# Patient Record
Sex: FEMALE | Race: WHITE | NOT HISPANIC OR LATINO | Employment: OTHER | URBAN - METROPOLITAN AREA
[De-identification: names, ages, dates, MRNs, and addresses within clinical notes are randomized per-mention and may not be internally consistent; named-entity substitution may affect disease eponyms.]

---

## 2023-08-17 ENCOUNTER — HOSPITAL ENCOUNTER (OUTPATIENT)
Dept: DATA CONVERSION | Facility: HOSPITAL | Age: 83
Discharge: HOME | End: 2023-08-17
Payer: MEDICARE

## 2023-08-17 DIAGNOSIS — N18.4 CHRONIC KIDNEY DISEASE, STAGE 4 (SEVERE) (MULTI): ICD-10-CM

## 2023-08-17 LAB
ALBUMIN SERPL-MCNC: 4.1 GM/DL (ref 3.5–5)
ANION GAP SERPL CALCULATED.3IONS-SCNC: 11 MMOL/L (ref 0–19)
BUN SERPL-MCNC: 36 MG/DL (ref 8–25)
BUN/CREAT SERPL: 22.5 RATIO (ref 8–21)
CALCIUM SERPL-MCNC: 9.3 MG/DL (ref 8.5–10.4)
CHLORIDE SERPL-SCNC: 109 MMOL/L (ref 97–107)
CO2 SERPL-SCNC: 20 MMOL/L (ref 24–31)
CREAT SERPL-MCNC: 1.6 MG/DL (ref 0.4–1.6)
DEPRECATED RDW RBC AUTO: 52.3 FL (ref 37–54)
ERYTHROCYTE [DISTWIDTH] IN BLOOD BY AUTOMATED COUNT: 14.3 % (ref 11.7–15)
GFR SERPL CREATININE-BSD FRML MDRD: 32 ML/MIN/1.73 M2
GLUCOSE SERPL-MCNC: 88 MG/DL (ref 65–99)
HCT VFR BLD AUTO: 37.1 % (ref 36–44)
HGB BLD-MCNC: 11.4 GM/DL (ref 12–15)
MCH RBC QN AUTO: 31 PG (ref 26–34)
MCHC RBC AUTO-ENTMCNC: 30.7 % (ref 31–37)
MCV RBC AUTO: 100.8 FL (ref 80–100)
NRBC BLD-RTO: 0 /100 WBC
PHOSPHATE SERPL-MCNC: 3.5 MG/DL (ref 2.5–4.5)
PLATELET # BLD AUTO: 217 K/UL (ref 150–450)
PMV BLD AUTO: 12.1 CU (ref 7–12.6)
POTASSIUM SERPL-SCNC: 4.7 MMOL/L (ref 3.4–5.1)
PTH-INTACT SERPL-MCNC: 71 PG/ML (ref 15–65)
RBC # BLD AUTO: 3.68 M/UL (ref 4–4.9)
SODIUM SERPL-SCNC: 140 MMOL/L (ref 133–145)
WBC # BLD AUTO: 38.9 K/UL (ref 4.5–11)

## 2023-10-23 DIAGNOSIS — C91.10 CLL (CHRONIC LYMPHOCYTIC LEUKEMIA) (MULTI): Primary | ICD-10-CM

## 2023-10-24 PROBLEM — M79.7 FIBROMYALGIA: Status: ACTIVE | Noted: 2023-10-24

## 2023-10-24 PROBLEM — B02.9 SHINGLES: Status: ACTIVE | Noted: 2023-10-24

## 2023-10-24 PROBLEM — E66.01 MORBID OBESITY (MULTI): Status: ACTIVE | Noted: 2023-10-24

## 2023-10-24 PROBLEM — E03.9 ACQUIRED HYPOTHYROIDISM: Status: ACTIVE | Noted: 2019-12-26

## 2023-10-24 PROBLEM — N18.9 CKD (CHRONIC KIDNEY DISEASE): Status: ACTIVE | Noted: 2023-10-24

## 2023-10-24 PROBLEM — E78.5 DYSLIPIDEMIA: Status: ACTIVE | Noted: 2023-10-24

## 2023-10-24 PROBLEM — M21.70 LEG LENGTH DISCREPANCY: Status: ACTIVE | Noted: 2023-10-24

## 2023-10-24 PROBLEM — R79.89 ELEVATED SERUM CREATININE: Status: ACTIVE | Noted: 2021-02-09

## 2023-10-24 PROBLEM — Z96.652 HISTORY OF ARTHROPLASTY OF LEFT KNEE: Status: ACTIVE | Noted: 2023-10-24

## 2023-10-24 PROBLEM — R60.0 PERIPHERAL EDEMA: Status: ACTIVE | Noted: 2023-10-24

## 2023-10-24 PROBLEM — C95.90 LEUKEMIA (MULTI): Status: ACTIVE | Noted: 2020-08-04

## 2023-10-24 PROBLEM — M19.90 ARTHRITIS: Status: ACTIVE | Noted: 2023-10-24

## 2023-10-24 PROBLEM — M96.1 POSTLAMINECTOMY SYNDROME OF LUMBOSACRAL REGION: Status: ACTIVE | Noted: 2023-10-24

## 2023-10-24 PROBLEM — M17.9 DJD (DEGENERATIVE JOINT DISEASE) OF KNEE: Status: ACTIVE | Noted: 2023-10-24

## 2023-10-24 PROBLEM — F32.A DEPRESSION: Status: ACTIVE | Noted: 2023-10-24

## 2023-10-24 PROBLEM — M17.9 OSTEOARTHRITIS OF KNEE: Status: ACTIVE | Noted: 2023-10-24

## 2023-10-24 PROBLEM — M25.562 CHRONIC PAIN OF LEFT KNEE: Status: ACTIVE | Noted: 2023-10-24

## 2023-10-24 PROBLEM — N18.9 CHRONIC RENAL INSUFFICIENCY: Status: ACTIVE | Noted: 2021-05-12

## 2023-10-24 PROBLEM — C91.10 CHRONIC LYMPHOCYTIC LEUKEMIA (MULTI): Status: ACTIVE | Noted: 2023-10-24

## 2023-10-24 PROBLEM — I83.899 VARICOSE VEINS OF LEG WITH SWELLING: Status: ACTIVE | Noted: 2023-10-24

## 2023-10-24 PROBLEM — I10 BENIGN ESSENTIAL HYPERTENSION: Status: ACTIVE | Noted: 2019-12-12

## 2023-10-24 PROBLEM — R79.9 ABNORMAL BLOOD CHEMISTRY: Status: ACTIVE | Noted: 2023-10-24

## 2023-10-24 PROBLEM — R01.1 HEART MURMUR: Status: ACTIVE | Noted: 2023-10-24

## 2023-10-24 PROBLEM — E87.8 FLUID VOLUME DISORDER: Status: ACTIVE | Noted: 2023-10-24

## 2023-10-24 PROBLEM — R55 SYNCOPE: Status: ACTIVE | Noted: 2023-10-24

## 2023-10-24 PROBLEM — R27.0 ATAXIA: Status: ACTIVE | Noted: 2023-10-24

## 2023-10-24 PROBLEM — R07.9 CHEST PAIN: Status: ACTIVE | Noted: 2019-09-03

## 2023-10-24 PROBLEM — C44.92 SCC (SQUAMOUS CELL CARCINOMA): Status: ACTIVE | Noted: 2023-10-24

## 2023-10-24 PROBLEM — E78.49 ESSENTIAL FAMILIAL HYPERLIPIDEMIA: Status: ACTIVE | Noted: 2019-12-26

## 2023-10-24 PROBLEM — E55.9 MILD VITAMIN D DEFICIENCY: Status: ACTIVE | Noted: 2023-10-24

## 2023-10-24 PROBLEM — G89.4 CHRONIC PAIN SYNDROME: Status: ACTIVE | Noted: 2023-10-24

## 2023-10-24 PROBLEM — N63.10 BREAST MASS, RIGHT: Status: ACTIVE | Noted: 2023-10-24

## 2023-10-24 PROBLEM — M71.20 BAKER'S CYST OF KNEE: Status: ACTIVE | Noted: 2023-10-24

## 2023-10-24 PROBLEM — E78.00 HYPERCHOLESTEROLEMIA: Status: ACTIVE | Noted: 2021-02-09

## 2023-10-24 PROBLEM — R60.9 PERIPHERAL EDEMA: Status: ACTIVE | Noted: 2023-10-24

## 2023-10-24 PROBLEM — G89.29 CHRONIC PAIN OF LEFT KNEE: Status: ACTIVE | Noted: 2023-10-24

## 2023-10-24 PROBLEM — G90.522 COMPLEX REGIONAL PAIN SYNDROME TYPE 1 OF LEFT LOWER EXTREMITY: Status: ACTIVE | Noted: 2023-10-24

## 2023-10-24 RX ORDER — LEVOTHYROXINE SODIUM 125 UG/1
125 TABLET ORAL DAILY
COMMUNITY
End: 2023-12-19 | Stop reason: HOSPADM

## 2023-10-24 RX ORDER — CREAM BASE NO.135
CREAM (GRAM) MISCELLANEOUS
COMMUNITY
Start: 2015-10-15 | End: 2024-01-02 | Stop reason: HOSPADM

## 2023-10-24 RX ORDER — NIFEDIPINE 30 MG/1
TABLET, EXTENDED RELEASE ORAL
COMMUNITY
Start: 2023-03-29 | End: 2023-12-19 | Stop reason: HOSPADM

## 2023-10-24 RX ORDER — DULOXETIN HYDROCHLORIDE 30 MG/1
1 CAPSULE, DELAYED RELEASE ORAL DAILY
COMMUNITY
Start: 2015-03-02 | End: 2023-12-19 | Stop reason: HOSPADM

## 2023-10-24 RX ORDER — HYDRALAZINE HYDROCHLORIDE 50 MG/1
TABLET, FILM COATED ORAL
COMMUNITY
End: 2023-12-19 | Stop reason: HOSPADM

## 2023-10-24 RX ORDER — PANTOPRAZOLE SODIUM 40 MG/1
TABLET, DELAYED RELEASE ORAL
COMMUNITY
Start: 2018-05-15 | End: 2024-03-01 | Stop reason: ALTCHOICE

## 2023-10-24 RX ORDER — TRAMADOL HYDROCHLORIDE 50 MG/1
TABLET ORAL
COMMUNITY
Start: 2015-11-06 | End: 2023-12-19 | Stop reason: HOSPADM

## 2023-10-24 RX ORDER — FOLIC ACID 20 MG
CAPSULE ORAL
COMMUNITY
End: 2023-12-19 | Stop reason: HOSPADM

## 2023-10-24 RX ORDER — FOLIC ACID 0.4 MG
TABLET ORAL
COMMUNITY
Start: 2019-09-03 | End: 2024-01-02 | Stop reason: HOSPADM

## 2023-10-24 RX ORDER — GABAPENTIN 800 MG/1
TABLET ORAL
COMMUNITY
Start: 2019-01-25 | End: 2023-12-19 | Stop reason: HOSPADM

## 2023-10-24 RX ORDER — PNV NO.95/FERROUS FUM/FOLIC AC 28MG-0.8MG
TABLET ORAL
COMMUNITY
Start: 2022-11-16

## 2023-10-24 RX ORDER — CALCIUM CARB/VITAMIN D3/VIT K1 500-500-40
TABLET,CHEWABLE ORAL
COMMUNITY
Start: 2019-09-03 | End: 2023-12-21 | Stop reason: WASHOUT

## 2023-10-24 RX ORDER — ACETAMINOPHEN 325 MG/1
TABLET ORAL
COMMUNITY
End: 2024-01-24 | Stop reason: SDUPTHER

## 2023-10-24 RX ORDER — CHOLECALCIFEROL (VITAMIN D3) 50 MCG
TABLET ORAL
COMMUNITY

## 2023-10-24 RX ORDER — POTASSIUM CHLORIDE 20 MEQ/1
TABLET, EXTENDED RELEASE ORAL
COMMUNITY
End: 2023-12-19 | Stop reason: HOSPADM

## 2023-10-24 RX ORDER — SODIUM BICARBONATE 650 MG/1
TABLET ORAL
COMMUNITY
End: 2023-12-19 | Stop reason: HOSPADM

## 2023-10-24 RX ORDER — LEVOTHYROXINE SODIUM 137 UG/1
TABLET ORAL
COMMUNITY
Start: 2022-12-09 | End: 2023-12-19 | Stop reason: HOSPADM

## 2023-10-24 RX ORDER — SIMVASTATIN 10 MG/1
TABLET, FILM COATED ORAL
COMMUNITY
Start: 2016-03-23 | End: 2024-01-02 | Stop reason: HOSPADM

## 2023-10-24 RX ORDER — VALSARTAN 80 MG/1
TABLET ORAL
COMMUNITY
End: 2023-12-19 | Stop reason: HOSPADM

## 2023-10-24 RX ORDER — AMLODIPINE BESYLATE 10 MG/1
TABLET ORAL
COMMUNITY
Start: 2014-11-07 | End: 2023-12-19 | Stop reason: HOSPADM

## 2023-10-24 RX ORDER — AMITRIPTYLINE HYDROCHLORIDE 10 MG/1
TABLET, FILM COATED ORAL
COMMUNITY
End: 2024-01-02 | Stop reason: HOSPADM

## 2023-10-25 ENCOUNTER — APPOINTMENT (OUTPATIENT)
Dept: LAB | Facility: CLINIC | Age: 83
End: 2023-10-25
Payer: MEDICARE

## 2023-10-25 ENCOUNTER — OFFICE VISIT (OUTPATIENT)
Dept: HEMATOLOGY/ONCOLOGY | Facility: CLINIC | Age: 83
End: 2023-10-25
Payer: MEDICARE

## 2023-10-25 VITALS
SYSTOLIC BLOOD PRESSURE: 163 MMHG | HEART RATE: 58 BPM | OXYGEN SATURATION: 98 % | DIASTOLIC BLOOD PRESSURE: 70 MMHG | RESPIRATION RATE: 18 BRPM | TEMPERATURE: 97.7 F | WEIGHT: 181.44 LBS | BODY MASS INDEX: 34.28 KG/M2

## 2023-10-25 DIAGNOSIS — C91.10 CLL (CHRONIC LYMPHOCYTIC LEUKEMIA) (MULTI): Primary | ICD-10-CM

## 2023-10-25 LAB
ALBUMIN SERPL BCP-MCNC: 4.3 G/DL (ref 3.4–5)
ALP SERPL-CCNC: 88 U/L (ref 33–136)
ALT SERPL W P-5'-P-CCNC: 5 U/L (ref 7–45)
ANION GAP SERPL CALC-SCNC: 13 MMOL/L (ref 10–20)
AST SERPL W P-5'-P-CCNC: 12 U/L (ref 9–39)
BASOPHILS # BLD MANUAL: 0 X10*3/UL (ref 0–0.1)
BASOPHILS NFR BLD MANUAL: 0 %
BILIRUB SERPL-MCNC: 0.3 MG/DL (ref 0–1.2)
BUN SERPL-MCNC: 34 MG/DL (ref 6–23)
CALCIUM SERPL-MCNC: 9.4 MG/DL (ref 8.6–10.3)
CHLORIDE SERPL-SCNC: 106 MMOL/L (ref 98–107)
CO2 SERPL-SCNC: 23 MMOL/L (ref 21–32)
CREAT SERPL-MCNC: 1.8 MG/DL (ref 0.5–1.05)
EOSINOPHIL # BLD MANUAL: 0 X10*3/UL (ref 0–0.4)
EOSINOPHIL NFR BLD MANUAL: 0 %
ERYTHROCYTE [DISTWIDTH] IN BLOOD BY AUTOMATED COUNT: 13.8 % (ref 11.5–14.5)
GFR SERPL CREATININE-BSD FRML MDRD: 28 ML/MIN/1.73M*2
GLUCOSE SERPL-MCNC: 107 MG/DL (ref 74–99)
HCT VFR BLD AUTO: 37.2 % (ref 36–46)
HGB BLD-MCNC: 11.6 G/DL (ref 12–16)
IMM GRANULOCYTES # BLD AUTO: 0.12 X10*3/UL (ref 0–0.5)
IMM GRANULOCYTES NFR BLD AUTO: 0.3 % (ref 0–0.9)
LDH SERPL L TO P-CCNC: 147 U/L (ref 84–246)
LYMPHOCYTES # BLD MANUAL: 32.94 X10*3/UL (ref 0.8–3)
LYMPHOCYTES NFR BLD MANUAL: 79 %
MCH RBC QN AUTO: 31.5 PG (ref 26–34)
MCHC RBC AUTO-ENTMCNC: 31.2 G/DL (ref 32–36)
MCV RBC AUTO: 101 FL (ref 80–100)
MONOCYTES # BLD MANUAL: 0.83 X10*3/UL (ref 0.05–0.8)
MONOCYTES NFR BLD MANUAL: 2 %
NEUTS SEG # BLD MANUAL: 7.92 X10*3/UL (ref 1.6–5)
NEUTS SEG NFR BLD MANUAL: 19 %
NRBC BLD-RTO: 0 /100 WBCS (ref 0–0)
PLATELET # BLD AUTO: 272 X10*3/UL (ref 150–450)
PMV BLD AUTO: 11.5 FL (ref 7.5–11.5)
POTASSIUM SERPL-SCNC: 4.1 MMOL/L (ref 3.5–5.3)
PROT SERPL-MCNC: 7.3 G/DL (ref 6.4–8.2)
RBC # BLD AUTO: 3.68 X10*6/UL (ref 4–5.2)
RBC MORPH BLD: ABNORMAL
SODIUM SERPL-SCNC: 138 MMOL/L (ref 136–145)
TOTAL CELLS COUNTED BLD: 100
WBC # BLD AUTO: 41.7 X10*3/UL (ref 4.4–11.3)

## 2023-10-25 PROCEDURE — 99213 OFFICE O/P EST LOW 20 MIN: CPT | Performed by: NURSE PRACTITIONER

## 2023-10-25 PROCEDURE — 1159F MED LIST DOCD IN RCRD: CPT | Performed by: NURSE PRACTITIONER

## 2023-10-25 PROCEDURE — 3077F SYST BP >= 140 MM HG: CPT | Performed by: NURSE PRACTITIONER

## 2023-10-25 PROCEDURE — 1036F TOBACCO NON-USER: CPT | Performed by: NURSE PRACTITIONER

## 2023-10-25 PROCEDURE — 83615 LACTATE (LD) (LDH) ENZYME: CPT | Performed by: NURSE PRACTITIONER

## 2023-10-25 PROCEDURE — 1125F AMNT PAIN NOTED PAIN PRSNT: CPT | Performed by: NURSE PRACTITIONER

## 2023-10-25 PROCEDURE — 3078F DIAST BP <80 MM HG: CPT | Performed by: NURSE PRACTITIONER

## 2023-10-25 PROCEDURE — 85007 BL SMEAR W/DIFF WBC COUNT: CPT | Performed by: NURSE PRACTITIONER

## 2023-10-25 PROCEDURE — 85027 COMPLETE CBC AUTOMATED: CPT | Performed by: NURSE PRACTITIONER

## 2023-10-25 PROCEDURE — 36415 COLL VENOUS BLD VENIPUNCTURE: CPT | Performed by: NURSE PRACTITIONER

## 2023-10-25 PROCEDURE — 80053 COMPREHEN METABOLIC PANEL: CPT | Performed by: NURSE PRACTITIONER

## 2023-10-25 ASSESSMENT — COLUMBIA-SUICIDE SEVERITY RATING SCALE - C-SSRS
1. IN THE PAST MONTH, HAVE YOU WISHED YOU WERE DEAD OR WISHED YOU COULD GO TO SLEEP AND NOT WAKE UP?: NO
6. HAVE YOU EVER DONE ANYTHING, STARTED TO DO ANYTHING, OR PREPARED TO DO ANYTHING TO END YOUR LIFE?: NO
2. HAVE YOU ACTUALLY HAD ANY THOUGHTS OF KILLING YOURSELF?: NO

## 2023-10-25 ASSESSMENT — ENCOUNTER SYMPTOMS
OCCASIONAL FEELINGS OF UNSTEADINESS: 1
LOSS OF SENSATION IN FEET: 0
DEPRESSION: 0

## 2023-10-25 ASSESSMENT — PATIENT HEALTH QUESTIONNAIRE - PHQ9
SUM OF ALL RESPONSES TO PHQ9 QUESTIONS 1 AND 2: 0
2. FEELING DOWN, DEPRESSED OR HOPELESS: NOT AT ALL
1. LITTLE INTEREST OR PLEASURE IN DOING THINGS: NOT AT ALL

## 2023-10-25 ASSESSMENT — PAIN SCALES - GENERAL: PAINLEVEL: 10-WORST PAIN EVER

## 2023-10-25 NOTE — PROGRESS NOTES
Patient ID: Tracie Baltazar is a 83 y.o. female.    Interval History:   The patient comes in today for follow-up evaluation for history of stage I chronic lymphocytic leukemia diagnosed in  with Dr. Dunlap.  On presentation today she denies any night sweats, lymphadenopathy or unexplained weight loss. She has not had any interval infections or hospitalizations. No cough, chest pain or shortness of breath. No nausea or vomiting. No constipation or diarrhea. Appetite is good and weight is stable. Her primary complaint is left knee pain, ongoing since knee replacement.     Review of Systems:  A review of systems has been completed and are negative for complaints except what is stated in the HPI and/or past medical history    Allergies:  Ibuprofen     Medications:  Medication list reviewed with patient and updated in EMR     Past Medical History:  stage I chronic lymphocytic leukemia, rheumatoid arthritis, essential hypertension, sensorineural hearing loss, hypothyroidism.    Past Surgical History:  back surgery in  and , arthroscopic surgery on both knees in , hysterectomy in , tubal pregnancy in , salpingo-oophorectomy in , shoulder surgery, wrist surgery, cholecystectomy in , knee replacement in 2014, spinal cord stimulater in 2013.    Vital Signs:   /70 (BP Location: Left arm, Patient Position: Sitting, BP Cuff Size: Adult)   Pulse 58   Temp 36.5 °C (97.7 °F) (Temporal)   Resp 18   Wt 82.3 kg (181 lb 7 oz)   SpO2 98%   BMI 34.28 kg/m²     Physical Exam:  ECO  Constitutional: Well developed, awake/alert/oriented x3, no distress, alert and cooperative  Eyes: PER. sclera anicteric  ENMT: Oral mucosa moist  Respiratory/Thorax: Breathing is non-labored. Lungs are clear to auscultation bilaterally. No adventitious breath sounds  Cardiovascular: S1-S2. Regular rate and rhythm. No murmurs, rubs, or gallops appreciated  Gastrointestinal: Abdomen soft  nontender, nondistended, normal active bowel sounds. No hepatosplenomegly  Musculoskeletal: ROM intact, no joint swelling, normal strength  Extremities: normal extremities, no cyanosis, no edema, no clubbing  Neurologic: alert and oriented x3. Nonfocal exam. No myoclonus  Psychological: Pleasant, appropriate and easily engaged     Labs:  December 2021  WBC 39.2, HGB/HCT 11.6/37.3 ,000    July 2022  WBC 33.0, HGB/HCT 10.7/34.6, ,000    April 17, 2023  WBC 36.3, HBG/HCT 11.9/37.4, ,000    October 25, 2023  WBC 41.7, HBG/HCT 11.6/37.2, ,000    Assessment:  Rand Stage I chronic lymphocytic leukemia originally diagnosed in 2007 and has been treated with observation alone ever since.  - continue to monitor labs on an every 6 month basis along with physical exam  - reviewed with patient she should contact office should she develop any drenching night sweats, unexplained weight loss or lymphadenopathy.    Immunizations. The patient will continue getting the flu shot every fall. Continue to receive COVID-19 boosters as recommended by CDC. She has already received both of her shingles vaccinations. She will receive the pneumonia vaccine every five years.     Health maintenance. The patient will continue to remain up to date on age appropriate screening through her primary care physician.    Plan:  - 6 month follow-up:  - labs on return CBCd, CMP    Fili Calhoun, APRN-CNP

## 2023-10-25 NOTE — PROGRESS NOTES
Pt here today with daughter Linda. Pt states no new signs or symptoms. Medications, pharmacy preference and allergies were reviewed with patient and updated in the medical record. Patient verbalized understanding and agreement regarding discussed information via verbal feedback. Maritza notified.

## 2023-12-13 ENCOUNTER — LAB (OUTPATIENT)
Dept: LAB | Facility: LAB | Age: 83
End: 2023-12-13
Payer: MEDICARE

## 2023-12-13 ENCOUNTER — HOSPITAL ENCOUNTER (OUTPATIENT)
Facility: HOSPITAL | Age: 83
Setting detail: OBSERVATION
Discharge: OTHER INSTITUTION | DRG: 291 | End: 2023-12-14
Attending: EMERGENCY MEDICINE | Admitting: HOSPITALIST
Payer: MEDICARE

## 2023-12-13 ENCOUNTER — APPOINTMENT (OUTPATIENT)
Dept: RADIOLOGY | Facility: HOSPITAL | Age: 83
DRG: 291 | End: 2023-12-13
Payer: MEDICARE

## 2023-12-13 DIAGNOSIS — N18.4 CHRONIC KIDNEY DISEASE, STAGE 4 (SEVERE) (MULTI): Primary | ICD-10-CM

## 2023-12-13 DIAGNOSIS — R53.1 WEAKNESS: ICD-10-CM

## 2023-12-13 DIAGNOSIS — R11.14 BILIOUS VOMITING WITH NAUSEA: ICD-10-CM

## 2023-12-13 DIAGNOSIS — C91.10 CLL (CHRONIC LYMPHOCYTIC LEUKEMIA) (MULTI): ICD-10-CM

## 2023-12-13 DIAGNOSIS — R06.02 SHORTNESS OF BREATH: Primary | ICD-10-CM

## 2023-12-13 PROBLEM — R06.00 DYSPNEA: Status: ACTIVE | Noted: 2023-12-13

## 2023-12-13 LAB
25(OH)D3 SERPL-MCNC: 28 NG/ML (ref 31–100)
ALBUMIN SERPL-MCNC: 4.2 G/DL (ref 3.5–5)
ALP BLD-CCNC: 105 U/L (ref 35–125)
ALT SERPL-CCNC: 6 U/L (ref 5–40)
ANION GAP SERPL CALC-SCNC: 13 MMOL/L
AST SERPL-CCNC: 12 U/L (ref 5–40)
BASOPHILS # BLD MANUAL: 0 X10*3/UL (ref 0–0.1)
BASOPHILS NFR BLD MANUAL: 0 %
BILIRUB SERPL-MCNC: 0.3 MG/DL (ref 0.1–1.2)
BUN SERPL-MCNC: 32 MG/DL (ref 8–25)
CALCIUM SERPL-MCNC: 9.1 MG/DL (ref 8.5–10.4)
CHLORIDE SERPL-SCNC: 108 MMOL/L (ref 97–107)
CO2 SERPL-SCNC: 20 MMOL/L (ref 24–31)
CREAT SERPL-MCNC: 1.7 MG/DL (ref 0.4–1.6)
EOSINOPHIL # BLD MANUAL: 0 X10*3/UL (ref 0–0.4)
EOSINOPHIL NFR BLD MANUAL: 0 %
ERYTHROCYTE [DISTWIDTH] IN BLOOD BY AUTOMATED COUNT: 13.4 % (ref 11.5–14.5)
GFR SERPL CREATININE-BSD FRML MDRD: 30 ML/MIN/1.73M*2
GLUCOSE SERPL-MCNC: 94 MG/DL (ref 65–99)
HCT VFR BLD AUTO: 37.2 % (ref 36–46)
HGB BLD-MCNC: 11.6 G/DL (ref 12–16)
IMM GRANULOCYTES # BLD AUTO: 0.1 X10*3/UL (ref 0–0.5)
IMM GRANULOCYTES NFR BLD AUTO: 0.3 % (ref 0–0.9)
LYMPHOCYTES # BLD MANUAL: 23.94 X10*3/UL (ref 0.8–3)
LYMPHOCYTES NFR BLD MANUAL: 73 %
MCH RBC QN AUTO: 31.8 PG (ref 26–34)
MCHC RBC AUTO-ENTMCNC: 31.2 G/DL (ref 32–36)
MCV RBC AUTO: 102 FL (ref 80–100)
MONOCYTES # BLD MANUAL: 1.64 X10*3/UL (ref 0.05–0.8)
MONOCYTES NFR BLD MANUAL: 5 %
NEUTS SEG # BLD MANUAL: 7.22 X10*3/UL (ref 1.6–5)
NEUTS SEG NFR BLD MANUAL: 22 %
NRBC BLD-RTO: 0 /100 WBCS (ref 0–0)
NT-PROBNP SERPL-MCNC: 834 PG/ML (ref 0–624)
PLATELET # BLD AUTO: 244 X10*3/UL (ref 150–450)
POTASSIUM SERPL-SCNC: 4 MMOL/L (ref 3.4–5.1)
PROT SERPL-MCNC: 6.4 G/DL (ref 5.9–7.9)
PTH-INTACT SERPL-MCNC: 98.2 PG/ML (ref 18.5–88)
RBC # BLD AUTO: 3.65 X10*6/UL (ref 4–5.2)
RBC MORPH BLD: ABNORMAL
SODIUM SERPL-SCNC: 141 MMOL/L (ref 133–145)
TOTAL CELLS COUNTED BLD: 100
TROPONIN T SERPL-MCNC: 26 NG/L
TROPONIN T SERPL-MCNC: 26 NG/L
WBC # BLD AUTO: 32.8 X10*3/UL (ref 4.4–11.3)

## 2023-12-13 PROCEDURE — 82306 VITAMIN D 25 HYDROXY: CPT

## 2023-12-13 PROCEDURE — 85007 BL SMEAR W/DIFF WBC COUNT: CPT

## 2023-12-13 PROCEDURE — 85027 COMPLETE CBC AUTOMATED: CPT

## 2023-12-13 PROCEDURE — 80053 COMPREHEN METABOLIC PANEL: CPT

## 2023-12-13 PROCEDURE — 71046 X-RAY EXAM CHEST 2 VIEWS: CPT

## 2023-12-13 PROCEDURE — 2500000004 HC RX 250 GENERAL PHARMACY W/ HCPCS (ALT 636 FOR OP/ED)

## 2023-12-13 PROCEDURE — 83970 ASSAY OF PARATHORMONE: CPT

## 2023-12-13 PROCEDURE — G0378 HOSPITAL OBSERVATION PER HR: HCPCS

## 2023-12-13 PROCEDURE — 84484 ASSAY OF TROPONIN QUANT: CPT

## 2023-12-13 PROCEDURE — 36415 COLL VENOUS BLD VENIPUNCTURE: CPT

## 2023-12-13 PROCEDURE — 99285 EMERGENCY DEPT VISIT HI MDM: CPT | Mod: 25 | Performed by: EMERGENCY MEDICINE

## 2023-12-13 PROCEDURE — 96375 TX/PRO/DX INJ NEW DRUG ADDON: CPT | Performed by: HOSPITALIST

## 2023-12-13 PROCEDURE — 83880 ASSAY OF NATRIURETIC PEPTIDE: CPT

## 2023-12-13 RX ORDER — ONDANSETRON HYDROCHLORIDE 2 MG/ML
4 INJECTION, SOLUTION INTRAVENOUS ONCE
Status: COMPLETED | OUTPATIENT
Start: 2023-12-13 | End: 2023-12-13

## 2023-12-13 RX ORDER — FUROSEMIDE 10 MG/ML
20 INJECTION INTRAMUSCULAR; INTRAVENOUS ONCE
Status: COMPLETED | OUTPATIENT
Start: 2023-12-13 | End: 2023-12-13

## 2023-12-13 RX ADMIN — FUROSEMIDE 20 MG: 10 INJECTION, SOLUTION INTRAMUSCULAR; INTRAVENOUS at 16:07

## 2023-12-13 RX ADMIN — ONDANSETRON 4 MG: 2 INJECTION INTRAMUSCULAR; INTRAVENOUS at 16:07

## 2023-12-13 ASSESSMENT — ENCOUNTER SYMPTOMS
DIARRHEA: 1
CHEST TIGHTNESS: 1
PSYCHIATRIC NEGATIVE: 1
DIZZINESS: 1
CARDIOVASCULAR NEGATIVE: 1
HEMATOLOGIC/LYMPHATIC NEGATIVE: 1
LIGHT-HEADEDNESS: 1
EYES NEGATIVE: 1
SHORTNESS OF BREATH: 1
MUSCULOSKELETAL NEGATIVE: 1
WEAKNESS: 1
ALLERGIC/IMMUNOLOGIC NEGATIVE: 1

## 2023-12-13 ASSESSMENT — PAIN SCALES - GENERAL
PAINLEVEL_OUTOF10: 0 - NO PAIN
PAINLEVEL_OUTOF10: 0 - NO PAIN

## 2023-12-13 ASSESSMENT — PAIN - FUNCTIONAL ASSESSMENT: PAIN_FUNCTIONAL_ASSESSMENT: 0-10

## 2023-12-13 NOTE — H&P
History Of Present Illness  Tracie Baltazar is a 83 y.o. female presenting with a history of hypertension, hypothyroidism, IBS, chronic kidney disease stage IV, CLL.  Presents to the emergency department at [facility] via EMS after she was involved with her daughter around town today having routine blood work done and then doing some shopping after going to breakfast at the restaurant.   She does recall symptoms coming on fairly abruptly at the restaurant after eating feeling a bit lightheaded like she was going to pass out felt the breathing was a bit heavy  She continued to feel not herself at the department store had to sit down because she felt she was going to fall out  She was ultimately brought into the emergency department  She had an NIH score of 0  She had a BNP elevation of 834 reportedly no acute findings noted on EKG troponin mildly elevated at 26 with no change in follow-up troponin  Urinalysis not yet completed  Has had several loose stools down in the emergency department  No recent antibiotic usage  Denies any viral symptoms  WBC count elevated at 32.8 which is essentially in line with where she usually is hemoglobin stable  History of chronic kidney disease with a creatinine at 1.7  Given small dose of diuretic 20 mg IV in the emergency department for possible fluid overload with chest x-ray some mild prominence of pulmonary vasculature     Past Medical History  Past Medical History:   Diagnosis Date    Essential (primary) hypertension 05/15/2018    Hypertension       Surgical History  Past Surgical History:   Procedure Laterality Date    BACK SURGERY  01/30/2014    Back Surgery    CHOLECYSTECTOMY  01/30/2014    Cholecystectomy    HYSTERECTOMY  01/30/2014    Hysterectomy    OTHER SURGICAL HISTORY  01/30/2014    Shoulder Surgery Left        Social History  She reports that she has never smoked. She has never used smokeless tobacco. She reports that she does not currently use alcohol. She reports  "that she does not use drugs.    Family History  Family History   Problem Relation Name Age of Onset    Emphysema Mother      Emphysema Father      Heart attack Sister      Lung cancer Brother      Lymphoma Son      Multiple sclerosis Son          Allergies  Ibuprofen    Review of Systems   Eyes: Negative.    Respiratory:  Positive for chest tightness and shortness of breath.    Cardiovascular: Negative.  Negative for chest pain.   Gastrointestinal:  Positive for diarrhea.   Musculoskeletal: Negative.    Skin: Negative.    Allergic/Immunologic: Negative.    Neurological:  Positive for dizziness, weakness and light-headedness.   Hematological: Negative.    Psychiatric/Behavioral: Negative.     All other systems reviewed and are negative.       Physical Exam  Vitals and nursing note reviewed.   Constitutional:       Appearance: Normal appearance.   HENT:      Head: Normocephalic and atraumatic.      Nose: Nose normal.      Mouth/Throat:      Mouth: Mucous membranes are moist.   Eyes:      Conjunctiva/sclera: Conjunctivae normal.   Cardiovascular:      Rate and Rhythm: Normal rate and regular rhythm.      Pulses: Normal pulses.      Heart sounds: Normal heart sounds.   Pulmonary:      Effort: Pulmonary effort is normal.   Abdominal:      General: Abdomen is flat.      Palpations: Abdomen is soft.   Musculoskeletal:         General: Normal range of motion.   Skin:     General: Skin is warm.      Capillary Refill: Capillary refill takes less than 2 seconds.   Neurological:      General: No focal deficit present.      Mental Status: She is alert.      Cranial Nerves: No cranial nerve deficit.      Sensory: No sensory deficit.      Motor: No weakness.   Psychiatric:         Mood and Affect: Mood normal.          Last Recorded Vitals  Blood pressure 133/50, pulse 71, temperature 36.8 °C (98.2 °F), temperature source Oral, resp. rate 18, height 1.549 m (5' 1\"), weight 82.1 kg (181 lb), SpO2 98 %.    Relevant " Results        She was at home     Assessment/Plan   Principal Problem:    Shortness of breath  Active Problems:    Chronic lymphocytic leukemia (CMS/HCC)    Near syncope    Lightheadedness/near syncope  Generalized weakness without any focal neurologic deficits  Shortness of breath  -CT brain without  -Orthostatic vital signs  -Giving dose of diuretic in emergency department  -Obtain echocardiogram  -Continue trending troponin  -Telemetry monitoring  -Stool for C. Difficile  -VQ scan  -Follow-up urine analysis r/o UTI  -COVID screening    Hypertension  -Hold home hydralazine for now  -Continue home nifedipine    Hypothyroidism  -On Synthroid 137 mcg daily    Hyperlipidemia  -On statin    Chronic kidney disease stage IV  -Continue home sodium bicarbonate  -Renally adjust medications limit nephrotoxin exposure    DVT prophylaxis  -Subcutaneous Lovenox 30 mg daily       I spent 40 minutes in the professional and overall care of this patient.      JENS FischerC

## 2023-12-13 NOTE — ED TRIAGE NOTES
Pt complains of lightheadedness, and blurry vision that started about an hour ago.  Pt states that she feels more weak then normal as well

## 2023-12-14 ENCOUNTER — APPOINTMENT (OUTPATIENT)
Dept: RADIOLOGY | Facility: HOSPITAL | Age: 83
DRG: 291 | End: 2023-12-14
Payer: MEDICARE

## 2023-12-14 ENCOUNTER — HOSPITAL ENCOUNTER (INPATIENT)
Facility: HOSPITAL | Age: 83
LOS: 5 days | Discharge: HOME | DRG: 291 | End: 2023-12-19
Attending: INTERNAL MEDICINE | Admitting: INTERNAL MEDICINE
Payer: MEDICARE

## 2023-12-14 VITALS
DIASTOLIC BLOOD PRESSURE: 47 MMHG | BODY MASS INDEX: 34.17 KG/M2 | WEIGHT: 181 LBS | SYSTOLIC BLOOD PRESSURE: 181 MMHG | HEART RATE: 56 BPM | HEIGHT: 61 IN | TEMPERATURE: 97.5 F | OXYGEN SATURATION: 95 % | RESPIRATION RATE: 18 BRPM

## 2023-12-14 DIAGNOSIS — I50.33 CONGESTIVE HEART FAILURE WITH LEFT VENTRICULAR DIASTOLIC DYSFUNCTION, ACUTE ON CHRONIC (MULTI): Primary | ICD-10-CM

## 2023-12-14 DIAGNOSIS — I10 BENIGN ESSENTIAL HYPERTENSION: ICD-10-CM

## 2023-12-14 DIAGNOSIS — M79.7 FIBROMYALGIA: ICD-10-CM

## 2023-12-14 DIAGNOSIS — R06.02 SHORTNESS OF BREATH: ICD-10-CM

## 2023-12-14 LAB
ANION GAP SERPL CALC-SCNC: 13 MMOL/L
APPEARANCE UR: CLEAR
BACTERIA #/AREA URNS AUTO: ABNORMAL /HPF
BILIRUB UR STRIP.AUTO-MCNC: NEGATIVE MG/DL
BUN SERPL-MCNC: 37 MG/DL (ref 8–25)
CALCIUM SERPL-MCNC: 9.1 MG/DL (ref 8.5–10.4)
CHLORIDE SERPL-SCNC: 107 MMOL/L (ref 97–107)
CO2 SERPL-SCNC: 21 MMOL/L (ref 24–31)
COLOR UR: COLORLESS
CREAT SERPL-MCNC: 2 MG/DL (ref 0.4–1.6)
GFR SERPL CREATININE-BSD FRML MDRD: 24 ML/MIN/1.73M*2
GLUCOSE SERPL-MCNC: 90 MG/DL (ref 65–99)
GLUCOSE UR STRIP.AUTO-MCNC: NORMAL MG/DL
HOLD SPECIMEN: NORMAL
KETONES UR STRIP.AUTO-MCNC: NEGATIVE MG/DL
LEUKOCYTE ESTERASE UR QL STRIP.AUTO: ABNORMAL
MUCOUS THREADS #/AREA URNS AUTO: ABNORMAL /LPF
NITRITE UR QL STRIP.AUTO: ABNORMAL
PH UR STRIP.AUTO: 6.5 [PH]
POTASSIUM SERPL-SCNC: 3.6 MMOL/L (ref 3.4–5.1)
PROT UR STRIP.AUTO-MCNC: NEGATIVE MG/DL
RBC # UR STRIP.AUTO: NEGATIVE /UL
RBC #/AREA URNS AUTO: ABNORMAL /HPF
SARS-COV-2 RNA RESP QL NAA+PROBE: NOT DETECTED
SODIUM SERPL-SCNC: 141 MMOL/L (ref 133–145)
SP GR UR STRIP.AUTO: 1.01
SQUAMOUS #/AREA URNS AUTO: ABNORMAL /HPF
TROPONIN T SERPL-MCNC: 32 NG/L
UROBILINOGEN UR STRIP.AUTO-MCNC: NORMAL MG/DL
WBC #/AREA URNS AUTO: ABNORMAL /HPF

## 2023-12-14 PROCEDURE — 84484 ASSAY OF TROPONIN QUANT: CPT

## 2023-12-14 PROCEDURE — 70450 CT HEAD/BRAIN W/O DYE: CPT

## 2023-12-14 PROCEDURE — 1100000001 HC PRIVATE ROOM DAILY

## 2023-12-14 PROCEDURE — 87086 URINE CULTURE/COLONY COUNT: CPT | Mod: WESLAB

## 2023-12-14 PROCEDURE — 2500000004 HC RX 250 GENERAL PHARMACY W/ HCPCS (ALT 636 FOR OP/ED): Performed by: NURSE PRACTITIONER

## 2023-12-14 PROCEDURE — 81001 URINALYSIS AUTO W/SCOPE: CPT

## 2023-12-14 PROCEDURE — 2500000001 HC RX 250 WO HCPCS SELF ADMINISTERED DRUGS (ALT 637 FOR MEDICARE OP): Performed by: INTERNAL MEDICINE

## 2023-12-14 PROCEDURE — G0378 HOSPITAL OBSERVATION PER HR: HCPCS

## 2023-12-14 PROCEDURE — 36415 COLL VENOUS BLD VENIPUNCTURE: CPT | Performed by: NURSE PRACTITIONER

## 2023-12-14 PROCEDURE — 96365 THER/PROPH/DIAG IV INF INIT: CPT | Mod: 59 | Performed by: HOSPITALIST

## 2023-12-14 PROCEDURE — 2500000004 HC RX 250 GENERAL PHARMACY W/ HCPCS (ALT 636 FOR OP/ED): Performed by: PHYSICIAN ASSISTANT

## 2023-12-14 PROCEDURE — 80048 BASIC METABOLIC PNL TOTAL CA: CPT | Performed by: NURSE PRACTITIONER

## 2023-12-14 PROCEDURE — 2500000004 HC RX 250 GENERAL PHARMACY W/ HCPCS (ALT 636 FOR OP/ED): Performed by: INTERNAL MEDICINE

## 2023-12-14 PROCEDURE — 87635 SARS-COV-2 COVID-19 AMP PRB: CPT | Performed by: PHYSICIAN ASSISTANT

## 2023-12-14 RX ORDER — FOLIC ACID 1 MG/1
1 TABLET ORAL DAILY
Status: DISCONTINUED | OUTPATIENT
Start: 2023-12-15 | End: 2023-12-19 | Stop reason: HOSPADM

## 2023-12-14 RX ORDER — LEVOTHYROXINE SODIUM 137 UG/1
137 TABLET ORAL DAILY
Status: DISCONTINUED | OUTPATIENT
Start: 2023-12-15 | End: 2023-12-18

## 2023-12-14 RX ORDER — L. ACIDOPHILUS/L.BULGARICUS 1MM CELL
1 TABLET ORAL 2 TIMES DAILY
Status: DISCONTINUED | OUTPATIENT
Start: 2023-12-14 | End: 2023-12-19 | Stop reason: HOSPADM

## 2023-12-14 RX ORDER — NIFEDIPINE 30 MG/1
30 TABLET, FILM COATED, EXTENDED RELEASE ORAL
Status: DISCONTINUED | OUTPATIENT
Start: 2023-12-15 | End: 2023-12-14

## 2023-12-14 RX ORDER — CEFTRIAXONE 1 G/50ML
1 INJECTION, SOLUTION INTRAVENOUS DAILY
Status: CANCELLED | OUTPATIENT
Start: 2023-12-15

## 2023-12-14 RX ORDER — ENOXAPARIN SODIUM 100 MG/ML
30 INJECTION SUBCUTANEOUS DAILY
Status: CANCELLED | OUTPATIENT
Start: 2023-12-15

## 2023-12-14 RX ORDER — LEVOTHYROXINE SODIUM 137 UG/1
137 TABLET ORAL DAILY
Status: DISCONTINUED | OUTPATIENT
Start: 2023-12-14 | End: 2023-12-14 | Stop reason: HOSPADM

## 2023-12-14 RX ORDER — SODIUM BICARBONATE 650 MG/1
1300 TABLET ORAL 2 TIMES DAILY
Status: DISCONTINUED | OUTPATIENT
Start: 2023-12-14 | End: 2023-12-15

## 2023-12-14 RX ORDER — VALSARTAN 80 MG/1
80 TABLET ORAL DAILY
Status: DISCONTINUED | OUTPATIENT
Start: 2023-12-15 | End: 2023-12-16

## 2023-12-14 RX ORDER — AMLODIPINE BESYLATE 10 MG/1
10 TABLET ORAL DAILY
Status: DISCONTINUED | OUTPATIENT
Start: 2023-12-14 | End: 2023-12-16

## 2023-12-14 RX ORDER — SIMVASTATIN 10 MG/1
10 TABLET, FILM COATED ORAL NIGHTLY
Status: DISCONTINUED | OUTPATIENT
Start: 2023-12-14 | End: 2023-12-19 | Stop reason: HOSPADM

## 2023-12-14 RX ORDER — POLYETHYLENE GLYCOL 3350 17 G/17G
17 POWDER, FOR SOLUTION ORAL DAILY PRN
Status: DISCONTINUED | OUTPATIENT
Start: 2023-12-14 | End: 2023-12-14 | Stop reason: HOSPADM

## 2023-12-14 RX ORDER — NIFEDIPINE 30 MG/1
30 TABLET, FILM COATED, EXTENDED RELEASE ORAL
Status: DISCONTINUED | OUTPATIENT
Start: 2023-12-15 | End: 2023-12-14 | Stop reason: HOSPADM

## 2023-12-14 RX ORDER — SIMVASTATIN 20 MG/1
10 TABLET, FILM COATED ORAL NIGHTLY
Status: CANCELLED | OUTPATIENT
Start: 2023-12-14

## 2023-12-14 RX ORDER — POLYETHYLENE GLYCOL 3350 17 G/17G
17 POWDER, FOR SOLUTION ORAL DAILY PRN
Status: DISCONTINUED | OUTPATIENT
Start: 2023-12-14 | End: 2023-12-19 | Stop reason: HOSPADM

## 2023-12-14 RX ORDER — PANTOPRAZOLE SODIUM 40 MG/1
40 TABLET, DELAYED RELEASE ORAL
Status: DISCONTINUED | OUTPATIENT
Start: 2023-12-15 | End: 2023-12-14 | Stop reason: HOSPADM

## 2023-12-14 RX ORDER — PANTOPRAZOLE SODIUM 40 MG/1
40 TABLET, DELAYED RELEASE ORAL
Status: DISCONTINUED | OUTPATIENT
Start: 2023-12-15 | End: 2023-12-19 | Stop reason: HOSPADM

## 2023-12-14 RX ORDER — CEFTRIAXONE 1 G/50ML
1 INJECTION, SOLUTION INTRAVENOUS DAILY
Status: DISCONTINUED | OUTPATIENT
Start: 2023-12-15 | End: 2023-12-14

## 2023-12-14 RX ORDER — SIMVASTATIN 20 MG/1
10 TABLET, FILM COATED ORAL NIGHTLY
Status: DISCONTINUED | OUTPATIENT
Start: 2023-12-14 | End: 2023-12-14 | Stop reason: HOSPADM

## 2023-12-14 RX ORDER — NIFEDIPINE 30 MG/1
30 TABLET, FILM COATED, EXTENDED RELEASE ORAL
Status: CANCELLED | OUTPATIENT
Start: 2023-12-15

## 2023-12-14 RX ORDER — PANTOPRAZOLE SODIUM 40 MG/1
40 TABLET, DELAYED RELEASE ORAL
Status: CANCELLED | OUTPATIENT
Start: 2023-12-15

## 2023-12-14 RX ORDER — CEFTRIAXONE 1 G/50ML
1 INJECTION, SOLUTION INTRAVENOUS DAILY
Status: DISCONTINUED | OUTPATIENT
Start: 2023-12-14 | End: 2023-12-14 | Stop reason: HOSPADM

## 2023-12-14 RX ORDER — SODIUM BICARBONATE 650 MG/1
1300 TABLET ORAL 2 TIMES DAILY
Status: CANCELLED | OUTPATIENT
Start: 2023-12-14

## 2023-12-14 RX ORDER — GABAPENTIN 300 MG/1
300 CAPSULE ORAL 2 TIMES DAILY
Status: DISCONTINUED | OUTPATIENT
Start: 2023-12-14 | End: 2023-12-19 | Stop reason: HOSPADM

## 2023-12-14 RX ORDER — ENOXAPARIN SODIUM 100 MG/ML
30 INJECTION SUBCUTANEOUS DAILY
Status: DISCONTINUED | OUTPATIENT
Start: 2023-12-15 | End: 2023-12-17

## 2023-12-14 RX ORDER — POLYETHYLENE GLYCOL 3350 17 G/17G
17 POWDER, FOR SOLUTION ORAL DAILY PRN
Status: CANCELLED | OUTPATIENT
Start: 2023-12-14

## 2023-12-14 RX ORDER — LEVOTHYROXINE SODIUM 137 UG/1
137 TABLET ORAL DAILY
Status: CANCELLED | OUTPATIENT
Start: 2023-12-15

## 2023-12-14 RX ORDER — ENOXAPARIN SODIUM 100 MG/ML
30 INJECTION SUBCUTANEOUS DAILY
Status: DISCONTINUED | OUTPATIENT
Start: 2023-12-14 | End: 2023-12-14 | Stop reason: HOSPADM

## 2023-12-14 RX ORDER — LANOLIN ALCOHOL/MO/W.PET/CERES
1000 CREAM (GRAM) TOPICAL DAILY
Status: DISCONTINUED | OUTPATIENT
Start: 2023-12-14 | End: 2023-12-19 | Stop reason: HOSPADM

## 2023-12-14 RX ORDER — DULOXETIN HYDROCHLORIDE 20 MG/1
20 CAPSULE, DELAYED RELEASE ORAL DAILY
Status: DISCONTINUED | OUTPATIENT
Start: 2023-12-15 | End: 2023-12-19 | Stop reason: HOSPADM

## 2023-12-14 RX ORDER — SODIUM BICARBONATE 650 MG/1
1300 TABLET ORAL 2 TIMES DAILY
Status: DISCONTINUED | OUTPATIENT
Start: 2023-12-14 | End: 2023-12-14 | Stop reason: HOSPADM

## 2023-12-14 RX ADMIN — SODIUM BICARBONATE 650 MG TABLET 1300 MG: at 20:14

## 2023-12-14 RX ADMIN — ENOXAPARIN SODIUM 30 MG: 30 INJECTION SUBCUTANEOUS at 11:02

## 2023-12-14 RX ADMIN — GABAPENTIN 300 MG: 300 CAPSULE ORAL at 20:15

## 2023-12-14 RX ADMIN — CEFTRIAXONE SODIUM 1 G: 1 INJECTION, SOLUTION INTRAVENOUS at 11:57

## 2023-12-14 RX ADMIN — Medication 1 TABLET: at 20:14

## 2023-12-14 RX ADMIN — SIMVASTATIN 10 MG: 10 TABLET, FILM COATED ORAL at 20:14

## 2023-12-14 RX ADMIN — SODIUM BICARBONATE 650 MG TABLET 1300 MG: at 11:02

## 2023-12-14 SDOH — SOCIAL STABILITY: SOCIAL NETWORK
IN A TYPICAL WEEK, HOW MANY TIMES DO YOU TALK ON THE PHONE WITH FAMILY, FRIENDS, OR NEIGHBORS?: MORE THAN THREE TIMES A WEEK

## 2023-12-14 SDOH — SOCIAL STABILITY: SOCIAL INSECURITY: ARE YOU OR HAVE YOU BEEN THREATENED OR ABUSED PHYSICALLY, EMOTIONALLY, OR SEXUALLY BY ANYONE?: NO

## 2023-12-14 SDOH — SOCIAL STABILITY: SOCIAL INSECURITY: HAVE YOU HAD THOUGHTS OF HARMING ANYONE ELSE?: NO

## 2023-12-14 SDOH — HEALTH STABILITY: MENTAL HEALTH
STRESS IS WHEN SOMEONE FEELS TENSE, NERVOUS, ANXIOUS, OR CAN'T SLEEP AT NIGHT BECAUSE THEIR MIND IS TROUBLED. HOW STRESSED ARE YOU?: NOT AT ALL

## 2023-12-14 SDOH — ECONOMIC STABILITY: INCOME INSECURITY: HOW HARD IS IT FOR YOU TO PAY FOR THE VERY BASICS LIKE FOOD, HOUSING, MEDICAL CARE, AND HEATING?: NOT HARD AT ALL

## 2023-12-14 SDOH — SOCIAL STABILITY: SOCIAL INSECURITY: ARE THERE ANY APPARENT SIGNS OF INJURIES/BEHAVIORS THAT COULD BE RELATED TO ABUSE/NEGLECT?: NO

## 2023-12-14 SDOH — SOCIAL STABILITY: SOCIAL INSECURITY: WITHIN THE LAST YEAR, HAVE YOU BEEN AFRAID OF YOUR PARTNER OR EX-PARTNER?: NO

## 2023-12-14 SDOH — SOCIAL STABILITY: SOCIAL INSECURITY
WITHIN THE LAST YEAR, HAVE TO BEEN RAPED OR FORCED TO HAVE ANY KIND OF SEXUAL ACTIVITY BY YOUR PARTNER OR EX-PARTNER?: NO

## 2023-12-14 SDOH — ECONOMIC STABILITY: INCOME INSECURITY: IN THE LAST 12 MONTHS, WAS THERE A TIME WHEN YOU WERE NOT ABLE TO PAY THE MORTGAGE OR RENT ON TIME?: NO

## 2023-12-14 SDOH — ECONOMIC STABILITY: TRANSPORTATION INSECURITY
IN THE PAST 12 MONTHS, HAS THE LACK OF TRANSPORTATION KEPT YOU FROM MEDICAL APPOINTMENTS OR FROM GETTING MEDICATIONS?: NO

## 2023-12-14 SDOH — ECONOMIC STABILITY: HOUSING INSECURITY
IN THE LAST 12 MONTHS, WAS THERE A TIME WHEN YOU DID NOT HAVE A STEADY PLACE TO SLEEP OR SLEPT IN A SHELTER (INCLUDING NOW)?: NO

## 2023-12-14 SDOH — ECONOMIC STABILITY: INCOME INSECURITY: IN THE PAST 12 MONTHS, HAS THE ELECTRIC, GAS, OIL, OR WATER COMPANY THREATENED TO SHUT OFF SERVICE IN YOUR HOME?: NO

## 2023-12-14 SDOH — ECONOMIC STABILITY: FOOD INSECURITY: WITHIN THE PAST 12 MONTHS, YOU WORRIED THAT YOUR FOOD WOULD RUN OUT BEFORE YOU GOT MONEY TO BUY MORE.: NEVER TRUE

## 2023-12-14 SDOH — HEALTH STABILITY: PHYSICAL HEALTH: ON AVERAGE, HOW MANY MINUTES DO YOU ENGAGE IN EXERCISE AT THIS LEVEL?: 0 MIN

## 2023-12-14 SDOH — SOCIAL STABILITY: SOCIAL NETWORK: HOW OFTEN DO YOU ATTEND CHURCH OR RELIGIOUS SERVICES?: NEVER

## 2023-12-14 SDOH — HEALTH STABILITY: MENTAL HEALTH: HOW OFTEN DO YOU HAVE A DRINK CONTAINING ALCOHOL?: MONTHLY OR LESS

## 2023-12-14 SDOH — SOCIAL STABILITY: SOCIAL NETWORK: HOW OFTEN DO YOU GET TOGETHER WITH FRIENDS OR RELATIVES?: MORE THAN THREE TIMES A WEEK

## 2023-12-14 SDOH — SOCIAL STABILITY: SOCIAL INSECURITY: DOES ANYONE TRY TO KEEP YOU FROM HAVING/CONTACTING OTHER FRIENDS OR DOING THINGS OUTSIDE YOUR HOME?: NO

## 2023-12-14 SDOH — SOCIAL STABILITY: SOCIAL INSECURITY
WITHIN THE LAST YEAR, HAVE YOU BEEN KICKED, HIT, SLAPPED, OR OTHERWISE PHYSICALLY HURT BY YOUR PARTNER OR EX-PARTNER?: NO

## 2023-12-14 SDOH — SOCIAL STABILITY: SOCIAL NETWORK
DO YOU BELONG TO ANY CLUBS OR ORGANIZATIONS SUCH AS CHURCH GROUPS UNIONS, FRATERNAL OR ATHLETIC GROUPS, OR SCHOOL GROUPS?: NO

## 2023-12-14 SDOH — ECONOMIC STABILITY: FOOD INSECURITY: WITHIN THE PAST 12 MONTHS, THE FOOD YOU BOUGHT JUST DIDN'T LAST AND YOU DIDN'T HAVE MONEY TO GET MORE.: NEVER TRUE

## 2023-12-14 SDOH — SOCIAL STABILITY: SOCIAL NETWORK: HOW OFTEN DO YOU ATTENT MEETINGS OF THE CLUB OR ORGANIZATION YOU BELONG TO?: NEVER

## 2023-12-14 SDOH — HEALTH STABILITY: MENTAL HEALTH: HOW OFTEN DO YOU HAVE 6 OR MORE DRINKS ON ONE OCCASION?: NEVER

## 2023-12-14 SDOH — SOCIAL STABILITY: SOCIAL INSECURITY: ABUSE: ADULT

## 2023-12-14 SDOH — SOCIAL STABILITY: SOCIAL NETWORK: ARE YOU MARRIED, WIDOWED, DIVORCED, SEPARATED, NEVER MARRIED, OR LIVING WITH A PARTNER?: WIDOWED

## 2023-12-14 SDOH — HEALTH STABILITY: MENTAL HEALTH: HOW MANY STANDARD DRINKS CONTAINING ALCOHOL DO YOU HAVE ON A TYPICAL DAY?: 1 OR 2

## 2023-12-14 SDOH — SOCIAL STABILITY: SOCIAL INSECURITY: WITHIN THE LAST YEAR, HAVE YOU BEEN HUMILIATED OR EMOTIONALLY ABUSED IN OTHER WAYS BY YOUR PARTNER OR EX-PARTNER?: NO

## 2023-12-14 SDOH — ECONOMIC STABILITY: HOUSING INSECURITY: IN THE LAST 12 MONTHS, HOW MANY PLACES HAVE YOU LIVED?: 1

## 2023-12-14 SDOH — SOCIAL STABILITY: SOCIAL INSECURITY: WERE YOU ABLE TO COMPLETE ALL THE BEHAVIORAL HEALTH SCREENINGS?: YES

## 2023-12-14 SDOH — SOCIAL STABILITY: SOCIAL INSECURITY: DO YOU FEEL ANYONE HAS EXPLOITED OR TAKEN ADVANTAGE OF YOU FINANCIALLY OR OF YOUR PERSONAL PROPERTY?: NO

## 2023-12-14 SDOH — HEALTH STABILITY: PHYSICAL HEALTH: ON AVERAGE, HOW MANY DAYS PER WEEK DO YOU ENGAGE IN MODERATE TO STRENUOUS EXERCISE (LIKE A BRISK WALK)?: 0 DAYS

## 2023-12-14 SDOH — SOCIAL STABILITY: SOCIAL INSECURITY: HAS ANYONE EVER THREATENED TO HURT YOUR FAMILY OR YOUR PETS?: NO

## 2023-12-14 SDOH — ECONOMIC STABILITY: TRANSPORTATION INSECURITY
IN THE PAST 12 MONTHS, HAS LACK OF TRANSPORTATION KEPT YOU FROM MEETINGS, WORK, OR FROM GETTING THINGS NEEDED FOR DAILY LIVING?: NO

## 2023-12-14 SDOH — SOCIAL STABILITY: SOCIAL INSECURITY: DO YOU FEEL UNSAFE GOING BACK TO THE PLACE WHERE YOU ARE LIVING?: NO

## 2023-12-14 ASSESSMENT — ACTIVITIES OF DAILY LIVING (ADL)
BATHING: INDEPENDENT
FEEDING YOURSELF: INDEPENDENT
JUDGMENT_ADEQUATE_SAFELY_COMPLETE_DAILY_ACTIVITIES: YES
HEARING - LEFT EAR: HEARING AID
GROOMING: INDEPENDENT
LACK_OF_TRANSPORTATION: NO
PATIENT'S MEMORY ADEQUATE TO SAFELY COMPLETE DAILY ACTIVITIES?: YES
WALKS IN HOME: INDEPENDENT
DRESSING YOURSELF: INDEPENDENT
ADEQUATE_TO_COMPLETE_ADL: YES
TOILETING: INDEPENDENT
HEARING - RIGHT EAR: FUNCTIONAL
WALKS IN HOME: INDEPENDENT
TOILETING: INDEPENDENT
HEARING - LEFT EAR: FUNCTIONAL
PATIENT'S MEMORY ADEQUATE TO SAFELY COMPLETE DAILY ACTIVITIES?: YES
JUDGMENT_ADEQUATE_SAFELY_COMPLETE_DAILY_ACTIVITIES: YES
HEARING - RIGHT EAR: HEARING AID
DRESSING YOURSELF: INDEPENDENT
LACK_OF_TRANSPORTATION: NO
GROOMING: INDEPENDENT
BATHING: INDEPENDENT
FEEDING YOURSELF: INDEPENDENT

## 2023-12-14 ASSESSMENT — COGNITIVE AND FUNCTIONAL STATUS - GENERAL
WALKING IN HOSPITAL ROOM: A LITTLE
MOVING TO AND FROM BED TO CHAIR: A LITTLE
TOILETING: A LITTLE
STANDING UP FROM CHAIR USING ARMS: A LITTLE
WALKING IN HOSPITAL ROOM: A LITTLE
MOBILITY SCORE: 20
EATING MEALS: A LITTLE
STANDING UP FROM CHAIR USING ARMS: A LITTLE
DAILY ACTIVITIY SCORE: 23
TOILETING: A LITTLE
CLIMB 3 TO 5 STEPS WITH RAILING: A LITTLE
PATIENT BASELINE BEDBOUND: NO
MOVING TO AND FROM BED TO CHAIR: A LITTLE
DAILY ACTIVITIY SCORE: 22
MOBILITY SCORE: 20
CLIMB 3 TO 5 STEPS WITH RAILING: A LITTLE

## 2023-12-14 ASSESSMENT — PATIENT HEALTH QUESTIONNAIRE - PHQ9
2. FEELING DOWN, DEPRESSED OR HOPELESS: NOT AT ALL
1. LITTLE INTEREST OR PLEASURE IN DOING THINGS: NOT AT ALL
SUM OF ALL RESPONSES TO PHQ9 QUESTIONS 1 & 2: 0

## 2023-12-14 ASSESSMENT — ENCOUNTER SYMPTOMS
LIGHT-HEADEDNESS: 1
HEMATOLOGIC/LYMPHATIC NEGATIVE: 1
WEAKNESS: 1
ALLERGIC/IMMUNOLOGIC NEGATIVE: 1
EYES NEGATIVE: 1
PSYCHIATRIC NEGATIVE: 1
MUSCULOSKELETAL NEGATIVE: 1
CARDIOVASCULAR NEGATIVE: 1
SHORTNESS OF BREATH: 1
CHEST TIGHTNESS: 1
DIZZINESS: 1
DIARRHEA: 1

## 2023-12-14 ASSESSMENT — LIFESTYLE VARIABLES
AUDIT-C TOTAL SCORE: 0
PRESCIPTION_ABUSE_PAST_12_MONTHS: NO
SKIP TO QUESTIONS 9-10: 1
EVER FELT BAD OR GUILTY ABOUT YOUR DRINKING: NO
REASON UNABLE TO ASSESS: NO
HAVE YOU EVER FELT YOU SHOULD CUT DOWN ON YOUR DRINKING: NO
AUDIT-C TOTAL SCORE: 0
HOW OFTEN DO YOU HAVE 6 OR MORE DRINKS ON ONE OCCASION: NEVER
HOW OFTEN DO YOU HAVE A DRINK CONTAINING ALCOHOL: NEVER
HAVE PEOPLE ANNOYED YOU BY CRITICIZING YOUR DRINKING: NO
SKIP TO QUESTIONS 9-10: 1
EVER HAD A DRINK FIRST THING IN THE MORNING TO STEADY YOUR NERVES TO GET RID OF A HANGOVER: NO
HOW MANY STANDARD DRINKS CONTAINING ALCOHOL DO YOU HAVE ON A TYPICAL DAY: PATIENT DOES NOT DRINK
AUDIT-C TOTAL SCORE: 1
SUBSTANCE_ABUSE_PAST_12_MONTHS: NO

## 2023-12-14 ASSESSMENT — COLUMBIA-SUICIDE SEVERITY RATING SCALE - C-SSRS
6. HAVE YOU EVER DONE ANYTHING, STARTED TO DO ANYTHING, OR PREPARED TO DO ANYTHING TO END YOUR LIFE?: NO
2. HAVE YOU ACTUALLY HAD ANY THOUGHTS OF KILLING YOURSELF?: NO
1. IN THE PAST MONTH, HAVE YOU WISHED YOU WERE DEAD OR WISHED YOU COULD GO TO SLEEP AND NOT WAKE UP?: NO

## 2023-12-14 ASSESSMENT — PAIN SCALES - GENERAL
PAINLEVEL_OUTOF10: 0 - NO PAIN

## 2023-12-14 NOTE — PROGRESS NOTES
12/14/23 1346   [facility] Disability Status   Are you deaf or do you have serious difficulty hearing? Y   Are you blind or do you have serious difficulty seeing, even when wearing glasses? N   Because of a physical, mental, or emotional condition, do you have serious difficulty concentrating, remembering, or making decisions? (5 years old or older) Y   Do you have serious difficulty walking or climbing stairs? Y   Do you have serious difficulty dressing or bathing? N   Because of a physical, mental, or emotional condition, do you have serious difficulty doing errands alone such as visiting the doctor? N

## 2023-12-14 NOTE — CARE PLAN
Pt has a POA/Living Will not on file    ADOD: 2 days    Pt is [place]; her hearing aid batteries are drained. Phoned pts dtr Linda Vail at [phone] for information.    Pt lives at home with her son Brock in a 2 story home with the bed and bath on the 1st floor and 3 steps to enter the house.  She does not use home 02, no cpap or bipap. She is independent with dressing and showering. She does not drive (she has never driven); she is able to shop, and do the laundry. Her children drive her to her appointments and to the store. Her children assist her with the housework.]She is being tx for depression (  in March), No S. I. Per dtr Linda.  She wears glasses, and bilat hearing aids. She does use a walker  Pt is here for SOB; no 02 in the ED, possible discharge home today.    DISCHARGE PLAN: HOME WITH FAMILY

## 2023-12-14 NOTE — DISCHARGE SUMMARY
Discharge Diagnosis  Dizziness, lightheaded, shortness of breath    Issues Requiring Follow-Up  Urinary tract infection, CKD 4, CLL    Discharge Meds     Your medication list        ASK your doctor about these medications        Instructions Last Dose Given Next Dose Due   acetaminophen 325 mg tablet  Commonly known as: Tylenol           amitriptyline 10 mg tablet  Commonly known as: Elavil           amLODIPine 10 mg tablet  Commonly known as: Norvasc           cholestyramine-aspartame 4 gram packet  Commonly known as: Prevalite           DULoxetine 30 mg DR capsule  Commonly known as: Cymbalta           folic acid 20 mg capsule           folic acid 400 mcg tablet  Commonly known as: Folvite           gabapentin 800 mg tablet  Commonly known as: Neurontin           hydrALAZINE 50 mg tablet  Commonly known as: Apresoline           HYDROCHLOROTHIAZIDE ORAL           lactobacillus acidoph-lactobacillus bulgar 0.5 million cell tablet chewable split tablet           levothyroxine 125 mcg tablet  Commonly known as: Synthroid, Levoxyl           levothyroxine 137 mcg tablet  Commonly known as: Synthroid, Levoxyl           NIFEdipine ER 30 mg 24 hr tablet  Commonly known as: Adalat CC           pantoprazole 40 mg EC tablet  Commonly known as: ProtoNix           potassium chloride CR 20 mEq ER tablet  Commonly known as: Klor-Con M20           SaltStable LS cream  Generic drug: cream base no.135 (bulk)           simvastatin 10 mg tablet  Commonly known as: Zocor           sodium bicarbonate 650 mg tablet           traMADol 50 mg tablet  Commonly known as: Ultram           valsartan 80 mg tablet  Commonly known as: Diovan           Vitamin B-12 100 mcg tablet  Generic drug: cyanocobalamin           cholecalciferol 50 MCG (2000 UT) tablet  Commonly known as: Vitamin D-3           Vitamin D3 400 unit capsule  Generic drug: cholecalciferol                    Test Results Pending At Discharge  Pending Labs       No current pending  labs.            Hospital Course   83-year-old female known history of hypertension stage IV CKD hypothyroidism IBS and CLL presents to the emergency department at [facility] via EMS for complaints of shortness of breath feeling lightheaded after eating out at a restaurant.  Daughter reports some nausea as well.  Workup in the emergency room remarkable for mildly elevated BNP at 834, chest x-ray with possible mild prominence of pulmonary vasculature, creatinine is 1.7 which is the patient's baseline, WBC count 32.8 which is baseline.  UA positive, stool studies have been ordered but not done as patient has not had a bowel movement since admission.  Her initial NIH score was 0.  She has been admitted for further evaluation and treatment, VQ scan was ordered, echocardiogram was ordered, stool studies ordered, home antihypertensives were held.  Patient is being transferred [facility] for observation RNF.    Pertinent Physical Exam At Time of Discharge  Physical Exam  Vitals and nursing note reviewed.   Constitutional:       General: She is not in acute distress.     Appearance: She is not ill-appearing.   HENT:      Head: Normocephalic and atraumatic.      Comments: Axis, left-sided amblyopia     Nose: Nose normal.      Mouth/Throat:      Mouth: Mucous membranes are moist.      Pharynx: Oropharynx is clear.   Eyes:      Extraocular Movements: Extraocular movements intact.      Pupils: Pupils are equal, round, and reactive to light.   Cardiovascular:      Rate and Rhythm: Normal rate and regular rhythm.      Pulses: Normal pulses.      Heart sounds: No murmur heard.  Pulmonary:      Effort: Pulmonary effort is normal. No respiratory distress.      Breath sounds: Normal breath sounds.   Chest:      Chest wall: No tenderness.   Abdominal:      General: Abdomen is flat. Bowel sounds are normal. There is no distension.      Palpations: Abdomen is soft.      Tenderness: There is no abdominal tenderness.       Comments: Mild suprapubic discomfort, mild epigastric discomfort with deeper palpation   Musculoskeletal:         General: No swelling, tenderness, deformity or signs of injury. Normal range of motion.      Cervical back: Normal range of motion and neck supple.   Skin:     General: Skin is warm and dry.      Capillary Refill: Capillary refill takes 2 to 3 seconds.   Neurological:      General: No focal deficit present.      Mental Status: She is alert and oriented to person, place, and time.   Psychiatric:         Mood and Affect: Mood normal.         Outpatient Follow-Up  Future Appointments   Date Time Provider Department Center   4/22/2024  1:30 PM Fili Cahloun, APRN-CNP [id number]         Krys Breaux, APRN-CNP

## 2023-12-14 NOTE — ED NOTES
Assumed care on patient     Juhi Garcia Anaya, SADI  12/14/23 0732    
Attempted to give nurse to nurse report.   Per  nurse is at  lunch.   Will call this nurse back.           Juhi Julien, SADI  12/14/23 8964    
Gave report to Marisa at [facility]     Juhi Julien, LPN  12/14/23 5320    
Offered patient breakfast.  Pt. Denied.   Pt. Stated she is not hungry     Juhi Julien, SADI  12/14/23 101    
Patient wears hearing aides.   Family present at bedside and took them with them     Juhi Julien, SADI  12/14/23 2095    
yes...

## 2023-12-14 NOTE — NURSING NOTE
Pt arrived via ambulance from [facility] ER no distress noted at this time no c/o. Pt alert and oriented ambulates with steady gait and SBA of 1

## 2023-12-14 NOTE — PROGRESS NOTES
Anointed in ER today  Will follow her to [facility] for later visits  Vikas Ervin

## 2023-12-14 NOTE — PROGRESS NOTES
12/14/23 1347   Current Planned Discharge Disposition   Current Planned Discharge Disposition Home

## 2023-12-14 NOTE — H&P
History Of Present Illness  Tracie Baltazar is a 83 y.o. female presenting with a history of hypertension, hypothyroidism, IBS, chronic kidney disease stage IV, CLL.  Presents to the emergency department at [facility] via EMS after she was involved with her daughter around town today having routine blood work done and then doing some shopping after going to breakfast at the restaurant.   She does recall symptoms coming on fairly abruptly at the restaurant after eating feeling a bit lightheaded like she was going to pass out felt the breathing was a bit heavy  She continued to feel not herself at the department store had to sit down because she felt she was going to fall out  She was ultimately brought into the emergency department  She had an NIH score of 0  She had a BNP elevation of 834 reportedly no acute findings noted on EKG troponin mildly elevated at 26 with no change in follow-up troponin  Urinalysis not yet completed  Has had several loose stools down in the emergency department  No recent antibiotic usage  Denies any viral symptoms  WBC count elevated at 32.8 which is essentially in line with where she usually is hemoglobin stable  History of chronic kidney disease with a creatinine at 1.7  Given small dose of diuretic 20 mg IV in the emergency department for possible fluid overload with chest x-ray some mild prominence of pulmonary vasculature     Past Medical History  Past Medical History:   Diagnosis Date    Essential (primary) hypertension 05/15/2018    Hypertension       Surgical History  Past Surgical History:   Procedure Laterality Date    BACK SURGERY  01/30/2014    Back Surgery    CHOLECYSTECTOMY  01/30/2014    Cholecystectomy    HYSTERECTOMY  01/30/2014    Hysterectomy    OTHER SURGICAL HISTORY  01/30/2014    Shoulder Surgery Left        Social History  She reports that she has never smoked. She has never used smokeless tobacco. She reports that she does not currently use alcohol. She reports  that she does not use drugs.    Family History  Family History   Problem Relation Name Age of Onset    Emphysema Mother      Emphysema Father      Heart attack Sister      Lung cancer Brother      Lymphoma Son      Multiple sclerosis Son          Allergies  Ibuprofen    Review of Systems   Eyes: Negative.    Respiratory:  Positive for chest tightness and shortness of breath.    Cardiovascular: Negative.  Negative for chest pain.   Gastrointestinal:  Positive for diarrhea.   Musculoskeletal: Negative.    Skin: Negative.    Allergic/Immunologic: Negative.    Neurological:  Positive for dizziness, weakness and light-headedness.   Hematological: Negative.    Psychiatric/Behavioral: Negative.     All other systems reviewed and are negative.       Physical Exam  Vitals and nursing note reviewed.   Constitutional:       Appearance: Normal appearance.   HENT:      Head: Normocephalic and atraumatic.      Nose: Nose normal.      Mouth/Throat:      Mouth: Mucous membranes are moist.   Eyes:      Conjunctiva/sclera: Conjunctivae normal.   Cardiovascular:      Rate and Rhythm: Normal rate and regular rhythm.      Pulses: Normal pulses.      Heart sounds: Normal heart sounds.   Pulmonary:      Effort: Pulmonary effort is normal.   Abdominal:      General: Abdomen is flat.      Palpations: Abdomen is soft.   Musculoskeletal:         General: Normal range of motion.   Skin:     General: Skin is warm.      Capillary Refill: Capillary refill takes less than 2 seconds.   Neurological:      General: No focal deficit present.      Mental Status: She is alert.      Cranial Nerves: No cranial nerve deficit.      Sensory: No sensory deficit.      Motor: No weakness.   Psychiatric:         Mood and Affect: Mood normal.        Last Recorded Vitals  Blood pressure 173/53, pulse 56, temperature 37.2 °C (99 °F), temperature source Oral, resp. rate 18, SpO2 100 %.    Relevant Results    Scheduled medications  [START ON 12/15/2023] cefTRIAXone,  1 g, intravenous, Daily  [START ON 12/15/2023] enoxaparin, 30 mg, subcutaneous, Daily  [START ON 12/15/2023] levothyroxine, 137 mcg, oral, Daily  [START ON 12/15/2023] NIFEdipine ER, 30 mg, oral, Daily before breakfast  [START ON 12/15/2023] pantoprazole, 40 mg, oral, Daily before breakfast  perflutren lipid microspheres, 0.5-10 mL of dilution, intravenous, Once in imaging  perflutren protein A microsphere, 0.5 mL, intravenous, Once in imaging  simvastatin, 10 mg, oral, Nightly  sodium bicarbonate, 1,300 mg, oral, BID  sulfur hexafluoride microsphr, 2 mL, intravenous, Once in imaging  Tc-99m-albumin, 4 millicurie, intravenous, Once in imaging      Continuous medications     PRN medications  PRN medications: polyethylene glycol  Results for orders placed or performed during the hospital encounter of 12/13/23 (from the past 24 hour(s))   Urinalysis with Reflex Microscopic and Culture   Result Value Ref Range    Color, Urine Colorless (N) Light-Yellow, Yellow, Dark-Yellow    Appearance, Urine Clear Clear    Specific Gravity, Urine 1.010 1.005 - 1.035    pH, Urine 6.5 5.0, 5.5, 6.0, 6.5, 7.0, 7.5, 8.0    Protein, Urine NEGATIVE NEGATIVE, 10 (TRACE), 20 (TRACE) mg/dL    Glucose, Urine Normal Normal mg/dL    Blood, Urine NEGATIVE NEGATIVE    Ketones, Urine NEGATIVE NEGATIVE mg/dL    Bilirubin, Urine NEGATIVE NEGATIVE    Urobilinogen, Urine Normal Normal mg/dL    Nitrite, Urine 2+ (A) NEGATIVE    Leukocyte Esterase, Urine 75 Luis M/µL (A) NEGATIVE   Extra Urine Gray Tube   Result Value Ref Range    Extra Tube Hold for add-ons.    Microscopic Only, Urine   Result Value Ref Range    WBC, Urine 1-5 1-5, NONE /HPF    RBC, Urine NONE NONE, 1-2, 3-5 /HPF    Squamous Epithelial Cells, Urine 1-9 (SPARSE) Reference range not established. /HPF    Bacteria, Urine 1+ (A) NONE SEEN /HPF    Mucus, Urine FEW Reference range not established. /LPF   Serial Troponin, 6 Hour ([facility])   Result Value Ref Range    Troponin T, High Sensitivity 32 (H)  <=15 ng/L   Basic metabolic panel   Result Value Ref Range    Glucose 90 65 - 99 mg/dL    Sodium 141 133 - 145 mmol/L    Potassium 3.6 3.4 - 5.1 mmol/L    Chloride 107 97 - 107 mmol/L    Bicarbonate 21 (L) 24 - 31 mmol/L    Urea Nitrogen 37 (H) 8 - 25 mg/dL    Creatinine 2.00 (H) 0.40 - 1.60 mg/dL    eGFR 24 (L) >60 mL/min/1.73m*2    Calcium 9.1 8.5 - 10.4 mg/dL    Anion Gap 13 <=19 mmol/L   Sars-CoV-2 PCR, Screen Asymptomatic   Result Value Ref Range    Coronavirus 2019, PCR Not Detected Not Detected     XR chest 2 views 12/13/2023    Narrative  Interpreted By:  Nj Zazueta,  STUDY:  XR CHEST 2 VIEWS; 12/13/2023 2:47 pm    INDICATION:  Signs/Symptoms:SOB dizziness    COMPARISON:  Chest radiograph 06/09/2020    ACCESSION NUMBER(S):  [id number]    ORDERING CLINICIAN:  LA SAEED    TECHNIQUE:  AP and lateral views of the chest performed.    FINDINGS:  Cardiac size is indeterminate due to the AP projection.  Calcifications are seen at the aortic knob. Thoracic aorta is  tortuous in appearance. Mild prominence of the pulmonary vasculature.  Faint bibasilar hazy opacities likely reflect atelectasis. No focal  consolidation, large pleural effusion, or visible pneumothorax.  Multilevel discogenic degenerative changes of the thoracic spine.  Presumed neurostimulator leads overlying the thoracic spine.    Impression  Mild prominence of the pulmonary vasculature, as can be seen with  pulmonary vascular congestion. Please correlate for corresponding  symptoms.    No focal consolidation or visible pneumothorax.    Faint bibasilar atelectasis suspected.    MACRO:  None.    Signed by: Nj Zazueta 12/13/2023 3:04 PM  Dictation workstation:   [id number]     She was at home     Assessment/Plan   Active Problems:  There are no active Hospital Problems.    Lightheadedness/near syncope  Generalized weakness without any focal neurologic deficits  Shortness of breath  -CT brain without  -Orthostatic vital signs  -Giving  dose of diuretic in emergency department  -Obtain echocardiogram  -Continue trending troponin  -Telemetry monitoring  -Stool for C. Difficile  -VQ scan  -Follow-up urine analysis r/o UTI  -COVID screening    Hypertension  -Hold home hydralazine for now  -Continue home nifedipine    Hypothyroidism  -On Synthroid 137 mcg daily    Hyperlipidemia  -On statin    Chronic kidney disease stage IV  -Continue home sodium bicarbonate  -Renally adjust medications limit nephrotoxin exposure    DVT prophylaxis  -Subcutaneous Lovenox 30 mg daily     Patient was seen and evaluated on transfer from [facility] emergency department no beds were available for the patient reviewing her clinical history and meeting with the patient and her family she is requesting a DNR CCA DNI status.  Reviewing her chest x-ray suggests diastolic congestive heart failure echocardiogram is pending cardiology consultation will be obtained as well as nephrology consultation given her CKD stage IV I do not clinically find evidence to suggest this patient has had a pulmonary embolism and I see no need to perform a VQ scan.  Her urinary analysis suggests contamination rather than actual infection we will discontinue IV antibiotics she has a remote history of C. difficile colitis is not been on antibiotics in the last 3 monthsAnd has had no further diarrhea and isolation can be discontinued relative to this.  We will continue to monitor the patient evaluated physical and Occupational Therapy and anticipating discharge of the patient in the next 24 to 48 hours    I spent 40 minutes in the professional and overall care of this patient.      Rashawn Pedersen DO

## 2023-12-14 NOTE — PROGRESS NOTES
IN BRIEF   83-year-old female presents with lightheadedness.  At the time of my evaluation t the hospitalist PA was present and evaluation is therefore limited.  I did however discuss this with the ER PA and I agree with his assessment.  She complains of lightheadedness weakness for approximately 1 hour.  She was doing errands when this occurred suddenly.  She felt as though she was going to pass out but never actually syncopized.  There is no associated shortness of breath.  She does have a significant history of CLL with blood work that was obtained this morning.    Lab work returned showing a minimally elevated troponin but its increased in BNP.  Patient does appear to be slightly clinically volume overloaded.  Patient will be admitted for further care.    I personally saw the patient and performed a substantive portion of the visit including all aspects of the medical decision making.   Parts of this chart were completed with dictation software, please excuse any errors in transcription.     Kami Sutton, DO  11:02 PM

## 2023-12-14 NOTE — CARE PLAN
The patient's goals for the shift include      The clinical goals for the shift include less sob  Problem: Fall/Injury  Goal: Not fall by end of shift  Outcome: Progressing  Goal: Be free from injury by end of the shift  Outcome: Progressing  Goal: Verbalize understanding of personal risk factors for fall in the hospital  Outcome: Progressing  Goal: Verbalize understanding of risk factor reduction measures to prevent injury from fall in the home  Outcome: Progressing  Goal: Use assistive devices by end of the shift  Outcome: Progressing  Goal: Pace activities to prevent fatigue by end of the shift  Outcome: Progressing     Problem: Pain  Goal: My pain/discomfort is manageable  Outcome: Progressing     Problem: Safety  Goal: Patient will be injury free during hospitalization  Outcome: Progressing  Goal: I will remain free of falls  Outcome: Progressing     Problem: Daily Care  Goal: Daily care needs are met  Outcome: Progressing     Problem: Psychosocial Needs  Goal: Demonstrates ability to cope with hospitalization/illness  Outcome: Progressing  Goal: Collaborate with me, my family, and caregiver to identify my specific goals  Outcome: Progressing     Problem: Discharge Barriers  Goal: My discharge needs are met  Outcome: Progressing      Detail Level: Generalized Detail Level: Zone Detail Level: Detailed Detail Level: Simple

## 2023-12-14 NOTE — PROGRESS NOTES
12/14/23 1343   Physical Activity   On average, how many days per week do you engage in moderate to strenuous exercise (like a brisk walk)? 0 days   On average, how many minutes do you engage in exercise at this level? 0 min   Financial Resource Strain   How hard is it for you to pay for the very basics like food, housing, medical care, and heating? Not hard   Housing Stability   In the last 12 months, was there a time when you were not able to pay the mortgage or rent on time? N   In the last 12 months, how many places have you lived? 1   In the last 12 months, was there a time when you did not have a steady place to sleep or slept in a shelter (including now)? N   Transportation Needs   In the past 12 months, has lack of transportation kept you from medical appointments or from getting medications? no   In the past 12 months, has lack of transportation kept you from meetings, work, or from getting things needed for daily living? No   Food Insecurity   Within the past 12 months, you worried that your food would run out before you got the money to buy more. Never true   Within the past 12 months, the food you bought just didn't last and you didn't have money to get more. Never true   Stress   Do you feel stress - tense, restless, nervous, or anxious, or unable to sleep at night because your mind is troubled all the time - these days? Not at all   Social Connections   In a typical week, how many times do you talk on the phone with family, friends, or neighbors? More than 3   How often do you get together with friends or relatives? More than 3   How often do you attend Voodoo or Mandaeism services? Never   Do you belong to any clubs or organizations such as Voodoo groups, unions, fraternal or athletic groups, or school groups? No   How often do you attend meetings of the clubs or organizations you belong to? Never   Are you , , , , never , or living with a partner?     Intimate Partner Violence   Within the last year, have you been afraid of your partner or ex-partner? No   Within the last year, have you been humiliated or emotionally abused in other ways by your partner or ex-partner? No   Within the last year, have you been kicked, hit, slapped, or otherwise physically hurt by your partner or ex-partner? No   Within the last year, have you been raped or forced to have any kind of sexual activity by your partner or ex-partner? No   Alcohol Use   Q1: How often do you have a drink containing alcohol? Monthly or l   Q2: How many drinks containing alcohol do you have on a typical day when you are drinking? 1 or 2   Q3: How often do you have six or more drinks on one occasion? Never   Utilities   In the past 12 months has the electric, gas, oil, or water company threatened to shut off services in your home? No

## 2023-12-14 NOTE — PROGRESS NOTES
12/14/23 1344   Discharge Planning   Living Arrangements Children   Support Systems Children   Type of Residence Private residence   Number of Stairs to Enter Residence 3   Number of Stairs Within Residence 12   Do you have animals or pets at home? No   Who is requesting discharge planning? Provider   Home or Post Acute Services None   Does the patient need discharge transport arranged? No   RoundTrip coordination needed? No   Has discharge transport been arranged? No   Financial Resource Strain   How hard is it for you to pay for the very basics like food, housing, medical care, and heating? Not hard   Housing Stability   In the last 12 months, was there a time when you were not able to pay the mortgage or rent on time? N   In the last 12 months, how many places have you lived? 1   In the last 12 months, was there a time when you did not have a steady place to sleep or slept in a shelter (including now)? N   Transportation Needs   In the past 12 months, has lack of transportation kept you from medical appointments or from getting medications? no   In the past 12 months, has lack of transportation kept you from meetings, work, or from getting things needed for daily living? No   Patient Choice   Patient / Family choosing to utilize agency / facility established prior to hospitalization No

## 2023-12-15 ENCOUNTER — APPOINTMENT (OUTPATIENT)
Dept: CARDIOLOGY | Facility: HOSPITAL | Age: 83
DRG: 291 | End: 2023-12-15
Payer: MEDICARE

## 2023-12-15 LAB
ANION GAP SERPL CALC-SCNC: 10 MMOL/L
AORTIC VALVE PEAK VELOCITY: 1.73
AV PEAK GRADIENT: 12
AVA (PEAK VEL): 2.2
BACTERIA UR CULT: ABNORMAL
BUN SERPL-MCNC: 33 MG/DL (ref 8–25)
CALCIUM SERPL-MCNC: 8.7 MG/DL (ref 8.5–10.4)
CHLORIDE SERPL-SCNC: 106 MMOL/L (ref 97–107)
CO2 SERPL-SCNC: 24 MMOL/L (ref 24–31)
CREAT SERPL-MCNC: 1.8 MG/DL (ref 0.4–1.6)
EJECTION FRACTION APICAL 4 CHAMBER: 51.6
EJECTION FRACTION: 56
ERYTHROCYTE [DISTWIDTH] IN BLOOD BY AUTOMATED COUNT: 13.5 % (ref 11.5–14.5)
GFR SERPL CREATININE-BSD FRML MDRD: 28 ML/MIN/1.73M*2
GLUCOSE SERPL-MCNC: 93 MG/DL (ref 65–99)
HCT VFR BLD AUTO: 33.3 % (ref 36–46)
HGB BLD-MCNC: 10.4 G/DL (ref 12–16)
LEFT VENTRICLE INTERNAL DIMENSION DIASTOLE: 4.23 (ref 3.5–6)
LEFT VENTRICULAR OUTFLOW TRACT DIAMETER: 2
MCH RBC QN AUTO: 31.7 PG (ref 26–34)
MCHC RBC AUTO-ENTMCNC: 31.2 G/DL (ref 32–36)
MCV RBC AUTO: 102 FL (ref 80–100)
MITRAL VALVE E/A RATIO: 0.91
MITRAL VALVE E/E' RATIO: 12.39
NRBC BLD-RTO: 0 /100 WBCS (ref 0–0)
PLATELET # BLD AUTO: 204 X10*3/UL (ref 150–450)
POTASSIUM SERPL-SCNC: 3.5 MMOL/L (ref 3.4–5.1)
RBC # BLD AUTO: 3.28 X10*6/UL (ref 4–5.2)
RIGHT VENTRICLE FREE WALL PEAK S': 13.2
SODIUM SERPL-SCNC: 140 MMOL/L (ref 133–145)
TRICUSPID ANNULAR PLANE SYSTOLIC EXCURSION: 1.7
WBC # BLD AUTO: 27.8 X10*3/UL (ref 4.4–11.3)

## 2023-12-15 PROCEDURE — 82374 ASSAY BLOOD CARBON DIOXIDE: CPT | Performed by: INTERNAL MEDICINE

## 2023-12-15 PROCEDURE — 93306 TTE W/DOPPLER COMPLETE: CPT | Performed by: INTERNAL MEDICINE

## 2023-12-15 PROCEDURE — 2500000004 HC RX 250 GENERAL PHARMACY W/ HCPCS (ALT 636 FOR OP/ED): Performed by: INTERNAL MEDICINE

## 2023-12-15 PROCEDURE — 2500000001 HC RX 250 WO HCPCS SELF ADMINISTERED DRUGS (ALT 637 FOR MEDICARE OP): Performed by: EMERGENCY MEDICINE

## 2023-12-15 PROCEDURE — 85027 COMPLETE CBC AUTOMATED: CPT | Performed by: INTERNAL MEDICINE

## 2023-12-15 PROCEDURE — 82310 ASSAY OF CALCIUM: CPT | Performed by: INTERNAL MEDICINE

## 2023-12-15 PROCEDURE — 36415 COLL VENOUS BLD VENIPUNCTURE: CPT | Performed by: INTERNAL MEDICINE

## 2023-12-15 PROCEDURE — 2500000001 HC RX 250 WO HCPCS SELF ADMINISTERED DRUGS (ALT 637 FOR MEDICARE OP): Performed by: INTERNAL MEDICINE

## 2023-12-15 PROCEDURE — 93306 TTE W/DOPPLER COMPLETE: CPT

## 2023-12-15 PROCEDURE — 2500000005 HC RX 250 GENERAL PHARMACY W/O HCPCS: Performed by: INTERNAL MEDICINE

## 2023-12-15 PROCEDURE — 1100000001 HC PRIVATE ROOM DAILY

## 2023-12-15 PROCEDURE — 96372 THER/PROPH/DIAG INJ SC/IM: CPT | Performed by: INTERNAL MEDICINE

## 2023-12-15 PROCEDURE — 99221 1ST HOSP IP/OBS SF/LOW 40: CPT | Performed by: INTERNAL MEDICINE

## 2023-12-15 RX ORDER — LIDOCAINE 560 MG/1
2 PATCH PERCUTANEOUS; TOPICAL; TRANSDERMAL DAILY
Status: DISCONTINUED | OUTPATIENT
Start: 2023-12-15 | End: 2023-12-19 | Stop reason: HOSPADM

## 2023-12-15 RX ORDER — HYDRALAZINE HYDROCHLORIDE 25 MG/1
25 TABLET, FILM COATED ORAL 3 TIMES DAILY
Status: DISCONTINUED | OUTPATIENT
Start: 2023-12-15 | End: 2023-12-17

## 2023-12-15 RX ORDER — FUROSEMIDE 10 MG/ML
40 INJECTION INTRAMUSCULAR; INTRAVENOUS EVERY 12 HOURS
Status: DISCONTINUED | OUTPATIENT
Start: 2023-12-15 | End: 2023-12-16

## 2023-12-15 RX ORDER — SODIUM BICARBONATE 650 MG/1
650 TABLET ORAL 3 TIMES DAILY
Status: DISCONTINUED | OUTPATIENT
Start: 2023-12-15 | End: 2023-12-19 | Stop reason: HOSPADM

## 2023-12-15 RX ORDER — HYDRALAZINE HYDROCHLORIDE 50 MG/1
50 TABLET, FILM COATED ORAL 3 TIMES DAILY
Status: DISCONTINUED | OUTPATIENT
Start: 2023-12-15 | End: 2023-12-15

## 2023-12-15 RX ORDER — ACETAMINOPHEN 325 MG/1
650 TABLET ORAL 4 TIMES DAILY
Status: DISCONTINUED | OUTPATIENT
Start: 2023-12-15 | End: 2023-12-19 | Stop reason: HOSPADM

## 2023-12-15 RX ADMIN — ENOXAPARIN SODIUM 30 MG: 30 INJECTION SUBCUTANEOUS at 09:32

## 2023-12-15 RX ADMIN — PANTOPRAZOLE SODIUM 40 MG: 40 TABLET, DELAYED RELEASE ORAL at 06:03

## 2023-12-15 RX ADMIN — ACETAMINOPHEN 650 MG: 325 TABLET ORAL at 17:05

## 2023-12-15 RX ADMIN — SODIUM BICARBONATE 650 MG TABLET 650 MG: at 09:32

## 2023-12-15 RX ADMIN — ACETAMINOPHEN 650 MG: 325 TABLET ORAL at 12:08

## 2023-12-15 RX ADMIN — VALSARTAN 80 MG: 80 TABLET, FILM COATED ORAL at 09:33

## 2023-12-15 RX ADMIN — DULOXETINE HYDROCHLORIDE 20 MG: 20 CAPSULE, DELAYED RELEASE ORAL at 09:35

## 2023-12-15 RX ADMIN — HYDRALAZINE HYDROCHLORIDE 25 MG: 25 TABLET, FILM COATED ORAL at 22:31

## 2023-12-15 RX ADMIN — FUROSEMIDE 40 MG: 10 INJECTION, SOLUTION INTRAMUSCULAR; INTRAVENOUS at 21:49

## 2023-12-15 RX ADMIN — FUROSEMIDE 40 MG: 10 INJECTION, SOLUTION INTRAMUSCULAR; INTRAVENOUS at 09:33

## 2023-12-15 RX ADMIN — SIMVASTATIN 10 MG: 10 TABLET, FILM COATED ORAL at 21:49

## 2023-12-15 RX ADMIN — LEVOTHYROXINE SODIUM 137 MCG: 0.14 TABLET ORAL at 06:03

## 2023-12-15 RX ADMIN — SODIUM BICARBONATE 650 MG TABLET 650 MG: at 21:49

## 2023-12-15 RX ADMIN — SODIUM BICARBONATE 650 MG TABLET 650 MG: at 17:05

## 2023-12-15 RX ADMIN — GABAPENTIN 300 MG: 300 CAPSULE ORAL at 09:33

## 2023-12-15 RX ADMIN — CYANOCOBALAMIN TAB 1000 MCG 1000 MCG: 1000 TAB at 09:33

## 2023-12-15 RX ADMIN — Medication 1 TABLET: at 21:49

## 2023-12-15 RX ADMIN — Medication 1 TABLET: at 09:33

## 2023-12-15 RX ADMIN — ACETAMINOPHEN 650 MG: 325 TABLET ORAL at 21:45

## 2023-12-15 RX ADMIN — GABAPENTIN 300 MG: 300 CAPSULE ORAL at 21:49

## 2023-12-15 RX ADMIN — HYDRALAZINE HYDROCHLORIDE 50 MG: 50 TABLET, FILM COATED ORAL at 09:33

## 2023-12-15 RX ADMIN — FOLIC ACID 1 MG: 1 TABLET ORAL at 09:33

## 2023-12-15 RX ADMIN — AMLODIPINE BESYLATE 10 MG: 10 TABLET ORAL at 09:33

## 2023-12-15 ASSESSMENT — PAIN SCALES - GENERAL
PAINLEVEL_OUTOF10: 5 - MODERATE PAIN
PAINLEVEL_OUTOF10: 0 - NO PAIN
PAINLEVEL_OUTOF10: 0 - NO PAIN

## 2023-12-15 ASSESSMENT — COGNITIVE AND FUNCTIONAL STATUS - GENERAL
STANDING UP FROM CHAIR USING ARMS: A LITTLE
MOBILITY SCORE: 20
WALKING IN HOSPITAL ROOM: A LITTLE
DAILY ACTIVITIY SCORE: 22
EATING MEALS: A LITTLE
CLIMB 3 TO 5 STEPS WITH RAILING: A LITTLE
TOILETING: A LITTLE
MOVING TO AND FROM BED TO CHAIR: A LITTLE

## 2023-12-15 ASSESSMENT — PAIN - FUNCTIONAL ASSESSMENT
PAIN_FUNCTIONAL_ASSESSMENT: 0-10

## 2023-12-15 ASSESSMENT — PAIN DESCRIPTION - LOCATION: LOCATION: BACK

## 2023-12-15 NOTE — NURSING NOTE
Assumed care of patient. Patient is lying in resting. Family is at the bedside. Call light in reach. Patient denies pain.

## 2023-12-15 NOTE — PROGRESS NOTES
Tracie Baltazar is a 83 y.o. female on day 1 of admission presenting with Congestive heart failure with left ventricular diastolic dysfunction, acute on chronic     Patient seen by cardiology, nephro, Encourage cardiac diet and oral fluid restriction.  Continue IV Lasix for now and monitor I's and O's.   Awaiting results of echocardiography.     Continue PT OT assessment initiate CHF navigator program and plan for home health services    PLAN: Home with family, potential home care services and CHF navigator program    Thalia Cramer RN

## 2023-12-15 NOTE — PROGRESS NOTES
Tracie Baltazar is a 83 y.o. female on day 1 of admission presenting with Congestive heart failure with left ventricular diastolic dysfunction, acute on chronic (CMS/HCC).    Subjective   Seen and examined echocardiogram pending cardiology input reviewed and appreciated continuing with diuresis will reassess potential for discharge tomorrow       Objective     Physical Exam  Vitals and nursing note reviewed.   Constitutional:       Appearance: Normal appearance.   HENT:      Head: Normocephalic and atraumatic.      Mouth/Throat:      Mouth: Mucous membranes are moist.   Eyes:      Extraocular Movements: Extraocular movements intact.      Pupils: Pupils are equal, round, and reactive to light.   Cardiovascular:      Rate and Rhythm: Normal rate and regular rhythm.      Pulses: Normal pulses.      Heart sounds: Normal heart sounds.   Pulmonary:      Effort: Pulmonary effort is normal.      Breath sounds: Normal breath sounds.   Abdominal:      Palpations: Abdomen is soft.   Musculoskeletal:         General: Normal range of motion.      Cervical back: Normal range of motion and neck supple.   Skin:     General: Skin is warm and dry.      Capillary Refill: Capillary refill takes less than 2 seconds.   Neurological:      General: No focal deficit present.      Mental Status: She is alert and oriented to person, place, and time. Mental status is at baseline.   Psychiatric:         Mood and Affect: Mood normal.         Last Recorded Vitals  Blood pressure (!) 150/47, pulse 54, temperature 36.9 °C (98.4 °F), temperature source Oral, resp. rate 20, SpO2 96 %.  Intake/Output last 3 Shifts:  No intake/output data recorded.    Relevant Results           Results for orders placed or performed during the hospital encounter of 12/14/23 (from the past 24 hour(s))   CBC   Result Value Ref Range    WBC 27.8 (H) 4.4 - 11.3 x10*3/uL    nRBC 0.0 0.0 - 0.0 /100 WBCs    RBC 3.28 (L) 4.00 - 5.20 x10*6/uL    Hemoglobin 10.4 (L) 12.0 -  16.0 g/dL    Hematocrit 33.3 (L) 36.0 - 46.0 %     (H) 80 - 100 fL    MCH 31.7 26.0 - 34.0 pg    MCHC 31.2 (L) 32.0 - 36.0 g/dL    RDW 13.5 11.5 - 14.5 %    Platelets 204 150 - 450 x10*3/uL   Basic Metabolic Panel   Result Value Ref Range    Glucose 93 65 - 99 mg/dL    Sodium 140 133 - 145 mmol/L    Potassium 3.5 3.4 - 5.1 mmol/L    Chloride 106 97 - 107 mmol/L    Bicarbonate 24 24 - 31 mmol/L    Urea Nitrogen 33 (H) 8 - 25 mg/dL    Creatinine 1.80 (H) 0.40 - 1.60 mg/dL    eGFR 28 (L) >60 mL/min/1.73m*2    Calcium 8.7 8.5 - 10.4 mg/dL    Anion Gap 10 <=19 mmol/L     Scheduled medications  amLODIPine, 10 mg, oral, Daily  cyanocobalamin, 1,000 mcg, oral, Daily  DULoxetine, 20 mg, oral, Daily  enoxaparin, 30 mg, subcutaneous, Daily  folic acid, 1 mg, oral, Daily  furosemide, 40 mg, intravenous, q12h  gabapentin, 300 mg, oral, BID  hydrALAZINE, 50 mg, oral, TID  lactobacillus acidophilus, 1 tablet, oral, BID  levothyroxine, 137 mcg, oral, Daily  pantoprazole, 40 mg, oral, Daily before breakfast  perflutren lipid microspheres, 0.5-10 mL of dilution, intravenous, Once in imaging  perflutren protein A microsphere, 0.5 mL, intravenous, Once in imaging  simvastatin, 10 mg, oral, Nightly  sodium bicarbonate, 650 mg, oral, TID  sulfur hexafluoride microsphr, 2 mL, intravenous, Once in imaging  valsartan, 80 mg, oral, Daily      Continuous medications     PRN medications  PRN medications: polyethylene glycol                      Assessment/Plan   Principal Problem:    Congestive heart failure with left ventricular diastolic dysfunction, acute on chronic (CMS/HCC)    Awaiting results of echocardiography.  Appreciate input from cardiology    CKD stage IV awaiting further recommendations from nephrology    Continue PT OT assessment initiate CHF navigator program and plan for home health services       I spent 25 minutes in the professional and overall care of this patient.      Rashawn Pedersen DO

## 2023-12-15 NOTE — CONSULTS
Reason For Consult  Chronic kidney disease stage IV    History Of Present Illness  Tracie Baltazar is a 83 y.o. female with a past medical history of chronic kidney disease age 3/4, hypertension, congestive heart failure presented to the hospital with dizziness and lightheadedness, dyspnea, leukocytosis.  The patient denies any other symptoms.  She does have chronic kidney disease stage III/IV and her creatinine is currently at baseline.  Was found to have some pulmonary edema on chest x-ray.  We are consulted for management of chronic kidney disease.     Past Medical History  She has a past medical history of Essential (primary) hypertension (05/15/2018).  Chronic kidney disease stage III/IV  Congestive heart failure    Surgical History  She has a past surgical history that includes Hysterectomy (01/30/2014); Back surgery (01/30/2014); Cholecystectomy (01/30/2014); and Other surgical history (01/30/2014).     Social History  She reports that she has never smoked. She has never used smokeless tobacco. She reports that she does not currently use alcohol. She reports that she does not use drugs.    Family History  Family History   Problem Relation Name Age of Onset    Emphysema Mother      Emphysema Father      Heart attack Sister      Lung cancer Brother      Lymphoma Son      Multiple sclerosis Son          Allergies  Ibuprofen    Review of Systems  10 point review of systems was obtained and is negative other than what is indicated above in the HPI     Physical Exam  General: Awake, alert, no acute distress  Head/ears/nose/throat:  Normocephalic, atraumatic, moist mucous membranes  Neck:  No jugular venous distention, neck supple  Respiratory: Diffusely diminished breath sounds, normal respiratory effort  Cardiovascular:  S1 and S2, no rubs  Gastrointestinal:  Soft, positive bowel sounds, no rebound or guarding  Extremities: Trace edema, no cyanosis  Musculoskeletal:  No injury or deformity noted  Neurologic:   Alert and oriented, responsive, cooperative with exam  Skin:  No ulcers noted, dry and intact  Psychiatric: Normal mood and affect         I&O 24HR  No intake or output data in the 24 hours ending 12/15/23 0935    Vitals 24HR  Heart Rate:  [54-58]   Temp:  [36.4 °C (97.5 °F)-37.2 °C (99 °F)]   Resp:  [1-20]   BP: (150-181)/(47-59)   SpO2:  [95 %-100 %]       Relevant Results  Results for orders placed or performed during the hospital encounter of 12/14/23 (from the past 24 hour(s))   CBC   Result Value Ref Range    WBC 27.8 (H) 4.4 - 11.3 x10*3/uL    nRBC 0.0 0.0 - 0.0 /100 WBCs    RBC 3.28 (L) 4.00 - 5.20 x10*6/uL    Hemoglobin 10.4 (L) 12.0 - 16.0 g/dL    Hematocrit 33.3 (L) 36.0 - 46.0 %     (H) 80 - 100 fL    MCH 31.7 26.0 - 34.0 pg    MCHC 31.2 (L) 32.0 - 36.0 g/dL    RDW 13.5 11.5 - 14.5 %    Platelets 204 150 - 450 x10*3/uL   Basic Metabolic Panel   Result Value Ref Range    Glucose 93 65 - 99 mg/dL    Sodium 140 133 - 145 mmol/L    Potassium 3.5 3.4 - 5.1 mmol/L    Chloride 106 97 - 107 mmol/L    Bicarbonate 24 24 - 31 mmol/L    Urea Nitrogen 33 (H) 8 - 25 mg/dL    Creatinine 1.80 (H) 0.40 - 1.60 mg/dL    eGFR 28 (L) >60 mL/min/1.73m*2    Calcium 8.7 8.5 - 10.4 mg/dL    Anion Gap 10 <=19 mmol/L          Assessment/Plan   1.  Chronic kidney disease stage 3/4  - Cr at baseline  2.  Hypertension  3.  Congestive heart failure  4.  Dizziness/lightheadedness    Plan: Renal function is currently stable at baseline, will continue to monitor.  Pending orthostatic vitals, echocardiogram, urine culture.  Continue IV Lasix for now and monitor I's and O's.  Cardiac diet and oral fluid restriction.  Cardiology on board.  Thank you for your consultation.    Darnell Amor MD

## 2023-12-15 NOTE — CONSULTS
Consults  History Of Present Illness:    Tracie Baltazar is a 83 y.o. female presenting with shortness of breath.  Patient states that over the past 2 days she has had gradually worsening shortness of breath symptoms.  She also has some lightheadedness and near syncope yesterday.  With her worsening shortness of breath lightheadedness her daughter brought her to the emergency department where she now has been admitted for further treatment and investigation.  Chest x-ray revealed a mild degree of pulmonary vascular prominence to her pro BNP level of 834.  This proBNP level will be normal for a patient 83 years of age with chronic kidney disease.  Blood cell count was elevated at 27.8 as well.  This morning the patient states she feels better than she did yesterday.     Last Recorded Vitals:  Vitals:    12/14/23 1555 12/14/23 1900 12/14/23 1905 12/15/23 0725   BP: 173/53 154/59  (!) 150/47   BP Location: Left arm Left arm  Right arm   Patient Position: Lying Lying  Lying   Pulse: 56 58  54   Resp: 18 (!) 1 20 20   Temp: 37.2 °C (99 °F) 36.9 °C (98.4 °F)  36.9 °C (98.4 °F)   TempSrc: Oral Oral  Oral   SpO2: 100% 97%  96%       Last Labs:  CBC - 12/15/2023:  4:44 AM  27.8 10.4 204    33.3      CMP - 12/15/2023:  4:44 AM  8.7 6.4 12 --- 0.3   3.5 4.2 6 105      PTT - No results in last year.  _   _ _     Hemoglobin A1C   Date/Time Value Ref Range Status   12/19/2019 10:50 AM 5.4 4.0 - 6.0 % Final     Comment:     Hemoglobin A1C levels are related to mean blood glucose during the   preceding 2-3 months. The relationship table below may be used as a   general guide. Each 1% increase in HGB A1C is a reflection of an   increase in mean glucose of approximately 30 mg/dl.   Reference: [employer], volume 29, supplement 1 Jan. 2006                        HGB A1C ................. Approx. Mean Glucose   _______________________________________________   6%   ...............................  120 mg/dl   7%    "...............................  150 mg/dl   8%   ...............................  180 mg/dl   9%   ...............................  210 mg/dl   10%  ...............................  240 mg/dl  Performed at [address] [address]       LDL Calculated   Date/Time Value Ref Range Status   12/08/2022 09:43  (H) 65 - 130 MG/DL Final   01/25/2021 02:53  65 - 130 MG/DL Final      Last I/O:  No intake/output data recorded.    Past Cardiology Tests (Last 3 Years):  EKG:  No results found for this or any previous visit from the past 1095 days.    Echo:  No results found for this or any previous visit from the past 1095 days.    Ejection Fractions:  No results found for: \"EF\"  Cath:  No results found for this or any previous visit from the past 1095 days.    Stress Test:  No results found for this or any previous visit from the past 1095 days.    Cardiac Imaging:  No results found for this or any previous visit from the past 1095 days.      Past Medical History:  She has a past medical history of Essential (primary) hypertension (05/15/2018).    Past Surgical History:  She has a past surgical history that includes Hysterectomy (01/30/2014); Back surgery (01/30/2014); Cholecystectomy (01/30/2014); and Other surgical history (01/30/2014).      Social History:  She reports that she has never smoked. She has never used smokeless tobacco. She reports that she does not currently use alcohol. She reports that she does not use drugs.    Family History:  Family History   Problem Relation Name Age of Onset    Emphysema Mother      Emphysema Father      Heart attack Sister      Lung cancer Brother      Lymphoma Son      Multiple sclerosis Son          Allergies:  Ibuprofen    Inpatient Medications:  Scheduled medications   Medication Dose Route Frequency    amLODIPine  10 mg oral Daily    cyanocobalamin  1,000 mcg oral Daily    DULoxetine  20 mg oral Daily    enoxaparin  30 mg subcutaneous Daily    " folic acid  1 mg oral Daily    gabapentin  300 mg oral BID    lactobacillus acidophilus  1 tablet oral BID    levothyroxine  137 mcg oral Daily    pantoprazole  40 mg oral Daily before breakfast    perflutren lipid microspheres  0.5-10 mL of dilution intravenous Once in imaging    perflutren protein A microsphere  0.5 mL intravenous Once in imaging    simvastatin  10 mg oral Nightly    sodium bicarbonate  1,300 mg oral BID    sulfur hexafluoride microsphr  2 mL intravenous Once in imaging    valsartan  80 mg oral Daily     PRN medications   Medication    polyethylene glycol     Continuous Medications   Medication Dose Last Rate     Outpatient Medications:  Current Outpatient Medications   Medication Instructions    acetaminophen (Tylenol) 325 mg tablet     amitriptyline (Elavil) 10 mg tablet     amLODIPine (Norvasc) 10 mg tablet half qd    cholecalciferol (Vitamin D-3) 50 MCG (2000 UT) tablet Vitamin D 50 MCG (2000 UT) Oral Tablet   Refills: 0       Active    cholecalciferol (Vitamin D3) 400 unit capsule = 1 cap(s) ( 400 International Unit ), Oral, daily, 0 Refill(s), Type: Maintenance    cholestyramine-aspartame (Prevalite) 4 gram packet     cream base no.135, bulk, (SaltStable LS) cream Salt Stable LS Advanced External Cream   Quantity: 240  Refills: 0        Start : 15-Oct-2015   Active    cyanocobalamin (Vitamin B-12) 100 mcg tablet = 1 tab(s) ( 100 mcg ), Oral, daily, 0 Refill(s), Type: Maintenance    DULoxetine (Cymbalta) 30 mg DR capsule 1 capsule, oral, Daily    folic acid (Folvite) 400 mcg tablet = 1 tab(s) ( 0.4 mg ), Oral, daily, 0 Refill(s), Type: Maintenance    folic acid 20 mg capsule Folic Acid CAPS   Refills: 0       Active    gabapentin (Neurontin) 800 mg tablet one tid    hydrALAZINE (Apresoline) 50 mg tablet TAKE 1 TABLET BY MOUTH THREE TIMES DAILY WITH FOOD    HYDROCHLOROTHIAZIDE ORAL hydroCHLOROthiazide    L. acidophilus/L.bulgaricus (lactobacillus acidoph-lactobacillus bulgar) 0.5 million cell  tablet chewable split tablet     levothyroxine (Synthroid, Levoxyl) 137 mcg tablet = 1 tab(s) ( 137 mcg ), Oral, daily, # 90 tab(s), 0 Refill(s), Type: Maintenance, Pharmacy: [id number], 1 tab(s) Oral daily, 61, in, 11/16/22 10:03:00 EST, Height Measured, 181.4, lb, 11/16/22 10:03:00 EST, Weight Measured    levothyroxine (SYNTHROID, LEVOXYL) 125 mcg, oral, Daily    NIFEdipine XL 30 mg 24 hr tablet TAKE 1 BY MOUTH ONCE DAILY ON AN EMPTY STOMACH FOR 90 DAYS    pantoprazole (ProtoNix) 40 mg EC tablet one bid for 10d, then one qd thereafter    potassium chloride CR 20 mEq ER tablet     simvastatin (Zocor) 10 mg tablet = 1 tab(s) ( 10 mg ), Oral, hs, # 90 tab(s), 3 Refill(s), Type: Maintenance, Pharmacy: [id number], 1 tab(s) Oral hs, 61, in, 12/15/22 10:06:00 EST, Height Measured, 182, lb, 12/15/22 10:06:00 EST, Weight Measured    sodium bicarbonate 650 mg tablet TAKE 2 TABLETS BY MOUTH TWICE DAILY AS DIRECTED    traMADol (Ultram) 50 mg tablet 1 po qid prn    valsartan (Diovan) 80 mg tablet        Physical Exam:  Constitutional:       Appearance: Not in distress.   Eyes:      Conjunctiva/sclera: Conjunctivae normal.   HENT:    Mouth/Throat:      Pharynx: Oropharynx is clear.   Neck:      Vascular: No carotid bruit. JVD normal.   Pulmonary:      Breath sounds: Normal breath sounds. No wheezing. No rales.   Cardiovascular:      Regular rhythm.      Murmurs: There is no murmur.      No gallop.  No click. No rub.   Abdominal:      Palpations: Abdomen is soft.      Tenderness: There is no abdominal tenderness.   Musculoskeletal:         General: No deformity. Neurological:      General: No focal deficit present.           Assessment/Plan   Shortness of breath  Leukocytosis  Chronic kidney disease  Essential hypertension  History of chronic lymphocytic leukemia    12/15: The patient's shortness of breath symptoms could be on the basis of congestive heart failure.  But the pro BNP level is not  significantly elevated in this 83-year-old female with chronic kidney disease.  She was given a dose of IV diuretic therapy and I would suggest we continue for the time being.  An echocardiogram has been ordered and will review when results are available.  Patient was on a combination of valsartan, nifedipine and hydralazine for treatment of her hypertension, will continue and follow and adjust as necessary.       Code Status:  DNR and No Intubation    I spent 45 minutes in the professional and overall care of this patient.        Elvis Bruce, DO

## 2023-12-15 NOTE — CONSULTS
"Nutrition Assessment Note; Discussed need for low Na diet,  pt states she  understands low Na diet, briefly discussed Na restriction, however states \"I like salt\". Denies need for written info    Nutrition Assessment    Reason for Assessment: Dietitian discretion    Reason for Hospital Admission:  Tracie Baltazar is a 83 y.o. female who is admitted for congestive heart failure w/ L ventricular dysfunction, acute on chronic    Nutrition History:        Food Allergies/Intolerances:  None  GI Symptoms: None  Oral Problems: None    Anthropometrics:  Ht:  , Wt:  , BMI:    IBW/kg (Dietitian Calculated): 48 kg  Percent of IBW: 171 %  Adjusted Body Weight (kg): 56 kg    Weight Change: None noted              Nutrition Focused Physical Exam Findings:   Subcutaneous Fat Loss  Orbital Fat Pads: Well nourshed (slightly bulging fat pads)  Buccal Fat Pads: Well nourished (full, rounded cheeks)    Muscle Wasting  Temporalis: Well nourished (well-defined muscle)  Pectoralis (Clavicular Region): Well nourished (clavicle not visible)  Deltoid/Trapezius: Well nourished (rounded appearance at arm, shoulder, neck)  Interosseous: Well nourished (muscle bulges)    Edema  Edema: none    Physical Findings (Nutrition Deficiency/Toxicity)  Nails: Negative  Skin: Negative    Nutrition Significant Labs:  Lab Results   Component Value Date    WBC 27.8 (H) 12/15/2023    HGB 10.4 (L) 12/15/2023    HCT 33.3 (L) 12/15/2023     12/15/2023    CHOL 247 (H) 12/08/2022    TRIG 156 (H) 12/08/2022    HDL 52 12/08/2022    ALT 6 12/13/2023    AST 12 12/13/2023     12/15/2023    K 3.5 12/15/2023     12/15/2023    CREATININE 1.80 (H) 12/15/2023    BUN 33 (H) 12/15/2023    CO2 24 12/15/2023    TSH 41.82 (H) 12/08/2022    HGBA1C 5.4 12/19/2019       Current Facility-Administered Medications:     acetaminophen (Tylenol) tablet 650 mg, 650 mg, oral, 4x daily, Rashawn Pedersen DO, 650 mg at 12/15/23 1208    amLODIPine (Norvasc) tablet 10 " mg, 10 mg, oral, Daily, Rashawn Pedersen DO, 10 mg at 12/15/23 0933    cyanocobalamin (Vitamin B-12) tablet 1,000 mcg, 1,000 mcg, oral, Daily, Rashawn Pedersen DO, 1,000 mcg at 12/15/23 0933    DULoxetine (Cymbalta) DR capsule 20 mg, 20 mg, oral, Daily, Rashawn Pedersen DO, 20 mg at 12/15/23 0935    enoxaparin (Lovenox) syringe 30 mg, 30 mg, subcutaneous, Daily, Rashawn Pedersen DO, 30 mg at 12/15/23 0932    folic acid (Folvite) tablet 1 mg, 1 mg, oral, Daily, Rashawn Pedersen DO, 1 mg at 12/15/23 0933    furosemide (Lasix) injection 40 mg, 40 mg, intravenous, q12h, Elvis Bruce DO, 40 mg at 12/15/23 0933    gabapentin (Neurontin) capsule 300 mg, 300 mg, oral, BID, Rashawn Pedersen DO, 300 mg at 12/15/23 0933    hydrALAZINE (Apresoline) tablet 50 mg, 50 mg, oral, TID, Elvis Bruce DO, 50 mg at 12/15/23 0933    lactobacillus acidophilus tablet 1 tablet, 1 tablet, oral, BID, Rashawn Pedersen DO, 1 tablet at 12/15/23 0933    levothyroxine (Synthroid, Levoxyl) tablet 137 mcg, 137 mcg, oral, Daily, Rashawn Pedersen DO, 137 mcg at 12/15/23 0603    lidocaine 4 % patch 2 patch, 2 patch, transdermal, Daily, Rashawn Pedersen DO    pantoprazole (ProtoNix) EC tablet 40 mg, 40 mg, oral, Daily before breakfast, Rashawn Pedersen DO, 40 mg at 12/15/23 0603    perflutren lipid microspheres (Definity) injection 0.5-10 mL of dilution, 0.5-10 mL of dilution, intravenous, Once in imaging, Rashawn Pedersen DO    perflutren protein A microsphere (Optison) injection 0.5 mL, 0.5 mL, intravenous, Once in imaging, Rashawn Pedersen DO    polyethylene glycol (Glycolax, Miralax) packet 17 g, 17 g, oral, Daily PRN, Rashawn Pedersen DO    simvastatin (Zocor) tablet 10 mg, 10 mg, oral, Nightly, Rashawn Pedersen DO, 10 mg at 12/14/23 2014    sodium bicarbonate tablet 650 mg, 650 mg, oral, TID, Darnell Amor MD, 650 mg at 12/15/23 0932    sulfur hexafluoride microsphr (Lumason)  injection 24.28 mg, 2 mL, intravenous, Once in imaging, Rashawn Pedersen DO    valsartan (Diovan) tablet 80 mg, 80 mg, oral, Daily, Rashawn Pedersen DO, 80 mg at 12/15/23 0933    Dietary Orders (From admission, onward)       Start     Ordered    12/15/23 0846  Adult diet Cardiac; 70 gm fat; 2 - 3 grams Sodium; 1500 mL fluid  Diet effective now        Question Answer Comment   Diet type Cardiac    Fat restriction: 70 gm fat    Sodium restriction: 2 - 3 grams Sodium    Dietary fluid restriction / 24h: 1500 mL fluid        12/15/23 0845                   Estimated Needs:   Estimated Energy Needs  Total Energy Estimated Needs (kCal): 1400 kCal  Total Estimated Energy Need per Day (kCal/kg): 25 kCal/kg  Method for Estimating Needs: adj IBW    Estimated Protein Needs  Total Protein Estimated Needs (g): 56 g  Total Protein Estimated Needs (g/kg): 1 g/kg  Method for Estimating Needs: adj IBW    Estimated Fluid Needs  Total Fluid Estimated Needs (mL): 1400 mL  Total Fluid Estimated Needs (mL/kg): 1 mL/kg  Method for Estimating Needs: adj IBW        Nutrition Diagnosis   Nutrition Diagnosis:  Malnutrition Diagnosis  Patient has Malnutrition Diagnosis: No    Nutrition Diagnosis  Patient has Nutrition Diagnosis: No     Nutrition Interventions/Recommendations   Nutrition Interventions and Recommendations:    Nutrition Prescription:  Individualized Nutrition Prescription Provided for : 1400 calories,56gm protein, low Na  Nutrition Interventions:   Food and/or Nutrient Delivery Interventions  Interventions: Meals and snacks  Meals and Snacks: Mineral-modified diet  Goal: low Na diet as ordered    Education Documentation  No documentation found.       Nutrition Monitoring and Evaluation   Monitoring/Evaluation:   Food/Nutrient Related History Monitoring  Monitoring and Evaluation Plan: Mineral/element intake  Mineral/Element Intake: Measured mineral/element intake  Criteria: Pt to follow a low Na diet                 Time  Spent/Follow-up:   Follow Up  Time Spent (min): 35 minutes  Last Date of Nutrition Visit: 12/15/23  Nutrition Follow-Up Needed?: 5-7 days  Follow up Comment: 12/20/23

## 2023-12-15 NOTE — CARE PLAN
The patient's goals for the shift include      The clinical goals for the shift include sob    Over the shift, the patient did not make progress toward the following goals. Barriers to progression include . Recommendations to address these barriers include   Problem: Fall/Injury  Goal: Not fall by end of shift  Outcome: Progressing  Goal: Be free from injury by end of the shift  Outcome: Progressing  Goal: Verbalize understanding of personal risk factors for fall in the hospital  Outcome: Progressing  Goal: Verbalize understanding of risk factor reduction measures to prevent injury from fall in the home  Outcome: Progressing  Goal: Use assistive devices by end of the shift  Outcome: Progressing  Goal: Pace activities to prevent fatigue by end of the shift  Outcome: Progressing     Problem: Pain  Goal: My pain/discomfort is manageable  Outcome: Progressing     Problem: Safety  Goal: Patient will be injury free during hospitalization  Outcome: Progressing  Goal: I will remain free of falls  Outcome: Progressing     Problem: Daily Care  Goal: Daily care needs are met  Outcome: Progressing     Problem: Psychosocial Needs  Goal: Demonstrates ability to cope with hospitalization/illness  Outcome: Progressing  Goal: Collaborate with me, my family, and caregiver to identify my specific goals  Outcome: Progressing     Problem: Discharge Barriers  Goal: My discharge needs are met  Outcome: Progressing   .

## 2023-12-16 LAB
ANION GAP SERPL CALC-SCNC: 13 MMOL/L
BUN SERPL-MCNC: 40 MG/DL (ref 8–25)
CALCIUM SERPL-MCNC: 8.3 MG/DL (ref 8.5–10.4)
CHLORIDE SERPL-SCNC: 101 MMOL/L (ref 97–107)
CO2 SERPL-SCNC: 21 MMOL/L (ref 24–31)
CREAT SERPL-MCNC: 2.9 MG/DL (ref 0.4–1.6)
ERYTHROCYTE [DISTWIDTH] IN BLOOD BY AUTOMATED COUNT: 13.5 % (ref 11.5–14.5)
GFR SERPL CREATININE-BSD FRML MDRD: 16 ML/MIN/1.73M*2
GLUCOSE SERPL-MCNC: 96 MG/DL (ref 65–99)
HCT VFR BLD AUTO: 31.4 % (ref 36–46)
HGB BLD-MCNC: 9.7 G/DL (ref 12–16)
MCH RBC QN AUTO: 31.6 PG (ref 26–34)
MCHC RBC AUTO-ENTMCNC: 30.9 G/DL (ref 32–36)
MCV RBC AUTO: 102 FL (ref 80–100)
NRBC BLD-RTO: 0 /100 WBCS (ref 0–0)
PLATELET # BLD AUTO: 189 X10*3/UL (ref 150–450)
POTASSIUM SERPL-SCNC: 3.6 MMOL/L (ref 3.4–5.1)
RBC # BLD AUTO: 3.07 X10*6/UL (ref 4–5.2)
SODIUM SERPL-SCNC: 135 MMOL/L (ref 133–145)
WBC # BLD AUTO: 29.3 X10*3/UL (ref 4.4–11.3)

## 2023-12-16 PROCEDURE — 36415 COLL VENOUS BLD VENIPUNCTURE: CPT | Performed by: INTERNAL MEDICINE

## 2023-12-16 PROCEDURE — 2500000004 HC RX 250 GENERAL PHARMACY W/ HCPCS (ALT 636 FOR OP/ED): Performed by: INTERNAL MEDICINE

## 2023-12-16 PROCEDURE — 2500000001 HC RX 250 WO HCPCS SELF ADMINISTERED DRUGS (ALT 637 FOR MEDICARE OP): Performed by: INTERNAL MEDICINE

## 2023-12-16 PROCEDURE — 96372 THER/PROPH/DIAG INJ SC/IM: CPT | Performed by: INTERNAL MEDICINE

## 2023-12-16 PROCEDURE — 99232 SBSQ HOSP IP/OBS MODERATE 35: CPT | Performed by: INTERNAL MEDICINE

## 2023-12-16 PROCEDURE — 85027 COMPLETE CBC AUTOMATED: CPT | Performed by: INTERNAL MEDICINE

## 2023-12-16 PROCEDURE — 80048 BASIC METABOLIC PNL TOTAL CA: CPT | Performed by: INTERNAL MEDICINE

## 2023-12-16 PROCEDURE — 1100000001 HC PRIVATE ROOM DAILY

## 2023-12-16 PROCEDURE — 94760 N-INVAS EAR/PLS OXIMETRY 1: CPT

## 2023-12-16 RX ADMIN — ACETAMINOPHEN 650 MG: 325 TABLET ORAL at 17:16

## 2023-12-16 RX ADMIN — ACETAMINOPHEN 650 MG: 325 TABLET ORAL at 21:41

## 2023-12-16 RX ADMIN — SODIUM BICARBONATE 650 MG TABLET 650 MG: at 09:37

## 2023-12-16 RX ADMIN — SODIUM CHLORIDE 250 ML: 900 INJECTION, SOLUTION INTRAVENOUS at 09:33

## 2023-12-16 RX ADMIN — ACETAMINOPHEN 650 MG: 325 TABLET ORAL at 06:14

## 2023-12-16 RX ADMIN — CYANOCOBALAMIN TAB 1000 MCG 1000 MCG: 1000 TAB at 09:37

## 2023-12-16 RX ADMIN — GABAPENTIN 300 MG: 300 CAPSULE ORAL at 09:37

## 2023-12-16 RX ADMIN — LEVOTHYROXINE SODIUM 137 MCG: 0.14 TABLET ORAL at 06:14

## 2023-12-16 RX ADMIN — ENOXAPARIN SODIUM 30 MG: 30 INJECTION SUBCUTANEOUS at 09:37

## 2023-12-16 RX ADMIN — HYDRALAZINE HYDROCHLORIDE 25 MG: 25 TABLET, FILM COATED ORAL at 21:41

## 2023-12-16 RX ADMIN — SODIUM BICARBONATE 650 MG TABLET 650 MG: at 21:41

## 2023-12-16 RX ADMIN — DULOXETINE HYDROCHLORIDE 20 MG: 20 CAPSULE, DELAYED RELEASE ORAL at 10:50

## 2023-12-16 RX ADMIN — PANTOPRAZOLE SODIUM 40 MG: 40 TABLET, DELAYED RELEASE ORAL at 06:14

## 2023-12-16 RX ADMIN — SODIUM BICARBONATE 650 MG TABLET 650 MG: at 17:16

## 2023-12-16 RX ADMIN — Medication 1 TABLET: at 09:37

## 2023-12-16 RX ADMIN — GABAPENTIN 300 MG: 300 CAPSULE ORAL at 21:41

## 2023-12-16 RX ADMIN — ACETAMINOPHEN 650 MG: 325 TABLET ORAL at 14:22

## 2023-12-16 RX ADMIN — FOLIC ACID 1 MG: 1 TABLET ORAL at 09:37

## 2023-12-16 RX ADMIN — SIMVASTATIN 10 MG: 10 TABLET, FILM COATED ORAL at 21:41

## 2023-12-16 RX ADMIN — Medication 1 TABLET: at 21:41

## 2023-12-16 ASSESSMENT — COGNITIVE AND FUNCTIONAL STATUS - GENERAL
WALKING IN HOSPITAL ROOM: A LITTLE
MOBILITY SCORE: 20
MOVING TO AND FROM BED TO CHAIR: A LITTLE
STANDING UP FROM CHAIR USING ARMS: A LITTLE
TURNING FROM BACK TO SIDE WHILE IN FLAT BAD: A LITTLE
CLIMB 3 TO 5 STEPS WITH RAILING: A LITTLE
WALKING IN HOSPITAL ROOM: A LITTLE
MOBILITY SCORE: 18
CLIMB 3 TO 5 STEPS WITH RAILING: A LITTLE
DAILY ACTIVITIY SCORE: 23
MOVING TO AND FROM BED TO CHAIR: A LITTLE
TOILETING: A LITTLE
CLIMB 3 TO 5 STEPS WITH RAILING: A LITTLE
MOVING TO AND FROM BED TO CHAIR: A LITTLE
MOVING FROM LYING ON BACK TO SITTING ON SIDE OF FLAT BED WITH BEDRAILS: A LITTLE
EATING MEALS: A LITTLE
STANDING UP FROM CHAIR USING ARMS: A LITTLE
DAILY ACTIVITIY SCORE: 23
DAILY ACTIVITIY SCORE: 22
MOVING FROM LYING ON BACK TO SITTING ON SIDE OF FLAT BED WITH BEDRAILS: A LITTLE
TURNING FROM BACK TO SIDE WHILE IN FLAT BAD: A LITTLE
WALKING IN HOSPITAL ROOM: A LITTLE
MOBILITY SCORE: 18
STANDING UP FROM CHAIR USING ARMS: A LITTLE
TOILETING: A LITTLE
TOILETING: A LITTLE

## 2023-12-16 ASSESSMENT — PAIN - FUNCTIONAL ASSESSMENT
PAIN_FUNCTIONAL_ASSESSMENT: 0-10

## 2023-12-16 ASSESSMENT — PAIN SCALES - GENERAL
PAINLEVEL_OUTOF10: 0 - NO PAIN

## 2023-12-16 NOTE — PROGRESS NOTES
Spiritual Care Visit    Clinical Encounter Type  Visited With: Patient  Routine Visit: Introduction  Continue Visiting: Yes         Values/Beliefs  Spiritual Requests During Hospitalization: Anointing & Communion    Sacramental Encounters  Communion: Patient wants communion  Communion Given Indicator: Yes  Sacrament of Sick-Anointing: Anointed     Vikas Ervin

## 2023-12-16 NOTE — PROGRESS NOTES
Called by nurse because of heart rate 40s-50s and blood pressure 112/36. Patient is scheduled to get 50 mg hydralazine now. Was hypertensive when admitted. Will have nurse give 1/2 tablet (25 mg) now and all doses changed to 25 mg. Defer to cardiology in AM to increase or continue this lowered dose.

## 2023-12-16 NOTE — CARE PLAN
Problem: Fall/Injury  Goal: Not fall by end of shift  Outcome: Progressing  Goal: Be free from injury by end of the shift  Outcome: Progressing  Goal: Use assistive devices by end of the shift  Outcome: Progressing     Problem: Pain  Goal: My pain/discomfort is manageable  Outcome: Met     Problem: Safety  Goal: Patient will be injury free during hospitalization  Outcome: Progressing  Goal: I will remain free of falls  Outcome: Progressing     Problem: Daily Care  Goal: Daily care needs are met  Outcome: Progressing

## 2023-12-16 NOTE — NURSING NOTE
Assumed care of patient, BSSR done, call light within reach, no c/o at this time, will cont to monitor.

## 2023-12-16 NOTE — PROGRESS NOTES
Tracie Baltazar is a 83 y.o. female on day 2 of admission presenting with Congestive heart failure with left ventricular diastolic dysfunction, acute on chronic (CMS/HCC).    Subjective   Seen and examined reading easier episode of hypotension last night creatinine is up to 2.9 decreased urine output per nursing staff bladder scan Hodgson catheter requested creatinine increase has been communicated to both cardiology and nephrology her Cozaar and Lasix are placed on hold continue her fluid restriction       Objective     Physical Exam  Vitals and nursing note reviewed.   Constitutional:       Appearance: Normal appearance.   HENT:      Head: Normocephalic and atraumatic.      Mouth/Throat:      Mouth: Mucous membranes are moist.   Eyes:      Extraocular Movements: Extraocular movements intact.      Pupils: Pupils are equal, round, and reactive to light.   Cardiovascular:      Rate and Rhythm: Normal rate and regular rhythm.      Pulses: Normal pulses.      Heart sounds: Normal heart sounds.   Pulmonary:      Effort: Pulmonary effort is normal.      Breath sounds: Normal breath sounds.   Abdominal:      Palpations: Abdomen is soft.   Musculoskeletal:         General: Normal range of motion.      Cervical back: Normal range of motion and neck supple.      Right lower leg: Edema present.      Left lower leg: Edema present.      Comments: Trace ankle edema bilaterally sniffily improved   Skin:     General: Skin is warm and dry.      Capillary Refill: Capillary refill takes less than 2 seconds.   Neurological:      General: No focal deficit present.      Mental Status: She is alert and oriented to person, place, and time. Mental status is at baseline.   Psychiatric:         Mood and Affect: Mood normal.         Last Recorded Vitals  Blood pressure (!) 102/39, pulse 51, temperature 36.8 °C (98.2 °F), temperature source Oral, resp. rate 16, SpO2 97 %.  Intake/Output last 3 Shifts:  I/O last 3 completed shifts:  In: 480  [P.O.:480]  Out: -     Relevant Results              Results for orders placed or performed during the hospital encounter of 12/14/23 (from the past 24 hour(s))   Transthoracic Echo (TTE) Complete   Result Value Ref Range    AV pk kellie 1.73     LVOT diam 2.00     MV E/A ratio 0.91     Tricuspid annular plane systolic excursion 1.7     LV biplane EF 56     MV avg E/e' ratio 12.39     RV free wall pk S' 13.20     LVIDd 4.23     AV pk grad 12.0     Aortic Valve Area by Continuity of Peak Velocity 2.20     LV A4C EF 51.6    Basic Metabolic Panel   Result Value Ref Range    Glucose 96 65 - 99 mg/dL    Sodium 135 133 - 145 mmol/L    Potassium 3.6 3.4 - 5.1 mmol/L    Chloride 101 97 - 107 mmol/L    Bicarbonate 21 (L) 24 - 31 mmol/L    Urea Nitrogen 40 (H) 8 - 25 mg/dL    Creatinine 2.90 (H) 0.40 - 1.60 mg/dL    eGFR 16 (L) >60 mL/min/1.73m*2    Calcium 8.3 (L) 8.5 - 10.4 mg/dL    Anion Gap 13 <=19 mmol/L   CBC   Result Value Ref Range    WBC 29.3 (H) 4.4 - 11.3 x10*3/uL    nRBC 0.0 0.0 - 0.0 /100 WBCs    RBC 3.07 (L) 4.00 - 5.20 x10*6/uL    Hemoglobin 9.7 (L) 12.0 - 16.0 g/dL    Hematocrit 31.4 (L) 36.0 - 46.0 %     (H) 80 - 100 fL    MCH 31.6 26.0 - 34.0 pg    MCHC 30.9 (L) 32.0 - 36.0 g/dL    RDW 13.5 11.5 - 14.5 %    Platelets 189 150 - 450 x10*3/uL     Scheduled medications  acetaminophen, 650 mg, oral, 4x daily  amLODIPine, 10 mg, oral, Daily  cyanocobalamin, 1,000 mcg, oral, Daily  DULoxetine, 20 mg, oral, Daily  enoxaparin, 30 mg, subcutaneous, Daily  folic acid, 1 mg, oral, Daily  gabapentin, 300 mg, oral, BID  hydrALAZINE, 25 mg, oral, TID  lactobacillus acidophilus, 1 tablet, oral, BID  levothyroxine, 137 mcg, oral, Daily  lidocaine, 2 patch, transdermal, Daily  pantoprazole, 40 mg, oral, Daily before breakfast  perflutren lipid microspheres, 0.5-10 mL of dilution, intravenous, Once in imaging  perflutren protein A microsphere, 0.5 mL, intravenous, Once in imaging  simvastatin, 10 mg, oral, Nightly  sodium  bicarbonate, 650 mg, oral, TID  sulfur hexafluoride microsphr, 2 mL, intravenous, Once in imaging      Continuous medications     PRN medications  PRN medications: polyethylene glycol                   Assessment/Plan   Principal Problem:    Congestive heart failure with left ventricular diastolic dysfunction, acute on chronic (CMS/HCC)    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  Her congestive heart failure symptoms have improved at the expense of worsening of her renal function which needs to be closely monitored.  Updated granddaughter at bedside       I spent 30 minutes in the professional and overall care of this patient.      Rashawn Pedersen,

## 2023-12-16 NOTE — PROGRESS NOTES
Tracie Baltazar is a 83 y.o. female on day 2 of admission presenting with Congestive heart failure with left ventricular diastolic dysfunction, acute on chronic (CMS/HCC).      Subjective   Creatinine has increased up to 2.9 mg/dL.  Patient still has mild shortness of breath.  Blood pressure is borderline low.  Hodgson catheter was inserted and the patient was given 250 cc bolus normal saline by primary team.  She still of Lasix and Cozaar.  Blood pressure is relatively low on multiple blood pressure medications including amlodipine and hydralazine.         Objective          Vitals 24HR  Heart Rate:  [46-57]   Temp:  [36.6 °C (97.9 °F)-36.8 °C (98.2 °F)]   Resp:  [16-18]   BP: ()/(30-42)   SpO2:  [95 %-98 %]         Intake/Output last 3 Shifts:    Intake/Output Summary (Last 24 hours) at 12/16/2023 1330  Last data filed at 12/16/2023 1238  Gross per 24 hour   Intake 490 ml   Output --   Net 490 ml       Physical Exam  Constitutional:       General: She is not in acute distress.  HENT:      Mouth/Throat:      Pharynx: Oropharynx is clear.   Cardiovascular:      Rate and Rhythm: Normal rate and regular rhythm.      Pulses: Normal pulses.      Heart sounds: Normal heart sounds. No murmur heard.     No gallop.   Pulmonary:      Effort: No respiratory distress.      Breath sounds: No wheezing, rhonchi or rales.   Abdominal:      General: Abdomen is flat. Bowel sounds are normal. There is no distension.      Palpations: Abdomen is soft. There is no mass.      Tenderness: There is no abdominal tenderness. There is no guarding or rebound.   Skin:     General: Skin is warm and dry.      Capillary Refill: Capillary refill takes more than 3 seconds.   Neurological:      General: No focal deficit present.      Mental Status: She is alert and oriented to person, place, and time.   Psychiatric:         Mood and Affect: Mood normal.         Behavior: Behavior normal.         Judgment: Judgment normal.         Relevant  Results    No current facility-administered medications on file prior to encounter.     Current Outpatient Medications on File Prior to Encounter   Medication Sig Dispense Refill    acetaminophen (Tylenol) 325 mg tablet       amitriptyline (Elavil) 10 mg tablet       amLODIPine (Norvasc) 10 mg tablet half qd      cholecalciferol (Vitamin D-3) 50 MCG (2000 UT) tablet Vitamin D 50 MCG (2000 UT) Oral Tablet   Refills: 0       Active      cholecalciferol (Vitamin D3) 400 unit capsule = 1 cap(s) ( 400 International Unit ), Oral, daily, 0 Refill(s), Type: Maintenance      cholestyramine-aspartame (Prevalite) 4 gram packet       cream base no.135, bulk, (SaltStable LS) cream Salt Stable LS Advanced External Cream   Quantity: 240  Refills: 0        Start : 15-Oct-2015   Active      cyanocobalamin (Vitamin B-12) 100 mcg tablet = 1 tab(s) ( 100 mcg ), Oral, daily, 0 Refill(s), Type: Maintenance      DULoxetine (Cymbalta) 30 mg DR capsule Take 1 capsule (30 mg) by mouth once daily.      folic acid (Folvite) 400 mcg tablet = 1 tab(s) ( 0.4 mg ), Oral, daily, 0 Refill(s), Type: Maintenance      folic acid 20 mg capsule Folic Acid CAPS   Refills: 0       Active      gabapentin (Neurontin) 800 mg tablet one tid      hydrALAZINE (Apresoline) 50 mg tablet TAKE 1 TABLET BY MOUTH THREE TIMES DAILY WITH FOOD      HYDROCHLOROTHIAZIDE ORAL hydroCHLOROthiazide      L. acidophilus/L.bulgaricus (lactobacillus acidoph-lactobacillus bulgar) 0.5 million cell tablet chewable split tablet       levothyroxine (Synthroid, Levoxyl) 125 mcg tablet Take 1 tablet (125 mcg) by mouth once daily.      levothyroxine (Synthroid, Levoxyl) 137 mcg tablet = 1 tab(s) ( 137 mcg ), Oral, daily, # 90 tab(s), 0 Refill(s), Type: Maintenance, Pharmacy: [id number], 1 tab(s) Oral daily, 61, in, 11/16/22 10:03:00 EST, Height Measured, 181.4, lb, 11/16/22 10:03:00 EST, Weight Measured      NIFEdipine XL 30 mg 24 hr tablet TAKE 1 BY MOUTH ONCE DAILY ON AN  EMPTY STOMACH FOR 90 DAYS      pantoprazole (ProtoNix) 40 mg EC tablet one bid for 10d, then one qd thereafter      potassium chloride CR 20 mEq ER tablet       simvastatin (Zocor) 10 mg tablet = 1 tab(s) ( 10 mg ), Oral, hs, # 90 tab(s), 3 Refill(s), Type: Maintenance, Pharmacy: [id number], 1 tab(s) Oral hs, 61, in, 12/15/22 10:06:00 EST, Height Measured, 182, lb, 12/15/22 10:06:00 EST, Weight Measured      sodium bicarbonate 650 mg tablet TAKE 2 TABLETS BY MOUTH TWICE DAILY AS DIRECTED      traMADol (Ultram) 50 mg tablet 1 po qid prn      valsartan (Diovan) 80 mg tablet        Results for orders placed or performed during the hospital encounter of 12/14/23 (from the past 24 hour(s))   Basic Metabolic Panel   Result Value Ref Range    Glucose 96 65 - 99 mg/dL    Sodium 135 133 - 145 mmol/L    Potassium 3.6 3.4 - 5.1 mmol/L    Chloride 101 97 - 107 mmol/L    Bicarbonate 21 (L) 24 - 31 mmol/L    Urea Nitrogen 40 (H) 8 - 25 mg/dL    Creatinine 2.90 (H) 0.40 - 1.60 mg/dL    eGFR 16 (L) >60 mL/min/1.73m*2    Calcium 8.3 (L) 8.5 - 10.4 mg/dL    Anion Gap 13 <=19 mmol/L   CBC   Result Value Ref Range    WBC 29.3 (H) 4.4 - 11.3 x10*3/uL    nRBC 0.0 0.0 - 0.0 /100 WBCs    RBC 3.07 (L) 4.00 - 5.20 x10*6/uL    Hemoglobin 9.7 (L) 12.0 - 16.0 g/dL    Hematocrit 31.4 (L) 36.0 - 46.0 %     (H) 80 - 100 fL    MCH 31.6 26.0 - 34.0 pg    MCHC 30.9 (L) 32.0 - 36.0 g/dL    RDW 13.5 11.5 - 14.5 %    Platelets 189 150 - 450 x10*3/uL        Transthoracic Echo (TTE) Complete    Result Date: 12/15/2023           [address] [place] [address]            Phone [phone] TRANSTHORACIC ECHOCARDIOGRAM REPORT  Patient Name:      MARK Patterson Physician:    [id number] Elvis Bruce DO Study Date:        12/15/2023          Ordering Provider:    [id number] ABDULLAHI VANESSA MRN/PID:           [id number]            Fellow:  Accession#:        [id number]        Nurse: Date of Birth/Age: 1940 / 83 years Sonographer:          Kristen Bray MALOU Gender:            F                   Additional Staff: Height:            154.94 cm           Admit Date:           12/14/2023 Weight:            82.10 kg            Admission Status:     Inpatient - Routine BSA:               1.81 m2             Department Location: Blood Pressure: 150 /47 mmHg Study Type:    TRANSTHORACIC ECHO (TTE) COMPLETE Diagnosis/ICD: Shortness of breath-R06.02 Indication:    Shortness of Breath CPT Codes:     Echo Complete w Full [id number] Patient History: Pertinent History: CKD CHF Depression HTN CP Murmur Dyslipidemia Varicose veins                    of leg with swellin ghyperlipidemia hypercholesterolemia. Study Detail: The following Echo studies were performed: 2D, Doppler, M-Mode,               color flow and 3D.  PHYSICIAN INTERPRETATION: Left Ventricle: Left ventricular systolic function is normal, with an estimated ejection fraction of 60-65%. There are no regional wall motion abnormalities. The left ventricular cavity size is normal. Left ventricular diastolic filling was indeterminate. Left Atrium: The left atrium is normal in size. Right Ventricle: The right ventricle is normal in size. There is normal right ventricular global systolic function. Right Atrium: The right atrium is normal in size. Aortic Valve: The aortic valve is trileaflet. There is mild aortic valve cusp calcification. There is no evidence of aortic valve regurgitation. The peak instantaneous gradient of the aortic valve is 12.0 mmHg. Mitral Valve: The mitral valve is normal in structure. There is trace to mild mitral valve regurgitation. Tricuspid Valve: The tricuspid valve is structurally normal. No evidence of tricuspid regurgitation. Pulmonic Valve: The pulmonic valve is not well visualized. There is trace to mild pulmonic valve regurgitation. Pericardium: There is no  pericardial effusion noted. Aorta: The aortic root is normal. Systemic Veins: The inferior vena cava appears to be of normal size. There is IVC inspiratory collapse greater than 50%.  CONCLUSIONS:  1. Left ventricular systolic function is normal with a 60-65% estimated ejection fraction.  2. Left ventricular diastolic filling indeterminate.  3. Mild aortic valve sclerosis. QUANTITATIVE DATA SUMMARY: 2D MEASUREMENTS:                          Normal Ranges: LAs:           3.00 cm   (2.7-4.0cm) IVSd:          0.85 cm   (0.6-1.1cm) LVPWd:         1.00 cm   (0.6-1.1cm) LVIDd:         4.23 cm   (3.9-5.9cm) LVIDs:         2.79 cm LV Mass Index: 69.0 g/m2 LV % FS        34.0 % LA VOLUME:                               Normal Ranges: LA Vol A2C:        59.3 ml LA Vol Index A2C:  32.8 ml/m2 LA Area A2C:       18.7 cm2 LA Major Axis A2C: 5.0 cm LA Volume Index:   29.6 ml/m2 LA Vol A2C:        59.7 ml RA VOLUME BY A/L METHOD:                               Normal Ranges: RA Vol A4C:        32.1 ml    (8.3-19.5ml) RA Vol Index A4C:  17.7 ml/m2 RA Area A4C:       13.6 cm2 RA Major Axis A4C: 4.9 cm M-MODE MEASUREMENTS:                  Normal Ranges: Ao Root: 2.20 cm (2.0-3.7cm) LAs:     4.80 cm (2.7-4.0cm) AORTA MEASUREMENTS:                    Normal Ranges: Asc Ao, d: 3.10 cm (2.1-3.4cm) LV SYSTOLIC FUNCTION BY 2D PLANIMETRY (MOD):                     Normal Ranges: EF-A4C View: 51.6 % (>=55%) EF-A2C View: 57.9 % EF-Biplane:  55.8 % LV DIASTOLIC FUNCTION:                            Normal Ranges: MV Peak E:      0.93 m/s   (0.7-1.2 m/s) MV Peak A:      1.02 m/s   (0.42-0.7 m/s) E/A Ratio:      0.91       (1.0-2.2) MV e'           0.08 m/s   (>8.0) MV lateral e'   0.08 m/s MV medial e'    0.07 m/s E/e' Ratio:     12.39      (<8.0) PulmV Sys Siddhartha:  68.10 cm/s PulmV Urrutia Siddhartha: 47.60 cm/s PulmV S/D Siddhartha:  1.40 MITRAL VALVE:                      Normal Ranges: MV Vmax:    1.22 m/s (<=1.3m/s) MV peak P.0 mmHg (<5mmHg) MV mean P.0  mmHg (<48mmHg) MV DT:      262 msec (150-240msec) AORTIC VALVE:                          Normal Ranges: AoV Vmax:      1.73 m/s  (<=1.7m/s) AoV Peak P.0 mmHg (<20mmHg) LVOT Max Siddhartha:  1.21 m/s  (<=1.1m/s) LVOT VTI:      29.10 cm LVOT Diameter: 2.00 cm   (1.8-2.4cm) AoV Area,Vmax: 2.20 cm2  (2.5-4.5cm2)  RIGHT VENTRICLE: RV Basal 3.18 cm RV Mid   2.70 cm RV Major 5.2 cm TAPSE:   16.5 mm RV s'    0.13 m/s TRICUSPID VALVE/RVSP:                   Normal Ranges: IVC Diam: 1.75 cm PULMONIC VALVE:                         Normal Ranges: PV Accel Time: 69 msec  (>120ms) PV Max Siddhartha:    1.4 m/s  (0.6-0.9m/s) PV Max P.3 mmHg Pulmonary Veins: PulmV Urrutia Siddhartha: 47.60 cm/s PulmV S/D Siddhartha:  1.40 PulmV Sys Siddhartha:  68.10 cm/s  [id number] Elvis [facility] DO Electronically signed on 12/15/2023 at 10:13:13 AM  ** Final **     CT head wo IV contrast    Result Date: 2023  Interpreted By:  Rashawn Hook, STUDY: CT HEAD WO IV CONTRAST; 2023 10:34 am   INDICATION: Signs/Symptoms:lightheadedness, near syncope.   COMPARISON: No comparison exams available.   ACCESSION NUMBER(S): [id number]   ORDERING CLINICIAN: SLY DAWKINS   TECHNIQUE: CT axial images through the Brain were obtained without contrast.   FINDINGS: Acute ischemic change: None.   Hemorrhage: No evidence of acute intracranial hemorrhage.   Mass Effect / Mass Lesion: No significant mass effect. There is no evidence of an intracranial mass or extraaxial fluid collection.   Chronic ischemic change: Patchy foci of  low attenuation coefficient are present within white matter which is a nonspecific finding but likely represents moderate microvascular ischemia.   Parenchyma: There is no significant volume loss. The brain parenchyma is otherwise within normal limits for age.   Ventricles: Normal caliber and morphology.   Other: There is calcification involving the carotid siphons.       No acute intracranial process   All CT examinations are performed with 1 or more of the  following dose reduction techniques: Automated exposure control, adjustment of mA and/or kv according to patient's size, or use of iterative reconstruction techniques.   MACRO: none   Signed by: Rashawn Hook 12/14/2023 10:48 AM Dictation workstation:   [id number]    XR chest 2 views    Result Date: 12/13/2023  Interpreted By:  Nj Zazueta, STUDY: XR CHEST 2 VIEWS; 12/13/2023 2:47 pm   INDICATION: Signs/Symptoms:SOB dizziness   COMPARISON: Chest radiograph 06/09/2020   ACCESSION NUMBER(S): [id number]   ORDERING CLINICIAN: LA SAEED   TECHNIQUE: AP and lateral views of the chest performed.   FINDINGS: Cardiac size is indeterminate due to the AP projection. Calcifications are seen at the aortic knob. Thoracic aorta is tortuous in appearance. Mild prominence of the pulmonary vasculature. Faint bibasilar hazy opacities likely reflect atelectasis. No focal consolidation, large pleural effusion, or visible pneumothorax. Multilevel discogenic degenerative changes of the thoracic spine. Presumed neurostimulator leads overlying the thoracic spine.       Mild prominence of the pulmonary vasculature, as can be seen with pulmonary vascular congestion. Please correlate for corresponding symptoms.   No focal consolidation or visible pneumothorax.   Faint bibasilar atelectasis suspected.   MACRO: None.   Signed by: Nj Zazueta 12/13/2023 3:04 PM Dictation workstation:   [id number]        Assessment/Plan     JANET on top of CKD stage III/stage IV.  Creatinine has increased up to 2.9 mg/dL in the setting of low blood pressure.  Lasix and ACE inhibitors are still on hold.  Congestive heart failure, patient breathing comfortably however she still has edema.  Hypertension, blood pressure relatively low in the setting of diuretics.    Plan:  1.  Will discontinue amlodipine  2.  Agree with 250 cc IV fluid bolus.  3.  Output parameters for hydralazine to hold if systolic blood pressure less than 110  4.  Monitor daily  intake and output and renal panel.            Principal Problem:    Congestive heart failure with left ventricular diastolic dysfunction, acute on chronic (CMS/HCC)              Katie Dozier MD

## 2023-12-16 NOTE — PROGRESS NOTES
Tracie Baltazar is a 83 y.o. female on day 2 of admission presenting with Congestive heart failure with left ventricular diastolic dysfunction, acute on chronic (CMS/HCC).    Subjective   Saturating well on room air       Objective     Physical Exam   Gen: NAD   Neck: no JVD, carotid upstroke is brisk and without delay   Heart: rrr, s1s2+ no mrg   Lungs: CTA   Ext: warm no edema last Recorded Vitals  Blood pressure (!) 102/39, pulse 51, temperature 36.8 °C (98.2 °F), temperature source Oral, resp. rate 16, SpO2 97 %.  Intake/Output last 3 Shifts:  I/O last 3 completed shifts:  In: 480 [P.O.:480]  Out: -            Assessment/Plan   Principal Problem:    Congestive heart failure with left ventricular diastolic dysfunction, acute on chronic (CMS/HCC)    Acute on chronic diastolic heart failure  Hypertension  CLL  Stage IV chronic kidney disease    12/16: Significant rise in serum creatinine from 1.8-2.9 in 24 hours likely associated with diuretics.  These been placed on hold, losartan has been held as well.  Blood pressures have been reasonable.  Her echocardiogram is pending.  Will monitor her kidney function off diuretic therapy.  Will follow.         I spent 25 minutes in the professional and overall care of this patient.      Aidan Wright, DO

## 2023-12-16 NOTE — NURSING NOTE
Assumed care of patient, pt alert and awake in bed, dr requesting quinn   Medical Necessity Information: It is in your best interest to select a reason for this procedure from the list below. All of these items fulfill various CMS LCD requirements except the new and changing color options. Detail Level: Detailed Post-Care Instructions: I reviewed with the patient in detail post-care instructions. Patient is to wear sunprotection, and avoid picking at any of the treated lesions. Pt may apply Vaseline to crusted or scabbing areas. Consent: The patient's consent was obtained including but not limited to risks of crusting, scabbing, blistering, scarring, darker or lighter pigmentary change, recurrence, incomplete removal and infection. Duration Of Freeze Thaw-Cycle (Seconds): 3 Render Post-Care Instructions In Note?: no Include Z78.9 (Other Specified Conditions Influencing Health Status) As An Associated Diagnosis?: Yes Number Of Freeze-Thaw Cycles: 3 freeze-thaw cycles Medical Necessity Clause: This procedure was medically necessary because the lesions that were treated were: itchy per patient report.

## 2023-12-17 LAB
ALBUMIN SERPL-MCNC: 3.3 G/DL (ref 3.5–5)
ANION GAP SERPL CALC-SCNC: 11 MMOL/L
BUN SERPL-MCNC: 43 MG/DL (ref 8–25)
CALCIUM SERPL-MCNC: 8.5 MG/DL (ref 8.5–10.4)
CHLORIDE SERPL-SCNC: 103 MMOL/L (ref 97–107)
CO2 SERPL-SCNC: 23 MMOL/L (ref 24–31)
CREAT SERPL-MCNC: 3 MG/DL (ref 0.4–1.6)
GFR SERPL CREATININE-BSD FRML MDRD: 15 ML/MIN/1.73M*2
GLUCOSE SERPL-MCNC: 96 MG/DL (ref 65–99)
PHOSPHATE SERPL-MCNC: 5 MG/DL (ref 2.5–4.5)
POTASSIUM SERPL-SCNC: 3.4 MMOL/L (ref 3.4–5.1)
SODIUM SERPL-SCNC: 137 MMOL/L (ref 133–145)

## 2023-12-17 PROCEDURE — 2500000001 HC RX 250 WO HCPCS SELF ADMINISTERED DRUGS (ALT 637 FOR MEDICARE OP): Performed by: INTERNAL MEDICINE

## 2023-12-17 PROCEDURE — 2500000004 HC RX 250 GENERAL PHARMACY W/ HCPCS (ALT 636 FOR OP/ED): Performed by: INTERNAL MEDICINE

## 2023-12-17 PROCEDURE — 36415 COLL VENOUS BLD VENIPUNCTURE: CPT | Performed by: INTERNAL MEDICINE

## 2023-12-17 PROCEDURE — 1100000001 HC PRIVATE ROOM DAILY

## 2023-12-17 PROCEDURE — 96372 THER/PROPH/DIAG INJ SC/IM: CPT | Performed by: INTERNAL MEDICINE

## 2023-12-17 PROCEDURE — 80069 RENAL FUNCTION PANEL: CPT | Performed by: INTERNAL MEDICINE

## 2023-12-17 PROCEDURE — 99232 SBSQ HOSP IP/OBS MODERATE 35: CPT | Performed by: INTERNAL MEDICINE

## 2023-12-17 RX ORDER — ONDANSETRON 4 MG/1
4 TABLET, ORALLY DISINTEGRATING ORAL EVERY 12 HOURS PRN
Status: DISCONTINUED | OUTPATIENT
Start: 2023-12-17 | End: 2023-12-19 | Stop reason: HOSPADM

## 2023-12-17 RX ORDER — HYDRALAZINE HYDROCHLORIDE 10 MG/1
20 TABLET, FILM COATED ORAL 3 TIMES DAILY
Status: DISCONTINUED | OUTPATIENT
Start: 2023-12-17 | End: 2023-12-19 | Stop reason: HOSPADM

## 2023-12-17 RX ORDER — DOCUSATE SODIUM 100 MG/1
100 CAPSULE, LIQUID FILLED ORAL 2 TIMES DAILY PRN
Status: DISCONTINUED | OUTPATIENT
Start: 2023-12-17 | End: 2023-12-19 | Stop reason: HOSPADM

## 2023-12-17 RX ORDER — HEPARIN SODIUM 5000 [USP'U]/ML
5000 INJECTION, SOLUTION INTRAVENOUS; SUBCUTANEOUS EVERY 8 HOURS
Status: DISCONTINUED | OUTPATIENT
Start: 2023-12-17 | End: 2023-12-19 | Stop reason: HOSPADM

## 2023-12-17 RX ADMIN — HYDRALAZINE HYDROCHLORIDE 20 MG: 10 TABLET, FILM COATED ORAL at 20:41

## 2023-12-17 RX ADMIN — FOLIC ACID 1 MG: 1 TABLET ORAL at 09:46

## 2023-12-17 RX ADMIN — HYDRALAZINE HYDROCHLORIDE 25 MG: 25 TABLET, FILM COATED ORAL at 09:46

## 2023-12-17 RX ADMIN — SIMVASTATIN 10 MG: 10 TABLET, FILM COATED ORAL at 20:41

## 2023-12-17 RX ADMIN — ACETAMINOPHEN 650 MG: 325 TABLET ORAL at 06:04

## 2023-12-17 RX ADMIN — ACETAMINOPHEN 650 MG: 325 TABLET ORAL at 17:12

## 2023-12-17 RX ADMIN — SODIUM BICARBONATE 650 MG TABLET 650 MG: at 20:41

## 2023-12-17 RX ADMIN — DULOXETINE HYDROCHLORIDE 20 MG: 20 CAPSULE, DELAYED RELEASE ORAL at 09:47

## 2023-12-17 RX ADMIN — HEPARIN SODIUM 5000 UNITS: 5000 INJECTION, SOLUTION INTRAVENOUS; SUBCUTANEOUS at 09:47

## 2023-12-17 RX ADMIN — Medication 1 TABLET: at 09:46

## 2023-12-17 RX ADMIN — CYANOCOBALAMIN TAB 1000 MCG 1000 MCG: 1000 TAB at 09:46

## 2023-12-17 RX ADMIN — PANTOPRAZOLE SODIUM 40 MG: 40 TABLET, DELAYED RELEASE ORAL at 06:04

## 2023-12-17 RX ADMIN — Medication 1 TABLET: at 20:41

## 2023-12-17 RX ADMIN — SODIUM BICARBONATE 650 MG TABLET 650 MG: at 17:12

## 2023-12-17 RX ADMIN — SODIUM BICARBONATE 650 MG TABLET 650 MG: at 09:47

## 2023-12-17 RX ADMIN — ACETAMINOPHEN 650 MG: 325 TABLET ORAL at 20:41

## 2023-12-17 RX ADMIN — ACETAMINOPHEN 650 MG: 325 TABLET ORAL at 14:42

## 2023-12-17 RX ADMIN — GABAPENTIN 300 MG: 300 CAPSULE ORAL at 09:47

## 2023-12-17 RX ADMIN — GABAPENTIN 300 MG: 300 CAPSULE ORAL at 20:41

## 2023-12-17 RX ADMIN — LEVOTHYROXINE SODIUM 137 MCG: 0.14 TABLET ORAL at 06:04

## 2023-12-17 RX ADMIN — HEPARIN SODIUM 5000 UNITS: 5000 INJECTION, SOLUTION INTRAVENOUS; SUBCUTANEOUS at 17:12

## 2023-12-17 ASSESSMENT — COGNITIVE AND FUNCTIONAL STATUS - GENERAL
DAILY ACTIVITIY SCORE: 23
TURNING FROM BACK TO SIDE WHILE IN FLAT BAD: A LITTLE
MOVING TO AND FROM BED TO CHAIR: A LITTLE
MOBILITY SCORE: 17
STANDING UP FROM CHAIR USING ARMS: A LITTLE
TOILETING: A LITTLE
CLIMB 3 TO 5 STEPS WITH RAILING: A LOT
CLIMB 3 TO 5 STEPS WITH RAILING: A LOT
TOILETING: A LITTLE
MOVING FROM LYING ON BACK TO SITTING ON SIDE OF FLAT BED WITH BEDRAILS: A LITTLE
MOVING TO AND FROM BED TO CHAIR: A LITTLE
MOBILITY SCORE: 17
DAILY ACTIVITIY SCORE: 23
WALKING IN HOSPITAL ROOM: A LITTLE
MOVING FROM LYING ON BACK TO SITTING ON SIDE OF FLAT BED WITH BEDRAILS: A LITTLE
TURNING FROM BACK TO SIDE WHILE IN FLAT BAD: A LITTLE
WALKING IN HOSPITAL ROOM: A LITTLE
STANDING UP FROM CHAIR USING ARMS: A LITTLE

## 2023-12-17 ASSESSMENT — PAIN SCALES - GENERAL
PAINLEVEL_OUTOF10: 0 - NO PAIN
PAINLEVEL_OUTOF10: 2
PAINLEVEL_OUTOF10: 2

## 2023-12-17 ASSESSMENT — PAIN - FUNCTIONAL ASSESSMENT
PAIN_FUNCTIONAL_ASSESSMENT: 0-10
PAIN_FUNCTIONAL_ASSESSMENT: 0-10

## 2023-12-17 NOTE — NURSING NOTE
Assumed care of patient, pt resting quietly in bed with no needs at this time, call light and possessions within reach

## 2023-12-17 NOTE — PROGRESS NOTES
Tracie Baltazar is a 83 y.o. female on day 3 of admission presenting with Congestive heart failure with left ventricular diastolic dysfunction, acute on chronic (CMS/HCC).    Subjective   Creatinine has increased yesterday up to 2.9 mg/dL mainly related to soft blood pressure.  Furosemide and losartan are still on hold. Baseline 1.6 to 1.8 mg/dL.  Amlodipine was stopped yesterday and parameters was added to hold hydralazine for systolic blood pressure less than 110.  Creatinine seems to be stabilized at 3 mg/dL.  Blood pressure slightly better 122/38 although the patient still of diuretics and losartan.     Objective     Physical Exam  Constitutional:       General: She is not in acute distress.  HENT:      Mouth/Throat:      Pharynx: Oropharynx is clear.   Cardiovascular:      Rate and Rhythm: Normal rate and regular rhythm.      Pulses: Normal pulses.      Heart sounds: Normal heart sounds. No murmur heard.     No gallop.   Pulmonary:      Effort: No respiratory distress.      Breath sounds: No wheezing, rhonchi or rales.   Abdominal:      General: Abdomen is flat. Bowel sounds are normal. There is no distension.      Palpations: Abdomen is soft. There is no mass.      Tenderness: There is no abdominal tenderness. There is no guarding or rebound.   Skin:     General: Skin is warm and dry.      Capillary Refill: Capillary refill takes more than 3 seconds.   Neurological:      General: No focal deficit present.      Mental Status: She is alert and oriented to person, place, and time.   Psychiatric:         Mood and Affect: Mood normal.         Behavior: Behavior normal.         Judgment: Judgment normal.     Last Recorded Vitals  Blood pressure (!) 122/38, pulse 52, temperature 36.6 °C (97.9 °F), temperature source Oral, resp. rate 16, SpO2 94 %.  Intake/Output last 3 Shifts:  I/O last 3 completed shifts:  In: 250 [IV Piggyback:250]  Out: 450 [Urine:450]    Relevant Results  Results for orders placed or performed  during the hospital encounter of 12/14/23 (from the past 24 hour(s))   Renal Function Panel   Result Value Ref Range    Glucose 96 65 - 99 mg/dL    Sodium 137 133 - 145 mmol/L    Potassium 3.4 3.4 - 5.1 mmol/L    Chloride 103 97 - 107 mmol/L    Bicarbonate 23 (L) 24 - 31 mmol/L    Urea Nitrogen 43 (H) 8 - 25 mg/dL    Creatinine 3.00 (H) 0.40 - 1.60 mg/dL    eGFR 15 (L) >60 mL/min/1.73m*2    Calcium 8.5 8.5 - 10.4 mg/dL    Phosphorus 5.0 (H) 2.5 - 4.5 mg/dL    Albumin 3.3 (L) 3.5 - 5.0 g/dL    Anion Gap 11 <=19 mmol/L                      Assessment/Plan   JANET on top of CKD stage III/stage IV.  Creatinine seems to be stabilized now at 3 mg/dL with better blood pressure.  She still of losartan, furosemide and amlodipine.  She has no hyperkalemia or acidosis.  Will just continue monitor of diuretics hopefully renal function will improve spontaneously with better blood pressure.    Congestive heart failure, patient breathing comfortably however she still has edema.  Continue low-salt diet.  Furosemide will be restarted later on.  Hypertension, blood pressure well-controlled despite holding medications.  Monitor renal function and renal output.  No indications for dialysis at this point.  Hopefully renal function will improve spontaneously.         Katie Dozier MD

## 2023-12-17 NOTE — DOCUMENTATION CLARIFICATION NOTE
"    PATIENT:               MARK GABRIEL  ACCT #:                  [id number]  MRN:                       [id number]  :                       1940  ADMIT DATE:       2023 3:47 PM  DISCH DATE:  RESPONDING PROVIDER #:        [id number]          PROVIDER RESPONSE TEXT:    Urinary tract infection ruled out after workup    CDI QUERY TEXT:    [facility]_Diagnosis Ruled Out In    Instruction:    Based on your assessment of the patient and the clinical information, please provide the requested documentation by clicking on the appropriate radio button and enter any additional information if prompted.    Question: Please further clarify the diagnosis of Urinary tract infection as      When answering this query, please exercise your independent professional judgment. The fact that a question is being asked, does not imply that any particular answer is desired or expected.    The patient's clinical indicators include:  Clinical Information:  83 y F presented with dizziness and shortness of breath    Clinical Indicators:   H&P \"Her urinary analysis suggests contamination rather than actual infection we will discontinue IV antibiotics\"   DC Summary \"Issues Requiring Follow-Up...Urinary tract infection\"    Treatment:   1627 Rocephin order Discontinued    Risk Factors:  Recent C. diff, JANET, elderly  Options provided:  -- Urinary tract infection ruled out after workup  -- Other - I will add my own diagnosis  -- Refer to Clinical Documentation Reviewer    Query created by: Zuleyma Farnsworth on 2023 11:45 AM      Electronically signed by:  NENA DENN[facility] 2023 11:48 AM          "

## 2023-12-17 NOTE — PROGRESS NOTES
Tracie Baltazar is a 83 y.o. female on day 3 of admission presenting with Congestive heart failure with left ventricular diastolic dysfunction, acute on chronic (CMS/HCC).    Subjective   Saturating well on room air       Objective     Physical Exam   Gen: NAD   Neck: no JVD, carotid upstroke is brisk and without delay   Heart: rrr, s1s2+ no mrg   Lungs: CTA   Ext: warm no edema last Recorded Vitals  Blood pressure (!) 122/38, pulse 52, temperature 36.6 °C (97.9 °F), temperature source Oral, resp. rate 16, SpO2 94 %.  Intake/Output last 3 Shifts:  I/O last 3 completed shifts:  In: 250 [IV Piggyback:250]  Out: 450 [Urine:450]           Assessment/Plan   Principal Problem:    Congestive heart failure with left ventricular diastolic dysfunction, acute on chronic (CMS/HCC)    Acute on chronic diastolic heart failure  Hypertension  CLL  Stage IV chronic kidney disease    12/16: Significant rise in serum creatinine from 1.8-2.9 in 24 hours likely associated with diuretics.  These been placed on hold, losartan has been held as well.  Blood pressures have been reasonable.  Her echocardiogram is pending.  Will monitor her kidney function off diuretic therapy.  Will follow.    12/17: Lasix has been held as well as her losartan.  Serum creatinine seems to have plateaued.  Would continue to hold these medications and observe.  Will continue to follow.         I spent 25 minutes in the professional and overall care of this patient.      Aidan Wright, DO

## 2023-12-17 NOTE — PROGRESS NOTES
"HOSPITALIST  PROGRESS NOTE   Tracie Baltazar    :  1940    Medical Record:  [id number]    DATE OF SERVICE: 2023  ADMIT DAY: 3.    Subjective:  Tracie Baltazar is a 83 y.o. year-old female who was admitted on 2023 for acute CHF exacerbation and JANET.  The patient's labs, imaging studies and vital signs are noted with the case discussed with the nursing staff. The patient was seen and examined and the chart was reviewed. The patient is being seen for management of their BP along with the pt's other medical conditions. Today reports \"worried about my kidneys\" but she denies any F/C/CP/N/V/D/Abd pain.    Objective:  Vitals:    23 0751   BP: (!) 122/38   Pulse: 52   Resp: 16   Temp: 36.6 °C (97.9 °F)   SpO2: 94%        I/O last 3 completed shifts:  In: 250 [IV Piggyback:250]  Out: 450 [Urine:450]  I/O this shift:  In: -   Out: 1200 [Urine:1200]  Pulmonary:RA    General: Female A&Ox3, and is following commands.    HEENT: Normocephalic atraumatic, pupils equally reactive to light and accomodation, extra occular muscles are intact. Neck is supple, trachea is midline without observable bruits.  CVS: Regular rate and rhythm, S1 S2 without any 3 or S4.  Pulmonary: Decreased breath sounds with occasional rales  GI: Abdomen is soft, non tender, positive bowel sounds are present.  EXT: B/L LE peripheral edema 1-2/4 with pulses are palpable throughout.  NEUROLOGY: CN 3-12 grossly intact except for known chronic hearing and visual impairments, Muscle strength is 5/5, DTRs are 2/4 throughout. There is no obvious facial asymmetry and no resting tremors.Moves all 4 extremities spontaneously.    LABS:  Results for orders placed or performed during the hospital encounter of 23 (from the past 24 hour(s))   Renal Function Panel   Result Value Ref Range    Glucose 96 65 - 99 mg/dL    Sodium 137 133 - 145 mmol/L    Potassium 3.4 3.4 - 5.1 mmol/L    Chloride 103 97 - 107 mmol/L    Bicarbonate 23 (L) 24 - 31 " mmol/L    Urea Nitrogen 43 (H) 8 - 25 mg/dL    Creatinine 3.00 (H) 0.40 - 1.60 mg/dL    eGFR 15 (L) >60 mL/min/1.73m*2    Calcium 8.5 8.5 - 10.4 mg/dL    Phosphorus 5.0 (H) 2.5 - 4.5 mg/dL    Albumin 3.3 (L) 3.5 - 5.0 g/dL    Anion Gap 11 <=19 mmol/L        No results found for the last 90 days.         MEDICATIONS:  Scheduled medications  acetaminophen, 650 mg, oral, 4x daily  cyanocobalamin, 1,000 mcg, oral, Daily  DULoxetine, 20 mg, oral, Daily  folic acid, 1 mg, oral, Daily  gabapentin, 300 mg, oral, BID  heparin, 5,000 Units, subcutaneous, q8h  hydrALAZINE, 25 mg, oral, TID  lactobacillus acidophilus, 1 tablet, oral, BID  levothyroxine, 137 mcg, oral, Daily  lidocaine, 2 patch, transdermal, Daily  pantoprazole, 40 mg, oral, Daily before breakfast  perflutren lipid microspheres, 0.5-10 mL of dilution, intravenous, Once in imaging  perflutren protein A microsphere, 0.5 mL, intravenous, Once in imaging  simvastatin, 10 mg, oral, Nightly  sodium bicarbonate, 650 mg, oral, TID  sulfur hexafluoride microsphr, 2 mL, intravenous, Once in imaging      Continuous medications     PRN medications  PRN medications: polyethylene glycol    PERTINENT IMAGING STUDIES/PROCEDURES:  Transthoracic Echo (TTE) Complete    Result Date: 12/15/2023           [place] [address]            Phone [phone] TRANSTHORACIC ECHOCARDIOGRAM REPORT  Patient Name:      MARK Patterson Physician:    [id number] [facility] Study Date:        12/15/2023          Ordering Provider:    [id number] ABDULLAHI VANESSA MRN/PID:           [id number]            Fellow: Accession#:        [id number]        Nurse: Date of Birth/Age: 1940 / 83 years Sonographer:          Kristen WESTFALL Gender:            F                   Additional Staff: Height:            154.94 cm           Admit Date:           12/14/2023 Weight:            82.10  kg            Admission Status:     Inpatient - Routine BSA:               1.81 m2             Department Location: Blood Pressure: 150 /47 mmHg Study Type:    TRANSTHORACIC ECHO (TTE) COMPLETE Diagnosis/ICD: Shortness of breath-R06.02 Indication:    Shortness of Breath CPT Codes:     Echo Complete w Full [id number] Patient History: Pertinent History: CKD CHF Depression HTN CP Murmur Dyslipidemia Varicose veins                    of leg with swellin ghyperlipidemia hypercholesterolemia. Study Detail: The following Echo studies were performed: 2D, Doppler, M-Mode,               color flow and 3D.  PHYSICIAN INTERPRETATION: Left Ventricle: Left ventricular systolic function is normal, with an estimated ejection fraction of 60-65%. There are no regional wall motion abnormalities. The left ventricular cavity size is normal. Left ventricular diastolic filling was indeterminate. Left Atrium: The left atrium is normal in size. Right Ventricle: The right ventricle is normal in size. There is normal right ventricular global systolic function. Right Atrium: The right atrium is normal in size. Aortic Valve: The aortic valve is trileaflet. There is mild aortic valve cusp calcification. There is no evidence of aortic valve regurgitation. The peak instantaneous gradient of the aortic valve is 12.0 mmHg. Mitral Valve: The mitral valve is normal in structure. There is trace to mild mitral valve regurgitation. Tricuspid Valve: The tricuspid valve is structurally normal. No evidence of tricuspid regurgitation. Pulmonic Valve: The pulmonic valve is not well visualized. There is trace to mild pulmonic valve regurgitation. Pericardium: There is no pericardial effusion noted. Aorta: The aortic root is normal. Systemic Veins: The inferior vena cava appears to be of normal size. There is IVC inspiratory collapse greater than 50%.  CONCLUSIONS:  1. Left ventricular systolic function is normal with a 60-65% estimated ejection fraction.   2. Left ventricular diastolic filling indeterminate.  3. Mild aortic valve sclerosis. QUANTITATIVE DATA SUMMARY: 2D MEASUREMENTS:                          Normal Ranges: LAs:           3.00 cm   (2.7-4.0cm) IVSd:          0.85 cm   (0.6-1.1cm) LVPWd:         1.00 cm   (0.6-1.1cm) LVIDd:         4.23 cm   (3.9-5.9cm) LVIDs:         2.79 cm LV Mass Index: 69.0 g/m2 LV % FS        34.0 % LA VOLUME:                               Normal Ranges: LA Vol A2C:        59.3 ml LA Vol Index A2C:  32.8 ml/m2 LA Area A2C:       18.7 cm2 LA Major Axis A2C: 5.0 cm LA Volume Index:   29.6 ml/m2 LA Vol A2C:        59.7 ml RA VOLUME BY A/L METHOD:                               Normal Ranges: RA Vol A4C:        32.1 ml    (8.3-19.5ml) RA Vol Index A4C:  17.7 ml/m2 RA Area A4C:       13.6 cm2 RA Major Axis A4C: 4.9 cm M-MODE MEASUREMENTS:                  Normal Ranges: Ao Root: 2.20 cm (2.0-3.7cm) LAs:     4.80 cm (2.7-4.0cm) AORTA MEASUREMENTS:                    Normal Ranges: Asc Ao, d: 3.10 cm (2.1-3.4cm) LV SYSTOLIC FUNCTION BY 2D PLANIMETRY (MOD):                     Normal Ranges: EF-A4C View: 51.6 % (>=55%) EF-A2C View: 57.9 % EF-Biplane:  55.8 % LV DIASTOLIC FUNCTION:                            Normal Ranges: MV Peak E:      0.93 m/s   (0.7-1.2 m/s) MV Peak A:      1.02 m/s   (0.42-0.7 m/s) E/A Ratio:      0.91       (1.0-2.2) MV e'           0.08 m/s   (>8.0) MV lateral e'   0.08 m/s MV medial e'    0.07 m/s E/e' Ratio:     12.39      (<8.0) PulmV Sys Siddhartha:  68.10 cm/s PulmV Urrutia Siddhartha: 47.60 cm/s PulmV S/D Siddhartha:  1.40 MITRAL VALVE:                      Normal Ranges: MV Vmax:    1.22 m/s (<=1.3m/s) MV peak P.0 mmHg (<5mmHg) MV mean P.0 mmHg (<48mmHg) MV DT:      262 msec (150-240msec) AORTIC VALVE:                          Normal Ranges: AoV Vmax:      1.73 m/s  (<=1.7m/s) AoV Peak P.0 mmHg (<20mmHg) LVOT Max Siddhartha:  1.21 m/s  (<=1.1m/s) LVOT VTI:      29.10 cm LVOT Diameter: 2.00 cm   (1.8-2.4cm) AoV Area,Vmax:  2.20 cm2  (2.5-4.5cm2)  RIGHT VENTRICLE: RV Basal 3.18 cm RV Mid   2.70 cm RV Major 5.2 cm TAPSE:   16.5 mm RV s'    0.13 m/s TRICUSPID VALVE/RVSP:                   Normal Ranges: IVC Diam: 1.75 cm PULMONIC VALVE:                         Normal Ranges: PV Accel Time: 69 msec  (>120ms) PV Max Siddhartha:    1.4 m/s  (0.6-0.9m/s) PV Max P.3 mmHg Pulmonary Veins: PulmV Urrutia Siddhartha: 47.60 cm/s PulmV S/D Siddhartha:  1.40 PulmV Sys Siddhartha:  68.10 cm/s  [id number] Elvis Bruce  Electronically signed on 12/15/2023 at 10:13:13 AM  ** Final **     CT head wo IV contrast    Result Date: 2023  Interpreted By:  Rashawn Hook, STUDY: CT HEAD WO IV CONTRAST; 2023 10:34 am   INDICATION: Signs/Symptoms:lightheadedness, near syncope.   COMPARISON: No comparison exams available.   ACCESSION NUMBER(S): [id number]   ORDERING CLINICIAN: SLY DAWKINS   TECHNIQUE: CT axial images through the Brain were obtained without contrast.   FINDINGS: Acute ischemic change: None.   Hemorrhage: No evidence of acute intracranial hemorrhage.   Mass Effect / Mass Lesion: No significant mass effect. There is no evidence of an intracranial mass or extraaxial fluid collection.   Chronic ischemic change: Patchy foci of  low attenuation coefficient are present within white matter which is a nonspecific finding but likely represents moderate microvascular ischemia.   Parenchyma: There is no significant volume loss. The brain parenchyma is otherwise within normal limits for age.   Ventricles: Normal caliber and morphology.   Other: There is calcification involving the carotid siphons.       No acute intracranial process   All CT examinations are performed with 1 or more of the following dose reduction techniques: Automated exposure control, adjustment of mA and/or kv according to patient's size, or use of iterative reconstruction techniques.   MACRO: none   Signed by: Rashawn Hook 2023 10:48 AM Dictation workstation:   [id number]    XR chest 2  views    Result Date: 12/13/2023  Interpreted By:  Nj Zazueta, STUDY: XR CHEST 2 VIEWS; 12/13/2023 2:47 pm   INDICATION: Signs/Symptoms:SOB dizziness   COMPARISON: Chest radiograph 06/09/2020   ACCESSION NUMBER(S): [id number]   ORDERING CLINICIAN: LA SAEED   TECHNIQUE: AP and lateral views of the chest performed.   FINDINGS: Cardiac size is indeterminate due to the AP projection. Calcifications are seen at the aortic knob. Thoracic aorta is tortuous in appearance. Mild prominence of the pulmonary vasculature. Faint bibasilar hazy opacities likely reflect atelectasis. No focal consolidation, large pleural effusion, or visible pneumothorax. Multilevel discogenic degenerative changes of the thoracic spine. Presumed neurostimulator leads overlying the thoracic spine.       Mild prominence of the pulmonary vasculature, as can be seen with pulmonary vascular congestion. Please correlate for corresponding symptoms.   No focal consolidation or visible pneumothorax.   Faint bibasilar atelectasis suspected.   MACRO: None.   Signed by: Nj Zazueta 12/13/2023 3:04 PM Dictation workstation:   [id number]    Assessment:  Tracie Baltazar is a 83 y.o. year-old female @ on admission for 3 days    Acute CHF exacerbation likely diastolic  JANET likely from diuretics with baseline creatinine 1.7-2  Acute hypokalemia-replaced  CLL  Acute on chronic anemia  Vitamin D deficiency  Vitamin B12 deficiency  Depression  Peripheral neuropathy  Hypothyroidism  GERD  Hyperlipidemia  Essential hypertension  Osteoarthritis  Deconditioning and impaired mobility    PULMONARY:  -The patient is currently on the respiratory protocol has been able to tolerate room air for majority of this hospitalization.  Repeat chest x-ray ordered for the morning.    CARDIOLOGY:  -BP is low at times so decrease hydralazine from 25 mg to 20 mg 3 times daily on 12/17.  Cozaar and Lasix held due to JANET with repeat labs ordered in the am.  Potassium  replaced as well.  -Cardiology on the case and echo showing EF of 60-65%.  Pt unable to be started on beta-blockers due to HYPOTENSION and diuretic as well as Cozaar stopped due to JANET     NEPHROLOGY:   -Nephrology on the case and is managing the pt's renal function, as needed fluids as well as the electrolytes.The results of the renal labs were worsening so stopped Cozaar and diuretics this admission.  The patient's Is and Os are being monitored and the patient continues to be off nephrotoxic agents as much as possible. The patient is on IVFs along with sodium bicarb as well.    ID:  Pt currently off any antibiotics but leukocytosis is chronic from CLL.     PSYCHIATRY/NEUROLOGY:   -Pt renewed on her Neurontin and Cymbalta.    GI:   Protonix     HEMATOLOGY:  -CBC noted and will recheck in am with no need for transfusions unless pt develops severe bleeding, platelet counts less than 10,000 or Hgb becomes < 7.     MUSCULOSKELETAL:     1- generalized weakness:  PT&OT    OTHER:    -The patient continues to be on the rest of their chronic home medications for hyperlipidemia, hypothyroidism, osteoarthritis, etc.  Case discussed with patient's granddaughter on 5/17 and updated them of the patient's current condition as well as answered all their questions to their satisfaction.    -Discharge planner is on the case following as well.    *CODE STATUS: DNR and no intubation    *DVT prophylaxis: Heparin subcu     *PUD PROPHYLAXIS:  Protonix    Time spent managing patient's care is greater than 40 minutes with approximately 50% or more spent in counseling and coordination of care.    Abilio ALANIS    Portions of this Progress note were dictated using [employer] and reviewed . While every effort was made to correct mistranscriptions, minor grammatical or typographical errors may be present from machine dictation

## 2023-12-17 NOTE — CARE PLAN
Problem: Fall/Injury  Goal: Not fall by end of shift  Outcome: Progressing  Goal: Be free from injury by end of the shift  Outcome: Progressing  Goal: Use assistive devices by end of the shift  Outcome: Progressing     Problem: Safety  Goal: Patient will be injury free during hospitalization  Outcome: Progressing  Goal: I will remain free of falls  Outcome: Progressing     Problem: Pain - Adult  Goal: Verbalizes/displays adequate comfort level or baseline comfort level  Outcome: Progressing     Problem: Safety - Adult  Goal: Free from fall injury  Outcome: Progressing

## 2023-12-18 ENCOUNTER — APPOINTMENT (OUTPATIENT)
Dept: RADIOLOGY | Facility: HOSPITAL | Age: 83
DRG: 291 | End: 2023-12-18
Payer: MEDICARE

## 2023-12-18 ENCOUNTER — PATIENT OUTREACH (OUTPATIENT)
Dept: CASE MANAGEMENT | Facility: HOSPITAL | Age: 83
End: 2023-12-18
Payer: MEDICARE

## 2023-12-18 LAB
ALBUMIN SERPL-MCNC: 3.4 G/DL (ref 3.5–5)
ANION GAP SERPL CALC-SCNC: 11 MMOL/L
BUN SERPL-MCNC: 41 MG/DL (ref 8–25)
CALCIUM SERPL-MCNC: 8.8 MG/DL (ref 8.5–10.4)
CHLORIDE SERPL-SCNC: 105 MMOL/L (ref 97–107)
CO2 SERPL-SCNC: 24 MMOL/L (ref 24–31)
CREAT SERPL-MCNC: 2.6 MG/DL (ref 0.4–1.6)
FERRITIN SERPL-MCNC: 589 NG/ML (ref 13–150)
GFR SERPL CREATININE-BSD FRML MDRD: 18 ML/MIN/1.73M*2
GLUCOSE SERPL-MCNC: 92 MG/DL (ref 65–99)
IRON SATN MFR SERPL: 18 % (ref 12–50)
IRON SERPL-MCNC: 34 UG/DL (ref 30–160)
PHOSPHATE SERPL-MCNC: 4.4 MG/DL (ref 2.5–4.5)
POTASSIUM SERPL-SCNC: 3.7 MMOL/L (ref 3.4–5.1)
SODIUM SERPL-SCNC: 140 MMOL/L (ref 133–145)
T4 FREE SERPL-MCNC: 1.1 NG/DL (ref 0.9–1.7)
TIBC SERPL-MCNC: 188 UG/DL (ref 228–428)
TSH SERPL DL<=0.05 MIU/L-ACNC: 20.97 MIU/L (ref 0.27–4.2)
UIBC SERPL-MCNC: 154 UG/DL (ref 110–370)

## 2023-12-18 PROCEDURE — 2500000001 HC RX 250 WO HCPCS SELF ADMINISTERED DRUGS (ALT 637 FOR MEDICARE OP): Performed by: INTERNAL MEDICINE

## 2023-12-18 PROCEDURE — 2500000004 HC RX 250 GENERAL PHARMACY W/ HCPCS (ALT 636 FOR OP/ED): Performed by: INTERNAL MEDICINE

## 2023-12-18 PROCEDURE — 84443 ASSAY THYROID STIM HORMONE: CPT | Performed by: INTERNAL MEDICINE

## 2023-12-18 PROCEDURE — 80069 RENAL FUNCTION PANEL: CPT | Performed by: INTERNAL MEDICINE

## 2023-12-18 PROCEDURE — 97165 OT EVAL LOW COMPLEX 30 MIN: CPT | Mod: GO

## 2023-12-18 PROCEDURE — 84439 ASSAY OF FREE THYROXINE: CPT | Performed by: INTERNAL MEDICINE

## 2023-12-18 PROCEDURE — 36415 COLL VENOUS BLD VENIPUNCTURE: CPT | Performed by: INTERNAL MEDICINE

## 2023-12-18 PROCEDURE — 99232 SBSQ HOSP IP/OBS MODERATE 35: CPT | Performed by: INTERNAL MEDICINE

## 2023-12-18 PROCEDURE — 82728 ASSAY OF FERRITIN: CPT | Performed by: NURSE PRACTITIONER

## 2023-12-18 PROCEDURE — 83550 IRON BINDING TEST: CPT | Performed by: NURSE PRACTITIONER

## 2023-12-18 PROCEDURE — 97116 GAIT TRAINING THERAPY: CPT | Mod: GP

## 2023-12-18 PROCEDURE — 96372 THER/PROPH/DIAG INJ SC/IM: CPT | Performed by: INTERNAL MEDICINE

## 2023-12-18 PROCEDURE — 97162 PT EVAL MOD COMPLEX 30 MIN: CPT | Mod: GP

## 2023-12-18 PROCEDURE — 71046 X-RAY EXAM CHEST 2 VIEWS: CPT | Mod: FY

## 2023-12-18 PROCEDURE — 1100000001 HC PRIVATE ROOM DAILY

## 2023-12-18 PROCEDURE — 94760 N-INVAS EAR/PLS OXIMETRY 1: CPT

## 2023-12-18 RX ORDER — LEVOTHYROXINE SODIUM 150 UG/1
150 TABLET ORAL DAILY
Status: DISCONTINUED | OUTPATIENT
Start: 2023-12-19 | End: 2023-12-19 | Stop reason: HOSPADM

## 2023-12-18 RX ADMIN — LEVOTHYROXINE SODIUM 137 MCG: 0.14 TABLET ORAL at 06:00

## 2023-12-18 RX ADMIN — GABAPENTIN 300 MG: 300 CAPSULE ORAL at 20:54

## 2023-12-18 RX ADMIN — HEPARIN SODIUM 5000 UNITS: 5000 INJECTION, SOLUTION INTRAVENOUS; SUBCUTANEOUS at 23:43

## 2023-12-18 RX ADMIN — ACETAMINOPHEN 650 MG: 325 TABLET ORAL at 06:00

## 2023-12-18 RX ADMIN — PANTOPRAZOLE SODIUM 40 MG: 40 TABLET, DELAYED RELEASE ORAL at 06:00

## 2023-12-18 RX ADMIN — HYDRALAZINE HYDROCHLORIDE 20 MG: 10 TABLET, FILM COATED ORAL at 15:08

## 2023-12-18 RX ADMIN — Medication 1 TABLET: at 08:44

## 2023-12-18 RX ADMIN — ACETAMINOPHEN 650 MG: 325 TABLET ORAL at 17:20

## 2023-12-18 RX ADMIN — SIMVASTATIN 10 MG: 10 TABLET, FILM COATED ORAL at 20:54

## 2023-12-18 RX ADMIN — HEPARIN SODIUM 5000 UNITS: 5000 INJECTION, SOLUTION INTRAVENOUS; SUBCUTANEOUS at 15:11

## 2023-12-18 RX ADMIN — HYDRALAZINE HYDROCHLORIDE 20 MG: 10 TABLET, FILM COATED ORAL at 20:54

## 2023-12-18 RX ADMIN — ACETAMINOPHEN 650 MG: 325 TABLET ORAL at 12:02

## 2023-12-18 RX ADMIN — ACETAMINOPHEN 650 MG: 325 TABLET ORAL at 20:54

## 2023-12-18 RX ADMIN — CYANOCOBALAMIN TAB 1000 MCG 1000 MCG: 1000 TAB at 08:44

## 2023-12-18 RX ADMIN — SODIUM BICARBONATE 650 MG TABLET 650 MG: at 15:08

## 2023-12-18 RX ADMIN — SODIUM BICARBONATE 650 MG TABLET 650 MG: at 20:54

## 2023-12-18 RX ADMIN — HEPARIN SODIUM 5000 UNITS: 5000 INJECTION, SOLUTION INTRAVENOUS; SUBCUTANEOUS at 00:28

## 2023-12-18 RX ADMIN — GABAPENTIN 300 MG: 300 CAPSULE ORAL at 08:44

## 2023-12-18 RX ADMIN — SODIUM BICARBONATE 650 MG TABLET 650 MG: at 08:44

## 2023-12-18 RX ADMIN — DULOXETINE HYDROCHLORIDE 20 MG: 20 CAPSULE, DELAYED RELEASE ORAL at 08:44

## 2023-12-18 RX ADMIN — Medication 1 TABLET: at 20:54

## 2023-12-18 RX ADMIN — FOLIC ACID 1 MG: 1 TABLET ORAL at 08:44

## 2023-12-18 RX ADMIN — HEPARIN SODIUM 5000 UNITS: 5000 INJECTION, SOLUTION INTRAVENOUS; SUBCUTANEOUS at 08:44

## 2023-12-18 RX ADMIN — HYDRALAZINE HYDROCHLORIDE 20 MG: 10 TABLET, FILM COATED ORAL at 08:44

## 2023-12-18 ASSESSMENT — PAIN SCALES - GENERAL
PAINLEVEL_OUTOF10: 0 - NO PAIN

## 2023-12-18 ASSESSMENT — COGNITIVE AND FUNCTIONAL STATUS - GENERAL
MOVING TO AND FROM BED TO CHAIR: A LITTLE
MOVING FROM LYING ON BACK TO SITTING ON SIDE OF FLAT BED WITH BEDRAILS: A LITTLE
CLIMB 3 TO 5 STEPS WITH RAILING: A LITTLE
DRESSING REGULAR LOWER BODY CLOTHING: A LITTLE
MOVING FROM LYING ON BACK TO SITTING ON SIDE OF FLAT BED WITH BEDRAILS: A LITTLE
STANDING UP FROM CHAIR USING ARMS: A LITTLE
DRESSING REGULAR UPPER BODY CLOTHING: A LITTLE
WALKING IN HOSPITAL ROOM: A LITTLE
DAILY ACTIVITIY SCORE: 19
STANDING UP FROM CHAIR USING ARMS: A LITTLE
TURNING FROM BACK TO SIDE WHILE IN FLAT BAD: A LITTLE
WALKING IN HOSPITAL ROOM: A LITTLE
TOILETING: A LITTLE
DAILY ACTIVITIY SCORE: 24
MOBILITY SCORE: 18
CLIMB 3 TO 5 STEPS WITH RAILING: A LITTLE
PERSONAL GROOMING: A LITTLE
MOVING TO AND FROM BED TO CHAIR: A LITTLE
HELP NEEDED FOR BATHING: A LITTLE
TURNING FROM BACK TO SIDE WHILE IN FLAT BAD: A LITTLE
MOBILITY SCORE: 18

## 2023-12-18 ASSESSMENT — PAIN - FUNCTIONAL ASSESSMENT
PAIN_FUNCTIONAL_ASSESSMENT: 0-10
PAIN_FUNCTIONAL_ASSESSMENT: 0-10

## 2023-12-18 ASSESSMENT — ACTIVITIES OF DAILY LIVING (ADL)
ADL_ASSISTANCE: INDEPENDENT
BATHING_ASSISTANCE: INDEPENDENT
ADL_ASSISTANCE: INDEPENDENT

## 2023-12-18 NOTE — PROGRESS NOTES
Subjective Data:  Resting comfortably and breathing easily and chest pain-free.    Overnight Events:    None cardiac     Objective Data:  Last Recorded Vitals:  Vitals:    12/17/23 0751 12/17/23 1619 12/17/23 2300 12/18/23 0711   BP: (!) 122/38 112/75 (!) 131/47 (!) 136/45   BP Location: Right arm Left arm Right arm Left arm   Patient Position: Lying Sitting Sitting Sitting   Pulse: 52 (!) 48 51 51   Resp: 16 18 18 18   Temp: 36.6 °C (97.9 °F) 36.8 °C (98.2 °F) 36.8 °C (98.2 °F) 36.7 °C (98.1 °F)   TempSrc: Oral Oral Oral Oral   SpO2: 94% 98% 96% 97%       Last Labs:  CBC - 12/16/2023:  4:25 AM  29.3 9.7 189    31.4      CMP - 12/18/2023:  4:29 AM  8.8 6.4 12 --- 0.3   4.4 3.4 6 105      PTT - No results in last year.  _   _ _     HGBA1C   Date/Time Value Ref Range Status   12/19/2019 10:50 AM 5.4 4.0 - 6.0 % Final     Comment:     Hemoglobin A1C levels are related to mean blood glucose during the   preceding 2-3 months. The relationship table below may be used as a   general guide. Each 1% increase in HGB A1C is a reflection of an   increase in mean glucose of approximately 30 mg/dl.   Reference: [employer], volume 29, supplement 1 Jan. 2006                        HGB A1C ................. Approx. Mean Glucose   _______________________________________________   6%   ...............................  120 mg/dl   7%   ...............................  150 mg/dl   8%   ...............................  180 mg/dl   9%   ...............................  210 mg/dl   10%  ...............................  240 mg/dl  Performed at [address] [address]       LDLCALC   Date/Time Value Ref Range Status   12/08/2022 09:43  65 - 130 MG/DL Final   01/25/2021 02:53  65 - 130 MG/DL Final      Last I/O:  I/O last 3 completed shifts:  In: -   Out: 2100 [Urine:2100]    Past Cardiology Tests (Last 3 Years):  EKG:  No results found for this or any previous visit from the past 1095  days.    Echo:  Transthoracic Echo (TTE) Complete 12/15/2023    Ejection Fractions:  EF   Date/Time Value Ref Range Status   12/15/2023 08:53 AM 56       Cath:  No results found for this or any previous visit from the past 1095 days.    Stress Test:  No results found for this or any previous visit from the past 1095 days.    Cardiac Imaging:  No results found for this or any previous visit from the past 1095 days.      Inpatient Medications:  Scheduled medications   Medication Dose Route Frequency    acetaminophen  650 mg oral 4x daily    cyanocobalamin  1,000 mcg oral Daily    DULoxetine  20 mg oral Daily    folic acid  1 mg oral Daily    gabapentin  300 mg oral BID    heparin  5,000 Units subcutaneous q8h    hydrALAZINE  20 mg oral TID    lactobacillus acidophilus  1 tablet oral BID    levothyroxine  137 mcg oral Daily    lidocaine  2 patch transdermal Daily    pantoprazole  40 mg oral Daily before breakfast    perflutren lipid microspheres  0.5-10 mL of dilution intravenous Once in imaging    perflutren protein A microsphere  0.5 mL intravenous Once in imaging    simvastatin  10 mg oral Nightly    sodium bicarbonate  650 mg oral TID    sulfur hexafluoride microsphr  2 mL intravenous Once in imaging     PRN medications   Medication    docusate sodium    ondansetron ODT    polyethylene glycol     Continuous Medications   Medication Dose Last Rate       Physical Exam:  Neck:  Normal jugular venous pressure, carotid upstrokes brisk   Lungs:  Clear to auscultation without wheezing or rhonchi or rales  Heart:  Regular rate and rhythm normal S1 and S2, no murmurs gallops rubs or clicks,   Abdomen:  Soft, good bowel sounds, nontender,Extremities:  Good radial, femoral, pedal pulses without pretibial edema  Neurological:  No focal sensory or motor deficits,    Assessment/Plan   Acute on chronic diastolic heart failure  Hypertension  CLL  Stage IV chronic kidney disease with worsening renal function from a combination of  diuresis and losartan   12/18: Resting comfortably, remains off of losartan and diuretic therapy as her renal function improves.  Creatinine has declined to 2.6 today and would resume her diuretic therapy when her creatinine returns to her baseline and avoid losartan for now and that can be resumed at a lower dose as an outpatient if necessary.  Will follow her with you as needed and feel free to call with questions      Code Status:  DNR and No Intubation    I spent 20 minutes in the professional and overall care of this patient.        Javier Steel, DO

## 2023-12-18 NOTE — PROGRESS NOTES
Occupational Therapy    Evaluation    Patient Name: Tracie Baltazar  MRN: [id number]  Today's Date: 12/18/2023  Time Calculation  Start Time: 1013  Stop Time: 1023  Time Calculation (min): 10 min    Assessment  IP OT Assessment  OT Assessment: Patient is an 83 year old female admitted with CHF. At time of evaluation, patient is up ad manpreet in the bathroom, completing ADL tasks with Divide. Patient is able to complete ADL tasks with Divide and reports baseline function.  Prognosis: Good  Evaluation/Treatment Tolerance: Patient tolerated treatment well  End of Session Communication: Bedside nurse  End of Session Patient Position: Up in chair  Plan:  No Skilled OT: Independent with ADLs  OT Discharge Recommendations: No further acute OT  OT Recommended Transfer Status: Stand by assist, Assist of 1  OT - OK to Discharge: Yes    Subjective   Current Problem:  1. Congestive heart failure with left ventricular diastolic dysfunction, acute on chronic (CMS/HCC)  Referral to [facility]    Inpatient consult to Heart Failure Navigator      2. Shortness of breath  polyethylene glycol (Glycolax, Miralax) packet 17 g    Transthoracic Echo (TTE) Complete    perflutren lipid microspheres (Definity) injection 0.5-10 mL of dilution    sulfur hexafluoride microsphr (Lumason) injection 24.28 mg    perflutren protein A microsphere (Optison) injection 0.5 mL    levothyroxine (Synthroid, Levoxyl) tablet 137 mcg    pantoprazole (ProtoNix) EC tablet 40 mg    simvastatin (Zocor) tablet 10 mg    Transthoracic Echo (TTE) Complete    sodium bicarbonate tablet 650 mg    DISCONTINUED: enoxaparin (Lovenox) syringe 30 mg    DISCONTINUED: NIFEdipine ER (Adalat CC) 24 hr tablet 30 mg    DISCONTINUED: sodium bicarbonate tablet 1,300 mg    DISCONTINUED: cefTRIAXone (Rocephin) IVPB 1 g    DISCONTINUED: Tc-99m-albumin (Draximage MAA) injection 4 millicurie        General:  General  Reason for Referral: decline in ADLs  Referred By:  Abilio Colón DO  Past Medical History Relevant to Rehab: The patient is an 83 y.o. female admitted to the hospital for increasing SOB. Pt found to have CHF exacerbation. PMH: CHF, HTN, CLL, chronic kidney disease  Family/Caregiver Present: Yes  Caregiver Feedback: daughter and granddaughter  Prior to Session Communication: Bedside nurse  Patient Position Received: Up in bathroom  Preferred Learning Style: verbal  General Comment: Patient is cleared by nursing for therapy. Patient is up in the bathroom upon arrival  Precautions:  Hearing/Visual Limitations: glasses; catarina HA  Medical Precautions: Fall precautions  Precautions Comment: Pt denies h/o recent falls.  Pain:  Pain Assessment  Pain Assessment: 0-10  Pain Score: 0 - No pain    Objective   Cognition:  Overall Cognitive Status: Within Functional Limits     Home Living:  Type of Home: House  Lives With: Adult children (son)  Home Adaptive Equipment: Walker rolling or standard, Cane (rollator)  Home Layout: Multi-level, Able to live on main level with bedroom/bathroom (no basement)  Home Access: Stairs to enter with rails  Entrance Stairs-Rails: Right  Bathroom Shower/Tub: Walk-in shower  Bathroom Toilet: Standard  Bathroom Equipment: Grab bars in shower, Shower chair with back   Prior Function:  Level of [place]: Independent with ADLs and functional transfers, Needs assistance with ADLs  Receives Help From: Family  ADL Assistance: Independent  Homemaking Assistance: Needs assistance (family completes homemaking tasks)  Driving/Transportation: Family/Friend  Ambulatory Assistance: Independent (rollator for community distances; cane and furniture walking in home)  Prior Function Comments: Pt denies h/o falls  IADL History:  Homemaking Responsibilities: Yes  IADL Comments: patient is independent with IADLs  ADL:  Eating Assistance: Independent  Eating Deficit: Setup  Grooming Assistance: Independent  Bathing Assistance: Independent  UE Dressing Assistance:  Independent  LE Dressing Assistance: Independent  Toileting Assistance with Device: Independent  Functional Assistance: Independent  Activity Tolerance:  Endurance: Endurance does not limit participation in activity  Bed Mobility/Transfers: Bed Mobility  Bed Mobility:  (patient up in the bathroom upon arrival and remains up in the chair)    Transfers  Transfer: Yes  Transfer 1  Transfer From 1: Toilet to  Transfer to 1: Stand  Technique 1: Sit to stand  Transfer Device 1: Walker (rollator)  Transfer Level of Assistance 1: Close supervision  Trials/Comments 1: patient stands at the sink to complete ADL tasks (she then completes functional mobility > household distances with close supervision using 2WW)    IADL's:   Homemaking Responsibilities: Yes  IADL Comments: patient is independent with IADLs    Sensation:  Sensation Comment: patient denies numbness/tingling  Strength:  Strength Comments: LUCERO UE 4/5 grossly  Coordination:  Movements are Fluid and Coordinated: Yes   Extremities: RUE   RUE : Within Functional Limits and LUE   LUE: Within Functional Limits    Outcome Measures: [facility] Daily Activity  Putting on and taking off regular lower body clothing: None  Bathing (including washing, rinsing, drying): None  Putting on and taking off regular upper body clothing: None  Toileting, which includes using toilet, bedpan or urinal: None  Taking care of personal grooming such as brushing teeth: None  Eating Meals: None  Daily Activity - Total Score: 24    Education Documentation  Body Mechanics, taught by Wendie Brice OT at 12/18/2023 10:58 AM.  Learner: Patient  Readiness: Acceptance  Method: Explanation, Demonstration  Response: Verbalizes Understanding    Precautions, taught by Wendie Brice OT at 12/18/2023 10:58 AM.  Learner: Patient  Readiness: Acceptance  Method: Explanation, Demonstration  Response: Verbalizes Understanding    ADL Training, taught by Wendie Brice OT at 12/18/2023 10:58 AM.  Learner:  Patient  Readiness: Acceptance  Method: Explanation, Demonstration  Response: Verbalizes Understanding    Education Comments  No comments found.      Goals:   Patient demo no acute OT needs

## 2023-12-18 NOTE — PROGRESS NOTES
Physical Therapy    Physical Therapy Evaluation & Treatment    Patient Name: Tracie Baltazar  MRN: [id number]  Today's Date: 12/18/2023   Time Calculation  Start Time: 0818  Stop Time: 0842  Time Calculation (min): 24 min    Assessment/Plan   PT Assessment  PT Assessment Results: Decreased strength, Decreased endurance, Impaired balance, Decreased mobility, Decreased coordination, Decreased safety awareness, Impaired hearing, Pain  Rehab Prognosis: Excellent  Evaluation/Treatment Tolerance: Patient tolerated treatment well  Medical Staff Made Aware: Yes  Strengths: Ability to acquire knowledge, Attitude of self, Access to adaptive/assistive products  End of Session Communication: Bedside nurse  Assessment Comment: The patient is an 83 y.o. female admitted to the hospital for increasing SOB. The patient currently requires supervision for transfers and ambulation with personal rollator. The patient has experienced a functional decline and would continue to benefit from skilled therapy services to address residual deficits and reduce risk of falls.  End of Session Patient Position: Up in chair   IP OR SWING BED PT PLAN  Inpatient or Swing Bed: Inpatient  PT Plan  Treatment/Interventions: Bed mobility, Transfer training, Gait training, Stair training, Balance training, Neuromuscular re-education, Strengthening, Endurance training, Therapeutic exercise, Therapeutic activity, Home exercise program  PT Plan: Skilled PT  PT Frequency: 4 times per week  PT Discharge Recommendations: Low intensity level of continued care  Equipment Recommended upon Discharge: Wheeled walker (rollator)  PT Recommended Transfer Status: Contact guard  PT - OK to Discharge: Yes      Subjective     General Visit Information:  General  Reason for Referral: mobility impairment  Referred By: Abilio Colón DO  Past Medical History Relevant to Rehab: CHF, HTN, CLL, chronic kidney disease  Family/Caregiver Present: Yes  Caregiver Feedback:  grand-dtr  Prior to Session Communication: Bedside nurse  Patient Position Received: Up in chair  Preferred Learning Style: verbal, visual  General Comment: The patient is an 83 y.o. female admitted to the hospital for increasing SOB. Pt found to have CHF exacerbation.  Home Living:  Home Living  Type of Home: House  Lives With: Adult children (son)  Home Adaptive Equipment: Walker rolling or standard, Cane (rollator)  Home Layout: Multi-level, Able to live on main level with bedroom/bathroom (no basement)  Home Access: Stairs to enter with rails  Entrance Stairs-Rails: Right  Entrance Stairs-Number of Steps: 2  Bathroom Shower/Tub: Walk-in shower  Bathroom Toilet: Standard  Bathroom Equipment: Grab bars in shower, Shower chair with back  Prior Level of Function:  Prior Function Per Pt/Caregiver Report  Level of [place]: Independent with ADLs and functional transfers, Needs assistance with ADLs  Receives Help From: Family  ADL Assistance: Independent  Homemaking Assistance: Needs assistance (family completes homemaking tasks)  Driving/Transportation: Family/Friend  Ambulatory Assistance: Independent (rollator for community distances; cane and furniture walking in home)  Prior Function Comments: Pt denies h/o falls  Precautions:  Precautions  Hearing/Visual Limitations: glasses; catarina HA  Medical Precautions: Fall precautions  Precautions Comment: Pt denies h/o recent falls.    Objective   Pain:  Pain Assessment  Pain Assessment: 0-10  Pain Score: 0 - No pain  Cognition:  Cognition  Overall Cognitive Status: Within Functional Limits    General Assessments:  General Observation  General Observation: pleasant; mild ecchymosis     Activity Tolerance  Endurance: Tolerates 10 - 20 min exercise with multiple rests    Sensation  Light Touch: No apparent deficits  Sharp/Dull: No apparent deficits    Coordination  Movements are Fluid and Coordinated: Yes    Postural Control  Posture Comment: rounded shoulders; forward  head    Dynamic Standing Balance  Dynamic Standing-Balance Support: Bilateral upper extremity supported  Dynamic Standing-Balance: Forward lean  Dynamic Standing-Comments: Use of rollator for ambulation; forward flexed posture with no major LOB.  Functional Assessments:    Transfers  Transfer: Yes  Transfer 1  Transfer From 1: Chair with arms to, Stand to  Transfer to 1: Stand, Chair with arms  Technique 1: Sit to stand, Stand to sit  Transfer Device 1:  (rollator)  Transfer Level of Assistance 1: Close supervision  Trials/Comments 1: Intermittent VCs for safe sequencing and hand placement    Ambulation/Gait Training  Ambulation/Gait Training Performed: Yes  Ambulation/Gait Training 1  Surface 1: Level tile  Device 1: Rollator  Assistance 1: Close supervision  Quality of Gait 1: Narrow base of support  Comments/Distance (ft) 1: Ambulation of 25ftx2 with use of personal rollator. Decreased iker with minimal catarina toe clearance. Intermittent VCs for general safety awareness.  Extremity/Trunk Assessments:  RLE   RLE : Within Functional Limits (mild generalized weakness noted)  LLE   LLE : Within Functional Limits (mild generalized weakness noted)  Treatments:  Therapeutic Exercise  Therapeutic Exercise Performed: Yes (completed in sitting position)  Therapeutic Exercise Activity 1: AP x 10 reps catarina  Therapeutic Exercise Activity 2: Knee ext x 10 reps catarina  Therapeutic Exercise Activity 3: Hip flex x 10 reps catarina    Therapeutic Activity  Therapeutic Activity Performed: Yes  Therapeutic Activity 1: Education provided on importance of continued mobility during hospital stay to assist with maintaining endurance and strength. Pt encouraged to ambulate with family or PCA approx. 4-5x a day.    Ambulation/Gait Training  Ambulation/Gait Training Performed: Yes  Ambulation/Gait Training 1  Surface 1: Level tile  Device 1: Rollator  Assistance 1: Close supervision  Quality of Gait 1: Narrow base of support  Comments/Distance (ft)  1: Ambulation of 25ftx2 with use of personal rollator. Decreased iker with minimal catarina toe clearance. Intermittent VCs for general safety awareness.  Transfers  Transfer: Yes  Transfer 1  Transfer From 1: Chair with arms to, Stand to  Transfer to 1: Stand, Chair with arms  Technique 1: Sit to stand, Stand to sit  Transfer Device 1:  (rollator)  Transfer Level of Assistance 1: Close supervision  Trials/Comments 1: Intermittent VCs for safe sequencing and hand placement  Outcome Measures:  [facility] Basic Mobility  Turning from your back to your side while in a flat bed without using bedrails: A little  Moving from lying on your back to sitting on the side of a flat bed without using bedrails: A little  Moving to and from bed to chair (including a wheelchair): A little  Standing up from a chair using your arms (e.g. wheelchair or bedside chair): A little  To walk in hospital room: A little  Climbing 3-5 steps with railing: A little  Basic Mobility - Total Score: 18    Encounter Problems       Encounter Problems (Active)       PT Problem       The patient will demonstrate an overall strength of >4/5 in BLE to assist with completion of functional mobility.   (Progressing)       Start:  12/18/23    Expected End:  01/18/24            The patient will complete functional mobility (bed mobility, transfers, etc.) at a mod indep level with LRAD by DC.   (Progressing)       Start:  12/18/23    Expected End:  01/18/24            The patient will be able to ambulate at a mod indep level for [id number] with LRAD.  (Progressing)       Start:  12/18/23    Expected End:  01/18/24            The patient will be able to ascend/descend 2 steps with use of 1 rail at a mod indep level by DC.  (Progressing)       Start:  12/18/23    Expected End:  01/18/24               Pain - Adult              Education Documentation  Precautions, taught by Ariella Jensen PT at 12/18/2023 10:46 AM.  Learner: Patient  Readiness: Acceptance  Method:  Explanation  Response: Verbalizes Understanding    Mobility Training, taught by Ariella Jensen PT at 12/18/2023 10:46 AM.  Learner: Patient  Readiness: Acceptance  Method: Explanation  Response: Verbalizes Understanding    Education Comments  No comments found.

## 2023-12-18 NOTE — CARE PLAN
The patient's goals for the shift include  monitor labs    The clinical goals for the shift include monitor labs    Over the shift, the patient did not make progress toward the following goals. Barriers to progression include . Recommendations to address these barriers include .

## 2023-12-18 NOTE — PROGRESS NOTES
"HOSPITALIST  PROGRESS NOTE   Tracie Baltazar    :  1940    Medical Record:  [id number]    DATE OF SERVICE: 2023  ADMIT DAY: 4.    Subjective:  Tracie Baltazar is a 83 y.o. year-old female who was admitted on 2023 for acute CHF exacerbation and JANET.  The patient's labs, imaging studies and vital signs are noted with the case discussed with the nursing staff. The patient was seen and examined and the chart was reviewed. The patient is being seen for management of their BP along with the pt's other medical conditions. Today denies any F/C/CP/N/V/D/Abd pain but she reports \"do not like that catheter\".    Objective:  Vitals:    23 0711   BP: (!) 136/45   Pulse: 51   Resp: 18   Temp: 36.7 °C (98.1 °F)   SpO2: 97%        I/O last 3 completed shifts:  In: -   Out: 2100 [Urine:2100]  No intake/output data recorded.  Pulmonary:RA    General: Female A&Ox3, and is following commands.    HEENT: Normocephalic atraumatic, pupils equally reactive to light and accomodation, extra occular muscles are intact. Neck is supple, trachea is midline without observable bruits.  CVS: Regular rate and rhythm, S1 S2 without any S3 or S4.  Pulmonary: Decreased breath sounds with occasional rales  GI: Abdomen is soft, non tender, positive bowel sounds are present.  EXT: B/L LE peripheral edema 1-2/4 with pulses are palpable throughout.  : Hodgson in place  NEUROLOGY: CN 3-12 grossly intact except for known chronic hearing and visual impairments, Muscle strength is 5/5, DTRs are 2/4 throughout. There is no obvious facial asymmetry and no resting tremors.Moves all 4 extremities spontaneously.    LABS:  Results for orders placed or performed during the hospital encounter of 23 (from the past 24 hour(s))   Renal Function Panel   Result Value Ref Range    Glucose 92 65 - 99 mg/dL    Sodium 140 133 - 145 mmol/L    Potassium 3.7 3.4 - 5.1 mmol/L    Chloride 105 97 - 107 mmol/L    Bicarbonate 24 24 - 31 mmol/L    Urea " Nitrogen 41 (H) 8 - 25 mg/dL    Creatinine 2.60 (H) 0.40 - 1.60 mg/dL    eGFR 18 (L) >60 mL/min/1.73m*2    Calcium 8.8 8.5 - 10.4 mg/dL    Phosphorus 4.4 2.5 - 4.5 mg/dL    Albumin 3.4 (L) 3.5 - 5.0 g/dL    Anion Gap 11 <=19 mmol/L   Thyroid Stimulating Hormone   Result Value Ref Range    Thyroid Stimulating Hormone 20.97 (H) 0.27 - 4.20 mIU/L        No results found for the last 90 days.         MEDICATIONS:  Scheduled medications  acetaminophen, 650 mg, oral, 4x daily  cyanocobalamin, 1,000 mcg, oral, Daily  DULoxetine, 20 mg, oral, Daily  folic acid, 1 mg, oral, Daily  gabapentin, 300 mg, oral, BID  heparin, 5,000 Units, subcutaneous, q8h  hydrALAZINE, 20 mg, oral, TID  lactobacillus acidophilus, 1 tablet, oral, BID  levothyroxine, 137 mcg, oral, Daily  lidocaine, 2 patch, transdermal, Daily  pantoprazole, 40 mg, oral, Daily before breakfast  perflutren lipid microspheres, 0.5-10 mL of dilution, intravenous, Once in imaging  perflutren protein A microsphere, 0.5 mL, intravenous, Once in imaging  simvastatin, 10 mg, oral, Nightly  sodium bicarbonate, 650 mg, oral, TID  sulfur hexafluoride microsphr, 2 mL, intravenous, Once in imaging      Continuous medications     PRN medications  PRN medications: docusate sodium, ondansetron ODT, polyethylene glycol    PERTINENT IMAGING STUDIES/PROCEDURES:  Transthoracic Echo (TTE) Complete    Result Date: 12/15/2023           [place] [address]            Phone [phone] TRANSTHORACIC ECHOCARDIOGRAM REPORT  Patient Name:      MARK Patterson Physician:    [id number] Elvis Bruce DO Study Date:        12/15/2023          Ordering Provider:    [id number] ABDULLAHI VANESSA MRN/PID:           [id number]            Fellow: Accession#:        [id number]        Nurse: Date of Birth/Age: 1940 / 83 years Sonographer:          Kristen WESTFALL Gender:            F                    Additional Staff: Height:            154.94 cm           Admit Date:           12/14/2023 Weight:            82.10 kg            Admission Status:     Inpatient - Routine BSA:               1.81 m2             Department Location: Blood Pressure: 150 /47 mmHg Study Type:    TRANSTHORACIC ECHO (TTE) COMPLETE Diagnosis/ICD: Shortness of breath-R06.02 Indication:    Shortness of Breath CPT Codes:     Echo Complete w Full [id number] Patient History: Pertinent History: CKD CHF Depression HTN CP Murmur Dyslipidemia Varicose veins                    of leg with swellin ghyperlipidemia hypercholesterolemia. Study Detail: The following Echo studies were performed: 2D, Doppler, M-Mode,               color flow and 3D.  PHYSICIAN INTERPRETATION: Left Ventricle: Left ventricular systolic function is normal, with an estimated ejection fraction of 60-65%. There are no regional wall motion abnormalities. The left ventricular cavity size is normal. Left ventricular diastolic filling was indeterminate. Left Atrium: The left atrium is normal in size. Right Ventricle: The right ventricle is normal in size. There is normal right ventricular global systolic function. Right Atrium: The right atrium is normal in size. Aortic Valve: The aortic valve is trileaflet. There is mild aortic valve cusp calcification. There is no evidence of aortic valve regurgitation. The peak instantaneous gradient of the aortic valve is 12.0 mmHg. Mitral Valve: The mitral valve is normal in structure. There is trace to mild mitral valve regurgitation. Tricuspid Valve: The tricuspid valve is structurally normal. No evidence of tricuspid regurgitation. Pulmonic Valve: The pulmonic valve is not well visualized. There is trace to mild pulmonic valve regurgitation. Pericardium: There is no pericardial effusion noted. Aorta: The aortic root is normal. Systemic Veins: The inferior vena cava appears to be of normal size. There is IVC inspiratory  collapse greater than 50%.  CONCLUSIONS:  1. Left ventricular systolic function is normal with a 60-65% estimated ejection fraction.  2. Left ventricular diastolic filling indeterminate.  3. Mild aortic valve sclerosis. QUANTITATIVE DATA SUMMARY: 2D MEASUREMENTS:                          Normal Ranges: LAs:           3.00 cm   (2.7-4.0cm) IVSd:          0.85 cm   (0.6-1.1cm) LVPWd:         1.00 cm   (0.6-1.1cm) LVIDd:         4.23 cm   (3.9-5.9cm) LVIDs:         2.79 cm LV Mass Index: 69.0 g/m2 LV % FS        34.0 % LA VOLUME:                               Normal Ranges: LA Vol A2C:        59.3 ml LA Vol Index A2C:  32.8 ml/m2 LA Area A2C:       18.7 cm2 LA Major Axis A2C: 5.0 cm LA Volume Index:   29.6 ml/m2 LA Vol A2C:        59.7 ml RA VOLUME BY A/L METHOD:                               Normal Ranges: RA Vol A4C:        32.1 ml    (8.3-19.5ml) RA Vol Index A4C:  17.7 ml/m2 RA Area A4C:       13.6 cm2 RA Major Axis A4C: 4.9 cm M-MODE MEASUREMENTS:                  Normal Ranges: Ao Root: 2.20 cm (2.0-3.7cm) LAs:     4.80 cm (2.7-4.0cm) AORTA MEASUREMENTS:                    Normal Ranges: Asc Ao, d: 3.10 cm (2.1-3.4cm) LV SYSTOLIC FUNCTION BY 2D PLANIMETRY (MOD):                     Normal Ranges: EF-A4C View: 51.6 % (>=55%) EF-A2C View: 57.9 % EF-Biplane:  55.8 % LV DIASTOLIC FUNCTION:                            Normal Ranges: MV Peak E:      0.93 m/s   (0.7-1.2 m/s) MV Peak A:      1.02 m/s   (0.42-0.7 m/s) E/A Ratio:      0.91       (1.0-2.2) MV e'           0.08 m/s   (>8.0) MV lateral e'   0.08 m/s MV medial e'    0.07 m/s E/e' Ratio:     12.39      (<8.0) PulmV Sys Siddhartha:  68.10 cm/s PulmV Urrutia Siddhartha: 47.60 cm/s PulmV S/D Siddhartha:  1.40 MITRAL VALVE:                      Normal Ranges: MV Vmax:    1.22 m/s (<=1.3m/s) MV peak P.0 mmHg (<5mmHg) MV mean P.0 mmHg (<48mmHg) MV DT:      262 msec (150-240msec) AORTIC VALVE:                          Normal Ranges: AoV Vmax:      1.73 m/s  (<=1.7m/s) AoV Peak PG:    12.0 mmHg (<20mmHg) LVOT Max Siddhartha:  1.21 m/s  (<=1.1m/s) LVOT VTI:      29.10 cm LVOT Diameter: 2.00 cm   (1.8-2.4cm) AoV Area,Vmax: 2.20 cm2  (2.5-4.5cm2)  RIGHT VENTRICLE: RV Basal 3.18 cm RV Mid   2.70 cm RV Major 5.2 cm TAPSE:   16.5 mm RV s'    0.13 m/s TRICUSPID VALVE/RVSP:                   Normal Ranges: IVC Diam: 1.75 cm PULMONIC VALVE:                         Normal Ranges: PV Accel Time: 69 msec  (>120ms) PV Max Siddhartha:    1.4 m/s  (0.6-0.9m/s) PV Max P.3 mmHg Pulmonary Veins: PulmV Urrutia Siddhartha: 47.60 cm/s PulmV S/D Siddhartha:  1.40 PulmV Sys Siddhartha:  68.10 cm/s  [id number] Elvis [facility] MARTINEZ Electronically signed on 12/15/2023 at 10:13:13 AM  ** Final **     CT head wo IV contrast    Result Date: 2023  Interpreted By:  Rashawn Hook, STUDY: CT HEAD WO IV CONTRAST; 2023 10:34 am   INDICATION: Signs/Symptoms:lightheadedness, near syncope.   COMPARISON: No comparison exams available.   ACCESSION NUMBER(S): [id number]   ORDERING CLINICIAN: SLY DAWKINS   TECHNIQUE: CT axial images through the Brain were obtained without contrast.   FINDINGS: Acute ischemic change: None.   Hemorrhage: No evidence of acute intracranial hemorrhage.   Mass Effect / Mass Lesion: No significant mass effect. There is no evidence of an intracranial mass or extraaxial fluid collection.   Chronic ischemic change: Patchy foci of  low attenuation coefficient are present within white matter which is a nonspecific finding but likely represents moderate microvascular ischemia.   Parenchyma: There is no significant volume loss. The brain parenchyma is otherwise within normal limits for age.   Ventricles: Normal caliber and morphology.   Other: There is calcification involving the carotid siphons.       No acute intracranial process   All CT examinations are performed with 1 or more of the following dose reduction techniques: Automated exposure control, adjustment of mA and/or kv according to patient's size, or use of iterative  reconstruction techniques.   MACRO: none   Signed by: Rashawn Hook 12/14/2023 10:48 AM Dictation workstation:   [id number]    XR chest 2 views    Result Date: 12/13/2023  Interpreted By:  Nj Zazueta, STUDY: XR CHEST 2 VIEWS; 12/13/2023 2:47 pm   INDICATION: Signs/Symptoms:SOB dizziness   COMPARISON: Chest radiograph 06/09/2020   ACCESSION NUMBER(S): [id number]   ORDERING CLINICIAN: LA SAEED   TECHNIQUE: AP and lateral views of the chest performed.   FINDINGS: Cardiac size is indeterminate due to the AP projection. Calcifications are seen at the aortic knob. Thoracic aorta is tortuous in appearance. Mild prominence of the pulmonary vasculature. Faint bibasilar hazy opacities likely reflect atelectasis. No focal consolidation, large pleural effusion, or visible pneumothorax. Multilevel discogenic degenerative changes of the thoracic spine. Presumed neurostimulator leads overlying the thoracic spine.       Mild prominence of the pulmonary vasculature, as can be seen with pulmonary vascular congestion. Please correlate for corresponding symptoms.   No focal consolidation or visible pneumothorax.   Faint bibasilar atelectasis suspected.   MACRO: None.   Signed by: Nj Zazueta 12/13/2023 3:04 PM Dictation workstation:   [id number]    CXR 12/18/23 IMPRESSION:  No acute cardiopulmonary process.  Assessment:  Tracie Baltazar is a 83 y.o. year-old female on admission for 4 days    Acute CHF exacerbation likely diastolic  JANET likely from diuretics with baseline creatinine 1.7-2  Acute hypokalemia-replaced  CLL  Acute on chronic anemia  Vitamin D deficiency  Vitamin B12 deficiency  Depression  Peripheral neuropathy  Hypothyroidism  GERD  Hyperlipidemia  Essential hypertension  Osteoarthritis  Deconditioning and impaired mobility    CARDIOLOGY:  -BP is being watched closely after I decreased hydralazine from 25 mg to 20 mg 3 times daily on 12/17.  Cozaar and Lasix held due to JANET with repeat labs  ordered in the am.  Potassium replaced as well.  -Cardiology on the case and echo showing EF of 60-65%.  Pt unable to be started on beta-blockers due to hypotension and diuretic as well as Cozaar stopped due to JANET     NEPHROLOGY:   -Nephrology on the case and is managing the pt's renal function, as needed fluids as well as the electrolytes.The results of the renal labs slightly better after stopped Cozaar and diuretics this admission.  The patient's Is and Os are being monitored and the patient continues to be off nephrotoxic agents as much as possible. The patient is on sodium bicarb as well.    ENDOCRINOLOGY:  TSH abnormal and ordered additional thyroid studies.  Synthroid increased from 137 to 150 mcg daily on 12/18.    PULMONARY:  -The patient is currently on the respiratory protocol has been able to tolerate room air for majority of this hospitalization.  Repeat chest x-ray result was reviewed from 12/18.    ID:  Pt currently off any antibiotics but leukocytosis is chronic from CLL.     PSYCHIATRY/NEUROLOGY:   -Pt renewed on her Neurontin and Cymbalta.    GI:   Protonix     HEMATOLOGY:  -CBC noted ith no need for transfusions unless pt develops severe bleeding, platelet counts less than 10,000 or Hgb becomes < 7.     MUSCULOSKELETAL:     1- generalized weakness:  PT&OT    OTHER:    -The patient continues to be on the rest of their chronic home medications for hyperlipidemia, hypothyroidism, osteoarthritis, etc.  Case discussed with patient's granddaughter and daughter on 12/18 and updated them of the patient's current condition as well as answered all their questions to their satisfaction.    -Discharge planner is on the case with possible discharge in the morning if her renal function continues to    *CODE STATUS: DNR and no intubation    *DVT prophylaxis: Heparin subcu     *PUD PROPHYLAXIS:  Protonix    Time spent managing patient's care is greater than 35 minutes with approximately 50% or more spent in  counseling and coordination of care.    Abilio ALANIS    Portions of this Progress note were dictated using MModal and reviewed . While every effort was made to correct mistranscriptions, minor grammatical or typographical errors may be present from machine dictation

## 2023-12-18 NOTE — PROGRESS NOTES
Met with pt @ bedside for information exchange & CHF education. Discussed HF Nurse role/HF program. Pt is agreeable to receiving CHF information & being followed after discharge to help manage her CHF. She reports that CHF is new dx. She follows up regularly with her PCP, Dr Floyd. Currently does not have a Cardiologist, but her daughter scheduled follow up appt. with Dr Wright ( Jan 3rd). Provided CHF booklet/folder & reviewed. Educated on CHF,EF, HF flare up symptoms to report to MD & managing CHF.  HEART FAILURE EDUCATION:  1. Weigh yourself daily and record on your weight log.  2. If you gain more than 2 or 3 pounds overnight, call your cardiologist.  3. Follow a low sodium diet. No more than 2000 mg in one day, or more than 650 mg per meal.  4. Limit total fluids to no more than 8 cups (or 2 liters) per day - this includes all fluids (water, coffee, juice, milk, tea, anything melts @ room temp.)  5. Call to schedule your follow-up appointments when you get home if they were not already scheduled for you.  6. Keep your follow-up appointments! Bring your weight log with you so the doctors can see your weight trend.  7. Be sure to  any new prescriptions and take them as directed. If unsure of the medications, be sure to call your cardiologist.  8. Stay as active as you can tolerate.   19. If you notice subtle change of symptoms (slight increase in swelling, slight shortness of breath, a new intolerance to laying flat, a new cough), be sure to call your cardiologist.  Pt verbalized understanding of CHF education. I will continue to follow after discharge to help manage CHF.

## 2023-12-19 VITALS
RESPIRATION RATE: 16 BRPM | HEART RATE: 56 BPM | TEMPERATURE: 97.7 F | DIASTOLIC BLOOD PRESSURE: 48 MMHG | OXYGEN SATURATION: 96 % | SYSTOLIC BLOOD PRESSURE: 132 MMHG

## 2023-12-19 DIAGNOSIS — N18.4 CHRONIC KIDNEY DISEASE, STAGE 4 (SEVERE) (MULTI): Primary | ICD-10-CM

## 2023-12-19 LAB
ANION GAP SERPL CALCULATED.3IONS-SCNC: 15 MMOL/L (ref 0–19)
BUN SERPL-MCNC: 42 MG/DL (ref 8–25)
CALCIUM SERPL-MCNC: 8.9 MG/DL (ref 8.5–10.4)
CHLORIDE SERPL-SCNC: 104 MMOL/L (ref 97–107)
CO2 SERPL-SCNC: 25 MMOL/L (ref 24–31)
CREAT SERPL-MCNC: 2.5 MG/DL (ref 0.4–1.6)
GFR SERPL CREATININE-BSD FRML MDRD: 19 ML/MIN/1.73M*2
GLUCOSE SERPL-MCNC: 98 MG/DL (ref 65–99)
POTASSIUM SERPL-SCNC: 3.7 MMOL/L (ref 3.4–5.1)
SODIUM SERPL-SCNC: 140 MMOL/L (ref 133–145)

## 2023-12-19 PROCEDURE — 80048 BASIC METABOLIC PNL TOTAL CA: CPT | Performed by: INTERNAL MEDICINE

## 2023-12-19 PROCEDURE — 2500000004 HC RX 250 GENERAL PHARMACY W/ HCPCS (ALT 636 FOR OP/ED): Performed by: INTERNAL MEDICINE

## 2023-12-19 PROCEDURE — 2500000001 HC RX 250 WO HCPCS SELF ADMINISTERED DRUGS (ALT 637 FOR MEDICARE OP): Performed by: INTERNAL MEDICINE

## 2023-12-19 PROCEDURE — 96372 THER/PROPH/DIAG INJ SC/IM: CPT | Performed by: INTERNAL MEDICINE

## 2023-12-19 RX ORDER — LEVOTHYROXINE SODIUM 150 UG/1
150 TABLET ORAL DAILY
Qty: 30 TABLET | Refills: 0 | Status: SHIPPED | OUTPATIENT
Start: 2023-12-20 | End: 2024-02-07 | Stop reason: SDUPTHER

## 2023-12-19 RX ORDER — FUROSEMIDE 20 MG/1
20 TABLET ORAL DAILY
Qty: 30 TABLET | Refills: 0 | Status: SHIPPED | OUTPATIENT
Start: 2023-12-21 | End: 2024-01-02 | Stop reason: HOSPADM

## 2023-12-19 RX ORDER — SODIUM BICARBONATE 650 MG/1
650 TABLET ORAL 3 TIMES DAILY
Qty: 90 TABLET | Refills: 0 | Status: SHIPPED | OUTPATIENT
Start: 2023-12-19 | End: 2024-01-24

## 2023-12-19 RX ORDER — GABAPENTIN 300 MG/1
300 CAPSULE ORAL 2 TIMES DAILY
Qty: 60 CAPSULE | Refills: 0 | Status: ON HOLD | OUTPATIENT
Start: 2023-12-19 | End: 2024-01-02 | Stop reason: SDUPTHER

## 2023-12-19 RX ORDER — HYDRALAZINE HYDROCHLORIDE 10 MG/1
20 TABLET, FILM COATED ORAL 3 TIMES DAILY
Qty: 180 TABLET | Refills: 0 | Status: SHIPPED | OUTPATIENT
Start: 2023-12-19 | End: 2023-12-21 | Stop reason: WASHOUT

## 2023-12-19 RX ORDER — DULOXETIN HYDROCHLORIDE 20 MG/1
20 CAPSULE, DELAYED RELEASE ORAL DAILY
Qty: 30 CAPSULE | Refills: 0 | Status: SHIPPED | OUTPATIENT
Start: 2023-12-20 | End: 2024-01-02 | Stop reason: HOSPADM

## 2023-12-19 RX ADMIN — HYDRALAZINE HYDROCHLORIDE 20 MG: 10 TABLET, FILM COATED ORAL at 08:49

## 2023-12-19 RX ADMIN — ACETAMINOPHEN 650 MG: 325 TABLET ORAL at 06:04

## 2023-12-19 RX ADMIN — CYANOCOBALAMIN TAB 1000 MCG 1000 MCG: 1000 TAB at 08:49

## 2023-12-19 RX ADMIN — Medication 1 TABLET: at 08:49

## 2023-12-19 RX ADMIN — FOLIC ACID 1 MG: 1 TABLET ORAL at 08:49

## 2023-12-19 RX ADMIN — GABAPENTIN 300 MG: 300 CAPSULE ORAL at 08:49

## 2023-12-19 RX ADMIN — HEPARIN SODIUM 5000 UNITS: 5000 INJECTION, SOLUTION INTRAVENOUS; SUBCUTANEOUS at 08:49

## 2023-12-19 RX ADMIN — DULOXETINE HYDROCHLORIDE 20 MG: 20 CAPSULE, DELAYED RELEASE ORAL at 08:49

## 2023-12-19 RX ADMIN — ACETAMINOPHEN 650 MG: 325 TABLET ORAL at 12:23

## 2023-12-19 RX ADMIN — PANTOPRAZOLE SODIUM 40 MG: 40 TABLET, DELAYED RELEASE ORAL at 06:04

## 2023-12-19 RX ADMIN — LEVOTHYROXINE SODIUM 150 MCG: 0.15 TABLET ORAL at 06:04

## 2023-12-19 RX ADMIN — SODIUM BICARBONATE 650 MG TABLET 650 MG: at 08:49

## 2023-12-19 ASSESSMENT — COGNITIVE AND FUNCTIONAL STATUS - GENERAL
TURNING FROM BACK TO SIDE WHILE IN FLAT BAD: A LITTLE
MOBILITY SCORE: 18
MOVING TO AND FROM BED TO CHAIR: A LITTLE
PERSONAL GROOMING: A LITTLE
DAILY ACTIVITIY SCORE: 20
HELP NEEDED FOR BATHING: A LITTLE
WALKING IN HOSPITAL ROOM: A LITTLE
CLIMB 3 TO 5 STEPS WITH RAILING: A LITTLE
TOILETING: A LITTLE
DRESSING REGULAR UPPER BODY CLOTHING: A LITTLE
STANDING UP FROM CHAIR USING ARMS: A LITTLE
MOVING FROM LYING ON BACK TO SITTING ON SIDE OF FLAT BED WITH BEDRAILS: A LITTLE

## 2023-12-19 ASSESSMENT — PAIN SCALES - GENERAL
PAINLEVEL_OUTOF10: 0 - NO PAIN
PAINLEVEL_OUTOF10: 0 - NO PAIN

## 2023-12-19 NOTE — PROGRESS NOTES
Tracie Baltazar is a 83 y.o. female on day 5 of admission presenting with Congestive heart failure with left ventricular diastolic dysfunction, acute on chronic (CMS/HCC).      Subjective   Patient had JANET on top of CKD in the setting of lower blood pressure with diuretics for CHF.  Patient still of losartan, amlodipine and furosemide.  Pressure did improve 130 systolic with gradual improvement of serum creatinine down to 2.5 mg/dL today.  She has normal potassium.    Patient is up in a chair.  She breathing comfortably.  No nausea or vomiting.  She stated that she feels good.    Objective          Vitals 24HR  Heart Rate:  [56-66]   Temp:  [36.5 °C (97.7 °F)-36.6 °C (97.9 °F)]   Resp:  [14-18]   BP: (127-151)/(42-52)   SpO2:  [95 %-97 %]         Intake/Output last 3 Shifts:    Intake/Output Summary (Last 24 hours) at 12/19/2023 1054  Last data filed at 12/19/2023 0600  Gross per 24 hour   Intake 620 ml   Output 1050 ml   Net -430 ml       Physical Exam  Constitutional:       General: She is not in acute distress.  Cardiovascular:      Rate and Rhythm: Normal rate and regular rhythm.      Pulses: Normal pulses.      Heart sounds: Normal heart sounds. No murmur heard.     No gallop.   Pulmonary:      Effort: No respiratory distress.      Breath sounds: No wheezing, rhonchi or rales.   Abdominal:      General: Abdomen is flat. Bowel sounds are normal. There is no distension.      Palpations: Abdomen is soft. There is no mass.      Tenderness: There is no abdominal tenderness. There is no guarding or rebound.   Skin:     General: Skin is warm and dry.      Capillary Refill: Capillary refill takes more than 3 seconds.   Psychiatric:         Mood and Affect: Mood normal.        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  Creatinine stabilized and started to improve down to 2.5 mg/dL with better blood pressure.   She still of losartan, furosemide and amlodipine.  SCongestive heart failure, patient breathing comfortably however she still has edema.  Continue low-salt diet.  Furosemide will be restarted later on.  Hypertension, blood pressure well-controlled despite holding medications.     Patient can be discharged home today from [facility].  I will discontinue Hodgson catheter.  Blood pressure stable.  Will start furosemide 40 mg daily to avoid recurrent CHF however will keep holding amlodipine and losartan.  Patient will need follow-up basic metabolic panel in 5 to 7 days and follow-up with Dr. Amor in the office to adjust medications including restarting lisinopril and increase furosemide as appropriate.    Katie Dozier MD

## 2023-12-19 NOTE — PROGRESS NOTES
Spiritual Care Visit    Clinical Encounter Type  Visited With: Patient  Routine Visit: Follow-up  Continue Visiting: Yes         Values/Beliefs  Spiritual Requests During Hospitalization: [place] today     Vikas Ervin

## 2023-12-19 NOTE — PROGRESS NOTES
Tracie Baltazar is a 83 y.o. female on day 4 of admission presenting with Congestive heart failure with left ventricular diastolic dysfunction, acute on chronic (CMS/HCC).    Subjective   Amlodipine was stopped because of low blood pressure and increase serum creatinine and hydralazine was decreased with parameters to keep systolic blood pressure more than 110.  Creatinine slowly improving down to 2.6 mg/dL however still higher than baseline 1.8 mg/dL.  Patient denies shortness of breath.  No chest pain.  She still off furosemide.       Objective   Physical Exam  Constitutional:       General: She is not in acute distress.  Cardiovascular:      Rate and Rhythm: Normal rate and regular rhythm.      Pulses: Normal pulses.      Heart sounds: Normal heart sounds. No murmur heard.     No gallop.   Pulmonary:      Effort: No respiratory distress.      Breath sounds: No wheezing, rhonchi or rales.   Abdominal:      General: Abdomen is flat. Bowel sounds are normal. There is no distension.      Palpations: Abdomen is soft. There is no mass.      Tenderness: There is no abdominal tenderness. There is no guarding or rebound.   Skin:     General: Skin is warm and dry.      Capillary Refill: Capillary refill takes more than 3 seconds.   Psychiatric:         Mood and Affect: Mood normal.           Last Recorded Vitals  Blood pressure 127/52, pulse 56, temperature 36.6 °C (97.9 °F), temperature source Oral, resp. rate 18, SpO2 95 %.    Intake/Output last 3 Shifts:  I/O last 3 completed shifts:  In: 860 [P.O.:860]  Out: 2100 [Urine:2100]  Scheduled medications  acetaminophen, 650 mg, oral, 4x daily  cyanocobalamin, 1,000 mcg, oral, Daily  DULoxetine, 20 mg, oral, Daily  folic acid, 1 mg, oral, Daily  gabapentin, 300 mg, oral, BID  heparin, 5,000 Units, subcutaneous, q8h  hydrALAZINE, 20 mg, oral, TID  lactobacillus acidophilus, 1 tablet, oral, BID  [START ON 12/19/2023] levothyroxine, 150 mcg, oral, Daily  lidocaine, 2 patch,  transdermal, Daily  pantoprazole, 40 mg, oral, Daily before breakfast  perflutren lipid microspheres, 0.5-10 mL of dilution, intravenous, Once in imaging  perflutren protein A microsphere, 0.5 mL, intravenous, Once in imaging  simvastatin, 10 mg, oral, Nightly  sodium bicarbonate, 650 mg, oral, TID  sulfur hexafluoride microsphr, 2 mL, intravenous, Once in imaging      Continuous medications     PRN medications  PRN medications: docusate sodium, ondansetron ODT, polyethylene glycol   Relevant Results    Results for orders placed or performed during the hospital encounter of 12/14/23 (from the past 24 hour(s))   Renal Function Panel   Result Value Ref Range    Glucose 92 65 - 99 mg/dL    Sodium 140 133 - 145 mmol/L    Potassium 3.7 3.4 - 5.1 mmol/L    Chloride 105 97 - 107 mmol/L    Bicarbonate 24 24 - 31 mmol/L    Urea Nitrogen 41 (H) 8 - 25 mg/dL    Creatinine 2.60 (H) 0.40 - 1.60 mg/dL    eGFR 18 (L) >60 mL/min/1.73m*2    Calcium 8.8 8.5 - 10.4 mg/dL    Phosphorus 4.4 2.5 - 4.5 mg/dL    Albumin 3.4 (L) 3.5 - 5.0 g/dL    Anion Gap 11 <=19 mmol/L   Thyroid Stimulating Hormone   Result Value Ref Range    Thyroid Stimulating Hormone 20.97 (H) 0.27 - 4.20 mIU/L   Iron and TIBC   Result Value Ref Range    Iron 34 30 - 160 ug/dL    UIBC 154 110 - 370 ug/dL    TIBC 188 (L) 228 - 428 ug/dL    % Saturation 18 12 - 50 %   Ferritin   Result Value Ref Range    Ferritin 589 (H) 13 - 150 ng/mL   T4, free   Result Value Ref Range    Thyroxine, Free 1.10 0.90 - 1.70 ng/dL       Assessment/Plan   JANET on top of CKD stage III/stage IV.  Creatinine stabilized and started to improve down to 2.6 mg/dL with better blood pressure.   She still of losartan, furosemide and amlodipine.  She has no hyperkalemia or acidosis.    IV fluid was stopped.  Will restart furosemide by tomorrow if systolic blood pressure is stable to avoid recurrent CHF.  Congestive heart failure, patient breathing comfortably however she still has edema.  Continue  low-salt diet.  Furosemide will be restarted later on.  Hypertension, blood pressure well-controlled despite holding medications.    Monitor renal function and renal output.  Hopefully renal function continue to improve spontaneously.             Katie Dozier MD

## 2023-12-19 NOTE — CARE PLAN
The patient's goals for the shift include      The clinical goals for the shift include i&o      Problem: Fall/Injury  Goal: Not fall by end of shift  Outcome: Progressing  Goal: Be free from injury by end of the shift  Outcome: Progressing  Goal: Verbalize understanding of personal risk factors for fall in the hospital  Outcome: Progressing  Goal: Verbalize understanding of risk factor reduction measures to prevent injury from fall in the home  Outcome: Progressing  Goal: Use assistive devices by end of the shift  Outcome: Progressing  Goal: Pace activities to prevent fatigue by end of the shift  Outcome: Progressing     Problem: Safety  Goal: Patient will be injury free during hospitalization  Outcome: Progressing  Goal: I will remain free of falls  Outcome: Progressing     Problem: Daily Care  Goal: Daily care needs are met  Outcome: Progressing     Problem: Psychosocial Needs  Goal: Demonstrates ability to cope with hospitalization/illness  Outcome: Progressing  Goal: Collaborate with me, my family, and caregiver to identify my specific goals  Outcome: Progressing     Problem: Pain - Adult  Goal: Verbalizes/displays adequate comfort level or baseline comfort level  Outcome: Progressing     Problem: Discharge Barriers  Goal: My discharge needs are met  Outcome: Progressing     Problem: Safety - Adult  Goal: Free from fall injury  Outcome: Progressing     Problem: Discharge Planning  Goal: Discharge to home or other facility with appropriate resources  Outcome: Progressing     Problem: Chronic Conditions and Co-morbidities  Goal: Patient's chronic conditions and co-morbidity symptoms are monitored and maintained or improved  Outcome: Progressing     Problem: Heart Failure  Goal: Improved gas exchange this shift  Outcome: Progressing  Goal: Improved urinary output this shift  Outcome: Progressing  Goal: Reduction in peripheral edema within 24 hours  Outcome: Progressing  Goal: Report improvement of  dyspnea/breathlessness this shift  Outcome: Progressing  Goal: Weight from fluid excess reduced over 2-3 days, then stabilize  Outcome: Progressing  Goal: Increase self care and/or family involvement in 24 hours  Outcome: Progressing

## 2023-12-19 NOTE — DISCHARGE SUMMARY
[facility] MEDICINE  DISCHARGE SUMMARY   _______________________________________________________________________    NAME: Tracie Baltazar MR#: [id number]   ATTENDING: Rashawn Pedersen DO  CSN#: [id number]   SEX: female ROOM: [address] [place] [address]-A   : 1940   DISCHDATE: 2023   ADMIT DATE: 2023       Admitting Physician: Rashawn Pedersen DO     Discharge Physician: Aiblio Colón DO    Consultants:  Dr Amor open nephrology and Dr. Bruce of cardiology        Admission Diagnoses:     Congestive heart failure with left ventricular diastolic dysfunction, acute on chronic (CMS/HCC) [I50.33]    Discharge  Diagnoses:   Acute diastolic CHF exacerbation   JANET likely from diuretics with baseline creatinine 1.7-2  Acute hypokalemia-replaced  Chronic leukocytosis from known CLL  Acute on chronic anemia  Vitamin D deficiency  Vitamin B12 deficiency  Depression  Peripheral neuropathy  Hypothyroidism  GERD  Hyperlipidemia  Essential hypertension  Osteoarthritis  Obesity with BMI of 34    Pertinent radiological studies/procedures:     Transthoracic Echo (TTE) 12/15/2023 CONCLUSIONS:    1. Left ventricular systolic function is normal with a 60-65% estimated ejection fraction.    2. Left ventricular diastolic filling indeterminate.    3. Mild aortic valve sclerosis.     CT head wo IV contrast 2023 CONCLUSIONS:    No acute intracranial process       XR chest 2 views 2023 CONCLUSIONS:    Mild prominence of the pulmonary vasculature, as can be seen with pulmonary vascular congestion. Please correlate for corresponding symptoms.   No focal consolidation or visible pneumothorax.   Faint bibasilar atelectasis suspected.      CXR 23 IMPRESSION:  No acute cardiopulmonary process.    HISTORY OF PRESENT ILLNESS:  Tracie Baltazar is a 83 y.o. female presenting with a history of hypertension, hypothyroidism, IBS, chronic kidney disease stage IV, CLL.  Presents to the emergency department  at [facility] via EMS after she was involved with her daughter around town today having routine blood work done and then doing some shopping after going to breakfast at the restaurant.   She does recall symptoms coming on fairly abruptly at the restaurant after eating feeling a bit lightheaded like she was going to pass out felt the breathing was a bit heavy  She continued to feel not herself at the department store had to sit down because she felt she was going to fall out  She was ultimately brought into the emergency department  She had an NIH score of 0  She had a BNP elevation of 834 reportedly no acute findings noted on EKG troponin mildly elevated at 26 with no change in follow-up troponin  Urinalysis not yet completed  Has had several loose stools down in the emergency department  No recent antibiotic usage  Denies any viral symptoms  WBC count elevated at 32.8 which is essentially in line with where she usually is hemoglobin stable  History of chronic kidney disease with a creatinine at 1.7  Given small dose of diuretic 20 mg IV in the emergency department for possible fluid overload with chest x-ray some mild prominence of pulmonary vasculature  Hospital Course:   Tracie Baltazar is a 83 y.o. year-old female who was admitted on 12/14/2023 for acute CHF exacerbation and JANET.  Pt was followed this admission by cardiology and nephrology. The patient was placed on the CHF pathway with cardiology consulted. Patient was started on IV Lasix as well as breathing treatments.  Patient diuresed well  and echo showing EF of 60-65%.  Unfortunately, patient developed JANET so Lasix and Cozaar were held.  Nephrology was consulted and eventually her renal function slowly improved.  Patient was resumed on her sodium bicarb had no evidence of uremia and did not meet criteria for hemodialysis. The patient's Is and Os were being monitored and the patient continued to be off nephrotoxic agents as much as possible.  Nephrology  gave clearance for patient to be restarted on oral Lasix after discharge.  While in the hospital, pt also was unable to be started on beta-blockers due to intermittent hypotension.  Patient  was on room air during hospitalization and did not meet criteria for home oxygen.       The patient's blood pressure was initially labile but this improved after the patient's BP medications were adjusted (Cozaar and Lasix held due to JANET and due to occasional hypotension, decreased hydralazine from 25 mg to 20 mg 3 times daily on 12/17 ).  Thyroid studies were then found to be abnormal so Synthroid increased from 137 to 150 mcg daily on 12/18.  Further management of this condition can be done by her PCP after discharge. Patient's mental status improved this hospitalization and the pt had no new neurological deficits.      The pt did continue to have anemia but the patient had no evidence of bleeding nor did they require any transfusions. The patient's Hgb stabilized and pt's electrolyte abnormalities were also corrected. Patient also did not require any antibiotics during this admission and the patient was afebrile the last few days prior to discharge. Please note patient has chronic leukocytosis from her known CLL.  Patient overall did improve while recovering from their acute illness and the patient's pain was eventually controlled with oral pain medications.  The patient was deemed safe for discharge home by myself and the specialist(s) on 12/19/2023  Discharge Examination:   Blood pressure (!) 132/48, pulse 56, temperature 36.5 °C (97.7 °F), temperature source Oral, resp. rate 16, SpO2 96 %.  General: Female A&Ox3, and is following commands.    HEENT: Normocephalic atraumatic, pupils equally reactive to light and accomodation, extra occular muscles are intact. Neck is supple, trachea is midline without observable bruits.  CVS: Regular rate and rhythm, S1 S2 without any S3 or S4.  Pulmonary: Decreased breath sounds with  occasional rales  GI: Abdomen is soft, non tender, positive bowel sounds are present.  EXT: B/L LE peripheral edema 1/4 with pulses are palpable throughout.  NEUROLOGY: CN 3-12 grossly intact except for known chronic hearing and visual impairments, Muscle strength is 5/5, DTRs are 2/4 throughout. There is no obvious facial asymmetry and no resting tremors.Moves all 4 extremities spontaneously.  MUSCULOSKELETAL:There is mild lumbar tenderness to palpation elicited.   Laboratory Results:        Results for orders placed or performed during the hospital encounter of 12/14/23 (from the past 24 hour(s))   Basic Metabolic Panel   Result Value Ref Range    Glucose 98 65 - 99 mg/dL    Sodium 140 133 - 145 mmol/L    Potassium 3.7 3.4 - 5.1 mmol/L    Chloride 104 97 - 107 mmol/L    Bicarbonate 25 24 - 31 mmol/L    Anion Gap 15 0 - 19 mmol/L    Urea Nitrogen 42 (H) 8 - 25 mg/dL    Creatinine 2.50 (H) 0.40 - 1.60 mg/dL    eGFR 19 (L) >60 mL/min/1.73m*2    Calcium 8.9 8.5 - 10.4 mg/dL        No results found for the last 90 days.     Patient Instructions:     PLEASE FOLLOW-UP WITH:  [facility]  [address]  [facility] [address]    Primary care provider (PCP)    Javier Floyd MD  [address]  [place] [address]  [phone]    Fili Calhoun, APRN-CNP  [address]  [facility] [address]  [phone]    Activity: as instructed by PT and OT  Diet: Renal and cardiac  Wound Care: As instructed       Your medication list        START taking these medications        Instructions Last Dose Given Next Dose Due   furosemide 20 mg tablet  Commonly known as: Lasix  Start taking on: December 21, 2023      Take 1 tablet (20 mg) by mouth once daily. Do not start before December 21, 2023.       gabapentin 300 mg capsule  Commonly known as: Neurontin  Replaces: gabapentin 800 mg tablet      Take 1 capsule (300 mg) by mouth 2 times a day.              CHANGE  how you take these medications        Instructions Last Dose Given Next Dose Due   DULoxetine 20 mg DR capsule  Commonly known as: Cymbalta  Start taking on: December 20, 2023  What changed:   medication strength  how much to take  additional instructions      Take 1 capsule (20 mg) by mouth once daily. Do not crush or chew. Do not start before December 20, 2023.       folic acid 400 mcg tablet  Commonly known as: Folvite  What changed: Another medication with the same name was removed. Continue taking this medication, and follow the directions you see here.           hydrALAZINE 10 mg tablet  Commonly known as: Apresoline  What changed:   medication strength  See the new instructions.      Take 2 tablets (20 mg) by mouth 3 times a day.       levothyroxine 150 mcg tablet  Commonly known as: Synthroid, Levoxyl  Start taking on: December 20, 2023  What changed:   medication strength  See the new instructions.  Another medication with the same name was removed. Continue taking this medication, and follow the directions you see here.      Take 1 tablet (150 mcg) by mouth early in the morning.. Do not start before December 20, 2023.       sodium bicarbonate 650 mg tablet  What changed:   how much to take  when to take this  additional instructions      Take 1 tablet (650 mg) by mouth 3 times a day.              CONTINUE taking these medications        Instructions Last Dose Given Next Dose Due   acetaminophen 325 mg tablet  Commonly known as: Tylenol           amitriptyline 10 mg tablet  Commonly known as: Elavil           cholestyramine-aspartame 4 gram packet  Commonly known as: Prevalite           lactobacillus acidoph-lactobacillus bulgar 0.5 million cell tablet chewable split tablet           pantoprazole 40 mg EC tablet  Commonly known as: ProtoNix           SaltStable LS cream  Generic drug: cream base no.135 (bulk)           simvastatin 10 mg tablet  Commonly known as: Zocor           Vitamin B-12 100 mcg  tablet  Generic drug: cyanocobalamin           cholecalciferol 50 MCG (2000 UT) tablet  Commonly known as: Vitamin D-3           Vitamin D3 400 unit capsule  Generic drug: cholecalciferol                  STOP taking these medications      amLODIPine 10 mg tablet  Commonly known as: Norvasc        gabapentin 800 mg tablet  Commonly known as: Neurontin  Replaced by: gabapentin 300 mg capsule        HYDROCHLOROTHIAZIDE ORAL        NIFEdipine ER 30 mg 24 hr tablet  Commonly known as: Adalat CC        potassium chloride CR 20 mEq ER tablet  Commonly known as: Klor-Con M20        traMADol 50 mg tablet  Commonly known as: Ultram        valsartan 80 mg tablet  Commonly known as: Diovan                  Where to Get Your Medications        These medications were sent to [facility] [address] [place] [address] [id number] [place] [id number]      Phone: [phone]   DULoxetine 20 mg DR capsule  furosemide 20 mg tablet  gabapentin 300 mg capsule  hydrALAZINE 10 mg tablet  levothyroxine 150 mcg tablet  sodium bicarbonate 650 mg tablet       CODE STATUS UPON DISCHARGE: FULL CODE    DISCHARGE INSTRUCTIONS:  The patient was asked to follow up with their family doctor in the next 5 days, the cardiologist in a week and with the nephrologist as instructed.  I will leave it up to the PCP to dose the BP medications and particularly diuretics and to manage the patient's other chronic medical conditions in an outpatient setting. The pt will need a repeat of their CBC and BMP when they see their family doctor. The patient was advised if their symptoms persist, to call their doctor or to go to the ED. The patient and their family understands events of their hospitalization and all their questions were answered.     Time spent with patient care,coordinating care with the treatment team, medication reconciliation of multiple medications and going over the discharge plan of care with the patient's family was 37  minutes.    Signed:    Abilio Colón D.O.  12/19/2023    Portions of this note were dictated using [employer] and reviewed . While every effort was made to correct mistranscriptions, minor grammatical or typographical errors may be present from machine dictation

## 2023-12-19 NOTE — CARE PLAN
Problem: Fall/Injury  Goal: Not fall by end of shift  Outcome: Progressing  Goal: Be free from injury by end of the shift  Outcome: Progressing  Goal: Verbalize understanding of personal risk factors for fall in the hospital  Outcome: Progressing  Goal: Verbalize understanding of risk factor reduction measures to prevent injury from fall in the home  Outcome: Progressing  Goal: Use assistive devices by end of the shift  Outcome: Progressing  Goal: Pace activities to prevent fatigue by end of the shift  Outcome: Progressing     Problem: Safety  Goal: Patient will be injury free during hospitalization  Outcome: Progressing  Goal: I will remain free of falls  Outcome: Progressing     Problem: Discharge Barriers  Goal: My discharge needs are met  Outcome: Progressing     Problem: Pain - Adult  Goal: Verbalizes/displays adequate comfort level or baseline comfort level  Outcome: Progressing     Problem: Safety - Adult  Goal: Free from fall injury  Outcome: Progressing     Problem: Discharge Planning  Goal: Discharge to home or other facility with appropriate resources  Outcome: Progressing   The patient's goals for the shift include  monitor I/O    The clinical goals for the shift include i/o

## 2023-12-20 ENCOUNTER — DOCUMENTATION (OUTPATIENT)
Dept: PRIMARY CARE | Facility: CLINIC | Age: 83
End: 2023-12-20
Payer: MEDICARE

## 2023-12-20 ENCOUNTER — TELEPHONE (OUTPATIENT)
Dept: PRIMARY CARE | Facility: CLINIC | Age: 83
End: 2023-12-20
Payer: MEDICARE

## 2023-12-20 ENCOUNTER — PATIENT OUTREACH (OUTPATIENT)
Dept: PRIMARY CARE | Facility: CLINIC | Age: 83
End: 2023-12-20
Payer: MEDICARE

## 2023-12-20 NOTE — TELEPHONE ENCOUNTER
Patient discharged from [facility] 12/19/23 to home, phoned patient in follow up and scheduled a [facility] post acute visit 12/21/23 at 1:00 pm with Lakia Leyva NP.

## 2023-12-21 ENCOUNTER — OFFICE VISIT (OUTPATIENT)
Dept: PRIMARY CARE | Facility: CLINIC | Age: 83
End: 2023-12-21
Payer: MEDICARE

## 2023-12-21 VITALS
HEART RATE: 78 BPM | WEIGHT: 181 LBS | DIASTOLIC BLOOD PRESSURE: 68 MMHG | SYSTOLIC BLOOD PRESSURE: 140 MMHG | TEMPERATURE: 97.2 F | HEIGHT: 63 IN | RESPIRATION RATE: 16 BRPM | BODY MASS INDEX: 32.07 KG/M2 | OXYGEN SATURATION: 98 %

## 2023-12-21 DIAGNOSIS — M17.4 OTHER SECONDARY OSTEOARTHRITIS OF BOTH KNEES: ICD-10-CM

## 2023-12-21 DIAGNOSIS — N18.4 CHRONIC KIDNEY DISEASE, STAGE 4 (SEVERE) (MULTI): ICD-10-CM

## 2023-12-21 DIAGNOSIS — C95.91 LEUKEMIA IN REMISSION, UNSPECIFIED LEUKEMIA TYPE (MULTI): ICD-10-CM

## 2023-12-21 DIAGNOSIS — M06.09 RHEUMATOID ARTHRITIS OF MULTIPLE SITES WITH NEGATIVE RHEUMATOID FACTOR (MULTI): Primary | ICD-10-CM

## 2023-12-21 PROCEDURE — 1160F RVW MEDS BY RX/DR IN RCRD: CPT | Performed by: NURSE PRACTITIONER

## 2023-12-21 PROCEDURE — 99350 HOME/RES VST EST HIGH MDM 60: CPT | Performed by: NURSE PRACTITIONER

## 2023-12-21 PROCEDURE — 1036F TOBACCO NON-USER: CPT | Performed by: NURSE PRACTITIONER

## 2023-12-21 PROCEDURE — 1111F DSCHRG MED/CURRENT MED MERGE: CPT | Performed by: NURSE PRACTITIONER

## 2023-12-21 PROCEDURE — 1159F MED LIST DOCD IN RCRD: CPT | Performed by: NURSE PRACTITIONER

## 2023-12-21 PROCEDURE — 3077F SYST BP >= 140 MM HG: CPT | Performed by: NURSE PRACTITIONER

## 2023-12-21 PROCEDURE — 1126F AMNT PAIN NOTED NONE PRSNT: CPT | Performed by: NURSE PRACTITIONER

## 2023-12-21 PROCEDURE — 3078F DIAST BP <80 MM HG: CPT | Performed by: NURSE PRACTITIONER

## 2023-12-21 RX ORDER — HYDRALAZINE HYDROCHLORIDE 10 MG/1
20 TABLET, FILM COATED ORAL 3 TIMES DAILY
COMMUNITY
End: 2024-01-02 | Stop reason: HOSPADM

## 2023-12-21 ASSESSMENT — ENCOUNTER SYMPTOMS
DIZZINESS: 0
COUGH: 0
FEVER: 0
ACTIVITY CHANGE: 1
FATIGUE: 1
BLOOD IN STOOL: 0
CHEST TIGHTNESS: 0
SHORTNESS OF BREATH: 0
HEADACHES: 0
PSYCHIATRIC NEGATIVE: 1
APPETITE CHANGE: 0
GASTROINTESTINAL NEGATIVE: 1
BACK PAIN: 1
VOICE CHANGE: 0
NAUSEA: 0
CONFUSION: 0
UNEXPECTED WEIGHT CHANGE: 0
TROUBLE SWALLOWING: 0
VOMITING: 0
WHEEZING: 0
PALPITATIONS: 0
CHILLS: 0
WEAKNESS: 1
SPEECH DIFFICULTY: 0
TREMORS: 0
CONSTIPATION: 0
JOINT SWELLING: 0
ARTHRALGIAS: 1
DIFFICULTY URINATING: 0
LIGHT-HEADEDNESS: 0

## 2023-12-21 ASSESSMENT — PAIN SCALES - GENERAL: PAINLEVEL: 0-NO PAIN

## 2023-12-21 NOTE — PROGRESS NOTES
Subjective   Patient ID: Tracie Baltazar is a 83 y.o. female who presents for Follow-up (Post Acute, new patient to establish).    Patient is seen in her private home where she resides with her son.  She is seen sitting up in her recliner in the living room.  Awake and alert with appropriate awareness and is ambulatory short distances with her walker.  PMH:  Leukemia (in remission) Anemia, DJD, Rheumatoid Arthritis, HTN.  Patient is homebound due to age related functional decline and disease progression.  Referral by PCP Dr. Floyd     Today is a post acute visit 12/13/23. Per EMR - Presents to ED via ambulance for generalized weakness x 1 hour.  Patient states she was exiting [facility] this morning when she became very lightheaded and dizzy and felt like she was going to pass out at which point EMS was notified.  Per EMS there was some concern for stroke-like symptoms however stroke scale is 0 on exam in ED.  Patient complains of associated shortness of breath, dizziness, nausea, productive cough.  Patient denies any recent hospitalizations or surgeries.  Patient did have CBC and CMP done this morning prior to the start of her symptoms.  Per patient's daughter, she has been weak all morning with assistance with ambulation at times. female with history of leukemia who presents to ED via ambulance for generalized weakness x 1 hour.      CHF pathway with cardiology consulted. Patient was started on IV Lasix as well as breathing treatments.  Patient diuresed well  and echo showing EF of 60-65%.  Unfortunately, patient developed JANET so Lasix and Cozaar were held.  Nephrology was consulted and eventually her renal function slowly improved.  Patient was resumed on her sodium bicarb had no evidence of uremia and did not meet criteria for hemodialysis. The patient's Is and Os were being monitored and the patient continued to be off nephrotoxic agents as much as possible.  Nephrology gave clearance for patient to be  restarted on oral Lasix after discharge.  While in the hospital, pt also was unable to be started on beta-blockers due to intermittent hypotension.  Patient  was on room air during hospitalization and did not meet criteria for home oxygen.       The patient's blood pressure was initially labile but this improved after the patient's BP medications were adjusted (Cozaar and Lasix held due to JANET and due to occasional hypotension, decreased hydralazine from 25 mg to 20 mg 3 times daily on 12/17 ).  Thyroid studies were then found to be abnormal so Synthroid increased from 137 to 150 mcg daily on 12/18.  Further management of this condition can be done by her PCP after discharge. Patient's mental status improved this hospitalization and the pt had no new neurological deficits.      The pt did continue to have anemia but the patient had no evidence of bleeding nor did they require any transfusions. The patient's Hgb stabilized and pt's electrolyte abnormalities were also corrected. Patient also did not require any antibiotics during this admission and the patient was afebrile the last few days prior to discharge. Please note patient has chronic leukocytosis from her known CLL.  Patient overall did improve while recovering from their acute illness and the patient's pain was eventually controlled with oral pain medications.  The patient was deemed safe for discharge home by myself and the specialist(s) on 12/19/2023    Today she reports feeling back to baseline, and glad to be home.  She is monitoring her blood pressure and weights daily and following a low sodium diet.  Both her and her son report that she has not gained her strength back and still feels weak and unsteady on her feet.  She does ambulate with her walking, which she did not prior to hospitalization.  Son reports she was always furniture walking prior to hospital admission. She remains on the first floor of private home and is able to ambulate from room to  "room.  She denies pain at rest, however with ambulation reports bilateral knee pain and lower back discomfort.  Pain is reported as aching in nature.  Mentions that since her knee replacement, \"it has never been good\".  Upon discharge she had declined inpatient PT/OT, however both this provider and patient feel that due to her knee pain and back pain, she would benefit from therapies for increased strength, balance and strengthening.  She is Independent with all ADL's, and is continent of bowel and bladder.  Appetite is reported as good, and staying hydrated.  Urinating adequate amounts, and reports bowels as regular.  Denies chest pain, palpitations, tachycardia, and shortness of breath, wheezing, no PND/orthopnea, or respiratory complaints for the patient. There is no fever, or chills. No nausea, vomiting, diarrhea, headaches, or vision changes or recent falls. There is no hematuria, dysuria, flank pain, or increased urgency.  Home Visit: Medically necessary due to: dementia/cognitive impairment, unsteady gait/poor balance, shortness of breath with minimal exertion, Illness or condition that results in activity limitation or restriction that impacts the ability to leave home such as: equipment and /or human assistance needed to safely leave the home, patient has limited support systems to help the patient attend office visits, the office visit would require excessive physical effort or pain for the patient.              Current Outpatient Medications:     acetaminophen (Tylenol) 325 mg tablet, , Disp: , Rfl:     amitriptyline (Elavil) 10 mg tablet, , Disp: , Rfl:     cholecalciferol (Vitamin D-3) 50 MCG (2000 UT) tablet, Vitamin D 50 MCG (2000 UT) Oral Tablet  Refills: 0     Active, Disp: , Rfl:     cholestyramine-aspartame (Prevalite) 4 gram packet, , Disp: , Rfl:     cyanocobalamin (Vitamin B-12) 100 mcg tablet, = 1 tab(s) ( 100 mcg ), Oral, daily, 0 Refill(s), Type: Maintenance, Disp: , Rfl:     DULoxetine " (Cymbalta) 20 mg DR capsule, Take 1 capsule (20 mg) by mouth once daily. Do not crush or chew. Do not start before December 20, 2023., Disp: 30 capsule, Rfl: 0    folic acid (Folvite) 400 mcg tablet, = 1 tab(s) ( 0.4 mg ), Oral, daily, 0 Refill(s), Type: Maintenance, Disp: , Rfl:     furosemide (Lasix) 20 mg tablet, Take 1 tablet (20 mg) by mouth once daily. Do not start before December 21, 2023., Disp: 30 tablet, Rfl: 0    gabapentin (Neurontin) 300 mg capsule, Take 1 capsule (300 mg) by mouth 2 times a day., Disp: 60 capsule, Rfl: 0    hydrALAZINE (Apresoline) 10 mg tablet, Take 2 tablets (20 mg) by mouth 3 times a day., Disp: , Rfl:     L. acidophilus/L.bulgaricus (lactobacillus acidoph-lactobacillus bulgar) 0.5 million cell tablet chewable split tablet, , Disp: , Rfl:     levothyroxine (Synthroid, Levoxyl) 150 mcg tablet, Take 1 tablet (150 mcg) by mouth early in the morning.. Do not start before December 20, 2023., Disp: 30 tablet, Rfl: 0    pantoprazole (ProtoNix) 40 mg EC tablet, one bid for 10d, then one qd thereafter, Disp: , Rfl:     simvastatin (Zocor) 10 mg tablet, = 1 tab(s) ( 10 mg ), Oral, hs, # 90 tab(s), 3 Refill(s), Type: Maintenance, Pharmacy: [id number], 1 tab(s) Oral hs, 61, in, 12/15/22 10:06:00 EST, Height Measured, 182, lb, 12/15/22 10:06:00 EST, Weight Measured, Disp: , Rfl:     sodium bicarbonate 650 mg tablet, Take 1 tablet (650 mg) by mouth 3 times a day., Disp: 90 tablet, Rfl: 0    cream base no.135, bulk, (SaltStable LS) cream, Salt Stable LS Advanced External Cream  Quantity: 240  Refills: 0      Start : 15-Oct-2015  Active, Disp: , Rfl:      Review of Systems   Constitutional:  Positive for activity change and fatigue. Negative for appetite change, chills, fever and unexpected weight change.   HENT:  Negative for congestion, trouble swallowing and voice change.    Respiratory:  Negative for cough, chest tightness, shortness of breath and wheezing.    Cardiovascular:   "Negative for chest pain, palpitations and leg swelling.   Gastrointestinal: Negative.  Negative for blood in stool, constipation, nausea and vomiting.   Genitourinary:  Positive for urgency. Negative for difficulty urinating.   Musculoskeletal:  Positive for arthralgias, back pain and gait problem. Negative for joint swelling.   Skin:  Positive for pallor.   Neurological:  Positive for weakness. Negative for dizziness, tremors, speech difficulty, light-headedness and headaches.   Psychiatric/Behavioral: Negative.  Negative for confusion.        Objective   /68   Pulse 78   Temp 36.2 °C (97.2 °F) (Temporal)   Resp 16   Ht 1.6 m (5' 3\")   Wt 82.1 kg (181 lb)   SpO2 98%   BMI 32.06 kg/m²     Physical Exam  Constitutional:       General: She is not in acute distress.     Appearance: She is normal weight.   HENT:      Head: Normocephalic and atraumatic.      Right Ear: Tympanic membrane normal.      Left Ear: Tympanic membrane normal.      Nose: Nose normal.      Mouth/Throat:      Mouth: Mucous membranes are moist.      Pharynx: Oropharynx is clear.   Eyes:      Extraocular Movements: Extraocular movements intact.      Conjunctiva/sclera: Conjunctivae normal.      Pupils: Pupils are equal, round, and reactive to light.   Neck:      Vascular: No carotid bruit.   Cardiovascular:      Rate and Rhythm: Normal rate and regular rhythm.      Pulses: Normal pulses.      Heart sounds: Normal heart sounds.   Pulmonary:      Effort: Pulmonary effort is normal.      Breath sounds: Normal breath sounds. No wheezing or rhonchi.   Abdominal:      General: Bowel sounds are normal. There is no distension.      Palpations: Abdomen is soft.      Tenderness: There is no abdominal tenderness.   Musculoskeletal:      Cervical back: Normal range of motion and neck supple. No tenderness.      Comments: Trace pedal edema   Skin:     General: Skin is warm and dry.      Coloration: Skin is pale.   Neurological:      Mental Status: " She is alert and oriented to person, place, and time. Mental status is at baseline.      Motor: Weakness present.      Gait: Gait abnormal.   Psychiatric:         Mood and Affect: Mood normal.         Assessment/Plan   Problem List Items Addressed This Visit             ICD-10-CM    Leukemia (CMS/[facility]) C95.90    DJD (degenerative joint disease) of knee M17.9    Relevant Orders    Referral to Home Care    Chronic kidney disease, stage 4 (severe) (CMS/[facility]) N18.4    Rheumatoid arthritis of multiple sites with negative rheumatoid factor (CMS/[facility]) - Primary M06.09    Relevant Orders    Referral to Home Care     Initial encounter for [facility] NP Visit.  Program explained and contact information provided, all questions answered with apparent satisfaction.   More than 50% of time spent in face-to-face discussion @ a total of 60 minutes with this patient on counseling, coordination of care, collaboration with family and staff and review of medical records and diagnostics.  Will initiate PT/OT for balance, stregthening related to back pain, knee pain and RA.       Lakia Leyva, APRN-CNP

## 2023-12-22 ENCOUNTER — PATIENT OUTREACH (OUTPATIENT)
Dept: CASE MANAGEMENT | Facility: HOSPITAL | Age: 83
End: 2023-12-22
Payer: MEDICARE

## 2023-12-22 NOTE — PROGRESS NOTES
Follow up phone call made by CHF Clinical Nurse Navigator. Called-No answer, unable to leave message.

## 2023-12-23 ENCOUNTER — TELEPHONE (OUTPATIENT)
Dept: HOME HEALTH SERVICES | Facility: HOME HEALTH | Age: 83
End: 2023-12-23

## 2023-12-23 ENCOUNTER — LAB (OUTPATIENT)
Dept: LAB | Facility: LAB | Age: 83
End: 2023-12-23
Payer: MEDICARE

## 2023-12-23 ENCOUNTER — DOCUMENTATION (OUTPATIENT)
Dept: HOME HEALTH SERVICES | Facility: HOME HEALTH | Age: 83
End: 2023-12-23

## 2023-12-23 ENCOUNTER — HOME HEALTH ADMISSION (OUTPATIENT)
Dept: HOME HEALTH SERVICES | Facility: HOME HEALTH | Age: 83
End: 2023-12-23
Payer: MEDICARE

## 2023-12-23 DIAGNOSIS — N18.4 CHRONIC KIDNEY DISEASE, STAGE 4 (SEVERE) (MULTI): ICD-10-CM

## 2023-12-23 LAB
25(OH)D3 SERPL-MCNC: 28 NG/ML (ref 31–100)
ALBUMIN SERPL-MCNC: 3.9 G/DL (ref 3.5–5)
ANION GAP SERPL CALC-SCNC: 16 MMOL/L
BASOPHILS # BLD MANUAL: 0 X10*3/UL (ref 0–0.1)
BASOPHILS NFR BLD MANUAL: 0 %
BUN SERPL-MCNC: 34 MG/DL (ref 8–25)
CALCIUM SERPL-MCNC: 9.3 MG/DL (ref 8.5–10.4)
CHLORIDE SERPL-SCNC: 103 MMOL/L (ref 97–107)
CO2 SERPL-SCNC: 25 MMOL/L (ref 24–31)
CREAT SERPL-MCNC: 2.2 MG/DL (ref 0.4–1.6)
EOSINOPHIL # BLD MANUAL: 0 X10*3/UL (ref 0–0.4)
EOSINOPHIL NFR BLD MANUAL: 0 %
ERYTHROCYTE [DISTWIDTH] IN BLOOD BY AUTOMATED COUNT: 13.2 % (ref 11.5–14.5)
GFR SERPL CREATININE-BSD FRML MDRD: 22 ML/MIN/1.73M*2
GLUCOSE SERPL-MCNC: 108 MG/DL (ref 65–99)
HCT VFR BLD AUTO: 36.9 % (ref 36–46)
HGB BLD-MCNC: 11.2 G/DL (ref 12–16)
IMM GRANULOCYTES # BLD AUTO: 0.09 X10*3/UL (ref 0–0.5)
IMM GRANULOCYTES NFR BLD AUTO: 0.3 % (ref 0–0.9)
LYMPHOCYTES # BLD MANUAL: 22.56 X10*3/UL (ref 0.8–3)
LYMPHOCYTES NFR BLD MANUAL: 77 %
MCH RBC QN AUTO: 31.4 PG (ref 26–34)
MCHC RBC AUTO-ENTMCNC: 30.4 G/DL (ref 32–36)
MCV RBC AUTO: 103 FL (ref 80–100)
MONOCYTES # BLD MANUAL: 0.59 X10*3/UL (ref 0.05–0.8)
MONOCYTES NFR BLD MANUAL: 2 %
NEUTS SEG # BLD MANUAL: 5.86 X10*3/UL (ref 1.6–5)
NEUTS SEG NFR BLD MANUAL: 20 %
NRBC BLD-RTO: 0 /100 WBCS (ref 0–0)
PHOSPHATE SERPL-MCNC: 3.7 MG/DL (ref 2.5–4.5)
PLATELET # BLD AUTO: 306 X10*3/UL (ref 150–450)
POTASSIUM SERPL-SCNC: 4 MMOL/L (ref 3.4–5.1)
PTH-INTACT SERPL-MCNC: 130.8 PG/ML (ref 18.5–88)
RBC # BLD AUTO: 3.57 X10*6/UL (ref 4–5.2)
RBC MORPH BLD: ABNORMAL
SODIUM SERPL-SCNC: 144 MMOL/L (ref 133–145)
TOTAL CELLS COUNTED BLD: 100
VARIANT LYMPHS # BLD MANUAL: 0.29 X10*3/UL (ref 0–0.3)
VARIANT LYMPHS NFR BLD: 1 %
WBC # BLD AUTO: 29.3 X10*3/UL (ref 4.4–11.3)

## 2023-12-23 PROCEDURE — 85007 BL SMEAR W/DIFF WBC COUNT: CPT

## 2023-12-23 PROCEDURE — 82306 VITAMIN D 25 HYDROXY: CPT

## 2023-12-23 PROCEDURE — 80069 RENAL FUNCTION PANEL: CPT

## 2023-12-23 PROCEDURE — 83970 ASSAY OF PARATHORMONE: CPT

## 2023-12-23 PROCEDURE — 85027 COMPLETE CBC AUTOMATED: CPT

## 2023-12-23 PROCEDURE — 36415 COLL VENOUS BLD VENIPUNCTURE: CPT

## 2023-12-23 NOTE — TELEPHONE ENCOUNTER
Good morning-we do not have staffing until Thursday  12.28- I will process now, if that is not acceptable please let us know if you will be referring to another agency- ty

## 2023-12-23 NOTE — HH CARE COORDINATION
Home Care received a Referral for Nursing, Physical Therapy, and Occupational Therapy. We have processed the referral for a Start of Care on 12/28.     If you have any questions or concerns, please feel free to contact us at [phone]. Follow the prompts, enter your five digit zip code, and you will be directed to your care team on [facility].

## 2023-12-24 ENCOUNTER — HOSPITAL ENCOUNTER (INPATIENT)
Facility: HOSPITAL | Age: 83
LOS: 9 days | Discharge: NURSING, REHAB OR LONG-TERM CARE | DRG: 371 | End: 2024-01-02
Attending: STUDENT IN AN ORGANIZED HEALTH CARE EDUCATION/TRAINING PROGRAM | Admitting: INTERNAL MEDICINE
Payer: MEDICARE

## 2023-12-24 ENCOUNTER — APPOINTMENT (OUTPATIENT)
Dept: CARDIOLOGY | Facility: HOSPITAL | Age: 83
DRG: 371 | End: 2023-12-24
Payer: MEDICARE

## 2023-12-24 ENCOUNTER — APPOINTMENT (OUTPATIENT)
Dept: RADIOLOGY | Facility: HOSPITAL | Age: 83
DRG: 371 | End: 2023-12-24
Payer: MEDICARE

## 2023-12-24 DIAGNOSIS — R53.1 WEAKNESS: Primary | ICD-10-CM

## 2023-12-24 DIAGNOSIS — I50.33 CONGESTIVE HEART FAILURE WITH LEFT VENTRICULAR DIASTOLIC DYSFUNCTION, ACUTE ON CHRONIC (MULTI): ICD-10-CM

## 2023-12-24 DIAGNOSIS — I48.11 LONGSTANDING PERSISTENT ATRIAL FIBRILLATION (MULTI): ICD-10-CM

## 2023-12-24 DIAGNOSIS — I48.0 PAROXYSMAL ATRIAL FIBRILLATION (MULTI): ICD-10-CM

## 2023-12-24 DIAGNOSIS — E78.00 HYPERCHOLESTEROLEMIA: ICD-10-CM

## 2023-12-24 DIAGNOSIS — M06.09 RHEUMATOID ARTHRITIS OF MULTIPLE SITES WITH NEGATIVE RHEUMATOID FACTOR (MULTI): ICD-10-CM

## 2023-12-24 DIAGNOSIS — M79.7 FIBROMYALGIA: ICD-10-CM

## 2023-12-24 DIAGNOSIS — A04.72 C. DIFFICILE COLITIS: ICD-10-CM

## 2023-12-24 LAB
ALBUMIN SERPL-MCNC: 3.8 G/DL (ref 3.5–5)
ALP BLD-CCNC: 98 U/L (ref 35–125)
ALT SERPL-CCNC: 14 U/L (ref 5–40)
ANION GAP SERPL CALC-SCNC: 17 MMOL/L
APPEARANCE UR: CLEAR
AST SERPL-CCNC: 21 U/L (ref 5–40)
BASOPHILS # BLD MANUAL: 0 X10*3/UL (ref 0–0.1)
BASOPHILS NFR BLD MANUAL: 0 %
BILIRUB SERPL-MCNC: 0.2 MG/DL (ref 0.1–1.2)
BILIRUB UR STRIP.AUTO-MCNC: NEGATIVE MG/DL
BUN SERPL-MCNC: 40 MG/DL (ref 8–25)
C DIF TOX TCDA+TCDB STL QL NAA+PROBE: DETECTED
CALCIUM SERPL-MCNC: 9.4 MG/DL (ref 8.5–10.4)
CHLORIDE SERPL-SCNC: 100 MMOL/L (ref 97–107)
CK SERPL-CCNC: 107 U/L (ref 24–195)
CO2 SERPL-SCNC: 22 MMOL/L (ref 24–31)
COLOR UR: ABNORMAL
CREAT SERPL-MCNC: 2.7 MG/DL (ref 0.4–1.6)
EOSINOPHIL # BLD MANUAL: 0 X10*3/UL (ref 0–0.4)
EOSINOPHIL NFR BLD MANUAL: 0 %
ERYTHROCYTE [DISTWIDTH] IN BLOOD BY AUTOMATED COUNT: 13.2 % (ref 11.5–14.5)
FLUAV RNA RESP QL NAA+PROBE: NOT DETECTED
FLUBV RNA RESP QL NAA+PROBE: NOT DETECTED
GFR SERPL CREATININE-BSD FRML MDRD: 17 ML/MIN/1.73M*2
GLUCOSE SERPL-MCNC: 130 MG/DL (ref 65–99)
GLUCOSE UR STRIP.AUTO-MCNC: ABNORMAL MG/DL
GRAN CASTS #/AREA UR COMP ASSIST: ABNORMAL /LPF
HCT VFR BLD AUTO: 36.1 % (ref 36–46)
HGB BLD-MCNC: 11.1 G/DL (ref 12–16)
HOLD SPECIMEN: NORMAL
HYALINE CASTS #/AREA URNS AUTO: ABNORMAL /LPF
IMM GRANULOCYTES # BLD AUTO: 0.35 X10*3/UL (ref 0–0.5)
IMM GRANULOCYTES NFR BLD AUTO: 0.8 % (ref 0–0.9)
KETONES UR STRIP.AUTO-MCNC: NEGATIVE MG/DL
LACTATE BLDV-SCNC: 1.4 MMOL/L (ref 0.4–2)
LEUKOCYTE ESTERASE UR QL STRIP.AUTO: NEGATIVE
LYMPHOCYTES # BLD MANUAL: 24.26 X10*3/UL (ref 0.8–3)
LYMPHOCYTES NFR BLD MANUAL: 55 %
MCH RBC QN AUTO: 31.4 PG (ref 26–34)
MCHC RBC AUTO-ENTMCNC: 30.7 G/DL (ref 32–36)
MCV RBC AUTO: 102 FL (ref 80–100)
MONOCYTES # BLD MANUAL: 0.88 X10*3/UL (ref 0.05–0.8)
MONOCYTES NFR BLD MANUAL: 2 %
MUCOUS THREADS #/AREA URNS AUTO: ABNORMAL /LPF
NEUTROPHILS # BLD MANUAL: 18.96 X10*3/UL (ref 1.6–5.5)
NEUTS BAND # BLD MANUAL: 0.44 X10*3/UL (ref 0–0.5)
NEUTS BAND NFR BLD MANUAL: 1 %
NEUTS SEG # BLD MANUAL: 18.52 X10*3/UL (ref 1.6–5)
NEUTS SEG NFR BLD MANUAL: 42 %
NITRITE UR QL STRIP.AUTO: NEGATIVE
NRBC BLD-RTO: 0 /100 WBCS (ref 0–0)
PH UR STRIP.AUTO: 5.5 [PH]
PLATELET # BLD AUTO: 299 X10*3/UL (ref 150–450)
POTASSIUM SERPL-SCNC: 3.5 MMOL/L (ref 3.4–5.1)
PROT SERPL-MCNC: 6.8 G/DL (ref 5.9–7.9)
PROT UR STRIP.AUTO-MCNC: ABNORMAL MG/DL
RBC # BLD AUTO: 3.53 X10*6/UL (ref 4–5.2)
RBC # UR STRIP.AUTO: ABNORMAL /UL
RBC #/AREA URNS AUTO: ABNORMAL /HPF
RBC MORPH BLD: ABNORMAL
SARS-COV-2 RNA RESP QL NAA+PROBE: NOT DETECTED
SODIUM SERPL-SCNC: 139 MMOL/L (ref 133–145)
SP GR UR STRIP.AUTO: 1.02
SQUAMOUS #/AREA URNS AUTO: ABNORMAL /HPF
TOTAL CELLS COUNTED BLD: 100
TSH SERPL DL<=0.05 MIU/L-ACNC: 12.17 MIU/L (ref 0.27–4.2)
UROBILINOGEN UR STRIP.AUTO-MCNC: NORMAL MG/DL
WBC # BLD AUTO: 44.1 X10*3/UL (ref 4.4–11.3)
WBC #/AREA URNS AUTO: ABNORMAL /HPF

## 2023-12-24 PROCEDURE — 87636 SARSCOV2 & INF A&B AMP PRB: CPT | Performed by: STUDENT IN AN ORGANIZED HEALTH CARE EDUCATION/TRAINING PROGRAM

## 2023-12-24 PROCEDURE — 85007 BL SMEAR W/DIFF WBC COUNT: CPT | Performed by: STUDENT IN AN ORGANIZED HEALTH CARE EDUCATION/TRAINING PROGRAM

## 2023-12-24 PROCEDURE — 85027 COMPLETE CBC AUTOMATED: CPT | Performed by: STUDENT IN AN ORGANIZED HEALTH CARE EDUCATION/TRAINING PROGRAM

## 2023-12-24 PROCEDURE — 87040 BLOOD CULTURE FOR BACTERIA: CPT | Mod: WESLAB | Performed by: STUDENT IN AN ORGANIZED HEALTH CARE EDUCATION/TRAINING PROGRAM

## 2023-12-24 PROCEDURE — 96365 THER/PROPH/DIAG IV INF INIT: CPT

## 2023-12-24 PROCEDURE — 93005 ELECTROCARDIOGRAM TRACING: CPT

## 2023-12-24 PROCEDURE — 2500000001 HC RX 250 WO HCPCS SELF ADMINISTERED DRUGS (ALT 637 FOR MEDICARE OP): Performed by: INTERNAL MEDICINE

## 2023-12-24 PROCEDURE — 71045 X-RAY EXAM CHEST 1 VIEW: CPT

## 2023-12-24 PROCEDURE — 76770 US EXAM ABDO BACK WALL COMP: CPT

## 2023-12-24 PROCEDURE — 87324 CLOSTRIDIUM AG IA: CPT | Mod: WESLAB | Performed by: INTERNAL MEDICINE

## 2023-12-24 PROCEDURE — 99221 1ST HOSP IP/OBS SF/LOW 40: CPT | Performed by: INTERNAL MEDICINE

## 2023-12-24 PROCEDURE — 99285 EMERGENCY DEPT VISIT HI MDM: CPT | Performed by: STUDENT IN AN ORGANIZED HEALTH CARE EDUCATION/TRAINING PROGRAM

## 2023-12-24 PROCEDURE — 83605 ASSAY OF LACTIC ACID: CPT | Performed by: STUDENT IN AN ORGANIZED HEALTH CARE EDUCATION/TRAINING PROGRAM

## 2023-12-24 PROCEDURE — 2500000004 HC RX 250 GENERAL PHARMACY W/ HCPCS (ALT 636 FOR OP/ED): Performed by: INTERNAL MEDICINE

## 2023-12-24 PROCEDURE — 36415 COLL VENOUS BLD VENIPUNCTURE: CPT | Performed by: STUDENT IN AN ORGANIZED HEALTH CARE EDUCATION/TRAINING PROGRAM

## 2023-12-24 PROCEDURE — 96374 THER/PROPH/DIAG INJ IV PUSH: CPT | Mod: 59

## 2023-12-24 PROCEDURE — 93010 ELECTROCARDIOGRAM REPORT: CPT | Performed by: INTERNAL MEDICINE

## 2023-12-24 PROCEDURE — 81001 URINALYSIS AUTO W/SCOPE: CPT | Performed by: STUDENT IN AN ORGANIZED HEALTH CARE EDUCATION/TRAINING PROGRAM

## 2023-12-24 PROCEDURE — 2500000004 HC RX 250 GENERAL PHARMACY W/ HCPCS (ALT 636 FOR OP/ED): Performed by: STUDENT IN AN ORGANIZED HEALTH CARE EDUCATION/TRAINING PROGRAM

## 2023-12-24 PROCEDURE — 82310 ASSAY OF CALCIUM: CPT | Performed by: STUDENT IN AN ORGANIZED HEALTH CARE EDUCATION/TRAINING PROGRAM

## 2023-12-24 PROCEDURE — 1200000002 HC GENERAL ROOM WITH TELEMETRY DAILY

## 2023-12-24 PROCEDURE — 84443 ASSAY THYROID STIM HORMONE: CPT | Performed by: INTERNAL MEDICINE

## 2023-12-24 PROCEDURE — 83735 ASSAY OF MAGNESIUM: CPT | Performed by: INTERNAL MEDICINE

## 2023-12-24 PROCEDURE — 87493 C DIFF AMPLIFIED PROBE: CPT | Performed by: INTERNAL MEDICINE

## 2023-12-24 PROCEDURE — 82550 ASSAY OF CK (CPK): CPT | Performed by: INTERNAL MEDICINE

## 2023-12-24 RX ORDER — SODIUM CHLORIDE 9 MG/ML
60 INJECTION, SOLUTION INTRAVENOUS CONTINUOUS
Status: DISCONTINUED | OUTPATIENT
Start: 2023-12-24 | End: 2023-12-29

## 2023-12-24 RX ORDER — SODIUM BICARBONATE 650 MG/1
650 TABLET ORAL 3 TIMES DAILY
Status: DISCONTINUED | OUTPATIENT
Start: 2023-12-24 | End: 2024-01-02 | Stop reason: HOSPADM

## 2023-12-24 RX ORDER — FOLIC ACID 1 MG/1
1 TABLET ORAL DAILY
Status: DISCONTINUED | OUTPATIENT
Start: 2023-12-24 | End: 2024-01-02 | Stop reason: HOSPADM

## 2023-12-24 RX ORDER — VANCOMYCIN HYDROCHLORIDE 125 MG/1
125 CAPSULE ORAL 4 TIMES DAILY
Status: DISCONTINUED | OUTPATIENT
Start: 2023-12-24 | End: 2024-01-02 | Stop reason: HOSPADM

## 2023-12-24 RX ORDER — VANCOMYCIN 2 G/400ML
2 INJECTION, SOLUTION INTRAVENOUS ONCE
Status: COMPLETED | OUTPATIENT
Start: 2023-12-24 | End: 2023-12-24

## 2023-12-24 RX ORDER — DULOXETIN HYDROCHLORIDE 20 MG/1
20 CAPSULE, DELAYED RELEASE ORAL DAILY
Status: DISCONTINUED | OUTPATIENT
Start: 2023-12-24 | End: 2024-01-02 | Stop reason: HOSPADM

## 2023-12-24 RX ORDER — LEVOTHYROXINE SODIUM 150 UG/1
150 TABLET ORAL DAILY
Status: DISCONTINUED | OUTPATIENT
Start: 2023-12-24 | End: 2024-01-02 | Stop reason: HOSPADM

## 2023-12-24 RX ORDER — METRONIDAZOLE 500 MG/1
500 TABLET ORAL EVERY 8 HOURS SCHEDULED
Status: DISCONTINUED | OUTPATIENT
Start: 2023-12-24 | End: 2023-12-24

## 2023-12-24 RX ORDER — ACETAMINOPHEN 650 MG/1
650 SUPPOSITORY RECTAL EVERY 4 HOURS PRN
Status: DISCONTINUED | OUTPATIENT
Start: 2023-12-24 | End: 2024-01-02 | Stop reason: HOSPADM

## 2023-12-24 RX ORDER — SIMVASTATIN 10 MG/1
10 TABLET, FILM COATED ORAL NIGHTLY
Status: DISCONTINUED | OUTPATIENT
Start: 2023-12-24 | End: 2024-01-02 | Stop reason: HOSPADM

## 2023-12-24 RX ORDER — GUAIFENESIN 600 MG/1
600 TABLET, EXTENDED RELEASE ORAL EVERY 12 HOURS PRN
Status: DISCONTINUED | OUTPATIENT
Start: 2023-12-24 | End: 2024-01-02 | Stop reason: HOSPADM

## 2023-12-24 RX ORDER — AMITRIPTYLINE HYDROCHLORIDE 10 MG/1
10 TABLET, FILM COATED ORAL NIGHTLY
Status: DISCONTINUED | OUTPATIENT
Start: 2023-12-24 | End: 2024-01-02 | Stop reason: HOSPADM

## 2023-12-24 RX ORDER — GUAIFENESIN/DEXTROMETHORPHAN 100-10MG/5
5 SYRUP ORAL EVERY 4 HOURS PRN
Status: DISCONTINUED | OUTPATIENT
Start: 2023-12-24 | End: 2024-01-02 | Stop reason: HOSPADM

## 2023-12-24 RX ORDER — HEPARIN SODIUM 5000 [USP'U]/ML
5000 INJECTION, SOLUTION INTRAVENOUS; SUBCUTANEOUS EVERY 8 HOURS
Status: DISCONTINUED | OUTPATIENT
Start: 2023-12-24 | End: 2023-12-30

## 2023-12-24 RX ORDER — L. ACIDOPHILUS/L.BULGARICUS 1MM CELL
1 TABLET ORAL
Status: DISCONTINUED | OUTPATIENT
Start: 2023-12-24 | End: 2024-01-02 | Stop reason: HOSPADM

## 2023-12-24 RX ORDER — PANTOPRAZOLE SODIUM 40 MG/1
40 TABLET, DELAYED RELEASE ORAL
Status: DISCONTINUED | OUTPATIENT
Start: 2023-12-24 | End: 2023-12-24

## 2023-12-24 RX ORDER — ACETAMINOPHEN 160 MG/5ML
650 SOLUTION ORAL EVERY 4 HOURS PRN
Status: DISCONTINUED | OUTPATIENT
Start: 2023-12-24 | End: 2024-01-02 | Stop reason: HOSPADM

## 2023-12-24 RX ORDER — POLYETHYLENE GLYCOL 3350 17 G/17G
17 POWDER, FOR SOLUTION ORAL DAILY PRN
Status: DISCONTINUED | OUTPATIENT
Start: 2023-12-24 | End: 2023-12-25

## 2023-12-24 RX ORDER — ACETAMINOPHEN 325 MG/1
650 TABLET ORAL EVERY 4 HOURS PRN
Status: DISCONTINUED | OUTPATIENT
Start: 2023-12-24 | End: 2024-01-02 | Stop reason: HOSPADM

## 2023-12-24 RX ORDER — GABAPENTIN 300 MG/1
300 CAPSULE ORAL 2 TIMES DAILY
Status: DISCONTINUED | OUTPATIENT
Start: 2023-12-24 | End: 2024-01-02 | Stop reason: HOSPADM

## 2023-12-24 RX ORDER — HYDRALAZINE HYDROCHLORIDE 10 MG/1
20 TABLET, FILM COATED ORAL 3 TIMES DAILY
Status: DISCONTINUED | OUTPATIENT
Start: 2023-12-24 | End: 2023-12-25

## 2023-12-24 RX ADMIN — VANCOMYCIN HYDROCHLORIDE 125 MG: 125 CAPSULE ORAL at 20:28

## 2023-12-24 RX ADMIN — DULOXETINE HYDROCHLORIDE 20 MG: 20 CAPSULE, DELAYED RELEASE ORAL at 09:52

## 2023-12-24 RX ADMIN — HYDRALAZINE HYDROCHLORIDE 20 MG: 10 TABLET ORAL at 20:28

## 2023-12-24 RX ADMIN — SIMVASTATIN 10 MG: 10 TABLET, FILM COATED ORAL at 20:28

## 2023-12-24 RX ADMIN — AMITRIPTYLINE HYDROCHLORIDE 10 MG: 10 TABLET, FILM COATED ORAL at 20:28

## 2023-12-24 RX ADMIN — LEVOTHYROXINE SODIUM 150 MCG: 0.15 TABLET ORAL at 07:14

## 2023-12-24 RX ADMIN — GABAPENTIN 300 MG: 300 CAPSULE ORAL at 09:52

## 2023-12-24 RX ADMIN — GABAPENTIN 300 MG: 300 CAPSULE ORAL at 20:28

## 2023-12-24 RX ADMIN — SODIUM CHLORIDE 1000 ML: 900 INJECTION, SOLUTION INTRAVENOUS at 04:37

## 2023-12-24 RX ADMIN — VANCOMYCIN 2 G: 2 INJECTION, SOLUTION INTRAVENOUS at 04:53

## 2023-12-24 RX ADMIN — HYDRALAZINE HYDROCHLORIDE 20 MG: 10 TABLET ORAL at 09:52

## 2023-12-24 RX ADMIN — HEPARIN SODIUM 5000 UNITS: 5000 INJECTION, SOLUTION INTRAVENOUS; SUBCUTANEOUS at 08:00

## 2023-12-24 RX ADMIN — FOLIC ACID 1 MG: 1 TABLET ORAL at 09:52

## 2023-12-24 RX ADMIN — SODIUM BICARBONATE 650 MG TABLET 650 MG: at 20:28

## 2023-12-24 RX ADMIN — SODIUM BICARBONATE 650 MG TABLET 650 MG: at 09:52

## 2023-12-24 RX ADMIN — SODIUM CHLORIDE 50 ML/HR: 900 INJECTION, SOLUTION INTRAVENOUS at 15:14

## 2023-12-24 RX ADMIN — Medication 1 TABLET: at 07:14

## 2023-12-24 RX ADMIN — PIPERACILLIN SODIUM AND TAZOBACTAM SODIUM 4.5 G: 4; .5 INJECTION, SOLUTION INTRAVENOUS at 04:53

## 2023-12-24 RX ADMIN — PANTOPRAZOLE SODIUM 40 MG: 40 TABLET, DELAYED RELEASE ORAL at 07:14

## 2023-12-24 SDOH — SOCIAL STABILITY: SOCIAL INSECURITY: DO YOU FEEL ANYONE HAS EXPLOITED OR TAKEN ADVANTAGE OF YOU FINANCIALLY OR OF YOUR PERSONAL PROPERTY?: NO

## 2023-12-24 SDOH — HEALTH STABILITY: MENTAL HEALTH: HOW OFTEN DO YOU HAVE 6 OR MORE DRINKS ON ONE OCCASION?: NEVER

## 2023-12-24 SDOH — SOCIAL STABILITY: SOCIAL INSECURITY: HAS ANYONE EVER THREATENED TO HURT YOUR FAMILY OR YOUR PETS?: NO

## 2023-12-24 SDOH — SOCIAL STABILITY: SOCIAL INSECURITY: ABUSE: ADULT

## 2023-12-24 SDOH — SOCIAL STABILITY: SOCIAL INSECURITY: DOES ANYONE TRY TO KEEP YOU FROM HAVING/CONTACTING OTHER FRIENDS OR DOING THINGS OUTSIDE YOUR HOME?: NO

## 2023-12-24 SDOH — SOCIAL STABILITY: SOCIAL INSECURITY: HAVE YOU HAD THOUGHTS OF HARMING ANYONE ELSE?: YES

## 2023-12-24 SDOH — SOCIAL STABILITY: SOCIAL INSECURITY: DO YOU FEEL UNSAFE GOING BACK TO THE PLACE WHERE YOU ARE LIVING?: NO

## 2023-12-24 SDOH — HEALTH STABILITY: MENTAL HEALTH: HOW OFTEN DO YOU HAVE A DRINK CONTAINING ALCOHOL?: NEVER

## 2023-12-24 SDOH — SOCIAL STABILITY: SOCIAL INSECURITY: WERE YOU ABLE TO COMPLETE ALL THE BEHAVIORAL HEALTH SCREENINGS?: YES

## 2023-12-24 SDOH — SOCIAL STABILITY: SOCIAL INSECURITY: ARE THERE ANY APPARENT SIGNS OF INJURIES/BEHAVIORS THAT COULD BE RELATED TO ABUSE/NEGLECT?: NO

## 2023-12-24 SDOH — HEALTH STABILITY: MENTAL HEALTH: HOW MANY STANDARD DRINKS CONTAINING ALCOHOL DO YOU HAVE ON A TYPICAL DAY?: PATIENT DOES NOT DRINK

## 2023-12-24 SDOH — SOCIAL STABILITY: SOCIAL INSECURITY: ARE YOU OR HAVE YOU BEEN THREATENED OR ABUSED PHYSICALLY, EMOTIONALLY, OR SEXUALLY BY ANYONE?: NO

## 2023-12-24 ASSESSMENT — ACTIVITIES OF DAILY LIVING (ADL)
BATHING: INDEPENDENT
HEARING - LEFT EAR: HEARING AID
BATHING: INDEPENDENT
GROOMING: INDEPENDENT
DRESSING YOURSELF: INDEPENDENT
WALKS IN HOME: INDEPENDENT
LACK_OF_TRANSPORTATION: NO
ADEQUATE_TO_COMPLETE_ADL: YES
JUDGMENT_ADEQUATE_SAFELY_COMPLETE_DAILY_ACTIVITIES: YES
FEEDING YOURSELF: INDEPENDENT
FEEDING YOURSELF: INDEPENDENT
WALKS IN HOME: INDEPENDENT
HEARING - LEFT EAR: HEARING AID
ASSISTIVE_DEVICE: HEARING AID - RIGHT
TOILETING: INDEPENDENT
HEARING - RIGHT EAR: HEARING AID
PATIENT'S MEMORY ADEQUATE TO SAFELY COMPLETE DAILY ACTIVITIES?: YES
ASSISTIVE_DEVICE: HEARING AID - RIGHT
TOILETING: INDEPENDENT
HEARING - RIGHT EAR: HEARING AID

## 2023-12-24 ASSESSMENT — PAIN SCALES - GENERAL
PAINLEVEL_OUTOF10: 8
PAINLEVEL_OUTOF10: 0 - NO PAIN

## 2023-12-24 ASSESSMENT — LIFESTYLE VARIABLES
AUDIT-C TOTAL SCORE: 0
EVER FELT BAD OR GUILTY ABOUT YOUR DRINKING: NO
SUBSTANCE_ABUSE_PAST_12_MONTHS: NO
HOW OFTEN DO YOU HAVE 6 OR MORE DRINKS ON ONE OCCASION: NEVER
HOW OFTEN DO YOU HAVE A DRINK CONTAINING ALCOHOL: NEVER
EVER HAD A DRINK FIRST THING IN THE MORNING TO STEADY YOUR NERVES TO GET RID OF A HANGOVER: NO
SKIP TO QUESTIONS 9-10: 1
PRESCIPTION_ABUSE_PAST_12_MONTHS: NO
HOW MANY STANDARD DRINKS CONTAINING ALCOHOL DO YOU HAVE ON A TYPICAL DAY: PATIENT DOES NOT DRINK
AUDIT-C TOTAL SCORE: 0
SKIP TO QUESTIONS 9-10: 1
HAVE YOU EVER FELT YOU SHOULD CUT DOWN ON YOUR DRINKING: NO
AUDIT-C TOTAL SCORE: 0
HAVE PEOPLE ANNOYED YOU BY CRITICIZING YOUR DRINKING: NO
REASON UNABLE TO ASSESS: NO

## 2023-12-24 ASSESSMENT — COLUMBIA-SUICIDE SEVERITY RATING SCALE - C-SSRS
2. HAVE YOU ACTUALLY HAD ANY THOUGHTS OF KILLING YOURSELF?: NO
1. IN THE PAST MONTH, HAVE YOU WISHED YOU WERE DEAD OR WISHED YOU COULD GO TO SLEEP AND NOT WAKE UP?: NO
6. HAVE YOU EVER DONE ANYTHING, STARTED TO DO ANYTHING, OR PREPARED TO DO ANYTHING TO END YOUR LIFE?: NO

## 2023-12-24 ASSESSMENT — PAIN - FUNCTIONAL ASSESSMENT
PAIN_FUNCTIONAL_ASSESSMENT: 0-10

## 2023-12-24 ASSESSMENT — COGNITIVE AND FUNCTIONAL STATUS - GENERAL
TURNING FROM BACK TO SIDE WHILE IN FLAT BAD: A LITTLE
DRESSING REGULAR UPPER BODY CLOTHING: A LITTLE
PATIENT BASELINE BEDBOUND: NO
DAILY ACTIVITIY SCORE: 19
STANDING UP FROM CHAIR USING ARMS: A LITTLE
MOVING FROM LYING ON BACK TO SITTING ON SIDE OF FLAT BED WITH BEDRAILS: A LITTLE
HELP NEEDED FOR BATHING: A LITTLE
PERSONAL GROOMING: A LITTLE
CLIMB 3 TO 5 STEPS WITH RAILING: A LOT
WALKING IN HOSPITAL ROOM: A LOT
DRESSING REGULAR LOWER BODY CLOTHING: A LITTLE
MOVING TO AND FROM BED TO CHAIR: A LITTLE
MOBILITY SCORE: 16
TOILETING: A LITTLE

## 2023-12-24 ASSESSMENT — PAIN DESCRIPTION - PROGRESSION: CLINICAL_PROGRESSION: GRADUALLY IMPROVING

## 2023-12-24 NOTE — ED PROVIDER NOTES
HPI   Chief Complaint   Patient presents with    Weakness, Gen       83-year-old female presents with weakness.  Patient states that she stood up to ambulate and was unable to as her legs gave out from underneath her.  Did not lose consciousness, did not hit her head.  She denies chest pain, abdominal pain, nausea, vomiting, diarrhea.  No recent fevers or chills.  Patient was recently admitted to the hospital earlier this month for CHF exacerbation.                          No data recorded                Patient History   Past Medical History:   Diagnosis Date    Asthma     Essential (primary) hypertension     Hypertension    Kidney failure     Leukemia (CMS/HCC)     Rheumatoid arthritis (CMS/HCC)      Past Surgical History:   Procedure Laterality Date    BACK SURGERY      Back Surgery    CHOLECYSTECTOMY      Cholecystectomy    HYSTERECTOMY      Hysterectomy    KNEE SURGERY      arthroscopic surgery?    OTHER SURGICAL HISTORY      Shoulder Surgery Left    SHOULDER SURGERY Left     Left shoulder impingement surgery    TOTAL KNEE ARTHROPLASTY Left 02/13/2014     Family History   Problem Relation Name Age of Onset    Emphysema Mother      Emphysema Father      Heart attack Sister      Lung cancer Brother          Agent orange    Lymphoma Son      Multiple sclerosis Son       Social History     Tobacco Use    Smoking status: Never    Smokeless tobacco: Never   Vaping Use    Vaping Use: Never used   Substance Use Topics    Alcohol use: Not Currently    Drug use: Never       Physical Exam   ED Triage Vitals   Temp Heart Rate Resp BP   12/24/23 0317 12/24/23 0317 12/24/23 0317 12/24/23 0315   37.9 °C (100.2 °F) 67 16 (!) 124/46      SpO2 Temp Source Heart Rate Source Patient Position   12/24/23 0315 12/24/23 0317 -- --   94 % Oral        BP Location FiO2 (%)     -- --             Physical Exam  Vitals and nursing note reviewed.   Constitutional:       General: She is not in acute distress.     Appearance: She is not  ill-appearing.   HENT:      Head: Normocephalic and atraumatic.      Mouth/Throat:      Mouth: Mucous membranes are moist.      Pharynx: Oropharynx is clear.   Eyes:      Extraocular Movements: Extraocular movements intact.      Conjunctiva/sclera: Conjunctivae normal.      Pupils: Pupils are equal, round, and reactive to light.   Cardiovascular:      Rate and Rhythm: Normal rate and regular rhythm.   Pulmonary:      Effort: Pulmonary effort is normal. No respiratory distress.      Breath sounds: Normal breath sounds.   Abdominal:      Palpations: Abdomen is soft.      Tenderness: There is no abdominal tenderness. There is no guarding or rebound.   Musculoskeletal:         General: No swelling or deformity. Normal range of motion.      Cervical back: Normal range of motion and neck supple.   Skin:     General: Skin is warm and dry.      Capillary Refill: Capillary refill takes less than 2 seconds.   Neurological:      General: No focal deficit present.      Mental Status: She is alert and oriented to person, place, and time. Mental status is at baseline.      Cranial Nerves: Cranial nerves 2-12 are intact.      Sensory: Sensation is intact.      Motor: Motor function is intact.      Comments: Unable to assess gait as patient is too weak to walk.   Psychiatric:         Mood and Affect: Mood normal.         Behavior: Behavior normal.         ED Course & MDM   ED Course as of 12/24/23 0627   Sun Dec 24, 2023   0318 Performed at  0316, HR of 67, NSR, NAD, QTc 393, no sign of STEMI or NSTEMI, no Q wave or T wave abnormality noted.    Reviewed and interpreted by me at time performed   []   0442 WBC(!): 44.1  Hx of CLL, chronically elevated [JM]   0442 POCT Lactate, Venous: 1.4 [JM]   0509 Creatinine(!): 2.70  baseline []      ED Course User Index  [] Hoa Parker MD         Diagnoses as of 12/24/23 0627   Weakness       Medical Decision Making  83 y.o. female presents with weakness, most suspicious of  infectious etiology given fever, leukocytosis. I have considered the following conditions in my assessment of this patient's weakness: Stroke, TIA, electrolyte abnormalities (hypo/hyperkalemia, hypo/hypernatremia, hypo/hyperglycemia, hypomagnesemia, hypo/hypercalcemia), volume depletion, anemia (due to iron deficiency, GIB or other blood loss, Sickle Cell Disease, chronic renal failure, hemolysis, anemia of chronic disease), metabolic causes (hypo/hyperthyroidism, Cushings Syndrome, [place]'s Disease, DKA, Hyperosmolar hyperglycemic state), infectious etiology (sepsis, pneumonia, UTI, Botulism, infectious mononucleosis).  Patient unable to ambulate here in the emergency department secondary to weakness.  Patient noted to have elevated leukocytosis from her baseline, however has chronic elevated white blood cells secondary to history of CLL.  Patient covered with antibiotics.  At this time source is unclear of infectious etiology.  Patient admitted to hospitalist for further management.        Procedure  Procedures     Hoa Parker MD  12/24/23 8490

## 2023-12-24 NOTE — CONSULTS
Reason For Consult  Acute kidney injury on chronic kidney disease    History Of Present Illness  Tracie Baltazar is a 83 y.o. female with a past medical history of chronic kidney disease stage III, CLL, hypertension who presented to the hospital with weakness in her legs, was unable to get up.  She denies any syncope.  She was found have acute kidney injury.  She also reports poor p.o. intake and has been having diarrhea.  She tested positive for C. difficile.  Notably she was recently admitted to the hospital with acute kidney injury on chronic kidney disease which recovered.  We are consulted for management of acute kidney injury on chronic kidney disease.     Past Medical History  She has a past medical history of Essential (primary) hypertension (05/15/2018).  Chronic kidney disease stage III/IV  Congestive heart failure    Surgical History  She has a past surgical history that includes Hysterectomy; Back surgery; Cholecystectomy; Other surgical history; Knee surgery; Shoulder surgery (Left); and Total knee arthroplasty (Left, 02/13/2014).     Social History  She reports that she has never smoked. She has never used smokeless tobacco. She reports that she does not currently use alcohol. She reports that she does not use drugs.    Family History  Family History   Problem Relation Name Age of Onset    Emphysema Mother      Emphysema Father      Heart attack Sister      Lung cancer Brother          Agent orange    Lymphoma Son      Multiple sclerosis Son          Allergies  Ibuprofen    Review of Systems  10 point review of systems was obtained and is negative other than what is indicated above in the HPI     Physical Exam  General: Awake, alert, no acute distress  Head/ears/nose/throat:  Normocephalic, dry mucous membranes  Neck:  No apparent jugular venous distention, neck supple  Respiratory:  Clear to auscultation bilaterally, normal respiratory effort  Cardiovascular:  S1 and S2, no rubs  Gastrointestinal:   "Soft, positive bowel sounds, no rebound or guarding  Extremities:  No edema, cyanosis  Musculoskeletal:  No injury or deformity noted  Neurologic:  Alert, responsive, cooperative with exam  Skin:  No visible ulcers noted, dry          I&O 24HR    Intake/Output Summary (Last 24 hours) at 12/24/2023 1742  Last data filed at 12/24/2023 1202  Gross per 24 hour   Intake 850 ml   Output 200 ml   Net 650 ml       Vitals 24HR  Heart Rate:  [66-84]   Temp:  [36.7 °C (98.1 °F)-38.4 °C (101.1 °F)]   Resp:  [15-24]   BP: (109-138)/(39-75)   Height:  [160 cm (5' 2.99\")-160 cm (5' 3\")]   Weight:  [83.6 kg (184 lb 4.9 oz)]   SpO2:  [90 %-98 %]       Relevant Results  Results for orders placed or performed during the hospital encounter of 12/24/23 (from the past 24 hour(s))   CBC and Auto Differential   Result Value Ref Range    WBC 44.1 (H) 4.4 - 11.3 x10*3/uL    nRBC 0.0 0.0 - 0.0 /100 WBCs    RBC 3.53 (L) 4.00 - 5.20 x10*6/uL    Hemoglobin 11.1 (L) 12.0 - 16.0 g/dL    Hematocrit 36.1 36.0 - 46.0 %     (H) 80 - 100 fL    MCH 31.4 26.0 - 34.0 pg    MCHC 30.7 (L) 32.0 - 36.0 g/dL    RDW 13.2 11.5 - 14.5 %    Platelets 299 150 - 450 x10*3/uL    Immature Granulocytes %, Automated 0.8 0.0 - 0.9 %    Immature Granulocytes Absolute, Automated 0.35 0.00 - 0.50 x10*3/uL   Comprehensive Metabolic Panel   Result Value Ref Range    Glucose 130 (H) 65 - 99 mg/dL    Sodium 139 133 - 145 mmol/L    Potassium 3.5 3.4 - 5.1 mmol/L    Chloride 100 97 - 107 mmol/L    Bicarbonate 22 (L) 24 - 31 mmol/L    Urea Nitrogen 40 (H) 8 - 25 mg/dL    Creatinine 2.70 (H) 0.40 - 1.60 mg/dL    eGFR 17 (L) >60 mL/min/1.73m*2    Calcium 9.4 8.5 - 10.4 mg/dL    Albumin 3.8 3.5 - 5.0 g/dL    Alkaline Phosphatase 98 35 - 125 U/L    Total Protein 6.8 5.9 - 7.9 g/dL    AST 21 5 - 40 U/L    Bilirubin, Total 0.2 0.1 - 1.2 mg/dL    ALT 14 5 - 40 U/L    Anion Gap 17 <=19 mmol/L   Blood Gas Lactic Acid, Venous   Result Value Ref Range    POCT Lactate, Venous 1.4 0.4 - " 2.0 mmol/L   Urinalysis with Reflex Culture and Microscopic   Result Value Ref Range    Color, Urine Light-Yellow Light-Yellow, Yellow, Dark-Yellow    Appearance, Urine Clear Clear    Specific Gravity, Urine 1.017 1.005 - 1.035    pH, Urine 5.5 5.0, 5.5, 6.0, 6.5, 7.0, 7.5, 8.0    Protein, Urine 100 (2+) (A) NEGATIVE, 10 (TRACE), 20 (TRACE) mg/dL    Glucose, Urine 50 (TRACE) (A) Normal mg/dL    Blood, Urine 0.03 (TRACE) (A) NEGATIVE    Ketones, Urine NEGATIVE NEGATIVE mg/dL    Bilirubin, Urine NEGATIVE NEGATIVE    Urobilinogen, Urine Normal Normal mg/dL    Nitrite, Urine NEGATIVE NEGATIVE    Leukocyte Esterase, Urine NEGATIVE NEGATIVE   Extra Urine Gray Tube   Result Value Ref Range    Extra Tube Hold for add-ons.    Urinalysis Microscopic   Result Value Ref Range    WBC, Urine 1-5 1-5, NONE /HPF    RBC, Urine 3-5 NONE, 1-2, 3-5 /HPF    Squamous Epithelial Cells, Urine 1-9 (SPARSE) Reference range not established. /HPF    Mucus, Urine FEW Reference range not established. /LPF    Hyaline Casts, Urine 2+ (A) NONE /LPF    Fine Granular Casts, Urine 2+ (A) NONE /LPF   Manual Differential   Result Value Ref Range    Neutrophils %, Manual 42.0 40.0 - 80.0 %    Bands %, Manual 1.0 0.0 - 5.0 %    Lymphocytes %, Manual 55.0 13.0 - 44.0 %    Monocytes %, Manual 2.0 2.0 - 10.0 %    Eosinophils %, Manual 0.0 0.0 - 6.0 %    Basophils %, Manual 0.0 0.0 - 2.0 %    Seg Neutrophils Absolute, Manual 18.52 (H) 1.60 - 5.00 x10*3/uL    Bands Absolute, Manual 0.44 0.00 - 0.50 x10*3/uL    Lymphocytes Absolute, Manual 24.26 (H) 0.80 - 3.00 x10*3/uL    Monocytes Absolute, Manual 0.88 (H) 0.05 - 0.80 x10*3/uL    Eosinophils Absolute, Manual 0.00 0.00 - 0.40 x10*3/uL    Basophils Absolute, Manual 0.00 0.00 - 0.10 x10*3/uL    Total Cells Counted 100     Neutrophils Absolute, Manual 18.96 (H) 1.60 - 5.50 x10*3/uL    RBC Morphology No significant RBC morphology present    Creatine Kinase   Result Value Ref Range    Creatine Kinase 107 24 - 195  U/L   TSH   Result Value Ref Range    Thyroid Stimulating Hormone 12.17 (H) 0.27 - 4.20 mIU/L   Sars-CoV-2 and Influenza A/B PCR   Result Value Ref Range    Flu A Result Not Detected Not Detected    Flu B Result Not Detected Not Detected    Coronavirus 2019, PCR Not Detected Not Detected   C. difficile, PCR    Specimen: Stool   Result Value Ref Range    C. difficile, PCR Detected (A) Not Detected          Assessment/Plan   1.  Acute kidney injury  - Likely from volume depletion in the setting of poor p.o. intake and diarrhea, may have acute tubular necrosis  2.  Chronic kidney disease stage III  - Baseline Creatinine 1.6-1.9  3.  C. difficile  4.  CLL  5.  Hypertension    Plan: I agree with holding her diuretics and ARB.  I agree with gentle IV fluids will increase the rate to 60 cc an hour, caution with IVF given Hx of CHF.  Check a renal ultrasound, postvoid bladder scan, urine protein to creatinine ratio.  Hematology on consult.  Thank you for your consultation    Darnell Amor MD

## 2023-12-24 NOTE — PROGRESS NOTES
Patient was seen and examined chart was reviewed.  Patient was admitted after midnight on 12/24/2023 for JANET, worsening deconditioning and impaired mobility as well as possible CHF exacerbation.  Exam unchanged from physical exam on Dr. Perez's H&P.  Lasix on hold due to worsening JANET.  Cardiology and nephrology consulted and repeat labs in the morning.  See physician orders and further recommendations to follow.

## 2023-12-24 NOTE — CARE PLAN
The patient's goals for the shift include FEEL BETTER    The clinical goals for the shift include GO HOME    Over the shift, the patient did not make progress toward the following goals. Barriers to progression include PT/OT. Recommendations to address these barriers include UNABLE TO AMBULATE.

## 2023-12-24 NOTE — ED TRIAGE NOTES
Patient arrives for evaluation of weakness. Patient had sudden weakness in her legs while walking to bathroom. Patient stated she had to sit on the floor for an hour until her son found her. Patient denies hitting her head.

## 2023-12-24 NOTE — NURSING NOTE
DR VILCHIS WANTED NS TO WAIT TIL NEPHROLOGY SAW PT DUE TO FLD RESTRICTION   75 y/o F With PMH of CKD, HTN, DM, HLD presents for right lower back pain x 4 days, worse with movements. Pt reports she was carrying heavy groceries when her pain started. She denies CP, SOB, urinary sympts, f/c, n/v or h/o kidney stones. Pt reports since the pain started, every time she sits to have a BM she gets a spasm in her back so she had not had a BM in 3 days.

## 2023-12-24 NOTE — PROGRESS NOTES
Tracie Baltazar is a 83 y.o. female on day 0 of admission presenting with Weakness.    Patient was discharged from [facility] 12/192023 with new dx CHF. She was to start [facility] services 12/28/2023. She is being followed by heart failure educator Tamra. She is active with [facility] - Lakia Le[facility].  She presents today to [facility] after a fall at home d/t increasing weakness of her legs.  RNCC met with patient and her family. They are agreeable to SNF placement for rehab. First choice is [facility] [place] [facility]. RNCC provided Medicare list of facility from [facility] for more choices.   Will need H&P, PT/OT notes, med rec, precert, 7000, and transportation.       Angelina Adkins RN

## 2023-12-24 NOTE — NURSING NOTE
Reeducated son on c diff precautions and hand washing with soap and water. Son stated he had done this before. Son had walked out of room and down to waiting room with gloves and gown after being with pt.

## 2023-12-24 NOTE — CONSULTS
"Reason For Consult  CLL    History Of Present Illness  Tracie Baltazar is a 83 y.o. female presenting with weakness, fell down at home , brought in by family via 911.    Past Medical History  She has a past medical history of Asthma, Essential (primary) hypertension, Kidney failure, Leukemia (CMS/HCC), and Rheumatoid arthritis (CMS/HCC).    Surgical History  She has a past surgical history that includes Hysterectomy; Back surgery; Cholecystectomy; Other surgical history; Knee surgery; Shoulder surgery (Left); and Total knee arthroplasty (Left, 02/13/2014).     Social History  She reports that she has never smoked. She has never used smokeless tobacco. She reports that she does not currently use alcohol. She reports that she does not use drugs.    Family History  Family History   Problem Relation Name Age of Onset    Emphysema Mother      Emphysema Father      Heart attack Sister      Lung cancer Brother          Agent orange    Lymphoma Son      Multiple sclerosis Son          Allergies  Ibuprofen    Review of Systems       Physical Exam  Elderly white woman resting on bed  Eyes: PERRL, EOMI,   ENMT: mucous membranes moist  Head/Neck: Neck supple, No JVD,   Respiratory/Thorax: Patent airways, CTAB,   Cardiovascular: Regular, rate and rhythm, no murmurs  Gastrointestinal: Soft, non-distended, +BS.  Musculoskeletal: ROM intact, no joint swelling, normal strength  Extremities: peripheral pulses intact; minimal edema  Neurological: Alert and Oriented x 3; no focal deficits; gross motor and sensation intact; CN II-XII intact. No asterixis.  Psychological: Appropriate mood and behavior  Skin: No lesions, No rashes.     Last Recorded Vitals  Blood pressure 120/75, pulse 72, temperature 36.7 °C (98.1 °F), temperature source Oral, resp. rate 16, height 1.6 m (5' 2.99\"), weight 83.6 kg (184 lb 4.9 oz), SpO2 93 %.    Relevant Results       Assessment/Plan     CLL    I don't believe her weakness is due to her CLL. Will " continue to monitor her CBC for now.       I spent 20 minutes in the professional and overall care of this patient.      Jennifer Ferreira MD

## 2023-12-24 NOTE — CARE PLAN
The patient's goals for the shift include FEEL BETTER    The clinical goals for the shift include GO HOME    Over the shift, the patient did not make progress toward the following goals. Barriers to progression include c diff positive. Recommendations to address these barriers include oral antibiotics.

## 2023-12-24 NOTE — H&P
History Of Present Illness      Tracie Baltazar is a 83 y.o. female presenting with Weakness.         Tracie Baltazar is a 83 y.o. female presenting with a history of hypertension, hypothyroidism, IBS, chronic kidney disease stage IV, CLL.      Patient arrives for evaluation of weakness. Patient had sudden weakness in her legs while walking to bathroom. Patient stated she had to sit on the floor for an hour until her son found her. Patient denies hitting her head.       Today's ED diagnostic workup ordered for a increase in WBC count from 29.3-44.1.  Patient's H&H is 11.1/36.1.  Blood count is stable at 299.  Blood chemistry noted for elevated glucose of 130.  Bicarbonate level is low at 22.  Creatinine is increased to 2.7 from 2.2.  Urinalysis has little bit of blood and glucose.  No evidence for UTI.  Chest x-ray official reading still pending but possibly minimal congestion compared to last chest x-ray.      Appears patient was newly discharged from [facility] on December 19, 2023.      She presented at that time with what sounded like a presyncopal episode.  Developed weakness and felt like she was going to faint.  Brought to the ED.  NIH was 0.  Subsequently admitted December 14, 2023 for acute CHF exacerbation and JANET.  Given IV Lasix.  Seen by nephrology and by cardiology.  Found to have an LVEF of 60 to 65%.  Her oral Lasix and Cozaar were held initially for JANET.  Noted to have labile blood pressures.  Her Synthroid was increased from 137 mcg to 150.  She was discharged on oral Lasix started December 21st. Her hydralazine was decreased 25 mg p.o. 3 times daily to 20 mg p.o. 3 times daily.  Discharged in sodium bicarbonate 650 mg p.o. Q8 hourly.    She was seen by abimbola on 12/21/2023.  She was evaluated by Lakia Leyva CNP.  Patient was at her baseline then.  She was glad to be back home.  She stated that she still having difficulty walking around the house.  It was noted that she  declined skilled nursing facility last hospitalization.  He was deemed to be homebound.    Home Visit: Medically necessary due to: dementia/cognitive impairment, unsteady gait/poor balance, shortness of breath with minimal exertion, Illness or condition that results in activity limitation or restriction that impacts the ability to leave home such as: equipment and /or human assistance needed to safely leave the home, patient has limited support systems to help the patient attend office visits, the office visit would require excessive physical effort or pain for the patient.        Past Medical History      Past Medical History:   Diagnosis Date    Asthma     Essential (primary) hypertension     Hypertension    Kidney failure     Leukemia (CMS/HCC)     Rheumatoid arthritis (CMS/HCC)          Surgical History        Past Surgical History:   Procedure Laterality Date    BACK SURGERY      Back Surgery    CHOLECYSTECTOMY      Cholecystectomy    HYSTERECTOMY      Hysterectomy    KNEE SURGERY      arthroscopic surgery?    OTHER SURGICAL HISTORY      Shoulder Surgery Left    SHOULDER SURGERY Left     Left shoulder impingement surgery    TOTAL KNEE ARTHROPLASTY Left 02/13/2014          Social History    She reports that she has never smoked. She has never used smokeless tobacco. She reports that she does not currently use alcohol. She reports that she does not use drugs.      Family History      Family History   Problem Relation Name Age of Onset    Emphysema Mother      Emphysema Father      Heart attack Sister      Lung cancer Brother          Agent orange    Lymphoma Son      Multiple sclerosis Son            Allergies      Ibuprofen      Review of Systems    14-point ROS otherwise negative, as per HPI/Interval History.    General: No change in weight. Yes weakenss. No Fevers/Chills/Night Sweats   Skin: No skin/hair/nail changes. No rashes or sores.  Head:  No trauma. No Headache/nasuea/vomitting.   Eyes: No visual  "changes. No tearing. No itching.   Ears: No hearing loss. No tinnitus. No vertigo. No discharge.  Nose, Sinuses: No rhinorrhea, No nasal congestion. No epistaxis.  Mouth, Throat, Neck: No bleeding gums, hoarseness, sore throat or swollen neck  Cardiac: No palpitations. No MANCILLA. No PND. No Orthopnea.   Respiratory: No Shortness of Breath. No wheezing. No cough. No hemoptysis.   GI: No nausea/vomiting. No indigestion. No diarrhea. No constipation.   Extremities: No numbness or tingling. No paresthesias.   Urinary: No change in urinary frequency. No change in hesitancy. No hematuria. No incontinence.       Physical Exam        Constitutional:  Pleasant  Eyes: PERRL, EOMI,   ENMT: mucous membranes moist  Head/Neck: Neck supple, No JVD,   Respiratory/Thorax: Patent airways, CTAB,   Cardiovascular: Regular, rate and rhythm, no murmurs  Gastrointestinal: Soft, non-distended, +BS.  Musculoskeletal: ROM intact, no joint swelling, normal strength  Extremities: peripheral pulses intact; minimal edema  Neurological: Alert and Oriented x 3; no focal deficits; gross motor and sensation intact; CN II-XII intact. No asterixis.  Psychological: Appropriate mood and behavior  Skin: No lesions, No rashes.         Last Recorded Vitals  Blood pressure (!) 131/45, pulse 77, temperature 37.7 °C (99.8 °F), temperature source Oral, resp. rate 15, height 1.6 m (5' 3\"), weight 83.6 kg (184 lb 4.9 oz), SpO2 93 %.    Relevant Results    Lab Results   Component Value Date    WBC 44.1 (H) 12/24/2023    HGB 11.1 (L) 12/24/2023    HCT 36.1 12/24/2023     (H) 12/24/2023     12/24/2023       Lab Results   Component Value Date    GLUCOSE 130 (H) 12/24/2023    CALCIUM 9.4 12/24/2023     12/24/2023    K 3.5 12/24/2023    CO2 22 (L) 12/24/2023     12/24/2023    BUN 40 (H) 12/24/2023    CREATININE 2.70 (H) 12/24/2023       Lab Results   Component Value Date    HGBA1C 5.4 12/19/2019         CT head wo IV contrast    Result Date: " 12/14/2023  Interpreted By:  Rashawn Hook, STUDY: CT HEAD WO IV CONTRAST; 12/14/2023 10:34 am   INDICATION: Signs/Symptoms:lightheadedness, near syncope.   COMPARISON: No comparison exams available.   ACCESSION NUMBER(S): [id number]   ORDERING CLINICIAN: SLY DAWKINS   TECHNIQUE: CT axial images through the Brain were obtained without contrast.   FINDINGS: Acute ischemic change: None.   Hemorrhage: No evidence of acute intracranial hemorrhage.   Mass Effect / Mass Lesion: No significant mass effect. There is no evidence of an intracranial mass or extraaxial fluid collection.   Chronic ischemic change: Patchy foci of  low attenuation coefficient are present within white matter which is a nonspecific finding but likely represents moderate microvascular ischemia.   Parenchyma: There is no significant volume loss. The brain parenchyma is otherwise within normal limits for age.   Ventricles: Normal caliber and morphology.   Other: There is calcification involving the carotid siphons.       No acute intracranial process   All CT examinations are performed with 1 or more of the following dose reduction techniques: Automated exposure control, adjustment of mA and/or kv according to patient's size, or use of iterative reconstruction techniques.   MACRO: none   Signed by: Rashawn Hook 12/14/2023 10:48 AM Dictation workstation:   [id number]       Scheduled medications  amitriptyline, 10 mg, oral, Nightly  DULoxetine, 20 mg, oral, Daily  folic acid, 1 mg, oral, Daily  gabapentin, 300 mg, oral, BID  heparin (porcine), 5,000 Units, subcutaneous, q8h  hydrALAZINE, 20 mg, oral, TID  lactobacillus acidophilus, 1 tablet, oral, BID  levothyroxine, 150 mcg, oral, Daily  pantoprazole, 40 mg, oral, Daily before breakfast  simvastatin, 10 mg, oral, Nightly  sodium bicarbonate, 650 mg, oral, TID      Continuous medications     PRN medications  PRN medications: benzocaine-menthol, dextromethorphan-guaifenesin, guaiFENesin,  polyethylene glycol        Assessment/Plan   Principal Problem:    Weakness  Active Problems:    Dyslipidemia    Chronic kidney disease, stage 4 (severe) (CMS/HCC)    Fibromyalgia    Chronic lymphocytic leukemia (CMS/HCC)    Benign essential hypertension    Congestive heart failure with left ventricular diastolic dysfunction, acute on chronic (CMS/HCC)        Tracie Baltazar is a 83 y.o. female presenting with Weakness.  Patient admitted for further evaluation and management.            General weakness  w/ Deconditioning and Impaired Mobility      Admit to [facility]  Patient will need to strongly reconsider SNF placement this time around. She has been established w/ [facility]   Patient currently complaining of a lot of joint pains  We will order acetaminophen as needed  Follow-up repeat TSH level      HFpEF    She was taking Oral Lasix therapy 20 mg PO Q Daily   Last hospitalization 2D echocardiogram noted for LVEF of 60 to 65%  She was unable to be started on beta-blockers due to hypotension  ARB discontinued secondary to JANET on CKD  Daily weights  Strict Intake and Output  Consider cardiology follow-up if necessary      CKD  (Stage III/IV) Baseline creatinine 1.7-2    Patient was discharged with a creatinine of 2.5 12/19  Patient's creatinine on 12/23 was noted to be 2.2  Today on 12/24 has further increased to 2.7  Patient takes oral bicarbonate therapy at home  Holding Lasix upon admission.  Defer to nephrology to continue.  Considering she was laying on the ground for an hour I will add on a CPK level for nephrology service        CLL    Increase in WBC count  Will consult Hematology/Oncology to evaluate the patient  No suspicion for superimposed infection at this time        Acute on chronic anemia    Continue with Folic Acid anf Vitamin B12        Vitamin D deficiency        Vitamin B12 deficiency    Cont. w/ Oral Vitamin B12 therapy         Depression        Peripheral neuropathy    Continue with home  Neuromodulator medications  Patient apparently takes SNRI/TCA/Gabapentinoids         Hypothyroidism    Continue with recently increased dose of Levothyroxine  Lobe repeat TSH level in house  F/U [facility] as outpatient PCP postdischarge        GERD    Continue with home Oral PPI        Hyperlipidemia    Continue with home Statin therapy  Simvastatin 10 mg PO Q Daily         Essential hypertension    Continue with Hydralazine 20 mg PO TID        Osteoarthritis    Tylenol PRN  PT/OT      GI + DVT PPX    Oral PPI  Heparin subcu      [facility] DIrectives:    DNR-CC-A/DNI (Established Last Hospitalization)        This Dictation was Transcribed using a Nuance Dragon Voice Recognition System Device (with Compatible Computer + Software) and as such may contain Grammatical Errors and Unintentional Typing Misprints.          I spent 35 minutes in the professional and overall care of this patient.        Dominic Perez MD

## 2023-12-25 PROBLEM — I48.91 ATRIAL FIBRILLATION (MULTI): Status: ACTIVE | Noted: 2023-12-25

## 2023-12-25 LAB
ANION GAP SERPL CALC-SCNC: 17 MMOL/L
BUN SERPL-MCNC: 38 MG/DL (ref 8–25)
CALCIUM SERPL-MCNC: 8.2 MG/DL (ref 8.5–10.4)
CHLORIDE SERPL-SCNC: 103 MMOL/L (ref 97–107)
CO2 SERPL-SCNC: 19 MMOL/L (ref 24–31)
CREAT SERPL-MCNC: 2.8 MG/DL (ref 0.4–1.6)
CREAT UR-MCNC: 114.3 MG/DL
ERYTHROCYTE [DISTWIDTH] IN BLOOD BY AUTOMATED COUNT: 13.6 % (ref 11.5–14.5)
GFR SERPL CREATININE-BSD FRML MDRD: 16 ML/MIN/1.73M*2
GLUCOSE SERPL-MCNC: 111 MG/DL (ref 65–99)
HCT VFR BLD AUTO: 33.2 % (ref 36–46)
HGB BLD-MCNC: 10.3 G/DL (ref 12–16)
MAGNESIUM SERPL-MCNC: 1.8 MG/DL (ref 1.6–3.1)
MAGNESIUM SERPL-MCNC: 2 MG/DL (ref 1.6–3.1)
MCH RBC QN AUTO: 31.3 PG (ref 26–34)
MCHC RBC AUTO-ENTMCNC: 31 G/DL (ref 32–36)
MCV RBC AUTO: 101 FL (ref 80–100)
NRBC BLD-RTO: 0 /100 WBCS (ref 0–0)
NT-PROBNP SERPL-MCNC: 4043 PG/ML (ref 0–624)
PHOSPHATE SERPL-MCNC: 3.6 MG/DL (ref 2.5–4.5)
PLATELET # BLD AUTO: 248 X10*3/UL (ref 150–450)
POTASSIUM SERPL-SCNC: 2.7 MMOL/L (ref 3.4–5.1)
PROT UR-MCNC: 54 MG/DL
PROT/CREAT UR: 0.47 MG/MG CREAT
RBC # BLD AUTO: 3.29 X10*6/UL (ref 4–5.2)
SODIUM SERPL-SCNC: 139 MMOL/L (ref 133–145)
WBC # BLD AUTO: 38.4 X10*3/UL (ref 4.4–11.3)

## 2023-12-25 PROCEDURE — 2500000004 HC RX 250 GENERAL PHARMACY W/ HCPCS (ALT 636 FOR OP/ED): Performed by: INTERNAL MEDICINE

## 2023-12-25 PROCEDURE — 82570 ASSAY OF URINE CREATININE: CPT | Performed by: INTERNAL MEDICINE

## 2023-12-25 PROCEDURE — 80048 BASIC METABOLIC PNL TOTAL CA: CPT | Performed by: INTERNAL MEDICINE

## 2023-12-25 PROCEDURE — 2500000005 HC RX 250 GENERAL PHARMACY W/O HCPCS: Performed by: INTERNAL MEDICINE

## 2023-12-25 PROCEDURE — 97161 PT EVAL LOW COMPLEX 20 MIN: CPT | Mod: GP

## 2023-12-25 PROCEDURE — 83880 ASSAY OF NATRIURETIC PEPTIDE: CPT | Performed by: INTERNAL MEDICINE

## 2023-12-25 PROCEDURE — 36415 COLL VENOUS BLD VENIPUNCTURE: CPT | Performed by: INTERNAL MEDICINE

## 2023-12-25 PROCEDURE — 85027 COMPLETE CBC AUTOMATED: CPT | Performed by: INTERNAL MEDICINE

## 2023-12-25 PROCEDURE — 96372 THER/PROPH/DIAG INJ SC/IM: CPT | Performed by: INTERNAL MEDICINE

## 2023-12-25 PROCEDURE — 2500000001 HC RX 250 WO HCPCS SELF ADMINISTERED DRUGS (ALT 637 FOR MEDICARE OP): Performed by: INTERNAL MEDICINE

## 2023-12-25 PROCEDURE — 84100 ASSAY OF PHOSPHORUS: CPT | Performed by: INTERNAL MEDICINE

## 2023-12-25 PROCEDURE — 83735 ASSAY OF MAGNESIUM: CPT | Performed by: INTERNAL MEDICINE

## 2023-12-25 PROCEDURE — 97530 THERAPEUTIC ACTIVITIES: CPT | Mod: GP

## 2023-12-25 PROCEDURE — 2060000001 HC INTERMEDIATE ICU ROOM DAILY

## 2023-12-25 PROCEDURE — 99222 1ST HOSP IP/OBS MODERATE 55: CPT | Performed by: INTERNAL MEDICINE

## 2023-12-25 RX ORDER — AMINOPHYLLINE 25 MG/ML
125 INJECTION, SOLUTION INTRAVENOUS AS NEEDED
Status: CANCELLED | OUTPATIENT
Start: 2023-12-25

## 2023-12-25 RX ORDER — POTASSIUM CHLORIDE 20 MEQ/1
40 TABLET, EXTENDED RELEASE ORAL DAILY
Status: DISCONTINUED | OUTPATIENT
Start: 2023-12-25 | End: 2024-01-02 | Stop reason: HOSPADM

## 2023-12-25 RX ORDER — CHOLESTYRAMINE 4 G/9G
4 POWDER, FOR SUSPENSION ORAL
Status: DISCONTINUED | OUTPATIENT
Start: 2023-12-25 | End: 2023-12-27

## 2023-12-25 RX ORDER — DILTIAZEM HCL/D5W 125 MG/125
5-15 PLASTIC BAG, INJECTION (ML) INTRAVENOUS CONTINUOUS
Status: DISCONTINUED | OUTPATIENT
Start: 2023-12-25 | End: 2023-12-25

## 2023-12-25 RX ORDER — DILTIAZEM HYDROCHLORIDE 5 MG/ML
10 INJECTION INTRAVENOUS ONCE
Status: COMPLETED | OUTPATIENT
Start: 2023-12-25 | End: 2023-12-25

## 2023-12-25 RX ADMIN — SODIUM CHLORIDE 60 ML/HR: 900 INJECTION, SOLUTION INTRAVENOUS at 08:50

## 2023-12-25 RX ADMIN — HEPARIN SODIUM 5000 UNITS: 5000 INJECTION, SOLUTION INTRAVENOUS; SUBCUTANEOUS at 18:41

## 2023-12-25 RX ADMIN — Medication 1 TABLET: at 12:49

## 2023-12-25 RX ADMIN — SIMVASTATIN 10 MG: 10 TABLET, FILM COATED ORAL at 21:22

## 2023-12-25 RX ADMIN — SODIUM BICARBONATE 650 MG TABLET 650 MG: at 16:44

## 2023-12-25 RX ADMIN — Medication 5 MG/HR: at 01:36

## 2023-12-25 RX ADMIN — AMITRIPTYLINE HYDROCHLORIDE 10 MG: 10 TABLET, FILM COATED ORAL at 21:22

## 2023-12-25 RX ADMIN — HEPARIN SODIUM 5000 UNITS: 5000 INJECTION, SOLUTION INTRAVENOUS; SUBCUTANEOUS at 00:50

## 2023-12-25 RX ADMIN — GABAPENTIN 300 MG: 300 CAPSULE ORAL at 21:22

## 2023-12-25 RX ADMIN — POTASSIUM CHLORIDE 40 MEQ: 1500 TABLET, EXTENDED RELEASE ORAL at 12:48

## 2023-12-25 RX ADMIN — Medication 1 TABLET: at 16:44

## 2023-12-25 RX ADMIN — VANCOMYCIN HYDROCHLORIDE 125 MG: 125 CAPSULE ORAL at 12:48

## 2023-12-25 RX ADMIN — VANCOMYCIN HYDROCHLORIDE 125 MG: 125 CAPSULE ORAL at 06:16

## 2023-12-25 RX ADMIN — HEPARIN SODIUM 5000 UNITS: 5000 INJECTION, SOLUTION INTRAVENOUS; SUBCUTANEOUS at 12:50

## 2023-12-25 RX ADMIN — SODIUM BICARBONATE 650 MG TABLET 650 MG: at 12:48

## 2023-12-25 RX ADMIN — GABAPENTIN 300 MG: 300 CAPSULE ORAL at 12:48

## 2023-12-25 RX ADMIN — FOLIC ACID 1 MG: 1 TABLET ORAL at 12:49

## 2023-12-25 RX ADMIN — DULOXETINE HYDROCHLORIDE 20 MG: 20 CAPSULE, DELAYED RELEASE ORAL at 12:49

## 2023-12-25 RX ADMIN — LEVOTHYROXINE SODIUM 150 MCG: 0.15 TABLET ORAL at 05:45

## 2023-12-25 RX ADMIN — CHOLESTYRAMINE 4 G: 4 POWDER, FOR SUSPENSION ORAL at 17:00

## 2023-12-25 RX ADMIN — DILTIAZEM HYDROCHLORIDE 10 MG: 5 INJECTION INTRAVENOUS at 01:00

## 2023-12-25 RX ADMIN — SODIUM BICARBONATE 650 MG TABLET 650 MG: at 21:23

## 2023-12-25 RX ADMIN — VANCOMYCIN HYDROCHLORIDE 125 MG: 125 CAPSULE ORAL at 16:44

## 2023-12-25 RX ADMIN — VANCOMYCIN HYDROCHLORIDE 125 MG: 125 CAPSULE ORAL at 21:22

## 2023-12-25 ASSESSMENT — COGNITIVE AND FUNCTIONAL STATUS - GENERAL
CLIMB 3 TO 5 STEPS WITH RAILING: TOTAL
EATING MEALS: A LITTLE
DRESSING REGULAR UPPER BODY CLOTHING: A LITTLE
MOVING FROM LYING ON BACK TO SITTING ON SIDE OF FLAT BED WITH BEDRAILS: A LOT
TURNING FROM BACK TO SIDE WHILE IN FLAT BAD: A LOT
DRESSING REGULAR LOWER BODY CLOTHING: A LITTLE
MOVING FROM LYING ON BACK TO SITTING ON SIDE OF FLAT BED WITH BEDRAILS: A LITTLE
STANDING UP FROM CHAIR USING ARMS: A LOT
MOBILITY SCORE: 14
MOBILITY SCORE: 11
TURNING FROM BACK TO SIDE WHILE IN FLAT BAD: A LITTLE
MOVING FROM LYING ON BACK TO SITTING ON SIDE OF FLAT BED WITH BEDRAILS: A LOT
DRESSING REGULAR UPPER BODY CLOTHING: A LITTLE
WALKING IN HOSPITAL ROOM: A LOT
CLIMB 3 TO 5 STEPS WITH RAILING: A LOT
MOVING FROM LYING ON BACK TO SITTING ON SIDE OF FLAT BED WITH BEDRAILS: A LITTLE
MOBILITY SCORE: 14
DAILY ACTIVITIY SCORE: 15
STANDING UP FROM CHAIR USING ARMS: A LOT
STANDING UP FROM CHAIR USING ARMS: A LOT
TURNING FROM BACK TO SIDE WHILE IN FLAT BAD: A LOT
WALKING IN HOSPITAL ROOM: A LOT
TURNING FROM BACK TO SIDE WHILE IN FLAT BAD: A LITTLE
EATING MEALS: A LITTLE
MOVING TO AND FROM BED TO CHAIR: A LOT
PERSONAL GROOMING: A LOT
DAILY ACTIVITIY SCORE: 14
MOVING TO AND FROM BED TO CHAIR: A LOT
HELP NEEDED FOR BATHING: A LOT
MOBILITY SCORE: 11
STANDING UP FROM CHAIR USING ARMS: A LOT
MOVING TO AND FROM BED TO CHAIR: A LITTLE
TOILETING: TOTAL
CLIMB 3 TO 5 STEPS WITH RAILING: TOTAL
HELP NEEDED FOR BATHING: A LITTLE
DRESSING REGULAR LOWER BODY CLOTHING: A LITTLE
WALKING IN HOSPITAL ROOM: A LOT
TOILETING: TOTAL
WALKING IN HOSPITAL ROOM: A LOT
PERSONAL GROOMING: A LOT
MOVING TO AND FROM BED TO CHAIR: A LOT
CLIMB 3 TO 5 STEPS WITH RAILING: TOTAL

## 2023-12-25 ASSESSMENT — PAIN - FUNCTIONAL ASSESSMENT: PAIN_FUNCTIONAL_ASSESSMENT: 0-10

## 2023-12-25 ASSESSMENT — ENCOUNTER SYMPTOMS
DIARRHEA: 1
ABDOMINAL PAIN: 0

## 2023-12-25 ASSESSMENT — PAIN SCALES - GENERAL
PAINLEVEL_OUTOF10: 0 - NO PAIN
PAINLEVEL_OUTOF10: 0 - NO PAIN

## 2023-12-25 ASSESSMENT — ACTIVITIES OF DAILY LIVING (ADL)
ADL_ASSISTANCE: INDEPENDENT
ADLS_ADDRESSED: YES

## 2023-12-25 NOTE — CONSULTS
Consults  History Of Present Illness:    Tracie Baltazar is a 83 y.o. female presenting with leg weakness, diarrhea, poor p.o. intake and she had acute kidney injury in the setting of intravascular depletion and positive for C. difficile colitis.  Her sodium 139, potassium 3.5, BUN is 40 with a creatinine of 2.7..        She presented with atrial fibrillation and a rapid ventricular response and was started on intravenous diltiazem, has now converted to sinus rhythm.  In the conversion process she developed long pauses consistent with sick sinus syndrome with temporary asystole, it spontaneously resolved but this could have contributed to her leg weakness and this is in the presence of intravenous diltiazem but no other antiarrhythmics and no other rate controlling agents.    She is breathing comfortably and has no chest discomfort.     Last Recorded Vitals:  Vitals:    12/25/23 0600 12/25/23 0700 12/25/23 0800 12/25/23 1010   BP:  90/62     BP Location:  Left arm     Patient Position:  Lying     Pulse: 100 102 (!) 138 91   Resp: 18 21 16    Temp:  36.8 °C (98.2 °F)     TempSrc:  Temporal     SpO2:  94%     Weight:       Height:           Last Labs:  CBC - 12/25/2023:  4:43 AM  38.4 10.3 248    33.2      CMP - 12/25/2023:  4:43 AM  8.2 6.8 21 --- 0.2   3.6 3.8 14 98      PTT - No results in last year.  _   _ _     Hemoglobin A1C   Date/Time Value Ref Range Status   12/19/2019 10:50 AM 5.4 4.0 - 6.0 % Final     Comment:     Hemoglobin A1C levels are related to mean blood glucose during the   preceding 2-3 months. The relationship table below may be used as a   general guide. Each 1% increase in HGB A1C is a reflection of an   increase in mean glucose of approximately 30 mg/dl.   Reference: Diabetes Care, volume 29, supplement 1 Jan. 2006                        HGB A1C ................. Approx. Mean Glucose   _______________________________________________   6%   ...............................  120 mg/dl   7%    ...............................  150 mg/dl   8%   ...............................  180 mg/dl   9%   ...............................  210 mg/dl   10%  ...............................  240 mg/dl  Performed at [facility] [id number] [place] [id number]       LDL Calculated   Date/Time Value Ref Range Status   12/08/2022 09:43  (H) 65 - 130 MG/DL Final   01/25/2021 02:53  65 - 130 MG/DL Final      Last I/O:  I/O last 3 completed shifts:  In: 1104.7 (13.2 mL/kg) [P.O.:200; I.V.:154.7 (1.9 mL/kg); IV Piggyback:750]  Out: 200 (2.4 mL/kg) [Urine:200 (0.1 mL/kg/hr)]  Weight: 83.6 kg     Past Cardiology Tests (Last 3 Years):  EKG:  No results found for this or any previous visit from the past 1095 days.    Echo:  Transthoracic Echo (TTE) Complete 12/15/2023    Ejection Fractions:  EF   Date/Time Value Ref Range Status   12/15/2023 08:53 AM 56       Cath:  No results found for this or any previous visit from the past 1095 days.    Stress Test:  No results found for this or any previous visit from the past 1095 days.    Cardiac Imaging:  No results found for this or any previous visit from the past 1095 days.      Past Medical History:  She has a past medical history of Asthma, Essential (primary) hypertension, Kidney failure, Leukemia (CMS/HCC), and Rheumatoid arthritis (CMS/HCC).    Past Surgical History:  She has a past surgical history that includes Hysterectomy; Back surgery; Cholecystectomy; Other surgical history; Knee surgery; Shoulder surgery (Left); and Total knee arthroplasty (Left, 02/13/2014).      Social History:  She reports that she has never smoked. She has never used smokeless tobacco. She reports that she does not currently use alcohol. She reports that she does not use drugs.    Family History:  Family History   Problem Relation Name Age of Onset    Emphysema Mother      Emphysema Father      Heart attack Sister      Lung cancer Brother          Agent orange    Lymphoma Son      Multiple  sclerosis Son          Allergies:  Ibuprofen    Inpatient Medications:  Scheduled medications   Medication Dose Route Frequency    amitriptyline  10 mg oral Nightly    DULoxetine  20 mg oral Daily    folic acid  1 mg oral Daily    gabapentin  300 mg oral BID    heparin (porcine)  5,000 Units subcutaneous q8h    lactobacillus acidophilus  1 tablet oral BID with meals    levothyroxine  150 mcg oral Daily    potassium chloride CR  40 mEq oral Daily    simvastatin  10 mg oral Nightly    sodium bicarbonate  650 mg oral TID    vancomycin  125 mg oral 4x daily     PRN medications   Medication    acetaminophen    Or    acetaminophen    Or    acetaminophen    benzocaine-menthol    dextromethorphan-guaifenesin    guaiFENesin    polyethylene glycol     Continuous Medications   Medication Dose Last Rate    sodium chloride 0.9%  60 mL/hr 60 mL/hr (12/25/23 0850)     Outpatient Medications:  Current Outpatient Medications   Medication Instructions    acetaminophen (Tylenol) 325 mg tablet     amitriptyline (Elavil) 10 mg tablet     cholecalciferol (Vitamin D-3) 50 MCG (2000 UT) tablet Vitamin D 50 MCG (2000 UT) Oral Tablet   Refills: 0       Active    cholestyramine-aspartame (Prevalite) 4 gram packet     cream base no.135, bulk, (SaltStable LS) cream Salt Stable LS Advanced External Cream   Quantity: 240  Refills: 0        Start : 15-Oct-2015   Active    cyanocobalamin (Vitamin B-12) 100 mcg tablet = 1 tab(s) ( 100 mcg ), Oral, daily, 0 Refill(s), Type: Maintenance    DULoxetine (CYMBALTA) 20 mg, oral, Daily, Do not crush or chew.    folic acid (Folvite) 400 mcg tablet = 1 tab(s) ( 0.4 mg ), Oral, daily, 0 Refill(s), Type: Maintenance    furosemide (LASIX) 20 mg, oral, Daily    gabapentin (NEURONTIN) 300 mg, oral, 2 times daily    hydrALAZINE (APRESOLINE) 20 mg, oral, 3 times daily    L. acidophilus/L.bulgaricus (lactobacillus acidoph-lactobacillus bulgar) 0.5 million cell tablet chewable split tablet     levothyroxine  (SYNTHROID, LEVOXYL) 150 mcg, oral, Daily    pantoprazole (ProtoNix) 40 mg EC tablet one bid for 10d, then one qd thereafter    simvastatin (Zocor) 10 mg tablet = 1 tab(s) ( 10 mg ), Oral, hs, # 90 tab(s), 3 Refill(s), Type: Maintenance, Pharmacy: [id number], 1 tab(s) Oral hs, 61, in, 12/15/22 10:06:00 EST, Height Measured, 182, lb, 12/15/22 10:06:00 EST, Weight Measured    sodium bicarbonate 650 mg, oral, 3 times daily       Physical Exam:  Neck:  Normal jugular venous pressure, carotid upstrokes brisk with no bruits  Lungs:  Clear to auscultation without wheezing or rhonchi or rales  Heart:  Regular rate and rhythm normal S1 and S2, no murmurs gallops rubs or clicks, PMI normal, no RV lift  Abdomen:  Soft, good bowel sounds, nontender, no hepatosplenomegaly, no pulsatile mass or bruits.  Extremities:  Good radial, femoral, pedal pulses without pretibial edema  Neurological:  No focal sensory or motor deficits, pupils equal and reactive     Assessment/Plan   #1 tachybradycardia syndrome with transient asystole in the presence of intravenous diltiazem that spontaneously resolved.    2. C. difficile colitis    3.  Dehydration/intravascular depletion    4.  Acute kidney injury    12/25: She is resting comfortably up in a chair  She has been rehydrated 754 cc positive,Her intravenous diltiazem has been stopped.  Blood pressure was 90/62 pulse 6 in the 70s, respirations 16 temperature 98.2 O2 saturation 94% on nasal cannula.  Will arrange echocardiography, nuclear stress testing to exclude coronary ischemia contributing to her sick sinus syndrome or tachybradycardia syndrome.  EP consultation with Dr. Chisholm to assess the appropriateness of permanent pacemaker implantation.  Will avoid AV amna suppressing agents in the interim and no antiarrhythmics.  After pacemaker implantation, anticoagulation with Eliquis be appropriate already embolic risk reduction.  Code Status:  Full Code    I spent 40  minutes in the professional and overall care of this patient.        Javier Steel, DO

## 2023-12-25 NOTE — NURSING NOTE
Assumed care of patient.  Patient resting in bed, alert.  Cardizem running at 5mg/hr,  And NS at 50ml/hr.  BSSR completed.  Bed low and call light within reach.

## 2023-12-25 NOTE — PROGRESS NOTES
"Tracie Baltazar is a 83 y.o. female on day 1 of admission presenting with Weakness.    Subjective          Objective     Physical Exam    Last Recorded Vitals  Blood pressure 80/52, pulse 69, temperature 36.8 °C (98.2 °F), temperature source Temporal, resp. rate 13, height 1.6 m (5' 2.99\"), weight 83.6 kg (184 lb 4.9 oz), SpO2 94 %.    Intake/Output last 3 Shifts:  I/O last 3 completed shifts:  In: 1104.7 (13.2 mL/kg) [P.O.:200; I.V.:154.7 (1.9 mL/kg); IV Piggyback:750]  Out: 200 (2.4 mL/kg) [Urine:200 (0.1 mL/kg/hr)]  Weight: 83.6 kg     Current Facility-Administered Medications:     acetaminophen (Tylenol) tablet 650 mg, 650 mg, oral, q4h PRN **OR** acetaminophen (Tylenol) oral liquid 650 mg, 650 mg, nasogastric tube, q4h PRN **OR** acetaminophen (Tylenol) suppository 650 mg, 650 mg, rectal, q4h PRN, Abilio Colón DO    amitriptyline (Elavil) tablet 10 mg, 10 mg, oral, Nightly, Dominic Perez MD, 10 mg at 12/24/23 2028    benzocaine-menthol (Cepastat Sore Throat) 15-3.6 mg lozenge 1 lozenge, 1 lozenge, Mouth/Throat, q2h PRN, Dominic Perez MD    dextromethorphan-guaifenesin (Robitussin DM)  mg/5 mL oral liquid 5 mL, 5 mL, oral, q4h PRN, Dominic Perez MD    DULoxetine (Cymbalta) DR capsule 20 mg, 20 mg, oral, Daily, Dominic Perez MD, 20 mg at 12/25/23 1249    folic acid (Folvite) tablet 1 mg, 1 mg, oral, Daily, Dominic Perez MD, 1 mg at 12/25/23 1249    gabapentin (Neurontin) capsule 300 mg, 300 mg, oral, BID, Dominic Perez MD, 300 mg at 12/25/23 1248    guaiFENesin (Mucinex) 12 hr tablet 600 mg, 600 mg, oral, q12h PRN, Dominic Perez MD    heparin (porcine) injection 5,000 Units, 5,000 Units, subcutaneous, q8h, Dominic Perez MD, 5,000 Units at 12/25/23 1250    lactobacillus acidophilus tablet 1 tablet, 1 tablet, oral, BID with meals, Dominic Perez MD, 1 tablet at 12/25/23 1249    levothyroxine (Synthroid, Levoxyl) tablet 150 mcg, 150 mcg, oral, Daily, Dominic Perez MD, 150 mcg at 12/25/23 " 0545    potassium chloride CR (Klor-Con M20) ER tablet 40 mEq, 40 mEq, oral, Daily, Aislinn Napoles MD, 40 mEq at 12/25/23 1248    simvastatin (Zocor) tablet 10 mg, 10 mg, oral, Nightly, Dmoinic Perez MD, 10 mg at 12/24/23 2028    sodium bicarbonate tablet 650 mg, 650 mg, oral, TID, Dominic Perez MD, 650 mg at 12/25/23 1248    sodium chloride 0.9% infusion, 60 mL/hr, intravenous, Continuous, Darnell Amor MD, Last Rate: 60 mL/hr at 12/25/23 0850, 60 mL/hr at 12/25/23 0850    vancomycin (Vancocin) capsule 125 mg, 125 mg, oral, 4x daily, Abilio Colón DO, 125 mg at 12/25/23 1248   Relevant Results    Results for orders placed or performed during the hospital encounter of 12/24/23 (from the past 96 hour(s))   CBC and Auto Differential   Result Value Ref Range    WBC 44.1 (H) 4.4 - 11.3 x10*3/uL    nRBC 0.0 0.0 - 0.0 /100 WBCs    RBC 3.53 (L) 4.00 - 5.20 x10*6/uL    Hemoglobin 11.1 (L) 12.0 - 16.0 g/dL    Hematocrit 36.1 36.0 - 46.0 %     (H) 80 - 100 fL    MCH 31.4 26.0 - 34.0 pg    MCHC 30.7 (L) 32.0 - 36.0 g/dL    RDW 13.2 11.5 - 14.5 %    Platelets 299 150 - 450 x10*3/uL    Immature Granulocytes %, Automated 0.8 0.0 - 0.9 %    Immature Granulocytes Absolute, Automated 0.35 0.00 - 0.50 x10*3/uL   Comprehensive Metabolic Panel   Result Value Ref Range    Glucose 130 (H) 65 - 99 mg/dL    Sodium 139 133 - 145 mmol/L    Potassium 3.5 3.4 - 5.1 mmol/L    Chloride 100 97 - 107 mmol/L    Bicarbonate 22 (L) 24 - 31 mmol/L    Urea Nitrogen 40 (H) 8 - 25 mg/dL    Creatinine 2.70 (H) 0.40 - 1.60 mg/dL    eGFR 17 (L) >60 mL/min/1.73m*2    Calcium 9.4 8.5 - 10.4 mg/dL    Albumin 3.8 3.5 - 5.0 g/dL    Alkaline Phosphatase 98 35 - 125 U/L    Total Protein 6.8 5.9 - 7.9 g/dL    AST 21 5 - 40 U/L    Bilirubin, Total 0.2 0.1 - 1.2 mg/dL    ALT 14 5 - 40 U/L    Anion Gap 17 <=19 mmol/L   Blood Gas Lactic Acid, Venous   Result Value Ref Range    POCT Lactate, Venous 1.4 0.4 - 2.0 mmol/L   Blood Culture    Specimen:  Peripheral Venipuncture; Blood culture   Result Value Ref Range    Blood Culture Loaded on Instrument - Culture in progress    Blood Culture    Specimen: Peripheral Venipuncture; Blood culture   Result Value Ref Range    Blood Culture Loaded on Instrument - Culture in progress    Urinalysis with Reflex Culture and Microscopic   Result Value Ref Range    Color, Urine Light-Yellow Light-Yellow, Yellow, Dark-Yellow    Appearance, Urine Clear Clear    Specific Gravity, Urine 1.017 1.005 - 1.035    pH, Urine 5.5 5.0, 5.5, 6.0, 6.5, 7.0, 7.5, 8.0    Protein, Urine 100 (2+) (A) NEGATIVE, 10 (TRACE), 20 (TRACE) mg/dL    Glucose, Urine 50 (TRACE) (A) Normal mg/dL    Blood, Urine 0.03 (TRACE) (A) NEGATIVE    Ketones, Urine NEGATIVE NEGATIVE mg/dL    Bilirubin, Urine NEGATIVE NEGATIVE    Urobilinogen, Urine Normal Normal mg/dL    Nitrite, Urine NEGATIVE NEGATIVE    Leukocyte Esterase, Urine NEGATIVE NEGATIVE   Extra Urine Gray Tube   Result Value Ref Range    Extra Tube Hold for add-ons.    Urinalysis Microscopic   Result Value Ref Range    WBC, Urine 1-5 1-5, NONE /HPF    RBC, Urine 3-5 NONE, 1-2, 3-5 /HPF    Squamous Epithelial Cells, Urine 1-9 (SPARSE) Reference range not established. /HPF    Mucus, Urine FEW Reference range not established. /LPF    Hyaline Casts, Urine 2+ (A) NONE /LPF    Fine Granular Casts, Urine 2+ (A) NONE /LPF   Manual Differential   Result Value Ref Range    Neutrophils %, Manual 42.0 40.0 - 80.0 %    Bands %, Manual 1.0 0.0 - 5.0 %    Lymphocytes %, Manual 55.0 13.0 - 44.0 %    Monocytes %, Manual 2.0 2.0 - 10.0 %    Eosinophils %, Manual 0.0 0.0 - 6.0 %    Basophils %, Manual 0.0 0.0 - 2.0 %    Seg Neutrophils Absolute, Manual 18.52 (H) 1.60 - 5.00 x10*3/uL    Bands Absolute, Manual 0.44 0.00 - 0.50 x10*3/uL    Lymphocytes Absolute, Manual 24.26 (H) 0.80 - 3.00 x10*3/uL    Monocytes Absolute, Manual 0.88 (H) 0.05 - 0.80 x10*3/uL    Eosinophils Absolute, Manual 0.00 0.00 - 0.40 x10*3/uL    Basophils  Absolute, Manual 0.00 0.00 - 0.10 x10*3/uL    Total Cells Counted 100     Neutrophils Absolute, Manual 18.96 (H) 1.60 - 5.50 x10*3/uL    RBC Morphology No significant RBC morphology present    Creatine Kinase   Result Value Ref Range    Creatine Kinase 107 24 - 195 U/L   TSH   Result Value Ref Range    Thyroid Stimulating Hormone 12.17 (H) 0.27 - 4.20 mIU/L   Sars-CoV-2 and Influenza A/B PCR   Result Value Ref Range    Flu A Result Not Detected Not Detected    Flu B Result Not Detected Not Detected    Coronavirus 2019, PCR Not Detected Not Detected   C. difficile, PCR    Specimen: Stool   Result Value Ref Range    C. difficile, PCR Detected (A) Not Detected   Protein, Urine Random   Result Value Ref Range    Total Protein, Urine Random 54 NOT ESTABLISHED mg/dL    Creatinine, Urine Random 114.3 NOT ESTABLISHED mg/dL    T. Protein/Creatinine Ratio 0.47 mg/mg Creat   Phosphorus   Result Value Ref Range    Phosphorus 3.6 2.5 - 4.5 mg/dL   Magnesium   Result Value Ref Range    Magnesium 1.80 1.60 - 3.10 mg/dL   Basic Metabolic Panel   Result Value Ref Range    Glucose 111 (H) 65 - 99 mg/dL    Sodium 139 133 - 145 mmol/L    Potassium 2.7 (LL) 3.4 - 5.1 mmol/L    Chloride 103 97 - 107 mmol/L    Bicarbonate 19 (L) 24 - 31 mmol/L    Urea Nitrogen 38 (H) 8 - 25 mg/dL    Creatinine 2.80 (H) 0.40 - 1.60 mg/dL    eGFR 16 (L) >60 mL/min/1.73m*2    Calcium 8.2 (L) 8.5 - 10.4 mg/dL    Anion Gap 17 <=19 mmol/L   CBC   Result Value Ref Range    WBC 38.4 (H) 4.4 - 11.3 x10*3/uL    nRBC 0.0 0.0 - 0.0 /100 WBCs    RBC 3.29 (L) 4.00 - 5.20 x10*6/uL    Hemoglobin 10.3 (L) 12.0 - 16.0 g/dL    Hematocrit 33.2 (L) 36.0 - 46.0 %     (H) 80 - 100 fL    MCH 31.3 26.0 - 34.0 pg    MCHC 31.0 (L) 32.0 - 36.0 g/dL    RDW 13.6 11.5 - 14.5 %    Platelets 248 150 - 450 x10*3/uL   NT-PROBNP   Result Value Ref Range    PROBNP 4,043 (H) 0 - 624 pg/mL       Assessment/Plan   1.  Acute kidney injury seems that creatinine level is stable continue  with gentle IV hydration and continue to monitor renal function very closely no indication for dialysis therapy  2.  Chronic kidney disease stage III  - Baseline Creatinine 1.6-1.9  3.  C. difficile  4.  CLL  5.  Hypertension      Epifanio Amor MD

## 2023-12-25 NOTE — NURSING NOTE
Patient's heart rate elevated, 140beats/min. Informed Dr Schaffer, order received. Rapid response Nurse and Nursing Supervisor also informed.

## 2023-12-25 NOTE — PROGRESS NOTES
Tracie Baltazar is a 83 y.o. female on day 1 of admission presenting with Weakness.      Subjective   Patient denied chest pain or shortness of breath.  Patient has been afebrile during the night.    On monitor patient had A-fib with pauses       Objective     Last Recorded Vitals  BP 80/52   Pulse 69   Temp 36.8 °C (98.2 °F) (Temporal)   Resp 13   Wt 83.6 kg (184 lb 4.9 oz)   SpO2 94%   Intake/Output last 3 Shifts:    Intake/Output Summary (Last 24 hours) at 12/25/2023 1221  Last data filed at 12/24/2023 1814  Gross per 24 hour   Intake 254.67 ml   Output --   Net 254.67 ml       Admission Weight  Weight: 83.6 kg (184 lb 4.9 oz) (12/24/23 0317)    Daily Weight  12/24/23 : 83.6 kg (184 lb 4.9 oz)    Image Results  US renal complete  Narrative: Interpreted By:  Bruno Bryant,   STUDY:  US RENAL COMPLETE; 12/24/2023 7:50 pm      INDICATION:  Signs/Symptoms:JANET on CKD. /history of CLL and hypertension      COMPARISON:  None.      ACCESSION NUMBER(S):  [id number]      ORDERING CLINICIAN:  SERJIO DEVINE      TECHNIQUE:  Grayscale and color Doppler imaging of the kidneys.      FINDINGS:  The right kidney measures 9.3 cm x 4.1 cm x 4.4 cm cm.  The left kidney measures 7.8 cm x 5.0 cm x 3.7 cm cm.  There is no shadowing calculus, hydronephrosis, or solid mass  identified. However, there is a 2.6 x 2.5 x 2.4 cm inferior pole  hypoechoic renal cyst without internal color Doppler flow. Renal  cortical thinning and increased echogenicity of the renal parenchyma  is noted.      The bladder is grossly unremarkable.      Impression: 1. No hydronephrosis or nephrolithiasis.  2. Medical renal disease. 3 left renal cyst.          MACRO:  None.      Signed by: Bruno Bryant 12/24/2023 8:19 PM  Dictation workstation:   [id number]  XR chest 1 view  Narrative: Interpreted By:  Nick Luciano,   STUDY:  XR CHEST 1 VIEW; 12/24/2023 4:15 am      INDICATION:  CLINICAL INFORMATION: Signs/Symptoms:weakness.       COMPARISON:  12/18/2020      ACCESSION NUMBER(S):  [id number]      ORDERING CLINICIAN:  LESLYE FRANK      TECHNIQUE:  Portable chest one view.      FINDINGS:  The cardiac size is indeterminate in view of the AP projection.  Neurostimulator overlies the thoracic spine. No infiltrates or  effusions are identified.  The aorta is tortuous.      Impression: No acute pathologic findings are identified.  There is no interval  change when compared to the previous examination.      MACRO:  none      Signed by: Nick Luciano 12/24/2023 9:11 AM  Dictation workstation:   [id number]      Physical Exam    General: , cooperating during physical exam.  HEENT: Pupils are equal and reactive to light and commendation , oral mucosa moist, no JVD oral mucosa is moist.  Cardiovascular: No in normal sinus rhythm,  Lungs: Clear to auscultation bilaterally, no wheezing, no crackles, no dullness to percussion.  Abdomen: No hepatosplenomegaly appreciated, soft , not tender, positive bowel sounds, positive bowel movement.  Neuro: Alert and oriented x 2,, strength in upper and lower extremities , sensation intact.  Psych: Patient had great insight was going on  Musculoskeletal: No swelling in lower extremities, no limitation in range of motion.  Vascular: Pulses are intact in upper and lower extremities  Skin: No petechiae, ecchymosis or other stigmata for dermatology disease.   Assessment/Plan      Acute kidney injury  Dr. Amor on the case   continue gentle with fluid    C. difficile colitis  On vancomycin  Consult  from ID services.    Leukocytosis  Secondary to CLL  Waiting by Dr. Ferreira  Monitor close.     Deconditioning  PT OT to see her  Fall precaution  Ambulate with assistant    Atrial fibrillation with pauses  Consult Dr. Steel fromcardiology services.    Now patient is in normal sinus rhythm.  Monitor close.  Plan for nuclear stress test in a.m.    DVT prophylaxis    Hypothyroidism  Resume her home  medication.    Hypokalemia  Replace potassium.    Time spent with the patient 35 minutes.  Check CBC and CMP in AM.  Continue with contact isolation.  Principal Problem:    Weakness  Active Problems:    Dyslipidemia    Chronic kidney disease, stage 4 (severe) (CMS/HCC)    Fibromyalgia    Chronic lymphocytic leukemia (CMS/HCC)    Benign essential hypertension    Congestive heart failure with left ventricular diastolic dysfunction, acute on chronic (CMS/HCC)    Atrial fibrillation (CMS/HCC)                  Aislinn Napoles MD

## 2023-12-25 NOTE — NURSING NOTE
Assumed care of this patient. Has C-diff, isolation in place, bladder scan was performed after she voided. Results were 535ml so a 16 [place] quinn was placed. 300ml immediately drained from her quinn following insertion. Urine was collected and sent to lab for testing. Oriented to room and call light placed at her bedside. On cardizem drip running at 5ml/hr. Hard wire monitor connected to obtain blood pressures and heart rate hourly. Resting in bed

## 2023-12-25 NOTE — PROGRESS NOTES
Physical Therapy    Physical Therapy Evaluation & Treatment    Patient Name: Tracie Baltazar  MRN: [id number]  Today's Date: 12/25/2023   Time Calculation  Start Time: 1010  Stop Time: 1040  Time Calculation (min): 30 min    Assessment/Plan   PT Assessment  PT Assessment Results: Decreased strength, Decreased endurance, Impaired balance, Decreased mobility, Impaired judgement, Decreased safety awareness, Impaired hearing  Rehab Prognosis: Good  Evaluation/Treatment Tolerance: Patient tolerated treatment well  Medical Staff Made Aware: Yes  Strengths: Ability to acquire knowledge, Premorbid level of function, Support of extended family/friends  Barriers to Participation: Insight into problems  End of Session Communication: Bedside nurse  Assessment Comment: Pt demonstrates impaired functional mobility from baseline level; pt with BLE weakness, impaired balance, decreased overall activity tolerance, and impaired safety awareness; pt is a high falls risk at this time and would benefit from mod intensity therapy needs upon d/c.  End of Session Patient Position: Up in chair   IP OR SWING BED PT PLAN  Inpatient or Swing Bed: Inpatient  PT Plan  Treatment/Interventions: Bed mobility, Transfer training, Gait training, Stair training, Balance training, Neuromuscular re-education, Strengthening, Endurance training, Therapeutic exercise, Therapeutic activity  PT Plan: Skilled PT  PT Frequency: 4 times per week  PT Discharge Recommendations: Moderate intensity level of continued care  Equipment Recommended upon Discharge: Wheeled walker  PT Recommended Transfer Status: Assist x1  PT - OK to Discharge: Yes    Subjective     General Visit Information:  General  Reason for Referral: impaired functional mobility (Pt is an 83 year-old F admitted from home with sudden weakness in legs while walking. Pt recently d/c from [facility] on 12/[facility]/23.)  Referred By: Dr. Sarbjit MD  Past Medical History Relevant to Rehab: CKD, fibromyalgia, CLL,  HTN, CHF, obesity, OA, DJD, depression  Family/Caregiver Present: No  Prior to Session Communication: Bedside nurse  Patient Position Received: Bed, 3 rail up, Alarm on  Preferred Learning Style: verbal  General Comment: Pt cleared for therapy via RN, received in supine, NAD, agreeable to participate in therapy. (+) IV, [place], telemetry, 2L O2 via NC  Home Living:  Home Living  Type of Home: House  Lives With: Adult children (son (who has MS); other children local)  Home Adaptive Equipment: Walker rolling or standard, Cane  Home Layout: Two level, Able to live on main level with bedroom/bathroom  Home Access: Stairs to enter with rails  Entrance Stairs-Rails: Right  Entrance Stairs-Number of Steps: 2  Bathroom Shower/Tub: Walk-in shower  Bathroom Toilet: Standard  Bathroom Equipment: Grab bars in shower, Shower chair with back  Prior Level of Function:  Prior Function Per Pt/Caregiver Report  Level of [place]: Independent with ADLs and functional transfers, Needs assistance with homemaking  Receives Help From: Family  ADL Assistance: Independent  Homemaking Assistance: Needs assistance  Driving/Transportation: Family/Friend  Homemaking Assistance Comments: assist from family for iADLs  Ambulatory Assistance: Independent (mod indep with rollator vs SC)  Vocational: Retired  Precautions:  Precautions  Hearing/Visual Limitations: glasses; B hearing aids; [place]  Medical Precautions: Fall precautions, Infection precautions, Oxygen therapy device and L/min ((+) C-diff; 2L O2 via NC)  Vital Signs:  Vital Signs  Heart Rate: 91    Objective   Pain:  Pain Assessment  Pain Assessment: 0-10  Pain Score: 0 - No pain  Cognition:  Cognition  Overall Cognitive Status: Within Functional Limits  Orientation Level: Oriented X4  Insight: Mild  Processing Speed: Delayed    General Assessments:     Activity Tolerance  Endurance: Tolerates 10 - 20 min exercise with multiple rests    Sensation  Light Touch: No apparent  deficits    Strength  Strength Comments: BLE > 3+/5  Strength  Strength Comments: BLE > 3+/5    Coordination  Movements are Fluid and Coordinated: Yes  Coordination Comment: decreased rate of movements    Postural Control  Postural Control: Impaired  Posture Comment: forward head, rounded shoulders    Static Sitting Balance  Static Sitting-Balance Support: No upper extremity supported  Static Sitting-Level of Assistance: Minimum assistance    Static Standing Balance  Static Standing-Balance Support: Bilateral upper extremity supported  Static Standing-Level of Assistance: Moderate assistance  Functional Assessments:  ADL  ADL's Addressed: Yes  ADL Comments: pt incontinent of BM upon arrival; assisted PCA with celsa-area cleanse and bedding change; mod assist x 1 to roll to L/R; pt able to maintain static L/R sidelying with use of bedrails and min assist x 1 to steady    Bed Mobility  Bed Mobility: Yes  Bed Mobility 1  Bed Mobility 1: Rolling right, Rolling left  Level of Assistance 1: Moderate assistance  Bed Mobility Comments 1: assisted with rolling for celsa-area cleanse  Bed Mobility 2  Bed Mobility  2: Supine to sitting  Level of Assistance 2: Moderate assistance  Bed Mobility Comments 2: assist for trunk elevation and advancement of BLE off EOB    Transfers  Transfer: Yes  Transfer 1  Transfer From 1: Sit to  Transfer to 1: Stand  Technique 1: Sit to stand  Transfer Device 1: Walker  Transfer Level of Assistance 1: Moderate assistance  Trials/Comments 1: assist for forward weight shift and balance, cueing for proper hand placement  Transfers 2  Transfer From 2: Stand to  Transfer to 2: Sit  Technique 2: Stand to sit  Transfer Device 2: Walker  Transfer Level of Assistance 2: Moderate assistance  Trials/Comments 2: assist for eccentric lowering into chair, cueing for sequencing    Ambulation/Gait Training  Ambulation/Gait Training Performed: Yes  Ambulation/Gait Training 1  Surface 1: Level tile  Device 1:  Rolling walker  Assistance 1: Moderate assistance  Quality of Gait 1: Forward flexed posture, Shuffling gait, Decreased step length  Comments/Distance (ft) 1: 3-4 steps to bedside chair    Stairs  Stairs: No       Extremity/Trunk Assessments:  RLE   RLE : Within Functional Limits  LLE   LLE : Within Functional Limits  Treatments:     Bed Mobility  Bed Mobility: Yes  Bed Mobility 1  Bed Mobility 1: Rolling right, Rolling left  Level of Assistance 1: Moderate assistance  Bed Mobility Comments 1: assisted with rolling for celsa-area cleanse  Bed Mobility 2  Bed Mobility  2: Supine to sitting  Level of Assistance 2: Moderate assistance  Bed Mobility Comments 2: assist for trunk elevation and advancement of BLE off EOB    Ambulation/Gait Training  Ambulation/Gait Training Performed: Yes  Ambulation/Gait Training 1  Surface 1: Level tile  Device 1: Rolling walker  Assistance 1: Moderate assistance  Quality of Gait 1: Forward flexed posture, Shuffling gait, Decreased step length  Comments/Distance (ft) 1: 3-4 steps to bedside chair  Transfers  Transfer: Yes  Transfer 1  Transfer From 1: Sit to  Transfer to 1: Stand  Technique 1: Sit to stand  Transfer Device 1: Walker  Transfer Level of Assistance 1: Moderate assistance  Trials/Comments 1: assist for forward weight shift and balance, cueing for proper hand placement  Transfers 2  Transfer From 2: Stand to  Transfer to 2: Sit  Technique 2: Stand to sit  Transfer Device 2: Walker  Transfer Level of Assistance 2: Moderate assistance  Trials/Comments 2: assist for eccentric lowering into chair, cueing for sequencing    Stairs  Stairs: No    Outcome Measures:  [facility] Basic Mobility  Turning from your back to your side while in a flat bed without using bedrails: A lot  Moving from lying on your back to sitting on the side of a flat bed without using bedrails: A lot  Moving to and from bed to chair (including a wheelchair): A lot  Standing up from a chair using your arms (e.g.  wheelchair or bedside chair): A lot  To walk in hospital room: A lot  Climbing 3-5 steps with railing: Total  Basic Mobility - Total Score: 11    Encounter Problems       Encounter Problems (Active)       Mobility       STG - Patient will ambulate 50' with rolling walker and modified independence. (Progressing)       Start:  12/25/23    Expected End:  01/08/24            STG - Patient will ascend and descend two stairs with use of one handrail and supervision for safety. (Not Progressing)       Start:  12/25/23    Expected End:  01/08/24               Safety       STG - Patient uses call light consistently to request assistance with transfers (Progressing)       Start:  12/25/23    Expected End:  01/08/24               Transfers       STG - Patient to transfer to and from sit to supine with independence. (Progressing)       Start:  12/25/23    Expected End:  01/08/24            STG - Patient will transfer sit to and from stand with rolling walker and modified independence. (Progressing)       Start:  12/25/23    Expected End:  01/08/24                   Education Documentation  Handouts, taught by Carissa Cagle PT at 12/25/2023 11:35 AM.  Learner: Patient  Readiness: Acceptance  Method: Explanation, Demonstration  Response: Needs Reinforcement    Precautions, taught by Carissa Cagle PT at 12/25/2023 11:35 AM.  Learner: Patient  Readiness: Acceptance  Method: Explanation, Demonstration  Response: Needs Reinforcement    Body Mechanics, taught by Carissa Cagle PT at 12/25/2023 11:35 AM.  Learner: Patient  Readiness: Acceptance  Method: Explanation, Demonstration  Response: Needs Reinforcement    Home Exercise Program, taught by Carissa Cagle PT at 12/25/2023 11:35 AM.  Learner: Patient  Readiness: Acceptance  Method: Explanation, Demonstration  Response: Needs Reinforcement    Mobility Training, taught by Carissa Cagle PT at 12/25/2023 11:35 AM.  Learner: Patient  Readiness: Acceptance  Method:  Explanation, Demonstration  Response: Needs Reinforcement    Education Comments  No comments found.

## 2023-12-25 NOTE — PROGRESS NOTES
Informed late this shift by RN that Cdiff positive. Started oral Vancomycin and pt placed in isolation. Will monitor

## 2023-12-25 NOTE — CARE PLAN
The patient's goals for the shift include FEEL BETTER    The clinical goals for the shift include go home    Over the shift, the patient did not make progress toward the following goals. Barriers to progression include incontinence. Recommendations to address these barriers include request pt to call for bedpan.

## 2023-12-25 NOTE — CONSULTS
Inpatient consult to Infectious Diseases  Consult performed by: Hipolito Elias MD  Consult ordered by: Aislinn Napoles MD            Primary MD: Javier Floyd MD    Reason For Consult  C. difficile infection    History Of Present Illness  Tracie Baltazar is a 83 y.o. female presenting with generalized weakness.  She had difficulty standing up on day of admission.  She was brought to the hospital for further evaluation and management.  She has a background history of chronic kidney disease,  She also has history of CLL.  She had interval development of diarrhea and tested positive for C. difficile infection.  She is on oral vancomycin.  Had 2 sons were present during her evaluation.     Past Medical History  She has a past medical history of Asthma, Essential (primary) hypertension, Kidney failure, Leukemia (CMS/HCC), and Rheumatoid arthritis (CMS/[facility]).    Surgical History  She has a past surgical history that includes Hysterectomy; Back surgery; Cholecystectomy; Other surgical history; Knee surgery; Shoulder surgery (Left); and Total knee arthroplasty (Left, 02/13/2014).     Social History     Occupational History    Not on file   Tobacco Use    Smoking status: Never    Smokeless tobacco: Never   Vaping Use    Vaping Use: Never used   Substance and Sexual Activity    Alcohol use: Not Currently    Drug use: Never    Sexual activity: Not on file     Travel History   Travel since 11/25/23    No documented travel since 11/25/23              Family History  Family History   Problem Relation Name Age of Onset    Emphysema Mother      Emphysema Father      Heart attack Sister      Lung cancer Brother          Agent orange    Lymphoma Son      Multiple sclerosis Son       Allergies  Ibuprofen     Immunization History   Administered Date(s) Administered    Flu vaccine, quadrivalent, high-dose, preservative free, age 65y+ (FLUZONE) 10/08/2020, 11/08/2022, 10/30/2023    Influenza, High Dose Seasonal,  Preservative Free 09/05/2015, 11/30/2017, 10/25/2018, 11/04/2019    Influenza, seasonal, injectable 11/22/2011    Moderna COVID-19 vaccine, Fall 2023, 12 yeasrs and older (50mcg/0.5mL) 10/30/2023    Pfizer COVID-19 vaccine, bivalent, age 12 years and older (30 mcg/0.3 mL) 12/22/2022    Pfizer Purple Cap SARS-CoV-2 03/03/2021, 03/24/2021, 09/25/2021    Pneumococcal polysaccharide vaccine, 23-valent, age 2 years and older (PNEUMOVAX 23) 01/01/2015, 09/05/2015, 11/30/2017    RSV, 60 Years And Older (AREXVY) 10/30/2023    Zoster vaccine, recombinant, adult (SHINGRIX) 11/04/2019, 02/26/2020     Medications  Home medications:  Medications Prior to Admission   Medication Sig Dispense Refill Last Dose    acetaminophen (Tylenol) 325 mg tablet    Unknown    amitriptyline (Elavil) 10 mg tablet    Unknown    cholecalciferol (Vitamin D-3) 50 MCG (2000 UT) tablet Vitamin D 50 MCG (2000 UT) Oral Tablet   Refills: 0       Active   Unknown    cholestyramine-aspartame (Prevalite) 4 gram packet    Unknown    cream base no.135, bulk, (SaltStable LS) cream Salt Stable LS Advanced External Cream   Quantity: 240  Refills: 0        Start : 15-Oct-2015   Active   Unknown    cyanocobalamin (Vitamin B-12) 100 mcg tablet = 1 tab(s) ( 100 mcg ), Oral, daily, 0 Refill(s), Type: Maintenance   Unknown    DULoxetine (Cymbalta) 20 mg DR capsule Take 1 capsule (20 mg) by mouth once daily. Do not crush or chew. Do not start before December 20, 2023. 30 capsule 0 Unknown    folic acid (Folvite) 400 mcg tablet = 1 tab(s) ( 0.4 mg ), Oral, daily, 0 Refill(s), Type: Maintenance   Unknown    furosemide (Lasix) 20 mg tablet Take 1 tablet (20 mg) by mouth once daily. Do not start before December 21, 2023. 30 tablet 0 Unknown    gabapentin (Neurontin) 300 mg capsule Take 1 capsule (300 mg) by mouth 2 times a day. 60 capsule 0 Unknown    hydrALAZINE (Apresoline) 10 mg tablet Take 2 tablets (20 mg) by mouth 3 times a day.   Unknown    L.  acidophilus/L.bulgaricus (lactobacillus acidoph-lactobacillus bulgar) 0.5 million cell tablet chewable split tablet    Unknown    levothyroxine (Synthroid, Levoxyl) 150 mcg tablet Take 1 tablet (150 mcg) by mouth early in the morning.. Do not start before December 20, 2023. 30 tablet 0 Unknown    pantoprazole (ProtoNix) 40 mg EC tablet one bid for 10d, then one qd thereafter   Unknown    simvastatin (Zocor) 10 mg tablet = 1 tab(s) ( 10 mg ), Oral, hs, # 90 tab(s), 3 Refill(s), Type: Maintenance, Pharmacy: [id number], 1 tab(s) Oral hs, 61, in, 12/15/22 10:06:00 EST, Height Measured, 182, lb, 12/15/22 10:06:00 EST, Weight Measured   Unknown    sodium bicarbonate 650 mg tablet Take 1 tablet (650 mg) by mouth 3 times a day. 90 tablet 0 Unknown     Current medications:  Scheduled medications  amitriptyline, 10 mg, oral, Nightly  DULoxetine, 20 mg, oral, Daily  folic acid, 1 mg, oral, Daily  gabapentin, 300 mg, oral, BID  heparin (porcine), 5,000 Units, subcutaneous, q8h  lactobacillus acidophilus, 1 tablet, oral, BID with meals  levothyroxine, 150 mcg, oral, Daily  potassium chloride CR, 40 mEq, oral, Daily  simvastatin, 10 mg, oral, Nightly  sodium bicarbonate, 650 mg, oral, TID  vancomycin, 125 mg, oral, 4x daily      Continuous medications  sodium chloride 0.9%, 60 mL/hr, Last Rate: 60 mL/hr (12/25/23 0850)      PRN medications  PRN medications: acetaminophen **OR** acetaminophen **OR** acetaminophen, benzocaine-menthol, dextromethorphan-guaifenesin, guaiFENesin    Review of Systems   Gastrointestinal:  Positive for diarrhea. Negative for abdominal pain.   All other systems reviewed and are negative.       Objective  Range of Vitals (last 24 hours)  Heart Rate:  []   Temp:  [36.6 °C (97.9 °F)-37.5 °C (99.5 °F)]   Resp:  [13-21]   BP: ()/(38-68)   SpO2:  [92 %-96 %]   Daily Weight  12/24/23 : 83.6 kg (184 lb 4.9 oz)    Body mass index is 32.66 kg/m².     Physical Exam  Constitutional:        "Appearance: Normal appearance.   HENT:      Head: Normocephalic and atraumatic.      Nose: Nose normal.   Eyes:      Extraocular Movements: Extraocular movements intact.      Conjunctiva/sclera: Conjunctivae normal.   Cardiovascular:      Rate and Rhythm: Regular rhythm.      Heart sounds: Normal heart sounds.   Abdominal:      General: Bowel sounds are normal.      Palpations: Abdomen is soft.   Musculoskeletal:      Cervical back: Normal range of motion and neck supple.   Skin:     General: Skin is warm and dry.   Neurological:      Mental Status: She is alert and oriented to person, place, and time.          Relevant Results  Outside Hospital Results    Labs  Results from last 72 hours   Lab Units 12/25/23  0443 12/24/23  0408 12/23/23  1116   WBC AUTO x10*3/uL 38.4* 44.1* 29.3*   HEMOGLOBIN g/dL 10.3* 11.1* 11.2*   HEMATOCRIT % 33.2* 36.1 36.9   PLATELETS AUTO x10*3/uL 248 299 306   LYMPHO PCT MAN %  --  55.0 77.0   MONO PCT MAN %  --  2.0 2.0   EOSINO PCT MAN %  --  0.0 0.0     Results from last 72 hours   Lab Units 12/25/23  0443 12/24/23  0408 12/23/23  1116   SODIUM mmol/L 139 139 144   POTASSIUM mmol/L 2.7* 3.5 4.0   CHLORIDE mmol/L 103 100 103   CO2 mmol/L 19* 22* 25   BUN mg/dL 38* 40* 34*   CREATININE mg/dL 2.80* 2.70* 2.20*   GLUCOSE mg/dL 111* 130* 108*   CALCIUM mg/dL 8.2* 9.4 9.3   ANION GAP mmol/L 17 17 16   EGFR mL/min/1.73m*2 16* 17* 22*   PHOSPHORUS mg/dL 3.6  --  3.7     Results from last 72 hours   Lab Units 12/24/23  0408 12/23/23  1116   ALK PHOS U/L 98  --    BILIRUBIN TOTAL mg/dL 0.2  --    PROTEIN TOTAL g/dL 6.8  --    ALT U/L 14  --    AST U/L 21  --    ALBUMIN g/dL 3.8 3.9     Estimated Creatinine Clearance: 15.6 mL/min (A) (by C-G formula based on SCr of 2.8 mg/dL (H)).  No results found for: \"CRP\", \"SEDRATE\"  No results found for: \"HIV1X2\", \"HIVCONF\", \"[id number]\"  No results found for: \"HEPCABINIT\", \"HEPCAB\", \"HCVPCRQUANT\"  Microbiology  Reviewed    Imaging  US renal complete    Result " Date: 12/24/2023  Interpreted By:  Bruno Bryant, STUDY: US RENAL COMPLETE; 12/24/2023 7:50 pm   INDICATION: Signs/Symptoms:JANET on CKD. /history of CLL and hypertension   COMPARISON: None.   ACCESSION NUMBER(S): [id number]   ORDERING CLINICIAN: SERJIO DEVINE   TECHNIQUE: Grayscale and color Doppler imaging of the kidneys.   FINDINGS: The right kidney measures 9.3 cm x 4.1 cm x 4.4 cm cm. The left kidney measures 7.8 cm x 5.0 cm x 3.7 cm cm. There is no shadowing calculus, hydronephrosis, or solid mass identified. However, there is a 2.6 x 2.5 x 2.4 cm inferior pole hypoechoic renal cyst without internal color Doppler flow. Renal cortical thinning and increased echogenicity of the renal parenchyma is noted.   The bladder is grossly unremarkable.       1. No hydronephrosis or nephrolithiasis. 2. Medical renal disease. 3 left renal cyst.     MACRO: None.   Signed by: Bruno Bryant 12/24/2023 8:19 PM Dictation workstation:   [id number]    XR chest 1 view    Result Date: 12/24/2023  Interpreted By:  Nick Luciano, STUDY: XR CHEST 1 VIEW; 12/24/2023 4:15 am   INDICATION: CLINICAL INFORMATION: Signs/Symptoms:weakness.   COMPARISON: 12/18/2020   ACCESSION NUMBER(S): [id number]   ORDERING CLINICIAN: LESLYE FRANK   TECHNIQUE: Portable chest one view.   FINDINGS: The cardiac size is indeterminate in view of the AP projection. Neurostimulator overlies the thoracic spine. No infiltrates or effusions are identified.  The aorta is tortuous.       No acute pathologic findings are identified.  There is no interval change when compared to the previous examination.   MACRO: none   Signed by: Nick Luciano 12/24/2023 9:11 AM Dictation workstation:   [id number]    XR chest 2 views    Result Date: 12/18/2023  Interpreted By:  Alex Mandel, STUDY: XR CHEST 2 VIEWS; 12/18/2023 8:06 am   INDICATION: Signs/Symptoms:sob   COMPARISON: 12/13/2023   ACCESSION NUMBER(S): [id number]   ORDERING CLINICIAN: SRIKANTH VILCHIS    TECHNIQUE: PA and LAT views of the chest were obtained.   FINDINGS: The cardiomediastinal silhouette is unremarkable. The lungs are clear. No pleural effusion is identified.   The osseous structures are intact. Spinal cord stimulator leads are again seen at the mid and lower thoracic level.       No acute cardiopulmonary process.     Signed by: Alex Mandel 12/18/2023 8:30 AM Dictation workstation:   [id number]    Transthoracic Echo (TTE) Complete    Result Date: 12/15/2023           [place] [id number]            Phone [phone] TRANSTHORACIC ECHOCARDIOGRAM REPORT  Patient Name:      MARK GABRIEL  Reading Physician:    [id number] Elvis Bruce DO Study Date:        12/15/2023          Ordering Provider:    [id number] ABDULLAHI VANESSA MRN/PID:           [id number]            Fellow: Accession#:        [id number]        Nurse: Date of Birth/Age: 1940 / 83 years Sonographer:          Kristen WESTFALL Gender:            F                   Additional Staff: Height:            154.94 cm           Admit Date:           12/14/2023 Weight:            82.10 kg            Admission Status:     Inpatient - Routine BSA:               1.81 m2             Department Location: Blood Pressure: 150 /47 mmHg Study Type:    TRANSTHORACIC ECHO (TTE) COMPLETE Diagnosis/ICD: Shortness of breath-R06.02 Indication:    Shortness of Breath CPT Codes:     Echo Complete w Full [id number] Patient History: Pertinent History: CKD CHF Depression HTN CP Murmur Dyslipidemia Varicose veins                    of leg with swellin ghyperlipidemia hypercholesterolemia. Study Detail: The following Echo studies were performed: 2D, Doppler, M-Mode,               color flow and 3D.  PHYSICIAN INTERPRETATION: Left Ventricle: Left ventricular systolic function is normal, with an estimated ejection fraction of 60-65%. There are no  regional wall motion abnormalities. The left ventricular cavity size is normal. Left ventricular diastolic filling was indeterminate. Left Atrium: The left atrium is normal in size. Right Ventricle: The right ventricle is normal in size. There is normal right ventricular global systolic function. Right Atrium: The right atrium is normal in size. Aortic Valve: The aortic valve is trileaflet. There is mild aortic valve cusp calcification. There is no evidence of aortic valve regurgitation. The peak instantaneous gradient of the aortic valve is 12.0 mmHg. Mitral Valve: The mitral valve is normal in structure. There is trace to mild mitral valve regurgitation. Tricuspid Valve: The tricuspid valve is structurally normal. No evidence of tricuspid regurgitation. Pulmonic Valve: The pulmonic valve is not well visualized. There is trace to mild pulmonic valve regurgitation. Pericardium: There is no pericardial effusion noted. Aorta: The aortic root is normal. Systemic Veins: The inferior vena cava appears to be of normal size. There is IVC inspiratory collapse greater than 50%.  CONCLUSIONS:  1. Left ventricular systolic function is normal with a 60-65% estimated ejection fraction.  2. Left ventricular diastolic filling indeterminate.  3. Mild aortic valve sclerosis. QUANTITATIVE DATA SUMMARY: 2D MEASUREMENTS:                          Normal Ranges: LAs:           3.00 cm   (2.7-4.0cm) IVSd:          0.85 cm   (0.6-1.1cm) LVPWd:         1.00 cm   (0.6-1.1cm) LVIDd:         4.23 cm   (3.9-5.9cm) LVIDs:         2.79 cm LV Mass Index: 69.0 g/m2 LV % FS        34.0 % LA VOLUME:                               Normal Ranges: LA Vol A2C:        59.3 ml LA Vol Index A2C:  32.8 ml/m2 LA Area A2C:       18.7 cm2 LA Major Axis A2C: 5.0 cm LA Volume Index:   29.6 ml/m2 LA Vol A2C:        59.7 ml RA VOLUME BY A/L METHOD:                               Normal Ranges: RA Vol A4C:        32.1 ml    (8.3-19.5ml) RA Vol Index A4C:  17.7 ml/m2  RA Area A4C:       13.6 cm2 RA Major Axis A4C: 4.9 cm M-MODE MEASUREMENTS:                  Normal Ranges: Ao Root: 2.20 cm (2.0-3.7cm) LAs:     4.80 cm (2.7-4.0cm) AORTA MEASUREMENTS:                    Normal Ranges: Asc Ao, d: 3.10 cm (2.1-3.4cm) LV SYSTOLIC FUNCTION BY 2D PLANIMETRY (MOD):                     Normal Ranges: EF-A4C View: 51.6 % (>=55%) EF-A2C View: 57.9 % EF-Biplane:  55.8 % LV DIASTOLIC FUNCTION:                            Normal Ranges: MV Peak E:      0.93 m/s   (0.7-1.2 m/s) MV Peak A:      1.02 m/s   (0.42-0.7 m/s) E/A Ratio:      0.91       (1.0-2.2) MV e'           0.08 m/s   (>8.0) MV lateral e'   0.08 m/s MV medial e'    0.07 m/s E/e' Ratio:     12.39      (<8.0) PulmV Sys Siddhartha:  68.10 cm/s PulmV Urrutia Siddhartha: 47.60 cm/s PulmV S/D Siddhartha:  1.40 MITRAL VALVE:                      Normal Ranges: MV Vmax:    1.22 m/s (<=1.3m/s) MV peak P.0 mmHg (<5mmHg) MV mean P.0 mmHg (<48mmHg) MV DT:      262 msec (150-240msec) AORTIC VALVE:                          Normal Ranges: AoV Vmax:      1.73 m/s  (<=1.7m/s) AoV Peak P.0 mmHg (<20mmHg) LVOT Max Siddhartha:  1.21 m/s  (<=1.1m/s) LVOT VTI:      29.10 cm LVOT Diameter: 2.00 cm   (1.8-2.4cm) AoV Area,Vmax: 2.20 cm2  (2.5-4.5cm2)  RIGHT VENTRICLE: RV Basal 3.18 cm RV Mid   2.70 cm RV Major 5.2 cm TAPSE:   16.5 mm RV s'    0.13 m/s TRICUSPID VALVE/RVSP:                   Normal Ranges: IVC Diam: 1.75 cm PULMONIC VALVE:                         Normal Ranges: PV Accel Time: 69 msec  (>120ms) PV Max Siddhartha:    1.4 m/s  (0.6-0.9m/s) PV Max P.3 mmHg Pulmonary Veins: PulmV Urrutia Siddhartha: 47.60 cm/s PulmV S/D Siddhartha:  1.40 PulmV Sys Siddhartha:  68.10 cm/s  [id number] Elvis Bruce DO Electronically signed on 12/15/2023 at 10:13:13 AM  ** Final **     CT head wo IV contrast    Result Date: 2023  Interpreted By:  Rashawn Hook, STUDY: CT HEAD WO IV CONTRAST; 2023 10:34 am   INDICATION: Signs/Symptoms:lightheadedness, near syncope.   COMPARISON: No comparison  exams available.   ACCESSION NUMBER(S): [id number]   ORDERING CLINICIAN: SLY DAWKINS   TECHNIQUE: CT axial images through the Brain were obtained without contrast.   FINDINGS: Acute ischemic change: None.   Hemorrhage: No evidence of acute intracranial hemorrhage.   Mass Effect / Mass Lesion: No significant mass effect. There is no evidence of an intracranial mass or extraaxial fluid collection.   Chronic ischemic change: Patchy foci of  low attenuation coefficient are present within white matter which is a nonspecific finding but likely represents moderate microvascular ischemia.   Parenchyma: There is no significant volume loss. The brain parenchyma is otherwise within normal limits for age.   Ventricles: Normal caliber and morphology.   Other: There is calcification involving the carotid siphons.       No acute intracranial process   All CT examinations are performed with 1 or more of the following dose reduction techniques: Automated exposure control, adjustment of mA and/or kv according to patient's size, or use of iterative reconstruction techniques.   MACRO: none   Signed by: Rashawn Hook 12/14/2023 10:48 AM Dictation workstation:   [id number]    XR chest 2 views    Result Date: 12/13/2023  Interpreted By:  Nj Zazueta, STUDY: XR CHEST 2 VIEWS; 12/13/2023 2:47 pm   INDICATION: Signs/Symptoms:SOB dizziness   COMPARISON: Chest radiograph 06/09/2020   ACCESSION NUMBER(S): [id number]   ORDERING CLINICIAN: LA SAEED   TECHNIQUE: AP and lateral views of the chest performed.   FINDINGS: Cardiac size is indeterminate due to the AP projection. Calcifications are seen at the aortic knob. Thoracic aorta is tortuous in appearance. Mild prominence of the pulmonary vasculature. Faint bibasilar hazy opacities likely reflect atelectasis. No focal consolidation, large pleural effusion, or visible pneumothorax. Multilevel discogenic degenerative changes of the thoracic spine. Presumed neurostimulator leads  overlying the thoracic spine.       Mild prominence of the pulmonary vasculature, as can be seen with pulmonary vascular congestion. Please correlate for corresponding symptoms.   No focal consolidation or visible pneumothorax.   Faint bibasilar atelectasis suspected.   MACRO: None.   Signed by: Nj Zazueta 12/13/2023 3:04 PM Dictation workstation:   [id number]     Assessment/Plan   Acute kidney injury  Leukocytosis-multifactorial  CLL  C. difficile infection    Continue oral vancomycin  Monitor renal function  Contact plus precautions  Questran  Supportive care  Monitor temperature and WBC      Hipolito Elias MD

## 2023-12-25 NOTE — CARE PLAN
The patient's goals for the shift include FEEL BETTER    The clinical goals for the shift include GO HOME    Over the shift, the patient did not make progress toward the following goals. Barriers to progression include n/a. Recommendations to address these barriers include rest.  .

## 2023-12-26 ENCOUNTER — APPOINTMENT (OUTPATIENT)
Dept: RADIOLOGY | Facility: HOSPITAL | Age: 83
DRG: 371 | End: 2023-12-26
Payer: MEDICARE

## 2023-12-26 ENCOUNTER — APPOINTMENT (OUTPATIENT)
Dept: CARDIOLOGY | Facility: HOSPITAL | Age: 83
DRG: 371 | End: 2023-12-26
Payer: MEDICARE

## 2023-12-26 LAB
ALBUMIN SERPL-MCNC: 2.8 G/DL (ref 3.5–5)
ALP BLD-CCNC: 69 U/L (ref 35–125)
ALT SERPL-CCNC: 11 U/L (ref 5–40)
ANION GAP SERPL CALC-SCNC: 12 MMOL/L
AST SERPL-CCNC: 16 U/L (ref 5–40)
BASOPHILS # BLD MANUAL: 0.75 X10*3/UL (ref 0–0.1)
BASOPHILS NFR BLD MANUAL: 2 %
BILIRUB SERPL-MCNC: <0.2 MG/DL (ref 0.1–1.2)
BUN SERPL-MCNC: 38 MG/DL (ref 8–25)
C DIFF TOX A+B STL QL IA: POSITIVE
CALCIUM SERPL-MCNC: 8.2 MG/DL (ref 8.5–10.4)
CHLORIDE SERPL-SCNC: 107 MMOL/L (ref 97–107)
CO2 SERPL-SCNC: 21 MMOL/L (ref 24–31)
CREAT SERPL-MCNC: 2.8 MG/DL (ref 0.4–1.6)
EOSINOPHIL # BLD MANUAL: 0.37 X10*3/UL (ref 0–0.4)
EOSINOPHIL NFR BLD MANUAL: 1 %
ERYTHROCYTE [DISTWIDTH] IN BLOOD BY AUTOMATED COUNT: 13.6 % (ref 11.5–14.5)
GFR SERPL CREATININE-BSD FRML MDRD: 16 ML/MIN/1.73M*2
GLUCOSE SERPL-MCNC: 89 MG/DL (ref 65–99)
HCT VFR BLD AUTO: 30.2 % (ref 36–46)
HGB BLD-MCNC: 9.4 G/DL (ref 12–16)
IMM GRANULOCYTES # BLD AUTO: 0.22 X10*3/UL (ref 0–0.5)
IMM GRANULOCYTES NFR BLD AUTO: 0.6 % (ref 0–0.9)
LYMPHOCYTES # BLD MANUAL: 19.4 X10*3/UL (ref 0.8–3)
LYMPHOCYTES NFR BLD MANUAL: 52 %
MAGNESIUM SERPL-MCNC: 2 MG/DL (ref 1.6–3.1)
MCH RBC QN AUTO: 31.6 PG (ref 26–34)
MCHC RBC AUTO-ENTMCNC: 31.1 G/DL (ref 32–36)
MCV RBC AUTO: 102 FL (ref 80–100)
MONOCYTES # BLD MANUAL: 1.87 X10*3/UL (ref 0.05–0.8)
MONOCYTES NFR BLD MANUAL: 5 %
NEUTROPHILS # BLD MANUAL: 13.81 X10*3/UL (ref 1.6–5.5)
NEUTS BAND # BLD MANUAL: 1.87 X10*3/UL (ref 0–0.5)
NEUTS BAND NFR BLD MANUAL: 5 %
NEUTS SEG # BLD MANUAL: 11.94 X10*3/UL (ref 1.6–5)
NEUTS SEG NFR BLD MANUAL: 32 %
NRBC BLD-RTO: 0 /100 WBCS (ref 0–0)
PLATELET # BLD AUTO: 227 X10*3/UL (ref 150–450)
POTASSIUM SERPL-SCNC: 3.2 MMOL/L (ref 3.4–5.1)
PROT SERPL-MCNC: 5.2 G/DL (ref 5.9–7.9)
RBC # BLD AUTO: 2.97 X10*6/UL (ref 4–5.2)
RBC MORPH BLD: ABNORMAL
SODIUM SERPL-SCNC: 140 MMOL/L (ref 133–145)
TOTAL CELLS COUNTED BLD: 100
VARIANT LYMPHS # BLD MANUAL: 1.12 X10*3/UL (ref 0–0.3)
VARIANT LYMPHS NFR BLD: 3 %
WBC # BLD AUTO: 37.3 X10*3/UL (ref 4.4–11.3)

## 2023-12-26 PROCEDURE — 96372 THER/PROPH/DIAG INJ SC/IM: CPT | Performed by: INTERNAL MEDICINE

## 2023-12-26 PROCEDURE — 36415 COLL VENOUS BLD VENIPUNCTURE: CPT | Performed by: INTERNAL MEDICINE

## 2023-12-26 PROCEDURE — 97165 OT EVAL LOW COMPLEX 30 MIN: CPT | Mod: GO

## 2023-12-26 PROCEDURE — 80053 COMPREHEN METABOLIC PANEL: CPT | Performed by: INTERNAL MEDICINE

## 2023-12-26 PROCEDURE — 85007 BL SMEAR W/DIFF WBC COUNT: CPT | Performed by: INTERNAL MEDICINE

## 2023-12-26 PROCEDURE — 85027 COMPLETE CBC AUTOMATED: CPT | Performed by: INTERNAL MEDICINE

## 2023-12-26 PROCEDURE — 2500000004 HC RX 250 GENERAL PHARMACY W/ HCPCS (ALT 636 FOR OP/ED): Performed by: INTERNAL MEDICINE

## 2023-12-26 PROCEDURE — 93017 CV STRESS TEST TRACING ONLY: CPT

## 2023-12-26 PROCEDURE — 2500000001 HC RX 250 WO HCPCS SELF ADMINISTERED DRUGS (ALT 637 FOR MEDICARE OP): Performed by: INTERNAL MEDICINE

## 2023-12-26 PROCEDURE — 99221 1ST HOSP IP/OBS SF/LOW 40: CPT | Performed by: INTERNAL MEDICINE

## 2023-12-26 PROCEDURE — A9502 TC99M TETROFOSMIN: HCPCS | Performed by: INTERNAL MEDICINE

## 2023-12-26 PROCEDURE — 83735 ASSAY OF MAGNESIUM: CPT | Performed by: NURSE PRACTITIONER

## 2023-12-26 PROCEDURE — 2500000004 HC RX 250 GENERAL PHARMACY W/ HCPCS (ALT 636 FOR OP/ED): Performed by: NURSE PRACTITIONER

## 2023-12-26 PROCEDURE — 93016 CV STRESS TEST SUPVJ ONLY: CPT | Performed by: INTERNAL MEDICINE

## 2023-12-26 PROCEDURE — 3430000001 HC RX 343 DIAGNOSTIC RADIOPHARMACEUTICALS: Performed by: INTERNAL MEDICINE

## 2023-12-26 PROCEDURE — 78452 HT MUSCLE IMAGE SPECT MULT: CPT

## 2023-12-26 PROCEDURE — 97530 THERAPEUTIC ACTIVITIES: CPT | Mod: GP

## 2023-12-26 PROCEDURE — 99232 SBSQ HOSP IP/OBS MODERATE 35: CPT | Performed by: INTERNAL MEDICINE

## 2023-12-26 PROCEDURE — 2060000001 HC INTERMEDIATE ICU ROOM DAILY

## 2023-12-26 RX ORDER — POTASSIUM CHLORIDE 20 MEQ/1
40 TABLET, EXTENDED RELEASE ORAL ONCE
Status: COMPLETED | OUTPATIENT
Start: 2023-12-26 | End: 2023-12-26

## 2023-12-26 RX ORDER — REGADENOSON 0.08 MG/ML
0.4 INJECTION, SOLUTION INTRAVENOUS
Status: COMPLETED | OUTPATIENT
Start: 2023-12-26 | End: 2023-12-26

## 2023-12-26 RX ADMIN — GABAPENTIN 300 MG: 300 CAPSULE ORAL at 08:06

## 2023-12-26 RX ADMIN — POTASSIUM CHLORIDE 40 MEQ: 1500 TABLET, EXTENDED RELEASE ORAL at 08:06

## 2023-12-26 RX ADMIN — CHOLESTYRAMINE 4 G: 4 POWDER, FOR SUSPENSION ORAL at 17:00

## 2023-12-26 RX ADMIN — GABAPENTIN 300 MG: 300 CAPSULE ORAL at 20:48

## 2023-12-26 RX ADMIN — VANCOMYCIN HYDROCHLORIDE 125 MG: 125 CAPSULE ORAL at 20:48

## 2023-12-26 RX ADMIN — DULOXETINE HYDROCHLORIDE 20 MG: 20 CAPSULE, DELAYED RELEASE ORAL at 08:06

## 2023-12-26 RX ADMIN — TETROFOSMIN 32 MILLICURIE: 0.23 INJECTION, POWDER, LYOPHILIZED, FOR SOLUTION INTRAVENOUS at 11:30

## 2023-12-26 RX ADMIN — SODIUM CHLORIDE 60 ML/HR: 900 INJECTION, SOLUTION INTRAVENOUS at 01:01

## 2023-12-26 RX ADMIN — ACETAMINOPHEN 650 MG: 325 TABLET ORAL at 20:58

## 2023-12-26 RX ADMIN — SODIUM BICARBONATE 650 MG TABLET 650 MG: at 14:19

## 2023-12-26 RX ADMIN — SODIUM CHLORIDE 60 ML/HR: 900 INJECTION, SOLUTION INTRAVENOUS at 21:04

## 2023-12-26 RX ADMIN — LEVOTHYROXINE SODIUM 150 MCG: 0.15 TABLET ORAL at 06:06

## 2023-12-26 RX ADMIN — VANCOMYCIN HYDROCHLORIDE 125 MG: 125 CAPSULE ORAL at 06:06

## 2023-12-26 RX ADMIN — Medication 1 TABLET: at 16:31

## 2023-12-26 RX ADMIN — VANCOMYCIN HYDROCHLORIDE 125 MG: 125 CAPSULE ORAL at 14:17

## 2023-12-26 RX ADMIN — Medication 1 TABLET: at 08:06

## 2023-12-26 RX ADMIN — SODIUM BICARBONATE 650 MG TABLET 650 MG: at 08:06

## 2023-12-26 RX ADMIN — FOLIC ACID 1 MG: 1 TABLET ORAL at 08:06

## 2023-12-26 RX ADMIN — HEPARIN SODIUM 5000 UNITS: 5000 INJECTION, SOLUTION INTRAVENOUS; SUBCUTANEOUS at 08:06

## 2023-12-26 RX ADMIN — VANCOMYCIN HYDROCHLORIDE 125 MG: 125 CAPSULE ORAL at 16:31

## 2023-12-26 RX ADMIN — HEPARIN SODIUM 5000 UNITS: 5000 INJECTION, SOLUTION INTRAVENOUS; SUBCUTANEOUS at 23:38

## 2023-12-26 RX ADMIN — SODIUM BICARBONATE 650 MG TABLET 650 MG: at 20:48

## 2023-12-26 RX ADMIN — AMITRIPTYLINE HYDROCHLORIDE 10 MG: 10 TABLET, FILM COATED ORAL at 20:48

## 2023-12-26 RX ADMIN — SIMVASTATIN 10 MG: 10 TABLET, FILM COATED ORAL at 20:48

## 2023-12-26 RX ADMIN — POTASSIUM CHLORIDE 40 MEQ: 1500 TABLET, FILM COATED, EXTENDED RELEASE ORAL at 09:22

## 2023-12-26 RX ADMIN — TETROFOSMIN 11 MILLICURIE: 0.23 INJECTION, POWDER, LYOPHILIZED, FOR SOLUTION INTRAVENOUS at 07:10

## 2023-12-26 RX ADMIN — HEPARIN SODIUM 5000 UNITS: 5000 INJECTION, SOLUTION INTRAVENOUS; SUBCUTANEOUS at 16:31

## 2023-12-26 RX ADMIN — REGADENOSON 0.4 MG: 0.08 INJECTION, SOLUTION INTRAVENOUS at 11:31

## 2023-12-26 ASSESSMENT — COGNITIVE AND FUNCTIONAL STATUS - GENERAL
DRESSING REGULAR LOWER BODY CLOTHING: TOTAL
TURNING FROM BACK TO SIDE WHILE IN FLAT BAD: A LITTLE
STANDING UP FROM CHAIR USING ARMS: A LOT
DAILY ACTIVITIY SCORE: 16
MOVING TO AND FROM BED TO CHAIR: A LITTLE
WALKING IN HOSPITAL ROOM: A LOT
MOBILITY SCORE: 14
DAILY ACTIVITIY SCORE: 14
CLIMB 3 TO 5 STEPS WITH RAILING: A LOT
CLIMB 3 TO 5 STEPS WITH RAILING: TOTAL
DRESSING REGULAR UPPER BODY CLOTHING: A LITTLE
TOILETING: A LOT
PERSONAL GROOMING: A LITTLE
PERSONAL GROOMING: A LOT
WALKING IN HOSPITAL ROOM: A LOT
EATING MEALS: A LITTLE
MOVING FROM LYING ON BACK TO SITTING ON SIDE OF FLAT BED WITH BEDRAILS: A LITTLE
MOBILITY SCORE: 14
MOVING TO AND FROM BED TO CHAIR: A LOT
MOVING FROM LYING ON BACK TO SITTING ON SIDE OF FLAT BED WITH BEDRAILS: A LITTLE
STANDING UP FROM CHAIR USING ARMS: A LITTLE
DRESSING REGULAR UPPER BODY CLOTHING: A LITTLE
HELP NEEDED FOR BATHING: A LITTLE
TOILETING: A LOT
EATING MEALS: A LITTLE
DRESSING REGULAR LOWER BODY CLOTHING: A LITTLE
HELP NEEDED FOR BATHING: A LOT
TURNING FROM BACK TO SIDE WHILE IN FLAT BAD: A LOT

## 2023-12-26 ASSESSMENT — PAIN SCALES - GENERAL
PAINLEVEL_OUTOF10: 0 - NO PAIN
PAINLEVEL_OUTOF10: 4
PAINLEVEL_OUTOF10: 0 - NO PAIN
PAINLEVEL_OUTOF10: 0 - NO PAIN

## 2023-12-26 ASSESSMENT — PAIN - FUNCTIONAL ASSESSMENT
PAIN_FUNCTIONAL_ASSESSMENT: 0-10

## 2023-12-26 ASSESSMENT — ACTIVITIES OF DAILY LIVING (ADL)
BATHING_ASSISTANCE: MODERATE
ADL_ASSISTANCE: INDEPENDENT

## 2023-12-26 ASSESSMENT — PAIN DESCRIPTION - LOCATION: LOCATION: HEAD

## 2023-12-26 NOTE — CARE PLAN
The patient's goals for the shift include FEEL BETTER    The clinical goals for the shift include safety, incontinence management    Over the shift, the patient did not make progress toward the following goals. Barriers to progression include none. Recommendations to address these barriers include assist with ADLs  Problem: Pain  Goal: My pain/discomfort is manageable  Outcome: Progressing     Problem: Safety  Goal: Patient will be injury free during hospitalization  Outcome: Progressing  Goal: I will remain free of falls  Outcome: Progressing     Problem: Daily Care  Goal: Daily care needs are met  Outcome: Progressing     Problem: Psychosocial Needs  Goal: Demonstrates ability to cope with hospitalization/illness  Outcome: Progressing  Goal: Collaborate with me, my family, and caregiver to identify my specific goals  Outcome: Progressing     Problem: Discharge Barriers  Goal: My discharge needs are met  Outcome: Progressing     Problem: Skin  Goal: Decreased wound size/increased tissue granulation at next dressing change  Outcome: Progressing  Goal: Participates in plan/prevention/treatment measures  Outcome: Progressing  Goal: Prevent/manage excess moisture  Outcome: Progressing  Flowsheets (Taken 12/26/2023 9017)  Prevent/manage excess moisture:   Cleanse incontinence/protect with barrier cream   Moisturize dry skin  Goal: Prevent/minimize sheer/friction injuries  Outcome: Progressing  Goal: Promote/optimize nutrition  Outcome: Progressing  Goal: Promote skin healing  Outcome: Progressing   .

## 2023-12-26 NOTE — PROGRESS NOTES
[facility] attempted to reach the pt x2 by phone, call would not go through. [facility] called pts daughter Linda to discuss dc planning. Pt with a few recent hospitalizations, normally lives with her son in a multistory house however with first floor setup. Pt has been using a walker for longer distances however daughter stating family has told her she needs to use it inside from now on. Pt wears glasses and hearing aids, has a hx of a little anxiety. Pts PCP is Dr Floyd, prescriptions through [facility] on [facility] in [place]. Discussion around dc planning with the pts daughter stating they are supportive of pt going to SNF and the patient herself has told them if it is deemed necessary she will go. Linda provided choices of 1. [facility] - await review for possible acceptance 2. [facility] - no contract with pts insurance 3. [facility] - unable to accept. Await response from chelita Padgett needed.      12/26/23 1530   Discharge Planning   Patient expects to be discharged to: [facility] vs [facility] - precert needed

## 2023-12-26 NOTE — PROGRESS NOTES
Physical Therapy    Physical Therapy Treatment    Patient Name: Tracie Baltazar  MRN: [id number]  Today's Date: 12/26/2023  Time Calculation  Start Time: 1255  Stop Time: 1315  Time Calculation (min): 20 min       Assessment/Plan   PT Assessment  Rehab Prognosis: Good  Evaluation/Treatment Tolerance: Patient limited by fatigue  End of Session Communication: Bedside nurse  Assessment Comment: Slow progress regarding functional mobility;  tolerance to activity improving slowly;  fall risk.  End of Session Patient Position: On cart  PT Plan  Inpatient/Swing Bed or Outpatient: Inpatient  PT Plan  Treatment/Interventions: Bed mobility, Transfer training, Gait training, Stair training, Balance training, Neuromuscular re-education, Strengthening, Endurance training, Therapeutic exercise, Therapeutic activity  PT Plan: Skilled PT  PT Frequency: 4 times per week  PT Discharge Recommendations: Moderate intensity level of continued care  Equipment Recommended upon Discharge: Wheeled walker  PT Recommended Transfer Status: Assist x1  PT - OK to Discharge: Yes (with skilled physical therapy services at next level of care.)      General Visit Information:   PT  Visit  PT Received On: 12/26/23  General  Missed Visit: Yes  Missed Visit Reason: Patient in a medical procedure (Patient off nursing dorantes at testing (stress test).)  Prior to Session Communication: Bedside nurse  Patient Position Received: Bed, 4 rail up  General Comment: RN cleared patient for treatment.  Patient reports agreeable to treatment.    Subjective   Precautions:  Precautions  Medical Precautions: Fall precautions         Objective   Pain:  Pain Assessment  Pain Assessment: 0-10  Pain Score: 0 - No pain  Cognition:  Cognition  Overall Cognitive Status: Within Functional Limits          Activity Tolerance:  Activity Tolerance  Endurance: Decreased tolerance for upright activites  Treatments:  Therapeutic Exercise  Therapeutic Exercise Performed:  Yes  Therapeutic Exercise Activity 1: ankle pumps, heel slides, hip abduction, TKE 5-8 reps x 1 set              Bed Mobility 1  Bed Mobility 1: Supine to sitting  Level of Assistance 1: Moderate assistance  Bed Mobility Comments 1: Assist with trunk-up during supine to sit;  mod assist to scoot hips to edge of bed during supine to sit.  Bed Mobility 2  Bed Mobility  2: Sitting to supine  Level of Assistance 2: Moderate assistance  Bed Mobility Comments 2: Assist with catarina LE during sit to supine.    Ambulation/Gait Training  Ambulation/Gait Training Performed: No  Transfer 1  Transfer From 1: Sit to  Transfer to 1: Stand  Technique 1: Sit to stand  Transfer Device 1: Walker  Transfer Level of Assistance 1: Minimum assistance  Trials/Comments 1: Assist with trunk support during sit to stand;  verbal cues for hand placement.  Transfers 2  Transfer From 2: Stand to  Transfer to 2: Sit  Technique 2: Stand to sit  Transfer Device 2: Walker  Transfer Level of Assistance 2: Minimum assistance  Trials/Comments 2: Assist with balance during stand to sit.  Transfers 3  Transfer From 3: Bed to  Transfer to 3:  (cart)  Technique 3: Stand pivot  Transfer Device 3: Walker  Transfer Level of Assistance 3: Minimum assistance  Trials/Comments 3: Assist with trunk support and balance during stand pivot transfer;  patient was able to support body weight with catarina LE in standing;  patient required increased time and effort to weightshift and advance catarina LE during pivot portion of transfer.    Stairs  Stairs: No         Outcome Measures:  [facility] Basic Mobility  Turning from your back to your side while in a flat bed without using bedrails: A little  Moving from lying on your back to sitting on the side of a flat bed without using bedrails: A lot  Moving to and from bed to chair (including a wheelchair): A lot  Standing up from a chair using your arms (e.g. wheelchair or bedside chair): A little  To walk in hospital room: A lot  Climbing  3-5 steps with railing: A lot  Basic Mobility - Total Score: 14    Education Documentation  No documentation found.  Education Comments  No comments found.        OP EDUCATION:       Encounter Problems       Encounter Problems (Active)       Mobility       STG - Patient will ambulate 50' with rolling walker and modified independence. (Progressing)       Start:  12/25/23    Expected End:  01/08/24            STG - Patient will ascend and descend two stairs with use of one handrail and supervision for safety. (Not Progressing)       Start:  12/25/23    Expected End:  01/08/24               Safety       STG - Patient uses call light consistently to request assistance with transfers (Progressing)       Start:  12/25/23    Expected End:  01/08/24               Transfers       STG - Patient to transfer to and from sit to supine with independence. (Progressing)       Start:  12/25/23    Expected End:  01/08/24            STG - Patient will transfer sit to and from stand with rolling walker and modified independence. (Progressing)       Start:  12/25/23    Expected End:  01/08/24

## 2023-12-26 NOTE — CONSULTS
Consults  History Of Present Illness:    Tracie Baltazar is a 83 y.o. female who I am seeing today for further evaluation regarding new onset atrial fibrillation with pauses.  This in the setting of CLL and C Diff  infection.  Yesterday she had an episode of atrial fibrillation with rapid ventricular response she was placed on a Cardizem drip and had a physiologic cause just under 6 seconds with termination of her atrial fibrillation.  She was completely asymptomatic both with her atrial fibrillation with a pause.     Last Recorded Vitals:  Vitals:    12/25/23 1850 12/25/23 2255 12/26/23 0323 12/26/23 0758   BP: 82/69 (!) 131/48 130/50 142/55   BP Location:  Left arm Left arm Right arm   Patient Position:  Lying Lying Lying   Pulse:  68 69 69   Resp:  14 18 20   Temp:  36.6 °C (97.9 °F) 36.2 °C (97.2 °F) 36.9 °C (98.4 °F)   TempSrc:  Temporal Temporal Temporal   SpO2:  97% 97%    Weight:   82.5 kg (181 lb 14.1 oz)    Height:           Last Labs:  CBC - 12/26/2023:  4:52 AM  37.3 9.4 227    30.2      CMP - 12/26/2023:  4:52 AM  8.2 5.2 16 --- <0.2   3.6 2.8 11 69      PTT - No results in last year.  _   _ _     Hemoglobin A1C   Date/Time Value Ref Range Status   12/19/2019 10:50 AM 5.4 4.0 - 6.0 % Final     Comment:     Hemoglobin A1C levels are related to mean blood glucose during the   preceding 2-3 months. The relationship table below may be used as a   general guide. Each 1% increase in HGB A1C is a reflection of an   increase in mean glucose of approximately 30 mg/dl.   Reference: Diabetes Care, volume 29, supplement 1 Jan. 2006                        HGB A1C ................. Approx. Mean Glucose   _______________________________________________   6%   ...............................  120 mg/dl   7%   ...............................  150 mg/dl   8%   ...............................  180 mg/dl   9%   ...............................  210 mg/dl   10%  ...............................  240 mg/dl  Performed at  [facility] [address] [facility] [address]       LDL Calculated   Date/Time Value Ref Range Status   12/08/2022 09:43  (H) 65 - 130 MG/DL Final   01/25/2021 02:53  65 - 130 MG/DL Final      Last I/O:  I/O last 3 completed shifts:  In: 48.7 (0.6 mL/kg) [I.V.:48.7 (0.6 mL/kg)]  Out: 750 (9.1 mL/kg) [Urine:750 (0.3 mL/kg/hr)]  Weight: 82.5 kg     Past Cardiology Tests (Last 3 Years):  EKG:  No results found for this or any previous visit from the past 1095 days.    Echo:  Transthoracic Echo (TTE) Complete 12/15/2023    Ejection Fractions:  EF   Date/Time Value Ref Range Status   12/15/2023 08:53 AM 56       Cath:  No results found for this or any previous visit from the past 1095 days.    Stress Test:  No results found for this or any previous visit from the past 1095 days.    Cardiac Imaging:  No results found for this or any previous visit from the past 1095 days.      Past Medical History:  She has a past medical history of Asthma, Essential (primary) hypertension, Kidney failure, Leukemia (CMS/HCC), and Rheumatoid arthritis (CMS/HCC).    Past Surgical History:  She has a past surgical history that includes Hysterectomy; Back surgery; Cholecystectomy; Other surgical history; Knee surgery; Shoulder surgery (Left); and Total knee arthroplasty (Left, 02/13/2014).      Social History:  She reports that she has never smoked. She has never used smokeless tobacco. She reports that she does not currently use alcohol. She reports that she does not use drugs.    Family History:  Family History   Problem Relation Name Age of Onset    Emphysema Mother      Emphysema Father      Heart attack Sister      Lung cancer Brother          Agent orange    Lymphoma Son      Multiple sclerosis Son          Allergies:  Ibuprofen    Inpatient Medications:  Scheduled medications   Medication Dose Route Frequency    amitriptyline  10 mg oral Nightly    cholestyramine  4 g oral Daily with evening meal    DULoxetine  20 mg oral  Daily    folic acid  1 mg oral Daily    gabapentin  300 mg oral BID    heparin (porcine)  5,000 Units subcutaneous q8h    lactobacillus acidophilus  1 tablet oral BID with meals    levothyroxine  150 mcg oral Daily    potassium chloride CR  40 mEq oral Daily    simvastatin  10 mg oral Nightly    sodium bicarbonate  650 mg oral TID    vancomycin  125 mg oral 4x daily     PRN medications   Medication    acetaminophen    Or    acetaminophen    Or    acetaminophen    benzocaine-menthol    dextromethorphan-guaifenesin    guaiFENesin     Continuous Medications   Medication Dose Last Rate    sodium chloride 0.9%  60 mL/hr 60 mL/hr (12/26/23 0101)     Outpatient Medications:  Current Outpatient Medications   Medication Instructions    acetaminophen (Tylenol) 325 mg tablet     amitriptyline (Elavil) 10 mg tablet     cholecalciferol (Vitamin D-3) 50 MCG (2000 UT) tablet Vitamin D 50 MCG (2000 UT) Oral Tablet   Refills: 0       Active    cholestyramine-aspartame (Prevalite) 4 gram packet     cream base no.135, bulk, (SaltStable LS) cream Salt Stable LS Advanced External Cream   Quantity: 240  Refills: 0        Start : 15-Oct-2015   Active    cyanocobalamin (Vitamin B-12) 100 mcg tablet = 1 tab(s) ( 100 mcg ), Oral, daily, 0 Refill(s), Type: Maintenance    DULoxetine (CYMBALTA) 20 mg, oral, Daily, Do not crush or chew.    folic acid (Folvite) 400 mcg tablet = 1 tab(s) ( 0.4 mg ), Oral, daily, 0 Refill(s), Type: Maintenance    furosemide (LASIX) 20 mg, oral, Daily    gabapentin (NEURONTIN) 300 mg, oral, 2 times daily    hydrALAZINE (APRESOLINE) 20 mg, oral, 3 times daily    L. acidophilus/L.bulgaricus (lactobacillus acidoph-lactobacillus bulgar) 0.5 million cell tablet chewable split tablet     levothyroxine (SYNTHROID, LEVOXYL) 150 mcg, oral, Daily    pantoprazole (ProtoNix) 40 mg EC tablet one bid for 10d, then one qd thereafter    simvastatin (Zocor) 10 mg tablet = 1 tab(s) ( 10 mg ), Oral, hs, # 90 tab(s), 3 Refill(s), Type:  Maintenance, Pharmacy: [id number], 1 tab(s) Oral hs, 61, in, 12/15/22 10:06:00 EST, Height Measured, 182, lb, 12/15/22 10:06:00 EST, Weight Measured    sodium bicarbonate 650 mg, oral, 3 times daily       Physical Exam:  General: Alert, oriented to person, place, date/time, pleasant  HEENT: Normocephalic, eyes normal ears normal  Heart: Normal S1, S2 , rhythm regular, no rubs or gallops   Respiratory: Lungs clear to auscultation bilaterally, no rales, crackles, wheezes or rhonchi  Abdomen: Bowel sounds present in all quadrants  Extremities: No upper or lower extremity edema appreciated  Dermatology: Skin Normal  Genitourinary: Deferred  Neurological: Nonfocal, moves all extremities  Psychology :Appropriate affect and mood       Assessment/Plan   1. atrial fibrillation and bradycardia  The patient has new onset atrial fibrillation that has terminated and in the setting of termination she had a physiologic pause just under 6 seconds with the use of IV Cardizem.  In that setting I would not recommend the use of AV amna blocking agents obviously but I would also not recommend pacemaker insertion at this time.  The patient has no symptoms regarding this and I would fit her with a 2-week long-term Holter prior to her discharge for further assessments of possible need for pacing in the future.  As far as anticoagulation her [id number] score is significant however she is a bit unstable with her gait and this requires further family discussion regarding long-term anticoagulation.  Peripheral IV 12/24/23 22 G Right Forearm (Active)   Site Assessment Clean;Dry;Intact 12/25/23 2338   Dressing Status Clean;Dry;Occlusive 12/25/23 2338   Number of days: 2       Peripheral IV 12/24/23 20 G Right Antecubital (Active)   Site Assessment Clean;Intact;Dry 12/25/23 2337   Dressing Status Clean;Dry;Occlusive 12/25/23 2337   Number of days: 2       Urethral Catheter 16 Fr. (Active)   Output (mL) 350 mL 12/26/23 8594    Number of days: 1       Code Status:  Full Code    I spent 30 minutes in the professional and overall care of this patient.        Timmy Chisholm MD

## 2023-12-26 NOTE — PROGRESS NOTES
Physical Therapy                 Therapy Communication Note    Patient Name: Tracie Baltazar  MRN: [id number]  Today's Date: 12/26/2023     Discipline: Physical Therapy    Missed Visit Reason: Missed Visit Reason: Patient in a medical procedure (Patient off nursing dorantes at testing (stress test).)    Missed Time: Attempt

## 2023-12-26 NOTE — CARE PLAN
Problem: Safety  Goal: STG - Patient uses call light consistently to request assistance with transfers  Outcome: Progressing     Problem: Transfers  Goal: STG - Patient to transfer to and from sit to supine with independence.  Outcome: Progressing  Goal: STG - Patient will transfer sit to and from stand with rolling walker and modified independence.  Outcome: Progressing

## 2023-12-26 NOTE — PROGRESS NOTES
Occupational Therapy                 Therapy Communication Note    Patient Name: Tracie Baltazar  MRN: [id number]  Today's Date: 12/26/2023     Discipline: Occupational Therapy    Missed Visit Reason: Missed Visit Reason: Patient in a medical procedure (pt off the floor at stress test)    Missed Time: Attempt    Comment:

## 2023-12-26 NOTE — PROGRESS NOTES
"Tracie Baltazar is a 83 y.o. female on day 2 of admission presenting with Weakness.    Subjective   Patient seen for acute kidney injury on top chronic kidney disease she is feeling okay she is being treated for C. difficile colitis which is positive no abdominal pain       Objective     Physical Exam  Neck:      Vascular: No carotid bruit.   Cardiovascular:      Rate and Rhythm: Normal rate and regular rhythm.      Heart sounds: No murmur heard.     No friction rub. No gallop.   Pulmonary:      Breath sounds: No wheezing, rhonchi or rales.   Chest:      Chest wall: No tenderness.   Abdominal:      General: There is no distension.      Tenderness: There is no abdominal tenderness. There is no guarding or rebound.   Musculoskeletal:         General: No swelling or tenderness.      Cervical back: Neck supple.      Right lower leg: No edema.      Left lower leg: No edema.   Lymphadenopathy:      Cervical: No cervical adenopathy.         Last Recorded Vitals  Blood pressure 142/55, pulse 69, temperature 36.9 °C (98.4 °F), temperature source Temporal, resp. rate 20, height 1.6 m (5' 2.99\"), weight 82.5 kg (181 lb 14.1 oz), SpO2 97 %.    Intake/Output last 3 Shifts:  I/O last 3 completed shifts:  In: 48.7 (0.6 mL/kg) [I.V.:48.7 (0.6 mL/kg)]  Out: 750 (9.1 mL/kg) [Urine:750 (0.3 mL/kg/hr)]  Weight: 82.5 kg     Current Facility-Administered Medications:     acetaminophen (Tylenol) tablet 650 mg, 650 mg, oral, q4h PRN **OR** acetaminophen (Tylenol) oral liquid 650 mg, 650 mg, nasogastric tube, q4h PRN **OR** acetaminophen (Tylenol) suppository 650 mg, 650 mg, rectal, q4h PRN, Abilio Colón DO    amitriptyline (Elavil) tablet 10 mg, 10 mg, oral, Nightly, Dominic Perez MD, 10 mg at 12/25/23 2122    benzocaine-menthol (Cepastat Sore Throat) 15-3.6 mg lozenge 1 lozenge, 1 lozenge, Mouth/Throat, q2h PRN, Dominic Perez MD    cholestyramine (Questran) 4 gram packet 4 g, 4 g, oral, Daily with evening meal, Hipolito MILLER" MD Jada, 4 g at 12/25/23 1700    dextromethorphan-guaifenesin (Robitussin DM)  mg/5 mL oral liquid 5 mL, 5 mL, oral, q4h PRN, Dominic Perez MD    DULoxetine (Cymbalta) DR capsule 20 mg, 20 mg, oral, Daily, Dominic Perez MD, 20 mg at 12/26/23 0806    folic acid (Folvite) tablet 1 mg, 1 mg, oral, Daily, Dominic Perez MD, 1 mg at 12/26/23 0806    gabapentin (Neurontin) capsule 300 mg, 300 mg, oral, BID, Dominic Perez MD, 300 mg at 12/26/23 0806    guaiFENesin (Mucinex) 12 hr tablet 600 mg, 600 mg, oral, q12h PRN, Dominic Perez MD    heparin (porcine) injection 5,000 Units, 5,000 Units, subcutaneous, q8h, Dominic Perez MD, 5,000 Units at 12/26/23 0806    lactobacillus acidophilus tablet 1 tablet, 1 tablet, oral, BID with meals, Dominic Perez MD, 1 tablet at 12/26/23 0806    levothyroxine (Synthroid, Levoxyl) tablet 150 mcg, 150 mcg, oral, Daily, Dominic Perez MD, 150 mcg at 12/26/23 0606    potassium chloride CR (Klor-Con M20) ER tablet 40 mEq, 40 mEq, oral, Daily, Aislinn Napoles MD, 40 mEq at 12/26/23 0806    simvastatin (Zocor) tablet 10 mg, 10 mg, oral, Nightly, Dominic Perez MD, 10 mg at 12/25/23 2122    sodium bicarbonate tablet 650 mg, 650 mg, oral, TID, Dominic Perez MD, 650 mg at 12/26/23 0806    sodium chloride 0.9% infusion, 60 mL/hr, intravenous, Continuous, Darnell Amor MD, Last Rate: 60 mL/hr at 12/26/23 0101, 60 mL/hr at 12/26/23 0101    vancomycin (Vancocin) capsule 125 mg, 125 mg, oral, 4x daily, Abilio Colón DO, 125 mg at 12/26/23 0606   Relevant Results    Results for orders placed or performed during the hospital encounter of 12/24/23 (from the past 96 hour(s))   CBC and Auto Differential   Result Value Ref Range    WBC 44.1 (H) 4.4 - 11.3 x10*3/uL    nRBC 0.0 0.0 - 0.0 /100 WBCs    RBC 3.53 (L) 4.00 - 5.20 x10*6/uL    Hemoglobin 11.1 (L) 12.0 - 16.0 g/dL    Hematocrit 36.1 36.0 - 46.0 %     (H) 80 - 100 fL    MCH 31.4 26.0 - 34.0 pg    MCHC 30.7 (L) 32.0 -  36.0 g/dL    RDW 13.2 11.5 - 14.5 %    Platelets 299 150 - 450 x10*3/uL    Immature Granulocytes %, Automated 0.8 0.0 - 0.9 %    Immature Granulocytes Absolute, Automated 0.35 0.00 - 0.50 x10*3/uL   Comprehensive Metabolic Panel   Result Value Ref Range    Glucose 130 (H) 65 - 99 mg/dL    Sodium 139 133 - 145 mmol/L    Potassium 3.5 3.4 - 5.1 mmol/L    Chloride 100 97 - 107 mmol/L    Bicarbonate 22 (L) 24 - 31 mmol/L    Urea Nitrogen 40 (H) 8 - 25 mg/dL    Creatinine 2.70 (H) 0.40 - 1.60 mg/dL    eGFR 17 (L) >60 mL/min/1.73m*2    Calcium 9.4 8.5 - 10.4 mg/dL    Albumin 3.8 3.5 - 5.0 g/dL    Alkaline Phosphatase 98 35 - 125 U/L    Total Protein 6.8 5.9 - 7.9 g/dL    AST 21 5 - 40 U/L    Bilirubin, Total 0.2 0.1 - 1.2 mg/dL    ALT 14 5 - 40 U/L    Anion Gap 17 <=19 mmol/L   Blood Gas Lactic Acid, Venous   Result Value Ref Range    POCT Lactate, Venous 1.4 0.4 - 2.0 mmol/L   Blood Culture    Specimen: Peripheral Venipuncture; Blood culture   Result Value Ref Range    Blood Culture No growth at 1 day    Blood Culture    Specimen: Peripheral Venipuncture; Blood culture   Result Value Ref Range    Blood Culture No growth at 1 day    Urinalysis with Reflex Culture and Microscopic   Result Value Ref Range    Color, Urine Light-Yellow Light-Yellow, Yellow, Dark-Yellow    Appearance, Urine Clear Clear    Specific Gravity, Urine 1.017 1.005 - 1.035    pH, Urine 5.5 5.0, 5.5, 6.0, 6.5, 7.0, 7.5, 8.0    Protein, Urine 100 (2+) (A) NEGATIVE, 10 (TRACE), 20 (TRACE) mg/dL    Glucose, Urine 50 (TRACE) (A) Normal mg/dL    Blood, Urine 0.03 (TRACE) (A) NEGATIVE    Ketones, Urine NEGATIVE NEGATIVE mg/dL    Bilirubin, Urine NEGATIVE NEGATIVE    Urobilinogen, Urine Normal Normal mg/dL    Nitrite, Urine NEGATIVE NEGATIVE    Leukocyte Esterase, Urine NEGATIVE NEGATIVE   Extra Urine Gray Tube   Result Value Ref Range    Extra Tube Hold for add-ons.    Urinalysis Microscopic   Result Value Ref Range    WBC, Urine 1-5 1-5, NONE /HPF    RBC,  Urine 3-5 NONE, 1-2, 3-5 /HPF    Squamous Epithelial Cells, Urine 1-9 (SPARSE) Reference range not established. /HPF    Mucus, Urine FEW Reference range not established. /LPF    Hyaline Casts, Urine 2+ (A) NONE /LPF    Fine Granular Casts, Urine 2+ (A) NONE /LPF   Manual Differential   Result Value Ref Range    Neutrophils %, Manual 42.0 40.0 - 80.0 %    Bands %, Manual 1.0 0.0 - 5.0 %    Lymphocytes %, Manual 55.0 13.0 - 44.0 %    Monocytes %, Manual 2.0 2.0 - 10.0 %    Eosinophils %, Manual 0.0 0.0 - 6.0 %    Basophils %, Manual 0.0 0.0 - 2.0 %    Seg Neutrophils Absolute, Manual 18.52 (H) 1.60 - 5.00 x10*3/uL    Bands Absolute, Manual 0.44 0.00 - 0.50 x10*3/uL    Lymphocytes Absolute, Manual 24.26 (H) 0.80 - 3.00 x10*3/uL    Monocytes Absolute, Manual 0.88 (H) 0.05 - 0.80 x10*3/uL    Eosinophils Absolute, Manual 0.00 0.00 - 0.40 x10*3/uL    Basophils Absolute, Manual 0.00 0.00 - 0.10 x10*3/uL    Total Cells Counted 100     Neutrophils Absolute, Manual 18.96 (H) 1.60 - 5.50 x10*3/uL    RBC Morphology No significant RBC morphology present    Creatine Kinase   Result Value Ref Range    Creatine Kinase 107 24 - 195 U/L   TSH   Result Value Ref Range    Thyroid Stimulating Hormone 12.17 (H) 0.27 - 4.20 mIU/L   Magnesium   Result Value Ref Range    Magnesium 2.00 1.60 - 3.10 mg/dL   Sars-CoV-2 and Influenza A/B PCR   Result Value Ref Range    Flu A Result Not Detected Not Detected    Flu B Result Not Detected Not Detected    Coronavirus 2019, PCR Not Detected Not Detected   C. difficile, PCR    Specimen: Stool   Result Value Ref Range    C. difficile, PCR Detected (A) Not Detected   Clostridium Difficile EIA    Specimen: Stool   Result Value Ref Range    C. difficile (Toxin A/B) Positive (A) Negative, Indeterminate   Protein, Urine Random   Result Value Ref Range    Total Protein, Urine Random 54 NOT ESTABLISHED mg/dL    Creatinine, Urine Random 114.3 NOT ESTABLISHED mg/dL    T. Protein/Creatinine Ratio 0.47 mg/mg  Creat   Phosphorus   Result Value Ref Range    Phosphorus 3.6 2.5 - 4.5 mg/dL   Magnesium   Result Value Ref Range    Magnesium 1.80 1.60 - 3.10 mg/dL   Basic Metabolic Panel   Result Value Ref Range    Glucose 111 (H) 65 - 99 mg/dL    Sodium 139 133 - 145 mmol/L    Potassium 2.7 (LL) 3.4 - 5.1 mmol/L    Chloride 103 97 - 107 mmol/L    Bicarbonate 19 (L) 24 - 31 mmol/L    Urea Nitrogen 38 (H) 8 - 25 mg/dL    Creatinine 2.80 (H) 0.40 - 1.60 mg/dL    eGFR 16 (L) >60 mL/min/1.73m*2    Calcium 8.2 (L) 8.5 - 10.4 mg/dL    Anion Gap 17 <=19 mmol/L   CBC   Result Value Ref Range    WBC 38.4 (H) 4.4 - 11.3 x10*3/uL    nRBC 0.0 0.0 - 0.0 /100 WBCs    RBC 3.29 (L) 4.00 - 5.20 x10*6/uL    Hemoglobin 10.3 (L) 12.0 - 16.0 g/dL    Hematocrit 33.2 (L) 36.0 - 46.0 %     (H) 80 - 100 fL    MCH 31.3 26.0 - 34.0 pg    MCHC 31.0 (L) 32.0 - 36.0 g/dL    RDW 13.6 11.5 - 14.5 %    Platelets 248 150 - 450 x10*3/uL   NT-PROBNP   Result Value Ref Range    PROBNP 4,043 (H) 0 - 624 pg/mL   CBC and Auto Differential   Result Value Ref Range    WBC 37.3 (H) 4.4 - 11.3 x10*3/uL    nRBC 0.0 0.0 - 0.0 /100 WBCs    RBC 2.97 (L) 4.00 - 5.20 x10*6/uL    Hemoglobin 9.4 (L) 12.0 - 16.0 g/dL    Hematocrit 30.2 (L) 36.0 - 46.0 %     (H) 80 - 100 fL    MCH 31.6 26.0 - 34.0 pg    MCHC 31.1 (L) 32.0 - 36.0 g/dL    RDW 13.6 11.5 - 14.5 %    Platelets 227 150 - 450 x10*3/uL    Immature Granulocytes %, Automated 0.6 0.0 - 0.9 %    Immature Granulocytes Absolute, Automated 0.22 0.00 - 0.50 x10*3/uL   Comprehensive metabolic panel   Result Value Ref Range    Glucose 89 65 - 99 mg/dL    Sodium 140 133 - 145 mmol/L    Potassium 3.2 (L) 3.4 - 5.1 mmol/L    Chloride 107 97 - 107 mmol/L    Bicarbonate 21 (L) 24 - 31 mmol/L    Urea Nitrogen 38 (H) 8 - 25 mg/dL    Creatinine 2.80 (H) 0.40 - 1.60 mg/dL    eGFR 16 (L) >60 mL/min/1.73m*2    Calcium 8.2 (L) 8.5 - 10.4 mg/dL    Albumin 2.8 (L) 3.5 - 5.0 g/dL    Alkaline Phosphatase 69 35 - 125 U/L    Total  Protein 5.2 (L) 5.9 - 7.9 g/dL    AST 16 5 - 40 U/L    Bilirubin, Total <0.2 0.1 - 1.2 mg/dL    ALT 11 5 - 40 U/L    Anion Gap 12 <=19 mmol/L   Manual Differential   Result Value Ref Range    Neutrophils %, Manual 32.0 40.0 - 80.0 %    Bands %, Manual 5.0 0.0 - 5.0 %    Lymphocytes %, Manual 52.0 13.0 - 44.0 %    Monocytes %, Manual 5.0 2.0 - 10.0 %    Eosinophils %, Manual 1.0 0.0 - 6.0 %    Basophils %, Manual 2.0 0.0 - 2.0 %    Atypical Lymphocytes %, Manual 3.0 0.0 - 2.0 %    Seg Neutrophils Absolute, Manual 11.94 (H) 1.60 - 5.00 x10*3/uL    Bands Absolute, Manual 1.87 (H) 0.00 - 0.50 x10*3/uL    Lymphocytes Absolute, Manual 19.40 (H) 0.80 - 3.00 x10*3/uL    Monocytes Absolute, Manual 1.87 (H) 0.05 - 0.80 x10*3/uL    Eosinophils Absolute, Manual 0.37 0.00 - 0.40 x10*3/uL    Basophils Absolute, Manual 0.75 (H) 0.00 - 0.10 x10*3/uL    Atypical Lymphs Absolute, Manual 1.12 (H) 0.00 - 0.30 x10*3/uL    Total Cells Counted 100     Neutrophils Absolute, Manual 13.81 (H) 1.60 - 5.50 x10*3/uL    RBC Morphology No significant RBC morphology present        Assessment/Plan   1.  Acute kidney injury seems that creatinine level is stable continue with gentle IV hydration and continue to monitor renal function very closely no indication for dialysis therapy  2.  Chronic kidney disease stage III  - Baseline Creatinine 1.6-1.9  3.  C. difficile  4.  CLL  5.  Hypertension  6. hypokalemia we will replace potassium      Epifanio Amor MD

## 2023-12-26 NOTE — PROGRESS NOTES
"Tracie Baltazar is a 83 y.o. female on day 2 of admission presenting with Weakness.    Subjective   Patient states she feels well this morning.       Objective     Physical Exam    Last Recorded Vitals  Blood pressure 142/55, pulse 69, temperature 36.9 °C (98.4 °F), temperature source Temporal, resp. rate 20, height 1.6 m (5' 2.99\"), weight 82.5 kg (181 lb 14.1 oz), SpO2 97 %.  Intake/Output last 3 Shifts:  I/O last 3 completed shifts:  In: 48.7 (0.6 mL/kg) [I.V.:48.7 (0.6 mL/kg)]  Out: 750 (9.1 mL/kg) [Urine:750 (0.3 mL/kg/hr)]  Weight: 82.5 kg     Relevant Results                This patient has a urinary catheter   Reason for the urinary catheter remaining today?                Assessment/Plan   Principal Problem:    Weakness  Active Problems:    Dyslipidemia    Chronic kidney disease, stage 4 (severe) (CMS/HCC)    Fibromyalgia    Chronic lymphocytic leukemia (CMS/HCC)    Benign essential hypertension    Congestive heart failure with left ventricular diastolic dysfunction, acute on chronic (CMS/HCC)    Atrial fibrillation (CMS/HCC)    tachybradycardia syndrome with transient asystole in the presence of intravenous diltiazem that spontaneously resolved.     2. C. difficile colitis     3.  Dehydration/intravascular depletion     4.  Acute kidney injury     12/25: She is resting comfortably up in a chair  She has been rehydrated 754 cc positive,Her intravenous diltiazem has been stopped.  Blood pressure was 90/62 pulse 6 in the 70s, respirations 16 temperature 98.2 O2 saturation 94% on nasal cannula.  Will arrange echocardiography, nuclear stress testing to exclude coronary ischemia contributing to her sick sinus syndrome or tachybradycardia syndrome.  EP consultation with Dr. Chisholm to assess the appropriateness of permanent pacemaker implantation.  Will avoid AV amna suppressing agents in the interim and no antiarrhythmics.  After pacemaker implantation, anticoagulation with Eliquis be appropriate already " embolic risk reduction.    12/26:  Patient remains in sinus rhythm.  To undergo myocardial perfusion stress test this morning.  If no significant ischemia detected would okay discharge home.  Atrial fibrillation follow-up with Dr. Chisholm.       I spent 20 minutes in the professional and overall care of this patient.      Elvis Bruce,

## 2023-12-26 NOTE — CARE PLAN
The patient's goals for the shift include FEEL BETTER    The clinical goals for the shift include rest    Over the shift, the patient did not make progress toward the following goals. Barriers to progression include N/A pt progressing. Recommendations to address these barriers include rest.

## 2023-12-26 NOTE — PROGRESS NOTES
Occupational Therapy    Evaluation    Patient Name: Tracie Baltazar  MRN: [id number]  Today's Date: 12/26/2023  Time Calculation  Start Time: 1312  Stop Time: 1320  Time Calculation (min): 8 min    Assessment  IP OT Assessment  OT Assessment: Patient is an 83 year old female admitted with weakness. She is presenting with a decline in strength, balance, activity tolerance, and function, resulting in an increased need for assistance with ADL/IADL tasks. Skilled OT to address the above deficits and increase patient's safety and independence with daily tasks.  Prognosis: Good  End of Session Communication: Bedside nurse  End of Session Patient Position: On cart (taken for testing)  Plan:  Treatment Interventions: ADL retraining, Functional transfer training, Endurance training, Patient/family training, Neuromuscular reeducation, Compensatory technique education  OT Frequency: 3 times per week  OT Discharge Recommendations: Moderate intensity level of continued care  OT Recommended Transfer Status: Minimal assist, Assist of 1  OT - OK to Discharge: Yes    Subjective   Current Problem:  1. Weakness        2. Paroxysmal atrial fibrillation (CMS/HCC)  Nuclear Stress Test    Nuclear Stress Test    Cardiology Interpretation Of Nuclear Stress - See Other Report For Nuclear Portion    Cardiology Interpretation Of Nuclear Stress - See Other Report For Nuclear Portion      3. Longstanding persistent atrial fibrillation (CMS/HCC)  Cardiology Interpretation Of Nuclear Stress - See Other Report For Nuclear Portion        General:  General  Reason for Referral: decline in ADLs  Referred By: Dr. Sarbjit MD  Past Medical History Relevant to Rehab: Patient is an 83 year old female admitted from home with sudden weakness in legs while walking. Patient recently d/c'd from [facility] 12/19/2023. PMH: CKD, fibromyalgia, CLL, HTN, CHF, obesity, OA, DJD, depression  Prior to Session Communication: Bedside nurse  Patient Position Received: Bed, 2  rail up (with PT present in room)  Preferred Learning Style: verbal  General Comment: Patient cleared by nursing for therapy. Patient in bed upon arrival. PT and transport in patient's room upon arrival  Precautions:  Medical Precautions: Fall precautions, Infection precautions, Oxygen therapy device and L/min (c-diff, 2L O2 via NC)  Pain:  Pain Assessment  Pain Assessment: 0-10  Pain Score: 0 - No pain    Objective   Cognition:  Overall Cognitive Status: Within Functional Limits  Orientation Level: Oriented X4     Home Living:  Type of Home: House  Lives With: Adult children (son (who has MS), other children local)  Home Adaptive Equipment: Walker rolling or standard, Cane  Home Layout: Two level, Able to live on main level with bedroom/bathroom  Home Access: Stairs to enter with rails  Entrance Stairs-Rails: Right  Entrance Stairs-Number of Steps: 2  Bathroom Shower/Tub: Walk-in shower  Bathroom Toilet: Standard  Bathroom Equipment: Grab bars in shower, Shower chair with back   Prior Function:  Level of [place]: Independent with ADLs and functional transfers, Needs assistance with homemaking  Receives Help From: Family  ADL Assistance: Independent  Homemaking Assistance: Needs assistance  Homemaking Assistance Comments: assist from family for iADLs  Ambulatory Assistance: Independent (Mod I with rollator vs straight cane)  Vocational: Retired  IADL History:  Homemaking Responsibilities: No  IADL Comments: patient's family assists with IADLs  ADL:  Eating Assistance: Stand by  Eating Deficit: Setup (seated with items within reach)  Grooming Assistance: Minimal  Grooming Deficit: Setup, Verbal cueing, Supervision/safety, Increased time to complete, Standing with assistive device (per clinical judgement)  Bathing Assistance: Moderate  Bathing Deficit: Left lower leg including foot, Right lower leg including foot, Buttocks, Setup, Supervision/safety, Increased time to complete  (per clinical judgement)  UE  Dressing Assistance: Minimal  UE Dressing Deficit: Setup, Supervision/safety, Increased time to complete (per clinical judgement)  LE Dressing Assistance: Total  LE Dressing Deficit: Don/doff R sock, Don/doff L sock  Toileting Assistance with Device: Total  Toileting Deficit: Clothing management up, Clothing management down, Perineal hygiene, Urinary Catheter, Supervison/safety  Activity Tolerance:  Endurance: Decreased tolerance for upright activites  Bed Mobility/Transfers: Bed Mobility 1  Bed Mobility 1: Supine to sitting  Level of Assistance 1: Moderate assistance  Bed Mobility Comments 1: head of bed elevated, assist to raise trunk  Bed Mobility 2  Bed Mobility  2: Sitting to supine  Level of Assistance 2: Moderate assistance  Bed Mobility Comments 2: assist to raise B LE and support trunk    Transfer 1  Transfer From 1: Bed to  Transfer to 1: Stand  Technique 1: Sit to stand  Transfer Device 1: Walker  Transfer Level of Assistance 1: Minimum assistance  Trials/Comments 1: v/c for proper hand placement    IADL's:   Homemaking Responsibilities: No  IADL Comments: patient's family assists with IADLs  Vision:    Sensation:  Sensation Comment: patient denies numbness/tingling  Strength:  Strength Comments: B UE at least >/= 3/5 grossly. observed functionally  Coordination:  Movements are Fluid and Coordinated: Yes   Hand Function:  Hand Function  Gross Grasp: Functional  Coordination: Functional  Extremities: RUE   RUE : Within Functional Limits (observed functionally) and LUE   LUE: Within Functional Limits (observed functionally)    Outcome Measures: [facility] Daily Activity  Putting on and taking off regular lower body clothing: Total  Bathing (including washing, rinsing, drying): A lot  Putting on and taking off regular upper body clothing: A little  Toileting, which includes using toilet, bedpan or urinal: A lot  Taking care of personal grooming such as brushing teeth: A little  Eating Meals: A little  Daily  Activity - Total Score: 14    Education Documentation  Body Mechanics, taught by Wendie Brice OT at 12/26/2023  1:49 PM.  Learner: Patient  Readiness: Acceptance  Method: Explanation, Demonstration  Response: Needs Reinforcement    Precautions, taught by Wendie Brice OT at 12/26/2023  1:49 PM.  Learner: Patient  Readiness: Acceptance  Method: Explanation, Demonstration  Response: Needs Reinforcement    Education Comments  No comments found.      Goals:   Encounter Problems       Encounter Problems (Active)       OT Goals       ADLs (Progressing)       Start:  12/26/23    Expected End:  01/25/24       Patient will demonstrate the ability to complete ADL tasks with Mod I, using AE as needed, in order to increase patent's safety and independence with self-care tasks.         Functional Transfers (Progressing)       Start:  12/26/23    Expected End:  01/25/24       Patient will complete functional transfers with Mod I in order to increase patient's safety and independence with daily tasks.         B UE Strengthening (Progressing)       Start:  12/26/23    Expected End:  01/25/24       Patient will increase B UE strength to 4+/5 for functional transfers.         Functional Mobility (Progressing)       Start:  12/26/23    Expected End:  01/25/24       Patient will demonstrate the ability to complete item retrieval and functional mobility with Mod I in order to increase patient's safety and independence with daily tasks.

## 2023-12-26 NOTE — PROGRESS NOTES
Tracie Baltazar is a 83 y.o. female on day 2 of admission presenting with Weakness.    Subjective   Interval History:       Afebrile, no chills  No abdominal pain  Diarrhea improving  No chest pain or shortness of breath    Review of Systems   All other systems reviewed and are negative.      Objective   Range of Vitals (last 24 hours)  Heart Rate:  []   Temp:  [36.2 °C (97.2 °F)-36.6 °C (97.9 °F)]   Resp:  [13-18]   BP: ()/(48-69)   Weight:  [82.5 kg (181 lb 14.1 oz)]   SpO2:  [97 %]   Daily Weight  12/26/23 : 82.5 kg (181 lb 14.1 oz)    Body mass index is 32.23 kg/m².    Physical Exam  Constitutional:       Appearance: Normal appearance.   HENT:      Head: Normocephalic and atraumatic.      Nose: Nose normal.   Eyes:      Extraocular Movements: Extraocular movements intact.      Conjunctiva/sclera: Conjunctivae normal.   Cardiovascular:      Rate and Rhythm: Regular rhythm.      Heart sounds: Normal heart sounds.   Abdominal:      General: Bowel sounds are normal.      Palpations: Abdomen is soft.   Musculoskeletal:      Cervical back: Normal range of motion and neck supple.   Skin:     General: Skin is warm and dry.   Neurological:      Mental Status: She is alert and oriented to person, place, and time.        Antibiotics  sodium chloride 0.9 % bolus 1,000 mL  piperacillin-tazobactam-dextrose (Zosyn) IV 4.5 g  vancomycin-diluent combo no.1 (Xellia) IVPB 2 g  amitriptyline (Elavil) tablet 10 mg  DULoxetine (Cymbalta) DR capsule 20 mg  folic acid (Folvite) tablet 1 mg  gabapentin (Neurontin) capsule 300 mg  hydrALAZINE (Apresoline) tablet 20 mg  lactobacillus acidophilus tablet 1 tablet  levothyroxine (Synthroid, Levoxyl) tablet 150 mcg  pantoprazole (ProtoNix) EC tablet 40 mg  simvastatin (Zocor) tablet 10 mg  sodium bicarbonate tablet 650 mg  heparin (porcine) injection 5,000 Units  polyethylene glycol (Glycolax, Miralax) packet 17 g  benzocaine-menthol (Cepastat Sore Throat) 15-3.6 mg lozenge 1  lozenge  guaiFENesin (Mucinex) 12 hr tablet 600 mg  dextromethorphan-guaifenesin (Robitussin DM)  mg/5 mL oral liquid 5 mL  acetaminophen (Tylenol) tablet 650 mg  acetaminophen (Tylenol) oral liquid 650 mg  acetaminophen (Tylenol) suppository 650 mg  sodium chloride 0.9% infusion  metroNIDAZOLE (Flagyl) tablet 500 mg  vancomycin (Vancocin) capsule 125 mg  dilTIAZem (Cardizem) 125 mg in dextrose 5% 125 mL (1 mg/mL) infusion (premix)  dilTIAZem (Cardizem) injection 10 mg  potassium chloride CR (Klor-Con M20) ER tablet 40 mEq  regadenoson (Lexiscan) injection 0.4 mg  aminophylline syringe 125 mg  cholestyramine (Questran) 4 gram packet 4 g      Relevant Results  Labs  Results from last 72 hours   Lab Units 12/26/23 0452 12/25/23 0443 12/24/23  0408 12/23/23  1116   WBC AUTO x10*3/uL 37.3* 38.4* 44.1* 29.3*   HEMOGLOBIN g/dL 9.4* 10.3* 11.1* 11.2*   HEMATOCRIT % 30.2* 33.2* 36.1 36.9   PLATELETS AUTO x10*3/uL 227 248 299 306   LYMPHO PCT MAN % 52.0  --  55.0 77.0   MONO PCT MAN % 5.0  --  2.0 2.0   EOSINO PCT MAN % 1.0  --  0.0 0.0     Results from last 72 hours   Lab Units 12/26/23  0452 12/25/23  0443 12/24/23  0408 12/23/23  1116   SODIUM mmol/L 140 139 139 144   POTASSIUM mmol/L 3.2* 2.7* 3.5 4.0   CHLORIDE mmol/L 107 103 100 103   CO2 mmol/L 21* 19* 22* 25   BUN mg/dL 38* 38* 40* 34*   CREATININE mg/dL 2.80* 2.80* 2.70* 2.20*   GLUCOSE mg/dL 89 111* 130* 108*   CALCIUM mg/dL 8.2* 8.2* 9.4 9.3   ANION GAP mmol/L 12 17 17 16   EGFR mL/min/1.73m*2 16* 16* 17* 22*   PHOSPHORUS mg/dL  --  3.6  --  3.7     Results from last 72 hours   Lab Units 12/26/23  0452 12/24/23  0408 12/23/23  1116   ALK PHOS U/L 69 98  --    BILIRUBIN TOTAL mg/dL <0.2 0.2  --    PROTEIN TOTAL g/dL 5.2* 6.8  --    ALT U/L 11 14  --    AST U/L 16 21  --    ALBUMIN g/dL 2.8* 3.8 3.9     Estimated Creatinine Clearance: 15.5 mL/min (A) (by C-G formula based on SCr of 2.8 mg/dL (H)).  No results found for:  "\"CRP\"  Microbiology  Reviewed  Imaging  ECG 12 lead    Result Date: 12/26/2023   Poor data quality, interpretation may be adversely affected Normal sinus rhythm Nonspecific ST abnormality Abnormal ECG When compared with ECG of 31-MAY-2013 19:08, QT has shortened    Cardiology Interpretation Of Nuclear Stress - See Other Report For Nuclear Portion    Result Date: 12/26/2023           [place] [id number]            Phone [phone] Nuclear Pharmacologic Stress Test Patient Name:      MARK GABRIEL Ordering Provider:    [id number] NORY LOZA Study Date:        12/26/2023         Reading Physician:    [id number] Bruce DO MRN/PID:           [id number]           Supervising           [id number] Bruce DO                                       Physician: Accession#:        [id number]       Fellow: Date of Birth/Age: 1940 / 83      Fellow:                    years Gender:            F                  Nurse:                Do Zafar RN Admit Date:                           Technician:           Matt Williamson                                                             CVTI Admission Status:                     Sonographer:          QUYEN Height:            160.02 cm          Technologist: Weight:            82.10 kg           Additional Staff: BSA:               1.85 m2            Encounter#:           [id number] BMI:               32.06 kg/m2        Patient Location: Study Type:    CARDIOLOGY INTERPRETATION OF NUCLEAR STRESS Diagnosis/ICD: Longstanding persistent atrial fibrillation-I48.11 Indication:    Atrial Fibrillation CPT Codes:     Stress Test Interpretation-63428; Stress Test Supervision-99298 Falls Risk:  Study Details: Correct procedure and correct patient verified verbally and with                ID Band checked.  Patient History: Chest pain, atrial fibrillation, syncope, hypertension, hyperlipidemia and congestive heart failure.  Medications: " SIMVASTATIN, FUROSEMIDE, HYDRALAZINE. The patient did not take medications as prescribed.  Patient Performance: Patient received a total of 0.4 mg of Regadenoson at 11:30:33 AM. The resting blood pressure was 175/68 mmHg with a heart rate of 74 bpm. The patient developed no symptoms during the stress exam. The blood pressure response was normal. The test was terminated due to: completed lab protocol. Standard dose of Lexiscan was infused over 10 to 15 seconds then flushed with saline. The patient then received a standard IV dose of Myoview. The patient did not experience chest pain with infusion of Lexiscan. The resting twelve-lead ECG was essentially normal and there were no significant changes in the ST segments with the infusion of Lexiscan. Resting blood pressure was 170/68 and decreased to 144/60 prior to completion of the study. No arrhythmias were induced. Myoview imaging was to be reported separately. Patient has met the discharge criteria and is discharged to their floor.  Baseline ECG: Resting ECG showed Atrial Fibrillation with AFIB.  Stress ECG: Stress ECG showed atrial fibrillation.  Stress Stage Data: +----------------+--+------+-------+                 HRSys BPDias BP +----------------+--+------+-------+ Baseline [id number]   68      +----------------+--+------+-------+  Recovery ECG: Recovery ECG showed atrial fibrillation.  +------------+--+------+-------+             HRSys BPDias BP +------------+--+------+-------+ Recovery I  [id number]   54      +------------+--+------+-------+ Recovery II [id number]   54      +------------+--+------+-------+ Recovery [id number]   61      +------------+--+------+-------+  Summary:  1. No clinical or ECG evidence of ischemia.  2. Myoview imaging to be reported separately.  3. Adequate level of stress achieved.  4. Nuclear image results are reported separately. [id number] Elvis Bruce DO Electronically signed on 12/26/2023 at 11:42:40 AM   ** Final **      US renal complete    Result Date: 12/24/2023  Interpreted By:  Bruno Bryant, STUDY: US RENAL COMPLETE; 12/24/2023 7:50 pm   INDICATION: Signs/Symptoms:JANET on CKD. /history of CLL and hypertension   COMPARISON: None.   ACCESSION NUMBER(S): [id number]   ORDERING CLINICIAN: SERJIO DEVINE   TECHNIQUE: Grayscale and color Doppler imaging of the kidneys.   FINDINGS: The right kidney measures 9.3 cm x 4.1 cm x 4.4 cm cm. The left kidney measures 7.8 cm x 5.0 cm x 3.7 cm cm. There is no shadowing calculus, hydronephrosis, or solid mass identified. However, there is a 2.6 x 2.5 x 2.4 cm inferior pole hypoechoic renal cyst without internal color Doppler flow. Renal cortical thinning and increased echogenicity of the renal parenchyma is noted.   The bladder is grossly unremarkable.       1. No hydronephrosis or nephrolithiasis. 2. Medical renal disease. 3 left renal cyst.     MACRO: None.   Signed by: Bruno Bryant 12/24/2023 8:19 PM Dictation workstation:   [id number]    XR chest 1 view    Result Date: 12/24/2023  Interpreted By:  Nick Luciano, STUDY: XR CHEST 1 VIEW; 12/24/2023 4:15 am   INDICATION: CLINICAL INFORMATION: Signs/Symptoms:weakness.   COMPARISON: 12/18/2020   ACCESSION NUMBER(S): [id number]   ORDERING CLINICIAN: LESLYE FRANK   TECHNIQUE: Portable chest one view.   FINDINGS: The cardiac size is indeterminate in view of the AP projection. Neurostimulator overlies the thoracic spine. No infiltrates or effusions are identified.  The aorta is tortuous.       No acute pathologic findings are identified.  There is no interval change when compared to the previous examination.   MACRO: none   Signed by: Nick Luciano 12/24/2023 9:11 AM Dictation workstation:   [id number]    XR chest 2 views    Result Date: 12/18/2023  Interpreted By:  Alex Mandel, STUDY: XR CHEST 2 VIEWS; 12/18/2023 8:06 am   INDICATION: Signs/Symptoms:sob   COMPARISON: 12/13/2023   ACCESSION NUMBER(S): [id number]    ORDERING CLINICIAN: SRIKANTH VILCHIS   TECHNIQUE: PA and LAT views of the chest were obtained.   FINDINGS: The cardiomediastinal silhouette is unremarkable. The lungs are clear. No pleural effusion is identified.   The osseous structures are intact. Spinal cord stimulator leads are again seen at the mid and lower thoracic level.       No acute cardiopulmonary process.     Signed by: Alex Mandel 12/18/2023 8:30 AM Dictation workstation:   [id number]    Transthoracic Echo (TTE) Complete    Result Date: 12/15/2023           [place] [address]            Phone [phone] TRANSTHORACIC ECHOCARDIOGRAM REPORT  Patient Name:      MARK GABRIEL  Reading Physician:    [id number] Elvis Bruce DO Study Date:        12/15/2023          Ordering Provider:    [id number] ABDULLAHI VANESSA MRN/PID:           [id number]            Fellow: Accession#:        [id number]        Nurse: Date of Birth/Age: 1940 / 83 years Sonographer:          Kristen WESTFALL Gender:            F                   Additional Staff: Height:            154.94 cm           Admit Date:           12/14/2023 Weight:            82.10 kg            Admission Status:     Inpatient - Routine BSA:               1.81 m2             Department Location: Blood Pressure: 150 /47 mmHg Study Type:    TRANSTHORACIC ECHO (TTE) COMPLETE Diagnosis/ICD: Shortness of breath-R06.02 Indication:    Shortness of Breath CPT Codes:     Echo Complete w Full Doppler-30154 Patient History: Pertinent History: CKD CHF Depression HTN CP Murmur Dyslipidemia Varicose veins                    of leg with swellin ghyperlipidemia hypercholesterolemia. Study Detail: The following Echo studies were performed: 2D, Doppler, M-Mode,               color flow and 3D.  PHYSICIAN INTERPRETATION: Left Ventricle: Left ventricular systolic function is normal, with an estimated ejection  fraction of 60-65%. There are no regional wall motion abnormalities. The left ventricular cavity size is normal. Left ventricular diastolic filling was indeterminate. Left Atrium: The left atrium is normal in size. Right Ventricle: The right ventricle is normal in size. There is normal right ventricular global systolic function. Right Atrium: The right atrium is normal in size. Aortic Valve: The aortic valve is trileaflet. There is mild aortic valve cusp calcification. There is no evidence of aortic valve regurgitation. The peak instantaneous gradient of the aortic valve is 12.0 mmHg. Mitral Valve: The mitral valve is normal in structure. There is trace to mild mitral valve regurgitation. Tricuspid Valve: The tricuspid valve is structurally normal. No evidence of tricuspid regurgitation. Pulmonic Valve: The pulmonic valve is not well visualized. There is trace to mild pulmonic valve regurgitation. Pericardium: There is no pericardial effusion noted. Aorta: The aortic root is normal. Systemic Veins: The inferior vena cava appears to be of normal size. There is IVC inspiratory collapse greater than 50%.  CONCLUSIONS:  1. Left ventricular systolic function is normal with a 60-65% estimated ejection fraction.  2. Left ventricular diastolic filling indeterminate.  3. Mild aortic valve sclerosis. QUANTITATIVE DATA SUMMARY: 2D MEASUREMENTS:                          Normal Ranges: LAs:           3.00 cm   (2.7-4.0cm) IVSd:          0.85 cm   (0.6-1.1cm) LVPWd:         1.00 cm   (0.6-1.1cm) LVIDd:         4.23 cm   (3.9-5.9cm) LVIDs:         2.79 cm LV Mass Index: 69.0 g/m2 LV % FS        34.0 % LA VOLUME:                               Normal Ranges: LA Vol A2C:        59.3 ml LA Vol Index A2C:  32.8 ml/m2 LA Area A2C:       18.7 cm2 LA Major Axis A2C: 5.0 cm LA Volume Index:   29.6 ml/m2 LA Vol A2C:        59.7 ml RA VOLUME BY A/L METHOD:                               Normal Ranges: RA Vol A4C:        32.1 ml     (8.3-19.5ml) RA Vol Index A4C:  17.7 ml/m2 RA Area A4C:       13.6 cm2 RA Major Axis A4C: 4.9 cm M-MODE MEASUREMENTS:                  Normal Ranges: Ao Root: 2.20 cm (2.0-3.7cm) LAs:     4.80 cm (2.7-4.0cm) AORTA MEASUREMENTS:                    Normal Ranges: Asc Ao, d: 3.10 cm (2.1-3.4cm) LV SYSTOLIC FUNCTION BY 2D PLANIMETRY (MOD):                     Normal Ranges: EF-A4C View: 51.6 % (>=55%) EF-A2C View: 57.9 % EF-Biplane:  55.8 % LV DIASTOLIC FUNCTION:                            Normal Ranges: MV Peak E:      0.93 m/s   (0.7-1.2 m/s) MV Peak A:      1.02 m/s   (0.42-0.7 m/s) E/A Ratio:      0.91       (1.0-2.2) MV e'           0.08 m/s   (>8.0) MV lateral e'   0.08 m/s MV medial e'    0.07 m/s E/e' Ratio:     12.39      (<8.0) PulmV Sys Siddhartha:  68.10 cm/s PulmV Urrutia Siddhartha: 47.60 cm/s PulmV S/D Siddhartha:  1.40 MITRAL VALVE:                      Normal Ranges: MV Vmax:    1.22 m/s (<=1.3m/s) MV peak P.0 mmHg (<5mmHg) MV mean P.0 mmHg (<48mmHg) MV DT:      262 msec (150-240msec) AORTIC VALVE:                          Normal Ranges: AoV Vmax:      1.73 m/s  (<=1.7m/s) AoV Peak P.0 mmHg (<20mmHg) LVOT Max Siddhartha:  1.21 m/s  (<=1.1m/s) LVOT VTI:      29.10 cm LVOT Diameter: 2.00 cm   (1.8-2.4cm) AoV Area,Vmax: 2.20 cm2  (2.5-4.5cm2)  RIGHT VENTRICLE: RV Basal 3.18 cm RV Mid   2.70 cm RV Major 5.2 cm TAPSE:   16.5 mm RV s'    0.13 m/s TRICUSPID VALVE/RVSP:                   Normal Ranges: IVC Diam: 1.75 cm PULMONIC VALVE:                         Normal Ranges: PV Accel Time: 69 msec  (>120ms) PV Max Siddhartha:    1.4 m/s  (0.6-0.9m/s) PV Max P.3 mmHg Pulmonary Veins: PulmV Urrutia Siddhartha: 47.60 cm/s PulmV S/D Siddhartha:  1.40 PulmV Sys Siddhartha:  68.10 cm/s  [id number] Elvis Bruce DO Electronically signed on 12/15/2023 at 10:13:13 AM  ** Final **     CT head wo IV contrast    Result Date: 2023  Interpreted By:  Rashawn Hook, STUDY: CT HEAD WO IV CONTRAST; 2023 10:34 am   INDICATION: Signs/Symptoms:lightheadedness,  near syncope.   COMPARISON: No comparison exams available.   ACCESSION NUMBER(S): [id number]   ORDERING CLINICIAN: SLY DAWKINS   TECHNIQUE: CT axial images through the Brain were obtained without contrast.   FINDINGS: Acute ischemic change: None.   Hemorrhage: No evidence of acute intracranial hemorrhage.   Mass Effect / Mass Lesion: No significant mass effect. There is no evidence of an intracranial mass or extraaxial fluid collection.   Chronic ischemic change: Patchy foci of  low attenuation coefficient are present within white matter which is a nonspecific finding but likely represents moderate microvascular ischemia.   Parenchyma: There is no significant volume loss. The brain parenchyma is otherwise within normal limits for age.   Ventricles: Normal caliber and morphology.   Other: There is calcification involving the carotid siphons.       No acute intracranial process   All CT examinations are performed with 1 or more of the following dose reduction techniques: Automated exposure control, adjustment of mA and/or kv according to patient's size, or use of iterative reconstruction techniques.   MACRO: none   Signed by: Rashawn Hook 12/14/2023 10:48 AM Dictation workstation:   [id number]    XR chest 2 views    Result Date: 12/13/2023  Interpreted By:  Nj Zazueta, STUDY: XR CHEST 2 VIEWS; 12/13/2023 2:47 pm   INDICATION: Signs/Symptoms:SOB dizziness   COMPARISON: Chest radiograph 06/09/2020   ACCESSION NUMBER(S): [id number]   ORDERING CLINICIAN: LA SAEED   TECHNIQUE: AP and lateral views of the chest performed.   FINDINGS: Cardiac size is indeterminate due to the AP projection. Calcifications are seen at the aortic knob. Thoracic aorta is tortuous in appearance. Mild prominence of the pulmonary vasculature. Faint bibasilar hazy opacities likely reflect atelectasis. No focal consolidation, large pleural effusion, or visible pneumothorax. Multilevel discogenic degenerative changes of the  thoracic spine. Presumed neurostimulator leads overlying the thoracic spine.       Mild prominence of the pulmonary vasculature, as can be seen with pulmonary vascular congestion. Please correlate for corresponding symptoms.   No focal consolidation or visible pneumothorax.   Faint bibasilar atelectasis suspected.   MACRO: None.   Signed by: Nj Zazueta 12/13/2023 3:04 PM Dictation workstation:   [id number]     Assessment/Plan     Acute kidney injury  Leukocytosis-multifactorial  CLL  C. difficile infection     Continue oral vancomycin  Monitor renal function  Contact plus precautions  Questran  Supportive care  Monitor temperature and WBC    Hipolito Elias MD

## 2023-12-26 NOTE — PROGRESS NOTES
"Tracie Baltazar is a 83 y.o. female on day 2 of admission presenting with Weakness.    Subjective   HPI   Patient seen and examined, she just came from stress test . She complains with diarrhea has loose stool x 2 today.  On IV fluid.  Denies any chest pain, shortness of breath on 2 L nasal cannula patient tolerates well her diet with no nausea vomiting or abdominal pain.  No fever or chills.  No headache or dizziness.      Objective     Last Recorded Vitals  Blood pressure 142/55, pulse 69, temperature 36.9 °C (98.4 °F), temperature source Temporal, resp. rate 20, height 1.6 m (5' 2.99\"), weight 82.5 kg (181 lb 14.1 oz), SpO2 97 %.      Intake/Output Summary (Last 24 hours) at 12/26/2023 0852  Last data filed at 12/26/2023 0526  Gross per 24 hour   Intake 48.67 ml   Output 750 ml   Net -701.33 ml         Physical Exam   General: alert, no diaphoresis   HENT: mucous membranes moist, external ears normal, no rhinorrhea   Eyes: no icterus or injection, no discharge   Lungs: CTA BL   Heart: RRR, no murmurs, no LE edema BL   GI: abdomen soft, nontender, nondistended, BS present   MSK: no joint effusion or deformity   Skin: no rashes, erythema, or ecchymosis   Neuro: grossly normal cognition, motor strength, sensation   Relevant Results    Lab Results   Component Value Date    WBC 37.3 (H) 12/26/2023    HGB 9.4 (L) 12/26/2023    HCT 30.2 (L) 12/26/2023     (H) 12/26/2023     12/26/2023       Lab Results   Component Value Date    GLUCOSE 89 12/26/2023    CALCIUM 8.2 (L) 12/26/2023     12/26/2023    K 3.2 (L) 12/26/2023    CO2 21 (L) 12/26/2023     12/26/2023    BUN 38 (H) 12/26/2023    CREATININE 2.80 (H) 12/26/2023       Lab Results   Component Value Date    HGBA1C 5.4 12/19/2019       US renal complete    Result Date: 12/24/2023  Interpreted By:  Bruno Bryant, STUDY: US RENAL COMPLETE; 12/24/2023 7:50 pm   INDICATION: Signs/Symptoms:JANET on CKD. /history of CLL and hypertension   " COMPARISON: None.   ACCESSION NUMBER(S): [id number]   ORDERING CLINICIAN: SERJIO DEVINE   TECHNIQUE: Grayscale and color Doppler imaging of the kidneys.   FINDINGS: The right kidney measures 9.3 cm x 4.1 cm x 4.4 cm cm. The left kidney measures 7.8 cm x 5.0 cm x 3.7 cm cm. There is no shadowing calculus, hydronephrosis, or solid mass identified. However, there is a 2.6 x 2.5 x 2.4 cm inferior pole hypoechoic renal cyst without internal color Doppler flow. Renal cortical thinning and increased echogenicity of the renal parenchyma is noted.   The bladder is grossly unremarkable.       1. No hydronephrosis or nephrolithiasis. 2. Medical renal disease. 3 left renal cyst.     MACRO: None.   Signed by: Bruno Bryant 12/24/2023 8:19 PM Dictation workstation:   [id number]    XR chest 1 view    Result Date: 12/24/2023  Interpreted By:  Nick Luciano, STUDY: XR CHEST 1 VIEW; 12/24/2023 4:15 am   INDICATION: CLINICAL INFORMATION: Signs/Symptoms:weakness.   COMPARISON: 12/18/2020   ACCESSION NUMBER(S): [id number]   ORDERING CLINICIAN: LESLYE FRANK   TECHNIQUE: Portable chest one view.   FINDINGS: The cardiac size is indeterminate in view of the AP projection. Neurostimulator overlies the thoracic spine. No infiltrates or effusions are identified.  The aorta is tortuous.       No acute pathologic findings are identified.  There is no interval change when compared to the previous examination.   MACRO: none   Signed by: Nick Luciano 12/24/2023 9:11 AM Dictation workstation:   [id number]    XR chest 2 views    Result Date: 12/18/2023  Interpreted By:  Alex Mandel, STUDY: XR CHEST 2 VIEWS; 12/18/2023 8:06 am   INDICATION: Signs/Symptoms:sob   COMPARISON: 12/13/2023   ACCESSION NUMBER(S): [id number]   ORDERING CLINICIAN: SRIKANTH VILCHIS   TECHNIQUE: PA and LAT views of the chest were obtained.   FINDINGS: The cardiomediastinal silhouette is unremarkable. The lungs are clear. No pleural effusion is identified.    The osseous structures are intact. Spinal cord stimulator leads are again seen at the mid and lower thoracic level.       No acute cardiopulmonary process.     Signed by: Alex Mandel 12/18/2023 8:30 AM Dictation workstation:   [id number]    Transthoracic Echo (TTE) Complete    Result Date: 12/15/2023           [place] [id number]            Phone [phone] TRANSTHORACIC ECHOCARDIOGRAM REPORT  Patient Name:      MARK GABRIEL  Reading Physician:    [id number] [facility]  Study Date:        12/15/2023          Ordering Provider:    [id number] ABDULLAHI VANESSA MRN/PID:           [id number]            Fellow: Accession#:        [id number]        Nurse: Date of Birth/Age: 1940 / 83 years Sonographer:          Kristen WETSFALL Gender:            F                   Additional Staff: Height:            154.94 cm           Admit Date:           12/14/2023 Weight:            82.10 kg            Admission Status:     Inpatient - Routine BSA:               1.81 m2             Department Location: Blood Pressure: 150 /47 mmHg Study Type:    TRANSTHORACIC ECHO (TTE) COMPLETE Diagnosis/ICD: Shortness of breath-R06.02 Indication:    Shortness of Breath CPT Codes:     Echo Complete w Full Doppler-[id number] Patient History: Pertinent History: CKD CHF Depression HTN CP Murmur Dyslipidemia Varicose veins                    of leg with swellin ghyperlipidemia hypercholesterolemia. Study Detail: The following Echo studies were performed: 2D, Doppler, M-Mode,               color flow and 3D.  PHYSICIAN INTERPRETATION: Left Ventricle: Left ventricular systolic function is normal, with an estimated ejection fraction of 60-65%. There are no regional wall motion abnormalities. The left ventricular cavity size is normal. Left ventricular diastolic filling was indeterminate. Left Atrium: The left atrium is normal in size.  Right Ventricle: The right ventricle is normal in size. There is normal right ventricular global systolic function. Right Atrium: The right atrium is normal in size. Aortic Valve: The aortic valve is trileaflet. There is mild aortic valve cusp calcification. There is no evidence of aortic valve regurgitation. The peak instantaneous gradient of the aortic valve is 12.0 mmHg. Mitral Valve: The mitral valve is normal in structure. There is trace to mild mitral valve regurgitation. Tricuspid Valve: The tricuspid valve is structurally normal. No evidence of tricuspid regurgitation. Pulmonic Valve: The pulmonic valve is not well visualized. There is trace to mild pulmonic valve regurgitation. Pericardium: There is no pericardial effusion noted. Aorta: The aortic root is normal. Systemic Veins: The inferior vena cava appears to be of normal size. There is IVC inspiratory collapse greater than 50%.  CONCLUSIONS:  1. Left ventricular systolic function is normal with a 60-65% estimated ejection fraction.  2. Left ventricular diastolic filling indeterminate.  3. Mild aortic valve sclerosis. QUANTITATIVE DATA SUMMARY: 2D MEASUREMENTS:                          Normal Ranges: LAs:           3.00 cm   (2.7-4.0cm) IVSd:          0.85 cm   (0.6-1.1cm) LVPWd:         1.00 cm   (0.6-1.1cm) LVIDd:         4.23 cm   (3.9-5.9cm) LVIDs:         2.79 cm LV Mass Index: 69.0 g/m2 LV % FS        34.0 % LA VOLUME:                               Normal Ranges: LA Vol A2C:        59.3 ml LA Vol Index A2C:  32.8 ml/m2 LA Area A2C:       18.7 cm2 LA Major Axis A2C: 5.0 cm LA Volume Index:   29.6 ml/m2 LA Vol A2C:        59.7 ml RA VOLUME BY A/L METHOD:                               Normal Ranges: RA Vol A4C:        32.1 ml    (8.3-19.5ml) RA Vol Index A4C:  17.7 ml/m2 RA Area A4C:       13.6 cm2 RA Major Axis A4C: 4.9 cm M-MODE MEASUREMENTS:                  Normal Ranges: Ao Root: 2.20 cm (2.0-3.7cm) LAs:     4.80 cm (2.7-4.0cm) AORTA  MEASUREMENTS:                    Normal Ranges: Asc Ao, d: 3.10 cm (2.1-3.4cm) LV SYSTOLIC FUNCTION BY 2D PLANIMETRY (MOD):                     Normal Ranges: EF-A4C View: 51.6 % (>=55%) EF-A2C View: 57.9 % EF-Biplane:  55.8 % LV DIASTOLIC FUNCTION:                            Normal Ranges: MV Peak E:      0.93 m/s   (0.7-1.2 m/s) MV Peak A:      1.02 m/s   (0.42-0.7 m/s) E/A Ratio:      0.91       (1.0-2.2) MV e'           0.08 m/s   (>8.0) MV lateral e'   0.08 m/s MV medial e'    0.07 m/s E/e' Ratio:     12.39      (<8.0) PulmV Sys Siddhartha:  68.10 cm/s PulmV Urrutia Siddhartha: 47.60 cm/s PulmV S/D Siddhartha:  1.40 MITRAL VALVE:                      Normal Ranges: MV Vmax:    1.22 m/s (<=1.3m/s) MV peak P.0 mmHg (<5mmHg) MV mean P.0 mmHg (<48mmHg) MV DT:      262 msec (150-240msec) AORTIC VALVE:                          Normal Ranges: AoV Vmax:      1.73 m/s  (<=1.7m/s) AoV Peak P.0 mmHg (<20mmHg) LVOT Max Siddhartha:  1.21 m/s  (<=1.1m/s) LVOT VTI:      29.10 cm LVOT Diameter: 2.00 cm   (1.8-2.4cm) AoV Area,Vmax: 2.20 cm2  (2.5-4.5cm2)  RIGHT VENTRICLE: RV Basal 3.18 cm RV Mid   2.70 cm RV Major 5.2 cm TAPSE:   16.5 mm RV s'    0.13 m/s TRICUSPID VALVE/RVSP:                   Normal Ranges: IVC Diam: 1.75 cm PULMONIC VALVE:                         Normal Ranges: PV Accel Time: 69 msec  (>120ms) PV Max Siddhartha:    1.4 m/s  (0.6-0.9m/s) PV Max P.3 mmHg Pulmonary Veins: PulmV Urrutia Siddhartha: 47.60 cm/s PulmV S/D Siddhartha:  1.40 PulmV Sys Siddhartha:  68.10 cm/s  [id number] Elvis Bruce DO Electronically signed on 12/15/2023 at 10:13:13 AM  ** Final **     CT head wo IV contrast    Result Date: 2023  Interpreted By:  Rashawn Hook, STUDY: CT HEAD WO IV CONTRAST; 2023 10:34 am   INDICATION: Signs/Symptoms:lightheadedness, near syncope.   COMPARISON: No comparison exams available.   ACCESSION NUMBER(S): [id number]   ORDERING CLINICIAN: SLY DAWKINS   TECHNIQUE: CT axial images through the Brain were obtained without contrast.    FINDINGS: Acute ischemic change: None.   Hemorrhage: No evidence of acute intracranial hemorrhage.   Mass Effect / Mass Lesion: No significant mass effect. There is no evidence of an intracranial mass or extraaxial fluid collection.   Chronic ischemic change: Patchy foci of  low attenuation coefficient are present within white matter which is a nonspecific finding but likely represents moderate microvascular ischemia.   Parenchyma: There is no significant volume loss. The brain parenchyma is otherwise within normal limits for age.   Ventricles: Normal caliber and morphology.   Other: There is calcification involving the carotid siphons.       No acute intracranial process   All CT examinations are performed with 1 or more of the following dose reduction techniques: Automated exposure control, adjustment of mA and/or kv according to patient's size, or use of iterative reconstruction techniques.   MACRO: none   Signed by: Rashawn Hook 12/14/2023 10:48 AM Dictation workstation:   [id number]    XR chest 2 views    Result Date: 12/13/2023  Interpreted By:  Nj Zazueta, STUDY: XR CHEST 2 VIEWS; 12/13/2023 2:47 pm   INDICATION: Signs/Symptoms:SOB dizziness   COMPARISON: Chest radiograph 06/09/2020   ACCESSION NUMBER(S): [id number]   ORDERING CLINICIAN: LA SAEED   TECHNIQUE: AP and lateral views of the chest performed.   FINDINGS: Cardiac size is indeterminate due to the AP projection. Calcifications are seen at the aortic knob. Thoracic aorta is tortuous in appearance. Mild prominence of the pulmonary vasculature. Faint bibasilar hazy opacities likely reflect atelectasis. No focal consolidation, large pleural effusion, or visible pneumothorax. Multilevel discogenic degenerative changes of the thoracic spine. Presumed neurostimulator leads overlying the thoracic spine.       Mild prominence of the pulmonary vasculature, as can be seen with pulmonary vascular congestion. Please correlate for corresponding  symptoms.   No focal consolidation or visible pneumothorax.   Faint bibasilar atelectasis suspected.   MACRO: None.   Signed by: Nj Zazueta 12/13/2023 3:04 PM Dictation workstation:   [id number]    Scheduled medications  amitriptyline, 10 mg, oral, Nightly  cholestyramine, 4 g, oral, Daily with evening meal  DULoxetine, 20 mg, oral, Daily  folic acid, 1 mg, oral, Daily  gabapentin, 300 mg, oral, BID  heparin (porcine), 5,000 Units, subcutaneous, q8h  lactobacillus acidophilus, 1 tablet, oral, BID with meals  levothyroxine, 150 mcg, oral, Daily  potassium chloride CR, 40 mEq, oral, Daily  potassium chloride CR, 40 mEq, oral, Once  simvastatin, 10 mg, oral, Nightly  sodium bicarbonate, 650 mg, oral, TID  vancomycin, 125 mg, oral, 4x daily      Continuous medications  sodium chloride 0.9%, 60 mL/hr, Last Rate: 60 mL/hr (12/26/23 0101)      PRN medications  PRN medications: acetaminophen **OR** acetaminophen **OR** acetaminophen, benzocaine-menthol, dextromethorphan-guaifenesin, guaiFENesin    Assessment/Plan   Principal Problem:    Weakness  Active Problems:    Dyslipidemia    Chronic kidney disease, stage 4 (severe) (CMS/HCC)    Fibromyalgia    Chronic lymphocytic leukemia (CMS/HCC)    Benign essential hypertension    Congestive heart failure with left ventricular diastolic dysfunction, acute on chronic (CMS/HCC)    Atrial fibrillation (CMS/HCC)    Hypokalemia  Replace potassium.  Order Mg level      Acute kidney injury  Dr. Amor on the case   continue gentle with fluid     C. difficile colitis  Pt had X2 loose stools this morning   On vancomycin   from ID services on the case .     Leukocytosis/history of leukemia/ Anemia  HH trending down   Secondary to CLL  Seen by  Dr. Ferreira  Monitor close.     Atrial fibrillation with pauses  Dr. Steel from cardiology services on the case    On  normal sinus rhythm.  Monitor close.  Plan for nuclear stress test      Hypothyroidism  Continue with  home  medication.    Weakness/deconditioning   Fall precautions  PT/OT comments moderate intensity    DVT prophylaxis  Heparin subcu     Discharge plan:  This is 83 years old female with med Hx history of hypertension, hypothyroidism, IBS, chronic kidney disease stage IV, CLL. . She  presented from home with weakness and fall.  Admitted for evaluation for weakness, heart failure preserved EF, CKD stage III-IV, acute on chronic anemia.  Evaluated by cardiology stress test done on 12/26  Evaluated by Dr. Chisholm regarding new onset of A-fib with pauses, recommend to not use AV amna blocking drugs also not a candidate for pacemaker insertion at this time.  Patient has to follow-up with him in 2 weeks long-term Holter prior to her discharge.   Evaluated by ID due to C. difficile, patient is on oral vancomycin.   Evaluated by nephrology due to JANET on CKD3-4; likely from volume depletion due to low p.o. intake and diarrhea, he baseline creatinine is 1.6-1.9; diuretic and ARB's were held    Anticipate discharging patient  skilled of nursing when medically clear     I spent 25 minutes in the professional and overall care of this patient.    Camille Olson, APRN-CNP

## 2023-12-27 DIAGNOSIS — I50.33 CONGESTIVE HEART FAILURE WITH LEFT VENTRICULAR DIASTOLIC DYSFUNCTION, ACUTE ON CHRONIC (MULTI): Primary | ICD-10-CM

## 2023-12-27 LAB
ANION GAP SERPL CALC-SCNC: 16 MMOL/L
ATRIAL RATE: 67 BPM
BUN SERPL-MCNC: 25 MG/DL (ref 8–25)
CALCIUM SERPL-MCNC: 8.5 MG/DL (ref 8.5–10.4)
CHLORIDE SERPL-SCNC: 107 MMOL/L (ref 97–107)
CO2 SERPL-SCNC: 19 MMOL/L (ref 24–31)
CREAT SERPL-MCNC: 2.1 MG/DL (ref 0.4–1.6)
ERYTHROCYTE [DISTWIDTH] IN BLOOD BY AUTOMATED COUNT: 13.2 % (ref 11.5–14.5)
GFR SERPL CREATININE-BSD FRML MDRD: 23 ML/MIN/1.73M*2
GLUCOSE SERPL-MCNC: 69 MG/DL (ref 65–99)
HCT VFR BLD AUTO: 32.6 % (ref 36–46)
HGB BLD-MCNC: 9.9 G/DL (ref 12–16)
MCH RBC QN AUTO: 30.9 PG (ref 26–34)
MCHC RBC AUTO-ENTMCNC: 30.4 G/DL (ref 32–36)
MCV RBC AUTO: 102 FL (ref 80–100)
NRBC BLD-RTO: 0 /100 WBCS (ref 0–0)
P AXIS: 71 DEGREES
P OFFSET: 164 MS
P ONSET: 139 MS
PLATELET # BLD AUTO: 254 X10*3/UL (ref 150–450)
POTASSIUM SERPL-SCNC: 3.4 MMOL/L (ref 3.4–5.1)
PR INTERVAL: 144 MS
Q ONSET: 211 MS
QRS COUNT: 11 BEATS
QRS DURATION: 84 MS
QT INTERVAL: 372 MS
QTC CALCULATION(BAZETT): 393 MS
QTC FREDERICIA: 386 MS
R AXIS: -15 DEGREES
RBC # BLD AUTO: 3.2 X10*6/UL (ref 4–5.2)
SODIUM SERPL-SCNC: 142 MMOL/L (ref 133–145)
T AXIS: 19 DEGREES
T OFFSET: 397 MS
VENTRICULAR RATE: 67 BPM
WBC # BLD AUTO: 35.7 X10*3/UL (ref 4.4–11.3)

## 2023-12-27 PROCEDURE — 2500000004 HC RX 250 GENERAL PHARMACY W/ HCPCS (ALT 636 FOR OP/ED): Performed by: INTERNAL MEDICINE

## 2023-12-27 PROCEDURE — 99232 SBSQ HOSP IP/OBS MODERATE 35: CPT | Performed by: NURSE PRACTITIONER

## 2023-12-27 PROCEDURE — 2500000001 HC RX 250 WO HCPCS SELF ADMINISTERED DRUGS (ALT 637 FOR MEDICARE OP): Performed by: INTERNAL MEDICINE

## 2023-12-27 PROCEDURE — 96372 THER/PROPH/DIAG INJ SC/IM: CPT | Performed by: INTERNAL MEDICINE

## 2023-12-27 PROCEDURE — 80048 BASIC METABOLIC PNL TOTAL CA: CPT | Performed by: NURSE PRACTITIONER

## 2023-12-27 PROCEDURE — 36415 COLL VENOUS BLD VENIPUNCTURE: CPT | Performed by: NURSE PRACTITIONER

## 2023-12-27 PROCEDURE — 2060000001 HC INTERMEDIATE ICU ROOM DAILY

## 2023-12-27 PROCEDURE — 85027 COMPLETE CBC AUTOMATED: CPT | Performed by: NURSE PRACTITIONER

## 2023-12-27 PROCEDURE — 2500000004 HC RX 250 GENERAL PHARMACY W/ HCPCS (ALT 636 FOR OP/ED): Performed by: HOSPITALIST

## 2023-12-27 PROCEDURE — 97530 THERAPEUTIC ACTIVITIES: CPT | Mod: GO,CO

## 2023-12-27 PROCEDURE — 97530 THERAPEUTIC ACTIVITIES: CPT | Mod: GP

## 2023-12-27 PROCEDURE — 2500000005 HC RX 250 GENERAL PHARMACY W/O HCPCS: Performed by: HOSPITALIST

## 2023-12-27 PROCEDURE — 99232 SBSQ HOSP IP/OBS MODERATE 35: CPT | Performed by: INTERNAL MEDICINE

## 2023-12-27 PROCEDURE — 97535 SELF CARE MNGMENT TRAINING: CPT | Mod: GO,CO

## 2023-12-27 RX ORDER — DILTIAZEM HCL/D5W 125 MG/125
10 PLASTIC BAG, INJECTION (ML) INTRAVENOUS CONTINUOUS
Status: DISCONTINUED | OUTPATIENT
Start: 2023-12-27 | End: 2023-12-29

## 2023-12-27 RX ORDER — CHOLESTYRAMINE 4 G/4.8G
4 POWDER, FOR SUSPENSION ORAL
Status: DISCONTINUED | OUTPATIENT
Start: 2023-12-27 | End: 2024-01-02 | Stop reason: HOSPADM

## 2023-12-27 RX ORDER — DILTIAZEM HYDROCHLORIDE 5 MG/ML
20 INJECTION INTRAVENOUS ONCE
Status: COMPLETED | OUTPATIENT
Start: 2023-12-27 | End: 2023-12-27

## 2023-12-27 RX ADMIN — AMITRIPTYLINE HYDROCHLORIDE 10 MG: 10 TABLET, FILM COATED ORAL at 21:03

## 2023-12-27 RX ADMIN — CHOLESTYRAMINE 4 G: 4 POWDER, FOR SUSPENSION ORAL at 16:04

## 2023-12-27 RX ADMIN — VANCOMYCIN HYDROCHLORIDE 125 MG: 125 CAPSULE ORAL at 12:56

## 2023-12-27 RX ADMIN — LEVOTHYROXINE SODIUM 150 MCG: 0.15 TABLET ORAL at 06:20

## 2023-12-27 RX ADMIN — Medication 1 TABLET: at 09:33

## 2023-12-27 RX ADMIN — SIMVASTATIN 10 MG: 10 TABLET, FILM COATED ORAL at 21:03

## 2023-12-27 RX ADMIN — Medication 10 MG/HR: at 11:49

## 2023-12-27 RX ADMIN — DULOXETINE HYDROCHLORIDE 20 MG: 20 CAPSULE, DELAYED RELEASE ORAL at 09:33

## 2023-12-27 RX ADMIN — VANCOMYCIN HYDROCHLORIDE 125 MG: 125 CAPSULE ORAL at 16:04

## 2023-12-27 RX ADMIN — Medication 10 MG/HR: at 23:24

## 2023-12-27 RX ADMIN — GABAPENTIN 300 MG: 300 CAPSULE ORAL at 09:33

## 2023-12-27 RX ADMIN — SODIUM BICARBONATE 650 MG TABLET 650 MG: at 09:33

## 2023-12-27 RX ADMIN — Medication 10 MG/HR: at 00:16

## 2023-12-27 RX ADMIN — HEPARIN SODIUM 5000 UNITS: 5000 INJECTION, SOLUTION INTRAVENOUS; SUBCUTANEOUS at 23:33

## 2023-12-27 RX ADMIN — SODIUM CHLORIDE 60 ML/HR: 900 INJECTION, SOLUTION INTRAVENOUS at 16:05

## 2023-12-27 RX ADMIN — HEPARIN SODIUM 5000 UNITS: 5000 INJECTION, SOLUTION INTRAVENOUS; SUBCUTANEOUS at 16:04

## 2023-12-27 RX ADMIN — FOLIC ACID 1 MG: 1 TABLET ORAL at 09:33

## 2023-12-27 RX ADMIN — VANCOMYCIN HYDROCHLORIDE 125 MG: 125 CAPSULE ORAL at 21:03

## 2023-12-27 RX ADMIN — SODIUM BICARBONATE 650 MG TABLET 650 MG: at 21:03

## 2023-12-27 RX ADMIN — VANCOMYCIN HYDROCHLORIDE 125 MG: 125 CAPSULE ORAL at 06:20

## 2023-12-27 RX ADMIN — POTASSIUM CHLORIDE 40 MEQ: 1500 TABLET, EXTENDED RELEASE ORAL at 09:33

## 2023-12-27 RX ADMIN — Medication 1 TABLET: at 16:04

## 2023-12-27 RX ADMIN — DILTIAZEM HYDROCHLORIDE 20 MG: 5 INJECTION, SOLUTION INTRAVENOUS at 00:16

## 2023-12-27 RX ADMIN — GABAPENTIN 300 MG: 300 CAPSULE ORAL at 21:03

## 2023-12-27 RX ADMIN — SODIUM BICARBONATE 650 MG TABLET 650 MG: at 16:00

## 2023-12-27 RX ADMIN — HEPARIN SODIUM 5000 UNITS: 5000 INJECTION, SOLUTION INTRAVENOUS; SUBCUTANEOUS at 09:33

## 2023-12-27 ASSESSMENT — COGNITIVE AND FUNCTIONAL STATUS - GENERAL
DAILY ACTIVITIY SCORE: 16
HELP NEEDED FOR BATHING: A LOT
MOVING FROM LYING ON BACK TO SITTING ON SIDE OF FLAT BED WITH BEDRAILS: A LOT
DRESSING REGULAR UPPER BODY CLOTHING: A LITTLE
WALKING IN HOSPITAL ROOM: A LOT
STANDING UP FROM CHAIR USING ARMS: A LITTLE
HELP NEEDED FOR BATHING: A LITTLE
STANDING UP FROM CHAIR USING ARMS: A LOT
TOILETING: TOTAL
MOVING TO AND FROM BED TO CHAIR: A LOT
TURNING FROM BACK TO SIDE WHILE IN FLAT BAD: A LOT
DRESSING REGULAR LOWER BODY CLOTHING: A LOT
MOBILITY SCORE: 14
TOILETING: A LOT
CLIMB 3 TO 5 STEPS WITH RAILING: A LOT
DAILY ACTIVITIY SCORE: 15
EATING MEALS: A LITTLE
MOVING TO AND FROM BED TO CHAIR: A LITTLE
DRESSING REGULAR UPPER BODY CLOTHING: A LITTLE
TURNING FROM BACK TO SIDE WHILE IN FLAT BAD: A LOT
DRESSING REGULAR LOWER BODY CLOTHING: A LOT
WALKING IN HOSPITAL ROOM: A LOT
MOVING FROM LYING ON BACK TO SITTING ON SIDE OF FLAT BED WITH BEDRAILS: A LITTLE
PERSONAL GROOMING: A LITTLE
PERSONAL GROOMING: A LITTLE
MOBILITY SCORE: 13
CLIMB 3 TO 5 STEPS WITH RAILING: A LOT

## 2023-12-27 ASSESSMENT — PAIN SCALES - GENERAL
PAINLEVEL_OUTOF10: 0 - NO PAIN

## 2023-12-27 ASSESSMENT — PAIN - FUNCTIONAL ASSESSMENT
PAIN_FUNCTIONAL_ASSESSMENT: 0-10
PAIN_FUNCTIONAL_ASSESSMENT: WONG-BAKER FACES
PAIN_FUNCTIONAL_ASSESSMENT: 0-10

## 2023-12-27 ASSESSMENT — ENCOUNTER SYMPTOMS: DIARRHEA: 1

## 2023-12-27 ASSESSMENT — ACTIVITIES OF DAILY LIVING (ADL): HOME_MANAGEMENT_TIME_ENTRY: 16

## 2023-12-27 NOTE — PROGRESS NOTES
Physical Therapy    Physical Therapy Treatment    Patient Name: Tracie Baltazar  MRN: [id number]  Today's Date: 12/27/2023  Time Calculation  Start Time: 1425  Stop Time: 1434  Time Calculation (min): 9 min       Assessment/Plan   PT Assessment  Rehab Prognosis: Good  Evaluation/Treatment Tolerance: Patient limited by fatigue  End of Session Communication: Bedside nurse  End of Session Patient Position: Up in chair  PT Plan  Inpatient/Swing Bed or Outpatient: Inpatient  PT Plan  Treatment/Interventions: Bed mobility, Transfer training, Gait training, Stair training, Balance training, Neuromuscular re-education, Strengthening, Endurance training, Therapeutic exercise, Therapeutic activity  PT Plan: Skilled PT  PT Frequency: 5 times per week  PT Discharge Recommendations: Moderate intensity level of continued care  Equipment Recommended upon Discharge: Wheeled walker  PT Recommended Transfer Status: Assist x1  PT - OK to Discharge: Yes (with skilled physical therapy services at next level of care.)      General Visit Information:   PT  Visit  PT Received On: 12/27/23  General  Prior to Session Communication: Bedside nurse  Patient Position Received: Up in chair  General Comment: Assist nursing with standing.  Patient reports agreeable to stand for bedpan removal.    Subjective   Precautions:  Precautions  Medical Precautions: Fall precautions         Objective   Pain:  Pain Assessment  Pain Assessment: 0-10  Pain Score: 0 - No pain  Cognition:  Cognition  Overall Cognitive Status: Within Functional Limits          Activity Tolerance:  Activity Tolerance  Endurance: Decreased tolerance for upright activites  Activity Tolerance Comments: Fatigue  Treatments:  Therapeutic Activity  Therapeutic Activity Performed: Yes  Therapeutic Activity 1: Static standing with rolling walker x ~4 minutes and min assist for balance.                 Transfer 1  Transfer From 1: Sit to  Transfer to 1: Stand  Technique 1: Sit to  stand  Transfer Device 1: Walker  Transfer Level of Assistance 1: Minimum assistance  Trials/Comments 1: Assist with trunk support during sit to stand.  Transfers 2  Transfer From 2: Stand to  Transfer to 2: Sit  Technique 2: Stand to sit  Transfer Device 2: Walker  Transfer Level of Assistance 2: Minimum assistance  Trials/Comments 2: Assist with trunk support during stand to sit.    Stairs  Stairs: No         Outcome Measures:  [facility] Basic Mobility  Turning from your back to your side while in a flat bed without using bedrails: A lot  Moving from lying on your back to sitting on the side of a flat bed without using bedrails: A lot  Moving to and from bed to chair (including a wheelchair): A little  Standing up from a chair using your arms (e.g. wheelchair or bedside chair): A lot  To walk in hospital room: A lot  Climbing 3-5 steps with railing: A lot  Basic Mobility - Total Score: 13    Education Documentation  No documentation found.  Education Comments  No comments found.        OP EDUCATION:       Encounter Problems       Encounter Problems (Active)       Mobility       STG - Patient will ambulate 50' with rolling walker and modified independence. (Progressing)       Start:  12/25/23    Expected End:  01/08/24            STG - Patient will ascend and descend two stairs with use of one handrail and supervision for safety. (Not Progressing)       Start:  12/25/23    Expected End:  01/08/24               Safety       STG - Patient uses call light consistently to request assistance with transfers (Progressing)       Start:  12/25/23    Expected End:  01/08/24               Transfers       STG - Patient to transfer to and from sit to supine with independence. (Progressing)       Start:  12/25/23    Expected End:  01/08/24            STG - Patient will transfer sit to and from stand with rolling walker and modified independence. (Progressing)       Start:  12/25/23    Expected End:  01/08/24

## 2023-12-27 NOTE — PROGRESS NOTES
Pt with afib rvr this morning, started on cardizem drip.     Still awaiting response on whether the pt will be accepted by [facility], if they cannot accept then alternate SNF will need to be found.      12/27/23 5588   Discharge Planning   Patient expects to be discharged to: [facility] vs alt SNF - precert needed

## 2023-12-27 NOTE — PROGRESS NOTES
Patient seen for acute kidney injury on top chronic kidney disease stage III she is being treated for C. difficile colitis with IV fluids her renal function started to improve as [id number] her creatinine level is down to 2.1 mg/dL her physical exam is unchanged continue IV hydration and monitor renal function very closely

## 2023-12-27 NOTE — CARE PLAN
The patient's goals for the shift include FEEL BETTER    The clinical goals for the shift include safety    Over the shift, the patient did not make progress toward the following goals. Barriers to progression include none. Recommendations to address these barriers include assist with ADLs  Problem: Pain  Goal: My pain/discomfort is manageable  Outcome: Progressing     Problem: Safety  Goal: Patient will be injury free during hospitalization  Outcome: Progressing  Goal: I will remain free of falls  Outcome: Progressing     Problem: Daily Care  Goal: Daily care needs are met  Outcome: Progressing     Problem: Psychosocial Needs  Goal: Demonstrates ability to cope with hospitalization/illness  Outcome: Progressing  Goal: Collaborate with me, my family, and caregiver to identify my specific goals  Outcome: Progressing     Problem: Discharge Barriers  Goal: My discharge needs are met  Outcome: Progressing     Problem: Skin  Goal: Decreased wound size/increased tissue granulation at next dressing change  Outcome: Progressing  Goal: Participates in plan/prevention/treatment measures  Outcome: Progressing  Goal: Prevent/manage excess moisture  Outcome: Progressing  Flowsheets (Taken 12/27/2023 1110)  Prevent/manage excess moisture:   Cleanse incontinence/protect with barrier cream   Moisturize dry skin  Goal: Prevent/minimize sheer/friction injuries  Outcome: Progressing  Goal: Promote/optimize nutrition  Outcome: Progressing  Goal: Promote skin healing  Outcome: Progressing   .

## 2023-12-27 NOTE — PROGRESS NOTES
"Tracie Baltazar is a 83 y.o. female on day 3 of admission presenting with Weakness.    Subjective   Alert and oriented, denies complaint of chest pain or pressure, palpitations or feeling rapid heart rate.  Continues in isolation for C. difficile infection       Objective     Physical Exam  Vitals and nursing note reviewed.   Constitutional:       General: She is not in acute distress.     Appearance: Normal appearance. She is not ill-appearing or toxic-appearing.   HENT:      Head: Normocephalic and atraumatic.      Nose: Nose normal.      Mouth/Throat:      Mouth: Mucous membranes are dry.   Cardiovascular:      Rate and Rhythm: Normal rate. Rhythm irregular.      Heart sounds: No murmur heard.     No friction rub. No gallop.   Pulmonary:      Effort: Pulmonary effort is normal.      Breath sounds: Normal breath sounds. No wheezing, rhonchi or rales.   Abdominal:      General: Abdomen is flat.      Palpations: Abdomen is soft.   Musculoskeletal:      Right lower leg: No edema.      Left lower leg: No edema.   Skin:     Capillary Refill: Capillary refill takes less than 2 seconds.   Neurological:      Mental Status: She is alert and oriented to person, place, and time. Mental status is at baseline.   Psychiatric:         Mood and Affect: Mood normal.         Behavior: Behavior normal.         Thought Content: Thought content normal.         Judgment: Judgment normal.         Last Recorded Vitals  Blood pressure 115/54, pulse 85, temperature 37.2 °C (99 °F), temperature source Temporal, resp. rate 20, height 1.6 m (5' 2.99\"), weight 83.6 kg (184 lb 4.9 oz), SpO2 94 %.  Intake/Output last 3 Shifts:  I/O last 3 completed shifts:  In: 48.7 (0.6 mL/kg) [I.V.:48.7 (0.6 mL/kg)]  Out: 2751 (32.9 mL/kg) [Urine:2750 (0.9 mL/kg/hr); Stool:1]  Weight: 83.6 kg     Relevant Results  Results for orders placed or performed during the hospital encounter of 12/24/23 (from the past 24 hour(s))   CBC   Result Value Ref Range    WBC " 35.7 (H) 4.4 - 11.3 x10*3/uL    nRBC 0.0 0.0 - 0.0 /100 WBCs    RBC 3.20 (L) 4.00 - 5.20 x10*6/uL    Hemoglobin 9.9 (L) 12.0 - 16.0 g/dL    Hematocrit 32.6 (L) 36.0 - 46.0 %     (H) 80 - 100 fL    MCH 30.9 26.0 - 34.0 pg    MCHC 30.4 (L) 32.0 - 36.0 g/dL    RDW 13.2 11.5 - 14.5 %    Platelets 254 150 - 450 x10*3/uL   Basic Metabolic Panel   Result Value Ref Range    Glucose 69 65 - 99 mg/dL    Sodium 142 133 - 145 mmol/L    Potassium 3.4 3.4 - 5.1 mmol/L    Chloride 107 97 - 107 mmol/L    Bicarbonate 19 (L) 24 - 31 mmol/L    Urea Nitrogen 25 8 - 25 mg/dL    Creatinine 2.10 (H) 0.40 - 1.60 mg/dL    eGFR 23 (L) >60 mL/min/1.73m*2    Calcium 8.5 8.5 - 10.4 mg/dL    Anion Gap 16 <=19 mmol/L         Assessment/Plan   Principal Problem:    Weakness  Active Problems:    Dyslipidemia    Chronic kidney disease, stage 4 (severe) (CMS/HCC)    Fibromyalgia    Chronic lymphocytic leukemia (CMS/HCC)    Benign essential hypertension    Congestive heart failure with left ventricular diastolic dysfunction, acute on chronic (CMS/HCC)    Atrial fibrillation (CMS/HCC)    tachybradycardia syndrome with transient asystole in the presence of intravenous diltiazem that spontaneously resolved.     2. C. difficile colitis     3.  Dehydration/intravascular depletion     4.  Acute kidney injury     12/25: She is resting comfortably up in a chair  She has been rehydrated 754 cc positive,Her intravenous diltiazem has been stopped.  Blood pressure was 90/62 pulse 6 in the 70s, respirations 16 temperature 98.2 O2 saturation 94% on nasal cannula.  Will arrange echocardiography, nuclear stress testing to exclude coronary ischemia contributing to her sick sinus syndrome or tachybradycardia syndrome.  EP consultation with Dr. Chisholm to assess the appropriateness of permanent pacemaker implantation.  Will avoid AV amna suppressing agents in the interim and no antiarrhythmics.  After pacemaker implantation, anticoagulation with Eliquis be  appropriate already embolic risk reduction.     12/26:  Patient remains in sinus rhythm.  To undergo myocardial perfusion stress test this morning.  If no significant ischemia detected would okay discharge home.  Atrial fibrillation follow-up with Dr. Chisholm.    12/27: Stable overnight, however patient did return into a rapid atrial fibrillation  earlier this morning.  She was reinitiated on a diltiazem drip by admitting.  Patient did undergo nuclear stress test yesterday which revealed no acute changes.  Dr. Chisholm has been following the patient and at this time has not recommended a permanent pacemaker.  She additionally did have 2 episodes of pauses overnight, longest lasting 3 seconds otherwise the other event was 2 seconds.  I have called Dr. Chisholm to discuss this case.  He will review the telemetry this morning and reassess, will likely discontinue diltiazem and placed the patient on a small dose of beta-blocker.  Otherwise from the cardiac perspective she appears euvolemic.  Her blood pressure is adequate with most recent of 115/54.  Her creatinine remains elevated at 2.8.  Hemoglobin is stable at 9.9 and she does have a history of CLL at which her white blood cell count remains elevated at 35.7.  She is chest pain-free and breathing comfortably on room air.  Overall stable from my perspective, await further EP input.      I spent 35 minutes in the professional and overall care of this patient.      Iban Mcguire, JUMA-CNP

## 2023-12-27 NOTE — PROGRESS NOTES
"Tracie Baltazar is a 83 y.o. female on day 3 of admission presenting with Weakness.    Subjective   The patient is resting comfortably.  Unfortunately she went back into atrial fibrillation with rapid ventricular response is currently on IV diltiazem.  She had about 4-second pauses in the setting of conversion back to sinus rhythm which again is physiologic.  She is completely asymptomatic with this and is unaware of her atrial fibrillation with rapid rates or her pauses.       Objective   As above  Physical Exam    Last Recorded Vitals  Blood pressure 115/54, pulse 85, temperature 37.2 °C (99 °F), temperature source Temporal, resp. rate 20, height 1.6 m (5' 2.99\"), weight 83.6 kg (184 lb 4.9 oz), SpO2 94 %.  Intake/Output last 3 Shifts:  I/O last 3 completed shifts:  In: 48.7 (0.6 mL/kg) [I.V.:48.7 (0.6 mL/kg)]  Out: 2751 (32.9 mL/kg) [Urine:2750 (0.9 mL/kg/hr); Stool:1]  Weight: 83.6 kg     Relevant Results                               Assessment/Plan   Principal Problem:    Weakness  Active Problems:    Dyslipidemia    Chronic kidney disease, stage 4 (severe) (CMS/HCC)    Fibromyalgia    Chronic lymphocytic leukemia (CMS/HCC)    Benign essential hypertension    Congestive heart failure with left ventricular diastolic dysfunction, acute on chronic (CMS/HCC)    Atrial fibrillation (CMS/HCC)  Regarding the patient's atrial fibrillation and pauses.  She clearly has sinus node dysfunction I do not think she is a good candidate for pacemaker at this time specially in the absence of symptoms and no significant pauses of greater than 6 seconds.  The pauses I am seeing are predominantly physiologic as her A-fib terminates.  I would discontinue her diltiazem at this juncture and use amiodarone 200 mg a day for suppression of her atrial fibrillation particularly given the hazards of anticoagulation with this patient will be best to suppress with an antiarrhythmic drug.             Timmy Chisholm MD      "

## 2023-12-27 NOTE — PROGRESS NOTES
Tracie Baltazar is a 83 y.o. female on day 3 of admission presenting with Weakness.    Subjective   Interval History:   Patient seen and examined  Daughter present  Interval improvement with regards to diarrhea  Abdominal pain  Interval evaluation by cardiology last note reviewed    Review of Systems   Gastrointestinal:  Positive for diarrhea.       Objective   Range of Vitals (last 24 hours)  Heart Rate:  []   Temp:  [36 °C (96.8 °F)-37.6 °C (99.7 °F)]   Resp:  [18-23]   BP: (115-188)/(41-87)   Weight:  [83.6 kg (184 lb 4.9 oz)]   SpO2:  [94 %-96 %]   Daily Weight  12/27/23 : 83.6 kg (184 lb 4.9 oz)    Body mass index is 32.66 kg/m².    Physical Exam  Constitutional:       Appearance: Normal appearance.   HENT:      Head: Normocephalic and atraumatic.      Nose: Nose normal.   Eyes:      Extraocular Movements: Extraocular movements intact.      Conjunctiva/sclera: Conjunctivae normal.   Cardiovascular:      Rate and Rhythm: Regular rhythm.      Heart sounds: Normal heart sounds.   Abdominal:      General: Bowel sounds are normal.      Palpations: Abdomen is soft.   Musculoskeletal:      Cervical back: Normal range of motion and neck supple.   Skin:     General: Skin is warm and dry.   Neurological:      Mental Status: She is alert and oriented to person, place, and time.    Antibiotics  sodium chloride 0.9 % bolus 1,000 mL  piperacillin-tazobactam-dextrose (Zosyn) IV 4.5 g  vancomycin-diluent combo no.1 (Xellia) IVPB 2 g  amitriptyline (Elavil) tablet 10 mg  DULoxetine (Cymbalta) DR capsule 20 mg  folic acid (Folvite) tablet 1 mg  gabapentin (Neurontin) capsule 300 mg  hydrALAZINE (Apresoline) tablet 20 mg  lactobacillus acidophilus tablet 1 tablet  levothyroxine (Synthroid, Levoxyl) tablet 150 mcg  pantoprazole (ProtoNix) EC tablet 40 mg  simvastatin (Zocor) tablet 10 mg  sodium bicarbonate tablet 650 mg  heparin (porcine) injection 5,000 Units  polyethylene glycol (Glycolax, Miralax) packet 17  g  benzocaine-menthol (Cepastat Sore Throat) 15-3.6 mg lozenge 1 lozenge  guaiFENesin (Mucinex) 12 hr tablet 600 mg  dextromethorphan-guaifenesin (Robitussin DM)  mg/5 mL oral liquid 5 mL  acetaminophen (Tylenol) tablet 650 mg  acetaminophen (Tylenol) oral liquid 650 mg  acetaminophen (Tylenol) suppository 650 mg  sodium chloride 0.9% infusion  metroNIDAZOLE (Flagyl) tablet 500 mg  vancomycin (Vancocin) capsule 125 mg  dilTIAZem (Cardizem) 125 mg in dextrose 5% 125 mL (1 mg/mL) infusion (premix)  dilTIAZem (Cardizem) injection 10 mg  potassium chloride CR (Klor-Con M20) ER tablet 40 mEq  regadenoson (Lexiscan) injection 0.4 mg  aminophylline syringe 125 mg  cholestyramine (Questran) 4 gram packet 4 g  potassium chloride CR (Klor-Con M20) ER tablet 40 mEq  Tc-99m tetrofosmin (Myoview) injection 11 millicurie  Tc-99m tetrofosmin (Myoview) injection 32 millicurie  dilTIAZem (Cardizem) 125 mg in dextrose 5% 125 mL (1 mg/mL) infusion (premix)  dilTIAZem (Cardizem) injection 20 mg      Relevant Results  Labs  Results from last 72 hours   Lab Units 12/27/23 0826 12/26/23 0452 12/25/23  0443   WBC AUTO x10*3/uL 35.7* 37.3* 38.4*   HEMOGLOBIN g/dL 9.9* 9.4* 10.3*   HEMATOCRIT % 32.6* 30.2* 33.2*   PLATELETS AUTO x10*3/uL 254 227 248   LYMPHO PCT MAN %  --  52.0  --    MONO PCT MAN %  --  5.0  --    EOSINO PCT MAN %  --  1.0  --      Results from last 72 hours   Lab Units 12/27/23 0826 12/26/23 0452 12/25/23  0443   SODIUM mmol/L 142 140 139   POTASSIUM mmol/L 3.4 3.2* 2.7*   CHLORIDE mmol/L 107 107 103   CO2 mmol/L 19* 21* 19*   BUN mg/dL 25 38* 38*   CREATININE mg/dL 2.10* 2.80* 2.80*   GLUCOSE mg/dL 69 89 111*   CALCIUM mg/dL 8.5 8.2* 8.2*   ANION GAP mmol/L 16 12 17   EGFR mL/min/1.73m*2 23* 16* 16*   PHOSPHORUS mg/dL  --   --  3.6     Results from last 72 hours   Lab Units 12/26/23  0452   ALK PHOS U/L 69   BILIRUBIN TOTAL mg/dL <0.2   PROTEIN TOTAL g/dL 5.2*   ALT U/L 11   AST U/L 16   ALBUMIN g/dL 2.8*  "    Estimated Creatinine Clearance: 20.8 mL/min (A) (by C-G formula based on SCr of 2.1 mg/dL (H)).  No results found for: \"CRP\"  Microbiology  Reviewed  Imaging  ECG 12 lead    Result Date: 12/27/2023   Poor data quality, interpretation may be adversely affected Normal sinus rhythm Nonspecific ST abnormality Abnormal ECG When compared with ECG of 31-MAY-2013 19:08, QT has shortened Confirmed by Magan Perez ([id number]) on 12/27/2023 10:14:24 AM    Nuclear Stress Test    Result Date: 12/26/2023  Interpreted By:  Remigio Cherry, STUDY: NUCLEAR STRESS TEST;  12/26/2023 2:36 pm   INDICATION: Signs/Symptoms:Paroxysmal atrial fibrillation, sick sinus syndrome.   COMPARISON: None.   ACCESSION NUMBER(S): [id number]   ORDERING CLINICIAN: NORY LOZA   TECHNIQUE: DIVISION OF NUCLEAR MEDICINE PHARMACOLOGIC STRESS MYOCARDIAL PERFUSION SCAN, ONE DAY PROTOCOL   The patient received an intravenous injection of 11.0 mCi of Tc-99m Myoview and resting emission tomographic (SPECT) images of the myocardium were acquired. The patient then received an intravenous infusion of 0.4 mg regadenoson (Lexiscan) followed by an additional injection of 32.0 mCi of Tc-99m Myoview. Stress phase SPECT images of the myocardium were then acquired. These included ECG-gated post-stress and rest images to assess and quantify ventricular function.  Low dose CT was acquired for attenuation correction.   FINDINGS: Stress and rest phase imaging demonstrate no evidence for ischemia. No fixed defects seen to suggest infarct   EKG gated imaging demonstrates normal left ventricular wall motion and thickening. There is normal end-diastolic volume. Normal ejection fraction 65%           Attenuation correction CT images demonstrate nonspecific bilateral axillary lymphadenopathy. For example, in the right axilla measuring 2.8 by 1.2 cm. Lung parenchyma demonstrates small bilateral basilar effusions and compressive atelectasis       1. No evidence for ischemia.   2. " Normal ejection fraction 65%.   3. Small bilateral basilar effusions and compressive atelectasis   4. Bilateral axillary lymphadenopathy nonspecific and likely reactive         MACRO: None   Signed by: Remigio Cherry 12/26/2023 2:59 PM Dictation workstation:   [id number]    Cardiology Interpretation Of Nuclear Stress - See Other Report For Nuclear Portion    Result Date: 12/26/2023           [place] [id number]            Phone [phone] Nuclear Pharmacologic Stress Test Patient Name:      MARK GABRIEL Ordering Provider:    [id number] NORY LOZA Study Date:        12/26/2023         Reading Physician:    [id number] Elvis Bruce DO MRN/PID:           [id number]           Supervising           Balwinder Bruce DO                                       Physician: Accession#:        [id number]       Fellow: Date of Birth/Age: 1940 / 83      Fellow:                    years Gender:            F                  Nurse:                Do Zafar RN Admit Date:                           Technician:           Matt Williamson                                                             CVTI Admission Status:                     Sonographer:          QUYEN Height:            160.02 cm          Technologist: Weight:            82.10 kg           Additional Staff: BSA:               1.85 m2            Encounter#:           [id number] BMI:               32.06 kg/m2        Patient Location: Study Type:    CARDIOLOGY INTERPRETATION OF NUCLEAR STRESS Diagnosis/ICD: Longstanding persistent atrial fibrillation-I48.11 Indication:    Atrial Fibrillation CPT Codes:     Stress Test Interpretation-28965; Stress Test Supervision-[id number] Falls Risk:  Study Details: Correct procedure and correct patient verified verbally and with                ID Band checked.  Patient History: Chest pain, atrial fibrillation, syncope, hypertension, hyperlipidemia and congestive heart failure.   Medications: SIMVASTATIN, FUROSEMIDE, HYDRALAZINE. The patient did not take medications as prescribed.  Patient Performance: Patient received a total of 0.4 mg of Regadenoson at 11:30:33 AM. The resting blood pressure was 175/68 mmHg with a heart rate of 74 bpm. The patient developed no symptoms during the stress exam. The blood pressure response was normal. The test was terminated due to: completed lab protocol. Standard dose of Lexiscan was infused over 10 to 15 seconds then flushed with saline. The patient then received a standard IV dose of Myoview. The patient did not experience chest pain with infusion of Lexiscan. The resting twelve-lead ECG was essentially normal and there were no significant changes in the ST segments with the infusion of Lexiscan. Resting blood pressure was 170/68 and decreased to 144/60 prior to completion of the study. No arrhythmias were induced. Myoview imaging was to be reported separately. Patient has met the discharge criteria and is discharged to their floor.  Baseline ECG: Resting ECG showed Atrial Fibrillation with AFIB.  Stress ECG: Stress ECG showed atrial fibrillation.  Stress Stage Data: +----------------+--+------+-------+                 HRSys BPDias BP +----------------+--+------+-------+ Baseline [id number]   68      +----------------+--+------+-------+  Recovery ECG: Recovery ECG showed atrial fibrillation.  +------------+--+------+-------+             HRSys BPDias BP +------------+--+------+-------+ Recovery I  [id number]   54      +------------+--+------+-------+ Recovery II [id number]   54      +------------+--+------+-------+ Recovery [id number]   61      +------------+--+------+-------+  Summary:  1. No clinical or ECG evidence of ischemia.  2. Myoview imaging to be reported separately.  3. Adequate level of stress achieved.  4. Nuclear image results are reported separately. [id number] Elvis Bruce DO Electronically signed on 12/26/2023 at 11:42:40 AM    ** Final **     US renal complete    Result Date: 12/24/2023  Interpreted By:  Bruno Bryant, STUDY: US RENAL COMPLETE; 12/24/2023 7:50 pm   INDICATION: Signs/Symptoms:JANET on CKD. /history of CLL and hypertension   COMPARISON: None.   ACCESSION NUMBER(S): [id number]   ORDERING CLINICIAN: SERJIO DEVINE   TECHNIQUE: Grayscale and color Doppler imaging of the kidneys.   FINDINGS: The right kidney measures 9.3 cm x 4.1 cm x 4.4 cm cm. The left kidney measures 7.8 cm x 5.0 cm x 3.7 cm cm. There is no shadowing calculus, hydronephrosis, or solid mass identified. However, there is a 2.6 x 2.5 x 2.4 cm inferior pole hypoechoic renal cyst without internal color Doppler flow. Renal cortical thinning and increased echogenicity of the renal parenchyma is noted.   The bladder is grossly unremarkable.       1. No hydronephrosis or nephrolithiasis. 2. Medical renal disease. 3 left renal cyst.     MACRO: None.   Signed by: Bruno Bryant 12/24/2023 8:19 PM Dictation workstation:   [id number]    XR chest 1 view    Result Date: 12/24/2023  Interpreted By:  Nick Luciano, STUDY: XR CHEST 1 VIEW; 12/24/2023 4:15 am   INDICATION: CLINICAL INFORMATION: Signs/Symptoms:weakness.   COMPARISON: 12/18/2020   ACCESSION NUMBER(S): [id number]   ORDERING CLINICIAN: LESLYE FRANK   TECHNIQUE: Portable chest one view.   FINDINGS: The cardiac size is indeterminate in view of the AP projection. Neurostimulator overlies the thoracic spine. No infiltrates or effusions are identified.  The aorta is tortuous.       No acute pathologic findings are identified.  There is no interval change when compared to the previous examination.   MACRO: none   Signed by: Nick Luciano 12/24/2023 9:11 AM Dictation workstation:   [id number]    XR chest 2 views    Result Date: 12/18/2023  Interpreted By:  Alex Mandel, STUDY: XR CHEST 2 VIEWS; 12/18/2023 8:06 am   INDICATION: Signs/Symptoms:sob   COMPARISON: 12/13/2023   ACCESSION NUMBER(S):  [id number]   ORDERING CLINICIAN: SRIKANTH VILCHIS   TECHNIQUE: PA and LAT views of the chest were obtained.   FINDINGS: The cardiomediastinal silhouette is unremarkable. The lungs are clear. No pleural effusion is identified.   The osseous structures are intact. Spinal cord stimulator leads are again seen at the mid and lower thoracic level.       No acute cardiopulmonary process.     Signed by: Alex Mandel 12/18/2023 8:30 AM Dictation workstation:   [id number]    Transthoracic Echo (TTE) Complete    Result Date: 12/15/2023           [place] [address]            Phone [phone] TRANSTHORACIC ECHOCARDIOGRAM REPORT  Patient Name:      MARK DARIEN GABRIEL  Reading Physician:    [id number] Elvis Bruce DO Study Date:        12/15/2023          Ordering Provider:    [id number] ABDULLAHI VANESSA MRN/PID:           [id number]            Fellow: Accession#:        [id number]        Nurse: Date of Birth/Age: 1940 / 83 years Sonographer:          Kristen WESTFALL Gender:            F                   Additional Staff: Height:            154.94 cm           Admit Date:           12/14/2023 Weight:            82.10 kg            Admission Status:     Inpatient - Routine BSA:               1.81 m2             Department Location: Blood Pressure: 150 /47 mmHg Study Type:    TRANSTHORACIC ECHO (TTE) COMPLETE Diagnosis/ICD: Shortness of breath-R06.02 Indication:    Shortness of Breath CPT Codes:     Echo Complete w Full Doppler-[id number] Patient History: Pertinent History: CKD CHF Depression HTN CP Murmur Dyslipidemia Varicose veins                    of leg with swellin ghyperlipidemia hypercholesterolemia. Study Detail: The following Echo studies were performed: 2D, Doppler, M-Mode,               color flow and 3D.  PHYSICIAN INTERPRETATION: Left Ventricle: Left ventricular systolic function is normal, with an estimated  ejection fraction of 60-65%. There are no regional wall motion abnormalities. The left ventricular cavity size is normal. Left ventricular diastolic filling was indeterminate. Left Atrium: The left atrium is normal in size. Right Ventricle: The right ventricle is normal in size. There is normal right ventricular global systolic function. Right Atrium: The right atrium is normal in size. Aortic Valve: The aortic valve is trileaflet. There is mild aortic valve cusp calcification. There is no evidence of aortic valve regurgitation. The peak instantaneous gradient of the aortic valve is 12.0 mmHg. Mitral Valve: The mitral valve is normal in structure. There is trace to mild mitral valve regurgitation. Tricuspid Valve: The tricuspid valve is structurally normal. No evidence of tricuspid regurgitation. Pulmonic Valve: The pulmonic valve is not well visualized. There is trace to mild pulmonic valve regurgitation. Pericardium: There is no pericardial effusion noted. Aorta: The aortic root is normal. Systemic Veins: The inferior vena cava appears to be of normal size. There is IVC inspiratory collapse greater than 50%.  CONCLUSIONS:  1. Left ventricular systolic function is normal with a 60-65% estimated ejection fraction.  2. Left ventricular diastolic filling indeterminate.  3. Mild aortic valve sclerosis. QUANTITATIVE DATA SUMMARY: 2D MEASUREMENTS:                          Normal Ranges: LAs:           3.00 cm   (2.7-4.0cm) IVSd:          0.85 cm   (0.6-1.1cm) LVPWd:         1.00 cm   (0.6-1.1cm) LVIDd:         4.23 cm   (3.9-5.9cm) LVIDs:         2.79 cm LV Mass Index: 69.0 g/m2 LV % FS        34.0 % LA VOLUME:                               Normal Ranges: LA Vol A2C:        59.3 ml LA Vol Index A2C:  32.8 ml/m2 LA Area A2C:       18.7 cm2 LA Major Axis A2C: 5.0 cm LA Volume Index:   29.6 ml/m2 LA Vol A2C:        59.7 ml RA VOLUME BY A/L METHOD:                               Normal Ranges: RA Vol A4C:        32.1 ml     (8.3-19.5ml) RA Vol Index A4C:  17.7 ml/m2 RA Area A4C:       13.6 cm2 RA Major Axis A4C: 4.9 cm M-MODE MEASUREMENTS:                  Normal Ranges: Ao Root: 2.20 cm (2.0-3.7cm) LAs:     4.80 cm (2.7-4.0cm) AORTA MEASUREMENTS:                    Normal Ranges: Asc Ao, d: 3.10 cm (2.1-3.4cm) LV SYSTOLIC FUNCTION BY 2D PLANIMETRY (MOD):                     Normal Ranges: EF-A4C View: 51.6 % (>=55%) EF-A2C View: 57.9 % EF-Biplane:  55.8 % LV DIASTOLIC FUNCTION:                            Normal Ranges: MV Peak E:      0.93 m/s   (0.7-1.2 m/s) MV Peak A:      1.02 m/s   (0.42-0.7 m/s) E/A Ratio:      0.91       (1.0-2.2) MV e'           0.08 m/s   (>8.0) MV lateral e'   0.08 m/s MV medial e'    0.07 m/s E/e' Ratio:     12.39      (<8.0) PulmV Sys Siddhartha:  68.10 cm/s PulmV Rurutia Siddhartha: 47.60 cm/s PulmV S/D Siddhartha:  1.40 MITRAL VALVE:                      Normal Ranges: MV Vmax:    1.22 m/s (<=1.3m/s) MV peak P.0 mmHg (<5mmHg) MV mean P.0 mmHg (<48mmHg) MV DT:      262 msec (150-240msec) AORTIC VALVE:                          Normal Ranges: AoV Vmax:      1.73 m/s  (<=1.7m/s) AoV Peak P.0 mmHg (<20mmHg) LVOT Max Siddhartha:  1.21 m/s  (<=1.1m/s) LVOT VTI:      29.10 cm LVOT Diameter: 2.00 cm   (1.8-2.4cm) AoV Area,Vmax: 2.20 cm2  (2.5-4.5cm2)  RIGHT VENTRICLE: RV Basal 3.18 cm RV Mid   2.70 cm RV Major 5.2 cm TAPSE:   16.5 mm RV s'    0.13 m/s TRICUSPID VALVE/RVSP:                   Normal Ranges: IVC Diam: 1.75 cm PULMONIC VALVE:                         Normal Ranges: PV Accel Time: 69 msec  (>120ms) PV Max Siddhartha:    1.4 m/s  (0.6-0.9m/s) PV Max P.3 mmHg Pulmonary Veins: PulmV Urrutia Siddhartha: 47.60 cm/s PulmV S/D Siddhartha:  1.40 PulmV Sys Siddhartha:  68.10 cm/s  [id number] Elvis Bruce DO Electronically signed on 12/15/2023 at 10:13:13 AM  ** Final **     CT head wo IV contrast    Result Date: 2023  Interpreted By:  Rashawn Hook, STUDY: CT HEAD WO IV CONTRAST; 2023 10:34 am   INDICATION: Signs/Symptoms:lightheadedness,  near syncope.   COMPARISON: No comparison exams available.   ACCESSION NUMBER(S): [id number]   ORDERING CLINICIAN: SLY DAWKINS   TECHNIQUE: CT axial images through the Brain were obtained without contrast.   FINDINGS: Acute ischemic change: None.   Hemorrhage: No evidence of acute intracranial hemorrhage.   Mass Effect / Mass Lesion: No significant mass effect. There is no evidence of an intracranial mass or extraaxial fluid collection.   Chronic ischemic change: Patchy foci of  low attenuation coefficient are present within white matter which is a nonspecific finding but likely represents moderate microvascular ischemia.   Parenchyma: There is no significant volume loss. The brain parenchyma is otherwise within normal limits for age.   Ventricles: Normal caliber and morphology.   Other: There is calcification involving the carotid siphons.       No acute intracranial process   All CT examinations are performed with 1 or more of the following dose reduction techniques: Automated exposure control, adjustment of mA and/or kv according to patient's size, or use of iterative reconstruction techniques.   MACRO: none   Signed by: Rashawn Hook 12/14/2023 10:48 AM Dictation workstation:   [id number]    XR chest 2 views    Result Date: 12/13/2023  Interpreted By:  Nj Zazueta, STUDY: XR CHEST 2 VIEWS; 12/13/2023 2:47 pm   INDICATION: Signs/Symptoms:SOB dizziness   COMPARISON: Chest radiograph 06/09/2020   ACCESSION NUMBER(S): [id number]   ORDERING CLINICIAN: LA SAEED   TECHNIQUE: AP and lateral views of the chest performed.   FINDINGS: Cardiac size is indeterminate due to the AP projection. Calcifications are seen at the aortic knob. Thoracic aorta is tortuous in appearance. Mild prominence of the pulmonary vasculature. Faint bibasilar hazy opacities likely reflect atelectasis. No focal consolidation, large pleural effusion, or visible pneumothorax. Multilevel discogenic degenerative changes of the  thoracic spine. Presumed neurostimulator leads overlying the thoracic spine.       Mild prominence of the pulmonary vasculature, as can be seen with pulmonary vascular congestion. Please correlate for corresponding symptoms.   No focal consolidation or visible pneumothorax.   Faint bibasilar atelectasis suspected.   MACRO: None.   Signed by: Nj Zazueta 12/13/2023 3:04 PM Dictation workstation:   [id number]     Assessment/Plan     Acute kidney injury-resolving  Leukocytosis-multifactorial  CLL  C. difficile infection-resolving     Continue oral vancomycin  Monitor renal function  Contact plus precautions  Questran  Supportive care  Monitor temperature and WBC    Hipolito Elias MD

## 2023-12-27 NOTE — NURSING NOTE
Cardizem bolus complete and gtt initiated per order. Pt educated on medication and atrial fibrillation. Pt has no questions at this time. IV's infusing with  no difficulty. Continuing with plan of care

## 2023-12-27 NOTE — PROGRESS NOTES
Update: [facility] stating they likely will not have a bed available when the pt is ready for dc. [facility] spoke with dtr Linda [phone] who gave ok for referral to [facility], referral sent, await response. Precert will be needed.     12/27/23 8706   Discharge Planning   Patient expects to be discharged to: SNF

## 2023-12-27 NOTE — PROGRESS NOTES
"Tracie Baltazar is a 83 y.o. female on day 3 of admission presenting with Weakness.    Subjective   HPI   Patient seen and examined, feels slightly better only 2 diarrhea yesterday and 1 time loose stools today.  Feeling weak and fatigued.  Denies any chest pain, shortness of breath on 2 L nasal cannula patient tolerates well her diet with no nausea vomiting or abdominal pain.  No fever or chills.  No headache or dizziness.      Objective     Last Recorded Vitals  Blood pressure (!) 132/42, pulse 53, temperature 35.8 °C (96.4 °F), temperature source Temporal, resp. rate 18, height 1.6 m (5' 2.99\"), weight 83.6 kg (184 lb 4.9 oz), SpO2 99 %.      Intake/Output Summary (Last 24 hours) at 12/27/2023 1724  Last data filed at 12/27/2023 1500  Gross per 24 hour   Intake 240 ml   Output 1925 ml   Net -1685 ml         Physical Exam   General: alert, no diaphoresis   HENT: mucous membranes moist, external ears normal, no rhinorrhea   Eyes: no icterus or injection, no discharge   Lungs: CTA BL   Heart: RRR, no murmurs, no LE edema BL   GI: abdomen soft, nontender, nondistended, BS present   MSK: no joint effusion or deformity   Skin: no rashes, erythema, or ecchymosis   Neuro: grossly normal cognition, motor strength, sensation   Relevant Results    Lab Results   Component Value Date    WBC 35.7 (H) 12/27/2023    HGB 9.9 (L) 12/27/2023    HCT 32.6 (L) 12/27/2023     (H) 12/27/2023     12/27/2023       Lab Results   Component Value Date    GLUCOSE 69 12/27/2023    CALCIUM 8.5 12/27/2023     12/27/2023    K 3.4 12/27/2023    CO2 19 (L) 12/27/2023     12/27/2023    BUN 25 12/27/2023    CREATININE 2.10 (H) 12/27/2023       Lab Results   Component Value Date    HGBA1C 5.4 12/19/2019       US renal complete    Result Date: 12/24/2023  Interpreted By:  Bruno Bryant, STUDY: US RENAL COMPLETE; 12/24/2023 7:50 pm   INDICATION: Signs/Symptoms:JANET on CKD. /history of CLL and hypertension   COMPARISON: " None.   ACCESSION NUMBER(S): [id number]   ORDERING CLINICIAN: SERJIO DEVINE   TECHNIQUE: Grayscale and color Doppler imaging of the kidneys.   FINDINGS: The right kidney measures 9.3 cm x 4.1 cm x 4.4 cm cm. The left kidney measures 7.8 cm x 5.0 cm x 3.7 cm cm. There is no shadowing calculus, hydronephrosis, or solid mass identified. However, there is a 2.6 x 2.5 x 2.4 cm inferior pole hypoechoic renal cyst without internal color Doppler flow. Renal cortical thinning and increased echogenicity of the renal parenchyma is noted.   The bladder is grossly unremarkable.       1. No hydronephrosis or nephrolithiasis. 2. Medical renal disease. 3 left renal cyst.     MACRO: None.   Signed by: Bruno Bryant 12/24/2023 8:19 PM Dictation workstation:   [id number]    XR chest 1 view    Result Date: 12/24/2023  Interpreted By:  Nick Luciano, STUDY: XR CHEST 1 VIEW; 12/24/2023 4:15 am   INDICATION: CLINICAL INFORMATION: Signs/Symptoms:weakness.   COMPARISON: 12/18/2020   ACCESSION NUMBER(S): [id number]   ORDERING CLINICIAN: LESLYE FRANK   TECHNIQUE: Portable chest one view.   FINDINGS: The cardiac size is indeterminate in view of the AP projection. Neurostimulator overlies the thoracic spine. No infiltrates or effusions are identified.  The aorta is tortuous.       No acute pathologic findings are identified.  There is no interval change when compared to the previous examination.   MACRO: none   Signed by: Nick Luciano 12/24/2023 9:11 AM Dictation workstation:   [id number]    XR chest 2 views    Result Date: 12/18/2023  Interpreted By:  Alex Mandel, STUDY: XR CHEST 2 VIEWS; 12/18/2023 8:06 am   INDICATION: Signs/Symptoms:sob   COMPARISON: 12/13/2023   ACCESSION NUMBER(S): [id number]   ORDERING CLINICIAN: SRIKANTH VILCHIS   TECHNIQUE: PA and LAT views of the chest were obtained.   FINDINGS: The cardiomediastinal silhouette is unremarkable. The lungs are clear. No pleural effusion is identified.   The osseous  structures are intact. Spinal cord stimulator leads are again seen at the mid and lower thoracic level.       No acute cardiopulmonary process.     Signed by: Alex Mandel 12/18/2023 8:30 AM Dictation workstation:   [id number]    Transthoracic Echo (TTE) Complete    Result Date: 12/15/2023           [place] [id number]            Phone [phone] TRANSTHORACIC ECHOCARDIOGRAM REPORT  Patient Name:      MARK GABRIEL  Reading Physician:    [id number] Elvis [facility] Areasa MARTINEZ Study Date:        12/15/2023          Ordering Provider:    [id number] ABDULLAHI VANESSA MRN/PID:           [id number]            Fellow: Accession#:        [id number]        Nurse: Date of Birth/Age: 1940 / 83 years Sonographer:          Kristen WESTFALL Gender:            F                   Additional Staff: Height:            154.94 cm           Admit Date:           12/14/2023 Weight:            82.10 kg            Admission Status:     Inpatient - Routine BSA:               1.81 m2             Department Location: Blood Pressure: 150 /47 mmHg Study Type:    TRANSTHORACIC ECHO (TTE) COMPLETE Diagnosis/ICD: Shortness of breath-R06.02 Indication:    Shortness of Breath CPT Codes:     Echo Complete w Full [id number] Patient History: Pertinent History: CKD CHF Depression HTN CP Murmur Dyslipidemia Varicose veins                    of leg with swellin ghyperlipidemia hypercholesterolemia. Study Detail: The following Echo studies were performed: 2D, Doppler, M-Mode,               color flow and 3D.  PHYSICIAN INTERPRETATION: Left Ventricle: Left ventricular systolic function is normal, with an estimated ejection fraction of 60-65%. There are no regional wall motion abnormalities. The left ventricular cavity size is normal. Left ventricular diastolic filling was indeterminate. Left Atrium: The left atrium is normal in size. Right  Ventricle: The right ventricle is normal in size. There is normal right ventricular global systolic function. Right Atrium: The right atrium is normal in size. Aortic Valve: The aortic valve is trileaflet. There is mild aortic valve cusp calcification. There is no evidence of aortic valve regurgitation. The peak instantaneous gradient of the aortic valve is 12.0 mmHg. Mitral Valve: The mitral valve is normal in structure. There is trace to mild mitral valve regurgitation. Tricuspid Valve: The tricuspid valve is structurally normal. No evidence of tricuspid regurgitation. Pulmonic Valve: The pulmonic valve is not well visualized. There is trace to mild pulmonic valve regurgitation. Pericardium: There is no pericardial effusion noted. Aorta: The aortic root is normal. Systemic Veins: The inferior vena cava appears to be of normal size. There is IVC inspiratory collapse greater than 50%.  CONCLUSIONS:  1. Left ventricular systolic function is normal with a 60-65% estimated ejection fraction.  2. Left ventricular diastolic filling indeterminate.  3. Mild aortic valve sclerosis. QUANTITATIVE DATA SUMMARY: 2D MEASUREMENTS:                          Normal Ranges: LAs:           3.00 cm   (2.7-4.0cm) IVSd:          0.85 cm   (0.6-1.1cm) LVPWd:         1.00 cm   (0.6-1.1cm) LVIDd:         4.23 cm   (3.9-5.9cm) LVIDs:         2.79 cm LV Mass Index: 69.0 g/m2 LV % FS        34.0 % LA VOLUME:                               Normal Ranges: LA Vol A2C:        59.3 ml LA Vol Index A2C:  32.8 ml/m2 LA Area A2C:       18.7 cm2 LA Major Axis A2C: 5.0 cm LA Volume Index:   29.6 ml/m2 LA Vol A2C:        59.7 ml RA VOLUME BY A/L METHOD:                               Normal Ranges: RA Vol A4C:        32.1 ml    (8.3-19.5ml) RA Vol Index A4C:  17.7 ml/m2 RA Area A4C:       13.6 cm2 RA Major Axis A4C: 4.9 cm M-MODE MEASUREMENTS:                  Normal Ranges: Ao Root: 2.20 cm (2.0-3.7cm) LAs:     4.80 cm (2.7-4.0cm) AORTA MEASUREMENTS:                     Normal Ranges: Asc Ao, d: 3.10 cm (2.1-3.4cm) LV SYSTOLIC FUNCTION BY 2D PLANIMETRY (MOD):                     Normal Ranges: EF-A4C View: 51.6 % (>=55%) EF-A2C View: 57.9 % EF-Biplane:  55.8 % LV DIASTOLIC FUNCTION:                            Normal Ranges: MV Peak E:      0.93 m/s   (0.7-1.2 m/s) MV Peak A:      1.02 m/s   (0.42-0.7 m/s) E/A Ratio:      0.91       (1.0-2.2) MV e'           0.08 m/s   (>8.0) MV lateral e'   0.08 m/s MV medial e'    0.07 m/s E/e' Ratio:     12.39      (<8.0) PulmV Sys Siddhartha:  68.10 cm/s PulmV Urrutia Siddhartha: 47.60 cm/s PulmV S/D Siddhartha:  1.40 MITRAL VALVE:                      Normal Ranges: MV Vmax:    1.22 m/s (<=1.3m/s) MV peak P.0 mmHg (<5mmHg) MV mean P.0 mmHg (<48mmHg) MV DT:      262 msec (150-240msec) AORTIC VALVE:                          Normal Ranges: AoV Vmax:      1.73 m/s  (<=1.7m/s) AoV Peak P.0 mmHg (<20mmHg) LVOT Max Siddhartha:  1.21 m/s  (<=1.1m/s) LVOT VTI:      29.10 cm LVOT Diameter: 2.00 cm   (1.8-2.4cm) AoV Area,Vmax: 2.20 cm2  (2.5-4.5cm2)  RIGHT VENTRICLE: RV Basal 3.18 cm RV Mid   2.70 cm RV Major 5.2 cm TAPSE:   16.5 mm RV s'    0.13 m/s TRICUSPID VALVE/RVSP:                   Normal Ranges: IVC Diam: 1.75 cm PULMONIC VALVE:                         Normal Ranges: PV Accel Time: 69 msec  (>120ms) PV Max Siddhartha:    1.4 m/s  (0.6-0.9m/s) PV Max P.3 mmHg Pulmonary Veins: PulmV Urrutia Siddhartha: 47.60 cm/s PulmV S/D Siddhartha:  1.40 PulmV Sys Siddhartha:  68.10 cm/s  [id number] Elvis Bruce DO Electronically signed on 12/15/2023 at 10:13:13 AM  ** Final **     CT head wo IV contrast    Result Date: 2023  Interpreted By:  Rashawn Hook, STUDY: CT HEAD WO IV CONTRAST; 2023 10:34 am   INDICATION: Signs/Symptoms:lightheadedness, near syncope.   COMPARISON: No comparison exams available.   ACCESSION NUMBER(S): [id number]   ORDERING CLINICIAN: SLY DAWKINS   TECHNIQUE: CT axial images through the Brain were obtained without contrast.   FINDINGS: Acute ischemic  change: None.   Hemorrhage: No evidence of acute intracranial hemorrhage.   Mass Effect / Mass Lesion: No significant mass effect. There is no evidence of an intracranial mass or extraaxial fluid collection.   Chronic ischemic change: Patchy foci of  low attenuation coefficient are present within white matter which is a nonspecific finding but likely represents moderate microvascular ischemia.   Parenchyma: There is no significant volume loss. The brain parenchyma is otherwise within normal limits for age.   Ventricles: Normal caliber and morphology.   Other: There is calcification involving the carotid siphons.       No acute intracranial process   All CT examinations are performed with 1 or more of the following dose reduction techniques: Automated exposure control, adjustment of mA and/or kv according to patient's size, or use of iterative reconstruction techniques.   MACRO: none   Signed by: Rashawn Hook 12/14/2023 10:48 AM Dictation workstation:   [id number]    XR chest 2 views    Result Date: 12/13/2023  Interpreted By:  Nj Zazueta, STUDY: XR CHEST 2 VIEWS; 12/13/2023 2:47 pm   INDICATION: Signs/Symptoms:SOB dizziness   COMPARISON: Chest radiograph 06/09/2020   ACCESSION NUMBER(S): [id number]   ORDERING CLINICIAN: LA SAEED   TECHNIQUE: AP and lateral views of the chest performed.   FINDINGS: Cardiac size is indeterminate due to the AP projection. Calcifications are seen at the aortic knob. Thoracic aorta is tortuous in appearance. Mild prominence of the pulmonary vasculature. Faint bibasilar hazy opacities likely reflect atelectasis. No focal consolidation, large pleural effusion, or visible pneumothorax. Multilevel discogenic degenerative changes of the thoracic spine. Presumed neurostimulator leads overlying the thoracic spine.       Mild prominence of the pulmonary vasculature, as can be seen with pulmonary vascular congestion. Please correlate for corresponding symptoms.   No focal  consolidation or visible pneumothorax.   Faint bibasilar atelectasis suspected.   MACRO: None.   Signed by: Nj Zazueta 12/13/2023 3:04 PM Dictation workstation:   [id number]    Scheduled medications  amitriptyline, 10 mg, oral, Nightly  cholestyramine light, 4 g, oral, Daily with evening meal  DULoxetine, 20 mg, oral, Daily  folic acid, 1 mg, oral, Daily  gabapentin, 300 mg, oral, BID  heparin (porcine), 5,000 Units, subcutaneous, q8h  lactobacillus acidophilus, 1 tablet, oral, BID with meals  levothyroxine, 150 mcg, oral, Daily  potassium chloride CR, 40 mEq, oral, Daily  simvastatin, 10 mg, oral, Nightly  sodium bicarbonate, 650 mg, oral, TID  vancomycin, 125 mg, oral, 4x daily      Continuous medications  dilTIAZem, 10 mg/hr, Last Rate: 10 mg/hr (12/27/23 1149)  sodium chloride 0.9%, 60 mL/hr, Last Rate: 60 mL/hr (12/27/23 1605)      PRN medications  PRN medications: acetaminophen **OR** acetaminophen **OR** acetaminophen, benzocaine-menthol, dextromethorphan-guaifenesin, guaiFENesin    Assessment/Plan   Principal Problem:    Weakness  Active Problems:    Dyslipidemia    Chronic kidney disease, stage 4 (severe) (CMS/HCC)    Fibromyalgia    Chronic lymphocytic leukemia (CMS/HCC)    Benign essential hypertension    Congestive heart failure with left ventricular diastolic dysfunction, acute on chronic (CMS/HCC)    Atrial fibrillation (CMS/HCC)    Atrial fibrillation with pauses/ A-fib with RVR   -140 , patient got Cardizem bolus and initiated Cardizem drip per cardiology  Dr. Janis Lee close.  nuclear stress test done on    Hypokalemia  Resolved     Acute kidney injury   Cr 2.1   Dr. Amor on the case   continue gentle with fluid     C. difficile colitis/ leukocytosis   BC neg X 2   On vancomycin   from ID services on the case .     Leukocytosis/history of leukemia/ Anemia  HH trending down   Secondary to CLL  Seen by  Dr. Jhon Lee close.     Hypothyroidism  Continue with  home  medication.    Weakness/deconditioning   Fall precautions  PT/OT comments moderate intensity    DVT prophylaxis  Heparin subcu     Discharge plan:  This is 83 years old female with med Hx history of hypertension, hypothyroidism, IBS, chronic kidney disease stage IV, CLL. . She  presented from home with weakness and fall.  Admitted for evaluation for weakness, heart failure preserved EF, CKD stage III-IV, acute on chronic anemia and C-diff.   Evaluated by cardiology stress test done on 12/26  Evaluated by Dr. Chisholm regarding new onset of A-fib with pauses, recommend to not use AV amna blocking drugs also not a candidate for pacemaker insertion at this time.  Patient has to follow-up with him in 2 weeks long-term Holter prior to her discharge.   Evaluated by ID due to C. difficile, patient is on oral vancomycin.   Evaluated by nephrology due to JANET on CKD3-4; likely from volume depletion due to low p.o. intake and diarrhea, he baseline creatinine is 1.6-1.9; diuretic and ARB's were held    Anticipate discharging patient  skilled of nursing when medically clear     I spent 25 minutes in the professional and overall care of this patient.    Camille Olson, APRN-CNP

## 2023-12-27 NOTE — CARE PLAN
Problem: Safety  Goal: STG - Patient uses call light consistently to request assistance with transfers  Outcome: Progressing     Problem: Transfers  Goal: STG - Patient will transfer sit to and from stand with rolling walker and modified independence.  Outcome: Progressing

## 2023-12-27 NOTE — PROGRESS NOTES
Spiritual Care Visit    Clinical Encounter Type  Visited With: Patient  Routine Visit: Introduction  Continue Visiting: Yes         Values/Beliefs  Spiritual Requests During Hospitalization: Anointed today    Sacramental Encounters  Sacrament of Sick-Anointing: Anointed     Vikas Ervin

## 2023-12-27 NOTE — CARE PLAN
The patient's goals for the shift include FEEL BETTER    The clinical goals for the shift include safety, incontinence management    Over the shift, the patient did not make progress toward the following goals. Barriers to progression include cdiff. Recommendations to address these barriers include administer medications/interventions as ordered.

## 2023-12-27 NOTE — PROGRESS NOTES
Occupational Therapy    OT Treatment    Patient Name: Tracie Baltazar  MRN: [id number]  Today's Date: 12/27/2023  Time Calculation  Start Time: 1300  Stop Time: 1326  Time Calculation (min): 26 min       Assessment:  OT Assessment: Pt tolerated session fairly with increased fatigue throughout, progressing towards POC. Pt would benefit from continued skilled OT services to improve strength and functional tolerance to increase independence with ADL tasks  End of Session Communication: Bedside nurse  End of Session Patient Position: Up in chair (all needs in reach with daughter present at bedside)  OT Assessment Results: Decreased ADL status, Decreased upper extremity strength, Decreased safe judgment during ADL, Decreased endurance, Decreased functional mobility, Decreased gross motor control, Decreased IADLs  Strengths: Ability to acquire knowledge, Premorbid level of function, Support of extended family/friends  Barriers to Participation: Insight into problems, Coping skills  Plan:  Treatment Interventions: ADL retraining, Functional transfer training, Endurance training, Patient/family training, Neuromuscular reeducation, Compensatory technique education  OT Frequency: 3 times per week  OT Discharge Recommendations: Moderate intensity level of continued care  OT Recommended Transfer Status: Minimal assist, Assist of 1  OT - OK to Discharge: Yes  Treatment Interventions: ADL retraining, Functional transfer training, Endurance training, Patient/family training, Neuromuscular reeducation, Compensatory technique education    Subjective   Previous Visit Info:  OT Last Visit  OT Received On: 12/27/23  General:  General  Reason for Referral: decline in ADLs  Referred By: Dr. Sarbjit MD  Past Medical History Relevant to Rehab: Patient is an 83 year old female admitted from home with sudden weakness in legs while walking. Patient recently d/c'd from [facility] 12/19/2023. PMH: CKD, fibromyalgia, CLL, HTN, CHF, obesity, OA,  DJD, depression  Family/Caregiver Present: Yes (daughter present throughout session)  Prior to Session Communication: Bedside nurse  Patient Position Received: Bed, 3 rail up  General Comment: pt cleared for therapy per RN. pt supine in bed upon arrival with daughter present, agreeable to therapy. Pt expressing that she is trying to do as much as she can independently so she can go home.  Precautions:  Medical Precautions: Fall precautions, Infection precautions, Oxygen therapy device and L/min    Pain:  Pain Assessment  Pain Assessment: 0-10  Pain Score: 0 - No pain    Objective    Cognition:  Cognition  Overall Cognitive Status: Within Functional Limits  Orientation Level: Oriented X4     Activities of Daily Living: Grooming  Grooming Level of Assistance: Setup  Grooming Where Assessed: Chair  Grooming Comments: s/u for oral hygeine and hair brushing task    UE Bathing  UE Bathing Level of Assistance: Setup, Distant supervision  UE Bathing Comments: s/u for UB bathing task with VC for task initiation with increased time required for task completion     Bed Mobility/Transfers: Bed Mobility  Bed Mobility: Yes  Bed Mobility 1  Bed Mobility 1: Supine to sitting  Level of Assistance 1: Moderate assistance  Bed Mobility Comments 1: mod A supine>sit EOB with elevated HOB with pt attempting to elevate trunk on own, requiring assist for task completion. Max assist to scoot hips to EOB with use of drawsheet. Pt requiring increased time at EOB d/t fatigue and for line management    Transfers  Transfer: Yes  Transfer 1  Transfer From 1: Sit to  Transfer to 1: Stand  Transfer Device 1: Walker  Transfer Level of Assistance 1: Moderate assistance  Trials/Comments 1: mod A sit>stand from EOB>FWW with VC for weight shifting and proper hand placement with 1 failed attempt d/t pt reaching for FWW, having to return to seated to re-attempt. Pt tolerated taking ~5 steps to chair with VC to lift BLE off ground when stepping  Transfers  2  Transfer From 2: Stand to  Transfer to 2: Sit  Technique 2: Stand to sit  Transfer Device 2: Walker  Transfer Level of Assistance 2: Minimum assistance  Trials/Comments 2: min A stand>sit from FWW>chair with VC for proper hand placement with pt demonstrating decreased eccentric control d/t fatigue    Sitting Balance:  Static Sitting Balance  Static Sitting-Balance Support: Bilateral upper extremity supported  Static Sitting-Level of Assistance: Minimum assistance  Static Sitting-Comment/Number of Minutes: poor sitting balance initially at EOB with retropulsion with pt able to correct with VC/TC for proper hand placement on bed  Standing Balance:  Static Standing Balance  Static Standing-Balance Support: Bilateral upper extremity supported  Static Standing-Level of Assistance: Minimum assistance  Static Standing-Comment/Number of Minutes: fair standing balance during transfers    Outcome Measures:[facility] Daily Activity  Putting on and taking off regular lower body clothing: A lot  Bathing (including washing, rinsing, drying): A little  Putting on and taking off regular upper body clothing: A little  Toileting, which includes using toilet, bedpan or urinal: Total (quinn)  Taking care of personal grooming such as brushing teeth: A little  Eating Meals: None  Daily Activity - Total Score: 16      Education Documentation  Handouts, taught by AUDELIA Edouard at 12/27/2023  1:44 PM.  Learner: Patient  Readiness: Acceptance  Method: Explanation, Demonstration  Response: Verbalizes Understanding, Demonstrated Understanding  Comment: pt educated on pacing tasks to increase safety awareness and EC, with pt demonstrating fair carryover following cues    Home Exercise Program, taught by AUDELIA Edouard at 12/27/2023  1:44 PM.  Learner: Patient  Readiness: Acceptance  Method: Explanation, Demonstration  Response: Verbalizes Understanding, Demonstrated Understanding  Comment: pt educated on pacing tasks to  increase safety awareness and EC, with pt demonstrating fair carryover following cues    Body Mechanics, taught by AUDELIA Edouard at 12/27/2023  1:44 PM.  Learner: Patient  Readiness: Acceptance  Method: Explanation, Demonstration  Response: Verbalizes Understanding, Demonstrated Understanding  Comment: pt educated on pacing tasks to increase safety awareness and EC, with pt demonstrating fair carryover following cues    Precautions, taught by AUDELIA Edouard at 12/27/2023  1:44 PM.  Learner: Patient  Readiness: Acceptance  Method: Explanation, Demonstration  Response: Verbalizes Understanding, Demonstrated Understanding  Comment: pt educated on pacing tasks to increase safety awareness and EC, with pt demonstrating fair carryover following cues    ADL Training, taught by AUDELIA Edouard at 12/27/2023  1:44 PM.  Learner: Patient  Readiness: Acceptance  Method: Explanation, Demonstration  Response: Verbalizes Understanding, Demonstrated Understanding  Comment: pt educated on pacing tasks to increase safety awareness and EC, with pt demonstrating fair carryover following cues    Education Comments  No comments found.           Problem: OT Goals  Goal: ADLs  Description: Patient will demonstrate the ability to complete ADL tasks with Mod I, using AE as needed, in order to increase patent's safety and independence with self-care tasks.  Outcome: Progressing  Goal: Functional Transfers  Description: Patient will complete functional transfers with Mod I in order to increase patient's safety and independence with daily tasks.  Outcome: Progressing  Goal: B UE Strengthening  Description: Patient will increase B UE strength to 4+/5 for functional transfers.  Outcome: Progressing  Goal: Functional Mobility  Description: Patient will demonstrate the ability to complete item retrieval and functional mobility with Mod I in order to increase patient's safety and independence with daily  tasks.    Outcome: Progressing

## 2023-12-28 LAB
ANION GAP SERPL CALC-SCNC: 14 MMOL/L
BACTERIA BLD CULT: NORMAL
BACTERIA BLD CULT: NORMAL
BUN SERPL-MCNC: 24 MG/DL (ref 8–25)
CALCIUM SERPL-MCNC: 8.3 MG/DL (ref 8.5–10.4)
CHLORIDE SERPL-SCNC: 107 MMOL/L (ref 97–107)
CO2 SERPL-SCNC: 19 MMOL/L (ref 24–31)
CREAT SERPL-MCNC: 2 MG/DL (ref 0.4–1.6)
ERYTHROCYTE [DISTWIDTH] IN BLOOD BY AUTOMATED COUNT: 13.6 % (ref 11.5–14.5)
GFR SERPL CREATININE-BSD FRML MDRD: 24 ML/MIN/1.73M*2
GLUCOSE SERPL-MCNC: 103 MG/DL (ref 65–99)
HCT VFR BLD AUTO: 30.3 % (ref 36–46)
HGB BLD-MCNC: 9.1 G/DL (ref 12–16)
MCH RBC QN AUTO: 30.5 PG (ref 26–34)
MCHC RBC AUTO-ENTMCNC: 30 G/DL (ref 32–36)
MCV RBC AUTO: 102 FL (ref 80–100)
NRBC BLD-RTO: 0 /100 WBCS (ref 0–0)
PLATELET # BLD AUTO: 257 X10*3/UL (ref 150–450)
POTASSIUM SERPL-SCNC: 3.5 MMOL/L (ref 3.4–5.1)
RBC # BLD AUTO: 2.98 X10*6/UL (ref 4–5.2)
SODIUM SERPL-SCNC: 140 MMOL/L (ref 133–145)
WBC # BLD AUTO: 38.4 X10*3/UL (ref 4.4–11.3)

## 2023-12-28 PROCEDURE — 85027 COMPLETE CBC AUTOMATED: CPT | Performed by: NURSE PRACTITIONER

## 2023-12-28 PROCEDURE — 97116 GAIT TRAINING THERAPY: CPT | Mod: GP,CQ

## 2023-12-28 PROCEDURE — 2060000001 HC INTERMEDIATE ICU ROOM DAILY

## 2023-12-28 PROCEDURE — 96372 THER/PROPH/DIAG INJ SC/IM: CPT | Performed by: INTERNAL MEDICINE

## 2023-12-28 PROCEDURE — 2500000004 HC RX 250 GENERAL PHARMACY W/ HCPCS (ALT 636 FOR OP/ED): Performed by: INTERNAL MEDICINE

## 2023-12-28 PROCEDURE — 2500000001 HC RX 250 WO HCPCS SELF ADMINISTERED DRUGS (ALT 637 FOR MEDICARE OP): Performed by: INTERNAL MEDICINE

## 2023-12-28 PROCEDURE — 97530 THERAPEUTIC ACTIVITIES: CPT | Mod: GO,CO

## 2023-12-28 PROCEDURE — 36415 COLL VENOUS BLD VENIPUNCTURE: CPT | Performed by: NURSE PRACTITIONER

## 2023-12-28 PROCEDURE — 80048 BASIC METABOLIC PNL TOTAL CA: CPT | Performed by: NURSE PRACTITIONER

## 2023-12-28 PROCEDURE — 97110 THERAPEUTIC EXERCISES: CPT | Mod: GP,CQ

## 2023-12-28 PROCEDURE — 97535 SELF CARE MNGMENT TRAINING: CPT | Mod: GO,CO

## 2023-12-28 RX ADMIN — GABAPENTIN 300 MG: 300 CAPSULE ORAL at 22:34

## 2023-12-28 RX ADMIN — SODIUM BICARBONATE 650 MG TABLET 650 MG: at 09:26

## 2023-12-28 RX ADMIN — Medication 1 TABLET: at 17:08

## 2023-12-28 RX ADMIN — VANCOMYCIN HYDROCHLORIDE 125 MG: 125 CAPSULE ORAL at 06:24

## 2023-12-28 RX ADMIN — VANCOMYCIN HYDROCHLORIDE 125 MG: 125 CAPSULE ORAL at 17:08

## 2023-12-28 RX ADMIN — SIMVASTATIN 10 MG: 10 TABLET, FILM COATED ORAL at 22:34

## 2023-12-28 RX ADMIN — FOLIC ACID 1 MG: 1 TABLET ORAL at 09:25

## 2023-12-28 RX ADMIN — CHOLESTYRAMINE 4 G: 4 POWDER, FOR SUSPENSION ORAL at 17:08

## 2023-12-28 RX ADMIN — GABAPENTIN 300 MG: 300 CAPSULE ORAL at 09:26

## 2023-12-28 RX ADMIN — HEPARIN SODIUM 5000 UNITS: 5000 INJECTION, SOLUTION INTRAVENOUS; SUBCUTANEOUS at 09:25

## 2023-12-28 RX ADMIN — Medication 1 TABLET: at 09:26

## 2023-12-28 RX ADMIN — SODIUM BICARBONATE 650 MG TABLET 650 MG: at 14:40

## 2023-12-28 RX ADMIN — HEPARIN SODIUM 5000 UNITS: 5000 INJECTION, SOLUTION INTRAVENOUS; SUBCUTANEOUS at 23:36

## 2023-12-28 RX ADMIN — AMITRIPTYLINE HYDROCHLORIDE 10 MG: 10 TABLET, FILM COATED ORAL at 22:33

## 2023-12-28 RX ADMIN — DULOXETINE HYDROCHLORIDE 20 MG: 20 CAPSULE, DELAYED RELEASE ORAL at 09:26

## 2023-12-28 RX ADMIN — BENZOCAINE AND MENTHOL 1 LOZENGE: 15; 3.6 LOZENGE ORAL at 15:09

## 2023-12-28 RX ADMIN — VANCOMYCIN HYDROCHLORIDE 125 MG: 125 CAPSULE ORAL at 22:33

## 2023-12-28 RX ADMIN — LEVOTHYROXINE SODIUM 150 MCG: 0.15 TABLET ORAL at 06:24

## 2023-12-28 RX ADMIN — GUAIFENESIN 600 MG: 600 TABLET ORAL at 17:10

## 2023-12-28 RX ADMIN — POTASSIUM CHLORIDE 40 MEQ: 1500 TABLET, EXTENDED RELEASE ORAL at 09:26

## 2023-12-28 RX ADMIN — HEPARIN SODIUM 5000 UNITS: 5000 INJECTION, SOLUTION INTRAVENOUS; SUBCUTANEOUS at 17:08

## 2023-12-28 RX ADMIN — VANCOMYCIN HYDROCHLORIDE 125 MG: 125 CAPSULE ORAL at 14:39

## 2023-12-28 RX ADMIN — SODIUM BICARBONATE 650 MG TABLET 650 MG: at 22:33

## 2023-12-28 ASSESSMENT — ACTIVITIES OF DAILY LIVING (ADL): HOME_MANAGEMENT_TIME_ENTRY: 15

## 2023-12-28 ASSESSMENT — COGNITIVE AND FUNCTIONAL STATUS - GENERAL
MOVING TO AND FROM BED TO CHAIR: A LITTLE
DRESSING REGULAR LOWER BODY CLOTHING: A LOT
MOBILITY SCORE: 13
TOILETING: TOTAL
WALKING IN HOSPITAL ROOM: A LOT
TURNING FROM BACK TO SIDE WHILE IN FLAT BAD: A LITTLE
CLIMB 3 TO 5 STEPS WITH RAILING: A LOT
MOBILITY SCORE: 16
STANDING UP FROM CHAIR USING ARMS: A LITTLE
MOVING TO AND FROM BED TO CHAIR: A LITTLE
MOVING FROM LYING ON BACK TO SITTING ON SIDE OF FLAT BED WITH BEDRAILS: A LOT
MOVING FROM LYING ON BACK TO SITTING ON SIDE OF FLAT BED WITH BEDRAILS: A LITTLE
STANDING UP FROM CHAIR USING ARMS: A LOT
DAILY ACTIVITIY SCORE: 16
WALKING IN HOSPITAL ROOM: A LOT
DRESSING REGULAR UPPER BODY CLOTHING: A LITTLE
PERSONAL GROOMING: A LITTLE
CLIMB 3 TO 5 STEPS WITH RAILING: A LOT
HELP NEEDED FOR BATHING: A LITTLE
TURNING FROM BACK TO SIDE WHILE IN FLAT BAD: A LOT

## 2023-12-28 ASSESSMENT — PAIN - FUNCTIONAL ASSESSMENT
PAIN_FUNCTIONAL_ASSESSMENT: WONG-BAKER FACES
PAIN_FUNCTIONAL_ASSESSMENT: 0-10

## 2023-12-28 ASSESSMENT — PAIN SCALES - GENERAL
PAINLEVEL_OUTOF10: 0 - NO PAIN
PAINLEVEL_OUTOF10: 0 - NO PAIN

## 2023-12-28 NOTE — PROGRESS NOTES
[facility] with some questions before they will decide on accepting the pt including whether the pt will be getting treatment for CLL, from clinical documentation there does not appear to be a focus on this. Facility also asking for clarity on pts treatment for IBS and whether she will need to continue on dialysis at dc, from nephrology notes it does not appear that pt will dc on dialysis. [facility] relayed information known to the facility, will discuss with attending question regarding any treatment for CLL and IBS.     12/28/23 0853   Discharge Planning   Patient expects to be discharged to: Referral to [facility], acceptance/precert pending

## 2023-12-28 NOTE — PROGRESS NOTES
[facility] has accepted the pt and precert has been approved on this day.This worker received a call from the pts daughter stating she and the pt believe she will be here through the weekend and in that case want to see if [facility] will have a bed next week, this worker asked attending CNP about anticipated dc. Await word from cardiology on when pt is anticipated to be ready for dc.      12/28/23 1242   Discharge Planning   Patient expects to be discharged to: [facility] - precert approved

## 2023-12-28 NOTE — PROGRESS NOTES
Occupational Therapy    OT Treatment    Patient Name: Tracie Baltazar  MRN: [id number]  Today's Date: 12/28/2023  Time Calculation  Start Time: 0800  Stop Time: 0825  Time Calculation (min): 25 min         Assessment:  OT Assessment: Pt tolerated session fairly with SOB and fatigue noted throughout. Pt would benefit from continued skilled OT services to improve strength, balance, and functional tolerance to increase independence with ADL tasks  End of Session Communication: Bedside nurse  End of Session Patient Position: Up in chair (all needs in reach and set up for breakfast)  OT Assessment Results: Decreased ADL status, Decreased upper extremity strength, Decreased safe judgment during ADL, Decreased endurance, Decreased functional mobility, Decreased gross motor control, Decreased IADLs  Strengths: Ability to acquire knowledge, Support of extended family/friends  Barriers to Participation: Insight into problems, Coping skills  Plan:  Treatment Interventions: ADL retraining, Functional transfer training, Endurance training, Patient/family training, Neuromuscular reeducation, Compensatory technique education  OT Frequency: 3 times per week  OT Discharge Recommendations: Moderate intensity level of continued care  OT Recommended Transfer Status: Minimal assist, Assist of 1  OT - OK to Discharge: Yes  Treatment Interventions: ADL retraining, Functional transfer training, Endurance training, Patient/family training, Neuromuscular reeducation, Compensatory technique education    Subjective   Previous Visit Info:  OT Last Visit  OT Received On: 12/28/23  General:  General  Reason for Referral: decline in ADLs  Referred By: Dr. Sarbjit MD  Past Medical History Relevant to Rehab: Patient is an 83 year old female admitted from home with sudden weakness in legs while walking. Patient recently [facility]. PMH: CKD, fibromyalgia, CLL, HTN, CHF, obesity, OA, DJD, depression  Family/Caregiver Present: Yes  (daughter present throughout session)  Prior to Session Communication: Bedside nurse  Patient Position Received: Bed, 3 rail up  General Comment: Pt cleared for therapy per RN. Pt supine in bed upon arrival and agreeable to tx session. (+)telemetry, oxygen therapy (2L)  Precautions:  Hearing/Visual Limitations: glasses; B hearing aids; [place] (pt requiring assist to don hearing aids this date)  Medical Precautions: Fall precautions, Infection precautions (+cdiff)  Vital Signs:  Vital Signs  SpO2: 93 %  Pain:  Pain Assessment  Pain Assessment: 0-10  Pain Score: 0 - No pain    Objective    Cognition:  Cognition  Overall Cognitive Status: Within Functional Limits  Orientation Level: Oriented X4    Activities of Daily Living: Grooming  Grooming Level of Assistance: Setup  Grooming Where Assessed: Chair  Grooming Comments: s/u for oral hygiene and hair brushing task    UE Bathing  UE Bathing Level of Assistance: Setup, Distant supervision  UE Bathing Comments: s/u for UB bathing task in chair with increased time required for task completion       Bed Mobility/Transfers: Bed Mobility  Bed Mobility: Yes  Bed Mobility 1  Bed Mobility 1: Supine to sitting  Level of Assistance 1: Moderate assistance  Bed Mobility Comments 1: mod A supine>sit with trunk elevation and VC for LE advancement and proper hand placement. Pt requiring max A to scoot hips to EOB with use of draw sheet, tolerating sitting EOB ~5 min for rest break prior to transfer    Transfers  Transfer: Yes  Transfer 1  Transfer From 1: Sit to  Transfer to 1: Stand  Technique 1: Sit to stand  Transfer Device 1: Walker  Transfer Level of Assistance 1: Minimum assistance  Trials/Comments 1: mod A initially for sit>stand with failed attempts x2, progressing to min A sit>stand from EOB>FWW with VC for forward weight shifting and proper hand placement on bed, however pt still reaching to pull up from walker.  Transfers 2  Transfer From 2: Stand to  Transfer to 2:  Sit  Technique 2: Stand to sit  Transfer Device 2: Walker  Transfer Level of Assistance 2: Minimum assistance  Trials/Comments 2: min A stand>sit from FWW>chair with VC for proper hand placement with pt demonstrating poor eccentric control d/t fatigue    Sitting Balance:  Static Sitting Balance  Static Sitting-Balance Support: Bilateral upper extremity supported  Static Sitting-Level of Assistance: Contact guard  Static Sitting-Comment/Number of Minutes: CGA once at EOB with pt able to correct with VC/TC, progressing to fair sitting balance  Standing Balance:  Static Standing Balance  Static Standing-Balance Support: Bilateral upper extremity supported  Static Standing-Level of Assistance: Minimum assistance  Static Standing-Comment/Number of Minutes: fair-stand balance with VC for proper postural alignment    Therapy/Activity: Therapeutic Activity  Therapeutic Activity 1: pt tolerated taking ~5 steps to chair at FWW with min A with VC to pace task      Outcome Measures:[facility] Daily Activity  Putting on and taking off regular lower body clothing: A lot  Bathing (including washing, rinsing, drying): A little  Putting on and taking off regular upper body clothing: A little  Toileting, which includes using toilet, bedpan or urinal: Total (quinn)  Taking care of personal grooming such as brushing teeth: A little  Eating Meals: None  Daily Activity - Total Score: 16        Education Documentation  Handouts, taught by AUDELIA Edouard at 12/28/2023  9:03 AM.  Learner: Patient  Readiness: Acceptance  Method: Demonstration, Explanation  Response: Demonstrated Understanding, Verbalizes Understanding    Home Exercise Program, taught by AUDELIA Edouard at 12/28/2023  9:03 AM.  Learner: Patient  Readiness: Acceptance  Method: Demonstration, Explanation  Response: Demonstrated Understanding, Verbalizes Understanding    Body Mechanics, taught by AUDELIA Edouard at 12/28/2023  9:03 AM.  Learner:  Patient  Readiness: Acceptance  Method: Demonstration, Explanation  Response: Demonstrated Understanding, Verbalizes Understanding    Precautions, taught by AUDELIA Edouard at 12/28/2023  9:03 AM.  Learner: Patient  Readiness: Acceptance  Method: Demonstration, Explanation  Response: Demonstrated Understanding, Verbalizes Understanding    ADL Training, taught by AUDELIA Edouard at 12/28/2023  9:03 AM.  Learner: Patient  Readiness: Acceptance  Method: Demonstration, Explanation  Response: Demonstrated Understanding, Verbalizes Understanding    Handouts, taught by AUDELIA Edouard at 12/27/2023  1:44 PM.  Learner: Patient  Readiness: Acceptance  Method: Explanation, Demonstration  Response: Verbalizes Understanding, Demonstrated Understanding  Comment: pt educated on pacing tasks to increase safety awareness and EC, with pt demonstrating fair carryover following cues    Home Exercise Program, taught by AUDELIA Edouard at 12/27/2023  1:44 PM.  Learner: Patient  Readiness: Acceptance  Method: Explanation, Demonstration  Response: Verbalizes Understanding, Demonstrated Understanding  Comment: pt educated on pacing tasks to increase safety awareness and EC, with pt demonstrating fair carryover following cues    Body Mechanics, taught by AUDELIA Edouard at 12/27/2023  1:44 PM.  Learner: Patient  Readiness: Acceptance  Method: Explanation, Demonstration  Response: Verbalizes Understanding, Demonstrated Understanding  Comment: pt educated on pacing tasks to increase safety awareness and EC, with pt demonstrating fair carryover following cues    Precautions, taught by AUDELIA Edouard at 12/27/2023  1:44 PM.  Learner: Patient  Readiness: Acceptance  Method: Explanation, Demonstration  Response: Verbalizes Understanding, Demonstrated Understanding  Comment: pt educated on pacing tasks to increase safety awareness and EC, with pt demonstrating fair carryover  following cues    ADL Training, taught by AUDELIA Edouard at 12/27/2023  1:44 PM.  Learner: Patient  Readiness: Acceptance  Method: Explanation, Demonstration  Response: Verbalizes Understanding, Demonstrated Understanding  Comment: pt educated on pacing tasks to increase safety awareness and EC, with pt demonstrating fair carryover following cues    Education Comments  No comments found.         Problem: OT Goals  Goal: ADLs  Description: Patient will demonstrate the ability to complete ADL tasks with Mod I, using AE as needed, in order to increase patent's safety and independence with self-care tasks.  Outcome: Progressing  Goal: Functional Transfers  Description: Patient will complete functional transfers with Mod I in order to increase patient's safety and independence with daily tasks.  Outcome: Progressing  Goal: B UE Strengthening  Description: Patient will increase B UE strength to 4+/5 for functional transfers.  Outcome: Progressing  Goal: Functional Mobility  Description: Patient will demonstrate the ability to complete item retrieval and functional mobility with Mod I in order to increase patient's safety and independence with daily tasks.    Outcome: Progressing

## 2023-12-28 NOTE — PROGRESS NOTES
Tracie Baltazar is a 83 y.o. female on day 4 of admission presenting with Weakness.    Subjective   Interval History:   Max 37.6 °C, denies chills  Denies shortness of breath  Denies nausea, vomiting, abdominal pain.  Reports diarrhea every few hours.      Objective   Range of Vitals (last 24 hours)  Heart Rate:  [53-63]   Temp:  [35.8 °C (96.4 °F)-37.6 °C (99.6 °F)]   Resp:  [16-22]   BP: (132-159)/(42-48)   Weight:  [85.1 kg (187 lb 9.8 oz)]   SpO2:  [92 %-99 %]   Daily Weight  12/28/23 : 85.1 kg (187 lb 9.8 oz)    Body mass index is 33.24 kg/m².    Physical Exam  Constitutional:       Appearance: Normal appearance.   HENT:      Head: Normocephalic and atraumatic.      Nose: Nose normal.      Mouth/Throat:      Mouth: Mucous membranes are moist.      Pharynx: Oropharynx is clear.   Eyes:      Extraocular Movements: Extraocular movements intact.      Conjunctiva/sclera: Conjunctivae normal.   Cardiovascular:      Rate and Rhythm: Normal rate and regular rhythm.   Pulmonary:      Effort: Pulmonary effort is normal.      Breath sounds: Normal breath sounds. No wheezing or rales.   Abdominal:      General: Bowel sounds are normal.      Palpations: Abdomen is soft.      Tenderness: There is no abdominal tenderness. There is no guarding.   Musculoskeletal:         General: Normal range of motion.      Cervical back: Normal range of motion and neck supple.   Skin:     General: Skin is warm and dry.   Neurological:      General: No focal deficit present.      Mental Status: She is alert and oriented to person, place, and time.   Psychiatric:         Mood and Affect: Mood normal.         Behavior: Behavior normal.           Antibiotics  sodium chloride 0.9 % bolus 1,000 mL  piperacillin-tazobactam-dextrose (Zosyn) IV 4.5 g  vancomycin-diluent combo no.1 (Xellia) IVPB 2 g  amitriptyline (Elavil) tablet 10 mg  DULoxetine (Cymbalta) DR capsule 20 mg  folic acid (Folvite) tablet 1 mg  gabapentin (Neurontin) capsule 300  mg  hydrALAZINE (Apresoline) tablet 20 mg  lactobacillus acidophilus tablet 1 tablet  levothyroxine (Synthroid, Levoxyl) tablet 150 mcg  pantoprazole (ProtoNix) EC tablet 40 mg  simvastatin (Zocor) tablet 10 mg  sodium bicarbonate tablet 650 mg  heparin (porcine) injection 5,000 Units  polyethylene glycol (Glycolax, Miralax) packet 17 g  benzocaine-menthol (Cepastat Sore Throat) 15-3.6 mg lozenge 1 lozenge  guaiFENesin (Mucinex) 12 hr tablet 600 mg  dextromethorphan-guaifenesin (Robitussin DM)  mg/5 mL oral liquid 5 mL  acetaminophen (Tylenol) tablet 650 mg  acetaminophen (Tylenol) oral liquid 650 mg  acetaminophen (Tylenol) suppository 650 mg  sodium chloride 0.9% infusion  metroNIDAZOLE (Flagyl) tablet 500 mg  vancomycin (Vancocin) capsule 125 mg  dilTIAZem (Cardizem) 125 mg in dextrose 5% 125 mL (1 mg/mL) infusion (premix)  dilTIAZem (Cardizem) injection 10 mg  potassium chloride CR (Klor-Con M20) ER tablet 40 mEq  regadenoson (Lexiscan) injection 0.4 mg  aminophylline syringe 125 mg  cholestyramine (Questran) 4 gram packet 4 g  potassium chloride CR (Klor-Con M20) ER tablet 40 mEq  Tc-99m tetrofosmin (Myoview) injection 11 millicurie  Tc-99m tetrofosmin (Myoview) injection 32 millicurie  dilTIAZem (Cardizem) 125 mg in dextrose 5% 125 mL (1 mg/mL) infusion (premix)  dilTIAZem (Cardizem) injection 20 mg      Relevant Results  Labs  Results from last 72 hours   Lab Units 12/28/23  0521 12/27/23  0826 12/26/23  0452   WBC AUTO x10*3/uL 38.4* 35.7* 37.3*   HEMOGLOBIN g/dL 9.1* 9.9* 9.4*   HEMATOCRIT % 30.3* 32.6* 30.2*   PLATELETS AUTO x10*3/uL 257 254 227   LYMPHO PCT MAN %  --   --  52.0   MONO PCT MAN %  --   --  5.0   EOSINO PCT MAN %  --   --  1.0       Results from last 72 hours   Lab Units 12/28/23  0521 12/27/23  0826 12/26/23  0452   SODIUM mmol/L 140 142 140   POTASSIUM mmol/L 3.5 3.4 3.2*   CHLORIDE mmol/L 107 107 107   CO2 mmol/L 19* 19* 21*   BUN mg/dL 24 25 38*   CREATININE mg/dL 2.00* 2.10* 2.80*  "  GLUCOSE mg/dL 103* 69 89   CALCIUM mg/dL 8.3* 8.5 8.2*   ANION GAP mmol/L 14 16 12   EGFR mL/min/1.73m*2 24* 23* 16*       Results from last 72 hours   Lab Units 12/26/23  0452   ALK PHOS U/L 69   BILIRUBIN TOTAL mg/dL <0.2   PROTEIN TOTAL g/dL 5.2*   ALT U/L 11   AST U/L 16   ALBUMIN g/dL 2.8*       Estimated Creatinine Clearance: 22 mL/min (A) (by C-G formula based on SCr of 2 mg/dL (H)).  No results found for: \"CRP\"  Microbiology  Positive C. difficile PCR and EIA  Blood cultures with no growth for 3 days  Urine culture finalized with multiple organisms-probable contamination  Imaging  Nuclear Stress Test    Result Date: 12/26/2023  Interpreted By:  Remigio Cherry, STUDY: NUCLEAR STRESS TEST;  12/26/2023 2:36 pm   INDICATION: Signs/Symptoms:Paroxysmal atrial fibrillation, sick sinus syndrome.   COMPARISON: None.   ACCESSION NUMBER(S): [id number]   ORDERING CLINICIAN: NORY LOZA   TECHNIQUE: DIVISION OF NUCLEAR MEDICINE PHARMACOLOGIC STRESS MYOCARDIAL PERFUSION SCAN, ONE DAY PROTOCOL   The patient received an intravenous injection of 11.0 mCi of Tc-99m Myoview and resting emission tomographic (SPECT) images of the myocardium were acquired. The patient then received an intravenous infusion of 0.4 mg regadenoson (Lexiscan) followed by an additional injection of 32.0 mCi of Tc-99m Myoview. Stress phase SPECT images of the myocardium were then acquired. These included ECG-gated post-stress and rest images to assess and quantify ventricular function.  Low dose CT was acquired for attenuation correction.   FINDINGS: Stress and rest phase imaging demonstrate no evidence for ischemia. No fixed defects seen to suggest infarct   EKG gated imaging demonstrates normal left ventricular wall motion and thickening. There is normal end-diastolic volume. Normal ejection fraction 65%           Attenuation correction CT images demonstrate nonspecific bilateral axillary lymphadenopathy. For example, in the right axilla " measuring 2.8 by 1.2 cm. Lung parenchyma demonstrates small bilateral basilar effusions and compressive atelectasis       1. No evidence for ischemia.   2. Normal ejection fraction 65%.   3. Small bilateral basilar effusions and compressive atelectasis   4. Bilateral axillary lymphadenopathy nonspecific and likely reactive         MACRO: None   Signed by: Remigio Cherry 12/26/2023 2:59 PM Dictation workstation:   [id number]    Cardiology Interpretation Of Nuclear Stress - See Other Report For Nuclear Portion    Result Date: 12/26/2023           [place] [address]            Phone [phone] Nuclear Pharmacologic Stress Test Patient Name:      MARK GABRIEL Ordering Provider:    [id number] NORY LOZA Study Date:        12/26/2023         Reading Physician:    [id number] Bruce DO MRN/PID:           [id number]           Supervising           Balwinder Bruce DO                                       Physician: Accession#:        [id number]       Fellow: Date of Birth/Age: 1940 / 83      Fellow:                    years Gender:            F                  Nurse:                Do Zafar RN Admit Date:                           Technician:           Matt Williamson                                                             CVTI Admission Status:                     Sonographer:          QUYEN Height:            160.02 cm          Technologist: Weight:            82.10 kg           Additional Staff: BSA:               1.85 m2            Encounter#:           [id number] BMI:               32.06 kg/m2        Patient Location: Study Type:    CARDIOLOGY INTERPRETATION OF NUCLEAR STRESS Diagnosis/ICD: Longstanding persistent atrial fibrillation-I48.11 Indication:    Atrial Fibrillation CPT Codes:     Stress Test Interpretation-96677; Stress Test Supervision-[id number] Falls Risk:  Study Details: Correct procedure and correct patient verified verbally and with                 ID Band checked.  Patient History: Chest pain, atrial fibrillation, syncope, hypertension, hyperlipidemia and congestive heart failure.  Medications: SIMVASTATIN, FUROSEMIDE, HYDRALAZINE. The patient did not take medications as prescribed.  Patient Performance: Patient received a total of 0.4 mg of Regadenoson at 11:30:33 AM. The resting blood pressure was 175/68 mmHg with a heart rate of 74 bpm. The patient developed no symptoms during the stress exam. The blood pressure response was normal. The test was terminated due to: completed lab protocol. Standard dose of Lexiscan was infused over 10 to 15 seconds then flushed with saline. The patient then received a standard IV dose of Myoview. The patient did not experience chest pain with infusion of Lexiscan. The resting twelve-lead ECG was essentially normal and there were no significant changes in the ST segments with the infusion of Lexiscan. Resting blood pressure was 170/68 and decreased to 144/60 prior to completion of the study. No arrhythmias were induced. Myoview imaging was to be reported separately. Patient has met the discharge criteria and is discharged to their floor.  Baseline ECG: Resting ECG showed Atrial Fibrillation with AFIB.  Stress ECG: Stress ECG showed atrial fibrillation.  Stress Stage Data: +----------------+--+------+-------+                 HRSys BPDias BP +----------------+--+------+-------+ Baseline [id number]   68      +----------------+--+------+-------+  Recovery ECG: Recovery ECG showed atrial fibrillation.  +------------+--+------+-------+             HRSys BPDias BP +------------+--+------+-------+ Recovery I  [id number]   54      +------------+--+------+-------+ Recovery II [id number]   54      +------------+--+------+-------+ Recovery [id number]   61      +------------+--+------+-------+  Summary:  1. No clinical or ECG evidence of ischemia.  2. Myoview imaging to be reported separately.  3. Adequate  level of stress achieved.  4. Nuclear image results are reported separately. [id number] Elvis Ronysa MARTINEZ Electronically signed on 12/26/2023 at 11:42:40 AM   ** Final **      Assessment/Plan     Acute kidney injury-resolving  Leukocytosis-multifactorial  CLL  C. difficile infection-resolving     Continue oral vancomycin  Monitor renal function  Contact plus precautions  Questran  Supportive care  Monitor temperature and WBC    Margarita LUGO Staff, APRN-CNP

## 2023-12-28 NOTE — CARE PLAN
The patient's goals for the shift include FEEL BETTER    The clinical goals for the shift include safety    Over the shift, the patient did not make progress toward the following goals. Barriers to progression include weak. Recommendations to address these barriers include encourage use of call light, frequent checks on patient.

## 2023-12-28 NOTE — PROGRESS NOTES
Physical Therapy    Physical Therapy Treatment    Patient Name: Tracie Baltazar  MRN: [id number]  Today's Date: 12/28/2023  Time Calculation  Start Time: 0850  Stop Time: 0915  Time Calculation (min): 25 min       Assessment/Plan   PT Assessment  End of Session Communication: Bedside nurse  End of Session Patient Position: Up in chair  PT Plan  Inpatient/Swing Bed or Outpatient: Inpatient  PT Plan  Treatment/Interventions: Bed mobility, Transfer training, Gait training, Stair training, Balance training, Neuromuscular re-education, Strengthening, Endurance training, Therapeutic exercise, Therapeutic activity  PT Plan: Skilled PT  PT Frequency: 5 times per week  PT Discharge Recommendations: Moderate intensity level of continued care  Equipment Recommended upon Discharge: Wheeled walker  PT Recommended Transfer Status: Assist x1  PT - OK to Discharge: Yes (with skilled physical therapy services at next level of care.)      General Visit Information:   PT  Visit  PT Received On: 12/28/23  General  Prior to Session Communication: Bedside nurse  Patient Position Received: Bed, 3 rail up  General Comment: Cleared by nursing to be seen for therapy, pt agreeable with tx, seated in chair upon arrival.    Subjective   Precautions:  Precautions  Precautions Comment: Contact Precautions (c-diff)    Objective   Pain:  Pain Assessment  Pain Assessment: 0-10  Pain Score: 0 - No pain    Treatments:  Therapeutic Exercise  Therapeutic Exercise Performed: Yes  Therapeutic Exercise Activity 1: Bilateral ankle pumps x15  Therapeutic Exercise Activity 2: Bilateral hip flexion x15  Therapeutic Exercise Activity 3: Bilateral knee extension x15  Therapeutic Exercise Activity 4: Resisted hip abd/add 15    Therapeutic Activity  Therapeutic Activity Performed: No    Balance/Neuromuscular Re-Education  Balance/Neuromuscular Re-Education Activity Performed: No    Bed Mobility  Bed Mobility: No    Ambulation/Gait Training  Ambulation/Gait  Training Performed: Yes  Ambulation/Gait Training 1  Surface 1: Level tile  Device 1: Rolling walker  Assistance 1: Moderate assistance  Comments/Distance (ft) 1: 12' with wheeled walker, presents with decreased bilateral step length, narrow BERTRAM, requires cues for upright posture/cues to remain in BERTRAM of walker, increased fatigue and bilateral LE instability noted requiring mod assist for balance.  Transfers  Transfer: Yes  Transfer 1  Transfer From 1: Sit to  Transfer to 1: Stand  Transfer Level of Assistance 1: Minimum assistance  Trials/Comments 1: Min assist for trunk up during sit to stand, poor static standing balance, cues for proper hand placement, decreased eccentric control in sitting.    Stairs  Stairs: No    Outcome Measures:  [facility] Basic Mobility  Turning from your back to your side while in a flat bed without using bedrails: A little  Moving from lying on your back to sitting on the side of a flat bed without using bedrails: A little  Moving to and from bed to chair (including a wheelchair): A little  Standing up from a chair using your arms (e.g. wheelchair or bedside chair): A little  To walk in hospital room: A lot  Climbing 3-5 steps with railing: A lot  Basic Mobility - Total Score: 16         Encounter Problems       Encounter Problems (Active)       Mobility       STG - Patient will ambulate 50' with rolling walker and modified independence. (Progressing)       Start:  12/25/23    Expected End:  01/08/24            STG - Patient will ascend and descend two stairs with use of one handrail and supervision for safety. (Progressing)       Start:  12/25/23    Expected End:  01/08/24               Safety       STG - Patient uses call light consistently to request assistance with transfers (Progressing)       Start:  12/25/23    Expected End:  01/08/24               Transfers       STG - Patient to transfer to and from sit to supine with independence. (Progressing)       Start:  12/25/23    Expected  End:  01/08/24            STG - Patient will transfer sit to and from stand with rolling walker and modified independence. (Progressing)       Start:  12/25/23    Expected End:  01/08/24

## 2023-12-28 NOTE — PROGRESS NOTES
"Tracie Baltazar is a 83 y.o. female on day 4 of admission presenting with Weakness.    Subjective   Patient seen for acute kidney injury on top chronic kidney disease she is feeling okay she is being treated for C. difficile colitis which is positive no abdominal pain she still has diarrhea today otherwise feels okay       Objective     Physical Exam  Neck:      Vascular: No carotid bruit.   Cardiovascular:      Rate and Rhythm: Normal rate and regular rhythm.      Heart sounds: No murmur heard.     No friction rub. No gallop.   Pulmonary:      Breath sounds: No wheezing, rhonchi or rales.   Chest:      Chest wall: No tenderness.   Abdominal:      General: There is no distension.      Tenderness: There is no abdominal tenderness. There is no guarding or rebound.   Musculoskeletal:         General: No swelling or tenderness.      Cervical back: Neck supple.      Right lower leg: No edema.      Left lower leg: No edema.   Lymphadenopathy:      Cervical: No cervical adenopathy.         Last Recorded Vitals  Blood pressure (!) 150/47, pulse 63, temperature 37.6 °C (99.6 °F), temperature source Oral, resp. rate 16, height 1.6 m (5' 2.99\"), weight 85.1 kg (187 lb 9.8 oz), SpO2 93 %.    Intake/Output last 3 Shifts:  I/O last 3 completed shifts:  In: 480 (5.6 mL/kg) [P.O.:480]  Out: 2175 (25.6 mL/kg) [Urine:2175 (0.7 mL/kg/hr)]  Weight: 85.1 kg     Current Facility-Administered Medications:     acetaminophen (Tylenol) tablet 650 mg, 650 mg, oral, q4h PRN, 650 mg at 12/26/23 2058 **OR** acetaminophen (Tylenol) oral liquid 650 mg, 650 mg, nasogastric tube, q4h PRN **OR** acetaminophen (Tylenol) suppository 650 mg, 650 mg, rectal, q4h PRN, Abilio Colón DO    amitriptyline (Elavil) tablet 10 mg, 10 mg, oral, Nightly, Dominic Perez MD, 10 mg at 12/27/23 2103    benzocaine-menthol (Cepastat Sore Throat) 15-3.6 mg lozenge 1 lozenge, 1 lozenge, Mouth/Throat, q2h PRN, Dominic Perez MD    cholestyramine light (Prevalite) 4 " gram packet 4 g, 4 g, oral, Daily with evening meal, Hipolito Elias MD, 4 g at 12/27/23 1604    dextromethorphan-guaifenesin (Robitussin DM)  mg/5 mL oral liquid 5 mL, 5 mL, oral, q4h PRN, Dominic Perez MD    dilTIAZem (Cardizem) 125 mg in dextrose 5% 125 mL (1 mg/mL) infusion (premix), 10 mg/hr, intravenous, Continuous, Ina Dietrich DO, Last Rate: 10 mL/hr at 12/27/23 2324, 10 mg/hr at 12/27/23 2324    DULoxetine (Cymbalta) DR capsule 20 mg, 20 mg, oral, Daily, Dominic Perez MD, 20 mg at 12/28/23 0926    folic acid (Folvite) tablet 1 mg, 1 mg, oral, Daily, Dominic Perez MD, 1 mg at 12/28/23 0925    gabapentin (Neurontin) capsule 300 mg, 300 mg, oral, BID, Dominic Perez MD, 300 mg at 12/28/23 0926    guaiFENesin (Mucinex) 12 hr tablet 600 mg, 600 mg, oral, q12h PRN, Dominic Perez MD    heparin (porcine) injection 5,000 Units, 5,000 Units, subcutaneous, q8h, Dominic Perez MD, 5,000 Units at 12/28/23 0925    lactobacillus acidophilus tablet 1 tablet, 1 tablet, oral, BID with meals, Dominic Perez MD, 1 tablet at 12/28/23 0926    levothyroxine (Synthroid, Levoxyl) tablet 150 mcg, 150 mcg, oral, Daily, Dominic Perez MD, 150 mcg at 12/28/23 0624    potassium chloride CR (Klor-Con M20) ER tablet 40 mEq, 40 mEq, oral, Daily, Aislinn Napoles MD, 40 mEq at 12/28/23 0926    simvastatin (Zocor) tablet 10 mg, 10 mg, oral, Nightly, Dominic Perez MD, 10 mg at 12/27/23 2103    sodium bicarbonate tablet 650 mg, 650 mg, oral, TID, Dominic Perez MD, 650 mg at 12/28/23 0926    sodium chloride 0.9% infusion, 60 mL/hr, intravenous, Continuous, Darnell Amor MD, Last Rate: 60 mL/hr at 12/27/23 1605, 60 mL/hr at 12/27/23 1605    vancomycin (Vancocin) capsule 125 mg, 125 mg, oral, 4x daily, Abilio Colón DO, 125 mg at 12/28/23 0624   Relevant Results    Results for orders placed or performed during the hospital encounter of 12/24/23 (from the past 96 hour(s))   C. difficile, PCR    Specimen: Stool    Result Value Ref Range    C. difficile, PCR Detected (A) Not Detected   Clostridium Difficile EIA    Specimen: Stool   Result Value Ref Range    C. difficile (Toxin A/B) Positive (A) Negative, Indeterminate   Protein, Urine Random   Result Value Ref Range    Total Protein, Urine Random 54 NOT ESTABLISHED mg/dL    Creatinine, Urine Random 114.3 NOT ESTABLISHED mg/dL    T. Protein/Creatinine Ratio 0.47 mg/mg Creat   Phosphorus   Result Value Ref Range    Phosphorus 3.6 2.5 - 4.5 mg/dL   Magnesium   Result Value Ref Range    Magnesium 1.80 1.60 - 3.10 mg/dL   Basic Metabolic Panel   Result Value Ref Range    Glucose 111 (H) 65 - 99 mg/dL    Sodium 139 133 - 145 mmol/L    Potassium 2.7 (LL) 3.4 - 5.1 mmol/L    Chloride 103 97 - 107 mmol/L    Bicarbonate 19 (L) 24 - 31 mmol/L    Urea Nitrogen 38 (H) 8 - 25 mg/dL    Creatinine 2.80 (H) 0.40 - 1.60 mg/dL    eGFR 16 (L) >60 mL/min/1.73m*2    Calcium 8.2 (L) 8.5 - 10.4 mg/dL    Anion Gap 17 <=19 mmol/L   CBC   Result Value Ref Range    WBC 38.4 (H) 4.4 - 11.3 x10*3/uL    nRBC 0.0 0.0 - 0.0 /100 WBCs    RBC 3.29 (L) 4.00 - 5.20 x10*6/uL    Hemoglobin 10.3 (L) 12.0 - 16.0 g/dL    Hematocrit 33.2 (L) 36.0 - 46.0 %     (H) 80 - 100 fL    MCH 31.3 26.0 - 34.0 pg    MCHC 31.0 (L) 32.0 - 36.0 g/dL    RDW 13.6 11.5 - 14.5 %    Platelets 248 150 - 450 x10*3/uL   NT-PROBNP   Result Value Ref Range    PROBNP 4,043 (H) 0 - 624 pg/mL   CBC and Auto Differential   Result Value Ref Range    WBC 37.3 (H) 4.4 - 11.3 x10*3/uL    nRBC 0.0 0.0 - 0.0 /100 WBCs    RBC 2.97 (L) 4.00 - 5.20 x10*6/uL    Hemoglobin 9.4 (L) 12.0 - 16.0 g/dL    Hematocrit 30.2 (L) 36.0 - 46.0 %     (H) 80 - 100 fL    MCH 31.6 26.0 - 34.0 pg    MCHC 31.1 (L) 32.0 - 36.0 g/dL    RDW 13.6 11.5 - 14.5 %    Platelets 227 150 - 450 x10*3/uL    Immature Granulocytes %, Automated 0.6 0.0 - 0.9 %    Immature Granulocytes Absolute, Automated 0.22 0.00 - 0.50 x10*3/uL   Comprehensive metabolic panel   Result Value  Ref Range    Glucose 89 65 - 99 mg/dL    Sodium 140 133 - 145 mmol/L    Potassium 3.2 (L) 3.4 - 5.1 mmol/L    Chloride 107 97 - 107 mmol/L    Bicarbonate 21 (L) 24 - 31 mmol/L    Urea Nitrogen 38 (H) 8 - 25 mg/dL    Creatinine 2.80 (H) 0.40 - 1.60 mg/dL    eGFR 16 (L) >60 mL/min/1.73m*2    Calcium 8.2 (L) 8.5 - 10.4 mg/dL    Albumin 2.8 (L) 3.5 - 5.0 g/dL    Alkaline Phosphatase 69 35 - 125 U/L    Total Protein 5.2 (L) 5.9 - 7.9 g/dL    AST 16 5 - 40 U/L    Bilirubin, Total <0.2 0.1 - 1.2 mg/dL    ALT 11 5 - 40 U/L    Anion Gap 12 <=19 mmol/L   Manual Differential   Result Value Ref Range    Neutrophils %, Manual 32.0 40.0 - 80.0 %    Bands %, Manual 5.0 0.0 - 5.0 %    Lymphocytes %, Manual 52.0 13.0 - 44.0 %    Monocytes %, Manual 5.0 2.0 - 10.0 %    Eosinophils %, Manual 1.0 0.0 - 6.0 %    Basophils %, Manual 2.0 0.0 - 2.0 %    Atypical Lymphocytes %, Manual 3.0 0.0 - 2.0 %    Seg Neutrophils Absolute, Manual 11.94 (H) 1.60 - 5.00 x10*3/uL    Bands Absolute, Manual 1.87 (H) 0.00 - 0.50 x10*3/uL    Lymphocytes Absolute, Manual 19.40 (H) 0.80 - 3.00 x10*3/uL    Monocytes Absolute, Manual 1.87 (H) 0.05 - 0.80 x10*3/uL    Eosinophils Absolute, Manual 0.37 0.00 - 0.40 x10*3/uL    Basophils Absolute, Manual 0.75 (H) 0.00 - 0.10 x10*3/uL    Atypical Lymphs Absolute, Manual 1.12 (H) 0.00 - 0.30 x10*3/uL    Total Cells Counted 100     Neutrophils Absolute, Manual 13.81 (H) 1.60 - 5.50 x10*3/uL    RBC Morphology No significant RBC morphology present    Magnesium   Result Value Ref Range    Magnesium 2.00 1.60 - 3.10 mg/dL   CBC   Result Value Ref Range    WBC 35.7 (H) 4.4 - 11.3 x10*3/uL    nRBC 0.0 0.0 - 0.0 /100 WBCs    RBC 3.20 (L) 4.00 - 5.20 x10*6/uL    Hemoglobin 9.9 (L) 12.0 - 16.0 g/dL    Hematocrit 32.6 (L) 36.0 - 46.0 %     (H) 80 - 100 fL    MCH 30.9 26.0 - 34.0 pg    MCHC 30.4 (L) 32.0 - 36.0 g/dL    RDW 13.2 11.5 - 14.5 %    Platelets 254 150 - 450 x10*3/uL   Basic Metabolic Panel   Result Value Ref  Range    Glucose 69 65 - 99 mg/dL    Sodium 142 133 - 145 mmol/L    Potassium 3.4 3.4 - 5.1 mmol/L    Chloride 107 97 - 107 mmol/L    Bicarbonate 19 (L) 24 - 31 mmol/L    Urea Nitrogen 25 8 - 25 mg/dL    Creatinine 2.10 (H) 0.40 - 1.60 mg/dL    eGFR 23 (L) >60 mL/min/1.73m*2    Calcium 8.5 8.5 - 10.4 mg/dL    Anion Gap 16 <=19 mmol/L   CBC   Result Value Ref Range    WBC 38.4 (H) 4.4 - 11.3 x10*3/uL    nRBC 0.0 0.0 - 0.0 /100 WBCs    RBC 2.98 (L) 4.00 - 5.20 x10*6/uL    Hemoglobin 9.1 (L) 12.0 - 16.0 g/dL    Hematocrit 30.3 (L) 36.0 - 46.0 %     (H) 80 - 100 fL    MCH 30.5 26.0 - 34.0 pg    MCHC 30.0 (L) 32.0 - 36.0 g/dL    RDW 13.6 11.5 - 14.5 %    Platelets 257 150 - 450 x10*3/uL   Basic Metabolic Panel   Result Value Ref Range    Glucose 103 (H) 65 - 99 mg/dL    Sodium 140 133 - 145 mmol/L    Potassium 3.5 3.4 - 5.1 mmol/L    Chloride 107 97 - 107 mmol/L    Bicarbonate 19 (L) 24 - 31 mmol/L    Urea Nitrogen 24 8 - 25 mg/dL    Creatinine 2.00 (H) 0.40 - 1.60 mg/dL    eGFR 24 (L) >60 mL/min/1.73m*2    Calcium 8.3 (L) 8.5 - 10.4 mg/dL    Anion Gap 14 <=19 mmol/L       Assessment/Plan   1.  Acute kidney injury seems that creatinine level is stable continue with gentle IV hydration and continue to monitor renal function very closely no indication for dialysis therapy  2.  Chronic kidney disease stage III  - Baseline Creatinine 1.6-1.9  3.  C. difficile  4.  CLL  5.  Hypertension  6. hypokalemia we will replace potassium      Epifanio Amor MD

## 2023-12-28 NOTE — PROGRESS NOTES
"Tracie Baltazar is a 83 y.o. female on day 4 of admission presenting with Weakness.    Subjective   HPI   Patient seen and examined, patient was sitting in her chair heart rate under control bradycardia the stop Cardizem drip for now waiting for cardiology to switch her to pills.  Patient had 1 loose stool  this morning tolerates well her diet without any issues.  Feels slightly better still feeling feeling weak and fatigued.  Denies any chest pain, shortness of breath on 2 L nasal cannula   No fever or chills.     Objective     Last Recorded Vitals  Blood pressure (!) 150/47, pulse 63, temperature 37.6 °C (99.6 °F), temperature source Oral, resp. rate 16, height 1.6 m (5' 2.99\"), weight 85.1 kg (187 lb 9.8 oz), SpO2 93 %.      Intake/Output Summary (Last 24 hours) at 12/28/2023 1143  Last data filed at 12/28/2023 0900  Gross per 24 hour   Intake 240 ml   Output 1200 ml   Net -960 ml           Physical Exam  Constitutional:       Appearance: Normal appearance.   HENT:      Head: Normocephalic and atraumatic.      Nose: Nose normal.   Eyes:      Extraocular Movements: Extraocular movements intact.      Pupils: Pupils are equal, round, and reactive to light.   Cardiovascular:      Rate and Rhythm: Bradycardia present. Rhythm irregular.   Pulmonary:      Effort: Pulmonary effort is normal. No respiratory distress.   Abdominal:      General: Bowel sounds are normal.      Palpations: Abdomen is soft.   Musculoskeletal:      Cervical back: Normal range of motion and neck supple.      Right lower leg: No edema.      Left lower leg: No edema.   Skin:     General: Skin is warm.   Neurological:      General: No focal deficit present.      Mental Status: She is alert. Mental status is at baseline.   Psychiatric:         Mood and Affect: Mood normal.          Relevant Results    Lab Results   Component Value Date    WBC 38.4 (H) 12/28/2023    HGB 9.1 (L) 12/28/2023    HCT 30.3 (L) 12/28/2023     (H) 12/28/2023    PLT " 257 12/28/2023       Lab Results   Component Value Date    GLUCOSE 103 (H) 12/28/2023    CALCIUM 8.3 (L) 12/28/2023     12/28/2023    K 3.5 12/28/2023    CO2 19 (L) 12/28/2023     12/28/2023    BUN 24 12/28/2023    CREATININE 2.00 (H) 12/28/2023       Lab Results   Component Value Date    HGBA1C 5.4 12/19/2019       US renal complete    Result Date: 12/24/2023  Interpreted By:  Bruno Bryant, STUDY: US RENAL COMPLETE; 12/24/2023 7:50 pm   INDICATION: Signs/Symptoms:JANET on CKD. /history of CLL and hypertension   COMPARISON: None.   ACCESSION NUMBER(S): [id number]   ORDERING CLINICIAN: SERJIO DEVINE   TECHNIQUE: Grayscale and color Doppler imaging of the kidneys.   FINDINGS: The right kidney measures 9.3 cm x 4.1 cm x 4.4 cm cm. The left kidney measures 7.8 cm x 5.0 cm x 3.7 cm cm. There is no shadowing calculus, hydronephrosis, or solid mass identified. However, there is a 2.6 x 2.5 x 2.4 cm inferior pole hypoechoic renal cyst without internal color Doppler flow. Renal cortical thinning and increased echogenicity of the renal parenchyma is noted.   The bladder is grossly unremarkable.       1. No hydronephrosis or nephrolithiasis. 2. Medical renal disease. 3 left renal cyst.     MACRO: None.   Signed by: Bruno Bryant 12/24/2023 8:19 PM Dictation workstation:   [id number]    XR chest 1 view    Result Date: 12/24/2023  Interpreted By:  Nick Luciano, STUDY: XR CHEST 1 VIEW; 12/24/2023 4:15 am   INDICATION: CLINICAL INFORMATION: Signs/Symptoms:weakness.   COMPARISON: 12/18/2020   ACCESSION NUMBER(S): [id number]   ORDERING CLINICIAN: LESLYE FRANK   TECHNIQUE: Portable chest one view.   FINDINGS: The cardiac size is indeterminate in view of the AP projection. Neurostimulator overlies the thoracic spine. No infiltrates or effusions are identified.  The aorta is tortuous.       No acute pathologic findings are identified.  There is no interval change when compared to the previous examination.    MACRO: none   Signed by: Nick Luciano 12/24/2023 9:11 AM Dictation workstation:   [id number]    XR chest 2 views    Result Date: 12/18/2023  Interpreted By:  Alex Mandel, STUDY: XR CHEST 2 VIEWS; 12/18/2023 8:06 am   INDICATION: Signs/Symptoms:sob   COMPARISON: 12/13/2023   ACCESSION NUMBER(S): [id number]   ORDERING CLINICIAN: SRIKANTH VILCHIS   TECHNIQUE: PA and LAT views of the chest were obtained.   FINDINGS: The cardiomediastinal silhouette is unremarkable. The lungs are clear. No pleural effusion is identified.   The osseous structures are intact. Spinal cord stimulator leads are again seen at the mid and lower thoracic level.       No acute cardiopulmonary process.     Signed by: Alex Mandel 12/18/2023 8:30 AM Dictation workstation:   [id number]    Transthoracic Echo (TTE) Complete    Result Date: 12/15/2023           [place] [address]            Phone [phone] TRANSTHORACIC ECHOCARDIOGRAM REPORT  Patient Name:      MARK GABRIEL  Reading Physician:    [id number] [facility] Study Date:        12/15/2023          Ordering Provider:    [id number] ABDULLAHI VANESSA MRN/PID:           [id number]            Fellow: Accession#:        [id number]        Nurse: Date of Birth/Age: 1940 / 83 years Sonographer:          Kristen WESTFALL Gender:            F                   Additional Staff: Height:            154.94 cm           Admit Date:           12/14/2023 Weight:            82.10 kg            Admission Status:     Inpatient - Routine BSA:               1.81 m2             Department Location: Blood Pressure: 150 /47 mmHg Study Type:    TRANSTHORACIC ECHO (TTE) COMPLETE Diagnosis/ICD: Shortness of breath-R06.02 Indication:    Shortness of Breath CPT Codes:     Echo Complete w Full [id number] Patient History: Pertinent History: CKD CHF Depression HTN CP Murmur Dyslipidemia  Varicose veins                    of leg with swellin ghyperlipidemia hypercholesterolemia. Study Detail: The following Echo studies were performed: 2D, Doppler, M-Mode,               color flow and 3D.  PHYSICIAN INTERPRETATION: Left Ventricle: Left ventricular systolic function is normal, with an estimated ejection fraction of 60-65%. There are no regional wall motion abnormalities. The left ventricular cavity size is normal. Left ventricular diastolic filling was indeterminate. Left Atrium: The left atrium is normal in size. Right Ventricle: The right ventricle is normal in size. There is normal right ventricular global systolic function. Right Atrium: The right atrium is normal in size. Aortic Valve: The aortic valve is trileaflet. There is mild aortic valve cusp calcification. There is no evidence of aortic valve regurgitation. The peak instantaneous gradient of the aortic valve is 12.0 mmHg. Mitral Valve: The mitral valve is normal in structure. There is trace to mild mitral valve regurgitation. Tricuspid Valve: The tricuspid valve is structurally normal. No evidence of tricuspid regurgitation. Pulmonic Valve: The pulmonic valve is not well visualized. There is trace to mild pulmonic valve regurgitation. Pericardium: There is no pericardial effusion noted. Aorta: The aortic root is normal. Systemic Veins: The inferior vena cava appears to be of normal size. There is IVC inspiratory collapse greater than 50%.  CONCLUSIONS:  1. Left ventricular systolic function is normal with a 60-65% estimated ejection fraction.  2. Left ventricular diastolic filling indeterminate.  3. Mild aortic valve sclerosis. QUANTITATIVE DATA SUMMARY: 2D MEASUREMENTS:                          Normal Ranges: LAs:           3.00 cm   (2.7-4.0cm) IVSd:          0.85 cm   (0.6-1.1cm) LVPWd:         1.00 cm   (0.6-1.1cm) LVIDd:         4.23 cm   (3.9-5.9cm) LVIDs:         2.79 cm LV Mass Index: 69.0 g/m2 LV % FS        34.0 % LA VOLUME:                                Normal Ranges: LA Vol A2C:        59.3 ml LA Vol Index A2C:  32.8 ml/m2 LA Area A2C:       18.7 cm2 LA Major Axis A2C: 5.0 cm LA Volume Index:   29.6 ml/m2 LA Vol A2C:        59.7 ml RA VOLUME BY A/L METHOD:                               Normal Ranges: RA Vol A4C:        32.1 ml    (8.3-19.5ml) RA Vol Index A4C:  17.7 ml/m2 RA Area A4C:       13.6 cm2 RA Major Axis A4C: 4.9 cm M-MODE MEASUREMENTS:                  Normal Ranges: Ao Root: 2.20 cm (2.0-3.7cm) LAs:     4.80 cm (2.7-4.0cm) AORTA MEASUREMENTS:                    Normal Ranges: Asc Ao, d: 3.10 cm (2.1-3.4cm) LV SYSTOLIC FUNCTION BY 2D PLANIMETRY (MOD):                     Normal Ranges: EF-A4C View: 51.6 % (>=55%) EF-A2C View: 57.9 % EF-Biplane:  55.8 % LV DIASTOLIC FUNCTION:                            Normal Ranges: MV Peak E:      0.93 m/s   (0.7-1.2 m/s) MV Peak A:      1.02 m/s   (0.42-0.7 m/s) E/A Ratio:      0.91       (1.0-2.2) MV e'           0.08 m/s   (>8.0) MV lateral e'   0.08 m/s MV medial e'    0.07 m/s E/e' Ratio:     12.39      (<8.0) PulmV Sys Siddhartha:  68.10 cm/s PulmV Urrutia Siddhartha: 47.60 cm/s PulmV S/D Siddhartha:  1.40 MITRAL VALVE:                      Normal Ranges: MV Vmax:    1.22 m/s (<=1.3m/s) MV peak P.0 mmHg (<5mmHg) MV mean P.0 mmHg (<48mmHg) MV DT:      262 msec (150-240msec) AORTIC VALVE:                          Normal Ranges: AoV Vmax:      1.73 m/s  (<=1.7m/s) AoV Peak P.0 mmHg (<20mmHg) LVOT Max Siddhartha:  1.21 m/s  (<=1.1m/s) LVOT VTI:      29.10 cm LVOT Diameter: 2.00 cm   (1.8-2.4cm) AoV Area,Vmax: 2.20 cm2  (2.5-4.5cm2)  RIGHT VENTRICLE: RV Basal 3.18 cm RV Mid   2.70 cm RV Major 5.2 cm TAPSE:   16.5 mm RV s'    0.13 m/s TRICUSPID VALVE/RVSP:                   Normal Ranges: IVC Diam: 1.75 cm PULMONIC VALVE:                         Normal Ranges: PV Accel Time: 69 msec  (>120ms) PV Max Siddhartha:    1.4 m/s  (0.6-0.9m/s) PV Max P.3 mmHg Pulmonary Veins: PulmV Urrutia Siddhartha: 47.60 cm/s PulmV S/D Siddhartha:   1.40 PulmV Sys Siddhartha:  68.10 cm/s  [id number] Elvis Bruce DO Electronically signed on 12/15/2023 at 10:13:13 AM  ** Final **     CT head wo IV contrast    Result Date: 12/14/2023  Interpreted By:  Rashawn Hook, STUDY: CT HEAD WO IV CONTRAST; 12/14/2023 10:34 am   INDICATION: Signs/Symptoms:lightheadedness, near syncope.   COMPARISON: No comparison exams available.   ACCESSION NUMBER(S): [id number]   ORDERING CLINICIAN: SLY DAWKINS   TECHNIQUE: CT axial images through the Brain were obtained without contrast.   FINDINGS: Acute ischemic change: None.   Hemorrhage: No evidence of acute intracranial hemorrhage.   Mass Effect / Mass Lesion: No significant mass effect. There is no evidence of an intracranial mass or extraaxial fluid collection.   Chronic ischemic change: Patchy foci of  low attenuation coefficient are present within white matter which is a nonspecific finding but likely represents moderate microvascular ischemia.   Parenchyma: There is no significant volume loss. The brain parenchyma is otherwise within normal limits for age.   Ventricles: Normal caliber and morphology.   Other: There is calcification involving the carotid siphons.       No acute intracranial process   All CT examinations are performed with 1 or more of the following dose reduction techniques: Automated exposure control, adjustment of mA and/or kv according to patient's size, or use of iterative reconstruction techniques.   MACRO: none   Signed by: Rashawn Hook 12/14/2023 10:48 AM Dictation workstation:   [id number]    XR chest 2 views    Result Date: 12/13/2023  Interpreted By:  Nj Zazueta, STUDY: XR CHEST 2 VIEWS; 12/13/2023 2:47 pm   INDICATION: Signs/Symptoms:SOB dizziness   COMPARISON: Chest radiograph 06/09/2020   ACCESSION NUMBER(S): [id number]   ORDERING CLINICIAN: LA SAEED   TECHNIQUE: AP and lateral views of the chest performed.   FINDINGS: Cardiac size is indeterminate due to the AP projection. Calcifications  are seen at the aortic knob. Thoracic aorta is tortuous in appearance. Mild prominence of the pulmonary vasculature. Faint bibasilar hazy opacities likely reflect atelectasis. No focal consolidation, large pleural effusion, or visible pneumothorax. Multilevel discogenic degenerative changes of the thoracic spine. Presumed neurostimulator leads overlying the thoracic spine.       Mild prominence of the pulmonary vasculature, as can be seen with pulmonary vascular congestion. Please correlate for corresponding symptoms.   No focal consolidation or visible pneumothorax.   Faint bibasilar atelectasis suspected.   MACRO: None.   Signed by: Nj Zazueta 12/13/2023 3:04 PM Dictation workstation:   [id number]    Scheduled medications  amitriptyline, 10 mg, oral, Nightly  cholestyramine light, 4 g, oral, Daily with evening meal  DULoxetine, 20 mg, oral, Daily  folic acid, 1 mg, oral, Daily  gabapentin, 300 mg, oral, BID  heparin (porcine), 5,000 Units, subcutaneous, q8h  lactobacillus acidophilus, 1 tablet, oral, BID with meals  levothyroxine, 150 mcg, oral, Daily  potassium chloride CR, 40 mEq, oral, Daily  simvastatin, 10 mg, oral, Nightly  sodium bicarbonate, 650 mg, oral, TID  vancomycin, 125 mg, oral, 4x daily      Continuous medications  dilTIAZem, 10 mg/hr, Last Rate: 10 mg/hr (12/27/23 7602)  sodium chloride 0.9%, 60 mL/hr, Last Rate: 60 mL/hr (12/27/23 1602)      PRN medications  PRN medications: acetaminophen **OR** acetaminophen **OR** acetaminophen, benzocaine-menthol, dextromethorphan-guaifenesin, guaiFENesin    Assessment/Plan   Principal Problem:    Weakness  Active Problems:    Dyslipidemia    Chronic kidney disease, stage 4 (severe) (CMS/HCC)    Fibromyalgia    Chronic lymphocytic leukemia (CMS/HCC)    Benign essential hypertension    Congestive heart failure with left ventricular diastolic dysfunction, acute on chronic (CMS/HCC)    Atrial fibrillation (CMS/HCC)    Atrial fibrillation with pauses/ A-fib  with RVR   HR 5560 130-140 ,Cardizem drip was held waiting for cardiology input   Dr. Bruce   Monitor close.  nuclear stress test done on 12/26 negative for ischemia.  Normal EF  65%. . Small bilateral basilar effusions and compressive atelectasis . Bilateral axillary lymphadenopathy nonspecific     Hypokalemia  Resolved     Acute kidney injury   Cr 2.1   Dr. Amor on the case   continue gentle with fluid     C. difficile colitis/ leukocytosis   BC neg X 3   On vancomycin   from ID services on the case .     Leukocytosis/history of leukemia/ Anemia  HH trending down   Secondary to CLL  Seen by  Dr. Ferreira  Monitor close.     Hypothyroidism  Continue with  home medication.    Weakness/deconditioning   Fall precautions  PT/OT comments moderate intensity    DVT prophylaxis  Heparin subcu     Discharge plan:  This is 83 years old female with med Hx history of hypertension, hypothyroidism, IBS, chronic kidney disease stage IV, CLL. . She  presented from home with weakness and fall.  Admitted for evaluation for weakness, heart failure preserved EF, CKD stage III-IV, acute on chronic anemia and C-diff.   Evaluated by cardiology stress test done on 12/26  Evaluated by Dr. Chisholm regarding new onset of A-fib with pauses, recommend to not use AV amna blocking drugs also not a candidate for pacemaker insertion at this time.  Patient has to follow-up with him in 2 weeks long-term Holter prior to her discharge.   Evaluated by ID due to C. difficile, patient is on oral vancomycin.   Evaluated by nephrology due to JANET on CKD3-4; likely from volume depletion due to low p.o. intake and diarrhea, he baseline creatinine is 1.6-1.9; diuretic and ARB's were held    Anticipate discharging patient  skilled of nursing when medically clear     I spent 25 minutes in the professional and overall care of this patient.    Camille Olson, APRN-CNP

## 2023-12-29 LAB
ANION GAP SERPL CALC-SCNC: 11 MMOL/L
BUN SERPL-MCNC: 15 MG/DL (ref 8–25)
CALCIUM SERPL-MCNC: 8.4 MG/DL (ref 8.5–10.4)
CHLORIDE SERPL-SCNC: 106 MMOL/L (ref 97–107)
CO2 SERPL-SCNC: 20 MMOL/L (ref 24–31)
CREAT SERPL-MCNC: 1.7 MG/DL (ref 0.4–1.6)
ERYTHROCYTE [DISTWIDTH] IN BLOOD BY AUTOMATED COUNT: 13.6 % (ref 11.5–14.5)
GFR SERPL CREATININE-BSD FRML MDRD: 30 ML/MIN/1.73M*2
GLUCOSE SERPL-MCNC: 102 MG/DL (ref 65–99)
HCT VFR BLD AUTO: 31.3 % (ref 36–46)
HGB BLD-MCNC: 9.7 G/DL (ref 12–16)
MCH RBC QN AUTO: 31.3 PG (ref 26–34)
MCHC RBC AUTO-ENTMCNC: 31 G/DL (ref 32–36)
MCV RBC AUTO: 101 FL (ref 80–100)
NRBC BLD-RTO: 0 /100 WBCS (ref 0–0)
PLATELET # BLD AUTO: 268 X10*3/UL (ref 150–450)
POTASSIUM SERPL-SCNC: 4 MMOL/L (ref 3.4–5.1)
RBC # BLD AUTO: 3.1 X10*6/UL (ref 4–5.2)
SODIUM SERPL-SCNC: 137 MMOL/L (ref 133–145)
WBC # BLD AUTO: 37.4 X10*3/UL (ref 4.4–11.3)

## 2023-12-29 PROCEDURE — 2060000001 HC INTERMEDIATE ICU ROOM DAILY

## 2023-12-29 PROCEDURE — 80048 BASIC METABOLIC PNL TOTAL CA: CPT | Performed by: NURSE PRACTITIONER

## 2023-12-29 PROCEDURE — 97535 SELF CARE MNGMENT TRAINING: CPT | Mod: GO,CO

## 2023-12-29 PROCEDURE — 2500000004 HC RX 250 GENERAL PHARMACY W/ HCPCS (ALT 636 FOR OP/ED): Performed by: INTERNAL MEDICINE

## 2023-12-29 PROCEDURE — 36415 COLL VENOUS BLD VENIPUNCTURE: CPT | Performed by: NURSE PRACTITIONER

## 2023-12-29 PROCEDURE — 2500000001 HC RX 250 WO HCPCS SELF ADMINISTERED DRUGS (ALT 637 FOR MEDICARE OP): Performed by: INTERNAL MEDICINE

## 2023-12-29 PROCEDURE — 97530 THERAPEUTIC ACTIVITIES: CPT | Mod: GO,CO

## 2023-12-29 PROCEDURE — 85027 COMPLETE CBC AUTOMATED: CPT | Performed by: NURSE PRACTITIONER

## 2023-12-29 PROCEDURE — 96372 THER/PROPH/DIAG INJ SC/IM: CPT | Performed by: INTERNAL MEDICINE

## 2023-12-29 PROCEDURE — 2500000002 HC RX 250 W HCPCS SELF ADMINISTERED DRUGS (ALT 637 FOR MEDICARE OP, ALT 636 FOR OP/ED): Performed by: REGISTERED NURSE

## 2023-12-29 RX ORDER — AMIODARONE HYDROCHLORIDE 200 MG/1
200 TABLET ORAL DAILY
Status: DISCONTINUED | OUTPATIENT
Start: 2023-12-29 | End: 2024-01-02 | Stop reason: HOSPADM

## 2023-12-29 RX ADMIN — CHOLESTYRAMINE 4 G: 4 POWDER, FOR SUSPENSION ORAL at 16:41

## 2023-12-29 RX ADMIN — VANCOMYCIN HYDROCHLORIDE 125 MG: 125 CAPSULE ORAL at 22:03

## 2023-12-29 RX ADMIN — SODIUM CHLORIDE 60 ML/HR: 900 INJECTION, SOLUTION INTRAVENOUS at 02:24

## 2023-12-29 RX ADMIN — VANCOMYCIN HYDROCHLORIDE 125 MG: 125 CAPSULE ORAL at 06:11

## 2023-12-29 RX ADMIN — AMIODARONE HYDROCHLORIDE 200 MG: 200 TABLET ORAL at 16:42

## 2023-12-29 RX ADMIN — SIMVASTATIN 10 MG: 10 TABLET, FILM COATED ORAL at 21:57

## 2023-12-29 RX ADMIN — LEVOTHYROXINE SODIUM 150 MCG: 0.15 TABLET ORAL at 06:11

## 2023-12-29 RX ADMIN — SODIUM BICARBONATE 650 MG TABLET 650 MG: at 16:42

## 2023-12-29 RX ADMIN — VANCOMYCIN HYDROCHLORIDE 125 MG: 125 CAPSULE ORAL at 16:39

## 2023-12-29 RX ADMIN — GABAPENTIN 300 MG: 300 CAPSULE ORAL at 21:57

## 2023-12-29 RX ADMIN — POTASSIUM CHLORIDE 40 MEQ: 1500 TABLET, EXTENDED RELEASE ORAL at 11:21

## 2023-12-29 RX ADMIN — FOLIC ACID 1 MG: 1 TABLET ORAL at 11:20

## 2023-12-29 RX ADMIN — Medication 1 TABLET: at 11:20

## 2023-12-29 RX ADMIN — GABAPENTIN 300 MG: 300 CAPSULE ORAL at 11:20

## 2023-12-29 RX ADMIN — SODIUM BICARBONATE 650 MG TABLET 650 MG: at 11:20

## 2023-12-29 RX ADMIN — HEPARIN SODIUM 5000 UNITS: 5000 INJECTION, SOLUTION INTRAVENOUS; SUBCUTANEOUS at 11:19

## 2023-12-29 RX ADMIN — Medication 1 TABLET: at 16:45

## 2023-12-29 RX ADMIN — VANCOMYCIN HYDROCHLORIDE 125 MG: 125 CAPSULE ORAL at 13:16

## 2023-12-29 RX ADMIN — SODIUM BICARBONATE 650 MG TABLET 650 MG: at 21:57

## 2023-12-29 RX ADMIN — AMITRIPTYLINE HYDROCHLORIDE 10 MG: 10 TABLET, FILM COATED ORAL at 21:57

## 2023-12-29 RX ADMIN — DULOXETINE HYDROCHLORIDE 20 MG: 20 CAPSULE, DELAYED RELEASE ORAL at 11:23

## 2023-12-29 ASSESSMENT — COGNITIVE AND FUNCTIONAL STATUS - GENERAL
DRESSING REGULAR UPPER BODY CLOTHING: A LITTLE
STANDING UP FROM CHAIR USING ARMS: A LITTLE
TURNING FROM BACK TO SIDE WHILE IN FLAT BAD: A LITTLE
DAILY ACTIVITIY SCORE: 15
CLIMB 3 TO 5 STEPS WITH RAILING: A LOT
MOVING FROM LYING ON BACK TO SITTING ON SIDE OF FLAT BED WITH BEDRAILS: A LITTLE
STANDING UP FROM CHAIR USING ARMS: A LOT
WALKING IN HOSPITAL ROOM: A LOT
DRESSING REGULAR UPPER BODY CLOTHING: A LOT
MOVING TO AND FROM BED TO CHAIR: A LOT
MOBILITY SCORE: 15
WALKING IN HOSPITAL ROOM: A LOT
HELP NEEDED FOR BATHING: A LOT
DRESSING REGULAR UPPER BODY CLOTHING: A LITTLE
HELP NEEDED FOR BATHING: A LOT
MOBILITY SCORE: 15
MOVING TO AND FROM BED TO CHAIR: A LOT
CLIMB 3 TO 5 STEPS WITH RAILING: A LOT
DRESSING REGULAR LOWER BODY CLOTHING: A LITTLE
TURNING FROM BACK TO SIDE WHILE IN FLAT BAD: A LITTLE
CLIMB 3 TO 5 STEPS WITH RAILING: A LOT
DRESSING REGULAR LOWER BODY CLOTHING: A LOT
DAILY ACTIVITIY SCORE: 17
PERSONAL GROOMING: A LITTLE
TOILETING: A LOT
PERSONAL GROOMING: A LITTLE
MOVING FROM LYING ON BACK TO SITTING ON SIDE OF FLAT BED WITH BEDRAILS: A LITTLE
TURNING FROM BACK TO SIDE WHILE IN FLAT BAD: A LITTLE
TOILETING: TOTAL
PERSONAL GROOMING: A LITTLE
WALKING IN HOSPITAL ROOM: A LOT
STANDING UP FROM CHAIR USING ARMS: A LITTLE
MOBILITY SCORE: 15
TOILETING: A LOT
MOVING TO AND FROM BED TO CHAIR: A LOT
HELP NEEDED FOR BATHING: A LOT
DAILY ACTIVITIY SCORE: 15
DRESSING REGULAR LOWER BODY CLOTHING: A LOT

## 2023-12-29 ASSESSMENT — PAIN SCALES - GENERAL
PAINLEVEL_OUTOF10: 0 - NO PAIN

## 2023-12-29 ASSESSMENT — PAIN - FUNCTIONAL ASSESSMENT: PAIN_FUNCTIONAL_ASSESSMENT: 0-10

## 2023-12-29 ASSESSMENT — PAIN SCALES - WONG BAKER
WONGBAKER_NUMERICALRESPONSE: NO HURT
WONGBAKER_NUMERICALRESPONSE: NO HURT

## 2023-12-29 ASSESSMENT — ACTIVITIES OF DAILY LIVING (ADL): HOME_MANAGEMENT_TIME_ENTRY: 15

## 2023-12-29 NOTE — PROGRESS NOTES
Tracie Baltazar is a 83 y.o. female on day 5 of admission presenting with Weakness.    Subjective   Interval History:   Patient seen and examined  Son present  Reports intermittent diarrhea  No abdominal pain        Review of Systems   All other systems reviewed and are negative.      Objective   Range of Vitals (last 24 hours)  Heart Rate:  []   Temp:  [36 °C (96.8 °F)-37.6 °C (99.7 °F)]   Resp:  [16-25]   BP: (138-156)/(51-71)   Weight:  [85 kg (187 lb 6.3 oz)]   SpO2:  [93 %-96 %]   Daily Weight  12/29/23 : 85 kg (187 lb 6.3 oz)    Body mass index is 33.2 kg/m².    Physical Exam  Constitutional:       Appearance: Normal appearance.   HENT:      Head: Normocephalic and atraumatic.      Nose: Nose normal.      Mouth/Throat:      Mouth: Mucous membranes are moist.      Pharynx: Oropharynx is clear.   Eyes:      Extraocular Movements: Extraocular movements intact.      Conjunctiva/sclera: Conjunctivae normal.   Cardiovascular:      Rate and Rhythm: Normal rate and regular rhythm.   Pulmonary:      Effort: Pulmonary effort is normal.      Breath sounds: Normal breath sounds. No wheezing or rales.   Abdominal:      General: Bowel sounds are normal.      Palpations: Abdomen is soft.      Tenderness: There is no abdominal tenderness. There is no guarding.   Musculoskeletal:         General: Normal range of motion.      Cervical back: Normal range of motion and neck supple.   Skin:     General: Skin is warm and dry.   Neurological:      General: No focal deficit present.      Mental Status: She is alert and oriented to person, place, and time.   Psychiatric:         Mood and Affect: Mood normal.         Behavior: Behavior normal.     Antibiotics  sodium chloride 0.9 % bolus 1,000 mL  piperacillin-tazobactam-dextrose (Zosyn) IV 4.5 g  vancomycin-diluent combo no.1 (Xellia) IVPB 2 g  amitriptyline (Elavil) tablet 10 mg  DULoxetine (Cymbalta) DR capsule 20 mg  folic acid (Folvite) tablet 1 mg  gabapentin (Neurontin)  capsule 300 mg  hydrALAZINE (Apresoline) tablet 20 mg  lactobacillus acidophilus tablet 1 tablet  levothyroxine (Synthroid, Levoxyl) tablet 150 mcg  pantoprazole (ProtoNix) EC tablet 40 mg  simvastatin (Zocor) tablet 10 mg  sodium bicarbonate tablet 650 mg  heparin (porcine) injection 5,000 Units  polyethylene glycol (Glycolax, Miralax) packet 17 g  benzocaine-menthol (Cepastat Sore Throat) 15-3.6 mg lozenge 1 lozenge  guaiFENesin (Mucinex) 12 hr tablet 600 mg  dextromethorphan-guaifenesin (Robitussin DM)  mg/5 mL oral liquid 5 mL  acetaminophen (Tylenol) tablet 650 mg  acetaminophen (Tylenol) oral liquid 650 mg  acetaminophen (Tylenol) suppository 650 mg  sodium chloride 0.9% infusion  metroNIDAZOLE (Flagyl) tablet 500 mg  vancomycin (Vancocin) capsule 125 mg  dilTIAZem (Cardizem) 125 mg in dextrose 5% 125 mL (1 mg/mL) infusion (premix)  dilTIAZem (Cardizem) injection 10 mg  potassium chloride CR (Klor-Con M20) ER tablet 40 mEq  regadenoson (Lexiscan) injection 0.4 mg  aminophylline syringe 125 mg  cholestyramine (Questran) 4 gram packet 4 g  potassium chloride CR (Klor-Con M20) ER tablet 40 mEq  Tc-99m tetrofosmin (Myoview) injection 11 millicurie  Tc-99m tetrofosmin (Myoview) injection 32 millicurie  dilTIAZem (Cardizem) 125 mg in dextrose 5% 125 mL (1 mg/mL) infusion (premix)  dilTIAZem (Cardizem) injection 20 mg  cholestyramine light (Prevalite) 4 gram packet 4 g      Relevant Results  Labs  Results from last 72 hours   Lab Units 12/29/23  0553 12/28/23  0521 12/27/23  0826   WBC AUTO x10*3/uL 37.4* 38.4* 35.7*   HEMOGLOBIN g/dL 9.7* 9.1* 9.9*   HEMATOCRIT % 31.3* 30.3* 32.6*   PLATELETS AUTO x10*3/uL 268 257 254     Results from last 72 hours   Lab Units 12/29/23  0553 12/28/23  0521 12/27/23  0826   SODIUM mmol/L 137 140 142   POTASSIUM mmol/L 4.0 3.5 3.4   CHLORIDE mmol/L 106 107 107   CO2 mmol/L 20* 19* 19*   BUN mg/dL 15 24 25   CREATININE mg/dL 1.70* 2.00* 2.10*   GLUCOSE mg/dL 102* 103* 69  "  CALCIUM mg/dL 8.4* 8.3* 8.5   ANION GAP mmol/L 11 14 16   EGFR mL/min/1.73m*2 30* 24* 23*         Estimated Creatinine Clearance: 25.9 mL/min (A) (by C-G formula based on SCr of 1.7 mg/dL (H)).  No results found for: \"CRP\"  Microbiology  Reviewed  Imaging  ECG 12 lead    Result Date: 12/27/2023   Poor data quality, interpretation may be adversely affected Normal sinus rhythm Nonspecific ST abnormality Abnormal ECG When compared with ECG of 31-MAY-2013 19:08, QT has shortened Confirmed by Magan Perez ([id number]) on 12/27/2023 10:14:24 AM    Nuclear Stress Test    Result Date: 12/26/2023  Interpreted By:  Remigio Cherry, STUDY: NUCLEAR STRESS TEST;  12/26/2023 2:36 pm   INDICATION: Signs/Symptoms:Paroxysmal atrial fibrillation, sick sinus syndrome.   COMPARISON: None.   ACCESSION NUMBER(S): [id number]   ORDERING CLINICIAN: NORY LOZA   TECHNIQUE: DIVISION OF NUCLEAR MEDICINE PHARMACOLOGIC STRESS MYOCARDIAL PERFUSION SCAN, ONE DAY PROTOCOL   The patient received an intravenous injection of 11.0 mCi of Tc-99m Myoview and resting emission tomographic (SPECT) images of the myocardium were acquired. The patient then received an intravenous infusion of 0.4 mg regadenoson (Lexiscan) followed by an additional injection of 32.0 mCi of Tc-99m Myoview. Stress phase SPECT images of the myocardium were then acquired. These included ECG-gated post-stress and rest images to assess and quantify ventricular function.  Low dose CT was acquired for attenuation correction.   FINDINGS: Stress and rest phase imaging demonstrate no evidence for ischemia. No fixed defects seen to suggest infarct   EKG gated imaging demonstrates normal left ventricular wall motion and thickening. There is normal end-diastolic volume. Normal ejection fraction 65%           Attenuation correction CT images demonstrate nonspecific bilateral axillary lymphadenopathy. For example, in the right axilla measuring 2.8 by 1.2 cm. Lung parenchyma demonstrates small " bilateral basilar effusions and compressive atelectasis       1. No evidence for ischemia.   2. Normal ejection fraction 65%.   3. Small bilateral basilar effusions and compressive atelectasis   4. Bilateral axillary lymphadenopathy nonspecific and likely reactive         MACRO: None   Signed by: Remigio Cherry 12/26/2023 2:59 PM Dictation workstation:   [id number]    Cardiology Interpretation Of Nuclear Stress - See Other Report For Nuclear Portion    Result Date: 12/26/2023           [place] [id number]            Phone [phone] Nuclear Pharmacologic Stress Test Patient Name:      MARK GABRIEL Ordering Provider:    [id number] NORY LOZA Study Date:        12/26/2023         Reading Physician:    [id number] Elvis Bruce DO MRN/PID:           [id number]           Supervising           Balwinder Bruce DO                                       Physician: Accession#:        [id number]       Fellow: Date of Birth/Age: 1940 / 83      Fellow:                    years Gender:            F                  Nurse:                Do Zafar RN Admit Date:                           Technician:           Matt Williamson                                                             CVTI Admission Status:                     Sonographer:          QUYEN Height:            160.02 cm          Technologist: Weight:            82.10 kg           Additional Staff: BSA:               1.85 m2            Encounter#:           [id number] BMI:               32.06 kg/m2        Patient Location: Study Type:    CARDIOLOGY INTERPRETATION OF NUCLEAR STRESS Diagnosis/ICD: Longstanding persistent atrial fibrillation-I48.11 Indication:    Atrial Fibrillation CPT Codes:     Stress Test Interpretation-80288; Stress Test Supervision-[id number] Falls Risk:  Study Details: Correct procedure and correct patient verified verbally and with                ID Band checked.  Patient History: Chest pain,  atrial fibrillation, syncope, hypertension, hyperlipidemia and congestive heart failure.  Medications: SIMVASTATIN, FUROSEMIDE, HYDRALAZINE. The patient did not take medications as prescribed.  Patient Performance: Patient received a total of 0.4 mg of Regadenoson at 11:30:33 AM. The resting blood pressure was 175/68 mmHg with a heart rate of 74 bpm. The patient developed no symptoms during the stress exam. The blood pressure response was normal. The test was terminated due to: completed lab protocol. Standard dose of Lexiscan was infused over 10 to 15 seconds then flushed with saline. The patient then received a standard IV dose of Myoview. The patient did not experience chest pain with infusion of Lexiscan. The resting twelve-lead ECG was essentially normal and there were no significant changes in the ST segments with the infusion of Lexiscan. Resting blood pressure was 170/68 and decreased to 144/60 prior to completion of the study. No arrhythmias were induced. Myoview imaging was to be reported separately. Patient has met the discharge criteria and is discharged to their floor.  Baseline ECG: Resting ECG showed Atrial Fibrillation with AFIB.  Stress ECG: Stress ECG showed atrial fibrillation.  Stress Stage Data: +----------------+--+------+-------+                 HRSys BPDias BP +----------------+--+------+-------+ Baseline [id number]   68      +----------------+--+------+-------+  Recovery ECG: Recovery ECG showed atrial fibrillation.  +------------+--+------+-------+             HRSys BPDias BP +------------+--+------+-------+ Recovery I  [id number]   54      +------------+--+------+-------+ Recovery II [id number]   54      +------------+--+------+-------+ Recovery [id number]   61      +------------+--+------+-------+  Summary:  1. No clinical or ECG evidence of ischemia.  2. Myoview imaging to be reported separately.  3. Adequate level of stress achieved.  4. Nuclear image results are  reported separately. [id number] Elvis Bruce DO Electronically signed on 12/26/2023 at 11:42:40 AM   ** Final **     US renal complete    Result Date: 12/24/2023  Interpreted By:  Bruno Bryant, STUDY: US RENAL COMPLETE; 12/24/2023 7:50 pm   INDICATION: Signs/Symptoms:JANET on CKD. /history of CLL and hypertension   COMPARISON: None.   ACCESSION NUMBER(S): [id number]   ORDERING CLINICIAN: SERJIO DEVINE   TECHNIQUE: Grayscale and color Doppler imaging of the kidneys.   FINDINGS: The right kidney measures 9.3 cm x 4.1 cm x 4.4 cm cm. The left kidney measures 7.8 cm x 5.0 cm x 3.7 cm cm. There is no shadowing calculus, hydronephrosis, or solid mass identified. However, there is a 2.6 x 2.5 x 2.4 cm inferior pole hypoechoic renal cyst without internal color Doppler flow. Renal cortical thinning and increased echogenicity of the renal parenchyma is noted.   The bladder is grossly unremarkable.       1. No hydronephrosis or nephrolithiasis. 2. Medical renal disease. 3 left renal cyst.     MACRO: None.   Signed by: Bruno Bryant 12/24/2023 8:19 PM Dictation workstation:   [id number]    XR chest 1 view    Result Date: 12/24/2023  Interpreted By:  Nick Luciano, STUDY: XR CHEST 1 VIEW; 12/24/2023 4:15 am   INDICATION: CLINICAL INFORMATION: Signs/Symptoms:weakness.   COMPARISON: 12/18/2020   ACCESSION NUMBER(S): [id number]   ORDERING CLINICIAN: LESLYE FRANK   TECHNIQUE: Portable chest one view.   FINDINGS: The cardiac size is indeterminate in view of the AP projection. Neurostimulator overlies the thoracic spine. No infiltrates or effusions are identified.  The aorta is tortuous.       No acute pathologic findings are identified.  There is no interval change when compared to the previous examination.   MACRO: none   Signed by: Nick Luciano 12/24/2023 9:11 AM Dictation workstation:   [id number]    XR chest 2 views    Result Date: 12/18/2023  Interpreted By:  Alex Mandel, STUDY: XR CHEST 2 VIEWS; 12/18/2023  8:06 am   INDICATION: Signs/Symptoms:sob   COMPARISON: 12/13/2023   ACCESSION NUMBER(S): [id number]   ORDERING CLINICIAN: SRIKANTH VILCHIS   TECHNIQUE: PA and LAT views of the chest were obtained.   FINDINGS: The cardiomediastinal silhouette is unremarkable. The lungs are clear. No pleural effusion is identified.   The osseous structures are intact. Spinal cord stimulator leads are again seen at the mid and lower thoracic level.       No acute cardiopulmonary process.     Signed by: Alex Mandel 12/18/2023 8:30 AM Dictation workstation:   [id number]    Transthoracic Echo (TTE) Complete    Result Date: 12/15/2023           [place] [address]            Phone [phone] TRANSTHORACIC ECHOCARDIOGRAM REPORT  Patient Name:      MARK Patterson Physician:    [id number] Elvis Bruce DO Study Date:        12/15/2023          Ordering Provider:    [id number] ABDULLAHI VANESSA MRN/PID:           [id number]            Fellow: Accession#:        [id number]        Nurse: Date of Birth/Age: 1940 / 83 years Sonographer:          Kristen WESTFALL Gender:            F                   Additional Staff: Height:            154.94 cm           Admit Date:           12/14/2023 Weight:            82.10 kg            Admission Status:     Inpatient - Routine BSA:               1.81 m2             Department Location: Blood Pressure: 150 /47 mmHg Study Type:    TRANSTHORACIC ECHO (TTE) COMPLETE Diagnosis/ICD: Shortness of breath-R06.02 Indication:    Shortness of Breath CPT Codes:     Echo Complete w Full Doppler-74139 Patient History: Pertinent History: CKD CHF Depression HTN CP Murmur Dyslipidemia Varicose veins                    of leg with swellin ghyperlipidemia hypercholesterolemia. Study Detail: The following Echo studies were performed: 2D, Doppler, M-Mode,               color flow and 3D.  PHYSICIAN  INTERPRETATION: Left Ventricle: Left ventricular systolic function is normal, with an estimated ejection fraction of 60-65%. There are no regional wall motion abnormalities. The left ventricular cavity size is normal. Left ventricular diastolic filling was indeterminate. Left Atrium: The left atrium is normal in size. Right Ventricle: The right ventricle is normal in size. There is normal right ventricular global systolic function. Right Atrium: The right atrium is normal in size. Aortic Valve: The aortic valve is trileaflet. There is mild aortic valve cusp calcification. There is no evidence of aortic valve regurgitation. The peak instantaneous gradient of the aortic valve is 12.0 mmHg. Mitral Valve: The mitral valve is normal in structure. There is trace to mild mitral valve regurgitation. Tricuspid Valve: The tricuspid valve is structurally normal. No evidence of tricuspid regurgitation. Pulmonic Valve: The pulmonic valve is not well visualized. There is trace to mild pulmonic valve regurgitation. Pericardium: There is no pericardial effusion noted. Aorta: The aortic root is normal. Systemic Veins: The inferior vena cava appears to be of normal size. There is IVC inspiratory collapse greater than 50%.  CONCLUSIONS:  1. Left ventricular systolic function is normal with a 60-65% estimated ejection fraction.  2. Left ventricular diastolic filling indeterminate.  3. Mild aortic valve sclerosis. QUANTITATIVE DATA SUMMARY: 2D MEASUREMENTS:                          Normal Ranges: LAs:           3.00 cm   (2.7-4.0cm) IVSd:          0.85 cm   (0.6-1.1cm) LVPWd:         1.00 cm   (0.6-1.1cm) LVIDd:         4.23 cm   (3.9-5.9cm) LVIDs:         2.79 cm LV Mass Index: 69.0 g/m2 LV % FS        34.0 % LA VOLUME:                               Normal Ranges: LA Vol A2C:        59.3 ml LA Vol Index A2C:  32.8 ml/m2 LA Area A2C:       18.7 cm2 LA Major Axis A2C: 5.0 cm LA Volume Index:   29.6 ml/m2 LA Vol A2C:        59.7 ml RA  VOLUME BY A/L METHOD:                               Normal Ranges: RA Vol A4C:        32.1 ml    (8.3-19.5ml) RA Vol Index A4C:  17.7 ml/m2 RA Area A4C:       13.6 cm2 RA Major Axis A4C: 4.9 cm M-MODE MEASUREMENTS:                  Normal Ranges: Ao Root: 2.20 cm (2.0-3.7cm) LAs:     4.80 cm (2.7-4.0cm) AORTA MEASUREMENTS:                    Normal Ranges: Asc Ao, d: 3.10 cm (2.1-3.4cm) LV SYSTOLIC FUNCTION BY 2D PLANIMETRY (MOD):                     Normal Ranges: EF-A4C View: 51.6 % (>=55%) EF-A2C View: 57.9 % EF-Biplane:  55.8 % LV DIASTOLIC FUNCTION:                            Normal Ranges: MV Peak E:      0.93 m/s   (0.7-1.2 m/s) MV Peak A:      1.02 m/s   (0.42-0.7 m/s) E/A Ratio:      0.91       (1.0-2.2) MV e'           0.08 m/s   (>8.0) MV lateral e'   0.08 m/s MV medial e'    0.07 m/s E/e' Ratio:     12.39      (<8.0) PulmV Sys Siddhartha:  68.10 cm/s PulmV Urrutia Siddhartha: 47.60 cm/s PulmV S/D Siddhartha:  1.40 MITRAL VALVE:                      Normal Ranges: MV Vmax:    1.22 m/s (<=1.3m/s) MV peak P.0 mmHg (<5mmHg) MV mean P.0 mmHg (<48mmHg) MV DT:      262 msec (150-240msec) AORTIC VALVE:                          Normal Ranges: AoV Vmax:      1.73 m/s  (<=1.7m/s) AoV Peak P.0 mmHg (<20mmHg) LVOT Max Siddhartha:  1.21 m/s  (<=1.1m/s) LVOT VTI:      29.10 cm LVOT Diameter: 2.00 cm   (1.8-2.4cm) AoV Area,Vmax: 2.20 cm2  (2.5-4.5cm2)  RIGHT VENTRICLE: RV Basal 3.18 cm RV Mid   2.70 cm RV Major 5.2 cm TAPSE:   16.5 mm RV s'    0.13 m/s TRICUSPID VALVE/RVSP:                   Normal Ranges: IVC Diam: 1.75 cm PULMONIC VALVE:                         Normal Ranges: PV Accel Time: 69 msec  (>120ms) PV Max Siddhartha:    1.4 m/s  (0.6-0.9m/s) PV Max P.3 mmHg Pulmonary Veins: PulmV Urrutia Siddhartha: 47.60 cm/s PulmV S/D Siddhartha:  1.40 PulmV Sys Siddhartha:  68.10 cm/s  [id number] Elvis [facility] MARTINEZ Electronically signed on 12/15/2023 at 10:13:13 AM  ** Final **     CT head wo IV contrast    Result Date: 2023  Interpreted By:  Rashawn Hook,  STUDY: CT HEAD WO IV CONTRAST; 12/14/2023 10:34 am   INDICATION: Signs/Symptoms:lightheadedness, near syncope.   COMPARISON: No comparison exams available.   ACCESSION NUMBER(S): [id number]   ORDERING CLINICIAN: SLY DAWKINS   TECHNIQUE: CT axial images through the Brain were obtained without contrast.   FINDINGS: Acute ischemic change: None.   Hemorrhage: No evidence of acute intracranial hemorrhage.   Mass Effect / Mass Lesion: No significant mass effect. There is no evidence of an intracranial mass or extraaxial fluid collection.   Chronic ischemic change: Patchy foci of  low attenuation coefficient are present within white matter which is a nonspecific finding but likely represents moderate microvascular ischemia.   Parenchyma: There is no significant volume loss. The brain parenchyma is otherwise within normal limits for age.   Ventricles: Normal caliber and morphology.   Other: There is calcification involving the carotid siphons.       No acute intracranial process   All CT examinations are performed with 1 or more of the following dose reduction techniques: Automated exposure control, adjustment of mA and/or kv according to patient's size, or use of iterative reconstruction techniques.   MACRO: none   Signed by: Rashawn Hook 12/14/2023 10:48 AM Dictation workstation:   [id number]    XR chest 2 views    Result Date: 12/13/2023  Interpreted By:  Nj Zazueta, STUDY: XR CHEST 2 VIEWS; 12/13/2023 2:47 pm   INDICATION: Signs/Symptoms:SOB dizziness   COMPARISON: Chest radiograph 06/09/2020   ACCESSION NUMBER(S): [id number]   ORDERING CLINICIAN: LA SAEED   TECHNIQUE: AP and lateral views of the chest performed.   FINDINGS: Cardiac size is indeterminate due to the AP projection. Calcifications are seen at the aortic knob. Thoracic aorta is tortuous in appearance. Mild prominence of the pulmonary vasculature. Faint bibasilar hazy opacities likely reflect atelectasis. No focal consolidation, large  pleural effusion, or visible pneumothorax. Multilevel discogenic degenerative changes of the thoracic spine. Presumed neurostimulator leads overlying the thoracic spine.       Mild prominence of the pulmonary vasculature, as can be seen with pulmonary vascular congestion. Please correlate for corresponding symptoms.   No focal consolidation or visible pneumothorax.   Faint bibasilar atelectasis suspected.   MACRO: None.   Signed by: Nj Zazueta 12/13/2023 3:04 PM Dictation workstation:   [id number]     Assessment/Plan     Acute kidney injury-resolving  Leukocytosis-multifactorial  CLL  C. difficile infection-resolving     Continue oral vancomycin  Monitor renal function  Contact plus precautions  Questran  Supportive care  Monitor temperature and WBC    Hipolito lEias MD

## 2023-12-29 NOTE — PROGRESS NOTES
Occupational Therapy    OT Treatment    Patient Name: Tracie Baltazar  MRN: [id number]  Today's Date: 12/29/2023  Time Calculation  Start Time: 0822  Stop Time: 0852  Time Calculation (min): 30 min         Assessment:  OT Assessment: Pt tolerating session poorly this date with increased HR. Pt would benefit from continued skilled OT services to improve strength, balance, and functional tolerance to increase independence with ADL/IADL tasks for homegoing  End of Session Communication: Bedside nurse  End of Session Patient Position: Bed, 3 rail up (RN present)  OT Assessment Results: Decreased ADL status, Decreased upper extremity strength, Decreased safe judgment during ADL, Decreased endurance, Decreased functional mobility, Decreased gross motor control, Decreased IADLs  Plan:  Treatment Interventions: ADL retraining, Functional transfer training, Endurance training, Patient/family training, Neuromuscular reeducation, Compensatory technique education  OT Frequency: 3 times per week  OT Discharge Recommendations: Moderate intensity level of continued care  OT Recommended Transfer Status: Minimal assist, Assist of 1  OT - OK to Discharge: Yes  Treatment Interventions: ADL retraining, Functional transfer training, Endurance training, Patient/family training, Neuromuscular reeducation, Compensatory technique education    Subjective   Previous Visit Info:  OT Last Visit  OT Received On: 12/29/23  General:  General  Reason for Referral: decline in ADLs  Referred By: Dr. Sarbjit MD  Past Medical History Relevant to Rehab: Patient is an 83 year old female admitted from home with sudden weakness in legs while walking. Patient recently d/c'd [facility] [place]. PMH: CKD, fibromyalgia, CLL, HTN, CHF, obesity, OA, DJD, depression  Prior to Session Communication: Bedside nurse  Patient Position Received: Bed, 3 rail up  General Comment: pt cleared for therapy per RN. pt supine in bed upon arrival and agreeable to tx  session. Pt HR increase 150bpm seated EOB, having to return to supine and terminate session  Precautions:  Medical Precautions: Fall precautions, Infection precautions  Vital Signs:  Vital Signs  Heart Rate: (!) 150 (HR increase to 150bpm at EOB, having to return to supine with pt able to slowly recover to 120bpm)  Heart Rate Source: Monitor  Pain:  Pain Assessment  Pain Assessment: 0-10  Pain Score: 0 - No pain    Objective    Cognition:  Cognition  Overall Cognitive Status: Within Functional Limits  Orientation Level: Oriented X4    Activities of Daily Living:        Toileting  Toileting Level of Assistance: Dependent  Where Assessed: Bed level  Toileting Comments: bowel incontinence upon arrival with pt dependent for pericare hygeine with pt donned/doff on bed pan with mod A after rolling with increase time required for task completion       Bed Mobility/Transfers: Bed Mobility  Bed Mobility: Yes  Bed Mobility 1  Bed Mobility 1: Rolling right, Rolling left  Level of Assistance 1: Moderate assistance  Bed Mobility Comments 1: mod A to roll L/R to don/doff on bed pan, pericare hygeine, and linen change  Bed Mobility 2  Bed Mobility  2: Supine to sitting  Level of Assistance 2: Moderate assistance  Bed Mobility Comments 2: mod A supine>sit EOB with trunk elevation and LE advancement with VC for proper hand placement with mod A to scoot hips to EOB with use of drawsheet. Pt tolerated sitting EOB ~3 min, with elevated HR having to return to supine.  Bed Mobility 3  Bed Mobility 3: Sitting to supine  Level of Assistance 3: Maximum assistance  Bed Mobility Comments 3: max A sit>supine with trunk and LE advancement, dependent x2 to boost to HOb with use of drawsheet    Sitting Balance:  Static Sitting Balance  Static Sitting-Balance Support: Bilateral upper extremity supported  Static Sitting-Level of Assistance: Minimum assistance  Static Sitting-Comment/Number of Minutes: poor sitting balance with VC/TC for proper hand  placement at sides with pt demonstrating poor carryover and slight retropulsion         Outcome Measures:[facility] Daily Activity  Putting on and taking off regular lower body clothing: A lot  Bathing (including washing, rinsing, drying): A lot  Putting on and taking off regular upper body clothing: A little  Toileting, which includes using toilet, bedpan or urinal: Total (quinn)  Taking care of personal grooming such as brushing teeth: A little  Eating Meals: None  Daily Activity - Total Score: 15      Education Documentation  Handouts, taught by AUDELIA Edouard at 12/29/2023  9:58 AM.  Learner: Patient  Readiness: Acceptance  Method: Explanation  Response: Verbalizes Understanding    Home Exercise Program, taught by AUDELIA Edouard at 12/29/2023  9:58 AM.  Learner: Patient  Readiness: Acceptance  Method: Explanation  Response: Verbalizes Understanding    Body Mechanics, taught by AUDELIA Edouard at 12/29/2023  9:58 AM.  Learner: Patient  Readiness: Acceptance  Method: Explanation  Response: Verbalizes Understanding    Precautions, taught by AUDELIA Edouard at 12/29/2023  9:58 AM.  Learner: Patient  Readiness: Acceptance  Method: Explanation  Response: Verbalizes Understanding    ADL Training, taught by AUDELIA Edouard at 12/29/2023  9:58 AM.  Learner: Patient  Readiness: Acceptance  Method: Explanation  Response: Verbalizes Understanding    Education Comments  No comments found.          Problem: OT Goals  Goal: ADLs  Description: Patient will demonstrate the ability to complete ADL tasks with Mod I, using AE as needed, in order to increase patent's safety and independence with self-care tasks.  Outcome: Progressing  Goal: Functional Transfers  Description: Patient will complete functional transfers with Mod I in order to increase patient's safety and independence with daily tasks.  Outcome: Progressing  Goal: B UE Strengthening  Description: Patient will  increase B UE strength to 4+/5 for functional transfers.  Outcome: Progressing  Goal: Functional Mobility  Description: Patient will demonstrate the ability to complete item retrieval and functional mobility with Mod I in order to increase patient's safety and independence with daily tasks.    Outcome: Progressing

## 2023-12-29 NOTE — PROGRESS NOTES
"Tracie Baltazar is a 83 y.o. female on day 5 of admission presenting with Weakness.    Subjective   HPI   Patient seen and examined, patient is resting in her bed, poor appetite  Patient had 2 or 3 loose stool  this morning tolerates well her diet without any issues.  feeling weak and fatigued.  Denies any chest pain, shortness of breath on 2 L nasal cannula   No fever or chills.     Objective     Last Recorded Vitals  Blood pressure 147/51, pulse 77, temperature 37.1 °C (98.8 °F), temperature source Temporal, resp. rate 21, height 1.6 m (5' 2.99\"), weight 85 kg (187 lb 6.3 oz), SpO2 94 %.      Intake/Output Summary (Last 24 hours) at 12/29/2023 1629  Last data filed at 12/29/2023 0621  Gross per 24 hour   Intake --   Output 1700 ml   Net -1700 ml           Physical Exam  Constitutional:       Appearance: Normal appearance.   HENT:      Head: Normocephalic and atraumatic.      Nose: Nose normal.   Eyes:      Extraocular Movements: Extraocular movements intact.      Pupils: Pupils are equal, round, and reactive to light.   Cardiovascular:      Rate and Rhythm: Rhythm irregular.      Comments: Irregular rhythm with pauses   Pulmonary:      Effort: Pulmonary effort is normal. No respiratory distress.   Abdominal:      General: Bowel sounds are normal.      Palpations: Abdomen is soft.   Musculoskeletal:      Cervical back: Normal range of motion and neck supple.      Right lower leg: No edema.      Left lower leg: No edema.   Skin:     General: Skin is warm.   Neurological:      General: No focal deficit present.      Mental Status: She is alert. Mental status is at baseline.   Psychiatric:         Mood and Affect: Mood normal.          Relevant Results    Lab Results   Component Value Date    WBC 37.4 (H) 12/29/2023    HGB 9.7 (L) 12/29/2023    HCT 31.3 (L) 12/29/2023     (H) 12/29/2023     12/29/2023       Lab Results   Component Value Date    GLUCOSE 102 (H) 12/29/2023    CALCIUM 8.4 (L) 12/29/2023 "     12/29/2023    K 4.0 12/29/2023    CO2 20 (L) 12/29/2023     12/29/2023    BUN 15 12/29/2023    CREATININE 1.70 (H) 12/29/2023       Lab Results   Component Value Date    HGBA1C 5.4 12/19/2019       US renal complete    Result Date: 12/24/2023  Interpreted By:  Bruno Bryant, STUDY: US RENAL COMPLETE; 12/24/2023 7:50 pm   INDICATION: Signs/Symptoms:JANET on CKD. /history of CLL and hypertension   COMPARISON: None.   ACCESSION NUMBER(S): [id number]   ORDERING CLINICIAN: SERJIO DEVINE   TECHNIQUE: Grayscale and color Doppler imaging of the kidneys.   FINDINGS: The right kidney measures 9.3 cm x 4.1 cm x 4.4 cm cm. The left kidney measures 7.8 cm x 5.0 cm x 3.7 cm cm. There is no shadowing calculus, hydronephrosis, or solid mass identified. However, there is a 2.6 x 2.5 x 2.4 cm inferior pole hypoechoic renal cyst without internal color Doppler flow. Renal cortical thinning and increased echogenicity of the renal parenchyma is noted.   The bladder is grossly unremarkable.       1. No hydronephrosis or nephrolithiasis. 2. Medical renal disease. 3 left renal cyst.     MACRO: None.   Signed by: Bruno Bryant 12/24/2023 8:19 PM Dictation workstation:   [id number]    XR chest 1 view    Result Date: 12/24/2023  Interpreted By:  Nick Luciano, STUDY: XR CHEST 1 VIEW; 12/24/2023 4:15 am   INDICATION: CLINICAL INFORMATION: Signs/Symptoms:weakness.   COMPARISON: 12/18/2020   ACCESSION NUMBER(S): [id number]   ORDERING CLINICIAN: LESLYE FRANK   TECHNIQUE: Portable chest one view.   FINDINGS: The cardiac size is indeterminate in view of the AP projection. Neurostimulator overlies the thoracic spine. No infiltrates or effusions are identified.  The aorta is tortuous.       No acute pathologic findings are identified.  There is no interval change when compared to the previous examination.   MACRO: none   Signed by: Nick Luciano 12/24/2023 9:11 AM Dictation workstation:   [id number]    XR chest 2  views    Result Date: 12/18/2023  Interpreted By:  Alex Mandel, STUDY: XR CHEST 2 VIEWS; 12/18/2023 8:06 am   INDICATION: Signs/Symptoms:sob   COMPARISON: 12/13/2023   ACCESSION NUMBER(S): [id number]   ORDERING CLINICIAN: SRIKANTH VILCHIS   TECHNIQUE: PA and LAT views of the chest were obtained.   FINDINGS: The cardiomediastinal silhouette is unremarkable. The lungs are clear. No pleural effusion is identified.   The osseous structures are intact. Spinal cord stimulator leads are again seen at the mid and lower thoracic level.       No acute cardiopulmonary process.     Signed by: Alex Mandel 12/18/2023 8:30 AM Dictation workstation:   [id number]    Transthoracic Echo (TTE) Complete    Result Date: 12/15/2023           [place] [address]            Phone [phone] TRANSTHORACIC ECHOCARDIOGRAM REPORT  Patient Name:      MARK DARIEN GABRIEL  Reading Physician:    [id number] [facility] Study Date:        12/15/2023          Ordering Provider:    [id number] ABDULLAHI VANESSA MRN/PID:           [id number]            Fellow: Accession#:        [id number]        Nurse: Date of Birth/Age: 1940 / 83 years Sonographer:          Kristen WESTFALL Gender:            F                   Additional Staff: Height:            154.94 cm           Admit Date:           12/14/2023 Weight:            82.10 kg            Admission Status:     Inpatient - Routine BSA:               1.81 m2             Department Location: Blood Pressure: 150 /47 mmHg Study Type:    TRANSTHORACIC ECHO (TTE) COMPLETE Diagnosis/ICD: Shortness of breath-R06.02 Indication:    Shortness of Breath CPT Codes:     Echo Complete w Full Doppler-[id number] Patient History: Pertinent History: CKD CHF Depression HTN CP Murmur Dyslipidemia Varicose veins                    of leg with swellin ghyperlipidemia hypercholesterolemia. Study Detail: The  following Echo studies were performed: 2D, Doppler, M-Mode,               color flow and 3D.  PHYSICIAN INTERPRETATION: Left Ventricle: Left ventricular systolic function is normal, with an estimated ejection fraction of 60-65%. There are no regional wall motion abnormalities. The left ventricular cavity size is normal. Left ventricular diastolic filling was indeterminate. Left Atrium: The left atrium is normal in size. Right Ventricle: The right ventricle is normal in size. There is normal right ventricular global systolic function. Right Atrium: The right atrium is normal in size. Aortic Valve: The aortic valve is trileaflet. There is mild aortic valve cusp calcification. There is no evidence of aortic valve regurgitation. The peak instantaneous gradient of the aortic valve is 12.0 mmHg. Mitral Valve: The mitral valve is normal in structure. There is trace to mild mitral valve regurgitation. Tricuspid Valve: The tricuspid valve is structurally normal. No evidence of tricuspid regurgitation. Pulmonic Valve: The pulmonic valve is not well visualized. There is trace to mild pulmonic valve regurgitation. Pericardium: There is no pericardial effusion noted. Aorta: The aortic root is normal. Systemic Veins: The inferior vena cava appears to be of normal size. There is IVC inspiratory collapse greater than 50%.  CONCLUSIONS:  1. Left ventricular systolic function is normal with a 60-65% estimated ejection fraction.  2. Left ventricular diastolic filling indeterminate.  3. Mild aortic valve sclerosis. QUANTITATIVE DATA SUMMARY: 2D MEASUREMENTS:                          Normal Ranges: LAs:           3.00 cm   (2.7-4.0cm) IVSd:          0.85 cm   (0.6-1.1cm) LVPWd:         1.00 cm   (0.6-1.1cm) LVIDd:         4.23 cm   (3.9-5.9cm) LVIDs:         2.79 cm LV Mass Index: 69.0 g/m2 LV % FS        34.0 % LA VOLUME:                               Normal Ranges: LA Vol A2C:        59.3 ml LA Vol Index A2C:  32.8 ml/m2 LA Area A2C:        18.7 cm2 LA Major Axis A2C: 5.0 cm LA Volume Index:   29.6 ml/m2 LA Vol A2C:        59.7 ml RA VOLUME BY A/L METHOD:                               Normal Ranges: RA Vol A4C:        32.1 ml    (8.3-19.5ml) RA Vol Index A4C:  17.7 ml/m2 RA Area A4C:       13.6 cm2 RA Major Axis A4C: 4.9 cm M-MODE MEASUREMENTS:                  Normal Ranges: Ao Root: 2.20 cm (2.0-3.7cm) LAs:     4.80 cm (2.7-4.0cm) AORTA MEASUREMENTS:                    Normal Ranges: Asc Ao, d: 3.10 cm (2.1-3.4cm) LV SYSTOLIC FUNCTION BY 2D PLANIMETRY (MOD):                     Normal Ranges: EF-A4C View: 51.6 % (>=55%) EF-A2C View: 57.9 % EF-Biplane:  55.8 % LV DIASTOLIC FUNCTION:                            Normal Ranges: MV Peak E:      0.93 m/s   (0.7-1.2 m/s) MV Peak A:      1.02 m/s   (0.42-0.7 m/s) E/A Ratio:      0.91       (1.0-2.2) MV e'           0.08 m/s   (>8.0) MV lateral e'   0.08 m/s MV medial e'    0.07 m/s E/e' Ratio:     12.39      (<8.0) PulmV Sys Siddhartha:  68.10 cm/s PulmV Urrutia Siddhartha: 47.60 cm/s PulmV S/D Siddhartha:  1.40 MITRAL VALVE:                      Normal Ranges: MV Vmax:    1.22 m/s (<=1.3m/s) MV peak P.0 mmHg (<5mmHg) MV mean P.0 mmHg (<48mmHg) MV DT:      262 msec (150-240msec) AORTIC VALVE:                          Normal Ranges: AoV Vmax:      1.73 m/s  (<=1.7m/s) AoV Peak P.0 mmHg (<20mmHg) LVOT Max Siddhartha:  1.21 m/s  (<=1.1m/s) LVOT VTI:      29.10 cm LVOT Diameter: 2.00 cm   (1.8-2.4cm) AoV Area,Vmax: 2.20 cm2  (2.5-4.5cm2)  RIGHT VENTRICLE: RV Basal 3.18 cm RV Mid   2.70 cm RV Major 5.2 cm TAPSE:   16.5 mm RV s'    0.13 m/s TRICUSPID VALVE/RVSP:                   Normal Ranges: IVC Diam: 1.75 cm PULMONIC VALVE:                         Normal Ranges: PV Accel Time: 69 msec  (>120ms) PV Max Siddhartha:    1.4 m/s  (0.6-0.9m/s) PV Max P.3 mmHg Pulmonary Veins: PulmV Urrutia Siddhartha: 47.60 cm/s PulmV S/D Siddhartha:  1.40 PulmV Sys Siddhartha:  68.10 cm/s  [id number] [facility] MARTINEZ Electronically signed on 12/15/2023 at 10:13:13 AM  **  Final **     CT head wo IV contrast    Result Date: 12/14/2023  Interpreted By:  Rashawn Hook, STUDY: CT HEAD WO IV CONTRAST; 12/14/2023 10:34 am   INDICATION: Signs/Symptoms:lightheadedness, near syncope.   COMPARISON: No comparison exams available.   ACCESSION NUMBER(S): [id number]   ORDERING CLINICIAN: SLY DAWKINS   TECHNIQUE: CT axial images through the Brain were obtained without contrast.   FINDINGS: Acute ischemic change: None.   Hemorrhage: No evidence of acute intracranial hemorrhage.   Mass Effect / Mass Lesion: No significant mass effect. There is no evidence of an intracranial mass or extraaxial fluid collection.   Chronic ischemic change: Patchy foci of  low attenuation coefficient are present within white matter which is a nonspecific finding but likely represents moderate microvascular ischemia.   Parenchyma: There is no significant volume loss. The brain parenchyma is otherwise within normal limits for age.   Ventricles: Normal caliber and morphology.   Other: There is calcification involving the carotid siphons.       No acute intracranial process   All CT examinations are performed with 1 or more of the following dose reduction techniques: Automated exposure control, adjustment of mA and/or kv according to patient's size, or use of iterative reconstruction techniques.   MACRO: none   Signed by: Rashawn Hook 12/14/2023 10:48 AM Dictation workstation:   [id number]    XR chest 2 views    Result Date: 12/13/2023  Interpreted By:  Nj Zazueta, STUDY: XR CHEST 2 VIEWS; 12/13/2023 2:47 pm   INDICATION: Signs/Symptoms:SOB dizziness   COMPARISON: Chest radiograph 06/09/2020   ACCESSION NUMBER(S): [id number]   ORDERING CLINICIAN: LA SAEED   TECHNIQUE: AP and lateral views of the chest performed.   FINDINGS: Cardiac size is indeterminate due to the AP projection. Calcifications are seen at the aortic knob. Thoracic aorta is tortuous in appearance. Mild prominence of the pulmonary  vasculature. Faint bibasilar hazy opacities likely reflect atelectasis. No focal consolidation, large pleural effusion, or visible pneumothorax. Multilevel discogenic degenerative changes of the thoracic spine. Presumed neurostimulator leads overlying the thoracic spine.       Mild prominence of the pulmonary vasculature, as can be seen with pulmonary vascular congestion. Please correlate for corresponding symptoms.   No focal consolidation or visible pneumothorax.   Faint bibasilar atelectasis suspected.   MACRO: None.   Signed by: Nj Zazueta 12/13/2023 3:04 PM Dictation workstation:   [id number]    Scheduled medications  amiodarone, 200 mg, oral, Daily  amitriptyline, 10 mg, oral, Nightly  cholestyramine light, 4 g, oral, Daily with evening meal  DULoxetine, 20 mg, oral, Daily  folic acid, 1 mg, oral, Daily  gabapentin, 300 mg, oral, BID  heparin (porcine), 5,000 Units, subcutaneous, q8h  lactobacillus acidophilus, 1 tablet, oral, BID with meals  levothyroxine, 150 mcg, oral, Daily  potassium chloride CR, 40 mEq, oral, Daily  simvastatin, 10 mg, oral, Nightly  sodium bicarbonate, 650 mg, oral, TID  vancomycin, 125 mg, oral, 4x daily      Continuous medications       PRN medications  PRN medications: acetaminophen **OR** acetaminophen **OR** acetaminophen, benzocaine-menthol, dextromethorphan-guaifenesin, guaiFENesin    Assessment/Plan   Principal Problem:    Weakness  Active Problems:    Dyslipidemia    Chronic kidney disease, stage 4 (severe) (CMS/HCC)    Fibromyalgia    Chronic lymphocytic leukemia (CMS/HCC)    Benign essential hypertension    Congestive heart failure with left ventricular diastolic dysfunction, acute on chronic (CMS/HCC)    Atrial fibrillation (CMS/HCC)    Atrial fibrillation with pauses/ A-fib with RVR   Had some pauses HR e 71 .  Do is aware . following with Dr. Chisholm cardiology input   Dr. Bruce   Monitor close.  nuclear stress test done on 12/26 negative for ischemia.  Normal  EF  65%. . Small bilateral basilar effusions and compressive atelectasis . Bilateral axillary lymphadenopathy nonspecific       Hypokalemia  Resolved     Acute kidney injury   Cr 2.1   Dr. Amor on the case   continue gentle with fluid     C. difficile colitis/ leukocytosis   BC neg X 4 days    On vancomycin   from ID services on the case .     Leukocytosis/history of leukemia/ Anemia  Hh stable   Secondary to CLL  Seen by  Dr. Ferreira  Monitor close.     Hypothyroidism  Continue with  home medication.    Weakness/deconditioning   Fall precautions  PT/OT comments moderate intensity    DVT prophylaxis  Heparin subcu     Discharge plan:  This is 83 years old female with med Hx history of hypertension, hypothyroidism, IBS, chronic kidney disease stage IV, CLL. . She  presented from home with weakness and fall.  Admitted for evaluation for weakness, heart failure preserved EF, CKD stage III-IV, acute on chronic anemia and C-diff.   Evaluated by cardiology stress test done on 12/26  Evaluated by Dr. Chisholm regarding new onset of A-fib with pauses, recommend to not use AV amna blocking drugs also not a candidate for pacemaker insertion at this time.  Patient has to follow-up with him in 2 weeks long-term Holter prior to her discharge.   Evaluated by ID due to C. difficile, patient is on oral vancomycin.   Evaluated by nephrology due to JANET on CKD3-4; likely from volume depletion due to low p.o. intake and diarrhea, he baseline creatinine is 1.6-1.9; diuretic and ARB's were held    Discussed with Do will consider to  add Eliquis due to high risk of stroke.     Anticipate discharging patient  skilled of nursing when medically clear     I spent 25 minutes in the professional and overall care of this patient.    Camille Olson, APRN-CNP

## 2023-12-29 NOTE — NURSING NOTE
Spoke with Do Landaverde NP regarding cardizem drip, heart rate 55. Requested Cardizem drip be discontinued.  Reviewed dr medina's note/recommendations from 12/26

## 2023-12-29 NOTE — PROGRESS NOTES
"Tracie Baltazar is a 83 y.o. female on day 5 of admission presenting with Weakness.    Subjective   Patient seen for acute.  Chronic kidney disease stage III secondary to hypertension C. difficile colitis the patient continues to have diarrhea however no abdominal pain no fever no chills no vomiting still has a Hodgson to CD with good urine output       Objective     Physical Exam  Neck:      Vascular: No carotid bruit.   Cardiovascular:      Rate and Rhythm: Normal rate and regular rhythm.      Heart sounds: No murmur heard.     No friction rub. No gallop.   Pulmonary:      Breath sounds: No wheezing, rhonchi or rales.   Chest:      Chest wall: No tenderness.   Abdominal:      General: There is no distension.      Tenderness: There is no abdominal tenderness. There is no guarding or rebound.   Musculoskeletal:         General: No swelling or tenderness.      Cervical back: Neck supple.      Right lower leg: No edema.      Left lower leg: No edema.   Lymphadenopathy:      Cervical: No cervical adenopathy.         Last Recorded Vitals  Blood pressure 138/71, pulse (!) 150, temperature 36 °C (96.8 °F), temperature source Temporal, resp. rate 19, height 1.6 m (5' 2.99\"), weight 85 kg (187 lb 6.3 oz), SpO2 94 %.    Intake/Output last 3 Shifts:  I/O last 3 completed shifts:  In: 120 (1.4 mL/kg) [P.O.:120]  Out: 2625 (30.9 mL/kg) [Urine:2625 (0.9 mL/kg/hr)]  Weight: 85 kg     Current Facility-Administered Medications:     acetaminophen (Tylenol) tablet 650 mg, 650 mg, oral, q4h PRN, 650 mg at 12/26/23 2058 **OR** acetaminophen (Tylenol) oral liquid 650 mg, 650 mg, nasogastric tube, q4h PRN **OR** acetaminophen (Tylenol) suppository 650 mg, 650 mg, rectal, q4h PRN, Abilio Colón DO    amitriptyline (Elavil) tablet 10 mg, 10 mg, oral, Nightly, Dominic Perez MD, 10 mg at 12/28/23 2233    benzocaine-menthol (Cepastat Sore Throat) 15-3.6 mg lozenge 1 lozenge, 1 lozenge, Mouth/Throat, q2h PRN, Dominic Perez MD, 1 lozenge " at 12/28/23 1509    cholestyramine light (Prevalite) 4 gram packet 4 g, 4 g, oral, Daily with evening meal, Hipolito Elias MD, 4 g at 12/28/23 1708    dextromethorphan-guaifenesin (Robitussin DM)  mg/5 mL oral liquid 5 mL, 5 mL, oral, q4h PRN, Dominic Perez MD    dilTIAZem (Cardizem) 125 mg in dextrose 5% 125 mL (1 mg/mL) infusion (premix), 10 mg/hr, intravenous, Continuous, Ina Dietrich DO, Last Rate: 10 mL/hr at 12/27/23 2324, 10 mg/hr at 12/27/23 2324    DULoxetine (Cymbalta) DR capsule 20 mg, 20 mg, oral, Daily, Dominic Perez MD, 20 mg at 12/28/23 0926    folic acid (Folvite) tablet 1 mg, 1 mg, oral, Daily, Dominic Perez MD, 1 mg at 12/28/23 0925    gabapentin (Neurontin) capsule 300 mg, 300 mg, oral, BID, Dominic Perez MD, 300 mg at 12/28/23 2234    guaiFENesin (Mucinex) 12 hr tablet 600 mg, 600 mg, oral, q12h PRN, Dominic Perez MD, 600 mg at 12/28/23 1710    heparin (porcine) injection 5,000 Units, 5,000 Units, subcutaneous, q8h, Dominic Perez MD, 5,000 Units at 12/28/23 2336    lactobacillus acidophilus tablet 1 tablet, 1 tablet, oral, BID with meals, Dominic Perez MD, 1 tablet at 12/28/23 1708    levothyroxine (Synthroid, Levoxyl) tablet 150 mcg, 150 mcg, oral, Daily, Dominic Perez MD, 150 mcg at 12/29/23 0611    potassium chloride CR (Klor-Con M20) ER tablet 40 mEq, 40 mEq, oral, Daily, Aislinn Napoles MD, 40 mEq at 12/28/23 0926    simvastatin (Zocor) tablet 10 mg, 10 mg, oral, Nightly, Dominic Perez MD, 10 mg at 12/28/23 2234    sodium bicarbonate tablet 650 mg, 650 mg, oral, TID, Dominic Perez MD, 650 mg at 12/28/23 2233    sodium chloride 0.9% infusion, 60 mL/hr, intravenous, Continuous, Darnell Amor MD, Last Rate: 60 mL/hr at 12/29/23 0224, 60 mL/hr at 12/29/23 0224    vancomycin (Vancocin) capsule 125 mg, 125 mg, oral, 4x daily, Abilio Colón DO, 125 mg at 12/29/23 0611   Relevant Results    Results for orders placed or performed during the hospital encounter  of 12/24/23 (from the past 96 hour(s))   CBC and Auto Differential   Result Value Ref Range    WBC 37.3 (H) 4.4 - 11.3 x10*3/uL    nRBC 0.0 0.0 - 0.0 /100 WBCs    RBC 2.97 (L) 4.00 - 5.20 x10*6/uL    Hemoglobin 9.4 (L) 12.0 - 16.0 g/dL    Hematocrit 30.2 (L) 36.0 - 46.0 %     (H) 80 - 100 fL    MCH 31.6 26.0 - 34.0 pg    MCHC 31.1 (L) 32.0 - 36.0 g/dL    RDW 13.6 11.5 - 14.5 %    Platelets 227 150 - 450 x10*3/uL    Immature Granulocytes %, Automated 0.6 0.0 - 0.9 %    Immature Granulocytes Absolute, Automated 0.22 0.00 - 0.50 x10*3/uL   Comprehensive metabolic panel   Result Value Ref Range    Glucose 89 65 - 99 mg/dL    Sodium 140 133 - 145 mmol/L    Potassium 3.2 (L) 3.4 - 5.1 mmol/L    Chloride 107 97 - 107 mmol/L    Bicarbonate 21 (L) 24 - 31 mmol/L    Urea Nitrogen 38 (H) 8 - 25 mg/dL    Creatinine 2.80 (H) 0.40 - 1.60 mg/dL    eGFR 16 (L) >60 mL/min/1.73m*2    Calcium 8.2 (L) 8.5 - 10.4 mg/dL    Albumin 2.8 (L) 3.5 - 5.0 g/dL    Alkaline Phosphatase 69 35 - 125 U/L    Total Protein 5.2 (L) 5.9 - 7.9 g/dL    AST 16 5 - 40 U/L    Bilirubin, Total <0.2 0.1 - 1.2 mg/dL    ALT 11 5 - 40 U/L    Anion Gap 12 <=19 mmol/L   Manual Differential   Result Value Ref Range    Neutrophils %, Manual 32.0 40.0 - 80.0 %    Bands %, Manual 5.0 0.0 - 5.0 %    Lymphocytes %, Manual 52.0 13.0 - 44.0 %    Monocytes %, Manual 5.0 2.0 - 10.0 %    Eosinophils %, Manual 1.0 0.0 - 6.0 %    Basophils %, Manual 2.0 0.0 - 2.0 %    Atypical Lymphocytes %, Manual 3.0 0.0 - 2.0 %    Seg Neutrophils Absolute, Manual 11.94 (H) 1.60 - 5.00 x10*3/uL    Bands Absolute, Manual 1.87 (H) 0.00 - 0.50 x10*3/uL    Lymphocytes Absolute, Manual 19.40 (H) 0.80 - 3.00 x10*3/uL    Monocytes Absolute, Manual 1.87 (H) 0.05 - 0.80 x10*3/uL    Eosinophils Absolute, Manual 0.37 0.00 - 0.40 x10*3/uL    Basophils Absolute, Manual 0.75 (H) 0.00 - 0.10 x10*3/uL    Atypical Lymphs Absolute, Manual 1.12 (H) 0.00 - 0.30 x10*3/uL    Total Cells Counted 100      Neutrophils Absolute, Manual 13.81 (H) 1.60 - 5.50 x10*3/uL    RBC Morphology No significant RBC morphology present    Magnesium   Result Value Ref Range    Magnesium 2.00 1.60 - 3.10 mg/dL   CBC   Result Value Ref Range    WBC 35.7 (H) 4.4 - 11.3 x10*3/uL    nRBC 0.0 0.0 - 0.0 /100 WBCs    RBC 3.20 (L) 4.00 - 5.20 x10*6/uL    Hemoglobin 9.9 (L) 12.0 - 16.0 g/dL    Hematocrit 32.6 (L) 36.0 - 46.0 %     (H) 80 - 100 fL    MCH 30.9 26.0 - 34.0 pg    MCHC 30.4 (L) 32.0 - 36.0 g/dL    RDW 13.2 11.5 - 14.5 %    Platelets 254 150 - 450 x10*3/uL   Basic Metabolic Panel   Result Value Ref Range    Glucose 69 65 - 99 mg/dL    Sodium 142 133 - 145 mmol/L    Potassium 3.4 3.4 - 5.1 mmol/L    Chloride 107 97 - 107 mmol/L    Bicarbonate 19 (L) 24 - 31 mmol/L    Urea Nitrogen 25 8 - 25 mg/dL    Creatinine 2.10 (H) 0.40 - 1.60 mg/dL    eGFR 23 (L) >60 mL/min/1.73m*2    Calcium 8.5 8.5 - 10.4 mg/dL    Anion Gap 16 <=19 mmol/L   CBC   Result Value Ref Range    WBC 38.4 (H) 4.4 - 11.3 x10*3/uL    nRBC 0.0 0.0 - 0.0 /100 WBCs    RBC 2.98 (L) 4.00 - 5.20 x10*6/uL    Hemoglobin 9.1 (L) 12.0 - 16.0 g/dL    Hematocrit 30.3 (L) 36.0 - 46.0 %     (H) 80 - 100 fL    MCH 30.5 26.0 - 34.0 pg    MCHC 30.0 (L) 32.0 - 36.0 g/dL    RDW 13.6 11.5 - 14.5 %    Platelets 257 150 - 450 x10*3/uL   Basic Metabolic Panel   Result Value Ref Range    Glucose 103 (H) 65 - 99 mg/dL    Sodium 140 133 - 145 mmol/L    Potassium 3.5 3.4 - 5.1 mmol/L    Chloride 107 97 - 107 mmol/L    Bicarbonate 19 (L) 24 - 31 mmol/L    Urea Nitrogen 24 8 - 25 mg/dL    Creatinine 2.00 (H) 0.40 - 1.60 mg/dL    eGFR 24 (L) >60 mL/min/1.73m*2    Calcium 8.3 (L) 8.5 - 10.4 mg/dL    Anion Gap 14 <=19 mmol/L   CBC   Result Value Ref Range    WBC 37.4 (H) 4.4 - 11.3 x10*3/uL    nRBC 0.0 0.0 - 0.0 /100 WBCs    RBC 3.10 (L) 4.00 - 5.20 x10*6/uL    Hemoglobin 9.7 (L) 12.0 - 16.0 g/dL    Hematocrit 31.3 (L) 36.0 - 46.0 %     (H) 80 - 100 fL    MCH 31.3 26.0 - 34.0 pg     MCHC 31.0 (L) 32.0 - 36.0 g/dL    RDW 13.6 11.5 - 14.5 %    Platelets 268 150 - 450 x10*3/uL   Basic Metabolic Panel   Result Value Ref Range    Glucose 102 (H) 65 - 99 mg/dL    Sodium 137 133 - 145 mmol/L    Potassium 4.0 3.4 - 5.1 mmol/L    Chloride 106 97 - 107 mmol/L    Bicarbonate 20 (L) 24 - 31 mmol/L    Urea Nitrogen 15 8 - 25 mg/dL    Creatinine 1.70 (H) 0.40 - 1.60 mg/dL    eGFR 30 (L) >60 mL/min/1.73m*2    Calcium 8.4 (L) 8.5 - 10.4 mg/dL    Anion Gap 11 <=19 mmol/L       Assessment/Plan     1.  Acute kidney injury renal function is much improved her creatinine is back to baseline I plan today is discontinue Hodgson catheter, discontinue IV fluids and get the patient ready for discharge  3.  C. difficile colitis continue with oral vancomycin  4.  CLL  5.  Hypertension  6. hypokalemia resolved              Epifanio Amor MD

## 2023-12-29 NOTE — PROGRESS NOTES
Await cardiology input. Precert has been approved to [facility].     Update: Pt will not dc today, facility made aware. Pts Precert expires at 11:59 PM on Sunday 12/31, attending CNP made aware.     If pt ready for dc over the weekend, report can be called to [phone] or [phone]     12/29/23 1020   Discharge Planning   Patient expects to be discharged to: [facility]

## 2023-12-29 NOTE — CONSULTS
Wound Care Consult     Visit Date: 12/29/2023      Patient Name: Tracie Baltazar         MRN: [id number]           YOB: 1940     Reason for Consult: Coccyx/inner buttock redness        Wound History: Present on admission. Redness, Rash and excoriation to bilateral inner buttock and periarea.      A 83 y.o. year old female admitted for Principal Problem:    Weakness  Active Problems:    Dyslipidemia    Chronic kidney disease, stage 4 (severe) (CMS/HCC)    Fibromyalgia    Chronic lymphocytic leukemia (CMS/HCC)    Benign essential hypertension    Congestive heart failure with left ventricular diastolic dysfunction, acute on chronic (CMS/HCC)    Atrial fibrillation (CMS/HCC)      Past Medical History:   Diagnosis Date    Asthma     Essential (primary) hypertension     Hypertension    Kidney failure     Leukemia (CMS/HCC)     Rheumatoid arthritis (CMS/HCC)       Past Surgical History:   Procedure Laterality Date    BACK SURGERY      Back Surgery    CHOLECYSTECTOMY      Cholecystectomy    HYSTERECTOMY      Hysterectomy    KNEE SURGERY      arthroscopic surgery?    OTHER SURGICAL HISTORY      Shoulder Surgery Left    SHOULDER SURGERY Left     Left shoulder impingement surgery    TOTAL KNEE ARTHROPLASTY Left 02/13/2014       Scheduled medications  amitriptyline, 10 mg, oral, Nightly  cholestyramine light, 4 g, oral, Daily with evening meal  DULoxetine, 20 mg, oral, Daily  folic acid, 1 mg, oral, Daily  gabapentin, 300 mg, oral, BID  heparin (porcine), 5,000 Units, subcutaneous, q8h  lactobacillus acidophilus, 1 tablet, oral, BID with meals  levothyroxine, 150 mcg, oral, Daily  potassium chloride CR, 40 mEq, oral, Daily  simvastatin, 10 mg, oral, Nightly  sodium bicarbonate, 650 mg, oral, TID  vancomycin, 125 mg, oral, 4x daily      Continuous medications  dilTIAZem, 10 mg/hr, Last Rate: 10 mg/hr (12/27/23 9553)      PRN medications  PRN medications: acetaminophen **OR** acetaminophen **OR** acetaminophen,  benzocaine-menthol, dextromethorphan-guaifenesin, guaiFENesin    Allergies   Allergen Reactions    Ibuprofen Other and Unknown     Hx of kidney failure         Pertinent Labs:   Albumin   Date Value Ref Range Status   12/26/2023 2.8 (L) 3.5 - 5.0 g/dL Final       Wound Assessment:  Wound 12/24/23 Moisture Associated Skin Damage Perineum (Active)   Wound Image   12/29/23 1412       Wound Team Summary Assessment:     Exam conducted on day 5 of stay with knowledge of Floor Nurse. Introductions made to patient and family, alert and oriented. On exam patient lying fowlers in bed, wearing 2LO2. Patient with excoriation and redness to bilateral inner buttock, coccyx, and red rash to periarea due to incontinence. Hodgson catheter removed yesterday. No other skin issues or concerns. [facility] bed system. Recommend Waffle mattress overlay. Nemo Jolly RN updated, to continue pressure injury prevention interventions, Woundcare, and nursing to continue to follow providers orders. Reconsult Wound RN PRN. Zabrina UNGERN RN       Wound Team Plan: Bilateral inner buttock/periarea- cleanse with soap and water, pat dry, apply mixture of Nystatin powder and Calazime cream daily and prn.      Zabrina Villagomez RN  12/29/2023  2:18 PM

## 2023-12-29 NOTE — PROGRESS NOTES
Physical Therapy                 Therapy Communication Note    Patient Name: Tracie Baltazar  MRN: [id number]  Today's Date: 12/29/2023     Discipline: Physical Therapy    Missed Visit Reason: Missed Visit Reason: Other (Comment)    Missed Time: Attempt    Comment: Cx, per RN pt -150, cardiology aware and deferring to Dr. Chisholm. Will await tx at this time.    no indicators present

## 2023-12-29 NOTE — PROGRESS NOTES
"Tracie Baltazar is a 83 y.o. female on day 5 of admission presenting with Weakness.    Subjective   Plaints denies any palpitations       Objective     Physical Exam  Gen: A+O x 3,  no acute distress  HEENT: Normocephalic/atraumatic pupils equal react light  Neck: Neck veins not elevated, upstrokes and volumes normal, no bruit   Lung: Clear throughout all lobes, nl AP diameter  CV:  nl sounding S1, single S2,, no S3 no murmur, PMI nondisplaced, no RV lift   Abdomen: soft nontender, sounds throughout all quadrants  Extremities: pulses palpable bilaterally, no edema, warm to touch  Neuro: no neurologic deficits  Last Recorded Vitals  Blood pressure 147/51, pulse 77, temperature 37.1 °C (98.8 °F), temperature source Temporal, resp. rate 21, height 1.6 m (5' 2.99\"), weight 85 kg (187 lb 6.3 oz), SpO2 94 %.  Intake/Output last 3 Shifts:  I/O last 3 completed shifts:  In: 120 (1.4 mL/kg) [P.O.:120]  Out: 2625 (30.9 mL/kg) [Urine:2625 (0.9 mL/kg/hr)]  Weight: 85 kg     Relevant Results  Results for orders placed or performed during the hospital encounter of 12/24/23 (from the past 24 hour(s))   CBC   Result Value Ref Range    WBC 37.4 (H) 4.4 - 11.3 x10*3/uL    nRBC 0.0 0.0 - 0.0 /100 WBCs    RBC 3.10 (L) 4.00 - 5.20 x10*6/uL    Hemoglobin 9.7 (L) 12.0 - 16.0 g/dL    Hematocrit 31.3 (L) 36.0 - 46.0 %     (H) 80 - 100 fL    MCH 31.3 26.0 - 34.0 pg    MCHC 31.0 (L) 32.0 - 36.0 g/dL    RDW 13.6 11.5 - 14.5 %    Platelets 268 150 - 450 x10*3/uL   Basic Metabolic Panel   Result Value Ref Range    Glucose 102 (H) 65 - 99 mg/dL    Sodium 137 133 - 145 mmol/L    Potassium 4.0 3.4 - 5.1 mmol/L    Chloride 106 97 - 107 mmol/L    Bicarbonate 20 (L) 24 - 31 mmol/L    Urea Nitrogen 15 8 - 25 mg/dL    Creatinine 1.70 (H) 0.40 - 1.60 mg/dL    eGFR 30 (L) >60 mL/min/1.73m*2    Calcium 8.4 (L) 8.5 - 10.4 mg/dL    Anion Gap 11 <=19 mmol/L            Assessment/Plan   Principal Problem:    Weakness  Active Problems:    " Dyslipidemia    Chronic kidney disease, stage 4 (severe) (CMS/HCC)    Fibromyalgia    Chronic lymphocytic leukemia (CMS/HCC)    Benign essential hypertension    Congestive heart failure with left ventricular diastolic dysfunction, acute on chronic (CMS/HCC)    Atrial fibrillation (CMS/HCC)    -Continues to have paroxysmal atrial fibrillation with rates in the 120s though asymptomatic   -though did have a postconversion pause was 4 seconds again asymptomatic not on any AV amna agents   -Will utilize amiodarone 200 mg daily to maintain sinus rhythm  -At this time concern with anticoagulation being hazardous with gait instability and high risk fall  -Being treated for C difficile  -Once for discharge to short-term facility per primary service when current issues stabilize       I spent 20 minutes in the professional and overall care of this patient.      Do Landaverde, JUMA-CNP

## 2023-12-29 NOTE — CARE PLAN
The patient's goals for the shift include FEEL BETTER    The clinical goals for the shift include safety

## 2023-12-30 LAB
ANION GAP SERPL CALC-SCNC: 13 MMOL/L
BUN SERPL-MCNC: 13 MG/DL (ref 8–25)
CALCIUM SERPL-MCNC: 8.6 MG/DL (ref 8.5–10.4)
CHLORIDE SERPL-SCNC: 107 MMOL/L (ref 97–107)
CO2 SERPL-SCNC: 22 MMOL/L (ref 24–31)
CREAT SERPL-MCNC: 1.7 MG/DL (ref 0.4–1.6)
ERYTHROCYTE [DISTWIDTH] IN BLOOD BY AUTOMATED COUNT: 13.6 % (ref 11.5–14.5)
GFR SERPL CREATININE-BSD FRML MDRD: 30 ML/MIN/1.73M*2
GLUCOSE SERPL-MCNC: 101 MG/DL (ref 65–99)
HCT VFR BLD AUTO: 32.8 % (ref 36–46)
HGB BLD-MCNC: 10.1 G/DL (ref 12–16)
MCH RBC QN AUTO: 31.2 PG (ref 26–34)
MCHC RBC AUTO-ENTMCNC: 30.8 G/DL (ref 32–36)
MCV RBC AUTO: 101 FL (ref 80–100)
NRBC BLD-RTO: 0 /100 WBCS (ref 0–0)
PLATELET # BLD AUTO: 295 X10*3/UL (ref 150–450)
POTASSIUM SERPL-SCNC: 3.7 MMOL/L (ref 3.4–5.1)
RBC # BLD AUTO: 3.24 X10*6/UL (ref 4–5.2)
SODIUM SERPL-SCNC: 142 MMOL/L (ref 133–145)
WBC # BLD AUTO: 37.5 X10*3/UL (ref 4.4–11.3)

## 2023-12-30 PROCEDURE — 2500000004 HC RX 250 GENERAL PHARMACY W/ HCPCS (ALT 636 FOR OP/ED): Performed by: INTERNAL MEDICINE

## 2023-12-30 PROCEDURE — 2060000001 HC INTERMEDIATE ICU ROOM DAILY

## 2023-12-30 PROCEDURE — 2500000001 HC RX 250 WO HCPCS SELF ADMINISTERED DRUGS (ALT 637 FOR MEDICARE OP): Performed by: INTERNAL MEDICINE

## 2023-12-30 PROCEDURE — 36415 COLL VENOUS BLD VENIPUNCTURE: CPT | Performed by: NURSE PRACTITIONER

## 2023-12-30 PROCEDURE — 97530 THERAPEUTIC ACTIVITIES: CPT | Mod: GP

## 2023-12-30 PROCEDURE — 2500000002 HC RX 250 W HCPCS SELF ADMINISTERED DRUGS (ALT 637 FOR MEDICARE OP, ALT 636 FOR OP/ED): Performed by: REGISTERED NURSE

## 2023-12-30 PROCEDURE — 2500000002 HC RX 250 W HCPCS SELF ADMINISTERED DRUGS (ALT 637 FOR MEDICARE OP, ALT 636 FOR OP/ED): Performed by: INTERNAL MEDICINE

## 2023-12-30 PROCEDURE — 80048 BASIC METABOLIC PNL TOTAL CA: CPT | Performed by: NURSE PRACTITIONER

## 2023-12-30 PROCEDURE — 96372 THER/PROPH/DIAG INJ SC/IM: CPT | Performed by: INTERNAL MEDICINE

## 2023-12-30 PROCEDURE — 85027 COMPLETE CBC AUTOMATED: CPT | Performed by: NURSE PRACTITIONER

## 2023-12-30 PROCEDURE — 2500000001 HC RX 250 WO HCPCS SELF ADMINISTERED DRUGS (ALT 637 FOR MEDICARE OP): Performed by: NURSE PRACTITIONER

## 2023-12-30 RX ADMIN — HEPARIN SODIUM 5000 UNITS: 5000 INJECTION, SOLUTION INTRAVENOUS; SUBCUTANEOUS at 10:20

## 2023-12-30 RX ADMIN — FOLIC ACID 1 MG: 1 TABLET ORAL at 10:16

## 2023-12-30 RX ADMIN — SODIUM BICARBONATE 650 MG TABLET 650 MG: at 21:36

## 2023-12-30 RX ADMIN — Medication 1 TABLET: at 10:17

## 2023-12-30 RX ADMIN — CHOLESTYRAMINE 4 G: 4 POWDER, FOR SUSPENSION ORAL at 17:03

## 2023-12-30 RX ADMIN — Medication 1 TABLET: at 17:04

## 2023-12-30 RX ADMIN — VANCOMYCIN HYDROCHLORIDE 125 MG: 125 CAPSULE ORAL at 06:36

## 2023-12-30 RX ADMIN — SIMVASTATIN 10 MG: 10 TABLET, FILM COATED ORAL at 21:36

## 2023-12-30 RX ADMIN — LEVOTHYROXINE SODIUM 150 MCG: 0.15 TABLET ORAL at 06:36

## 2023-12-30 RX ADMIN — GABAPENTIN 300 MG: 300 CAPSULE ORAL at 21:36

## 2023-12-30 RX ADMIN — APIXABAN 2.5 MG: 2.5 TABLET, FILM COATED ORAL at 15:03

## 2023-12-30 RX ADMIN — VANCOMYCIN HYDROCHLORIDE 125 MG: 125 CAPSULE ORAL at 17:04

## 2023-12-30 RX ADMIN — SODIUM BICARBONATE 650 MG TABLET 650 MG: at 10:17

## 2023-12-30 RX ADMIN — AMIODARONE HYDROCHLORIDE 200 MG: 200 TABLET ORAL at 10:16

## 2023-12-30 RX ADMIN — APIXABAN 2.5 MG: 2.5 TABLET, FILM COATED ORAL at 21:36

## 2023-12-30 RX ADMIN — DULOXETINE HYDROCHLORIDE 20 MG: 20 CAPSULE, DELAYED RELEASE ORAL at 10:17

## 2023-12-30 RX ADMIN — GABAPENTIN 300 MG: 300 CAPSULE ORAL at 10:16

## 2023-12-30 RX ADMIN — AMITRIPTYLINE HYDROCHLORIDE 10 MG: 10 TABLET, FILM COATED ORAL at 21:36

## 2023-12-30 RX ADMIN — POTASSIUM CHLORIDE 40 MEQ: 1500 TABLET, EXTENDED RELEASE ORAL at 10:16

## 2023-12-30 RX ADMIN — SODIUM BICARBONATE 650 MG TABLET 650 MG: at 15:03

## 2023-12-30 RX ADMIN — HEPARIN SODIUM 5000 UNITS: 5000 INJECTION, SOLUTION INTRAVENOUS; SUBCUTANEOUS at 00:02

## 2023-12-30 RX ADMIN — VANCOMYCIN HYDROCHLORIDE 125 MG: 125 CAPSULE ORAL at 21:36

## 2023-12-30 RX ADMIN — VANCOMYCIN HYDROCHLORIDE 125 MG: 125 CAPSULE ORAL at 15:03

## 2023-12-30 ASSESSMENT — COGNITIVE AND FUNCTIONAL STATUS - GENERAL
MOBILITY SCORE: 14
HELP NEEDED FOR BATHING: A LOT
MOVING TO AND FROM BED TO CHAIR: A LOT
MOVING TO AND FROM BED TO CHAIR: A LOT
MOBILITY SCORE: 12
DRESSING REGULAR LOWER BODY CLOTHING: A LITTLE
TURNING FROM BACK TO SIDE WHILE IN FLAT BAD: A LITTLE
STANDING UP FROM CHAIR USING ARMS: A LOT
DRESSING REGULAR UPPER BODY CLOTHING: A LITTLE
STANDING UP FROM CHAIR USING ARMS: A LOT
PERSONAL GROOMING: A LITTLE
MOVING FROM LYING ON BACK TO SITTING ON SIDE OF FLAT BED WITH BEDRAILS: A LOT
WALKING IN HOSPITAL ROOM: A LOT
TURNING FROM BACK TO SIDE WHILE IN FLAT BAD: A LOT
DAILY ACTIVITIY SCORE: 17
CLIMB 3 TO 5 STEPS WITH RAILING: A LOT
MOVING FROM LYING ON BACK TO SITTING ON SIDE OF FLAT BED WITH BEDRAILS: A LITTLE
WALKING IN HOSPITAL ROOM: A LOT
TOILETING: A LOT
CLIMB 3 TO 5 STEPS WITH RAILING: A LOT

## 2023-12-30 ASSESSMENT — PAIN SCALES - GENERAL
PAINLEVEL_OUTOF10: 0 - NO PAIN

## 2023-12-30 ASSESSMENT — ENCOUNTER SYMPTOMS: DIARRHEA: 1

## 2023-12-30 NOTE — PROGRESS NOTES
"Tracie Baltazar is a 83 y.o. female on day 6 of admission presenting with Weakness.    Subjective   HPI   Patient seen and examined, patient is resting in her bed,Patient had 2 or 3 loose stool  this morning,  tolerates well her diet but poor intake.  feeling weak and fatigued.  Denies any chest pain, shortness of breath on 2 L nasal cannula   No fever or chills.     Objective     Last Recorded Vitals  Blood pressure 149/69, pulse 110, temperature 37 °C (98.6 °F), temperature source Temporal, resp. rate 20, height 1.6 m (5' 2.99\"), weight 83 kg (182 lb 15.7 oz), SpO2 96 %.      Intake/Output Summary (Last 24 hours) at 12/30/2023 1046  Last data filed at 12/29/2023 1600  Gross per 24 hour   Intake --   Output 300 ml   Net -300 ml           Physical Exam  Constitutional:       Appearance: Normal appearance.   HENT:      Head: Normocephalic and atraumatic.      Nose: Nose normal.   Eyes:      Extraocular Movements: Extraocular movements intact.      Pupils: Pupils are equal, round, and reactive to light.   Cardiovascular:      Rate and Rhythm: Rhythm irregular.      Comments: Irregular rhythm with pauses   Pulmonary:      Effort: Pulmonary effort is normal. No respiratory distress.   Abdominal:      General: Bowel sounds are normal.      Palpations: Abdomen is soft.   Musculoskeletal:      Cervical back: Normal range of motion and neck supple.      Right lower leg: No edema.      Left lower leg: No edema.   Skin:     General: Skin is warm.   Neurological:      General: No focal deficit present.      Mental Status: She is alert. Mental status is at baseline.   Psychiatric:         Mood and Affect: Mood normal.          Relevant Results    Lab Results   Component Value Date    WBC 37.5 (H) 12/30/2023    HGB 10.1 (L) 12/30/2023    HCT 32.8 (L) 12/30/2023     (H) 12/30/2023     12/30/2023       Lab Results   Component Value Date    GLUCOSE 101 (H) 12/30/2023    CALCIUM 8.6 12/30/2023     12/30/2023 "    K 3.7 12/30/2023    CO2 22 (L) 12/30/2023     12/30/2023    BUN 13 12/30/2023    CREATININE 1.70 (H) 12/30/2023       Lab Results   Component Value Date    HGBA1C 5.4 12/19/2019       US renal complete    Result Date: 12/24/2023  Interpreted By:  Bruno Bryant, STUDY: US RENAL COMPLETE; 12/24/2023 7:50 pm   INDICATION: Signs/Symptoms:JANET on CKD. /history of CLL and hypertension   COMPARISON: None.   ACCESSION NUMBER(S): [id number]   ORDERING CLINICIAN: SERJIO DEVINE   TECHNIQUE: Grayscale and color Doppler imaging of the kidneys.   FINDINGS: The right kidney measures 9.3 cm x 4.1 cm x 4.4 cm cm. The left kidney measures 7.8 cm x 5.0 cm x 3.7 cm cm. There is no shadowing calculus, hydronephrosis, or solid mass identified. However, there is a 2.6 x 2.5 x 2.4 cm inferior pole hypoechoic renal cyst without internal color Doppler flow. Renal cortical thinning and increased echogenicity of the renal parenchyma is noted.   The bladder is grossly unremarkable.       1. No hydronephrosis or nephrolithiasis. 2. Medical renal disease. 3 left renal cyst.     MACRO: None.   Signed by: Bruno Bryant 12/24/2023 8:19 PM Dictation workstation:   [id number]    XR chest 1 view    Result Date: 12/24/2023  Interpreted By:  Nick Luciano, STUDY: XR CHEST 1 VIEW; 12/24/2023 4:15 am   INDICATION: CLINICAL INFORMATION: Signs/Symptoms:weakness.   COMPARISON: 12/18/2020   ACCESSION NUMBER(S): [id number]   ORDERING CLINICIAN: LESLYE FRANK   TECHNIQUE: Portable chest one view.   FINDINGS: The cardiac size is indeterminate in view of the AP projection. Neurostimulator overlies the thoracic spine. No infiltrates or effusions are identified.  The aorta is tortuous.       No acute pathologic findings are identified.  There is no interval change when compared to the previous examination.   MACRO: none   Signed by: Nick Luciano 12/24/2023 9:11 AM Dictation workstation:   [id number]    XR chest 2 views    Result Date:  12/18/2023  Interpreted By:  Alex Mandel, STUDY: XR CHEST 2 VIEWS; 12/18/2023 8:06 am   INDICATION: Signs/Symptoms:sob   COMPARISON: 12/13/2023   ACCESSION NUMBER(S): [id number]   ORDERING CLINICIAN: SRIKANTH VILCHIS   TECHNIQUE: PA and LAT views of the chest were obtained.   FINDINGS: The cardiomediastinal silhouette is unremarkable. The lungs are clear. No pleural effusion is identified.   The osseous structures are intact. Spinal cord stimulator leads are again seen at the mid and lower thoracic level.       No acute cardiopulmonary process.     Signed by: Alex Mandel 12/18/2023 8:30 AM Dictation workstation:   [id number]    Transthoracic Echo (TTE) Complete    Result Date: 12/15/2023           [place] [address]            Phone [phone] TRANSTHORACIC ECHOCARDIOGRAM REPORT  Patient Name:      MARK GABRIEL  Reading Physician:    [id number] [facility]  Study Date:        12/15/2023          Ordering Provider:    [id number] ABDULLAHI VANESSA MRN/PID:           [id number]            Fellow: Accession#:        [id number]        Nurse: Date of Birth/Age: 1940 / 83 years Sonographer:          Kristen WESTFALL Gender:            F                   Additional Staff: Height:            154.94 cm           Admit Date:           12/14/2023 Weight:            82.10 kg            Admission Status:     Inpatient - Routine BSA:               1.81 m2             Department Location: Blood Pressure: 150 /47 mmHg Study Type:    TRANSTHORACIC ECHO (TTE) COMPLETE Diagnosis/ICD: Shortness of breath-R06.02 Indication:    Shortness of Breath CPT Codes:     Echo Complete w Full Doppler-[id number] Patient History: Pertinent History: CKD CHF Depression HTN CP Murmur Dyslipidemia Varicose veins                    of leg with swellin ghyperlipidemia hypercholesterolemia. Study Detail: The following Echo studies were  performed: 2D, Doppler, M-Mode,               color flow and 3D.  PHYSICIAN INTERPRETATION: Left Ventricle: Left ventricular systolic function is normal, with an estimated ejection fraction of 60-65%. There are no regional wall motion abnormalities. The left ventricular cavity size is normal. Left ventricular diastolic filling was indeterminate. Left Atrium: The left atrium is normal in size. Right Ventricle: The right ventricle is normal in size. There is normal right ventricular global systolic function. Right Atrium: The right atrium is normal in size. Aortic Valve: The aortic valve is trileaflet. There is mild aortic valve cusp calcification. There is no evidence of aortic valve regurgitation. The peak instantaneous gradient of the aortic valve is 12.0 mmHg. Mitral Valve: The mitral valve is normal in structure. There is trace to mild mitral valve regurgitation. Tricuspid Valve: The tricuspid valve is structurally normal. No evidence of tricuspid regurgitation. Pulmonic Valve: The pulmonic valve is not well visualized. There is trace to mild pulmonic valve regurgitation. Pericardium: There is no pericardial effusion noted. Aorta: The aortic root is normal. Systemic Veins: The inferior vena cava appears to be of normal size. There is IVC inspiratory collapse greater than 50%.  CONCLUSIONS:  1. Left ventricular systolic function is normal with a 60-65% estimated ejection fraction.  2. Left ventricular diastolic filling indeterminate.  3. Mild aortic valve sclerosis. QUANTITATIVE DATA SUMMARY: 2D MEASUREMENTS:                          Normal Ranges: LAs:           3.00 cm   (2.7-4.0cm) IVSd:          0.85 cm   (0.6-1.1cm) LVPWd:         1.00 cm   (0.6-1.1cm) LVIDd:         4.23 cm   (3.9-5.9cm) LVIDs:         2.79 cm LV Mass Index: 69.0 g/m2 LV % FS        34.0 % LA VOLUME:                               Normal Ranges: LA Vol A2C:        59.3 ml LA Vol Index A2C:  32.8 ml/m2 LA Area A2C:       18.7 cm2 LA Major [place]  A2C: 5.0 cm LA Volume Index:   29.6 ml/m2 LA Vol A2C:        59.7 ml RA VOLUME BY A/L METHOD:                               Normal Ranges: RA Vol A4C:        32.1 ml    (8.3-19.5ml) RA Vol Index A4C:  17.7 ml/m2 RA Area A4C:       13.6 cm2 RA Major Axis A4C: 4.9 cm M-MODE MEASUREMENTS:                  Normal Ranges: Ao Root: 2.20 cm (2.0-3.7cm) LAs:     4.80 cm (2.7-4.0cm) AORTA MEASUREMENTS:                    Normal Ranges: Asc Ao, d: 3.10 cm (2.1-3.4cm) LV SYSTOLIC FUNCTION BY 2D PLANIMETRY (MOD):                     Normal Ranges: EF-A4C View: 51.6 % (>=55%) EF-A2C View: 57.9 % EF-Biplane:  55.8 % LV DIASTOLIC FUNCTION:                            Normal Ranges: MV Peak E:      0.93 m/s   (0.7-1.2 m/s) MV Peak A:      1.02 m/s   (0.42-0.7 m/s) E/A Ratio:      0.91       (1.0-2.2) MV e'           0.08 m/s   (>8.0) MV lateral e'   0.08 m/s MV medial e'    0.07 m/s E/e' Ratio:     12.39      (<8.0) PulmV Sys Siddhartha:  68.10 cm/s PulmV Urrutia Siddhartha: 47.60 cm/s PulmV S/D Siddhartha:  1.40 MITRAL VALVE:                      Normal Ranges: MV Vmax:    1.22 m/s (<=1.3m/s) MV peak P.0 mmHg (<5mmHg) MV mean P.0 mmHg (<48mmHg) MV DT:      262 msec (150-240msec) AORTIC VALVE:                          Normal Ranges: AoV Vmax:      1.73 m/s  (<=1.7m/s) AoV Peak P.0 mmHg (<20mmHg) LVOT Max Siddhartha:  1.21 m/s  (<=1.1m/s) LVOT VTI:      29.10 cm LVOT Diameter: 2.00 cm   (1.8-2.4cm) AoV Area,Vmax: 2.20 cm2  (2.5-4.5cm2)  RIGHT VENTRICLE: RV Basal 3.18 cm RV Mid   2.70 cm RV Major 5.2 cm TAPSE:   16.5 mm RV s'    0.13 m/s TRICUSPID VALVE/RVSP:                   Normal Ranges: IVC Diam: 1.75 cm PULMONIC VALVE:                         Normal Ranges: PV Accel Time: 69 msec  (>120ms) PV Max Siddhartha:    1.4 m/s  (0.6-0.9m/s) PV Max P.3 mmHg Pulmonary Veins: PulmV Urrutia Siddhartha: 47.60 cm/s PulmV S/D Siddhartha:  1.40 PulmV Sys Siddhartha:  68.10 cm/s  [id number] Elvis Bruce DO Electronically signed on 12/15/2023 at 10:13:13 AM  ** Final **     CT head wo IV  contrast    Result Date: 12/14/2023  Interpreted By:  Rashawn Hook, STUDY: CT HEAD WO IV CONTRAST; 12/14/2023 10:34 am   INDICATION: Signs/Symptoms:lightheadedness, near syncope.   COMPARISON: No comparison exams available.   ACCESSION NUMBER(S): [id number]   ORDERING CLINICIAN: SLY DAWKINS   TECHNIQUE: CT axial images through the Brain were obtained without contrast.   FINDINGS: Acute ischemic change: None.   Hemorrhage: No evidence of acute intracranial hemorrhage.   Mass Effect / Mass Lesion: No significant mass effect. There is no evidence of an intracranial mass or extraaxial fluid collection.   Chronic ischemic change: Patchy foci of  low attenuation coefficient are present within white matter which is a nonspecific finding but likely represents moderate microvascular ischemia.   Parenchyma: There is no significant volume loss. The brain parenchyma is otherwise within normal limits for age.   Ventricles: Normal caliber and morphology.   Other: There is calcification involving the carotid siphons.       No acute intracranial process   All CT examinations are performed with 1 or more of the following dose reduction techniques: Automated exposure control, adjustment of mA and/or kv according to patient's size, or use of iterative reconstruction techniques.   MACRO: none   Signed by: Rashawn Hook 12/14/2023 10:48 AM Dictation workstation:   [id number]    XR chest 2 views    Result Date: 12/13/2023  Interpreted By:  Nj Zazueta, STUDY: XR CHEST 2 VIEWS; 12/13/2023 2:47 pm   INDICATION: Signs/Symptoms:SOB dizziness   COMPARISON: Chest radiograph 06/09/2020   ACCESSION NUMBER(S): [id number]   ORDERING CLINICIAN: LA SAEED   TECHNIQUE: AP and lateral views of the chest performed.   FINDINGS: Cardiac size is indeterminate due to the AP projection. Calcifications are seen at the aortic knob. Thoracic aorta is tortuous in appearance. Mild prominence of the pulmonary vasculature. Faint bibasilar  hazy opacities likely reflect atelectasis. No focal consolidation, large pleural effusion, or visible pneumothorax. Multilevel discogenic degenerative changes of the thoracic spine. Presumed neurostimulator leads overlying the thoracic spine.       Mild prominence of the pulmonary vasculature, as can be seen with pulmonary vascular congestion. Please correlate for corresponding symptoms.   No focal consolidation or visible pneumothorax.   Faint bibasilar atelectasis suspected.   MACRO: None.   Signed by: Nj Zazueta 12/13/2023 3:04 PM Dictation workstation:   [id number]    Scheduled medications  amiodarone, 200 mg, oral, Daily  amitriptyline, 10 mg, oral, Nightly  cholestyramine light, 4 g, oral, Daily with evening meal  DULoxetine, 20 mg, oral, Daily  folic acid, 1 mg, oral, Daily  gabapentin, 300 mg, oral, BID  heparin (porcine), 5,000 Units, subcutaneous, q8h  lactobacillus acidophilus, 1 tablet, oral, BID with meals  levothyroxine, 150 mcg, oral, Daily  potassium chloride CR, 40 mEq, oral, Daily  simvastatin, 10 mg, oral, Nightly  sodium bicarbonate, 650 mg, oral, TID  vancomycin, 125 mg, oral, 4x daily      Continuous medications       PRN medications  PRN medications: acetaminophen **OR** acetaminophen **OR** acetaminophen, benzocaine-menthol, dextromethorphan-guaifenesin, guaiFENesin    Assessment/Plan   Principal Problem:    Weakness  Active Problems:    Dyslipidemia    Chronic kidney disease, stage 4 (severe) (CMS/HCC)    Fibromyalgia    Chronic lymphocytic leukemia (CMS/HCC)    Benign essential hypertension    Congestive heart failure with left ventricular diastolic dysfunction, acute on chronic (CMS/HCC)    Atrial fibrillation (CMS/HCC)    Atrial fibrillation with pauses/ A-fib with RVR   HR is under control. following with Dr. Chisholm cardiology input   Following Dr. Bruce   Monitor closely   Nuclear stress test done on 12/26 negative for ischemia.  Normal EF  65%. . Small bilateral basilar  effusions and compressive atelectasis . Bilateral axillary lymphadenopathy nonspecific   On miodarone 200 mg daily to maintain sinus rhythm per cardiology     Pt is high risk of stroke she will benefit from anticoagulation we will start her on Eliquis 2.5 mg twice a day due to her creatinine clearance 25.6.      Acute kidney injury   Cr 1.7 was 2.1   Dr. Amor on the case      C. difficile colitis/ leukocytosis   BC neg X 4 days    On vancomycin   from ID services on the case .     Leukocytosis/history of leukemia/ Anemia  HH stable   Secondary to CLL  Seen by  Dr. Ferreira  Monitor close.     Hypothyroidism  Continue with  home medication.    Hypokalemia  Resolved    Weakness/deconditioning   Fall precautions  PT/OT comments moderate intensity    DVT prophylaxis  Heparin subcu     Discharge plan:  This is 83 years old female with med Hx history of hypertension, hypothyroidism, IBS, chronic kidney disease stage IV, CLL. . She  presented from home with weakness and fall.  Admitted for evaluation for weakness, heart failure preserved EF, CKD stage III-IV, acute on chronic anemia and C-diff.   Evaluated by cardiology stress test done on 12/26  Evaluated by Dr. Chisholm regarding new onset of A-fib with pauses, recommend to not use AV amna blocking drugs also not a candidate for pacemaker insertion at this time.  Patient has to follow-up with him in 2 weeks long-term Holter prior to her discharge.   Evaluated by ID due to C. difficile, patient is on oral vancomycin.   Evaluated by nephrology due to JANET on CKD3-4; likely from volume depletion due to low p.o. intake and diarrhea, he baseline creatinine is 1.6-1.9; diuretic and ARB's were held  Due to A-fib with RVR patient started on amiodarone.  Also due to high risk of stroke started on Eliquis 2.5 mg twice a day.      Code Status was discussed with the patient with the presence of her daughter wants DNR CCA DNI    Anticipate discharging patient  skilled of  nursing when medically clear     I spent 25 minutes in the professional and overall care of this patient.    Camille Olson, APRN-CNP

## 2023-12-30 NOTE — CARE PLAN
The patient's goals for the shift include increase strength    The clinical goals for the shift include manage excess moisture on skin    Over the shift, the patient did not make progress toward the following goals. Barriers to progression include pt continues to have small episodes of incontinent stool. Recommendations to address these barriers include frequent checks for incontinence, offered toileting, use of incontinence pads and barrier cream.

## 2023-12-30 NOTE — NURSING NOTE
Spoke with Dr Dietrich about pt HR 140s-150s in Afib. Dr Dietrich states she will re-consult Dr Bruce as he is familiar with this pt and asks this nurse to page and notify him of HR

## 2023-12-30 NOTE — PROGRESS NOTES
Tracie Baltazar is a 83 y.o. female on day 6 of admission presenting with Weakness.    Subjective   Interval History:   Patient seen and examined  Daughter present  Intermittent diarrhea          Review of Systems   Gastrointestinal:  Positive for diarrhea.   All other systems reviewed and are negative.      Objective   Range of Vitals (last 24 hours)  Heart Rate:  []   Temp:  [36.4 °C (97.5 °F)-37.6 °C (99.7 °F)]   Resp:  [18-26]   BP: (117-157)/(49-86)   Weight:  [83 kg (182 lb 15.7 oz)]   SpO2:  [94 %-96 %]   Daily Weight  12/30/23 : 83 kg (182 lb 15.7 oz)    Body mass index is 32.42 kg/m².    Physical Exam  Constitutional:       Appearance: Normal appearance.   HENT:      Head: Normocephalic and atraumatic.      Nose: Nose normal.      Mouth/Throat:      Mouth: Mucous membranes are moist.      Pharynx: Oropharynx is clear.   Eyes:      Extraocular Movements: Extraocular movements intact.      Conjunctiva/sclera: Conjunctivae normal.   Cardiovascular:      Rate and Rhythm: Normal rate and regular rhythm.   Pulmonary:      Effort: Pulmonary effort is normal.      Breath sounds: Normal breath sounds. No wheezing or rales.   Abdominal:      General: Bowel sounds are normal.      Palpations: Abdomen is soft.      Tenderness: There is no abdominal tenderness. There is no guarding.   Musculoskeletal:         General: Normal range of motion.      Cervical back: Normal range of motion and neck supple.   Skin:     General: Skin is warm and dry.   Neurological:      General: No focal deficit present.      Mental Status: She is alert and oriented to person, place, and time.   Psychiatric:         Mood and Affect: Mood normal.         Behavior: Behavior normal.     Antibiotics  sodium chloride 0.9 % bolus 1,000 mL  piperacillin-tazobactam-dextrose (Zosyn) IV 4.5 g  vancomycin-diluent combo no.1 (Xellia) IVPB 2 g  amitriptyline (Elavil) tablet 10 mg  DULoxetine (Cymbalta) DR capsule 20 mg  folic acid (Folvite) tablet  1 mg  gabapentin (Neurontin) capsule 300 mg  hydrALAZINE (Apresoline) tablet 20 mg  lactobacillus acidophilus tablet 1 tablet  levothyroxine (Synthroid, Levoxyl) tablet 150 mcg  pantoprazole (ProtoNix) EC tablet 40 mg  simvastatin (Zocor) tablet 10 mg  sodium bicarbonate tablet 650 mg  heparin (porcine) injection 5,000 Units  polyethylene glycol (Glycolax, Miralax) packet 17 g  benzocaine-menthol (Cepastat Sore Throat) 15-3.6 mg lozenge 1 lozenge  guaiFENesin (Mucinex) 12 hr tablet 600 mg  dextromethorphan-guaifenesin (Robitussin DM)  mg/5 mL oral liquid 5 mL  acetaminophen (Tylenol) tablet 650 mg  acetaminophen (Tylenol) oral liquid 650 mg  acetaminophen (Tylenol) suppository 650 mg  sodium chloride 0.9% infusion  metroNIDAZOLE (Flagyl) tablet 500 mg  vancomycin (Vancocin) capsule 125 mg  dilTIAZem (Cardizem) 125 mg in dextrose 5% 125 mL (1 mg/mL) infusion (premix)  dilTIAZem (Cardizem) injection 10 mg  potassium chloride CR (Klor-Con M20) ER tablet 40 mEq  regadenoson (Lexiscan) injection 0.4 mg  aminophylline syringe 125 mg  cholestyramine (Questran) 4 gram packet 4 g  potassium chloride CR (Klor-Con M20) ER tablet 40 mEq  Tc-99m tetrofosmin (Myoview) injection 11 millicurie  Tc-99m tetrofosmin (Myoview) injection 32 millicurie  dilTIAZem (Cardizem) 125 mg in dextrose 5% 125 mL (1 mg/mL) infusion (premix)  dilTIAZem (Cardizem) injection 20 mg  cholestyramine light (Prevalite) 4 gram packet 4 g  amiodarone (Pacerone) tablet 200 mg      Relevant Results  Labs  Results from last 72 hours   Lab Units 12/30/23  0516 12/29/23  0553 12/28/23  0521   WBC AUTO x10*3/uL 37.5* 37.4* 38.4*   HEMOGLOBIN g/dL 10.1* 9.7* 9.1*   HEMATOCRIT % 32.8* 31.3* 30.3*   PLATELETS AUTO x10*3/uL 295 268 257     Results from last 72 hours   Lab Units 12/30/23  0516 12/29/23  0553 12/28/23  0521   SODIUM mmol/L 142 137 140   POTASSIUM mmol/L 3.7 4.0 3.5   CHLORIDE mmol/L 107 106 107   CO2 mmol/L 22* 20* 19*   BUN mg/dL 13 15 24  "  CREATININE mg/dL 1.70* 1.70* 2.00*   GLUCOSE mg/dL 101* 102* 103*   CALCIUM mg/dL 8.6 8.4* 8.3*   ANION GAP mmol/L 13 11 14   EGFR mL/min/1.73m*2 30* 30* 24*         Estimated Creatinine Clearance: 25.6 mL/min (A) (by C-G formula based on SCr of 1.7 mg/dL (H)).  No results found for: \"CRP\"  Microbiology  Reviewed  Imaging  ECG 12 lead    Result Date: 12/27/2023   Poor data quality, interpretation may be adversely affected Normal sinus rhythm Nonspecific ST abnormality Abnormal ECG When compared with ECG of 31-MAY-2013 19:08, QT has shortened Confirmed by Magan Perez ([id number]) on 12/27/2023 10:14:24 AM    Nuclear Stress Test    Result Date: 12/26/2023  Interpreted By:  Remigio Cherry, STUDY: NUCLEAR STRESS TEST;  12/26/2023 2:36 pm   INDICATION: Signs/Symptoms:Paroxysmal atrial fibrillation, sick sinus syndrome.   COMPARISON: None.   ACCESSION NUMBER(S): [id number]   ORDERING CLINICIAN: NORY LOZA   TECHNIQUE: DIVISION OF NUCLEAR MEDICINE PHARMACOLOGIC STRESS MYOCARDIAL PERFUSION SCAN, ONE DAY PROTOCOL   The patient received an intravenous injection of 11.0 mCi of Tc-99m Myoview and resting emission tomographic (SPECT) images of the myocardium were acquired. The patient then received an intravenous infusion of 0.4 mg regadenoson (Lexiscan) followed by an additional injection of 32.0 mCi of Tc-99m Myoview. Stress phase SPECT images of the myocardium were then acquired. These included ECG-gated post-stress and rest images to assess and quantify ventricular function.  Low dose CT was acquired for attenuation correction.   FINDINGS: Stress and rest phase imaging demonstrate no evidence for ischemia. No fixed defects seen to suggest infarct   EKG gated imaging demonstrates normal left ventricular wall motion and thickening. There is normal end-diastolic volume. Normal ejection fraction 65%           Attenuation correction CT images demonstrate nonspecific bilateral axillary lymphadenopathy. For example, in the " right axilla measuring 2.8 by 1.2 cm. Lung parenchyma demonstrates small bilateral basilar effusions and compressive atelectasis       1. No evidence for ischemia.   2. Normal ejection fraction 65%.   3. Small bilateral basilar effusions and compressive atelectasis   4. Bilateral axillary lymphadenopathy nonspecific and likely reactive         MACRO: None   Signed by: Remigio Cherry 12/26/2023 2:59 PM Dictation workstation:   [id number]    Cardiology Interpretation Of Nuclear Stress - See Other Report For Nuclear Portion    Result Date: 12/26/2023           [place] [id number]            Phone [phone] Nuclear Pharmacologic Stress Test Patient Name:      MARK GABRIEL Ordering Provider:    [id number] LOZA Study Date:        12/26/2023         Reading Physician:    [id number] Bruce DO MRN/PID:           [id number]           Supervising           [id number] Bruce DO                                       Physician: Accession#:        [id number]       Fellow: Date of Birth/Age: 1940 / 83      Fellow:                    years Gender:            F                  Nurse:                Do Zafar RN Admit Date:                           Technician:           Matt Williamson                                                             CVTI Admission Status:                     Sonographer:          QUYEN Height:            160.02 cm          Technologist: Weight:            82.10 kg           Additional Staff: BSA:               1.85 m2            Encounter#:           [id number] BMI:               32.06 kg/m2        Patient Location: Study Type:    CARDIOLOGY INTERPRETATION OF NUCLEAR STRESS Diagnosis/ICD: Longstanding persistent atrial fibrillation-I48.11 Indication:    Atrial Fibrillation CPT Codes:     Stress Test Interpretation-04348; Stress Test Supervision-[id number] Falls Risk:  Study Details: Correct procedure and correct patient verified verbally  and with                ID Band checked.  Patient History: Chest pain, atrial fibrillation, syncope, hypertension, hyperlipidemia and congestive heart failure.  Medications: SIMVASTATIN, FUROSEMIDE, HYDRALAZINE. The patient did not take medications as prescribed.  Patient Performance: Patient received a total of 0.4 mg of Regadenoson at 11:30:33 AM. The resting blood pressure was 175/68 mmHg with a heart rate of 74 bpm. The patient developed no symptoms during the stress exam. The blood pressure response was normal. The test was terminated due to: completed lab protocol. Standard dose of Lexiscan was infused over 10 to 15 seconds then flushed with saline. The patient then received a standard IV dose of Myoview. The patient did not experience chest pain with infusion of Lexiscan. The resting twelve-lead ECG was essentially normal and there were no significant changes in the ST segments with the infusion of Lexiscan. Resting blood pressure was 170/68 and decreased to 144/60 prior to completion of the study. No arrhythmias were induced. Myoview imaging was to be reported separately. Patient has met the discharge criteria and is discharged to their floor.  Baseline ECG: Resting ECG showed Atrial Fibrillation with AFIB.  Stress ECG: Stress ECG showed atrial fibrillation.  Stress Stage Data: +----------------+--+------+-------+                 HRSys BPDias BP +----------------+--+------+-------+ Baseline [id number]   68      +----------------+--+------+-------+  Recovery ECG: Recovery ECG showed atrial fibrillation.  +------------+--+------+-------+             HRSys BPDias BP +------------+--+------+-------+ Recovery I  [id number]   54      +------------+--+------+-------+ Recovery II [id number]   54      +------------+--+------+-------+ Recovery [id number]   61      +------------+--+------+-------+  Summary:  1. No clinical or ECG evidence of ischemia.  2. Myoview imaging to be reported separately.   3. Adequate level of stress achieved.  4. Nuclear image results are reported separately. [id number] Elvis Bruce DO Electronically signed on 12/26/2023 at 11:42:40 AM   ** Final **     US renal complete    Result Date: 12/24/2023  Interpreted By:  Bruno Bryant, STUDY: US RENAL COMPLETE; 12/24/2023 7:50 pm   INDICATION: Signs/Symptoms:JANET on CKD. /history of CLL and hypertension   COMPARISON: None.   ACCESSION NUMBER(S): [id number]   ORDERING CLINICIAN: SERJIO DEVINE   TECHNIQUE: Grayscale and color Doppler imaging of the kidneys.   FINDINGS: The right kidney measures 9.3 cm x 4.1 cm x 4.4 cm cm. The left kidney measures 7.8 cm x 5.0 cm x 3.7 cm cm. There is no shadowing calculus, hydronephrosis, or solid mass identified. However, there is a 2.6 x 2.5 x 2.4 cm inferior pole hypoechoic renal cyst without internal color Doppler flow. Renal cortical thinning and increased echogenicity of the renal parenchyma is noted.   The bladder is grossly unremarkable.       1. No hydronephrosis or nephrolithiasis. 2. Medical renal disease. 3 left renal cyst.     MACRO: None.   Signed by: Bruno Bryant 12/24/2023 8:19 PM Dictation workstation:   [id number]    XR chest 1 view    Result Date: 12/24/2023  Interpreted By:  Nick Luciano, STUDY: XR CHEST 1 VIEW; 12/24/2023 4:15 am   INDICATION: CLINICAL INFORMATION: Signs/Symptoms:weakness.   COMPARISON: 12/18/2020   ACCESSION NUMBER(S): [id number]   ORDERING CLINICIAN: LESLYE FRANK   TECHNIQUE: Portable chest one view.   FINDINGS: The cardiac size is indeterminate in view of the AP projection. Neurostimulator overlies the thoracic spine. No infiltrates or effusions are identified.  The aorta is tortuous.       No acute pathologic findings are identified.  There is no interval change when compared to the previous examination.   MACRO: none   Signed by: Nick Luciano 12/24/2023 9:11 AM Dictation workstation:   [id number]    XR chest 2 views    Result Date:  12/18/2023  Interpreted By:  Alex Mandel, STUDY: XR CHEST 2 VIEWS; 12/18/2023 8:06 am   INDICATION: Signs/Symptoms:sob   COMPARISON: 12/13/2023   ACCESSION NUMBER(S): [id number]   ORDERING CLINICIAN: SRIKANTH VILCHIS   TECHNIQUE: PA and LAT views of the chest were obtained.   FINDINGS: The cardiomediastinal silhouette is unremarkable. The lungs are clear. No pleural effusion is identified.   The osseous structures are intact. Spinal cord stimulator leads are again seen at the mid and lower thoracic level.       No acute cardiopulmonary process.     Signed by: Alex Mandel 12/18/2023 8:30 AM Dictation workstation:   [id number]    Transthoracic Echo (TTE) Complete    Result Date: 12/15/2023           [place] [address]            Phone [phone] TRANSTHORACIC ECHOCARDIOGRAM REPORT  Patient Name:      MARK GABRIEL  Reading Physician:    [id number] [facility]  Study Date:        12/15/2023          Ordering Provider:    [id number] ABDULLAHI VANESSA MRN/PID:           [id number]            Fellow: Accession#:        [id number]        Nurse: Date of Birth/Age: 1940 / 83 years Sonographer:          Kristen WESTFALL Gender:            F                   Additional Staff: Height:            154.94 cm           Admit Date:           12/14/2023 Weight:            82.10 kg            Admission Status:     Inpatient - Routine BSA:               1.81 m2             Department Location: Blood Pressure: 150 /47 mmHg Study Type:    TRANSTHORACIC ECHO (TTE) COMPLETE Diagnosis/ICD: Shortness of breath-R06.02 Indication:    Shortness of Breath CPT Codes:     Echo Complete w Full Doppler-[id number] Patient History: Pertinent History: CKD CHF Depression HTN CP Murmur Dyslipidemia Varicose veins                    of leg with swellin ghyperlipidemia hypercholesterolemia. Study Detail: The following Echo studies were  performed: 2D, Doppler, M-Mode,               color flow and 3D.  PHYSICIAN INTERPRETATION: Left Ventricle: Left ventricular systolic function is normal, with an estimated ejection fraction of 60-65%. There are no regional wall motion abnormalities. The left ventricular cavity size is normal. Left ventricular diastolic filling was indeterminate. Left Atrium: The left atrium is normal in size. Right Ventricle: The right ventricle is normal in size. There is normal right ventricular global systolic function. Right Atrium: The right atrium is normal in size. Aortic Valve: The aortic valve is trileaflet. There is mild aortic valve cusp calcification. There is no evidence of aortic valve regurgitation. The peak instantaneous gradient of the aortic valve is 12.0 mmHg. Mitral Valve: The mitral valve is normal in structure. There is trace to mild mitral valve regurgitation. Tricuspid Valve: The tricuspid valve is structurally normal. No evidence of tricuspid regurgitation. Pulmonic Valve: The pulmonic valve is not well visualized. There is trace to mild pulmonic valve regurgitation. Pericardium: There is no pericardial effusion noted. Aorta: The aortic root is normal. Systemic Veins: The inferior vena cava appears to be of normal size. There is IVC inspiratory collapse greater than 50%.  CONCLUSIONS:  1. Left ventricular systolic function is normal with a 60-65% estimated ejection fraction.  2. Left ventricular diastolic filling indeterminate.  3. Mild aortic valve sclerosis. QUANTITATIVE DATA SUMMARY: 2D MEASUREMENTS:                          Normal Ranges: LAs:           3.00 cm   (2.7-4.0cm) IVSd:          0.85 cm   (0.6-1.1cm) LVPWd:         1.00 cm   (0.6-1.1cm) LVIDd:         4.23 cm   (3.9-5.9cm) LVIDs:         2.79 cm LV Mass Index: 69.0 g/m2 LV % FS        34.0 % LA VOLUME:                               Normal Ranges: LA Vol A2C:        59.3 ml LA Vol Index A2C:  32.8 ml/m2 LA Area A2C:       18.7 cm2 LA Major Gilbert  A2C: 5.0 cm LA Volume Index:   29.6 ml/m2 LA Vol A2C:        59.7 ml RA VOLUME BY A/L METHOD:                               Normal Ranges: RA Vol A4C:        32.1 ml    (8.3-19.5ml) RA Vol Index A4C:  17.7 ml/m2 RA Area A4C:       13.6 cm2 RA Major Axis A4C: 4.9 cm M-MODE MEASUREMENTS:                  Normal Ranges: Ao Root: 2.20 cm (2.0-3.7cm) LAs:     4.80 cm (2.7-4.0cm) AORTA MEASUREMENTS:                    Normal Ranges: Asc Ao, d: 3.10 cm (2.1-3.4cm) LV SYSTOLIC FUNCTION BY 2D PLANIMETRY (MOD):                     Normal Ranges: EF-A4C View: 51.6 % (>=55%) EF-A2C View: 57.9 % EF-Biplane:  55.8 % LV DIASTOLIC FUNCTION:                            Normal Ranges: MV Peak E:      0.93 m/s   (0.7-1.2 m/s) MV Peak A:      1.02 m/s   (0.42-0.7 m/s) E/A Ratio:      0.91       (1.0-2.2) MV e'           0.08 m/s   (>8.0) MV lateral e'   0.08 m/s MV medial e'    0.07 m/s E/e' Ratio:     12.39      (<8.0) PulmV Sys Siddhartha:  68.10 cm/s PulmV Urrutia Siddhartha: 47.60 cm/s PulmV S/D Siddhartha:  1.40 MITRAL VALVE:                      Normal Ranges: MV Vmax:    1.22 m/s (<=1.3m/s) MV peak P.0 mmHg (<5mmHg) MV mean P.0 mmHg (<48mmHg) MV DT:      262 msec (150-240msec) AORTIC VALVE:                          Normal Ranges: AoV Vmax:      1.73 m/s  (<=1.7m/s) AoV Peak P.0 mmHg (<20mmHg) LVOT Max Siddhartha:  1.21 m/s  (<=1.1m/s) LVOT VTI:      29.10 cm LVOT Diameter: 2.00 cm   (1.8-2.4cm) AoV Area,Vmax: 2.20 cm2  (2.5-4.5cm2)  RIGHT VENTRICLE: RV Basal 3.18 cm RV Mid   2.70 cm RV Major 5.2 cm TAPSE:   16.5 mm RV s'    0.13 m/s TRICUSPID VALVE/RVSP:                   Normal Ranges: IVC Diam: 1.75 cm PULMONIC VALVE:                         Normal Ranges: PV Accel Time: 69 msec  (>120ms) PV Max Siddhartha:    1.4 m/s  (0.6-0.9m/s) PV Max P.3 mmHg Pulmonary Veins: PulmV Urrutia Siddhartha: 47.60 cm/s PulmV S/D Siddhartha:  1.40 PulmV Sys Siddhartha:  68.10 cm/s  [id number] Elvis Bruce DO Electronically signed on 12/15/2023 at 10:13:13 AM  ** Final **     CT head wo IV  contrast    Result Date: 12/14/2023  Interpreted By:  Rashawn Hook, STUDY: CT HEAD WO IV CONTRAST; 12/14/2023 10:34 am   INDICATION: Signs/Symptoms:lightheadedness, near syncope.   COMPARISON: No comparison exams available.   ACCESSION NUMBER(S): [id number]   ORDERING CLINICIAN: SLY DAWKINS   TECHNIQUE: CT axial images through the Brain were obtained without contrast.   FINDINGS: Acute ischemic change: None.   Hemorrhage: No evidence of acute intracranial hemorrhage.   Mass Effect / Mass Lesion: No significant mass effect. There is no evidence of an intracranial mass or extraaxial fluid collection.   Chronic ischemic change: Patchy foci of  low attenuation coefficient are present within white matter which is a nonspecific finding but likely represents moderate microvascular ischemia.   Parenchyma: There is no significant volume loss. The brain parenchyma is otherwise within normal limits for age.   Ventricles: Normal caliber and morphology.   Other: There is calcification involving the carotid siphons.       No acute intracranial process   All CT examinations are performed with 1 or more of the following dose reduction techniques: Automated exposure control, adjustment of mA and/or kv according to patient's size, or use of iterative reconstruction techniques.   MACRO: none   Signed by: Rashawn Hook 12/14/2023 10:48 AM Dictation workstation:   [id number]    XR chest 2 views    Result Date: 12/13/2023  Interpreted By:  Nj Zazueta, STUDY: XR CHEST 2 VIEWS; 12/13/2023 2:47 pm   INDICATION: Signs/Symptoms:SOB dizziness   COMPARISON: Chest radiograph 06/09/2020   ACCESSION NUMBER(S): [id number]   ORDERING CLINICIAN: LA SAEED   TECHNIQUE: AP and lateral views of the chest performed.   FINDINGS: Cardiac size is indeterminate due to the AP projection. Calcifications are seen at the aortic knob. Thoracic aorta is tortuous in appearance. Mild prominence of the pulmonary vasculature. Faint bibasilar  hazy opacities likely reflect atelectasis. No focal consolidation, large pleural effusion, or visible pneumothorax. Multilevel discogenic degenerative changes of the thoracic spine. Presumed neurostimulator leads overlying the thoracic spine.       Mild prominence of the pulmonary vasculature, as can be seen with pulmonary vascular congestion. Please correlate for corresponding symptoms.   No focal consolidation or visible pneumothorax.   Faint bibasilar atelectasis suspected.   MACRO: None.   Signed by: Nj Zazueta 12/13/2023 3:04 PM Dictation workstation:   [id number]        Assessment/Plan     Acute kidney injury-resolving  Leukocytosis-multifactorial  CLL  C. difficile infection-resolving     Continue oral vancomycin  Monitor renal function  Contact plus precautions  Questran  Supportive care  Monitor temperature and WBC    Hipolito Elias MD

## 2023-12-30 NOTE — NURSING NOTE
Assumed care of this pt. Pt sitting in bed, breathing even and unlabored on 2L NC, NAD, denies needs at this time. Hand off report received. Bed low, call light and belongings in reach. Continuing to monitor

## 2023-12-30 NOTE — CARE PLAN
The patient's goals for the shift include increase strength    The clinical goals for the shift include manage excess moisture on skin    Over the shift, the patient did not make progress toward the following goals. Barriers to progression include incontinence. Recommendations to address these barriers include promoting calling for a bedpan..

## 2023-12-30 NOTE — PROGRESS NOTES
"Tracie Baltazar is a 83 y.o. female on day 6 of admission presenting with Weakness.    Subjective   Patient seen for acute superimposed on chronic kidney disease stage III secondary to potential and C. difficile colitis the patient continues to have diarrhea however no abdominal pain Hodgson catheter was removed yesterday and she is able to void without any issues       Objective     Exam is unchanged    Last Recorded Vitals  Blood pressure 125/61, pulse (!) 111, temperature 36.8 °C (98.2 °F), temperature source Temporal, resp. rate 18, height 1.6 m (5' 2.99\"), weight 83 kg (182 lb 15.7 oz), SpO2 97 %.    Intake/Output last 3 Shifts:  I/O last 3 completed shifts:  In: - (0 mL/kg)   Out: 2000 (24.1 mL/kg) [Urine:2000 (0.7 mL/kg/hr)]  Weight: 83 kg     Current Facility-Administered Medications:     acetaminophen (Tylenol) tablet 650 mg, 650 mg, oral, q4h PRN, 650 mg at 12/26/23 2058 **OR** acetaminophen (Tylenol) oral liquid 650 mg, 650 mg, nasogastric tube, q4h PRN **OR** acetaminophen (Tylenol) suppository 650 mg, 650 mg, rectal, q4h PRN, Abilio Colón DO    amiodarone (Pacerone) tablet 200 mg, 200 mg, oral, Daily, JUMA Campbell-CNP, 200 mg at 12/30/23 1016    amitriptyline (Elavil) tablet 10 mg, 10 mg, oral, Nightly, Dominic Perez MD, 10 mg at 12/29/23 2157    apixaban (Eliquis) tablet 2.5 mg, 2.5 mg, oral, BID, Camille Olson APRN-CNP    benzocaine-menthol (Cepastat Sore Throat) 15-3.6 mg lozenge 1 lozenge, 1 lozenge, Mouth/Throat, q2h PRN, Dominic Perez MD, 1 lozenge at 12/28/23 1509    cholestyramine light (Prevalite) 4 gram packet 4 g, 4 g, oral, Daily with evening meal, Hipolito Elias MD, 4 g at 12/29/23 1641    dextromethorphan-guaifenesin (Robitussin DM)  mg/5 mL oral liquid 5 mL, 5 mL, oral, q4h PRN, Dominic Perez MD    DULoxetine (Cymbalta) DR capsule 20 mg, 20 mg, oral, Daily, Dominic Perez MD, 20 mg at 12/30/23 1017    folic acid (Folvite) tablet 1 mg, 1 mg, oral, Daily, Dominic" IVETTE Perez MD, 1 mg at 12/30/23 1016    gabapentin (Neurontin) capsule 300 mg, 300 mg, oral, BID, Dominic Perez MD, 300 mg at 12/30/23 1016    guaiFENesin (Mucinex) 12 hr tablet 600 mg, 600 mg, oral, q12h PRN, Dominic Perez MD, 600 mg at 12/28/23 1710    lactobacillus acidophilus tablet 1 tablet, 1 tablet, oral, BID with meals, Dominic Perez MD, 1 tablet at 12/30/23 1017    levothyroxine (Synthroid, Levoxyl) tablet 150 mcg, 150 mcg, oral, Daily, Dominic Perez MD, 150 mcg at 12/30/23 0636    potassium chloride CR (Klor-Con M20) ER tablet 40 mEq, 40 mEq, oral, Daily, Aislinn Napoles MD, 40 mEq at 12/30/23 1016    simvastatin (Zocor) tablet 10 mg, 10 mg, oral, Nightly, Dominic Perez MD, 10 mg at 12/29/23 2157    sodium bicarbonate tablet 650 mg, 650 mg, oral, TID, Dominic Perez MD, 650 mg at 12/30/23 1017    vancomycin (Vancocin) capsule 125 mg, 125 mg, oral, 4x daily, Abilio Colón DO, 125 mg at 12/30/23 0636   Relevant Results    Results for orders placed or performed during the hospital encounter of 12/24/23 (from the past 96 hour(s))   CBC   Result Value Ref Range    WBC 35.7 (H) 4.4 - 11.3 x10*3/uL    nRBC 0.0 0.0 - 0.0 /100 WBCs    RBC 3.20 (L) 4.00 - 5.20 x10*6/uL    Hemoglobin 9.9 (L) 12.0 - 16.0 g/dL    Hematocrit 32.6 (L) 36.0 - 46.0 %     (H) 80 - 100 fL    MCH 30.9 26.0 - 34.0 pg    MCHC 30.4 (L) 32.0 - 36.0 g/dL    RDW 13.2 11.5 - 14.5 %    Platelets 254 150 - 450 x10*3/uL   Basic Metabolic Panel   Result Value Ref Range    Glucose 69 65 - 99 mg/dL    Sodium 142 133 - 145 mmol/L    Potassium 3.4 3.4 - 5.1 mmol/L    Chloride 107 97 - 107 mmol/L    Bicarbonate 19 (L) 24 - 31 mmol/L    Urea Nitrogen 25 8 - 25 mg/dL    Creatinine 2.10 (H) 0.40 - 1.60 mg/dL    eGFR 23 (L) >60 mL/min/1.73m*2    Calcium 8.5 8.5 - 10.4 mg/dL    Anion Gap 16 <=19 mmol/L   CBC   Result Value Ref Range    WBC 38.4 (H) 4.4 - 11.3 x10*3/uL    nRBC 0.0 0.0 - 0.0 /100 WBCs    RBC 2.98 (L) 4.00 - 5.20 x10*6/uL     Hemoglobin 9.1 (L) 12.0 - 16.0 g/dL    Hematocrit 30.3 (L) 36.0 - 46.0 %     (H) 80 - 100 fL    MCH 30.5 26.0 - 34.0 pg    MCHC 30.0 (L) 32.0 - 36.0 g/dL    RDW 13.6 11.5 - 14.5 %    Platelets 257 150 - 450 x10*3/uL   Basic Metabolic Panel   Result Value Ref Range    Glucose 103 (H) 65 - 99 mg/dL    Sodium 140 133 - 145 mmol/L    Potassium 3.5 3.4 - 5.1 mmol/L    Chloride 107 97 - 107 mmol/L    Bicarbonate 19 (L) 24 - 31 mmol/L    Urea Nitrogen 24 8 - 25 mg/dL    Creatinine 2.00 (H) 0.40 - 1.60 mg/dL    eGFR 24 (L) >60 mL/min/1.73m*2    Calcium 8.3 (L) 8.5 - 10.4 mg/dL    Anion Gap 14 <=19 mmol/L   CBC   Result Value Ref Range    WBC 37.4 (H) 4.4 - 11.3 x10*3/uL    nRBC 0.0 0.0 - 0.0 /100 WBCs    RBC 3.10 (L) 4.00 - 5.20 x10*6/uL    Hemoglobin 9.7 (L) 12.0 - 16.0 g/dL    Hematocrit 31.3 (L) 36.0 - 46.0 %     (H) 80 - 100 fL    MCH 31.3 26.0 - 34.0 pg    MCHC 31.0 (L) 32.0 - 36.0 g/dL    RDW 13.6 11.5 - 14.5 %    Platelets 268 150 - 450 x10*3/uL   Basic Metabolic Panel   Result Value Ref Range    Glucose 102 (H) 65 - 99 mg/dL    Sodium 137 133 - 145 mmol/L    Potassium 4.0 3.4 - 5.1 mmol/L    Chloride 106 97 - 107 mmol/L    Bicarbonate 20 (L) 24 - 31 mmol/L    Urea Nitrogen 15 8 - 25 mg/dL    Creatinine 1.70 (H) 0.40 - 1.60 mg/dL    eGFR 30 (L) >60 mL/min/1.73m*2    Calcium 8.4 (L) 8.5 - 10.4 mg/dL    Anion Gap 11 <=19 mmol/L   CBC   Result Value Ref Range    WBC 37.5 (H) 4.4 - 11.3 x10*3/uL    nRBC 0.0 0.0 - 0.0 /100 WBCs    RBC 3.24 (L) 4.00 - 5.20 x10*6/uL    Hemoglobin 10.1 (L) 12.0 - 16.0 g/dL    Hematocrit 32.8 (L) 36.0 - 46.0 %     (H) 80 - 100 fL    MCH 31.2 26.0 - 34.0 pg    MCHC 30.8 (L) 32.0 - 36.0 g/dL    RDW 13.6 11.5 - 14.5 %    Platelets 295 150 - 450 x10*3/uL   Basic Metabolic Panel   Result Value Ref Range    Glucose 101 (H) 65 - 99 mg/dL    Sodium 142 133 - 145 mmol/L    Potassium 3.7 3.4 - 5.1 mmol/L    Chloride 107 97 - 107 mmol/L    Bicarbonate 22 (L) 24 - 31 mmol/L    Urea  Nitrogen 13 8 - 25 mg/dL    Creatinine 1.70 (H) 0.40 - 1.60 mg/dL    eGFR 30 (L) >60 mL/min/1.73m*2    Calcium 8.6 8.5 - 10.4 mg/dL    Anion Gap 13 <=19 mmol/L       Assessment/Plan     1.  Acute kidney injury renal function is much improved her creatinine is back to baseline continue to monitor renal function discussed the case with her daughter  3.  C. difficile colitis continue with oral vancomycin  4.  CLL  5.  Hypertension  6. hypokalemia resolved              Epifanio Amor MD

## 2023-12-30 NOTE — PROGRESS NOTES
"Physical Therapy    Physical Therapy Treatment    Patient Name: Tracie Baltazar  MRN: [id number]  Today's Date: 12/30/2023  Time Calculation  Start Time: 1012  Stop Time: 1038  Time Calculation (min): 26 min       Assessment/Plan   PT Assessment  Assessment Comment: pt's HR with A0fib this date limited activities, HR increased to 160 and standing activities ceased, unable to attempt ambulation this date due to flucuating HR  End of Session Patient Position: Bed, 3 rail up, Alarm off, caregiver present  PT Plan  Inpatient/Swing Bed or Outpatient: Inpatient  PT Plan  Treatment/Interventions: Bed mobility, Transfer training, Gait training, Stair training, Balance training, Neuromuscular re-education, Strengthening, Endurance training, Therapeutic exercise, Therapeutic activity  PT Plan: Skilled PT  PT Frequency: 5 times per week  PT Discharge Recommendations: Moderate intensity level of continued care  Equipment Recommended upon Discharge: Wheeled walker  PT Recommended Transfer Status: Assist x1  PT - OK to Discharge: Yes (with skilled physical therapy services at next level of care.)      General Visit Information:      General  Prior to Session Communication: Bedside nurse  Patient Position Received: Bed, 3 rail up  General Comment: pt agreeable to therapy, NC3L, PIV    Subjective   Precautions:     Vital Signs:  Vital Signs  Heart Rate: (!) 112 (elevated to 160 during therapy activities)    Objective   Pain:   2/10 low back    Treatments:  Therapeutic Exercise  Therapeutic Exercise Activity 1: LAQs sitting EOB x20  Therapeutic Exercise Activity 2: hip marching x20 BLE  Therapeutic Exercise Activity 3: toe<>heel raises x20    Therapeutic Activity  Therapeutic Activity 1: STS into FWW from slightly elevated bed x3  Therapeutic Activity 2: static standing in FWW 3x~30-45\" emphasis on upright posture and trunk/hip extension    Bed Mobility 1  Bed Mobility 1: Supine to sitting, Sitting to supine  Level of Assistance 1: " Moderate assistance  Bed Mobility 2  Bed Mobility  2: Rolling right, Rolling left  Level of Assistance 2: Moderate assistance    Transfer 1  Transfer From 1: Sit to  Transfer to 1: Stand  Transfer Device 1: Walker  Transfer Level of Assistance 1: Moderate assistance  Trials/Comments 1: asist for initial steadying balance and retro lean with walker    Outcome Measures:  [facility] Basic Mobility  Turning from your back to your side while in a flat bed without using bedrails: A lot  Moving from lying on your back to sitting on the side of a flat bed without using bedrails: A lot  Moving to and from bed to chair (including a wheelchair): A lot  Standing up from a chair using your arms (e.g. wheelchair or bedside chair): A lot  To walk in hospital room: A lot  Climbing 3-5 steps with railing: A lot  Basic Mobility - Total Score: 12    Education Documentation  Mobility Training, taught by Roosevelt Delgado PT at 12/30/2023 11:08 AM.  Learner: Patient  Readiness: Acceptance  Method: Explanation  Response: Needs Reinforcement    Education Comments  No comments found.        OP EDUCATION:  Outpatient Education  Individual(s) Educated: Patient, Child  Education Provided: Physiology, POC    Encounter Problems       Encounter Problems (Active)       Mobility       STG - Patient will ambulate 50' with rolling walker and modified independence. (Progressing)       Start:  12/25/23    Expected End:  01/08/24            STG - Patient will ascend and descend two stairs with use of one handrail and supervision for safety. (Progressing)       Start:  12/25/23    Expected End:  01/08/24               Safety       STG - Patient uses call light consistently to request assistance with transfers (Progressing)       Start:  12/25/23    Expected End:  01/08/24               Transfers       STG - Patient to transfer to and from sit to supine with independence. (Progressing)       Start:  12/25/23    Expected End:  01/08/24            STG - Patient  will transfer sit to and from stand with rolling walker and modified independence. (Progressing)       Start:  12/25/23    Expected End:  01/08/24

## 2023-12-30 NOTE — NURSING NOTE
Spoke with Dr Bruce, states not to call him about HR as EP is consulted. Page placed to Dr Chisholm. Dr Dietrich notified

## 2023-12-30 NOTE — NURSING NOTE
Assumed care of patient.  Patient resting in bed, with eyes closed, sleeping.  BSSR completed.  Bed low and call light within reach.

## 2023-12-30 NOTE — NURSING NOTE
Spoke with Do Landaverde regarding pt HR 130s-150s, no new orders at this time. Continuing to monitor

## 2023-12-31 ENCOUNTER — APPOINTMENT (OUTPATIENT)
Dept: RADIOLOGY | Facility: HOSPITAL | Age: 83
DRG: 371 | End: 2023-12-31
Payer: MEDICARE

## 2023-12-31 LAB
ANION GAP SERPL CALC-SCNC: 12 MMOL/L
BUN SERPL-MCNC: 14 MG/DL (ref 8–25)
CALCIUM SERPL-MCNC: 9 MG/DL (ref 8.5–10.4)
CHLORIDE SERPL-SCNC: 105 MMOL/L (ref 97–107)
CO2 SERPL-SCNC: 23 MMOL/L (ref 24–31)
CREAT SERPL-MCNC: 1.9 MG/DL (ref 0.4–1.6)
ERYTHROCYTE [DISTWIDTH] IN BLOOD BY AUTOMATED COUNT: 13.5 % (ref 11.5–14.5)
GFR SERPL CREATININE-BSD FRML MDRD: 26 ML/MIN/1.73M*2
GLUCOSE SERPL-MCNC: 115 MG/DL (ref 65–99)
HCT VFR BLD AUTO: 34.6 % (ref 36–46)
HGB BLD-MCNC: 10.9 G/DL (ref 12–16)
MCH RBC QN AUTO: 31.6 PG (ref 26–34)
MCHC RBC AUTO-ENTMCNC: 31.5 G/DL (ref 32–36)
MCV RBC AUTO: 100 FL (ref 80–100)
NRBC BLD-RTO: 0 /100 WBCS (ref 0–0)
PLATELET # BLD AUTO: 343 X10*3/UL (ref 150–450)
POTASSIUM SERPL-SCNC: 4 MMOL/L (ref 3.4–5.1)
RBC # BLD AUTO: 3.45 X10*6/UL (ref 4–5.2)
SODIUM SERPL-SCNC: 140 MMOL/L (ref 133–145)
TROPONIN T SERPL-MCNC: 50 NG/L
TROPONIN T SERPL-MCNC: 52 NG/L
WBC # BLD AUTO: 39.7 X10*3/UL (ref 4.4–11.3)

## 2023-12-31 PROCEDURE — 2500000001 HC RX 250 WO HCPCS SELF ADMINISTERED DRUGS (ALT 637 FOR MEDICARE OP): Performed by: REGISTERED NURSE

## 2023-12-31 PROCEDURE — 71046 X-RAY EXAM CHEST 2 VIEWS: CPT

## 2023-12-31 PROCEDURE — 2500000001 HC RX 250 WO HCPCS SELF ADMINISTERED DRUGS (ALT 637 FOR MEDICARE OP): Performed by: NURSE PRACTITIONER

## 2023-12-31 PROCEDURE — 2500000001 HC RX 250 WO HCPCS SELF ADMINISTERED DRUGS (ALT 637 FOR MEDICARE OP): Performed by: INTERNAL MEDICINE

## 2023-12-31 PROCEDURE — 2060000001 HC INTERMEDIATE ICU ROOM DAILY

## 2023-12-31 PROCEDURE — 2500000004 HC RX 250 GENERAL PHARMACY W/ HCPCS (ALT 636 FOR OP/ED): Performed by: INTERNAL MEDICINE

## 2023-12-31 PROCEDURE — 2500000002 HC RX 250 W HCPCS SELF ADMINISTERED DRUGS (ALT 637 FOR MEDICARE OP, ALT 636 FOR OP/ED): Performed by: REGISTERED NURSE

## 2023-12-31 PROCEDURE — 84484 ASSAY OF TROPONIN QUANT: CPT | Performed by: NURSE PRACTITIONER

## 2023-12-31 PROCEDURE — 36415 COLL VENOUS BLD VENIPUNCTURE: CPT | Performed by: NURSE PRACTITIONER

## 2023-12-31 PROCEDURE — 80048 BASIC METABOLIC PNL TOTAL CA: CPT | Performed by: NURSE PRACTITIONER

## 2023-12-31 PROCEDURE — 85027 COMPLETE CBC AUTOMATED: CPT | Performed by: NURSE PRACTITIONER

## 2023-12-31 RX ORDER — METOPROLOL SUCCINATE 25 MG/1
12.5 TABLET, EXTENDED RELEASE ORAL DAILY
Status: DISCONTINUED | OUTPATIENT
Start: 2023-12-31 | End: 2024-01-02 | Stop reason: HOSPADM

## 2023-12-31 RX ADMIN — LEVOTHYROXINE SODIUM 150 MCG: 0.15 TABLET ORAL at 06:23

## 2023-12-31 RX ADMIN — APIXABAN 2.5 MG: 2.5 TABLET, FILM COATED ORAL at 20:44

## 2023-12-31 RX ADMIN — VANCOMYCIN HYDROCHLORIDE 125 MG: 125 CAPSULE ORAL at 06:23

## 2023-12-31 RX ADMIN — SODIUM BICARBONATE 650 MG TABLET 650 MG: at 14:17

## 2023-12-31 RX ADMIN — APIXABAN 2.5 MG: 2.5 TABLET, FILM COATED ORAL at 08:50

## 2023-12-31 RX ADMIN — CHOLESTYRAMINE 4 G: 4 POWDER, FOR SUSPENSION ORAL at 17:12

## 2023-12-31 RX ADMIN — DULOXETINE HYDROCHLORIDE 20 MG: 20 CAPSULE, DELAYED RELEASE ORAL at 08:50

## 2023-12-31 RX ADMIN — Medication 1 TABLET: at 17:12

## 2023-12-31 RX ADMIN — Medication 1 TABLET: at 08:50

## 2023-12-31 RX ADMIN — FOLIC ACID 1 MG: 1 TABLET ORAL at 08:50

## 2023-12-31 RX ADMIN — SODIUM BICARBONATE 650 MG TABLET 650 MG: at 20:44

## 2023-12-31 RX ADMIN — GABAPENTIN 300 MG: 300 CAPSULE ORAL at 20:44

## 2023-12-31 RX ADMIN — SIMVASTATIN 10 MG: 10 TABLET, FILM COATED ORAL at 20:44

## 2023-12-31 RX ADMIN — GABAPENTIN 300 MG: 300 CAPSULE ORAL at 08:50

## 2023-12-31 RX ADMIN — SODIUM BICARBONATE 650 MG TABLET 650 MG: at 08:50

## 2023-12-31 RX ADMIN — AMIODARONE HYDROCHLORIDE 200 MG: 200 TABLET ORAL at 08:50

## 2023-12-31 RX ADMIN — METOPROLOL SUCCINATE 12.5 MG: 25 TABLET, EXTENDED RELEASE ORAL at 08:50

## 2023-12-31 RX ADMIN — VANCOMYCIN HYDROCHLORIDE 125 MG: 125 CAPSULE ORAL at 17:12

## 2023-12-31 RX ADMIN — POTASSIUM CHLORIDE 40 MEQ: 1500 TABLET, EXTENDED RELEASE ORAL at 08:50

## 2023-12-31 RX ADMIN — VANCOMYCIN HYDROCHLORIDE 125 MG: 125 CAPSULE ORAL at 20:44

## 2023-12-31 RX ADMIN — AMITRIPTYLINE HYDROCHLORIDE 10 MG: 10 TABLET, FILM COATED ORAL at 20:44

## 2023-12-31 RX ADMIN — VANCOMYCIN HYDROCHLORIDE 125 MG: 125 CAPSULE ORAL at 13:37

## 2023-12-31 ASSESSMENT — PAIN SCALES - GENERAL: PAINLEVEL_OUTOF10: 0 - NO PAIN

## 2023-12-31 ASSESSMENT — COGNITIVE AND FUNCTIONAL STATUS - GENERAL
MOVING TO AND FROM BED TO CHAIR: A LITTLE
WALKING IN HOSPITAL ROOM: A LOT
TURNING FROM BACK TO SIDE WHILE IN FLAT BAD: A LITTLE
TOILETING: A LOT
DRESSING REGULAR LOWER BODY CLOTHING: A LITTLE
MOBILITY SCORE: 15
MOVING FROM LYING ON BACK TO SITTING ON SIDE OF FLAT BED WITH BEDRAILS: A LITTLE
STANDING UP FROM CHAIR USING ARMS: A LITTLE
DAILY ACTIVITIY SCORE: 16
DRESSING REGULAR UPPER BODY CLOTHING: A LOT
PERSONAL GROOMING: A LITTLE
CLIMB 3 TO 5 STEPS WITH RAILING: TOTAL
HELP NEEDED FOR BATHING: A LOT

## 2023-12-31 NOTE — PROGRESS NOTES
Tracie Baltazar is a 83 y.o. female on day 7 of admission presenting with Weakness.    Subjective   Interval History:    afebrile, no chills  No episode of diarrhea today  No abdominal pain, nausea or vomiting        Review of Systems   All other systems reviewed and are negative.      Objective   Range of Vitals (last 24 hours)  Heart Rate:  [110-146]   Temp:  [36.6 °C (97.9 °F)-37.2 °C (99 °F)]   Resp:  [16-26]   BP: (118-149)/(56-92)   SpO2:  [93 %-97 %]   Daily Weight  12/30/23 : 83 kg (182 lb 15.7 oz)    Body mass index is 32.42 kg/m².    Physical Exam  Constitutional:       Appearance: Normal appearance.   HENT:      Head: Normocephalic and atraumatic.      Nose: Nose normal.      Mouth/Throat:      Mouth: Mucous membranes are moist.      Pharynx: Oropharynx is clear.   Eyes:      Extraocular Movements: Extraocular movements intact.      Conjunctiva/sclera: Conjunctivae normal.   Cardiovascular:      Rate and Rhythm: Normal rate and regular rhythm.   Pulmonary:      Effort: Pulmonary effort is normal.      Breath sounds: Normal breath sounds. No wheezing or rales.   Abdominal:      General: Bowel sounds are normal.      Palpations: Abdomen is soft.      Tenderness: There is no abdominal tenderness. There is no guarding.   Musculoskeletal:         General: Normal range of motion.      Cervical back: Normal range of motion and neck supple.   Skin:     General: Skin is warm and dry.   Neurological:      General: No focal deficit present.      Mental Status: She is alert and oriented to person, place, and time.   Psychiatric:         Mood and Affect: Mood normal.         Behavior: Behavior normal.     Antibiotics  sodium chloride 0.9 % bolus 1,000 mL  piperacillin-tazobactam-dextrose (Zosyn) IV 4.5 g  vancomycin-diluent combo no.1 (Xellia) IVPB 2 g  amitriptyline (Elavil) tablet 10 mg  DULoxetine (Cymbalta) DR capsule 20 mg  folic acid (Folvite) tablet 1 mg  gabapentin (Neurontin) capsule 300 mg  hydrALAZINE  (Apresoline) tablet 20 mg  lactobacillus acidophilus tablet 1 tablet  levothyroxine (Synthroid, Levoxyl) tablet 150 mcg  pantoprazole (ProtoNix) EC tablet 40 mg  simvastatin (Zocor) tablet 10 mg  sodium bicarbonate tablet 650 mg  heparin (porcine) injection 5,000 Units  polyethylene glycol (Glycolax, Miralax) packet 17 g  benzocaine-menthol (Cepastat Sore Throat) 15-3.6 mg lozenge 1 lozenge  guaiFENesin (Mucinex) 12 hr tablet 600 mg  dextromethorphan-guaifenesin (Robitussin DM)  mg/5 mL oral liquid 5 mL  acetaminophen (Tylenol) tablet 650 mg  acetaminophen (Tylenol) oral liquid 650 mg  acetaminophen (Tylenol) suppository 650 mg  sodium chloride 0.9% infusion  metroNIDAZOLE (Flagyl) tablet 500 mg  vancomycin (Vancocin) capsule 125 mg  dilTIAZem (Cardizem) 125 mg in dextrose 5% 125 mL (1 mg/mL) infusion (premix)  dilTIAZem (Cardizem) injection 10 mg  potassium chloride CR (Klor-Con M20) ER tablet 40 mEq  regadenoson (Lexiscan) injection 0.4 mg  aminophylline syringe 125 mg  cholestyramine (Questran) 4 gram packet 4 g  potassium chloride CR (Klor-Con M20) ER tablet 40 mEq  Tc-99m tetrofosmin (Myoview) injection 11 millicurie  Tc-99m tetrofosmin (Myoview) injection 32 millicurie  dilTIAZem (Cardizem) 125 mg in dextrose 5% 125 mL (1 mg/mL) infusion (premix)  dilTIAZem (Cardizem) injection 20 mg  cholestyramine light (Prevalite) 4 gram packet 4 g  amiodarone (Pacerone) tablet 200 mg  apixaban (Eliquis) tablet 2.5 mg      Relevant Results  Labs  Results from last 72 hours   Lab Units 12/31/23  0528 12/30/23  0516 12/29/23  0553   WBC AUTO x10*3/uL 39.7* 37.5* 37.4*   HEMOGLOBIN g/dL 10.9* 10.1* 9.7*   HEMATOCRIT % 34.6* 32.8* 31.3*   PLATELETS AUTO x10*3/uL 343 295 268     Results from last 72 hours   Lab Units 12/31/23  0528 12/30/23  0516 12/29/23  0553   SODIUM mmol/L 140 142 137   POTASSIUM mmol/L 4.0 3.7 4.0   CHLORIDE mmol/L 105 107 106   CO2 mmol/L 23* 22* 20*   BUN mg/dL 14 13 15   CREATININE mg/dL 1.90*  "1.70* 1.70*   GLUCOSE mg/dL 115* 101* 102*   CALCIUM mg/dL 9.0 8.6 8.4*   ANION GAP mmol/L 12 13 11   EGFR mL/min/1.73m*2 26* 30* 30*         Estimated Creatinine Clearance: 22.9 mL/min (A) (by C-G formula based on SCr of 1.9 mg/dL (H)).  No results found for: \"CRP\"  Microbiology   reviewed  Imaging  XR chest 2 views    Result Date: 12/31/2023  Interpreted By:  Rashawn Hook, STUDY: XR CHEST 2 VIEWS; 12/31/2023 3:36 pm   INDICATION: Signs/Symptoms:Chest pain and respiratory failure;   COMPARISON: December 24, 2023   ACCESSION NUMBER(S): [id number]   ORDERING CLINICIAN: KIRSTIN SHARP   TECHNIQUE: Views: PA and Lateral   FINDINGS: RESULTS:  The cardiac silhouette is enlarged. Mediastinal contours are unremarkable. Left basilar opacity obscures the left hemidiaphragm. There is blunting of the right costophrenic angle with hazy density in the right lower lobe and diffuse interstitial thickening. There is no evidence for pleural effusion. The osseous structures are unremarkable.       Findings suggesting pulmonary edema and heart failure. Left basilar opacity may represent pleural fluid and atelectasis. Underlying infiltrate or mass is not excluded.     Signed by: Rashawn Hook 12/31/2023 4:18 PM Dictation workstation:   [id number]    ECG 12 lead    Result Date: 12/27/2023   Poor data quality, interpretation may be adversely affected Normal sinus rhythm Nonspecific ST abnormality Abnormal ECG When compared with ECG of 31-MAY-2013 19:08, QT has shortened Confirmed by Magan Perez ([id number]) on 12/27/2023 10:14:24 AM    Nuclear Stress Test    Result Date: 12/26/2023  Interpreted By:  Remigio Cherry, STUDY: NUCLEAR STRESS TEST;  12/26/2023 2:36 pm   INDICATION: Signs/Symptoms:Paroxysmal atrial fibrillation, sick sinus syndrome.   COMPARISON: None.   ACCESSION NUMBER(S): [id number]   ORDERING CLINICIAN: NORY LOZA   TECHNIQUE: DIVISION OF NUCLEAR MEDICINE PHARMACOLOGIC STRESS MYOCARDIAL PERFUSION SCAN, ONE DAY " PROTOCOL   The patient received an intravenous injection of 11.0 mCi of Tc-99m Myoview and resting emission tomographic (SPECT) images of the myocardium were acquired. The patient then received an intravenous infusion of 0.4 mg regadenoson (Lexiscan) followed by an additional injection of 32.0 mCi of Tc-99m Myoview. Stress phase SPECT images of the myocardium were then acquired. These included ECG-gated post-stress and rest images to assess and quantify ventricular function.  Low dose CT was acquired for attenuation correction.   FINDINGS: Stress and rest phase imaging demonstrate no evidence for ischemia. No fixed defects seen to suggest infarct   EKG gated imaging demonstrates normal left ventricular wall motion and thickening. There is normal end-diastolic volume. Normal ejection fraction 65%           Attenuation correction CT images demonstrate nonspecific bilateral axillary lymphadenopathy. For example, in the right axilla measuring 2.8 by 1.2 cm. Lung parenchyma demonstrates small bilateral basilar effusions and compressive atelectasis       1. No evidence for ischemia.   2. Normal ejection fraction 65%.   3. Small bilateral basilar effusions and compressive atelectasis   4. Bilateral axillary lymphadenopathy nonspecific and likely reactive         MACRO: None   Signed by: Remigio Cherry 12/26/2023 2:59 PM Dictation workstation:   [id number]    Cardiology Interpretation Of Nuclear Stress - See Other Report For Nuclear Portion    Result Date: 12/26/2023           [place] [id number]            Phone [phone] Nuclear Pharmacologic Stress Test Patient Name:      MARK GABRIEL Ordering Provider:    [id number] NORY LOZA Study Date:        12/26/2023         Reading Physician:    [id number] Bruce DO MRN/PID:           [id number]           Supervising           Balwinder Bruce DO                                       Physician: Accession#:        [id number]        Fellow: Date of Birth/Age: 1940 / 83      Fellow:                    years Gender:            F                  Nurse:                Do Zafar RN Admit Date:                           Technician:           Matt Williamson                                                             CVTI Admission Status:                     Sonographer:          QUYEN Height:            160.02 cm          Technologist: Weight:            82.10 kg           Additional Staff: BSA:               1.85 m2            Encounter#:           [id number] BMI:               32.06 kg/m2        Patient Location: Study Type:    CARDIOLOGY INTERPRETATION OF NUCLEAR STRESS Diagnosis/ICD: Longstanding persistent atrial fibrillation-I48.11 Indication:    Atrial Fibrillation CPT Codes:     Stress Test Interpretation-81679; Stress Test Supervision-22406 Falls Risk:  Study Details: Correct procedure and correct patient verified verbally and with                ID Band checked.  Patient History: Chest pain, atrial fibrillation, syncope, hypertension, hyperlipidemia and congestive heart failure.  Medications: SIMVASTATIN, FUROSEMIDE, HYDRALAZINE. The patient did not take medications as prescribed.  Patient Performance: Patient received a total of 0.4 mg of Regadenoson at 11:30:33 AM. The resting blood pressure was 175/68 mmHg with a heart rate of 74 bpm. The patient developed no symptoms during the stress exam. The blood pressure response was normal. The test was terminated due to: completed lab protocol. Standard dose of Lexiscan was infused over 10 to 15 seconds then flushed with saline. The patient then received a standard IV dose of Myoview. The patient did not experience chest pain with infusion of Lexiscan. The resting twelve-lead ECG was essentially normal and there were no significant changes in the ST segments with the infusion of Lexiscan. Resting blood pressure was 170/68 and decreased to 144/60 prior to completion of the study.  No arrhythmias were induced. Myoview imaging was to be reported separately. Patient has met the discharge criteria and is discharged to their floor.  Baseline ECG: Resting ECG showed Atrial Fibrillation with AFIB.  Stress ECG: Stress ECG showed atrial fibrillation.  Stress Stage Data: +----------------+--+------+-------+                 HRSys BPDias BP +----------------+--+------+-------+ Baseline [id number]   68      +----------------+--+------+-------+  Recovery ECG: Recovery ECG showed atrial fibrillation.  +------------+--+------+-------+             HRSys BPDias BP +------------+--+------+-------+ Recovery I  [id number]   54      +------------+--+------+-------+ Recovery II 10450   54      +------------+--+------+-------+ Recovery [id number]   61      +------------+--+------+-------+  Summary:  1. No clinical or ECG evidence of ischemia.  2. Myoview imaging to be reported separately.  3. Adequate level of stress achieved.  4. Nuclear image results are reported separately. [id number] Elvis Bruce  Electronically signed on 12/26/2023 at 11:42:40 AM   ** Final **     US renal complete    Result Date: 12/24/2023  Interpreted By:  Bruno Bryant, STUDY: US RENAL COMPLETE; 12/24/2023 7:50 pm   INDICATION: Signs/Symptoms:JANET on CKD. /history of CLL and hypertension   COMPARISON: None.   ACCESSION NUMBER(S): [id number]   ORDERING CLINICIAN: SERJIO DEVINE   TECHNIQUE: Grayscale and color Doppler imaging of the kidneys.   FINDINGS: The right kidney measures 9.3 cm x 4.1 cm x 4.4 cm cm. The left kidney measures 7.8 cm x 5.0 cm x 3.7 cm cm. There is no shadowing calculus, hydronephrosis, or solid mass identified. However, there is a 2.6 x 2.5 x 2.4 cm inferior pole hypoechoic renal cyst without internal color Doppler flow. Renal cortical thinning and increased echogenicity of the renal parenchyma is noted.   The bladder is grossly unremarkable.       1. No hydronephrosis or nephrolithiasis. 2. Medical  renal disease. 3 left renal cyst.     MACRO: None.   Signed by: Bruno Bryant 12/24/2023 8:19 PM Dictation workstation:   [id number]    XR chest 1 view    Result Date: 12/24/2023  Interpreted By:  Nick Luciano, STUDY: XR CHEST 1 VIEW; 12/24/2023 4:15 am   INDICATION: CLINICAL INFORMATION: Signs/Symptoms:weakness.   COMPARISON: 12/18/2020   ACCESSION NUMBER(S): [id number]   ORDERING CLINICIAN: LESLYE FRANK   TECHNIQUE: Portable chest one view.   FINDINGS: The cardiac size is indeterminate in view of the AP projection. Neurostimulator overlies the thoracic spine. No infiltrates or effusions are identified.  The aorta is tortuous.       No acute pathologic findings are identified.  There is no interval change when compared to the previous examination.   MACRO: none   Signed by: Nick Luciano 12/24/2023 9:11 AM Dictation workstation:   [id number]    XR chest 2 views    Result Date: 12/18/2023  Interpreted By:  Alex Mandel, STUDY: XR CHEST 2 VIEWS; 12/18/2023 8:06 am   INDICATION: Signs/Symptoms:sob   COMPARISON: 12/13/2023   ACCESSION NUMBER(S): [id number]   ORDERING CLINICIAN: SRIKANTH VILCHIS   TECHNIQUE: PA and LAT views of the chest were obtained.   FINDINGS: The cardiomediastinal silhouette is unremarkable. The lungs are clear. No pleural effusion is identified.   The osseous structures are intact. Spinal cord stimulator leads are again seen at the mid and lower thoracic level.       No acute cardiopulmonary process.     Signed by: Alex Mandel 12/18/2023 8:30 AM Dictation workstation:   [id number]    Transthoracic Echo (TTE) Complete    Result Date: 12/15/2023           [place] [address]            Phone [phone] TRANSTHORACIC ECHOCARDIOGRAM REPORT  Patient Name:      MARK GABRIEL  Reading Physician:    [id number] Elvis Bruce DO Study Date:        12/15/2023          Ordering Provider:    [id number] ABDULLAHI HERNANDEZ                                                               RASHEEDA MRN/PID:           [id number]            Fellow: Accession#:        [id number]        Nurse: Date of Birth/Age: 1940 / 83 years Sonographer:          Kristen WESTFALL Gender:            F                   Additional Staff: Height:            154.94 cm           Admit Date:           12/14/2023 Weight:            82.10 kg            Admission Status:     Inpatient - Routine BSA:               1.81 m2             Department Location: Blood Pressure: 150 /47 mmHg Study Type:    TRANSTHORACIC ECHO (TTE) COMPLETE Diagnosis/ICD: Shortness of breath-R06.02 Indication:    Shortness of Breath CPT Codes:     Echo Complete w Full Doppler-75207 Patient History: Pertinent History: CKD CHF Depression HTN CP Murmur Dyslipidemia Varicose veins                    of leg with swellin ghyperlipidemia hypercholesterolemia. Study Detail: The following Echo studies were performed: 2D, Doppler, M-Mode,               color flow and 3D.  PHYSICIAN INTERPRETATION: Left Ventricle: Left ventricular systolic function is normal, with an estimated ejection fraction of 60-65%. There are no regional wall motion abnormalities. The left ventricular cavity size is normal. Left ventricular diastolic filling was indeterminate. Left Atrium: The left atrium is normal in size. Right Ventricle: The right ventricle is normal in size. There is normal right ventricular global systolic function. Right Atrium: The right atrium is normal in size. Aortic Valve: The aortic valve is trileaflet. There is mild aortic valve cusp calcification. There is no evidence of aortic valve regurgitation. The peak instantaneous gradient of the aortic valve is 12.0 mmHg. Mitral Valve: The mitral valve is normal in structure. There is trace to mild mitral valve regurgitation. Tricuspid Valve: The tricuspid valve is structurally normal. No evidence of tricuspid regurgitation. Pulmonic Valve: The pulmonic valve is not well visualized.  There is trace to mild pulmonic valve regurgitation. Pericardium: There is no pericardial effusion noted. Aorta: The aortic root is normal. Systemic Veins: The inferior vena cava appears to be of normal size. There is IVC inspiratory collapse greater than 50%.  CONCLUSIONS:  1. Left ventricular systolic function is normal with a 60-65% estimated ejection fraction.  2. Left ventricular diastolic filling indeterminate.  3. Mild aortic valve sclerosis. QUANTITATIVE DATA SUMMARY: 2D MEASUREMENTS:                          Normal Ranges: LAs:           3.00 cm   (2.7-4.0cm) IVSd:          0.85 cm   (0.6-1.1cm) LVPWd:         1.00 cm   (0.6-1.1cm) LVIDd:         4.23 cm   (3.9-5.9cm) LVIDs:         2.79 cm LV Mass Index: 69.0 g/m2 LV % FS        34.0 % LA VOLUME:                               Normal Ranges: LA Vol A2C:        59.3 ml LA Vol Index A2C:  32.8 ml/m2 LA Area A2C:       18.7 cm2 LA Major Axis A2C: 5.0 cm LA Volume Index:   29.6 ml/m2 LA Vol A2C:        59.7 ml RA VOLUME BY A/L METHOD:                               Normal Ranges: RA Vol A4C:        32.1 ml    (8.3-19.5ml) RA Vol Index A4C:  17.7 ml/m2 RA Area A4C:       13.6 cm2 RA Major Axis A4C: 4.9 cm M-MODE MEASUREMENTS:                  Normal Ranges: Ao Root: 2.20 cm (2.0-3.7cm) LAs:     4.80 cm (2.7-4.0cm) AORTA MEASUREMENTS:                    Normal Ranges: Asc Ao, d: 3.10 cm (2.1-3.4cm) LV SYSTOLIC FUNCTION BY 2D PLANIMETRY (MOD):                     Normal Ranges: EF-A4C View: 51.6 % (>=55%) EF-A2C View: 57.9 % EF-Biplane:  55.8 % LV DIASTOLIC FUNCTION:                            Normal Ranges: MV Peak E:      0.93 m/s   (0.7-1.2 m/s) MV Peak A:      1.02 m/s   (0.42-0.7 m/s) E/A Ratio:      0.91       (1.0-2.2) MV e'           0.08 m/s   (>8.0) MV lateral e'   0.08 m/s MV medial e'    0.07 m/s E/e' Ratio:     12.39      (<8.0) PulmV Sys Siddhartha:  68.10 cm/s PulmV Urrutia Siddhartha: 47.60 cm/s PulmV S/D Siddhartha:  1.40 MITRAL VALVE:                      Normal Ranges:  MV Vmax:    1.22 m/s (<=1.3m/s) MV peak P.0 mmHg (<5mmHg) MV mean P.0 mmHg (<48mmHg) MV DT:      262 msec (150-240msec) AORTIC VALVE:                          Normal Ranges: AoV Vmax:      1.73 m/s  (<=1.7m/s) AoV Peak P.0 mmHg (<20mmHg) LVOT Max Siddhartha:  1.21 m/s  (<=1.1m/s) LVOT VTI:      29.10 cm LVOT Diameter: 2.00 cm   (1.8-2.4cm) AoV Area,Vmax: 2.20 cm2  (2.5-4.5cm2)  RIGHT VENTRICLE: RV Basal 3.18 cm RV Mid   2.70 cm RV Major 5.2 cm TAPSE:   16.5 mm RV s'    0.13 m/s TRICUSPID VALVE/RVSP:                   Normal Ranges: IVC Diam: 1.75 cm PULMONIC VALVE:                         Normal Ranges: PV Accel Time: 69 msec  (>120ms) PV Max Siddhartha:    1.4 m/s  (0.6-0.9m/s) PV Max P.3 mmHg Pulmonary Veins: PulmV Urrutia Siddhartha: 47.60 cm/s PulmV S/D Siddhartha:  1.40 PulmV Sys Siddhartha:  68.10 cm/s  [id number] Elvis Bruce DO Electronically signed on 12/15/2023 at 10:13:13 AM  ** Final **     CT head wo IV contrast    Result Date: 2023  Interpreted By:  Rashawn Hook, STUDY: CT HEAD WO IV CONTRAST; 2023 10:34 am   INDICATION: Signs/Symptoms:lightheadedness, near syncope.   COMPARISON: No comparison exams available.   ACCESSION NUMBER(S): [id number]   ORDERING CLINICIAN: SLY DAWKINS   TECHNIQUE: CT axial images through the Brain were obtained without contrast.   FINDINGS: Acute ischemic change: None.   Hemorrhage: No evidence of acute intracranial hemorrhage.   Mass Effect / Mass Lesion: No significant mass effect. There is no evidence of an intracranial mass or extraaxial fluid collection.   Chronic ischemic change: Patchy foci of  low attenuation coefficient are present within white matter which is a nonspecific finding but likely represents moderate microvascular ischemia.   Parenchyma: There is no significant volume loss. The brain parenchyma is otherwise within normal limits for age.   Ventricles: Normal caliber and morphology.   Other: There is calcification involving the carotid siphons.       No acute  intracranial process   All CT examinations are performed with 1 or more of the following dose reduction techniques: Automated exposure control, adjustment of mA and/or kv according to patient's size, or use of iterative reconstruction techniques.   MACRO: none   Signed by: Rashawn Hook 12/14/2023 10:48 AM Dictation workstation:   [id number]    XR chest 2 views    Result Date: 12/13/2023  Interpreted By:  Nj Zazueta, STUDY: XR CHEST 2 VIEWS; 12/13/2023 2:47 pm   INDICATION: Signs/Symptoms:SOB dizziness   COMPARISON: Chest radiograph 06/09/2020   ACCESSION NUMBER(S): [id number]   ORDERING CLINICIAN: LA SAEED   TECHNIQUE: AP and lateral views of the chest performed.   FINDINGS: Cardiac size is indeterminate due to the AP projection. Calcifications are seen at the aortic knob. Thoracic aorta is tortuous in appearance. Mild prominence of the pulmonary vasculature. Faint bibasilar hazy opacities likely reflect atelectasis. No focal consolidation, large pleural effusion, or visible pneumothorax. Multilevel discogenic degenerative changes of the thoracic spine. Presumed neurostimulator leads overlying the thoracic spine.       Mild prominence of the pulmonary vasculature, as can be seen with pulmonary vascular congestion. Please correlate for corresponding symptoms.   No focal consolidation or visible pneumothorax.   Faint bibasilar atelectasis suspected.   MACRO: None.   Signed by: Nj Zazueta 12/13/2023 3:04 PM Dictation workstation:   [id number]     Assessment/Plan   Acute kidney injury-resolving  Leukocytosis-multifactorial  CLL  C. difficile infection-resolving     Continue oral vancomycin-total of 14 days  Monitor renal function  Contact plus precautions  Questran  Supportive care  Monitor temperature and WBC      Hipolito Elias MD

## 2023-12-31 NOTE — CARE PLAN
The patient's goals for the shift include increase strength    The clinical goals for the shift include manage excess moisture on skin    Over the shift, the patient did not make progress toward the following goals. Barriers to progression include pt incontinent of stool. Recommendations to address these barriers include encourage calling for bedpan, offered toileting, use of incontinence pad and barrier cream.      Problem: Skin  Goal: Decreased wound size/increased tissue granulation at next dressing change  Outcome: Progressing  Goal: Participates in plan/prevention/treatment measures  Outcome: Progressing  Goal: Prevent/manage excess moisture  12/31/2023 0105 by Nat Leyva RN  Outcome: Progressing  12/31/2023 0104 by Nat Leyva RN  Flowsheets (Taken 12/31/2023 0104)  Prevent/manage excess moisture:   Cleanse incontinence/protect with barrier cream   Use wicking fabric (obtain order)  Goal: Prevent/minimize sheer/friction injuries  Outcome: Progressing  Goal: Promote/optimize nutrition  Outcome: Progressing  Goal: Promote skin healing  Outcome: Progressing

## 2023-12-31 NOTE — PROGRESS NOTES
Patient seen for acute kidney injury on top chronic kidney disease stage III creatinine is stable at 1.9 no macrolides we will continue to monitor with you physical exam no CHF

## 2023-12-31 NOTE — PROGRESS NOTES
"Tracie Baltazar is a 83 y.o. female on day 7 of admission presenting with Weakness.    Subjective   HPI   Patient seen and examined, patient is resting in her bed, planes of pain under her left breast radiating to her back.  Mostly when she takes a breath.    Patient had 2 loose stool  this morning,  tolerates well her diet out any nausea or vomiting .  Her heart is 150 , feeling weak and fatigued.  Denies any  shortness of breath on 2 L nasal cannula   No fever or chills.     Objective     Last Recorded Vitals  Blood pressure 98/64, pulse (!) 152, temperature 37 °C (98.6 °F), temperature source Temporal, resp. rate 18, height 1.6 m (5' 2.99\"), weight 83 kg (182 lb 15.7 oz), SpO2 94 %.    No intake or output data in the 24 hours ending 12/31/23 0916        Physical Exam  Constitutional:       Appearance: Normal appearance.   HENT:      Head: Normocephalic and atraumatic.      Nose: Nose normal.   Eyes:      Extraocular Movements: Extraocular movements intact.      Pupils: Pupils are equal, round, and reactive to light.   Cardiovascular:      Rate and Rhythm: Rhythm irregular.      Comments: Irregular rhythm with pauses   Pulmonary:      Effort: Pulmonary effort is normal. No respiratory distress.   Abdominal:      General: Bowel sounds are normal.      Palpations: Abdomen is soft.   Musculoskeletal:      Cervical back: Normal range of motion and neck supple.      Right lower leg: No edema.      Left lower leg: No edema.   Skin:     General: Skin is warm.   Neurological:      General: No focal deficit present.      Mental Status: She is alert. Mental status is at baseline.   Psychiatric:         Mood and Affect: Mood normal.          Relevant Results    Lab Results   Component Value Date    WBC 39.7 (H) 12/31/2023    HGB 10.9 (L) 12/31/2023    HCT 34.6 (L) 12/31/2023     12/31/2023     12/31/2023       Lab Results   Component Value Date    GLUCOSE 115 (H) 12/31/2023    CALCIUM 9.0 12/31/2023    NA " 140 12/31/2023    K 4.0 12/31/2023    CO2 23 (L) 12/31/2023     12/31/2023    BUN 14 12/31/2023    CREATININE 1.90 (H) 12/31/2023       Lab Results   Component Value Date    HGBA1C 5.4 12/19/2019       US renal complete    Result Date: 12/24/2023  Interpreted By:  Bruno Bryant, STUDY: US RENAL COMPLETE; 12/24/2023 7:50 pm   INDICATION: Signs/Symptoms:JANET on CKD. /history of CLL and hypertension   COMPARISON: None.   ACCESSION NUMBER(S): [id number]   ORDERING CLINICIAN: SERJIO DEVINE   TECHNIQUE: Grayscale and color Doppler imaging of the kidneys.   FINDINGS: The right kidney measures 9.3 cm x 4.1 cm x 4.4 cm cm. The left kidney measures 7.8 cm x 5.0 cm x 3.7 cm cm. There is no shadowing calculus, hydronephrosis, or solid mass identified. However, there is a 2.6 x 2.5 x 2.4 cm inferior pole hypoechoic renal cyst without internal color Doppler flow. Renal cortical thinning and increased echogenicity of the renal parenchyma is noted.   The bladder is grossly unremarkable.       1. No hydronephrosis or nephrolithiasis. 2. Medical renal disease. 3 left renal cyst.     MACRO: None.   Signed by: Bruno Bryatn 12/24/2023 8:19 PM Dictation workstation:   [id number]    XR chest 1 view    Result Date: 12/24/2023  Interpreted By:  Nick Luciano, STUDY: XR CHEST 1 VIEW; 12/24/2023 4:15 am   INDICATION: CLINICAL INFORMATION: Signs/Symptoms:weakness.   COMPARISON: 12/18/2020   ACCESSION NUMBER(S): [id number]   ORDERING CLINICIAN: LESLYE FRANK   TECHNIQUE: Portable chest one view.   FINDINGS: The cardiac size is indeterminate in view of the AP projection. Neurostimulator overlies the thoracic spine. No infiltrates or effusions are identified.  The aorta is tortuous.       No acute pathologic findings are identified.  There is no interval change when compared to the previous examination.   MACRO: none   Signed by: Nick Luciano 12/24/2023 9:11 AM Dictation workstation:   [id number]    XR chest 2  views    Result Date: 12/18/2023  Interpreted By:  Alex Mandel, STUDY: XR CHEST 2 VIEWS; 12/18/2023 8:06 am   INDICATION: Signs/Symptoms:sob   COMPARISON: 12/13/2023   ACCESSION NUMBER(S): [id number]   ORDERING CLINICIAN: SRIKANTH VILCHIS   TECHNIQUE: PA and LAT views of the chest were obtained.   FINDINGS: The cardiomediastinal silhouette is unremarkable. The lungs are clear. No pleural effusion is identified.   The osseous structures are intact. Spinal cord stimulator leads are again seen at the mid and lower thoracic level.       No acute cardiopulmonary process.     Signed by: Alex Mandel 12/18/2023 8:30 AM Dictation workstation:   [id number]    Transthoracic Echo (TTE) Complete    Result Date: 12/15/2023           [place] [address]            Phone [phone] TRANSTHORACIC ECHOCARDIOGRAM REPORT  Patient Name:      MARK DARIEN GABRIEL  Reading Physician:    [id number] [facility] Study Date:        12/15/2023          Ordering Provider:    [id number] ABDULLAHI VANESSA MRN/PID:           [id number]            Fellow: Accession#:        [id number]        Nurse: Date of Birth/Age: 1940 / 83 years Sonographer:          Kristen WESTFALL Gender:            F                   Additional Staff: Height:            154.94 cm           Admit Date:           12/14/2023 Weight:            82.10 kg            Admission Status:     Inpatient - Routine BSA:               1.81 m2             Department Location: Blood Pressure: 150 /47 mmHg Study Type:    TRANSTHORACIC ECHO (TTE) COMPLETE Diagnosis/ICD: Shortness of breath-R06.02 Indication:    Shortness of Breath CPT Codes:     Echo Complete w Full [id number] Patient History: Pertinent History: CKD CHF Depression HTN CP Murmur Dyslipidemia Varicose veins                    of leg with swellin ghyperlipidemia hypercholesterolemia. Study Detail: The  following Echo studies were performed: 2D, Doppler, M-Mode,               color flow and 3D.  PHYSICIAN INTERPRETATION: Left Ventricle: Left ventricular systolic function is normal, with an estimated ejection fraction of 60-65%. There are no regional wall motion abnormalities. The left ventricular cavity size is normal. Left ventricular diastolic filling was indeterminate. Left Atrium: The left atrium is normal in size. Right Ventricle: The right ventricle is normal in size. There is normal right ventricular global systolic function. Right Atrium: The right atrium is normal in size. Aortic Valve: The aortic valve is trileaflet. There is mild aortic valve cusp calcification. There is no evidence of aortic valve regurgitation. The peak instantaneous gradient of the aortic valve is 12.0 mmHg. Mitral Valve: The mitral valve is normal in structure. There is trace to mild mitral valve regurgitation. Tricuspid Valve: The tricuspid valve is structurally normal. No evidence of tricuspid regurgitation. Pulmonic Valve: The pulmonic valve is not well visualized. There is trace to mild pulmonic valve regurgitation. Pericardium: There is no pericardial effusion noted. Aorta: The aortic root is normal. Systemic Veins: The inferior vena cava appears to be of normal size. There is IVC inspiratory collapse greater than 50%.  CONCLUSIONS:  1. Left ventricular systolic function is normal with a 60-65% estimated ejection fraction.  2. Left ventricular diastolic filling indeterminate.  3. Mild aortic valve sclerosis. QUANTITATIVE DATA SUMMARY: 2D MEASUREMENTS:                          Normal Ranges: LAs:           3.00 cm   (2.7-4.0cm) IVSd:          0.85 cm   (0.6-1.1cm) LVPWd:         1.00 cm   (0.6-1.1cm) LVIDd:         4.23 cm   (3.9-5.9cm) LVIDs:         2.79 cm LV Mass Index: 69.0 g/m2 LV % FS        34.0 % LA VOLUME:                               Normal Ranges: LA Vol A2C:        59.3 ml LA Vol Index A2C:  32.8 ml/m2 LA Area A2C:        18.7 cm2 LA Major Axis A2C: 5.0 cm LA Volume Index:   29.6 ml/m2 LA Vol A2C:        59.7 ml RA VOLUME BY A/L METHOD:                               Normal Ranges: RA Vol A4C:        32.1 ml    (8.3-19.5ml) RA Vol Index A4C:  17.7 ml/m2 RA Area A4C:       13.6 cm2 RA Major Axis A4C: 4.9 cm M-MODE MEASUREMENTS:                  Normal Ranges: Ao Root: 2.20 cm (2.0-3.7cm) LAs:     4.80 cm (2.7-4.0cm) AORTA MEASUREMENTS:                    Normal Ranges: Asc Ao, d: 3.10 cm (2.1-3.4cm) LV SYSTOLIC FUNCTION BY 2D PLANIMETRY (MOD):                     Normal Ranges: EF-A4C View: 51.6 % (>=55%) EF-A2C View: 57.9 % EF-Biplane:  55.8 % LV DIASTOLIC FUNCTION:                            Normal Ranges: MV Peak E:      0.93 m/s   (0.7-1.2 m/s) MV Peak A:      1.02 m/s   (0.42-0.7 m/s) E/A Ratio:      0.91       (1.0-2.2) MV e'           0.08 m/s   (>8.0) MV lateral e'   0.08 m/s MV medial e'    0.07 m/s E/e' Ratio:     12.39      (<8.0) PulmV Sys Siddhartha:  68.10 cm/s PulmV Urrutia Siddhartha: 47.60 cm/s PulmV S/D Siddhartha:  1.40 MITRAL VALVE:                      Normal Ranges: MV Vmax:    1.22 m/s (<=1.3m/s) MV peak P.0 mmHg (<5mmHg) MV mean P.0 mmHg (<48mmHg) MV DT:      262 msec (150-240msec) AORTIC VALVE:                          Normal Ranges: AoV Vmax:      1.73 m/s  (<=1.7m/s) AoV Peak P.0 mmHg (<20mmHg) LVOT Max Siddhartha:  1.21 m/s  (<=1.1m/s) LVOT VTI:      29.10 cm LVOT Diameter: 2.00 cm   (1.8-2.4cm) AoV Area,Vmax: 2.20 cm2  (2.5-4.5cm2)  RIGHT VENTRICLE: RV Basal 3.18 cm RV Mid   2.70 cm RV Major 5.2 cm TAPSE:   16.5 mm RV s'    0.13 m/s TRICUSPID VALVE/RVSP:                   Normal Ranges: IVC Diam: 1.75 cm PULMONIC VALVE:                         Normal Ranges: PV Accel Time: 69 msec  (>120ms) PV Max Siddhartha:    1.4 m/s  (0.6-0.9m/s) PV Max P.3 mmHg Pulmonary Veins: PulmV Urrutia Siddhartha: 47.60 cm/s PulmV S/D Siddhartha:  1.40 PulmV Sys Siddhartha:  68.10 cm/s  [id number] [facility] MARTINEZ Electronically signed on 12/15/2023 at 10:13:13 AM  **  Final **     CT head wo IV contrast    Result Date: 12/14/2023  Interpreted By:  Rashawn Hook, STUDY: CT HEAD WO IV CONTRAST; 12/14/2023 10:34 am   INDICATION: Signs/Symptoms:lightheadedness, near syncope.   COMPARISON: No comparison exams available.   ACCESSION NUMBER(S): [id number]   ORDERING CLINICIAN: SLY DAWKINS   TECHNIQUE: CT axial images through the Brain were obtained without contrast.   FINDINGS: Acute ischemic change: None.   Hemorrhage: No evidence of acute intracranial hemorrhage.   Mass Effect / Mass Lesion: No significant mass effect. There is no evidence of an intracranial mass or extraaxial fluid collection.   Chronic ischemic change: Patchy foci of  low attenuation coefficient are present within white matter which is a nonspecific finding but likely represents moderate microvascular ischemia.   Parenchyma: There is no significant volume loss. The brain parenchyma is otherwise within normal limits for age.   Ventricles: Normal caliber and morphology.   Other: There is calcification involving the carotid siphons.       No acute intracranial process   All CT examinations are performed with 1 or more of the following dose reduction techniques: Automated exposure control, adjustment of mA and/or kv according to patient's size, or use of iterative reconstruction techniques.   MACRO: none   Signed by: Rashawn Hook 12/14/2023 10:48 AM Dictation workstation:   [id number]    XR chest 2 views    Result Date: 12/13/2023  Interpreted By:  Nj Zazueta, STUDY: XR CHEST 2 VIEWS; 12/13/2023 2:47 pm   INDICATION: Signs/Symptoms:SOB dizziness   COMPARISON: Chest radiograph 06/09/2020   ACCESSION NUMBER(S): [id number]   ORDERING CLINICIAN: LA SAEED   TECHNIQUE: AP and lateral views of the chest performed.   FINDINGS: Cardiac size is indeterminate due to the AP projection. Calcifications are seen at the aortic knob. Thoracic aorta is tortuous in appearance. Mild prominence of the pulmonary  vasculature. Faint bibasilar hazy opacities likely reflect atelectasis. No focal consolidation, large pleural effusion, or visible pneumothorax. Multilevel discogenic degenerative changes of the thoracic spine. Presumed neurostimulator leads overlying the thoracic spine.       Mild prominence of the pulmonary vasculature, as can be seen with pulmonary vascular congestion. Please correlate for corresponding symptoms.   No focal consolidation or visible pneumothorax.   Faint bibasilar atelectasis suspected.   MACRO: None.   Signed by: Nj Zazueta 12/13/2023 3:04 PM Dictation workstation:   [id number]    Scheduled medications  amiodarone, 200 mg, oral, Daily  amitriptyline, 10 mg, oral, Nightly  apixaban, 2.5 mg, oral, BID  cholestyramine light, 4 g, oral, Daily with evening meal  DULoxetine, 20 mg, oral, Daily  folic acid, 1 mg, oral, Daily  gabapentin, 300 mg, oral, BID  lactobacillus acidophilus, 1 tablet, oral, BID with meals  levothyroxine, 150 mcg, oral, Daily  metoprolol succinate XL, 12.5 mg, oral, Daily  potassium chloride CR, 40 mEq, oral, Daily  simvastatin, 10 mg, oral, Nightly  sodium bicarbonate, 650 mg, oral, TID  vancomycin, 125 mg, oral, 4x daily      Continuous medications       PRN medications  PRN medications: acetaminophen **OR** acetaminophen **OR** acetaminophen, benzocaine-menthol, dextromethorphan-guaifenesin, guaiFENesin    Assessment/Plan   Principal Problem:    Weakness  Active Problems:    Dyslipidemia    Chronic kidney disease, stage 4 (severe) (CMS/HCC)    Fibromyalgia    Chronic lymphocytic leukemia (CMS/HCC)    Benign essential hypertension    Congestive heart failure with left ventricular diastolic dysfunction, acute on chronic (CMS/HCC)    Atrial fibrillation (CMS/HCC)  Chest pain discomfort  Patient was complaining of pain under her left breast radiating to her back  Pain mostly when she takes a breath.   Order troponin stat  Order chest x-ray 2 view    Atrial fibrillation with  pauses/ A-fib with RVR   HR is on 150 th . Do miranda awamurray, following with Dr. Chisholm cardiology input   Following Dr. Bruce   Monitor closely   Nuclear stress test done on 12/26 negative for ischemia.  Normal EF  65%. . Small bilateral basilar effusions and compressive atelectasis . Bilateral axillary lymphadenopathy nonspecific   On miodarone 200 mg daily and metoprolol 12.5 daily per cardiology   on Eliquis 2.5 mg twice a day due to her creatinine clearance 25.6.      Acute kidney injury   Cr 1.9  was 1.7 and  2.1   Dr. Amor on the case      C. difficile colitis  X 2 loose stools today   On vancomycin   from ID services on the case .     Leukocytosis/history of leukemia/ Anemia  HH stable   Secondary to CLL  Seen by  Dr. Ferreira  Monitor close.     Hypothyroidism  Continue with  home medication.    Hypokalemia  Resolved    Weakness/deconditioning   Fall precautions  PT/OT comments moderate intensity    DVT prophylaxis  Heparin subcu     Discharge plan:  This is 83 years old female with med Hx history of hypertension, hypothyroidism, IBS, chronic kidney disease stage IV, CLL. . She  presented from home with weakness and fall.  Admitted for evaluation for weakness, heart failure preserved EF, CKD stage III-IV, acute on chronic anemia and C-diff.   Evaluated by cardiology stress test done on 12/26  Evaluated by Dr. Chisholm regarding new onset of A-fib with pauses, recommend to not use AV amna blocking drugs also not a candidate for pacemaker insertion at this time.  Patient has to follow-up with him in 2 weeks long-term Holter prior to her discharge.   Evaluated by ID due to C. difficile, patient is on oral vancomycin.   Evaluated by nephrology due to JANET on CKD3-4; l   A-fib with RVR patient started on amiodarone and metoprolol per cardiology.  Also due to high risk of stroke started on Eliquis 2.5 mg twice a day.      Code Status  DNR CCA DNI    Anticipate discharging patient  skilled of nursing when  medically clear     I spent 25 minutes in the professional and overall care of this patient.    Camille Olson, APRN-CNP

## 2023-12-31 NOTE — NURSING NOTE
Assumed care of this pt. Pt sitting in bed, breathing even and unlabore don 2L NC. NAD. BSSR. Bed low, call light and belongings in reach. Continuing to monitor

## 2023-12-31 NOTE — CARE PLAN
Spoke with nurse regarding discharge and that the referral expires at the end of the day.  States patients HR is increased over 150 for 36 hours/ will need a new precert.

## 2024-01-01 ENCOUNTER — APPOINTMENT (OUTPATIENT)
Dept: RADIOLOGY | Facility: HOSPITAL | Age: 84
DRG: 371 | End: 2024-01-01
Payer: MEDICARE

## 2024-01-01 LAB
ANION GAP SERPL CALC-SCNC: 11 MMOL/L
BUN SERPL-MCNC: 19 MG/DL (ref 8–25)
CALCIUM SERPL-MCNC: 9 MG/DL (ref 8.5–10.4)
CHLORIDE SERPL-SCNC: 104 MMOL/L (ref 97–107)
CO2 SERPL-SCNC: 24 MMOL/L (ref 24–31)
CREAT SERPL-MCNC: 2.1 MG/DL (ref 0.4–1.6)
ERYTHROCYTE [DISTWIDTH] IN BLOOD BY AUTOMATED COUNT: 13.5 % (ref 11.5–14.5)
GFR SERPL CREATININE-BSD FRML MDRD: 23 ML/MIN/1.73M*2
GLUCOSE SERPL-MCNC: 116 MG/DL (ref 65–99)
HCT VFR BLD AUTO: 34.6 % (ref 36–46)
HGB BLD-MCNC: 10.7 G/DL (ref 12–16)
MCH RBC QN AUTO: 31.1 PG (ref 26–34)
MCHC RBC AUTO-ENTMCNC: 30.9 G/DL (ref 32–36)
MCV RBC AUTO: 101 FL (ref 80–100)
NRBC BLD-RTO: 0 /100 WBCS (ref 0–0)
PLATELET # BLD AUTO: 364 X10*3/UL (ref 150–450)
POTASSIUM SERPL-SCNC: 4.6 MMOL/L (ref 3.4–5.1)
RBC # BLD AUTO: 3.44 X10*6/UL (ref 4–5.2)
SODIUM SERPL-SCNC: 139 MMOL/L (ref 133–145)
TROPONIN T SERPL-MCNC: 52 NG/L
WBC # BLD AUTO: 45.4 X10*3/UL (ref 4.4–11.3)

## 2024-01-01 PROCEDURE — 2500000001 HC RX 250 WO HCPCS SELF ADMINISTERED DRUGS (ALT 637 FOR MEDICARE OP): Performed by: REGISTERED NURSE

## 2024-01-01 PROCEDURE — 2500000002 HC RX 250 W HCPCS SELF ADMINISTERED DRUGS (ALT 637 FOR MEDICARE OP, ALT 636 FOR OP/ED): Performed by: REGISTERED NURSE

## 2024-01-01 PROCEDURE — 2060000001 HC INTERMEDIATE ICU ROOM DAILY

## 2024-01-01 PROCEDURE — 85027 COMPLETE CBC AUTOMATED: CPT | Performed by: NURSE PRACTITIONER

## 2024-01-01 PROCEDURE — 80048 BASIC METABOLIC PNL TOTAL CA: CPT | Performed by: NURSE PRACTITIONER

## 2024-01-01 PROCEDURE — 97116 GAIT TRAINING THERAPY: CPT | Mod: GP,CQ

## 2024-01-01 PROCEDURE — 84484 ASSAY OF TROPONIN QUANT: CPT | Performed by: NURSE PRACTITIONER

## 2024-01-01 PROCEDURE — 2500000001 HC RX 250 WO HCPCS SELF ADMINISTERED DRUGS (ALT 637 FOR MEDICARE OP): Performed by: INTERNAL MEDICINE

## 2024-01-01 PROCEDURE — 97110 THERAPEUTIC EXERCISES: CPT | Mod: GP,CQ

## 2024-01-01 PROCEDURE — 2500000001 HC RX 250 WO HCPCS SELF ADMINISTERED DRUGS (ALT 637 FOR MEDICARE OP): Performed by: NURSE PRACTITIONER

## 2024-01-01 PROCEDURE — 71250 CT THORAX DX C-: CPT

## 2024-01-01 PROCEDURE — 2500000004 HC RX 250 GENERAL PHARMACY W/ HCPCS (ALT 636 FOR OP/ED): Performed by: INTERNAL MEDICINE

## 2024-01-01 PROCEDURE — 36415 COLL VENOUS BLD VENIPUNCTURE: CPT | Performed by: NURSE PRACTITIONER

## 2024-01-01 RX ADMIN — VANCOMYCIN HYDROCHLORIDE 125 MG: 125 CAPSULE ORAL at 17:55

## 2024-01-01 RX ADMIN — CHOLESTYRAMINE 4 G: 4 POWDER, FOR SUSPENSION ORAL at 17:55

## 2024-01-01 RX ADMIN — GABAPENTIN 300 MG: 300 CAPSULE ORAL at 21:20

## 2024-01-01 RX ADMIN — LEVOTHYROXINE SODIUM 150 MCG: 0.15 TABLET ORAL at 06:35

## 2024-01-01 RX ADMIN — Medication 1 TABLET: at 17:55

## 2024-01-01 RX ADMIN — FUROSEMIDE 60 MG: 40 TABLET ORAL at 14:05

## 2024-01-01 RX ADMIN — APIXABAN 2.5 MG: 2.5 TABLET, FILM COATED ORAL at 21:20

## 2024-01-01 RX ADMIN — AMITRIPTYLINE HYDROCHLORIDE 10 MG: 10 TABLET, FILM COATED ORAL at 21:20

## 2024-01-01 RX ADMIN — SODIUM BICARBONATE 650 MG TABLET 650 MG: at 08:19

## 2024-01-01 RX ADMIN — GABAPENTIN 300 MG: 300 CAPSULE ORAL at 08:18

## 2024-01-01 RX ADMIN — AMIODARONE HYDROCHLORIDE 200 MG: 200 TABLET ORAL at 08:20

## 2024-01-01 RX ADMIN — SODIUM BICARBONATE 650 MG TABLET 650 MG: at 21:20

## 2024-01-01 RX ADMIN — DULOXETINE HYDROCHLORIDE 20 MG: 20 CAPSULE, DELAYED RELEASE ORAL at 08:20

## 2024-01-01 RX ADMIN — METOPROLOL SUCCINATE 12.5 MG: 25 TABLET, EXTENDED RELEASE ORAL at 08:19

## 2024-01-01 RX ADMIN — VANCOMYCIN HYDROCHLORIDE 125 MG: 125 CAPSULE ORAL at 14:05

## 2024-01-01 RX ADMIN — FOLIC ACID 1 MG: 1 TABLET ORAL at 08:20

## 2024-01-01 RX ADMIN — VANCOMYCIN HYDROCHLORIDE 125 MG: 125 CAPSULE ORAL at 21:20

## 2024-01-01 RX ADMIN — VANCOMYCIN HYDROCHLORIDE 125 MG: 125 CAPSULE ORAL at 06:35

## 2024-01-01 RX ADMIN — APIXABAN 2.5 MG: 2.5 TABLET, FILM COATED ORAL at 08:20

## 2024-01-01 RX ADMIN — Medication 1 TABLET: at 08:19

## 2024-01-01 RX ADMIN — SIMVASTATIN 10 MG: 10 TABLET, FILM COATED ORAL at 21:20

## 2024-01-01 RX ADMIN — SODIUM BICARBONATE 650 MG TABLET 650 MG: at 17:55

## 2024-01-01 RX ADMIN — ACETAMINOPHEN 650 MG: 325 TABLET ORAL at 21:20

## 2024-01-01 ASSESSMENT — PAIN SCALES - GENERAL
PAINLEVEL_OUTOF10: 0 - NO PAIN
PAINLEVEL_OUTOF10: 2
PAINLEVEL_OUTOF10: 0 - NO PAIN

## 2024-01-01 ASSESSMENT — COGNITIVE AND FUNCTIONAL STATUS - GENERAL
PERSONAL GROOMING: A LITTLE
DRESSING REGULAR LOWER BODY CLOTHING: A LITTLE
MOVING FROM LYING ON BACK TO SITTING ON SIDE OF FLAT BED WITH BEDRAILS: A LITTLE
MOBILITY SCORE: 12
MOVING FROM LYING ON BACK TO SITTING ON SIDE OF FLAT BED WITH BEDRAILS: A LITTLE
DRESSING REGULAR UPPER BODY CLOTHING: A LOT
MOBILITY SCORE: 12
DAILY ACTIVITIY SCORE: 17
MOVING TO AND FROM BED TO CHAIR: A LOT
HELP NEEDED FOR BATHING: A LOT
HELP NEEDED FOR BATHING: A LOT
WALKING IN HOSPITAL ROOM: A LOT
DRESSING REGULAR UPPER BODY CLOTHING: A LITTLE
STANDING UP FROM CHAIR USING ARMS: A LOT
MOBILITY SCORE: 14
TOILETING: A LOT
DRESSING REGULAR LOWER BODY CLOTHING: A LITTLE
CLIMB 3 TO 5 STEPS WITH RAILING: A LOT
TURNING FROM BACK TO SIDE WHILE IN FLAT BAD: A LITTLE
PERSONAL GROOMING: A LITTLE
CLIMB 3 TO 5 STEPS WITH RAILING: TOTAL
STANDING UP FROM CHAIR USING ARMS: A LOT
MOVING FROM LYING ON BACK TO SITTING ON SIDE OF FLAT BED WITH BEDRAILS: A LITTLE
TURNING FROM BACK TO SIDE WHILE IN FLAT BAD: A LOT
STANDING UP FROM CHAIR USING ARMS: A LOT
MOVING TO AND FROM BED TO CHAIR: A LOT
WALKING IN HOSPITAL ROOM: A LOT
DAILY ACTIVITIY SCORE: 16
WALKING IN HOSPITAL ROOM: A LOT
MOVING TO AND FROM BED TO CHAIR: A LOT
TOILETING: A LOT
TURNING FROM BACK TO SIDE WHILE IN FLAT BAD: A LOT
CLIMB 3 TO 5 STEPS WITH RAILING: TOTAL

## 2024-01-01 ASSESSMENT — PAIN - FUNCTIONAL ASSESSMENT
PAIN_FUNCTIONAL_ASSESSMENT: 0-10
PAIN_FUNCTIONAL_ASSESSMENT: FLACC (FACE, LEGS, ACTIVITY, CRY, CONSOLABILITY)
PAIN_FUNCTIONAL_ASSESSMENT: 0-10

## 2024-01-01 ASSESSMENT — ENCOUNTER SYMPTOMS
DIARRHEA: 0
ABDOMINAL PAIN: 0

## 2024-01-01 NOTE — PROGRESS NOTES
Tracie Baltazar is a 83 y.o. female on day 8 of admission presenting with Weakness.      Subjective   Overall the patient is doing well with no complaints other than mild dyspnea.  Diarrhea resolved.  Her creatinine is fairly stable right around baseline.       Objective          Vitals 24HR  Heart Rate:  [107-147]   Temp:  [36.5 °C (97.7 °F)-36.8 °C (98.2 °F)]   Resp:  [18-21]   BP: (101-113)/(58-73)   SpO2:  [90 %-97 %]       Intake/Output last 3 Shifts:  No intake or output data in the 24 hours ending 01/01/24 1202    Physical Exam  Constitutional:       General: She is awake. She is not in acute distress.  Cardiovascular:      Heart sounds:      No friction rub.   Pulmonary:      Effort: Pulmonary effort is normal.      Breath sounds: Normal breath sounds.   Abdominal:      General: Bowel sounds are normal.      Palpations: Abdomen is soft.      Tenderness: There is no guarding or rebound.   Musculoskeletal:      Right lower leg: No edema.      Left lower leg: No edema.   Neurological:      Mental Status: She is alert.         Relevant Results  Results for orders placed or performed during the hospital encounter of 12/24/23 (from the past 24 hour(s))   Serial Troponin, Initial ([facility])   Result Value Ref Range    Troponin T, High Sensitivity 50 (H) <=15 ng/L   Serial Troponin, 2 Hour ([facility])   Result Value Ref Range    Troponin T, High Sensitivity 52 (H) <=15 ng/L   Serial Troponin, 6 Hour ([facility])   Result Value Ref Range    Troponin T, High Sensitivity 52 (H) <=15 ng/L   CBC   Result Value Ref Range    WBC 45.4 (H) 4.4 - 11.3 x10*3/uL    nRBC 0.0 0.0 - 0.0 /100 WBCs    RBC 3.44 (L) 4.00 - 5.20 x10*6/uL    Hemoglobin 10.7 (L) 12.0 - 16.0 g/dL    Hematocrit 34.6 (L) 36.0 - 46.0 %     (H) 80 - 100 fL    MCH 31.1 26.0 - 34.0 pg    MCHC 30.9 (L) 32.0 - 36.0 g/dL    RDW 13.5 11.5 - 14.5 %    Platelets 364 150 - 450 x10*3/uL   Basic Metabolic Panel   Result Value Ref Range    Glucose 116 (H) 65 - 99 mg/dL     Sodium 139 133 - 145 mmol/L    Potassium 4.6 3.4 - 5.1 mmol/L    Chloride 104 97 - 107 mmol/L    Bicarbonate 24 24 - 31 mmol/L    Urea Nitrogen 19 8 - 25 mg/dL    Creatinine 2.10 (H) 0.40 - 1.60 mg/dL    eGFR 23 (L) >60 mL/min/1.73m*2    Calcium 9.0 8.5 - 10.4 mg/dL    Anion Gap 11 <=19 mmol/L            Assessment/Plan   1.  Acute kidney injury renal function is much improved her creatinine is back to baseline continue to monitor renal function   -Baseline creatinine 1.6-1.9  3.  C. difficile colitis continue with oral vancomycin  -Improved  4.  CLL  5.  Hypertension  6. congestive heart failure  - Start oral Lasix at 60 mg once a day and monitor    Darnell Amor MD

## 2024-01-01 NOTE — CARE PLAN
The patient's goals for the shift include increase strength    The clinical goals for the shift include patient safety, fall prevention, rest.      Problem: Pain  Goal: My pain/discomfort is manageable  Outcome: Progressing     Problem: Safety  Goal: Patient will be injury free during hospitalization  Outcome: Progressing  Goal: I will remain free of falls  Outcome: Progressing     Problem: Daily Care  Goal: Daily care needs are met  Outcome: Progressing     Problem: Psychosocial Needs  Goal: Demonstrates ability to cope with hospitalization/illness  Outcome: Progressing  Goal: Collaborate with me, my family, and caregiver to identify my specific goals  Outcome: Progressing     Problem: Discharge Barriers  Goal: My discharge needs are met  Outcome: Progressing     Problem: Skin  Goal: Decreased wound size/increased tissue granulation at next dressing change  Outcome: Progressing  Goal: Participates in plan/prevention/treatment measures  Outcome: Progressing  Goal: Prevent/manage excess moisture  Outcome: Progressing  Goal: Prevent/minimize sheer/friction injuries  Outcome: Progressing  Goal: Promote/optimize nutrition  Outcome: Progressing  Goal: Promote skin healing  Outcome: Progressing

## 2024-01-01 NOTE — PROGRESS NOTES
Tracie Baltazar is a 83 y.o. female on day 8 of admission presenting with Weakness.    Subjective   Interval History:    patient seen and examined  Daughter present  No  diarrhea   no abdominal pain      Review of Systems   Gastrointestinal:  Negative for abdominal pain and diarrhea.   All other systems reviewed and are negative.      Objective   Range of Vitals (last 24 hours)  Heart Rate:  [107-147]   Temp:  [36.5 °C (97.7 °F)-36.8 °C (98.2 °F)]   Resp:  [18-21]   BP: (101-113)/(58-73)   SpO2:  [90 %-97 %]   Daily Weight  12/30/23 : 83 kg (182 lb 15.7 oz)    Body mass index is 32.42 kg/m².    Physical Exam  Constitutional:       Appearance: Normal appearance.   HENT:      Head: Normocephalic and atraumatic.      Nose: Nose normal.      Mouth/Throat:      Mouth: Mucous membranes are moist.      Pharynx: Oropharynx is clear.   Eyes:      Extraocular Movements: Extraocular movements intact.      Conjunctiva/sclera: Conjunctivae normal.   Cardiovascular:      Rate and Rhythm: Normal rate and regular rhythm.   Pulmonary:      Effort: Pulmonary effort is normal.      Breath sounds: Normal breath sounds. No wheezing or rales.   Abdominal:      General: Bowel sounds are normal.      Palpations: Abdomen is soft.      Tenderness: There is no abdominal tenderness. There is no guarding.   Musculoskeletal:         General: Normal range of motion.      Cervical back: Normal range of motion and neck supple.   Skin:     General: Skin is warm and dry.   Neurological:      General: No focal deficit present.      Mental Status: She is alert and oriented to person, place, and time.   Psychiatric:         Mood and Affect: Mood normal.         Behavior: Behavior normal.     Antibiotics  sodium chloride 0.9 % bolus 1,000 mL  piperacillin-tazobactam-dextrose (Zosyn) IV 4.5 g  vancomycin-diluent combo no.1 (Xellia) IVPB 2 g  amitriptyline (Elavil) tablet 10 mg  DULoxetine (Cymbalta) DR capsule 20 mg  folic acid (Folvite) tablet 1  mg  gabapentin (Neurontin) capsule 300 mg  hydrALAZINE (Apresoline) tablet 20 mg  lactobacillus acidophilus tablet 1 tablet  levothyroxine (Synthroid, Levoxyl) tablet 150 mcg  pantoprazole (ProtoNix) EC tablet 40 mg  simvastatin (Zocor) tablet 10 mg  sodium bicarbonate tablet 650 mg  heparin (porcine) injection 5,000 Units  polyethylene glycol (Glycolax, Miralax) packet 17 g  benzocaine-menthol (Cepastat Sore Throat) 15-3.6 mg lozenge 1 lozenge  guaiFENesin (Mucinex) 12 hr tablet 600 mg  dextromethorphan-guaifenesin (Robitussin DM)  mg/5 mL oral liquid 5 mL  acetaminophen (Tylenol) tablet 650 mg  acetaminophen (Tylenol) oral liquid 650 mg  acetaminophen (Tylenol) suppository 650 mg  sodium chloride 0.9% infusion  metroNIDAZOLE (Flagyl) tablet 500 mg  vancomycin (Vancocin) capsule 125 mg  dilTIAZem (Cardizem) 125 mg in dextrose 5% 125 mL (1 mg/mL) infusion (premix)  dilTIAZem (Cardizem) injection 10 mg  potassium chloride CR (Klor-Con M20) ER tablet 40 mEq  regadenoson (Lexiscan) injection 0.4 mg  aminophylline syringe 125 mg  cholestyramine (Questran) 4 gram packet 4 g  potassium chloride CR (Klor-Con M20) ER tablet 40 mEq  Tc-99m tetrofosmin (Myoview) injection 11 millicurie  Tc-99m tetrofosmin (Myoview) injection 32 millicurie  dilTIAZem (Cardizem) 125 mg in dextrose 5% 125 mL (1 mg/mL) infusion (premix)  dilTIAZem (Cardizem) injection 20 mg  cholestyramine light (Prevalite) 4 gram packet 4 g  amiodarone (Pacerone) tablet 200 mg  apixaban (Eliquis) tablet 2.5 mg  metoprolol succinate XL (Toprol-XL) 24 hr tablet 12.5 mg  furosemide (Lasix) tablet 60 mg      Relevant Results  Labs  Results from last 72 hours   Lab Units 01/01/24  0540 12/31/23  0528 12/30/23  0516   WBC AUTO x10*3/uL 45.4* 39.7* 37.5*   HEMOGLOBIN g/dL 10.7* 10.9* 10.1*   HEMATOCRIT % 34.6* 34.6* 32.8*   PLATELETS AUTO x10*3/uL 364 343 295     Results from last 72 hours   Lab Units 01/01/24  0540 12/31/23  0528 12/30/23  0516   SODIUM mmol/L  "139 140 142   POTASSIUM mmol/L 4.6 4.0 3.7   CHLORIDE mmol/L 104 105 107   CO2 mmol/L 24 23* 22*   BUN mg/dL 19 14 13   CREATININE mg/dL 2.10* 1.90* 1.70*   GLUCOSE mg/dL 116* 115* 101*   CALCIUM mg/dL 9.0 9.0 8.6   ANION GAP mmol/L 11 12 13   EGFR mL/min/1.73m*2 23* 26* 30*         Estimated Creatinine Clearance: 20.7 mL/min (A) (by C-G formula based on SCr of 2.1 mg/dL (H)).  No results found for: \"CRP\"  Microbiology   reviewed  Imaging  CT chest wo IV contrast    Result Date: 1/1/2024  Interpreted By:  Sherman Gonzales, STUDY: CT CHEST WO IV CONTRAST;  1/1/2024 4:37 pm   INDICATION: 84 y/o   F with  abnormal x-ray, question mass.   COMPARISON: None.   ACCESSION NUMBER(S): [id number]   ORDERING CLINICIAN: KIRSTIN SHARP   TECHNIQUE: Axial CT images of the chest without intravenous contrast. Coronal and sagittal reformatted images generated.   FINDINGS: LIMITATIONS/LINES: None.   LOWER NECK/CHEST WALL: Unremarkable.   HEART: Heart is within normal limits of size.  No significant pericardial effusion.   MEDIASTINUM/TIFFANY: Multiple non pathologically enlarged mediastinal lymph nodes, up to 9 mm short axis..   PLEURA: Small effusion bilaterally. Presumed bibasilar atelectasis, involving multiple basilar segments on the left.   LUNG PARENCHYMA: Background of mild interlobular septal thickening in both upper lobes with rare ground-glass opacity..   BONES: No acute finding.   CHEST WALL: Multiple mildly enlarged right axillary lymph nodes, up to 1 cm in short axis. Several borderline enlarged left axillary lymph nodes up to 9 mm short axis.   UPPER ABDOMEN: Unremarkable.       Small pleural effusion bilaterally with bibasilar atelectasis, more on the left. This accounts for the appearance of the chest x-ray findings. Mild interstitial pulmonary edema.   MACRO: None   Signed by: Sherman Gonzales 1/1/2024 5:23 PM Dictation workstation:   [id number]    XR chest 2 views    Result Date: 12/31/2023  Interpreted By:  Monster, " Rashawn, STUDY: XR CHEST 2 VIEWS; 12/31/2023 3:36 pm   INDICATION: Signs/Symptoms:Chest pain and respiratory failure;   COMPARISON: December 24, 2023   ACCESSION NUMBER(S): [id number]   ORDERING CLINICIAN: KIRSTIN SHARP   TECHNIQUE: Views: PA and Lateral   FINDINGS: RESULTS:  The cardiac silhouette is enlarged. Mediastinal contours are unremarkable. Left basilar opacity obscures the left hemidiaphragm. There is blunting of the right costophrenic angle with hazy density in the right lower lobe and diffuse interstitial thickening. There is no evidence for pleural effusion. The osseous structures are unremarkable.       Findings suggesting pulmonary edema and heart failure. Left basilar opacity may represent pleural fluid and atelectasis. Underlying infiltrate or mass is not excluded.     Signed by: Rashawn Hook 12/31/2023 4:18 PM Dictation workstation:   [id number]    ECG 12 lead    Result Date: 12/27/2023   Poor data quality, interpretation may be adversely affected Normal sinus rhythm Nonspecific ST abnormality Abnormal ECG When compared with ECG of 31-MAY-2013 19:08, QT has shortened Confirmed by Magan Perez ([id number]) on 12/27/2023 10:14:24 AM    Nuclear Stress Test    Result Date: 12/26/2023  Interpreted By:  Remigio Cherry, STUDY: NUCLEAR STRESS TEST;  12/26/2023 2:36 pm   INDICATION: Signs/Symptoms:Paroxysmal atrial fibrillation, sick sinus syndrome.   COMPARISON: None.   ACCESSION NUMBER(S): [id number]   ORDERING CLINICIAN: NORY LOZA   TECHNIQUE: DIVISION OF NUCLEAR MEDICINE PHARMACOLOGIC STRESS MYOCARDIAL PERFUSION SCAN, ONE DAY PROTOCOL   The patient received an intravenous injection of 11.0 mCi of Tc-99m Myoview and resting emission tomographic (SPECT) images of the myocardium were acquired. The patient then received an intravenous infusion of 0.4 mg regadenoson (Lexiscan) followed by an additional injection of 32.0 mCi of Tc-99m Myoview. Stress phase SPECT images of the myocardium were then  acquired. These included ECG-gated post-stress and rest images to assess and quantify ventricular function.  Low dose CT was acquired for attenuation correction.   FINDINGS: Stress and rest phase imaging demonstrate no evidence for ischemia. No fixed defects seen to suggest infarct   EKG gated imaging demonstrates normal left ventricular wall motion and thickening. There is normal end-diastolic volume. Normal ejection fraction 65%           Attenuation correction CT images demonstrate nonspecific bilateral axillary lymphadenopathy. For example, in the right axilla measuring 2.8 by 1.2 cm. Lung parenchyma demonstrates small bilateral basilar effusions and compressive atelectasis       1. No evidence for ischemia.   2. Normal ejection fraction 65%.   3. Small bilateral basilar effusions and compressive atelectasis   4. Bilateral axillary lymphadenopathy nonspecific and likely reactive         MACRO: None   Signed by: Remigio Cherry 12/26/2023 2:59 PM Dictation workstation:   [id number]    Cardiology Interpretation Of Nuclear Stress - See Other Report For Nuclear Portion    Result Date: 12/26/2023           [place] [address]            Phone [phone] Nuclear Pharmacologic Stress Test Patient Name:      MARK GABRIEL Ordering Provider:    [id number] NORY LOZA Study Date:        12/26/2023         Reading Physician:    [id number] Bruce DO MRN/PID:           [id number]           Supervising           Balwinder Bruce DO                                       Physician: Accession#:        [id number]       Fellow: Date of Birth/Age: 1940 / 83      Fellow:                    years Gender:            F                  Nurse:                Do Zafar RN Admit Date:                           Technician:           Matt Williamson                                                             CVTI Admission Status:                     Sonographer:          QUYEN  Height:            160.02 cm          Technologist: Weight:            82.10 kg           Additional Staff: BSA:               1.85 m2            Encounter#:           [id number] BMI:               32.06 kg/m2        Patient Location: Study Type:    CARDIOLOGY INTERPRETATION OF NUCLEAR STRESS Diagnosis/ICD: Longstanding persistent atrial fibrillation-I48.11 Indication:    Atrial Fibrillation CPT Codes:     Stress Test Interpretation-24999; Stress Test Supervision-[id number] Falls Risk:  Study Details: Correct procedure and correct patient verified verbally and with                ID Band checked.  Patient History: Chest pain, atrial fibrillation, syncope, hypertension, hyperlipidemia and congestive heart failure.  Medications: SIMVASTATIN, FUROSEMIDE, HYDRALAZINE. The patient did not take medications as prescribed.  Patient Performance: Patient received a total of 0.4 mg of Regadenoson at 11:30:33 AM. The resting blood pressure was 175/68 mmHg with a heart rate of 74 bpm. The patient developed no symptoms during the stress exam. The blood pressure response was normal. The test was terminated due to: completed lab protocol. Standard dose of Lexiscan was infused over 10 to 15 seconds then flushed with saline. The patient then received a standard IV dose of Myoview. The patient did not experience chest pain with infusion of Lexiscan. The resting twelve-lead ECG was essentially normal and there were no significant changes in the ST segments with the infusion of Lexiscan. Resting blood pressure was 170/68 and decreased to 144/60 prior to completion of the study. No arrhythmias were induced. Myoview imaging was to be reported separately. Patient has met the discharge criteria and is discharged to their floor.  Baseline ECG: Resting ECG showed Atrial Fibrillation with AFIB.  Stress ECG: Stress ECG showed atrial fibrillation.  Stress Stage Data: +----------------+--+------+-------+                 HRSys BPDias BP  +----------------+--+------+-------+ Baseline [id number]   68      +----------------+--+------+-------+  Recovery ECG: Recovery ECG showed atrial fibrillation.  +------------+--+------+-------+             HRSys BPDias BP +------------+--+------+-------+ Recovery I  [id number]   54      +------------+--+------+-------+ Recovery II [id number]   54      +------------+--+------+-------+ Recovery [id number]   61      +------------+--+------+-------+  Summary:  1. No clinical or ECG evidence of ischemia.  2. Myoview imaging to be reported separately.  3. Adequate level of stress achieved.  4. Nuclear image results are reported separately. [id number] Elvis Bruce DO Electronically signed on 12/26/2023 at 11:42:40 AM   ** Final **     US renal complete    Result Date: 12/24/2023  Interpreted By:  Bruno Bryant, STUDY: US RENAL COMPLETE; 12/24/2023 7:50 pm   INDICATION: Signs/Symptoms:JANET on CKD. /history of CLL and hypertension   COMPARISON: None.   ACCESSION NUMBER(S): [id number]   ORDERING CLINICIAN: SERJIO DEVINE   TECHNIQUE: Grayscale and color Doppler imaging of the kidneys.   FINDINGS: The right kidney measures 9.3 cm x 4.1 cm x 4.4 cm cm. The left kidney measures 7.8 cm x 5.0 cm x 3.7 cm cm. There is no shadowing calculus, hydronephrosis, or solid mass identified. However, there is a 2.6 x 2.5 x 2.4 cm inferior pole hypoechoic renal cyst without internal color Doppler flow. Renal cortical thinning and increased echogenicity of the renal parenchyma is noted.   The bladder is grossly unremarkable.       1. No hydronephrosis or nephrolithiasis. 2. Medical renal disease. 3 left renal cyst.     MACRO: None.   Signed by: Bruno Bryant 12/24/2023 8:19 PM Dictation workstation:   [id number]    XR chest 1 view    Result Date: 12/24/2023  Interpreted By:  Nick Luciano, STUDY: XR CHEST 1 VIEW; 12/24/2023 4:15 am   INDICATION: CLINICAL INFORMATION: Signs/Symptoms:weakness.   COMPARISON: 12/18/2020   ACCESSION  NUMBER(S): [id number]   ORDERING CLINICIAN: LESLYE RFANK   TECHNIQUE: Portable chest one view.   FINDINGS: The cardiac size is indeterminate in view of the AP projection. Neurostimulator overlies the thoracic spine. No infiltrates or effusions are identified.  The aorta is tortuous.       No acute pathologic findings are identified.  There is no interval change when compared to the previous examination.   MACRO: none   Signed by: Nick Luciano 12/24/2023 9:11 AM Dictation workstation:   [id number]    XR chest 2 views    Result Date: 12/18/2023  Interpreted By:  Alex Mandel, STUDY: XR CHEST 2 VIEWS; 12/18/2023 8:06 am   INDICATION: Signs/Symptoms:sob   COMPARISON: 12/13/2023   ACCESSION NUMBER(S): [id number]   ORDERING CLINICIAN: SRIKANTH VILCHIS   TECHNIQUE: PA and LAT views of the chest were obtained.   FINDINGS: The cardiomediastinal silhouette is unremarkable. The lungs are clear. No pleural effusion is identified.   The osseous structures are intact. Spinal cord stimulator leads are again seen at the mid and lower thoracic level.       No acute cardiopulmonary process.     Signed by: Alex Mandel 12/18/2023 8:30 AM Dictation workstation:   [id number]    Transthoracic Echo (TTE) Complete    Result Date: 12/15/2023           [place] [address]            Phone [phone] TRANSTHORACIC ECHOCARDIOGRAM REPORT  Patient Name:      MARK Patterson Physician:    [id number] Elivs Bruce DO Study Date:        12/15/2023          Ordering Provider:    [id number] ABDULLAHI VANESSA MRN/PID:           [id number]            Fellow: Accession#:        [id number]        Nurse: Date of Birth/Age: 1940 / 83 years Sonographer:          Kristen WESTFALL Gender:            F                   Additional Staff: Height:            154.94 cm           Admit Date:           12/14/2023 Weight:             82.10 kg            Admission Status:     Inpatient - Routine BSA:               1.81 m2             Department Location: Blood Pressure: 150 /47 mmHg Study Type:    TRANSTHORACIC ECHO (TTE) COMPLETE Diagnosis/ICD: Shortness of breath-R06.02 Indication:    Shortness of Breath CPT Codes:     Echo Complete w Full Doppler-01186 Patient History: Pertinent History: CKD CHF Depression HTN CP Murmur Dyslipidemia Varicose veins                    of leg with swellin ghyperlipidemia hypercholesterolemia. Study Detail: The following Echo studies were performed: 2D, Doppler, M-Mode,               color flow and 3D.  PHYSICIAN INTERPRETATION: Left Ventricle: Left ventricular systolic function is normal, with an estimated ejection fraction of 60-65%. There are no regional wall motion abnormalities. The left ventricular cavity size is normal. Left ventricular diastolic filling was indeterminate. Left Atrium: The left atrium is normal in size. Right Ventricle: The right ventricle is normal in size. There is normal right ventricular global systolic function. Right Atrium: The right atrium is normal in size. Aortic Valve: The aortic valve is trileaflet. There is mild aortic valve cusp calcification. There is no evidence of aortic valve regurgitation. The peak instantaneous gradient of the aortic valve is 12.0 mmHg. Mitral Valve: The mitral valve is normal in structure. There is trace to mild mitral valve regurgitation. Tricuspid Valve: The tricuspid valve is structurally normal. No evidence of tricuspid regurgitation. Pulmonic Valve: The pulmonic valve is not well visualized. There is trace to mild pulmonic valve regurgitation. Pericardium: There is no pericardial effusion noted. Aorta: The aortic root is normal. Systemic Veins: The inferior vena cava appears to be of normal size. There is IVC inspiratory collapse greater than 50%.  CONCLUSIONS:  1. Left ventricular systolic function is normal with a 60-65% estimated ejection  fraction.  2. Left ventricular diastolic filling indeterminate.  3. Mild aortic valve sclerosis. QUANTITATIVE DATA SUMMARY: 2D MEASUREMENTS:                          Normal Ranges: LAs:           3.00 cm   (2.7-4.0cm) IVSd:          0.85 cm   (0.6-1.1cm) LVPWd:         1.00 cm   (0.6-1.1cm) LVIDd:         4.23 cm   (3.9-5.9cm) LVIDs:         2.79 cm LV Mass Index: 69.0 g/m2 LV % FS        34.0 % LA VOLUME:                               Normal Ranges: LA Vol A2C:        59.3 ml LA Vol Index A2C:  32.8 ml/m2 LA Area A2C:       18.7 cm2 LA Major Axis A2C: 5.0 cm LA Volume Index:   29.6 ml/m2 LA Vol A2C:        59.7 ml RA VOLUME BY A/L METHOD:                               Normal Ranges: RA Vol A4C:        32.1 ml    (8.3-19.5ml) RA Vol Index A4C:  17.7 ml/m2 RA Area A4C:       13.6 cm2 RA Major Axis A4C: 4.9 cm M-MODE MEASUREMENTS:                  Normal Ranges: Ao Root: 2.20 cm (2.0-3.7cm) LAs:     4.80 cm (2.7-4.0cm) AORTA MEASUREMENTS:                    Normal Ranges: Asc Ao, d: 3.10 cm (2.1-3.4cm) LV SYSTOLIC FUNCTION BY 2D PLANIMETRY (MOD):                     Normal Ranges: EF-A4C View: 51.6 % (>=55%) EF-A2C View: 57.9 % EF-Biplane:  55.8 % LV DIASTOLIC FUNCTION:                            Normal Ranges: MV Peak E:      0.93 m/s   (0.7-1.2 m/s) MV Peak A:      1.02 m/s   (0.42-0.7 m/s) E/A Ratio:      0.91       (1.0-2.2) MV e'           0.08 m/s   (>8.0) MV lateral e'   0.08 m/s MV medial e'    0.07 m/s E/e' Ratio:     12.39      (<8.0) PulmV Sys Siddhartha:  68.10 cm/s PulmV Urrutia Siddhartha: 47.60 cm/s PulmV S/D Siddhartha:  1.40 MITRAL VALVE:                      Normal Ranges: MV Vmax:    1.22 m/s (<=1.3m/s) MV peak P.0 mmHg (<5mmHg) MV mean P.0 mmHg (<48mmHg) MV DT:      262 msec (150-240msec) AORTIC VALVE:                          Normal Ranges: AoV Vmax:      1.73 m/s  (<=1.7m/s) AoV Peak P.0 mmHg (<20mmHg) LVOT Max Siddhartha:  1.21 m/s  (<=1.1m/s) LVOT VTI:      29.10 cm LVOT Diameter: 2.00 cm   (1.8-2.4cm) AoV  Area,Vmax: 2.20 cm2  (2.5-4.5cm2)  RIGHT VENTRICLE: RV Basal 3.18 cm RV Mid   2.70 cm RV Major 5.2 cm TAPSE:   16.5 mm RV s'    0.13 m/s TRICUSPID VALVE/RVSP:                   Normal Ranges: IVC Diam: 1.75 cm PULMONIC VALVE:                         Normal Ranges: PV Accel Time: 69 msec  (>120ms) PV Max Siddhartha:    1.4 m/s  (0.6-0.9m/s) PV Max P.3 mmHg Pulmonary Veins: PulmV Urrutia Siddhartha: 47.60 cm/s PulmV S/D Siddhartha:  1.40 PulmV Sys Siddhartha:  68.10 cm/s  [id number] Elvis Uribesa MARTINEZ Electronically signed on 12/15/2023 at 10:13:13 AM  ** Final **     CT head wo IV contrast    Result Date: 2023  Interpreted By:  Rashawn Hook, STUDY: CT HEAD WO IV CONTRAST; 2023 10:34 am   INDICATION: Signs/Symptoms:lightheadedness, near syncope.   COMPARISON: No comparison exams available.   ACCESSION NUMBER(S): [id number]   ORDERING CLINICIAN: SLY DAWKINS   TECHNIQUE: CT axial images through the Brain were obtained without contrast.   FINDINGS: Acute ischemic change: None.   Hemorrhage: No evidence of acute intracranial hemorrhage.   Mass Effect / Mass Lesion: No significant mass effect. There is no evidence of an intracranial mass or extraaxial fluid collection.   Chronic ischemic change: Patchy foci of  low attenuation coefficient are present within white matter which is a nonspecific finding but likely represents moderate microvascular ischemia.   Parenchyma: There is no significant volume loss. The brain parenchyma is otherwise within normal limits for age.   Ventricles: Normal caliber and morphology.   Other: There is calcification involving the carotid siphons.       No acute intracranial process   All CT examinations are performed with 1 or more of the following dose reduction techniques: Automated exposure control, adjustment of mA and/or kv according to patient's size, or use of iterative reconstruction techniques.   MACRO: none   Signed by: Rashawn Hook 2023 10:48 AM Dictation workstation:   [id number]    XR  chest 2 views    Result Date: 12/13/2023  Interpreted By:  Nj Zazueta, STUDY: XR CHEST 2 VIEWS; 12/13/2023 2:47 pm   INDICATION: Signs/Symptoms:SOB dizziness   COMPARISON: Chest radiograph 06/09/2020   ACCESSION NUMBER(S): [id number]   ORDERING CLINICIAN: LA SAEED   TECHNIQUE: AP and lateral views of the chest performed.   FINDINGS: Cardiac size is indeterminate due to the AP projection. Calcifications are seen at the aortic knob. Thoracic aorta is tortuous in appearance. Mild prominence of the pulmonary vasculature. Faint bibasilar hazy opacities likely reflect atelectasis. No focal consolidation, large pleural effusion, or visible pneumothorax. Multilevel discogenic degenerative changes of the thoracic spine. Presumed neurostimulator leads overlying the thoracic spine.       Mild prominence of the pulmonary vasculature, as can be seen with pulmonary vascular congestion. Please correlate for corresponding symptoms.   No focal consolidation or visible pneumothorax.   Faint bibasilar atelectasis suspected.   MACRO: None.   Signed by: Nj Zazueta 12/13/2023 3:04 PM Dictation workstation:   [id number]     Assessment/Plan     Acute kidney injury- marginal increase in serum creatinine  Leukocytosis-multifactorial  CLL  C. difficile infection- resolved     Continue oral vancomycin-total of 14 days  Monitor renal function  Contact plus precautions  Questran  Supportive care  Monitor temperature and WBC    Hipolito Elias MD

## 2024-01-01 NOTE — PROGRESS NOTES
Physical Therapy    Physical Therapy Treatment    Patient Name: Tracie Baltazar  MRN: [id number]  Today's Date: 1/1/2024  Time Calculation  Start Time: 1344  Stop Time: 1407  Time Calculation (min): 23 min       Assessment/Plan   PT Assessment  End of Session Communication: Bedside nurse  End of Session Patient Position: Up in chair  PT Plan  Inpatient/Swing Bed or Outpatient: Inpatient  PT Plan  Treatment/Interventions: Bed mobility, Transfer training, Gait training, Stair training, Balance training, Neuromuscular re-education, Strengthening, Endurance training, Therapeutic exercise, Therapeutic activity  PT Plan: Skilled PT  PT Frequency: 5 times per week  PT Discharge Recommendations: Moderate intensity level of continued care  Equipment Recommended upon Discharge: Wheeled walker  PT Recommended Transfer Status: Assist x1  PT - OK to Discharge: Yes (with skilled physical therapy services at next level of care.)      General Visit Information:   PT  Visit  PT Received On: 01/01/24  General  Family/Caregiver Present: Yes  Caregiver Feedback: Son present  Prior to Session Communication: Bedside nurse  Patient Position Received: Bed, 3 rail up  General Comment: Cleared by nursing to be seen for therapy, pt agreeable with tx, supine in bed upon arrival.    Subjective   Precautions:  Precautions  Precautions Comment: Contact Precautions (c-diff)  Vital Signs:  Vital Signs  Heart Rate: (!) 149 (HR fluctuating 110-149 RN notified)    Objective   Pain:  Pain Assessment  Pain Assessment: 0-10  Pain Score: 0 - No pain    Treatments:  Therapeutic Exercise  Therapeutic Exercise Performed: Yes  Therapeutic Exercise Activity 1: Bilateral ankle pumps x15  Therapeutic Exercise Activity 2: Bilateral hip flexion x15    Therapeutic Activity  Therapeutic Activity Performed: Yes  Therapeutic Activity 1: Seated EOB x4-5 minutes, presents with mild retropulsion requiring min assist to maintain midline.    Balance/Neuromuscular  Re-Education  Balance/Neuromuscular Re-Education Activity Performed: Yes  Balance/Neuromuscular Re-Education Activity 1: Poor seated static balance, poor static standing balance.    Bed Mobility  Bed Mobility: Yes  Bed Mobility 1  Bed Mobility 1: Supine to sitting, Sitting to supine  Level of Assistance 1: Moderate assistance  Bed Mobility Comments 1: Mod assist for trunk during supine to sit, mod assist to scoot EOB with use of draw sheet.    Ambulation/Gait Training  Ambulation/Gait Training Performed: Yes  Ambulation/Gait Training 1  Surface 1: Level tile  Device 1: Rolling walker  Assistance 1: Moderate assistance  Comments/Distance (ft) 1: 5-6 steps (bed to chair) with wheeled walker, presents with slow iker, difficulty advancing LE's, retropulsive, assist to maneuver walker, mod assist x2 for balance.  Transfers  Transfer: Yes  Transfer 1  Transfer From 1: Sit to  Transfer to 1: Stand  Transfer Level of Assistance 1: Moderate assistance  Trials/Comments 1: Mod assist x2 for trunk up during sit to stand, poor static standing balance, cues for proper hand placement, decreased eccentric control in sitting.    Stairs  Stairs: No    Outcome Measures:  [facility] Basic Mobility  Turning from your back to your side while in a flat bed without using bedrails: A little  Moving from lying on your back to sitting on the side of a flat bed without using bedrails: A lot  Moving to and from bed to chair (including a wheelchair): A lot  Standing up from a chair using your arms (e.g. wheelchair or bedside chair): A lot  To walk in hospital room: A lot  Climbing 3-5 steps with railing: Total  Basic Mobility - Total Score: 12         Encounter Problems       Encounter Problems (Active)       Mobility       STG - Patient will ambulate 50' with rolling walker and modified independence. (Progressing)       Start:  12/25/23    Expected End:  01/08/24            STG - Patient will ascend and descend two stairs with use of one handrail  and supervision for safety. (Progressing)       Start:  12/25/23    Expected End:  01/08/24               Safety       STG - Patient uses call light consistently to request assistance with transfers (Progressing)       Start:  12/25/23    Expected End:  01/08/24               Transfers       STG - Patient to transfer to and from sit to supine with independence. (Progressing)       Start:  12/25/23    Expected End:  01/08/24            STG - Patient will transfer sit to and from stand with rolling walker and modified independence. (Progressing)       Start:  12/25/23    Expected End:  01/08/24

## 2024-01-01 NOTE — PROGRESS NOTES
"Tracie Baltazar is a 83 y.o. female on day 8 of admission presenting with Weakness.    Subjective   HPI   Patient seen and examined, patient is resting in her bed, complains of pain under her right breast radiating to left breast .  Mostly when she takes a breath.    No  loose stool  this morning,  tolerates well her diet out any nausea or vomiting .  Her heart is 120-140 , feeling weak and fatigued.  Denies any  shortness of breath on 2 L nasal cannula   No fever or chills.     Objective     Last Recorded Vitals  Blood pressure 104/58, pulse (!) 124, temperature 36.6 °C (97.9 °F), temperature source Temporal, resp. rate 20, height 1.6 m (5' 2.99\"), weight 83 kg (182 lb 15.7 oz), SpO2 95 %.    No intake or output data in the 24 hours ending 01/01/24 1011        Physical Exam  Constitutional:       Appearance: Normal appearance.   HENT:      Head: Normocephalic and atraumatic.      Nose: Nose normal.   Eyes:      Extraocular Movements: Extraocular movements intact.      Pupils: Pupils are equal, round, and reactive to light.   Cardiovascular:      Rate and Rhythm: Rhythm irregular.      Comments: Irregular rhythm with pauses   Pulmonary:      Effort: Pulmonary effort is normal. No respiratory distress.   Abdominal:      General: Bowel sounds are normal.      Palpations: Abdomen is soft.   Musculoskeletal:      Cervical back: Normal range of motion and neck supple.      Right lower leg: No edema.      Left lower leg: No edema.   Skin:     General: Skin is warm.   Neurological:      General: No focal deficit present.      Mental Status: She is alert. Mental status is at baseline.   Psychiatric:         Mood and Affect: Mood normal.          Relevant Results    Lab Results   Component Value Date    WBC 45.4 (H) 01/01/2024    HGB 10.7 (L) 01/01/2024    HCT 34.6 (L) 01/01/2024     (H) 01/01/2024     01/01/2024       Lab Results   Component Value Date    GLUCOSE 116 (H) 01/01/2024    CALCIUM 9.0 01/01/2024 "     01/01/2024    K 4.6 01/01/2024    CO2 24 01/01/2024     01/01/2024    BUN 19 01/01/2024    CREATININE 2.10 (H) 01/01/2024       Lab Results   Component Value Date    HGBA1C 5.4 12/19/2019       US renal complete    Result Date: 12/24/2023  Interpreted By:  Bruno Bryant, STUDY: US RENAL COMPLETE; 12/24/2023 7:50 pm   INDICATION: Signs/Symptoms:JANET on CKD. /history of CLL and hypertension   COMPARISON: None.   ACCESSION NUMBER(S): [id number]   ORDERING CLINICIAN: SERJIO DEVINE   TECHNIQUE: Grayscale and color Doppler imaging of the kidneys.   FINDINGS: The right kidney measures 9.3 cm x 4.1 cm x 4.4 cm cm. The left kidney measures 7.8 cm x 5.0 cm x 3.7 cm cm. There is no shadowing calculus, hydronephrosis, or solid mass identified. However, there is a 2.6 x 2.5 x 2.4 cm inferior pole hypoechoic renal cyst without internal color Doppler flow. Renal cortical thinning and increased echogenicity of the renal parenchyma is noted.   The bladder is grossly unremarkable.       1. No hydronephrosis or nephrolithiasis. 2. Medical renal disease. 3 left renal cyst.     MACRO: None.   Signed by: Bruno Bryant 12/24/2023 8:19 PM Dictation workstation:   [id number]    XR chest 1 view    Result Date: 12/24/2023  Interpreted By:  Nick Luciano, STUDY: XR CHEST 1 VIEW; 12/24/2023 4:15 am   INDICATION: CLINICAL INFORMATION: Signs/Symptoms:weakness.   COMPARISON: 12/18/2020   ACCESSION NUMBER(S): [id number]   ORDERING CLINICIAN: LESLYE FRANK   TECHNIQUE: Portable chest one view.   FINDINGS: The cardiac size is indeterminate in view of the AP projection. Neurostimulator overlies the thoracic spine. No infiltrates or effusions are identified.  The aorta is tortuous.       No acute pathologic findings are identified.  There is no interval change when compared to the previous examination.   MACRO: none   Signed by: Nick Luciano 12/24/2023 9:11 AM Dictation workstation:   [id number]    XR chest 2  views    Result Date: 12/18/2023  Interpreted By:  Alex Mandel, STUDY: XR CHEST 2 VIEWS; 12/18/2023 8:06 am   INDICATION: Signs/Symptoms:sob   COMPARISON: 12/13/2023   ACCESSION NUMBER(S): [id number]   ORDERING CLINICIAN: SRIKANTH VILCHIS   TECHNIQUE: PA and LAT views of the chest were obtained.   FINDINGS: The cardiomediastinal silhouette is unremarkable. The lungs are clear. No pleural effusion is identified.   The osseous structures are intact. Spinal cord stimulator leads are again seen at the mid and lower thoracic level.       No acute cardiopulmonary process.     Signed by: Alex Mandel 12/18/2023 8:30 AM Dictation workstation:   [id number]    Transthoracic Echo (TTE) Complete    Result Date: 12/15/2023           [place] [address]            Phone [phone] TRANSTHORACIC ECHOCARDIOGRAM REPORT  Patient Name:      MARK DARIEN GABRIEL  Reading Physician:    [id number] [facility] Study Date:        12/15/2023          Ordering Provider:    [id number] ABDULLAHI VANESSA MRN/PID:           [id number]            Fellow: Accession#:        [id number]        Nurse: Date of Birth/Age: 1940 / 83 years Sonographer:          Kristen WESTFALL Gender:            F                   Additional Staff: Height:            154.94 cm           Admit Date:           12/14/2023 Weight:            82.10 kg            Admission Status:     Inpatient - Routine BSA:               1.81 m2             Department Location: Blood Pressure: 150 /47 mmHg Study Type:    TRANSTHORACIC ECHO (TTE) COMPLETE Diagnosis/ICD: Shortness of breath-R06.02 Indication:    Shortness of Breath CPT Codes:     Echo Complete w Full Doppler-[id number] Patient History: Pertinent History: CKD CHF Depression HTN CP Murmur Dyslipidemia Varicose veins                    of leg with swellin ghyperlipidemia hypercholesterolemia. Study Detail: The  following Echo studies were performed: 2D, Doppler, M-Mode,               color flow and 3D.  PHYSICIAN INTERPRETATION: Left Ventricle: Left ventricular systolic function is normal, with an estimated ejection fraction of 60-65%. There are no regional wall motion abnormalities. The left ventricular cavity size is normal. Left ventricular diastolic filling was indeterminate. Left Atrium: The left atrium is normal in size. Right Ventricle: The right ventricle is normal in size. There is normal right ventricular global systolic function. Right Atrium: The right atrium is normal in size. Aortic Valve: The aortic valve is trileaflet. There is mild aortic valve cusp calcification. There is no evidence of aortic valve regurgitation. The peak instantaneous gradient of the aortic valve is 12.0 mmHg. Mitral Valve: The mitral valve is normal in structure. There is trace to mild mitral valve regurgitation. Tricuspid Valve: The tricuspid valve is structurally normal. No evidence of tricuspid regurgitation. Pulmonic Valve: The pulmonic valve is not well visualized. There is trace to mild pulmonic valve regurgitation. Pericardium: There is no pericardial effusion noted. Aorta: The aortic root is normal. Systemic Veins: The inferior vena cava appears to be of normal size. There is IVC inspiratory collapse greater than 50%.  CONCLUSIONS:  1. Left ventricular systolic function is normal with a 60-65% estimated ejection fraction.  2. Left ventricular diastolic filling indeterminate.  3. Mild aortic valve sclerosis. QUANTITATIVE DATA SUMMARY: 2D MEASUREMENTS:                          Normal Ranges: LAs:           3.00 cm   (2.7-4.0cm) IVSd:          0.85 cm   (0.6-1.1cm) LVPWd:         1.00 cm   (0.6-1.1cm) LVIDd:         4.23 cm   (3.9-5.9cm) LVIDs:         2.79 cm LV Mass Index: 69.0 g/m2 LV % FS        34.0 % LA VOLUME:                               Normal Ranges: LA Vol A2C:        59.3 ml LA Vol Index A2C:  32.8 ml/m2 LA Area A2C:        18.7 cm2 LA Major Axis A2C: 5.0 cm LA Volume Index:   29.6 ml/m2 LA Vol A2C:        59.7 ml RA VOLUME BY A/L METHOD:                               Normal Ranges: RA Vol A4C:        32.1 ml    (8.3-19.5ml) RA Vol Index A4C:  17.7 ml/m2 RA Area A4C:       13.6 cm2 RA Major Axis A4C: 4.9 cm M-MODE MEASUREMENTS:                  Normal Ranges: Ao Root: 2.20 cm (2.0-3.7cm) LAs:     4.80 cm (2.7-4.0cm) AORTA MEASUREMENTS:                    Normal Ranges: Asc Ao, d: 3.10 cm (2.1-3.4cm) LV SYSTOLIC FUNCTION BY 2D PLANIMETRY (MOD):                     Normal Ranges: EF-A4C View: 51.6 % (>=55%) EF-A2C View: 57.9 % EF-Biplane:  55.8 % LV DIASTOLIC FUNCTION:                            Normal Ranges: MV Peak E:      0.93 m/s   (0.7-1.2 m/s) MV Peak A:      1.02 m/s   (0.42-0.7 m/s) E/A Ratio:      0.91       (1.0-2.2) MV e'           0.08 m/s   (>8.0) MV lateral e'   0.08 m/s MV medial e'    0.07 m/s E/e' Ratio:     12.39      (<8.0) PulmV Sys Siddhartha:  68.10 cm/s PulmV Urrutia Siddhartha: 47.60 cm/s PulmV S/D Siddhartha:  1.40 MITRAL VALVE:                      Normal Ranges: MV Vmax:    1.22 m/s (<=1.3m/s) MV peak P.0 mmHg (<5mmHg) MV mean P.0 mmHg (<48mmHg) MV DT:      262 msec (150-240msec) AORTIC VALVE:                          Normal Ranges: AoV Vmax:      1.73 m/s  (<=1.7m/s) AoV Peak P.0 mmHg (<20mmHg) LVOT Max Siddhartha:  1.21 m/s  (<=1.1m/s) LVOT VTI:      29.10 cm LVOT Diameter: 2.00 cm   (1.8-2.4cm) AoV Area,Vmax: 2.20 cm2  (2.5-4.5cm2)  RIGHT VENTRICLE: RV Basal 3.18 cm RV Mid   2.70 cm RV Major 5.2 cm TAPSE:   16.5 mm RV s'    0.13 m/s TRICUSPID VALVE/RVSP:                   Normal Ranges: IVC Diam: 1.75 cm PULMONIC VALVE:                         Normal Ranges: PV Accel Time: 69 msec  (>120ms) PV Max Siddhartha:    1.4 m/s  (0.6-0.9m/s) PV Max P.3 mmHg Pulmonary Veins: PulmV Urrutia Siddhartha: 47.60 cm/s PulmV S/D Siddhartha:  1.40 PulmV Sys Siddhartha:  68.10 cm/s  [id number] [facility] MARTINEZ Electronically signed on 12/15/2023 at 10:13:13 AM  **  Final **     CT head wo IV contrast    Result Date: 12/14/2023  Interpreted By:  Rashawn Hook, STUDY: CT HEAD WO IV CONTRAST; 12/14/2023 10:34 am   INDICATION: Signs/Symptoms:lightheadedness, near syncope.   COMPARISON: No comparison exams available.   ACCESSION NUMBER(S): [id number]   ORDERING CLINICIAN: SLY DAWKINS   TECHNIQUE: CT axial images through the Brain were obtained without contrast.   FINDINGS: Acute ischemic change: None.   Hemorrhage: No evidence of acute intracranial hemorrhage.   Mass Effect / Mass Lesion: No significant mass effect. There is no evidence of an intracranial mass or extraaxial fluid collection.   Chronic ischemic change: Patchy foci of  low attenuation coefficient are present within white matter which is a nonspecific finding but likely represents moderate microvascular ischemia.   Parenchyma: There is no significant volume loss. The brain parenchyma is otherwise within normal limits for age.   Ventricles: Normal caliber and morphology.   Other: There is calcification involving the carotid siphons.       No acute intracranial process   All CT examinations are performed with 1 or more of the following dose reduction techniques: Automated exposure control, adjustment of mA and/or kv according to patient's size, or use of iterative reconstruction techniques.   MACRO: none   Signed by: Rashawn Hook 12/14/2023 10:48 AM Dictation workstation:   [id number]    XR chest 2 views    Result Date: 12/13/2023  Interpreted By:  Nj Zazueta, STUDY: XR CHEST 2 VIEWS; 12/13/2023 2:47 pm   INDICATION: Signs/Symptoms:SOB dizziness   COMPARISON: Chest radiograph 06/09/2020   ACCESSION NUMBER(S): [id number]   ORDERING CLINICIAN: LA SAEED   TECHNIQUE: AP and lateral views of the chest performed.   FINDINGS: Cardiac size is indeterminate due to the AP projection. Calcifications are seen at the aortic knob. Thoracic aorta is tortuous in appearance. Mild prominence of the pulmonary  vasculature. Faint bibasilar hazy opacities likely reflect atelectasis. No focal consolidation, large pleural effusion, or visible pneumothorax. Multilevel discogenic degenerative changes of the thoracic spine. Presumed neurostimulator leads overlying the thoracic spine.       Mild prominence of the pulmonary vasculature, as can be seen with pulmonary vascular congestion. Please correlate for corresponding symptoms.   No focal consolidation or visible pneumothorax.   Faint bibasilar atelectasis suspected.   MACRO: None.   Signed by: Nj Zazueta 12/13/2023 3:04 PM Dictation workstation:   [id number]    Scheduled medications  amiodarone, 200 mg, oral, Daily  amitriptyline, 10 mg, oral, Nightly  apixaban, 2.5 mg, oral, BID  cholestyramine light, 4 g, oral, Daily with evening meal  DULoxetine, 20 mg, oral, Daily  folic acid, 1 mg, oral, Daily  gabapentin, 300 mg, oral, BID  lactobacillus acidophilus, 1 tablet, oral, BID with meals  levothyroxine, 150 mcg, oral, Daily  metoprolol succinate XL, 12.5 mg, oral, Daily  potassium chloride CR, 40 mEq, oral, Daily  simvastatin, 10 mg, oral, Nightly  sodium bicarbonate, 650 mg, oral, TID  vancomycin, 125 mg, oral, 4x daily      Continuous medications       PRN medications  PRN medications: acetaminophen **OR** acetaminophen **OR** acetaminophen, benzocaine-menthol, dextromethorphan-guaifenesin, guaiFENesin    Assessment/Plan   Principal Problem:    Weakness  Active Problems:    Dyslipidemia    Chronic kidney disease, stage 4 (severe) (CMS/HCC)    Fibromyalgia    Chronic lymphocytic leukemia (CMS/HCC)    Benign essential hypertension    Congestive heart failure with left ventricular diastolic dysfunction, acute on chronic (CMS/HCC)    Atrial fibrillation (CMS/HCC)  Chest pain discomfort  Patient was complaining of pain under her l breast radiating to left breast ; pain mostly when she takes a breath.   Troponin 50 then 52 then 52 , 52    chest x-ray 2 view done on 12/  31  Order CT of the chest without contrast    Atrial fibrillation with pauses/ A-fib with RVR   HR is on 120-140 th . Dr. Chisholm cardiology will see her today   Dr. Bruce signed off  Monitor closely   Nuclear stress test done on 12/26 negative for ischemia.  Normal EF  65%. . Small bilateral basilar effusions and compressive atelectasis . Bilateral axillary lymphadenopathy nonspecific   On miodarone 200 mg daily and metoprolol 12.5 daily per cardiology   on Eliquis 2.5 mg twice a day due to her creatinine clearance 25.6.      Acute kidney injury   Cr 2.1, 1.9  was 1.7 and  2.1   Dr. Amor on the case      C. difficile colitis  X 2 loose stools today   On vancomycin   from ID services on the case .     Leukocytosis/history of leukemia/ Anemia  HH stable   Secondary to CLL  Seen by  Dr. Ferreira  Monitor close.     Hypothyroidism  Continue with  home medication.    Hypokalemia  Resolved    Weakness/deconditioning   Fall precautions  PT/OT comments moderate intensity    DVT prophylaxis  Heparin subcu     Discharge plan:  This is 83 years old female with med Hx history of hypertension, hypothyroidism, IBS, chronic kidney disease stage IV, CLL. . She presented from home with weakness and fall.  Admitted for evaluation for weakness, heart failure preserved EF, CKD stage III-IV, acute on chronic anemia and C-diff. Evaluated by cardiology stress test done on 12/26 no ischemia  Evaluated by Dr. Chisholm regarding new onset of A-fib with pauses, recommend to not use AV amna blocking drugs also not a candidate for pacemaker insertion at this time. Evaluated by ID due to C. difficile, patient is on oral vancomycin. Evaluated by nephrology due to JANET on CKD3-4;  A-fib with RVR patient started on amiodarone and metoprolol per cardiology.  Also due to high risk of stroke started on Eliquis 2.5 mg twice a day.  Still on A-fib RVR today Janis signed off Phan following up with her.   Patient has to follow-up with him  in 2 weeks long-term Holter prior to her discharge.      Follow-up with the CT of the chest without contrast  Code Status  DNR CCA DNI    Anticipate discharging patient  skilled of nursing when medically clear     I spent 25 minutes in the professional and overall care of this patient.    Camille Olson, APRN-CNP

## 2024-01-01 NOTE — CARE PLAN
The patient's goals for the shift include increase strength    The clinical goals for the shift include rest    Over the shift, the patient did not make progress toward the following goals. Barriers to progression include N/A pt progressing. Recommendations to address these barriers include rest.

## 2024-01-01 NOTE — PROGRESS NOTES
Notified Dr. Sosa of pt's HR sustaining in the 130s.  Per MD, continue scheduled medication regimen.  No new orders at this time.   Patient is leaving.

## 2024-01-02 VITALS
WEIGHT: 179.9 LBS | DIASTOLIC BLOOD PRESSURE: 50 MMHG | BODY MASS INDEX: 31.88 KG/M2 | SYSTOLIC BLOOD PRESSURE: 105 MMHG | OXYGEN SATURATION: 99 % | RESPIRATION RATE: 17 BRPM | HEART RATE: 72 BPM | HEIGHT: 63 IN | TEMPERATURE: 97.5 F

## 2024-01-02 LAB
ALBUMIN SERPL-MCNC: 2.8 G/DL (ref 3.5–5)
ANION GAP SERPL CALC-SCNC: 12 MMOL/L
BUN SERPL-MCNC: 26 MG/DL (ref 8–25)
CALCIUM SERPL-MCNC: 8.8 MG/DL (ref 8.5–10.4)
CHLORIDE SERPL-SCNC: 103 MMOL/L (ref 97–107)
CO2 SERPL-SCNC: 24 MMOL/L (ref 24–31)
CREAT SERPL-MCNC: 2.2 MG/DL (ref 0.4–1.6)
ERYTHROCYTE [DISTWIDTH] IN BLOOD BY AUTOMATED COUNT: 13.2 % (ref 11.5–14.5)
GFR SERPL CREATININE-BSD FRML MDRD: 22 ML/MIN/1.73M*2
GLUCOSE SERPL-MCNC: 107 MG/DL (ref 65–99)
HCT VFR BLD AUTO: 32 % (ref 36–46)
HGB BLD-MCNC: 10.3 G/DL (ref 12–16)
MAGNESIUM SERPL-MCNC: 1.8 MG/DL (ref 1.6–3.1)
MCH RBC QN AUTO: 31.1 PG (ref 26–34)
MCHC RBC AUTO-ENTMCNC: 32.2 G/DL (ref 32–36)
MCV RBC AUTO: 97 FL (ref 80–100)
NRBC BLD-RTO: 0 /100 WBCS (ref 0–0)
PHOSPHATE SERPL-MCNC: 4.3 MG/DL (ref 2.5–4.5)
PLATELET # BLD AUTO: 355 X10*3/UL (ref 150–450)
POTASSIUM SERPL-SCNC: 4.3 MMOL/L (ref 3.4–5.1)
RBC # BLD AUTO: 3.31 X10*6/UL (ref 4–5.2)
SODIUM SERPL-SCNC: 139 MMOL/L (ref 133–145)
WBC # BLD AUTO: 42.6 X10*3/UL (ref 4.4–11.3)

## 2024-01-02 PROCEDURE — 97110 THERAPEUTIC EXERCISES: CPT | Mod: GP

## 2024-01-02 PROCEDURE — 2500000004 HC RX 250 GENERAL PHARMACY W/ HCPCS (ALT 636 FOR OP/ED): Performed by: INTERNAL MEDICINE

## 2024-01-02 PROCEDURE — 83735 ASSAY OF MAGNESIUM: CPT | Performed by: REGISTERED NURSE

## 2024-01-02 PROCEDURE — 80069 RENAL FUNCTION PANEL: CPT | Performed by: INTERNAL MEDICINE

## 2024-01-02 PROCEDURE — 85027 COMPLETE CBC AUTOMATED: CPT | Performed by: INTERNAL MEDICINE

## 2024-01-02 PROCEDURE — 2500000001 HC RX 250 WO HCPCS SELF ADMINISTERED DRUGS (ALT 637 FOR MEDICARE OP): Performed by: INTERNAL MEDICINE

## 2024-01-02 PROCEDURE — 97530 THERAPEUTIC ACTIVITIES: CPT | Mod: GO,CO

## 2024-01-02 PROCEDURE — 97530 THERAPEUTIC ACTIVITIES: CPT | Mod: GP

## 2024-01-02 PROCEDURE — 2500000002 HC RX 250 W HCPCS SELF ADMINISTERED DRUGS (ALT 637 FOR MEDICARE OP, ALT 636 FOR OP/ED): Performed by: REGISTERED NURSE

## 2024-01-02 PROCEDURE — 2500000001 HC RX 250 WO HCPCS SELF ADMINISTERED DRUGS (ALT 637 FOR MEDICARE OP): Performed by: REGISTERED NURSE

## 2024-01-02 PROCEDURE — 2500000001 HC RX 250 WO HCPCS SELF ADMINISTERED DRUGS (ALT 637 FOR MEDICARE OP): Performed by: NURSE PRACTITIONER

## 2024-01-02 PROCEDURE — 36415 COLL VENOUS BLD VENIPUNCTURE: CPT | Performed by: INTERNAL MEDICINE

## 2024-01-02 RX ORDER — AMIODARONE HYDROCHLORIDE 200 MG/1
200 TABLET ORAL DAILY
Start: 2024-01-03 | End: 2024-01-15 | Stop reason: SDUPTHER

## 2024-01-02 RX ORDER — ACETAMINOPHEN 325 MG/1
650 TABLET ORAL EVERY 4 HOURS PRN
Qty: 30 TABLET | Refills: 0 | Status: SHIPPED | OUTPATIENT
Start: 2024-01-02

## 2024-01-02 RX ORDER — FOLIC ACID 1 MG/1
1 TABLET ORAL DAILY
Start: 2024-01-03 | End: 2024-02-12 | Stop reason: WASHOUT

## 2024-01-02 RX ORDER — DULOXETIN HYDROCHLORIDE 20 MG/1
20 CAPSULE, DELAYED RELEASE ORAL DAILY
Start: 2024-01-03 | End: 2024-01-02 | Stop reason: SDUPTHER

## 2024-01-02 RX ORDER — VANCOMYCIN HYDROCHLORIDE 125 MG/1
125 CAPSULE ORAL 4 TIMES DAILY
Qty: 24 CAPSULE | Refills: 0 | Status: SHIPPED
Start: 2024-01-02 | End: 2024-01-08

## 2024-01-02 RX ORDER — FUROSEMIDE 20 MG/1
60 TABLET ORAL DAILY
Start: 2024-01-03 | End: 2024-02-02 | Stop reason: SDUPTHER

## 2024-01-02 RX ORDER — GABAPENTIN 300 MG/1
300 CAPSULE ORAL 2 TIMES DAILY
Qty: 6 CAPSULE | Refills: 0 | Status: SHIPPED | OUTPATIENT
Start: 2024-01-02 | End: 2024-01-02 | Stop reason: SDUPTHER

## 2024-01-02 RX ORDER — AMITRIPTYLINE HYDROCHLORIDE 10 MG/1
10 TABLET, FILM COATED ORAL NIGHTLY
Start: 2024-01-02 | End: 2024-01-15 | Stop reason: SDUPTHER

## 2024-01-02 RX ORDER — DULOXETIN HYDROCHLORIDE 20 MG/1
20 CAPSULE, DELAYED RELEASE ORAL DAILY
Qty: 3 CAPSULE | Refills: 0 | Status: SHIPPED | OUTPATIENT
Start: 2024-01-03 | End: 2024-01-02 | Stop reason: SDUPTHER

## 2024-01-02 RX ORDER — DULOXETIN HYDROCHLORIDE 20 MG/1
20 CAPSULE, DELAYED RELEASE ORAL DAILY
Qty: 3 CAPSULE | Refills: 0 | Status: SHIPPED | OUTPATIENT
Start: 2024-01-03 | End: 2024-02-07 | Stop reason: SDUPTHER

## 2024-01-02 RX ORDER — POTASSIUM CHLORIDE 20 MEQ/1
20 TABLET, EXTENDED RELEASE ORAL 2 TIMES DAILY
Start: 2024-01-02 | End: 2024-01-15 | Stop reason: WASHOUT

## 2024-01-02 RX ORDER — GABAPENTIN 300 MG/1
300 CAPSULE ORAL 2 TIMES DAILY
Qty: 6 CAPSULE | Refills: 0 | Status: SHIPPED | OUTPATIENT
Start: 2024-01-02 | End: 2024-02-07 | Stop reason: SDUPTHER

## 2024-01-02 RX ORDER — METOPROLOL SUCCINATE 25 MG/1
12.5 TABLET, EXTENDED RELEASE ORAL DAILY
Start: 2024-01-03 | End: 2024-01-15 | Stop reason: SDUPTHER

## 2024-01-02 RX ORDER — L. ACIDOPHILUS/L.BULGARICUS 1MM CELL
1 TABLET ORAL
Start: 2024-01-02 | End: 2024-02-12 | Stop reason: WASHOUT

## 2024-01-02 RX ORDER — SIMVASTATIN 10 MG/1
10 TABLET, FILM COATED ORAL NIGHTLY
Start: 2024-01-02 | End: 2024-03-18 | Stop reason: SDUPTHER

## 2024-01-02 RX ORDER — CHOLESTYRAMINE 4 G/4.8G
4 POWDER, FOR SUSPENSION ORAL
Start: 2024-01-02 | End: 2024-02-12 | Stop reason: WASHOUT

## 2024-01-02 RX ADMIN — AMIODARONE HYDROCHLORIDE 200 MG: 200 TABLET ORAL at 08:58

## 2024-01-02 RX ADMIN — FUROSEMIDE 60 MG: 40 TABLET ORAL at 08:57

## 2024-01-02 RX ADMIN — VANCOMYCIN HYDROCHLORIDE 125 MG: 125 CAPSULE ORAL at 14:05

## 2024-01-02 RX ADMIN — GABAPENTIN 300 MG: 300 CAPSULE ORAL at 08:58

## 2024-01-02 RX ADMIN — DULOXETINE HYDROCHLORIDE 20 MG: 20 CAPSULE, DELAYED RELEASE ORAL at 08:58

## 2024-01-02 RX ADMIN — LEVOTHYROXINE SODIUM 150 MCG: 0.15 TABLET ORAL at 06:18

## 2024-01-02 RX ADMIN — APIXABAN 2.5 MG: 2.5 TABLET, FILM COATED ORAL at 08:58

## 2024-01-02 RX ADMIN — Medication 1 TABLET: at 08:57

## 2024-01-02 RX ADMIN — FOLIC ACID 1 MG: 1 TABLET ORAL at 08:58

## 2024-01-02 RX ADMIN — POTASSIUM CHLORIDE 40 MEQ: 1500 TABLET, EXTENDED RELEASE ORAL at 08:57

## 2024-01-02 RX ADMIN — SODIUM BICARBONATE 650 MG TABLET 650 MG: at 08:58

## 2024-01-02 RX ADMIN — SODIUM BICARBONATE 650 MG TABLET 650 MG: at 14:05

## 2024-01-02 RX ADMIN — METOPROLOL SUCCINATE 12.5 MG: 25 TABLET, EXTENDED RELEASE ORAL at 08:57

## 2024-01-02 RX ADMIN — VANCOMYCIN HYDROCHLORIDE 125 MG: 125 CAPSULE ORAL at 06:18

## 2024-01-02 ASSESSMENT — COGNITIVE AND FUNCTIONAL STATUS - GENERAL
TURNING FROM BACK TO SIDE WHILE IN FLAT BAD: A LOT
STANDING UP FROM CHAIR USING ARMS: A LOT
MOVING TO AND FROM BED TO CHAIR: A LOT
PERSONAL GROOMING: A LITTLE
TOILETING: A LOT
MOVING FROM LYING ON BACK TO SITTING ON SIDE OF FLAT BED WITH BEDRAILS: A LOT
CLIMB 3 TO 5 STEPS WITH RAILING: TOTAL
DAILY ACTIVITIY SCORE: 17
WALKING IN HOSPITAL ROOM: TOTAL
DRESSING REGULAR LOWER BODY CLOTHING: A LOT
HELP NEEDED FOR BATHING: A LITTLE
DRESSING REGULAR UPPER BODY CLOTHING: A LITTLE
MOBILITY SCORE: 10

## 2024-01-02 ASSESSMENT — PAIN SCALES - GENERAL: PAINLEVEL_OUTOF10: 0 - NO PAIN

## 2024-01-02 ASSESSMENT — PAIN - FUNCTIONAL ASSESSMENT: PAIN_FUNCTIONAL_ASSESSMENT: 0-10

## 2024-01-02 NOTE — CARE PLAN
The patient's goals for the shift include increase strength    The clinical goals for the shift include stable HR    Over the shift, the patient did not make progress toward the following goals. Barriers to progression include NA pt HR is improved from prior. Recommendations to address these barriers include continue medications as ordered.

## 2024-01-02 NOTE — PROGRESS NOTES
Pts daughter had wanted this worker to see if [facility] would have a bed for this pt if the pt was still here at beginning of the week, message sent to [facility] this morning and await response. A new precert is needed for whichever facility the pt goes to.      01/02/24 1593   Discharge Planning   Patient expects to be discharged to: [facility] vs [facility]        Pt a&ox3 amb with cane presenting to ED for RUQ abdominal pain. Pt states pain has been going on for several months. Pt abdomen tender to touch. Pt denies ua symptoms/ fevers/ nvd. 20g IV placed in RT AC, labs sent. Will monitor.

## 2024-01-02 NOTE — PROGRESS NOTES
[facility] spoke with pt and family at bedside, they are aware [facility] still does not have bed availability and are ok with new precert to be started with [facility]. [facility] asked  internal team to initiate precert which is currently pending. Pt ready for dc today.      01/02/24 1213   Discharge Planning   Patient expects to be discharged to: [facility] - precert pending

## 2024-01-02 NOTE — PROGRESS NOTES
Tracie Baltazar is a 83 y.o. female on day 9 of admission presenting with Weakness.      Subjective   No complaints, Cr stable.        Objective          Vitals 24HR  Heart Rate:  []   Temp:  [36 °C (96.8 °F)-36.8 °C (98.2 °F)]   Resp:  [16-20]   BP: (104-131)/(50-74)   Weight:  [81.6 kg (179 lb 14.3 oz)]   SpO2:  [94 %-100 %]       Intake/Output last 3 Shifts:    Intake/Output Summary (Last 24 hours) at 1/2/2024 1544  Last data filed at 1/2/2024 1214  Gross per 24 hour   Intake 540 ml   Output 51 ml   Net 489 ml       Physical Exam  Constitutional:       General: She is awake. She is not in acute distress.  Cardiovascular:      Heart sounds:      No friction rub.   Pulmonary:      Effort: Pulmonary effort is normal.      Breath sounds: Normal breath sounds.   Abdominal:      General: Bowel sounds are normal.      Palpations: Abdomen is soft.      Tenderness: There is no guarding or rebound.   Musculoskeletal:      Right lower leg: No edema.      Left lower leg: No edema.   Neurological:      Mental Status: She is alert.    Relevant Results  Results for orders placed or performed during the hospital encounter of 12/24/23 (from the past 24 hour(s))   Renal Function Panel   Result Value Ref Range    Glucose 107 (H) 65 - 99 mg/dL    Sodium 139 133 - 145 mmol/L    Potassium 4.3 3.4 - 5.1 mmol/L    Chloride 103 97 - 107 mmol/L    Bicarbonate 24 24 - 31 mmol/L    Urea Nitrogen 26 (H) 8 - 25 mg/dL    Creatinine 2.20 (H) 0.40 - 1.60 mg/dL    eGFR 22 (L) >60 mL/min/1.73m*2    Calcium 8.8 8.5 - 10.4 mg/dL    Phosphorus 4.3 2.5 - 4.5 mg/dL    Albumin 2.8 (L) 3.5 - 5.0 g/dL    Anion Gap 12 <=19 mmol/L   CBC   Result Value Ref Range    WBC 42.6 (H) 4.4 - 11.3 x10*3/uL    nRBC 0.0 0.0 - 0.0 /100 WBCs    RBC 3.31 (L) 4.00 - 5.20 x10*6/uL    Hemoglobin 10.3 (L) 12.0 - 16.0 g/dL    Hematocrit 32.0 (L) 36.0 - 46.0 %    MCV 97 80 - 100 fL    MCH 31.1 26.0 - 34.0 pg    MCHC 32.2 32.0 - 36.0 g/dL    RDW 13.2 11.5 - 14.5 %     Platelets 355 150 - 450 x10*3/uL   Magnesium   Result Value Ref Range    Magnesium 1.80 1.60 - 3.10 mg/dL            Assessment/Plan   1.  Acute kidney injury  - improved   -Baseline creatinine 1.6-1.9, Cr stable just slightly above baseline  3.  C. difficile colitis continue with oral vancomycin  -Improved  4.  CLL  5.  Hypertension  6. congestive heart failure  - Continue oral Lasix at 60 mg once a day and monitor    Darnell Amor MD

## 2024-01-02 NOTE — NURSING NOTE
Assumed care of this pt. Pt is sitting in bed, breathing even and unlabored, NC in place. NAD, pt denies needs at this time. BSSR. Bed low, call light and belongings in reach. Continuing to monitor

## 2024-01-02 NOTE — PROGRESS NOTES
Tracie Baltazar is a 83 y.o. female on day 9 of admission presenting with Weakness.    Subjective   Interval History:   Afebrile, no chills  No diarrhea  Daughter present        Review of Systems   All other systems reviewed and are negative.      Objective   Range of Vitals (last 24 hours)  Heart Rate:  [102-149]   Temp:  [36 °C (96.8 °F)-36.8 °C (98.2 °F)]   Resp:  [16-22]   BP: (104-131)/(54-75)   Weight:  [81.6 kg (179 lb 14.3 oz)]   SpO2:  [94 %-100 %]   Daily Weight  01/02/24 : 81.6 kg (179 lb 14.3 oz)    Body mass index is 31.88 kg/m².    Physical Exam  Constitutional:       Appearance: Normal appearance.   HENT:      Head: Normocephalic and atraumatic.      Nose: Nose normal.      Mouth/Throat:      Mouth: Mucous membranes are moist.      Pharynx: Oropharynx is clear.   Eyes:      Extraocular Movements: Extraocular movements intact.      Conjunctiva/sclera: Conjunctivae normal.   Cardiovascular:      Rate and Rhythm: Normal rate and regular rhythm.   Pulmonary:      Effort: Pulmonary effort is normal.      Breath sounds: Normal breath sounds. No wheezing or rales.   Abdominal:      General: Bowel sounds are normal.      Palpations: Abdomen is soft.      Tenderness: There is no abdominal tenderness. There is no guarding.   Musculoskeletal:         General: Normal range of motion.      Cervical back: Normal range of motion and neck supple.   Skin:     General: Skin is warm and dry.   Neurological:      General: No focal deficit present.      Mental Status: She is alert and oriented to person, place, and time.   Psychiatric:         Mood and Affect: Mood normal.         Behavior: Behavior normal.     Antibiotics  sodium chloride 0.9 % bolus 1,000 mL  piperacillin-tazobactam-dextrose (Zosyn) IV 4.5 g  vancomycin-diluent combo no.1 (Xellia) IVPB 2 g  amitriptyline (Elavil) tablet 10 mg  DULoxetine (Cymbalta) DR capsule 20 mg  folic acid (Folvite) tablet 1 mg  gabapentin (Neurontin) capsule 300 mg  hydrALAZINE  (Apresoline) tablet 20 mg  lactobacillus acidophilus tablet 1 tablet  levothyroxine (Synthroid, Levoxyl) tablet 150 mcg  pantoprazole (ProtoNix) EC tablet 40 mg  simvastatin (Zocor) tablet 10 mg  sodium bicarbonate tablet 650 mg  heparin (porcine) injection 5,000 Units  polyethylene glycol (Glycolax, Miralax) packet 17 g  benzocaine-menthol (Cepastat Sore Throat) 15-3.6 mg lozenge 1 lozenge  guaiFENesin (Mucinex) 12 hr tablet 600 mg  dextromethorphan-guaifenesin (Robitussin DM)  mg/5 mL oral liquid 5 mL  acetaminophen (Tylenol) tablet 650 mg  acetaminophen (Tylenol) oral liquid 650 mg  acetaminophen (Tylenol) suppository 650 mg  sodium chloride 0.9% infusion  metroNIDAZOLE (Flagyl) tablet 500 mg  vancomycin (Vancocin) capsule 125 mg  dilTIAZem (Cardizem) 125 mg in dextrose 5% 125 mL (1 mg/mL) infusion (premix)  dilTIAZem (Cardizem) injection 10 mg  potassium chloride CR (Klor-Con M20) ER tablet 40 mEq  regadenoson (Lexiscan) injection 0.4 mg  aminophylline syringe 125 mg  cholestyramine (Questran) 4 gram packet 4 g  potassium chloride CR (Klor-Con M20) ER tablet 40 mEq  Tc-99m tetrofosmin (Myoview) injection 11 millicurie  Tc-99m tetrofosmin (Myoview) injection 32 millicurie  dilTIAZem (Cardizem) 125 mg in dextrose 5% 125 mL (1 mg/mL) infusion (premix)  dilTIAZem (Cardizem) injection 20 mg  cholestyramine light (Prevalite) 4 gram packet 4 g  amiodarone (Pacerone) tablet 200 mg  apixaban (Eliquis) tablet 2.5 mg  metoprolol succinate XL (Toprol-XL) 24 hr tablet 12.5 mg  furosemide (Lasix) tablet 60 mg      Relevant Results  Labs  Results from last 72 hours   Lab Units 01/02/24  0603 01/01/24  0540 12/31/23  0528   WBC AUTO x10*3/uL 42.6* 45.4* 39.7*   HEMOGLOBIN g/dL 10.3* 10.7* 10.9*   HEMATOCRIT % 32.0* 34.6* 34.6*   PLATELETS AUTO x10*3/uL 355 364 343     Results from last 72 hours   Lab Units 01/02/24  0603 01/01/24  0540 12/31/23  0528   SODIUM mmol/L 139 139 140   POTASSIUM mmol/L 4.3 4.6 4.0   CHLORIDE  "mmol/L 103 104 105   CO2 mmol/L 24 24 23*   BUN mg/dL 26* 19 14   CREATININE mg/dL 2.20* 2.10* 1.90*   GLUCOSE mg/dL 107* 116* 115*   CALCIUM mg/dL 8.8 9.0 9.0   ANION GAP mmol/L 12 11 12   EGFR mL/min/1.73m*2 22* 23* 26*   PHOSPHORUS mg/dL 4.3  --   --      Results from last 72 hours   Lab Units 01/02/24  0603   ALBUMIN g/dL 2.8*     Estimated Creatinine Clearance: 19.6 mL/min (A) (by C-G formula based on SCr of 2.2 mg/dL (H)).  No results found for: \"CRP\"  Microbiology  Reviewed  Imaging  CT chest wo IV contrast    Result Date: 1/1/2024  Interpreted By:  Sherman Gonzales, STUDY: CT CHEST WO IV CONTRAST;  1/1/2024 4:37 pm   INDICATION: 84 y/o   F with  abnormal x-ray, question mass.   COMPARISON: None.   ACCESSION NUMBER(S): [id number]   ORDERING CLINICIAN: KIRSTIN SHARP   TECHNIQUE: Axial CT images of the chest without intravenous contrast. Coronal and sagittal reformatted images generated.   FINDINGS: LIMITATIONS/LINES: None.   LOWER NECK/CHEST WALL: Unremarkable.   HEART: Heart is within normal limits of size.  No significant pericardial effusion.   MEDIASTINUM/TIFFANY: Multiple non pathologically enlarged mediastinal lymph nodes, up to 9 mm short axis..   PLEURA: Small effusion bilaterally. Presumed bibasilar atelectasis, involving multiple basilar segments on the left.   LUNG PARENCHYMA: Background of mild interlobular septal thickening in both upper lobes with rare ground-glass opacity..   BONES: No acute finding.   CHEST WALL: Multiple mildly enlarged right axillary lymph nodes, up to 1 cm in short axis. Several borderline enlarged left axillary lymph nodes up to 9 mm short axis.   UPPER ABDOMEN: Unremarkable.       Small pleural effusion bilaterally with bibasilar atelectasis, more on the left. This accounts for the appearance of the chest x-ray findings. Mild interstitial pulmonary edema.   MACRO: None   Signed by: Sherman Gonzales 1/1/2024 5:23 PM Dictation workstation:   [id number]    XR chest 2 " views    Result Date: 12/31/2023  Interpreted By:  Rashawn Hook, STUDY: XR CHEST 2 VIEWS; 12/31/2023 3:36 pm   INDICATION: Signs/Symptoms:Chest pain and respiratory failure;   COMPARISON: December 24, 2023   ACCESSION NUMBER(S): [id number]   ORDERING CLINICIAN: KIRSTIN SHARP   TECHNIQUE: Views: PA and Lateral   FINDINGS: RESULTS:  The cardiac silhouette is enlarged. Mediastinal contours are unremarkable. Left basilar opacity obscures the left hemidiaphragm. There is blunting of the right costophrenic angle with hazy density in the right lower lobe and diffuse interstitial thickening. There is no evidence for pleural effusion. The osseous structures are unremarkable.       Findings suggesting pulmonary edema and heart failure. Left basilar opacity may represent pleural fluid and atelectasis. Underlying infiltrate or mass is not excluded.     Signed by: Rashawn Hook 12/31/2023 4:18 PM Dictation workstation:   [id number]    ECG 12 lead    Result Date: 12/27/2023   Poor data quality, interpretation may be adversely affected Normal sinus rhythm Nonspecific ST abnormality Abnormal ECG When compared with ECG of 31-MAY-2013 19:08, QT has shortened Confirmed by Magan Perez ([id number]) on 12/27/2023 10:14:24 AM    Nuclear Stress Test    Result Date: 12/26/2023  Interpreted By:  Remigio Cherry, STUDY: NUCLEAR STRESS TEST;  12/26/2023 2:36 pm   INDICATION: Signs/Symptoms:Paroxysmal atrial fibrillation, sick sinus syndrome.   COMPARISON: None.   ACCESSION NUMBER(S): [id number]   ORDERING CLINICIAN: NORY LOZA   TECHNIQUE: DIVISION OF NUCLEAR MEDICINE PHARMACOLOGIC STRESS MYOCARDIAL PERFUSION SCAN, ONE DAY PROTOCOL   The patient received an intravenous injection of 11.0 mCi of Tc-99m Myoview and resting emission tomographic (SPECT) images of the myocardium were acquired. The patient then received an intravenous infusion of 0.4 mg regadenoson (Lexiscan) followed by an additional injection of 32.0 mCi of Tc-99m  Myoview. Stress phase SPECT images of the myocardium were then acquired. These included ECG-gated post-stress and rest images to assess and quantify ventricular function.  Low dose CT was acquired for attenuation correction.   FINDINGS: Stress and rest phase imaging demonstrate no evidence for ischemia. No fixed defects seen to suggest infarct   EKG gated imaging demonstrates normal left ventricular wall motion and thickening. There is normal end-diastolic volume. Normal ejection fraction 65%           Attenuation correction CT images demonstrate nonspecific bilateral axillary lymphadenopathy. For example, in the right axilla measuring 2.8 by 1.2 cm. Lung parenchyma demonstrates small bilateral basilar effusions and compressive atelectasis       1. No evidence for ischemia.   2. Normal ejection fraction 65%.   3. Small bilateral basilar effusions and compressive atelectasis   4. Bilateral axillary lymphadenopathy nonspecific and likely reactive         MACRO: None   Signed by: Remigio Cherry 12/26/2023 2:59 PM Dictation workstation:   [id number]    Cardiology Interpretation Of Nuclear Stress - See Other Report For Nuclear Portion    Result Date: 12/26/2023           [place] [id number]            Phone [phone] Nuclear Pharmacologic Stress Test Patient Name:      MARK GABRIEL Ordering Provider:    [id number] NORY LOZA Study Date:        12/26/2023         Reading Physician:    [id number] Elvis Bruce DO MRN/PID:           [id number]           Supervising           Balwinder Bruce DO                                       Physician: Accession#:        [id number]       Fellow: Date of Birth/Age: 1940 / 83      Fellow:                    years Gender:            F                  Nurse:                Do Zafar RN Admit Date:                           Technician:           Matt FARAH  Admission Status:                     Sonographer:          QUYEN Height:            160.02 cm          Technologist: Weight:            82.10 kg           Additional Staff: BSA:               1.85 m2            Encounter#:           [id number] BMI:               32.06 kg/m2        Patient Location: Study Type:    CARDIOLOGY INTERPRETATION OF NUCLEAR STRESS Diagnosis/ICD: Longstanding persistent atrial fibrillation-I48.11 Indication:    Atrial Fibrillation CPT Codes:     Stress Test Interpretation-06387; Stress Test Supervision-[id number] Falls Risk:  Study Details: Correct procedure and correct patient verified verbally and with                ID Band checked.  Patient History: Chest pain, atrial fibrillation, syncope, hypertension, hyperlipidemia and congestive heart failure.  Medications: SIMVASTATIN, FUROSEMIDE, HYDRALAZINE. The patient did not take medications as prescribed.  Patient Performance: Patient received a total of 0.4 mg of Regadenoson at 11:30:33 AM. The resting blood pressure was 175/68 mmHg with a heart rate of 74 bpm. The patient developed no symptoms during the stress exam. The blood pressure response was normal. The test was terminated due to: completed lab protocol. Standard dose of Lexiscan was infused over 10 to 15 seconds then flushed with saline. The patient then received a standard IV dose of Myoview. The patient did not experience chest pain with infusion of Lexiscan. The resting twelve-lead ECG was essentially normal and there were no significant changes in the ST segments with the infusion of Lexiscan. Resting blood pressure was 170/68 and decreased to 144/60 prior to completion of the study. No arrhythmias were induced. Myoview imaging was to be reported separately. Patient has met the discharge criteria and is discharged to their floor.  Baseline ECG: Resting ECG showed Atrial Fibrillation with AFIB.  Stress ECG: Stress ECG showed atrial fibrillation.  Stress Stage Data:  +----------------+--+------+-------+                 HRSys BPDias BP +----------------+--+------+-------+ Baseline [id number]   68      +----------------+--+------+-------+  Recovery ECG: Recovery ECG showed atrial fibrillation.  +------------+--+------+-------+             HRSys BPDias BP +------------+--+------+-------+ Recovery I  [id number]   54      +------------+--+------+-------+ Recovery II [id number]   54      +------------+--+------+-------+ Recovery [id number]   61      +------------+--+------+-------+  Summary:  1. No clinical or ECG evidence of ischemia.  2. Myoview imaging to be reported separately.  3. Adequate level of stress achieved.  4. Nuclear image results are reported separately. [id number] Elvis Bruce  Electronically signed on 12/26/2023 at 11:42:40 AM   ** Final **     US renal complete    Result Date: 12/24/2023  Interpreted By:  Bruno Bryant, STUDY: US RENAL COMPLETE; 12/24/2023 7:50 pm   INDICATION: Signs/Symptoms:JANET on CKD. /history of CLL and hypertension   COMPARISON: None.   ACCESSION NUMBER(S): [id number]   ORDERING CLINICIAN: SERJIO DEVINE   TECHNIQUE: Grayscale and color Doppler imaging of the kidneys.   FINDINGS: The right kidney measures 9.3 cm x 4.1 cm x 4.4 cm cm. The left kidney measures 7.8 cm x 5.0 cm x 3.7 cm cm. There is no shadowing calculus, hydronephrosis, or solid mass identified. However, there is a 2.6 x 2.5 x 2.4 cm inferior pole hypoechoic renal cyst without internal color Doppler flow. Renal cortical thinning and increased echogenicity of the renal parenchyma is noted.   The bladder is grossly unremarkable.       1. No hydronephrosis or nephrolithiasis. 2. Medical renal disease. 3 left renal cyst.     MACRO: None.   Signed by: Bruno Bryant 12/24/2023 8:19 PM Dictation workstation:   [id number]    XR chest 1 view    Result Date: 12/24/2023  Interpreted By:  Nick Luciano, STUDY: XR CHEST 1 VIEW; 12/24/2023 4:15 am   INDICATION: CLINICAL  INFORMATION: Signs/Symptoms:weakness.   COMPARISON: 12/18/2020   ACCESSION NUMBER(S): [id number]   ORDERING CLINICIAN: LESLYE FRANK   TECHNIQUE: Portable chest one view.   FINDINGS: The cardiac size is indeterminate in view of the AP projection. Neurostimulator overlies the thoracic spine. No infiltrates or effusions are identified.  The aorta is tortuous.       No acute pathologic findings are identified.  There is no interval change when compared to the previous examination.   MACRO: none   Signed by: Nick Luciano 12/24/2023 9:11 AM Dictation workstation:   [id number]    XR chest 2 views    Result Date: 12/18/2023  Interpreted By:  Alex Mandel, STUDY: XR CHEST 2 VIEWS; 12/18/2023 8:06 am   INDICATION: Signs/Symptoms:sob   COMPARISON: 12/13/2023   ACCESSION NUMBER(S): [id number]   ORDERING CLINICIAN: SRIKANTH VILCHIS   TECHNIQUE: PA and LAT views of the chest were obtained.   FINDINGS: The cardiomediastinal silhouette is unremarkable. The lungs are clear. No pleural effusion is identified.   The osseous structures are intact. Spinal cord stimulator leads are again seen at the mid and lower thoracic level.       No acute cardiopulmonary process.     Signed by: Alex Mandel 12/18/2023 8:30 AM Dictation workstation:   [id number]    Transthoracic Echo (TTE) Complete    Result Date: 12/15/2023           [place] [address]            Phone [phone] TRANSTHORACIC ECHOCARDIOGRAM REPORT  Patient Name:      MARK Patterson Physician:    [id number] Elvis [facility] Study Date:        12/15/2023          Ordering Provider:    [id number] ABDULLAHI VANESSA MRN/PID:           [id number]            Fellow: Accession#:        [id number]        Nurse: Date of Birth/Age: 1940 / 83 years Sonographer:          Kristen WESTFALL Gender:            F                   Additional Staff: Height:             154.94 cm           Admit Date:           12/14/2023 Weight:            82.10 kg            Admission Status:     Inpatient - Routine BSA:               1.81 m2             Department Location: Blood Pressure: 150 /47 mmHg Study Type:    TRANSTHORACIC ECHO (TTE) COMPLETE Diagnosis/ICD: Shortness of breath-R06.02 Indication:    Shortness of Breath CPT Codes:     Echo Complete w Full Doppler-72802 Patient History: Pertinent History: CKD CHF Depression HTN CP Murmur Dyslipidemia Varicose veins                    of leg with swellin ghyperlipidemia hypercholesterolemia. Study Detail: The following Echo studies were performed: 2D, Doppler, M-Mode,               color flow and 3D.  PHYSICIAN INTERPRETATION: Left Ventricle: Left ventricular systolic function is normal, with an estimated ejection fraction of 60-65%. There are no regional wall motion abnormalities. The left ventricular cavity size is normal. Left ventricular diastolic filling was indeterminate. Left Atrium: The left atrium is normal in size. Right Ventricle: The right ventricle is normal in size. There is normal right ventricular global systolic function. Right Atrium: The right atrium is normal in size. Aortic Valve: The aortic valve is trileaflet. There is mild aortic valve cusp calcification. There is no evidence of aortic valve regurgitation. The peak instantaneous gradient of the aortic valve is 12.0 mmHg. Mitral Valve: The mitral valve is normal in structure. There is trace to mild mitral valve regurgitation. Tricuspid Valve: The tricuspid valve is structurally normal. No evidence of tricuspid regurgitation. Pulmonic Valve: The pulmonic valve is not well visualized. There is trace to mild pulmonic valve regurgitation. Pericardium: There is no pericardial effusion noted. Aorta: The aortic root is normal. Systemic Veins: The inferior vena cava appears to be of normal size. There is IVC inspiratory collapse greater than 50%.  CONCLUSIONS:  1. Left  ventricular systolic function is normal with a 60-65% estimated ejection fraction.  2. Left ventricular diastolic filling indeterminate.  3. Mild aortic valve sclerosis. QUANTITATIVE DATA SUMMARY: 2D MEASUREMENTS:                          Normal Ranges: LAs:           3.00 cm   (2.7-4.0cm) IVSd:          0.85 cm   (0.6-1.1cm) LVPWd:         1.00 cm   (0.6-1.1cm) LVIDd:         4.23 cm   (3.9-5.9cm) LVIDs:         2.79 cm LV Mass Index: 69.0 g/m2 LV % FS        34.0 % LA VOLUME:                               Normal Ranges: LA Vol A2C:        59.3 ml LA Vol Index A2C:  32.8 ml/m2 LA Area A2C:       18.7 cm2 LA Major Axis A2C: 5.0 cm LA Volume Index:   29.6 ml/m2 LA Vol A2C:        59.7 ml RA VOLUME BY A/L METHOD:                               Normal Ranges: RA Vol A4C:        32.1 ml    (8.3-19.5ml) RA Vol Index A4C:  17.7 ml/m2 RA Area A4C:       13.6 cm2 RA Major Axis A4C: 4.9 cm M-MODE MEASUREMENTS:                  Normal Ranges: Ao Root: 2.20 cm (2.0-3.7cm) LAs:     4.80 cm (2.7-4.0cm) AORTA MEASUREMENTS:                    Normal Ranges: Asc Ao, d: 3.10 cm (2.1-3.4cm) LV SYSTOLIC FUNCTION BY 2D PLANIMETRY (MOD):                     Normal Ranges: EF-A4C View: 51.6 % (>=55%) EF-A2C View: 57.9 % EF-Biplane:  55.8 % LV DIASTOLIC FUNCTION:                            Normal Ranges: MV Peak E:      0.93 m/s   (0.7-1.2 m/s) MV Peak A:      1.02 m/s   (0.42-0.7 m/s) E/A Ratio:      0.91       (1.0-2.2) MV e'           0.08 m/s   (>8.0) MV lateral e'   0.08 m/s MV medial e'    0.07 m/s E/e' Ratio:     12.39      (<8.0) PulmV Sys Siddhartha:  68.10 cm/s PulmV Urrutia Siddhartha: 47.60 cm/s PulmV S/D Siddhartha:  1.40 MITRAL VALVE:                      Normal Ranges: MV Vmax:    1.22 m/s (<=1.3m/s) MV peak P.0 mmHg (<5mmHg) MV mean P.0 mmHg (<48mmHg) MV DT:      262 msec (150-240msec) AORTIC VALVE:                          Normal Ranges: AoV Vmax:      1.73 m/s  (<=1.7m/s) AoV Peak P.0 mmHg (<20mmHg) LVOT Max Siddhartha:  1.21 m/s   (<=1.1m/s) LVOT VTI:      29.10 cm LVOT Diameter: 2.00 cm   (1.8-2.4cm) AoV Area,Vmax: 2.20 cm2  (2.5-4.5cm2)  RIGHT VENTRICLE: RV Basal 3.18 cm RV Mid   2.70 cm RV Major 5.2 cm TAPSE:   16.5 mm RV s'    0.13 m/s TRICUSPID VALVE/RVSP:                   Normal Ranges: IVC Diam: 1.75 cm PULMONIC VALVE:                         Normal Ranges: PV Accel Time: 69 msec  (>120ms) PV Max Siddhartha:    1.4 m/s  (0.6-0.9m/s) PV Max P.3 mmHg Pulmonary Veins: PulmV Urrutia Siddhartha: 47.60 cm/s PulmV S/D Siddhartha:  1.40 PulmV Sys Siddhartha:  68.10 cm/s  [id number] Elvis Bruce  Electronically signed on 12/15/2023 at 10:13:13 AM  ** Final **     CT head wo IV contrast    Result Date: 2023  Interpreted By:  Rashawn Hook, STUDY: CT HEAD WO IV CONTRAST; 2023 10:34 am   INDICATION: Signs/Symptoms:lightheadedness, near syncope.   COMPARISON: No comparison exams available.   ACCESSION NUMBER(S): [id number]   ORDERING CLINICIAN: SLY DAWKINS   TECHNIQUE: CT axial images through the Brain were obtained without contrast.   FINDINGS: Acute ischemic change: None.   Hemorrhage: No evidence of acute intracranial hemorrhage.   Mass Effect / Mass Lesion: No significant mass effect. There is no evidence of an intracranial mass or extraaxial fluid collection.   Chronic ischemic change: Patchy foci of  low attenuation coefficient are present within white matter which is a nonspecific finding but likely represents moderate microvascular ischemia.   Parenchyma: There is no significant volume loss. The brain parenchyma is otherwise within normal limits for age.   Ventricles: Normal caliber and morphology.   Other: There is calcification involving the carotid siphons.       No acute intracranial process   All CT examinations are performed with 1 or more of the following dose reduction techniques: Automated exposure control, adjustment of mA and/or kv according to patient's size, or use of iterative reconstruction techniques.   MACRO: none   Signed by:  Rashawn Hook 12/14/2023 10:48 AM Dictation workstation:   [id number]    XR chest 2 views    Result Date: 12/13/2023  Interpreted By:  Nj Zazueta, STUDY: XR CHEST 2 VIEWS; 12/13/2023 2:47 pm   INDICATION: Signs/Symptoms:SOB dizziness   COMPARISON: Chest radiograph 06/09/2020   ACCESSION NUMBER(S): [id number]   ORDERING CLINICIAN: LA SAEED   TECHNIQUE: AP and lateral views of the chest performed.   FINDINGS: Cardiac size is indeterminate due to the AP projection. Calcifications are seen at the aortic knob. Thoracic aorta is tortuous in appearance. Mild prominence of the pulmonary vasculature. Faint bibasilar hazy opacities likely reflect atelectasis. No focal consolidation, large pleural effusion, or visible pneumothorax. Multilevel discogenic degenerative changes of the thoracic spine. Presumed neurostimulator leads overlying the thoracic spine.       Mild prominence of the pulmonary vasculature, as can be seen with pulmonary vascular congestion. Please correlate for corresponding symptoms.   No focal consolidation or visible pneumothorax.   Faint bibasilar atelectasis suspected.   MACRO: None.   Signed by: Nj Zazueta 12/13/2023 3:04 PM Dictation workstation:   [id number]     Assessment/Plan     Acute kidney injury- marginal increase in serum creatinine  Leukocytosis-multifactorial  CLL  C. difficile infection- resolved     Continue oral vancomycin-total of 14 days  Monitor renal function  Contact plus precautions  Questran  Supportive care  Monitor temperature and WBC  Discharge planning    Hipolito Elias MD

## 2024-01-02 NOTE — CARE PLAN
The patient's goals for the shift include increase strength    The clinical goals for the shift include stable HR    Problem: Pain  Goal: My pain/discomfort is manageable  Outcome: Progressing     Problem: Safety  Goal: Patient will be injury free during hospitalization  Outcome: Progressing  Goal: I will remain free of falls  Outcome: Progressing     Problem: Daily Care  Goal: Daily care needs are met  Outcome: Progressing     Problem: Psychosocial Needs  Goal: Demonstrates ability to cope with hospitalization/illness  Outcome: Progressing  Goal: Collaborate with me, my family, and caregiver to identify my specific goals  Outcome: Progressing     Problem: Discharge Barriers  Goal: My discharge needs are met  Outcome: Progressing     Problem: Skin  Goal: Decreased wound size/increased tissue granulation at next dressing change  Outcome: Progressing  Goal: Participates in plan/prevention/treatment measures  Outcome: Progressing  Goal: Prevent/manage excess moisture  Outcome: Progressing  Goal: Prevent/minimize sheer/friction injuries  Outcome: Progressing  Goal: Promote/optimize nutrition  Outcome: Progressing  Goal: Promote skin healing  Outcome: Progressing  Flowsheets (Taken 1/2/2024 5908)  Promote skin healing:   Assess skin/pad under line(s)/device(s)   Turn/reposition every 2 hours/use positioning/transfer devices

## 2024-01-02 NOTE — PROGRESS NOTES
"Tracie Baltazar is a 83 y.o. female on day 9 of admission presenting with Weakness.    Subjective   No complaints        Objective     Physical Exam  Gen: A+O x 3, o acute distress  HEENT: Normocephalic/atraumatic pupils equal react light  Neck: Neck veins not elevated, upstrokes and volumes normal, no bruit   Lung: Clear throughout all lobes, nl AP diameter  CV: Nl sounding S1, single S2, no S3/murmur, PMI nondisplaced, Abdomen: soft nontender, sounds throughout all quadrants  Extremities: pulses palpable bilaterally, no edema, warm to touch  Neuro: no neurologic deficits    Last Recorded Vitals  Blood pressure 104/63, pulse (!) 115, temperature 36 °C (96.8 °F), temperature source Temporal, resp. rate 18, height 1.6 m (5' 2.99\"), weight 81.6 kg (179 lb 14.3 oz), SpO2 100 %.  Intake/Output last 3 Shifts:  I/O last 3 completed shifts:  In: 1080 (13.2 mL/kg) [P.O.:1080]  Out: 1 (0 mL/kg) [Stool:1]  Weight: 81.6 kg     Relevant Results  Results for orders placed or performed during the hospital encounter of 12/24/23 (from the past 24 hour(s))   Renal Function Panel   Result Value Ref Range    Glucose 107 (H) 65 - 99 mg/dL    Sodium 139 133 - 145 mmol/L    Potassium 4.3 3.4 - 5.1 mmol/L    Chloride 103 97 - 107 mmol/L    Bicarbonate 24 24 - 31 mmol/L    Urea Nitrogen 26 (H) 8 - 25 mg/dL    Creatinine 2.20 (H) 0.40 - 1.60 mg/dL    eGFR 22 (L) >60 mL/min/1.73m*2    Calcium 8.8 8.5 - 10.4 mg/dL    Phosphorus 4.3 2.5 - 4.5 mg/dL    Albumin 2.8 (L) 3.5 - 5.0 g/dL    Anion Gap 12 <=19 mmol/L   CBC   Result Value Ref Range    WBC 42.6 (H) 4.4 - 11.3 x10*3/uL    nRBC 0.0 0.0 - 0.0 /100 WBCs    RBC 3.31 (L) 4.00 - 5.20 x10*6/uL    Hemoglobin 10.3 (L) 12.0 - 16.0 g/dL    Hematocrit 32.0 (L) 36.0 - 46.0 %    MCV 97 80 - 100 fL    MCH 31.1 26.0 - 34.0 pg    MCHC 32.2 32.0 - 36.0 g/dL    RDW 13.2 11.5 - 14.5 %    Platelets 355 150 - 450 x10*3/uL                       Assessment/Plan   Principal Problem:    Weakness  Active " Problems:    Dyslipidemia    Chronic kidney disease, stage 4 (severe) (CMS/[facility])    Fibromyalgia    Chronic lymphocytic leukemia (CMS/HCC)    Benign essential hypertension    Congestive heart failure with left ventricular diastolic dysfunction, acute on chronic (CMS/[facility])    Atrial fibrillation (CMS/[facility])    12/29:  -Continues to have paroxysmal atrial fibrillation with rates in the 120s though asymptomatic   -though did have a postconversion pause was 4 seconds again asymptomatic not on any AV amna agents   -Will utilize amiodarone 200 mg daily to maintain sinus rhythm  -At this time concern with anticoagulation being hazardous with gait instability and high risk fall  -Being treated for C difficile  -Once for discharge to short-term facility per primary service when current issues stabilize     1/2/24:   -Telemetry atrial fibrillation over the weekend, this a.m. rates in the 120s, though asymptomatic   -started on low-dose beta-blocker over the weekend for improvement in rate control,  additionally started on anticoagulation   -11:09 a.m. converted to sinus rhythm without post conversion pause,  heart rate in the 70s   -tolerating amiodarone 200 mg daily with metoprolol extended release 12.5 once daily  -attempt to maintain  sinus rhythm with low-dose bb and amiodarone  -if after amiodarone loaded rates slow can stop BB   -rec checking  H/H within next few wks   -plan for discharge to a short-term facility  -If patient able can follow-up in 4-6 weeks         JUMA Campbell-CNP

## 2024-01-02 NOTE — PROGRESS NOTES
Occupational Therapy    OT Treatment    Patient Name: Tracie Baltazar  MRN: [id number]  Today's Date: 1/2/2024  Time Calculation  Start Time: 1100  Stop Time: 1115  Time Calculation (min): 15 min         Assessment:  OT Assessment: pt tolerating poorly with increased fatigue and elevated HR. Pt would benefit from continued skilled OT services to improve strength and functional tolerance to increase independence with ADL tasks  End of Session Communication: Bedside nurse  End of Session Patient Position: Bed, 3 rail up  OT Assessment Results: Decreased ADL status, Decreased upper extremity strength, Decreased safe judgment during ADL, Decreased endurance, Decreased functional mobility, Decreased gross motor control, Decreased IADLs  Plan:  Treatment Interventions: ADL retraining, Functional transfer training, Endurance training, Patient/family training, Neuromuscular reeducation, Compensatory technique education  OT Frequency: 3 times per week  OT Discharge Recommendations: Moderate intensity level of continued care  OT Recommended Transfer Status: Minimal assist, Assist of 1  OT - OK to Discharge: Yes  Treatment Interventions: ADL retraining, Functional transfer training, Endurance training, Patient/family training, Neuromuscular reeducation, Compensatory technique education    Subjective   Previous Visit Info:  OT Last Visit  OT Received On: 01/02/24  General:  General  Reason for Referral: decline in ADLs  Referred By: Dr. Sarbjit MD  Past Medical History Relevant to Rehab: Patient is an 83 year old female admitted from home with sudden weakness in legs while walking. Patient recently d/c'd from [facility] 12/19/2023. PMH: CKD, fibromyalgia, CLL, HTN, CHF, obesity, OA, DJD, depression  Family/Caregiver Present: Yes  Co-Treatment: PT (assist x2 required for safe pt handling during transfers)  Prior to Session Communication: Bedside nurse  Patient Position Received: Up in chair  General Comment: pt cleared for therapy  per RN. Pt seated in chair upon arrival and agreeable to tx session  Precautions:  Medical Precautions: Fall precautions, Infection precautions  Vital Signs:  Vital Signs  Heart Rate: (!) 145 (elevated HR up in chair/transfers, decreasing to 80s once supine in bed)  Heart Rate Source: Monitor  Pain:  Pain Assessment  Pain Assessment: 0-10  Pain Score: 0 - No pain    Objective    Cognition:  Cognition  Overall Cognitive Status: Within Functional Limits  Orientation Level: Oriented X4    Activities of Daily Living:    Toileting  Toileting Level of Assistance: Dependent  Toileting Comments: dependent to don/doff on bed pan in chair and dependent for rear pericare hygiene       Bed Mobility/Transfers: Bed Mobility  Bed Mobility: Yes  Bed Mobility 1  Bed Mobility 1: Sitting to supine  Level of Assistance 1: Maximum assistance  Bed Mobility Comments 1: max A x2 sit>supine with trunk control and BLE advancement. Dependent to boost to HOB with use of draw sheet    Transfer 1  Transfer From 1: Sit to, Stand to  Transfer to 1: Stand, Sit  Technique 1: Sit to stand, Stand to sit  Transfer Device 1: Walker  Transfer Level of Assistance 1: Maximum tactile cues, +2  Trials/Comments 1: max A x2 sit<>stand from chair<>FWW with VC for proper hand placement and postural alignment.  Transfers 2  Transfer From 2: Sit to, Stand to  Transfer to 2: Stand, Sit  Technique 2: Sit to stand, Stand to sit  Transfer Device 2: Walker  Transfer Level of Assistance 2: Maximum tactile cues, Maximum assistance, +2  Trials/Comments 2: max A x2 sit<>stand from chair<>FWW to don/doff on bed pan with VC for hand placement.  Transfers 3  Transfer From 3: Stand to  Transfer to 3: Sit  Technique 3: Stand to sit  Transfer Device 3: Walker  Transfer Level of Assistance 3: Maximum assistance, +2  Trials/Comments 3: max A x2 stand>sit from FWW>EOB    Standing Balance:  Static Standing Balance  Static Standing-Balance Support: Bilateral upper extremity  supported  Static Standing-Level of Assistance: Maximum assistance  Static Standing-Comment/Number of Minutes: poor standing balance during transfers       Therapy/Activity:      Therapeutic Activity  Therapeutic Activity 1: pt tolerated taking ~4 steps to EOB at FWW with max A and VC for step sequencing with increased time required d/t fatigue      Outcome Measures:[facility] Daily Activity  Putting on and taking off regular lower body clothing: A lot  Bathing (including washing, rinsing, drying): A little  Putting on and taking off regular upper body clothing: A little  Toileting, which includes using toilet, bedpan or urinal: A lot  Taking care of personal grooming such as brushing teeth: A little  Eating Meals: None  Daily Activity - Total Score: 17      Education Documentation  Handouts, taught by AUDELIA Edouard at 1/2/2024 11:50 AM.  Learner: Patient  Readiness: Acceptance  Method: Explanation  Response: Verbalizes Understanding, Demonstrated Understanding    Home Exercise Program, taught by AUDELIA Edouard at 1/2/2024 11:50 AM.  Learner: Patient  Readiness: Acceptance  Method: Explanation  Response: Verbalizes Understanding, Demonstrated Understanding    Body Mechanics, taught by AUDELIA Edouard at 1/2/2024 11:50 AM.  Learner: Patient  Readiness: Acceptance  Method: Explanation  Response: Verbalizes Understanding, Demonstrated Understanding    Precautions, taught by AUDELIA Edouard at 1/2/2024 11:50 AM.  Learner: Patient  Readiness: Acceptance  Method: Explanation  Response: Verbalizes Understanding, Demonstrated Understanding    ADL Training, taught by AUDELIA Edouard at 1/2/2024 11:50 AM.  Learner: Patient  Readiness: Acceptance  Method: Explanation  Response: Verbalizes Understanding, Demonstrated Understanding    Education Comments  No comments found.      Problem: OT Goals  Goal: ADLs  Description: Patient will demonstrate the ability to complete  ADL tasks with Mod I, using AE as needed, in order to increase patent's safety and independence with self-care tasks.  Outcome: Progressing  Goal: Functional Transfers  Description: Patient will complete functional transfers with Mod I in order to increase patient's safety and independence with daily tasks.  Outcome: Progressing  Goal: B UE Strengthening  Description: Patient will increase B UE strength to 4+/5 for functional transfers.  Outcome: Progressing  Goal: Functional Mobility  Description: Patient will demonstrate the ability to complete item retrieval and functional mobility with Mod I in order to increase patient's safety and independence with daily tasks.    Outcome: Progressing

## 2024-01-02 NOTE — PROGRESS NOTES
Precert has been approved for [facility] - awaiting response from facility on what time the pt can dc to them.      01/02/24 1251   Discharge Planning   Patient expects to be discharged to: [facility] - precert approved

## 2024-01-02 NOTE — PROGRESS NOTES
Physical Therapy    Physical Therapy Treatment    Patient Name: Tracie Baltazar  MRN: [id number]  Today's Date: 1/2/2024  Time Calculation  Start Time: 1021  Stop Time: 1050  Time Calculation (min): 29 min       Assessment/Plan   PT Assessment  PT Assessment Results: Decreased strength, Decreased endurance, Impaired balance, Decreased mobility, Impaired judgement, Decreased safety awareness, Impaired hearing  Rehab Prognosis: Good  Evaluation/Treatment Tolerance: Patient limited by fatigue  Medical Staff Made Aware: Yes  Strengths: Ability to acquire knowledge  Barriers to Participation: Insight into problems  End of Session Communication: Bedside nurse  Assessment Comment: decreased overall activity tolerance, weakness B LE, high fall risk, continues to require moderate intesity follow up  End of Session Patient Position: Bed, 3 rail up  PT Plan  Inpatient/Swing Bed or Outpatient: Inpatient  PT Plan  Treatment/Interventions: Bed mobility, Transfer training, Balance training, Strengthening, Endurance training, Therapeutic exercise, Therapeutic activity  PT Plan: Skilled PT  PT Frequency: 5 times per week  PT Discharge Recommendations: Moderate intensity level of continued care  Equipment Recommended upon Discharge: Wheeled walker  PT Recommended Transfer Status: Assist x1  PT - OK to Discharge: Yes      General Visit Information:   PT  Visit  PT Received On: 01/02/24  General  Reason for Referral: impaired mobility  Referred By: Dr. Sarbjit MD  Past Medical History Relevant to Rehab: Patient is an 83 year old female admitted from home with sudden weakness in legs while walking. Patient recently d/c'd from [facility] 12/19/2023. PMH: CKD, fibromyalgia, CLL, HTN, CHF, obesity, OA, DJD, depression  Family/Caregiver Present: Yes  Caregiver Feedback:  (daughter present)  Co-Treatment: OT  Prior to Session Communication: Bedside nurse  Patient Position Received: Up in chair  Preferred Learning Style: verbal  General Comment:  partial cotreat OT for mobilitly as patient is max A x 2 this date. Patient cleared for PT by RN (Cleared by RN, patient agreeable to PT, patient is up in chair upon arrival)    Subjective   Precautions:  Precautions  Medical Precautions: Fall precautions, Infection precautions  Vital Signs:       Objective   Pain:     Cognition:  Cognition  Insight: Mild  Processing Speed: Delayed  Postural Control:  Postural Control  Posture Comment: forward head, rounded shoulders  Extremity/Trunk Assessments:        Activity Tolerance:  Activity Tolerance  Endurance: Decreased tolerance for upright activites  Treatments:  Therapeutic Exercise  Therapeutic Exercise Performed: Yes  Therapeutic Exercise Activity 1: hip flex 2x15  Therapeutic Exercise Activity 2: LAQ 2x15  Therapeutic Exercise Activity 3: hip adduction against manual resistance 2x15  Therapeutic Exercise Activity 4: hip abduction against manual resistance         Bed Mobility 1  Bed Mobility 1: Sitting to supine  Level of Assistance 1: Maximum assistance  Bed Mobility Comments 1: max A x 2, dependent to boost up in bed  Bed Mobility 2  Bed Mobility  2: Rolling right  Level of Assistance 2: Moderate assistance  Bed Mobility Comments 2: to straighten bedding underneath patient    Ambulation/Gait Training  Ambulation/Gait Training Performed: No  Transfer 1  Technique 1: Sit to stand, Stand to sit  Transfer Device 1: Walker  Transfer Level of Assistance 1: Maximum assistance, +2  Trials/Comments 1: max A x 2 to push up from chair to RW, verbal cues for hand placement and upright posture. Patient flexed at hips and knees, decreased eccentric control.  Transfers 2  Transfer From 2: Chair with arms to  Transfer to 2: Bed  Technique 2: Sit to stand, Stand to sit  Transfer Device 2: Walker  Transfer Level of Assistance 2: Maximum assistance, +2  Trials/Comments 2: Takes 4 steps from bed to chair, difficulty with weightshifting and foot clearance. B hips/knees flexed, sits  without warning. Recommend use of gait belt for safety and B knees can buckle. Assist for eccentric lowering.    Outcome Measures:  [facility] Basic Mobility  Turning from your back to your side while in a flat bed without using bedrails: A lot  Moving from lying on your back to sitting on the side of a flat bed without using bedrails: A lot  Moving to and from bed to chair (including a wheelchair): A lot  Standing up from a chair using your arms (e.g. wheelchair or bedside chair): A lot  To walk in hospital room: Total  Climbing 3-5 steps with railing: Total  Basic Mobility - Total Score: 10    IP EDUCATION:  Education provided re: safe mobility techniques, hand placement, current functional status, importance of out of bed for recovery.    Encounter Problems       Encounter Problems (Active)       Mobility       STG - Patient will ambulate 50' with rolling walker and modified independence. (Progressing)       Start:  12/25/23    Expected End:  01/08/24            STG - Patient will ascend and descend two stairs with use of one handrail and supervision for safety. (Progressing)       Start:  12/25/23    Expected End:  01/08/24               Safety       STG - Patient uses call light consistently to request assistance with transfers (Progressing)       Start:  12/25/23    Expected End:  01/08/24               Transfers       STG - Patient to transfer to and from sit to supine with independence. (Progressing)       Start:  12/25/23    Expected End:  01/08/24            STG - Patient will transfer sit to and from stand with rolling walker and modified independence. (Progressing)       Start:  12/25/23    Expected End:  01/08/24

## 2024-01-02 NOTE — NURSING NOTE
Patient discharge to snf with belongings, Clothes, BL hearing aides and hearing aide .  Patient transported in  stable condition.

## 2024-01-03 ENCOUNTER — NURSING HOME VISIT (OUTPATIENT)
Dept: POST ACUTE CARE | Facility: EXTERNAL LOCATION | Age: 84
End: 2024-01-03

## 2024-01-03 DIAGNOSIS — C91.10 CLL (CHRONIC LYMPHOCYTIC LEUKEMIA) (MULTI): ICD-10-CM

## 2024-01-03 DIAGNOSIS — N39.0 URINARY TRACT INFECTION WITHOUT HEMATURIA, SITE UNSPECIFIED: Primary | ICD-10-CM

## 2024-01-03 DIAGNOSIS — I48.91 ATRIAL FIBRILLATION, UNSPECIFIED TYPE (MULTI): ICD-10-CM

## 2024-01-03 PROCEDURE — 99306 1ST NF CARE HIGH MDM 50: CPT | Performed by: INTERNAL MEDICINE

## 2024-01-03 ASSESSMENT — ENCOUNTER SYMPTOMS
NAUSEA: 0
CHILLS: 0
COUGH: 0
DIARRHEA: 0
VOMITING: 0
ABDOMINAL PAIN: 0
FEVER: 0
HEADACHES: 0
APPETITE CHANGE: 0
SHORTNESS OF BREATH: 0

## 2024-01-03 NOTE — LETTER
Patient: Tracie Baltazar  : 1940    Encounter Date: 2024    HISTORY AND PHYSICAL    History of present illness:    Tracie Baltazar is a 83 y.o. female admitted to SNF after hospitalization for weakness, she has a history of CLL and atrial fibrillation, had a near syncopal episode and significant leg weakness.  She had elevation of her white blood cell count beyond her usual range in the setting of the CLL, found with urinary tract infection treated with Keflex also C. difficile colitis which was treated.  The stools have firmed up.  She says she has no abdominal pain.  She says her legs are just weak.  However, she denies chest pain, shortness of breath and any other symptoms at this time.  She says her appetite is good and she is anxious to begin a rehabilitation program.    Past Medical History:   Diagnosis Date   • Asthma    • Essential (primary) hypertension     Hypertension   • Kidney failure    • Leukemia (CMS/HCC)    • Rheumatoid arthritis (CMS/HCC)         Past Surgical History:   Procedure Laterality Date   • BACK SURGERY      Back Surgery   • CHOLECYSTECTOMY      Cholecystectomy   • HYSTERECTOMY      Hysterectomy   • KNEE SURGERY      arthroscopic surgery?   • OTHER SURGICAL HISTORY      Shoulder Surgery Left   • SHOULDER SURGERY Left     Left shoulder impingement surgery   • TOTAL KNEE ARTHROPLASTY Left 2014        Family History   Problem Relation Name Age of Onset   • Emphysema Mother     • Emphysema Father     • Heart attack Sister     • Lung cancer Brother          Agent orange   • Lymphoma Son     • Multiple sclerosis Son         Social History     Socioeconomic History   • Marital status:      Spouse name: Not on file   • Number of children: Not on file   • Years of education: Not on file   • Highest education level: Not on file   Occupational History   • Not on file   Tobacco Use   • Smoking status: Never   • Smokeless tobacco: Never   Vaping Use   • Vaping Use: Never  used   Substance and Sexual Activity   • Alcohol use: Not Currently   • Drug use: Never   • Sexual activity: Not on file   Other Topics Concern   • Not on file   Social History Narrative   • Not on file     Social Determinants of Health     Financial Resource Strain: Low Risk  (12/24/2023)    Overall Financial Resource Strain (CARDIA)    • Difficulty of Paying Living Expenses: Not hard at all   Food Insecurity: No Food Insecurity (12/14/2023)    Hunger Vital Sign    • Worried About Running Out of Food in the Last Year: Never true    • Ran Out of Food in the Last Year: Never true   Transportation Needs: No Transportation Needs (12/24/2023)    PRAPARE - Transportation    • Lack of Transportation (Medical): No    • Lack of Transportation (Non-Medical): No   Physical Activity: Inactive (12/14/2023)    Exercise Vital Sign    • Days of Exercise per Week: 0 days    • Minutes of Exercise per Session: 0 min   Stress: No Stress Concern Present (12/14/2023)    [employer] - Occupational Stress Questionnaire    • Feeling of Stress : Not at all   Social Connections: Socially Isolated (12/14/2023)    Social Connection and Isolation Panel [NHANES]    • Frequency of Communication with Friends and Family: More than three times a week    • Frequency of Social Gatherings with Friends and Family: More than three times a week    • Attends Oriental orthodox Services: Never    • Active Member of Clubs or Organizations: No    • Attends Club or Organization Meetings: Never    • Marital Status:    Intimate Partner Violence: Not At Risk (12/14/2023)    Humiliation, Afraid, Rape, and Kick questionnaire    • Fear of Current or Ex-Partner: No    • Emotionally Abused: No    • Physically Abused: No    • Sexually Abused: No   Housing Stability: High Risk (12/24/2023)    Housing Stability Vital Sign    • Unable to Pay for Housing in the Last Year: No    • Number of Places Lived in the Last Year: 1    • Unstable Housing in  the Last Year: Yes       Review of Systems   Constitutional:  Negative for appetite change, chills and fever.   Respiratory:  Negative for cough and shortness of breath.    Cardiovascular:  Negative for chest pain.   Gastrointestinal:  Negative for abdominal pain, diarrhea, nausea and vomiting.   Neurological:  Negative for headaches.   All other systems reviewed and are negative.       Objective  Vital signs reviewed in [facility].    Physical Exam  Vitals reviewed.   Constitutional:       General: She is not in acute distress.     Appearance: She is not toxic-appearing.   HENT:      Head: Normocephalic and atraumatic.      Mouth/Throat:      Mouth: Mucous membranes are moist.   Eyes:      Pupils: Pupils are equal, round, and reactive to light.   Cardiovascular:      Rate and Rhythm: Normal rate and regular rhythm.      Heart sounds: No murmur heard.  Pulmonary:      Breath sounds: Normal breath sounds. No wheezing, rhonchi or rales.   Abdominal:      General: There is no distension.      Palpations: Abdomen is soft.   Musculoskeletal:      Right lower leg: No edema.      Left lower leg: No edema.   Neurological:      General: No focal deficit present.      Mental Status: She is alert and oriented to person, place, and time.          Assessment/Plan  Diagnoses and all orders for this visit:  Urinary tract infection without hematuria, site unspecified (Primary)  CLL (chronic lymphocytic leukemia) (CMS/HCC)  Atrial fibrillation, unspecified type (CMS/HCC)    Weakness, debility - multifactorial - due to UTI, CDI.  PT, OT    CDI - complete vanco - symptomatically improved    UTI - treated - no clear positive culture on my review    CLL - to follow up with hematology - follow WBC    pAF - amio, Eliquis, BB, stable HR, SR on my exam    The essential elements of the hospital History and Physical as well as the Discharge Summary have been confirmed to the best of my ability by means of interviewing the patient plus a  review of the hospital records. Please note that this entry was created in a shared electronic medical record.     All available hospital and outpatient records have been reviewed. Will continue other current drug therapies as ordered on the continuation of care documents. Physical and Occupational Therapy will assess and treat per POC. Analgesia for identified pain symptoms. Continue the various medicines for bowel and bladder symptoms as well as the vitamins and supplements per hospital instructions. Will assess and provide local care to abnormalities of skin integrity. Drug to drug interaction data as noted by the pharmacy has been reviewed. The patient's condition warrants the continuation of these drugs as prescribed by the medical specialists. Discharge planning will be coordinated through the  department. I have reviewed any advanced directive documentation that is contained in the admission packet as directives indicating whether a surrogate decision maker has been identified.       Electronically Signed By: Jimi Marcos MD   1/3/24 11:41 AM

## 2024-01-03 NOTE — PROGRESS NOTES
HISTORY AND PHYSICAL    History of present illness:    Tracie Baltazar is a 83 y.o. female admitted to SNF after hospitalization for weakness, she has a history of CLL and atrial fibrillation, had a near syncopal episode and significant leg weakness.  She had elevation of her white blood cell count beyond her usual range in the setting of the CLL, found with urinary tract infection treated with Keflex also C. difficile colitis which was treated.  The stools have firmed up.  She says she has no abdominal pain.  She says her legs are just weak.  However, she denies chest pain, shortness of breath and any other symptoms at this time.  She says her appetite is good and she is anxious to begin a rehabilitation program.    Past Medical History:   Diagnosis Date    Asthma     Essential (primary) hypertension     Hypertension    Kidney failure     Leukemia (CMS/HCC)     Rheumatoid arthritis (CMS/HCC)         Past Surgical History:   Procedure Laterality Date    BACK SURGERY      Back Surgery    CHOLECYSTECTOMY      Cholecystectomy    HYSTERECTOMY      Hysterectomy    KNEE SURGERY      arthroscopic surgery?    OTHER SURGICAL HISTORY      Shoulder Surgery Left    SHOULDER SURGERY Left     Left shoulder impingement surgery    TOTAL KNEE ARTHROPLASTY Left 02/13/2014        Family History   Problem Relation Name Age of Onset    Emphysema Mother      Emphysema Father      Heart attack Sister      Lung cancer Brother          Agent orange    Lymphoma Son      Multiple sclerosis Son         Social History     Socioeconomic History    Marital status:      Spouse name: Not on file    Number of children: Not on file    Years of education: Not on file    Highest education level: Not on file   Occupational History    Not on file   Tobacco Use    Smoking status: Never    Smokeless tobacco: Never   Vaping Use    Vaping Use: Never used   Substance and Sexual Activity    Alcohol use: Not Currently    Drug use: Never    Sexual  activity: Not on file   Other Topics Concern    Not on file   Social History Narrative    Not on file     Social Determinants of Health     Financial Resource Strain: Low Risk  (12/24/2023)    Overall Financial Resource Strain (CARDIA)     Difficulty of Paying Living Expenses: Not hard at all   Food Insecurity: No Food Insecurity (12/14/2023)    Hunger Vital Sign     Worried About Running Out of Food in the Last Year: Never true     Ran Out of Food in the Last Year: Never true   Transportation Needs: No Transportation Needs (12/24/2023)    PRAPARE - Transportation     Lack of Transportation (Medical): No     Lack of Transportation (Non-Medical): No   Physical Activity: Inactive (12/14/2023)    Exercise Vital Sign     Days of Exercise per Week: 0 days     Minutes of Exercise per Session: 0 min   Stress: No Stress Concern Present (12/14/2023)    [employer] - Occupational Stress Questionnaire     Feeling of Stress : Not at all   Social Connections: Socially Isolated (12/14/2023)    Social Connection and Isolation Panel [NHANES]     Frequency of Communication with Friends and Family: More than three times a week     Frequency of Social Gatherings with Friends and Family: More than three times a week     Attends Christian Services: Never     Active Member of Clubs or Organizations: No     Attends Club or Organization Meetings: Never     Marital Status:    Intimate Partner Violence: Not At Risk (12/14/2023)    Humiliation, Afraid, Rape, and Kick questionnaire     Fear of Current or Ex-Partner: No     Emotionally Abused: No     Physically Abused: No     Sexually Abused: No   Housing Stability: High Risk (12/24/2023)    Housing Stability Vital Sign     Unable to Pay for Housing in the Last Year: No     Number of Places Lived in the Last Year: 1     Unstable Housing in the Last Year: Yes       Review of Systems   Constitutional:  Negative for appetite change, chills and fever.    Respiratory:  Negative for cough and shortness of breath.    Cardiovascular:  Negative for chest pain.   Gastrointestinal:  Negative for abdominal pain, diarrhea, nausea and vomiting.   Neurological:  Negative for headaches.   All other systems reviewed and are negative.       Objective   Vital signs reviewed in [facility].    Physical Exam  Vitals reviewed.   Constitutional:       General: She is not in acute distress.     Appearance: She is not toxic-appearing.   HENT:      Head: Normocephalic and atraumatic.      Mouth/Throat:      Mouth: Mucous membranes are moist.   Eyes:      Pupils: Pupils are equal, round, and reactive to light.   Cardiovascular:      Rate and Rhythm: Normal rate and regular rhythm.      Heart sounds: No murmur heard.  Pulmonary:      Breath sounds: Normal breath sounds. No wheezing, rhonchi or rales.   Abdominal:      General: There is no distension.      Palpations: Abdomen is soft.   Musculoskeletal:      Right lower leg: No edema.      Left lower leg: No edema.   Neurological:      General: No focal deficit present.      Mental Status: She is alert and oriented to person, place, and time.          Assessment/Plan   Diagnoses and all orders for this visit:  Urinary tract infection without hematuria, site unspecified (Primary)  CLL (chronic lymphocytic leukemia) (CMS/HCC)  Atrial fibrillation, unspecified type (CMS/HCC)    Weakness, debility - multifactorial - due to UTI, CDI.  PT, OT    CDI - complete vanco - symptomatically improved    UTI - treated - no clear positive culture on my review    CLL - to follow up with hematology - follow WBC    pAF - amio, Eliquis, BB, stable HR, SR on my exam    The essential elements of the hospital History and Physical as well as the Discharge Summary have been confirmed to the best of my ability by means of interviewing the patient plus a review of the hospital records. Please note that this entry was created in a shared electronic medical  record.     All available hospital and outpatient records have been reviewed. Will continue other current drug therapies as ordered on the continuation of care documents. Physical and Occupational Therapy will assess and treat per POC. Analgesia for identified pain symptoms. Continue the various medicines for bowel and bladder symptoms as well as the vitamins and supplements per hospital instructions. Will assess and provide local care to abnormalities of skin integrity. Drug to drug interaction data as noted by the pharmacy has been reviewed. The patient's condition warrants the continuation of these drugs as prescribed by the medical specialists. Discharge planning will be coordinated through the  department. I have reviewed any advanced directive documentation that is contained in the admission packet as directives indicating whether a surrogate decision maker has been identified.

## 2024-01-04 ENCOUNTER — NURSING HOME VISIT (OUTPATIENT)
Dept: POST ACUTE CARE | Facility: EXTERNAL LOCATION | Age: 84
End: 2024-01-04
Payer: MEDICARE

## 2024-01-04 VITALS
BODY MASS INDEX: 31.26 KG/M2 | RESPIRATION RATE: 22 BRPM | HEART RATE: 63 BPM | DIASTOLIC BLOOD PRESSURE: 59 MMHG | WEIGHT: 176.4 LBS | TEMPERATURE: 98.2 F | OXYGEN SATURATION: 95 % | SYSTOLIC BLOOD PRESSURE: 134 MMHG

## 2024-01-04 DIAGNOSIS — N18.4 CHRONIC KIDNEY DISEASE, STAGE 4 (SEVERE) (MULTI): ICD-10-CM

## 2024-01-04 DIAGNOSIS — I48.91 ATRIAL FIBRILLATION, UNSPECIFIED TYPE (MULTI): ICD-10-CM

## 2024-01-04 DIAGNOSIS — A04.72 CLOSTRIDIUM DIFFICILE COLITIS: ICD-10-CM

## 2024-01-04 DIAGNOSIS — I10 BENIGN ESSENTIAL HYPERTENSION: ICD-10-CM

## 2024-01-04 DIAGNOSIS — C91.10 CHRONIC LYMPHOCYTIC LEUKEMIA (MULTI): ICD-10-CM

## 2024-01-04 DIAGNOSIS — I50.33 CONGESTIVE HEART FAILURE WITH LEFT VENTRICULAR DIASTOLIC DYSFUNCTION, ACUTE ON CHRONIC (MULTI): ICD-10-CM

## 2024-01-04 DIAGNOSIS — N30.01 ACUTE CYSTITIS WITH HEMATURIA: Primary | ICD-10-CM

## 2024-01-04 PROCEDURE — 99309 SBSQ NF CARE MODERATE MDM 30: CPT | Performed by: PHYSICIAN ASSISTANT

## 2024-01-04 ASSESSMENT — ENCOUNTER SYMPTOMS
DYSURIA: 0
FREQUENCY: 0
DIZZINESS: 0
WHEEZING: 0
WEAKNESS: 0
COUGH: 0
SHORTNESS OF BREATH: 0
DIARRHEA: 0
VOMITING: 0
NAUSEA: 0
NERVOUS/ANXIOUS: 0
ABDOMINAL PAIN: 0
CONSTIPATION: 0
APPETITE CHANGE: 0
TREMORS: 0
CONFUSION: 0
FEVER: 0
HEADACHES: 0
CHILLS: 0
HEMATURIA: 0

## 2024-01-04 NOTE — ASSESSMENT & PLAN NOTE
Continue lasix.   Monitor weights and labs.   Currently on 1 liter of oxygen.   Wean oxygen as tolerated.

## 2024-01-04 NOTE — LETTER
Patient: Tracie Baltazar  : 1940    Encounter Date: 2024    No chief complaint on file.      Subjective  [id number] : Tracie Baltazar is a 83 y.o. female admitted to [facility] for rehab.   HPI  Pt with h/o CLL, CKD 4, and CHF admitted with weakness and difficulty ambulating.  She was found to have a UTI and c-diff.    She completed course of keflex for UTI. She has a few more days of the vancomycin.  Pt was also seen by cardiology for paroxysmal Afib and  started on amiodarone, apixaban and metoprolol.  Nursing reports rash on pt's back which was noted in the hospital.  It is erythematous and covers pt's back and extends down to the back of her thighs.  She denies any itching or discomfort related to the rash.  She reports that diarrhea is much better.  Stools formed.  She reports a good appetite.  No abdominal pain. No nausea or emesis.  She denies any shortness of breath or cough.  She is currently on 1liter of oxygen which she did not have at home.  She does not feel like she still needs the oxygen.   Code status is DNRcca DNI.   Review of Systems   Constitutional:  Negative for appetite change, chills and fever.   Respiratory:  Negative for cough, shortness of breath and wheezing.    Cardiovascular:  Negative for chest pain and leg swelling.   Gastrointestinal:  Negative for abdominal pain, constipation, diarrhea, nausea and vomiting.   Genitourinary:  Negative for dysuria, frequency and hematuria.   Skin:  Positive for rash.   Neurological:  Negative for dizziness, tremors, weakness and headaches.   Psychiatric/Behavioral:  Negative for confusion. The patient is not nervous/anxious.    All other systems reviewed and are negative.      Objective  /59   Pulse 63   Temp 36.8 °C (98.2 °F)   Resp 22   Wt 80 kg (176 lb 6.4 oz)   SpO2 95%   BMI 31.26 kg/m²    Physical Exam  Constitutional:       General: She is not in acute distress.  Eyes:      Conjunctiva/sclera: Conjunctivae normal.  "     Pupils: Pupils are equal, round, and reactive to light.   Cardiovascular:      Rate and Rhythm: Normal rate and regular rhythm.      Heart sounds: No murmur heard.  Pulmonary:      Effort: Pulmonary effort is normal.      Breath sounds: No wheezing, rhonchi or rales.   Abdominal:      General: Abdomen is flat. Bowel sounds are normal. There is no distension.      Palpations: Abdomen is soft. There is no mass.      Tenderness: There is no abdominal tenderness.   Musculoskeletal:         General: No swelling. Normal range of motion.   Skin:     General: Skin is warm and dry.      Findings: Rash (erythematous rash to back) present.   Neurological:      General: No focal deficit present.      Mental Status: She is alert and oriented to person, place, and time. Mental status is at baseline.       No lab exists for component: \"CBC BMP\"  Assessment/Plan  Problem List Items Addressed This Visit             ICD-10-CM    Chronic kidney disease, stage 4 (severe) (CMS/HCC) N18.4     Monitor weekly labs.  On sodium bicarb.           Chronic lymphocytic leukemia (CMS/HCC) C91.10     Seen by Dr Larson in hospital.  Follow up with oncologist.         Benign essential hypertension I10     Monitor blood pressures and adjust meds as needed.          Congestive heart failure with left ventricular diastolic dysfunction, acute on chronic (CMS/HCC) I50.33     Continue lasix.   Monitor weights and labs.   Currently on 1 liter of oxygen.   Wean oxygen as tolerated.           Atrial fibrillation (CMS/HCC) I48.91     Rate controlled with amiodarone and metoprolol   anticoagulated with apixaban.    Follow up with Dr Chisholm.          Acute cystitis with hematuria - Primary N30.01     Treated with course of keflex         Clostridium difficile colitis A04.72     Complete course of vanco.    Symptoms improved.  Taking Questran.         Rash - hydrocortisone cream apply to back BID routine x 5 days.  Claritin daily.  Monitor.          Time " spent: 30 min in review of chart, labs and orders, consultation with pt and documentation.     Electronically Signed By: Mariam Berry PA-C   1/4/24 12:56 PM

## 2024-01-04 NOTE — PROGRESS NOTES
No chief complaint on file.      Subjective   [id number] : Tracie Baltazar is a 83 y.o. female admitted to [facility] for rehab.   HPI  Pt with h/o CLL, CKD 4, and CHF admitted with weakness and difficulty ambulating.  She was found to have a UTI and c-diff.    She completed course of keflex for UTI. She has a few more days of the vancomycin.  Pt was also seen by cardiology for paroxysmal Afib and  started on amiodarone, apixaban and metoprolol.  Nursing reports rash on pt's back which was noted in the hospital.  It is erythematous and covers pt's back and extends down to the back of her thighs.  She denies any itching or discomfort related to the rash.  She reports that diarrhea is much better.  Stools formed.  She reports a good appetite.  No abdominal pain. No nausea or emesis.  She denies any shortness of breath or cough.  She is currently on 1liter of oxygen which she did not have at home.  She does not feel like she still needs the oxygen.   Code status is DNRcca DNI.   Review of Systems   Constitutional:  Negative for appetite change, chills and fever.   Respiratory:  Negative for cough, shortness of breath and wheezing.    Cardiovascular:  Negative for chest pain and leg swelling.   Gastrointestinal:  Negative for abdominal pain, constipation, diarrhea, nausea and vomiting.   Genitourinary:  Negative for dysuria, frequency and hematuria.   Skin:  Positive for rash.   Neurological:  Negative for dizziness, tremors, weakness and headaches.   Psychiatric/Behavioral:  Negative for confusion. The patient is not nervous/anxious.    All other systems reviewed and are negative.      Objective   /59   Pulse 63   Temp 36.8 °C (98.2 °F)   Resp 22   Wt 80 kg (176 lb 6.4 oz)   SpO2 95%   BMI 31.26 kg/m²    Physical Exam  Constitutional:       General: She is not in acute distress.  Eyes:      Conjunctiva/sclera: Conjunctivae normal.      Pupils: Pupils are equal, round, and reactive to light.  "  Cardiovascular:      Rate and Rhythm: Normal rate and regular rhythm.      Heart sounds: No murmur heard.  Pulmonary:      Effort: Pulmonary effort is normal.      Breath sounds: No wheezing, rhonchi or rales.   Abdominal:      General: Abdomen is flat. Bowel sounds are normal. There is no distension.      Palpations: Abdomen is soft. There is no mass.      Tenderness: There is no abdominal tenderness.   Musculoskeletal:         General: No swelling. Normal range of motion.   Skin:     General: Skin is warm and dry.      Findings: Rash (erythematous rash to back) present.   Neurological:      General: No focal deficit present.      Mental Status: She is alert and oriented to person, place, and time. Mental status is at baseline.       No lab exists for component: \"CBC BMP\"  Assessment/Plan   Problem List Items Addressed This Visit             ICD-10-CM    Chronic kidney disease, stage 4 (severe) (CMS/[facility]) N18.4     Monitor weekly labs.  On sodium bicarb.           Chronic lymphocytic leukemia (CMS/HCC) C91.10     Seen by Dr Larson in hospital.  Follow up with oncologist.         Benign essential hypertension I10     Monitor blood pressures and adjust meds as needed.          Congestive heart failure with left ventricular diastolic dysfunction, acute on chronic (CMS/HCC) I50.33     Continue lasix.   Monitor weights and labs.   Currently on 1 liter of oxygen.   Wean oxygen as tolerated.           Atrial fibrillation (CMS/HCC) I48.91     Rate controlled with amiodarone and metoprolol   anticoagulated with apixaban.    Follow up with Dr Chisholm.          Acute cystitis with hematuria - Primary N30.01     Treated with course of keflex         Clostridium difficile colitis A04.72     Complete course of vanco.    Symptoms improved.  Taking Questran.         Rash - hydrocortisone cream apply to back BID routine x 5 days.  Claritin daily.  Monitor.          Time spent: 30 min in review of chart, labs and orders, " consultation with pt and documentation.

## 2024-01-04 NOTE — ASSESSMENT & PLAN NOTE
Rate controlled with amiodarone and metoprolol   anticoagulated with apixaban.    Follow up with Dr Chisholm.

## 2024-01-05 ENCOUNTER — PATIENT OUTREACH (OUTPATIENT)
Dept: CASE MANAGEMENT | Facility: HOSPITAL | Age: 84
End: 2024-01-05
Payer: MEDICARE

## 2024-01-05 NOTE — PROGRESS NOTES
Follow up phone call made by CHF Clinical Nurse Navigator. Tracie discharged to [facility]. Spoke with nurse, reports that Tracie is doing well. Daily weights are performed, following low Na diet, compliant with medications. I will continue to follow for CHF management.

## 2024-01-08 ENCOUNTER — NURSING HOME VISIT (OUTPATIENT)
Dept: POST ACUTE CARE | Facility: EXTERNAL LOCATION | Age: 84
End: 2024-01-08
Payer: MEDICARE

## 2024-01-08 VITALS
BODY MASS INDEX: 31.47 KG/M2 | HEART RATE: 73 BPM | SYSTOLIC BLOOD PRESSURE: 129 MMHG | RESPIRATION RATE: 18 BRPM | OXYGEN SATURATION: 92 % | DIASTOLIC BLOOD PRESSURE: 55 MMHG | WEIGHT: 177.6 LBS | TEMPERATURE: 98 F

## 2024-01-08 DIAGNOSIS — I10 BENIGN ESSENTIAL HYPERTENSION: ICD-10-CM

## 2024-01-08 DIAGNOSIS — N30.01 ACUTE CYSTITIS WITH HEMATURIA: Primary | ICD-10-CM

## 2024-01-08 DIAGNOSIS — A04.72 CLOSTRIDIUM DIFFICILE COLITIS: ICD-10-CM

## 2024-01-08 DIAGNOSIS — N18.4 CHRONIC KIDNEY DISEASE, STAGE 4 (SEVERE) (MULTI): ICD-10-CM

## 2024-01-08 DIAGNOSIS — R21 RASH: ICD-10-CM

## 2024-01-08 DIAGNOSIS — I50.33 CONGESTIVE HEART FAILURE WITH LEFT VENTRICULAR DIASTOLIC DYSFUNCTION, ACUTE ON CHRONIC (MULTI): ICD-10-CM

## 2024-01-08 DIAGNOSIS — C91.10 CHRONIC LYMPHOCYTIC LEUKEMIA (MULTI): ICD-10-CM

## 2024-01-08 DIAGNOSIS — I48.91 ATRIAL FIBRILLATION, UNSPECIFIED TYPE (MULTI): ICD-10-CM

## 2024-01-08 PROCEDURE — 99309 SBSQ NF CARE MODERATE MDM 30: CPT | Performed by: PHYSICIAN ASSISTANT

## 2024-01-08 ASSESSMENT — ENCOUNTER SYMPTOMS
HEMATURIA: 0
DYSURIA: 0
VOMITING: 0
DIZZINESS: 0
DIARRHEA: 0
TREMORS: 0
CHILLS: 0
FEVER: 0
WHEEZING: 0
WEAKNESS: 0
SHORTNESS OF BREATH: 0
NERVOUS/ANXIOUS: 0
ABDOMINAL PAIN: 0
FREQUENCY: 0
COUGH: 0
CONFUSION: 0
APPETITE CHANGE: 0
CONSTIPATION: 0
NAUSEA: 0
HEADACHES: 0

## 2024-01-08 NOTE — LETTER
Patient: Tracie Baltazar  : 1940    Encounter Date: 2024    No chief complaint on file.      Subjective  [id number] : Tracie Baltazar is a 83 y.o. female admitted to [facility] for rehab.   HPI  Pt with h/o CLL, CKD 4, and CHF  treated for UTI and c-diff.   Pt seen while resting in bed with granddaughter at the bedside.   Nursing reports that pt rash has gotten worse and has spread on her thighs, arms and torso.   She is on vancomycin for c-diff.  Today is day 14 of the medication.  She has no further diarrhea or symptoms or C-diff.  She reports that the rash is now itchy.   She had labs today which show that her creatinine has increased from baseline of 2.2 to 3.1.  She was started on lasix 40mg daily in the hospital for CHF.  She has no edema.  She denies any shortness of breath but she is still on the oxygen.  Her pulse ox drops on room air.  She denies any cough.  She is eating and drinking well.  S No abdominal pain. No nausea or emesis.   Code status is DNRcca DNI.   Review of Systems   Constitutional:  Negative for appetite change, chills and fever.   Respiratory:  Negative for cough, shortness of breath and wheezing.    Cardiovascular:  Negative for chest pain and leg swelling.   Gastrointestinal:  Negative for abdominal pain, constipation, diarrhea, nausea and vomiting.   Genitourinary:  Negative for dysuria, frequency and hematuria.   Skin:  Positive for rash.   Neurological:  Negative for dizziness, tremors, weakness and headaches.   Psychiatric/Behavioral:  Negative for confusion. The patient is not nervous/anxious.    All other systems reviewed and are negative.      Objective  /55   Pulse 73   Temp 36.7 °C (98 °F)   Resp 18   Wt 80.6 kg (177 lb 9.6 oz)   SpO2 92%   BMI 31.47 kg/m²    Physical Exam  Constitutional:       General: She is not in acute distress.  Eyes:      Conjunctiva/sclera: Conjunctivae normal.      Pupils: Pupils are equal, round, and reactive to light.  "  Cardiovascular:      Rate and Rhythm: Normal rate and regular rhythm.      Heart sounds: No murmur heard.  Pulmonary:      Effort: Pulmonary effort is normal.      Breath sounds: No wheezing, rhonchi or rales.   Abdominal:      General: Abdomen is flat. Bowel sounds are normal. There is no distension.      Palpations: Abdomen is soft. There is no mass.      Tenderness: There is no abdominal tenderness.   Musculoskeletal:         General: No swelling. Normal range of motion.   Skin:     General: Skin is warm and dry.      Findings: Rash (erythematous rash to back,neck, arms and thighs.) present.   Neurological:      General: No focal deficit present.      Mental Status: She is alert and oriented to person, place, and time. Mental status is at baseline.       No lab exists for component: \"CBC BMP\"  Assessment/Plan  Chronic kidney disease, stage 4 (severe) (CMS/HCC) N18.4        Monitor weekly labs.  On sodium bicarb.     acute on chronic kidney injury.  Will stop lasix.  IVF ns @75cc/hr x 1 liter.  BMP daily x 3 days.         Chronic lymphocytic leukemia (CMS/[facility]) C91.10       Seen by Dr Larson in hospital.  Follow up with oncologist.  Elevated WBC is trending down.  No sign of infection.           Benign essential hypertension I10       Monitor blood pressures and adjust meds as needed.            Congestive heart failure with left ventricular diastolic dysfunction, acute on chronic (CMS/HCC) I50.33       Stop lasix due to JANET -  Monitor weights and labs.   Currently on 1 liter of oxygen.   Wean oxygen as tolerated.             Atrial fibrillation (CMS/[facility]) I48.91       Rate controlled with amiodarone and metoprolol   anticoagulated with apixaban.    Follow up with Dr Chisholm.            Acute cystitis with hematuria - Primary N30.01       Treated with course of keflex           Clostridium difficile colitis A04.72       Stop vanco today..    Symptoms improved.  Taking Questran.          Rash -  medrol dose aniket " ordered.    Benadryl 25mg Q6 hour prn for itching.   Can continue to apply hydrocortisone cream apply to rash BID routine.   Monitor.      Time spent: 30 min in review of chart, labs and orders, consultation with pt and documentation.     Electronically Signed By: Mariam Berry PA-C   1/8/24  2:22 PM

## 2024-01-08 NOTE — PROGRESS NOTES
No chief complaint on file.      Subjective   [id number] : Tracie Baltazar is a 83 y.o. female admitted to [facility] for rehab.   HPI  Pt with h/o CLL, CKD 4, and CHF  treated for UTI and c-diff.   Pt seen while resting in bed with granddaughter at the bedside.   Nursing reports that pt rash has gotten worse and has spread on her thighs, arms and torso.   She is on vancomycin for c-diff.  Today is day 14 of the medication.  She has no further diarrhea or symptoms or C-diff.  She reports that the rash is now itchy.   She had labs today which show that her creatinine has increased from baseline of 2.2 to 3.1.  She was started on lasix 40mg daily in the hospital for CHF.  She has no edema.  She denies any shortness of breath but she is still on the oxygen.  Her pulse ox drops on room air.  She denies any cough.  She is eating and drinking well.  S No abdominal pain. No nausea or emesis.   Code status is DNRcca DNI.   Review of Systems   Constitutional:  Negative for appetite change, chills and fever.   Respiratory:  Negative for cough, shortness of breath and wheezing.    Cardiovascular:  Negative for chest pain and leg swelling.   Gastrointestinal:  Negative for abdominal pain, constipation, diarrhea, nausea and vomiting.   Genitourinary:  Negative for dysuria, frequency and hematuria.   Skin:  Positive for rash.   Neurological:  Negative for dizziness, tremors, weakness and headaches.   Psychiatric/Behavioral:  Negative for confusion. The patient is not nervous/anxious.    All other systems reviewed and are negative.      Objective   /55   Pulse 73   Temp 36.7 °C (98 °F)   Resp 18   Wt 80.6 kg (177 lb 9.6 oz)   SpO2 92%   BMI 31.47 kg/m²    Physical Exam  Constitutional:       General: She is not in acute distress.  Eyes:      Conjunctiva/sclera: Conjunctivae normal.      Pupils: Pupils are equal, round, and reactive to light.   Cardiovascular:      Rate and Rhythm: Normal rate and regular rhythm.  "     Heart sounds: No murmur heard.  Pulmonary:      Effort: Pulmonary effort is normal.      Breath sounds: No wheezing, rhonchi or rales.   Abdominal:      General: Abdomen is flat. Bowel sounds are normal. There is no distension.      Palpations: Abdomen is soft. There is no mass.      Tenderness: There is no abdominal tenderness.   Musculoskeletal:         General: No swelling. Normal range of motion.   Skin:     General: Skin is warm and dry.      Findings: Rash (erythematous rash to back,neck, arms and thighs.) present.   Neurological:      General: No focal deficit present.      Mental Status: She is alert and oriented to person, place, and time. Mental status is at baseline.       No lab exists for component: \"CBC BMP\"  Assessment/Plan   Chronic kidney disease, stage 4 (severe) (CMS/HCC) N18.4        Monitor weekly labs.  On sodium bicarb.     acute on chronic kidney injury.  Will stop lasix.  IVF ns @75cc/hr x 1 liter.  BMP daily x 3 days.         Chronic lymphocytic leukemia (CMS/HCC) C91.10       Seen by Dr Larson in hospital.  Follow up with oncologist.  Elevated WBC is trending down.  No sign of infection.           Benign essential hypertension I10       Monitor blood pressures and adjust meds as needed.            Congestive heart failure with left ventricular diastolic dysfunction, acute on chronic (CMS/HCC) I50.33       Stop lasix due to JANET -  Monitor weights and labs.   Currently on 1 liter of oxygen.   Wean oxygen as tolerated.             Atrial fibrillation (CMS/HCC) I48.91       Rate controlled with amiodarone and metoprolol   anticoagulated with apixaban.    Follow up with Dr Chisholm.            Acute cystitis with hematuria - Primary N30.01       Treated with course of keflex           Clostridium difficile colitis A04.72       Stop vanco today..    Symptoms improved.  Taking Questran.          Rash -  medrol dose aniket ordered.    Benadryl 25mg Q6 hour prn for itching.   Can continue to apply " hydrocortisone cream apply to rash BID routine.   Monitor.      Time spent: 30 min in review of chart, labs and orders, consultation with pt and documentation.

## 2024-01-10 NOTE — DOCUMENTATION CLARIFICATION NOTE
"    PATIENT:               MARK GABRIEL  ACCT #:                  [id number]  MRN:                       [id number]  :                       1940  ADMIT DATE:       2023 3:13 AM  DISCH DATE:        2024 4:28 PM  RESPONDING PROVIDER #:        [id number]          PROVIDER RESPONSE TEXT:    Acute on Chronic Diastolic Congestive Heart Failure    CDI QUERY TEXT:    [facility]_CHF      Instruction:    Based on your assessment of the patient and the clinical information, please provide the requested documentation by clicking on the appropriate radio button and enter any additional information if prompted.    Question: Please further clarify the type and acuity of congestive heart failure      When answering this query, please exercise your independent professional judgment. The fact that a question is being asked, does not imply that any particular answer is desired or expected.    The patient's clinical indicators include:  Clinical Information:    Clinical Indicators:    12/15/23- TTE- 60-65%  23- Lab- ProBNP- 4,043  23- Radiology Report- CXR- \"No infiltrates or effusions are identified\".  23- ER Record- \"Patient was recently admitted to the hospital earlier this month for CHF exacerbation\".  23- H/P- \"She was taking oral Lasix therapy 20mg po q daily. HFpEF\".  23- Radiology Report- CXR- \"Findings suggesting pulmonary edema and heart failure\".  24- DC Summary- Dr. Mccullough -\"Acute CHF exacerbation and JANET\".    Treatment:    24- Lasix 60mg oral daily  24- Lasix 20mg oral daily  23- DC Summary -\"Lasix 20mg- 3 tablets- start taking 1/3/24 once daily\".    Risk Factors:  Elderly, recent admission for CHF  Options provided:  -- Acute Diastolic Congestive Heart Failure  -- Acute on Chronic Diastolic Congestive Heart Failure  -- Chronic Diastolic Congestive Heart Failure  -- Other - I will add my own diagnosis  -- Refer to Clinical Documentation Reviewer    Query created " chest tightness- was seen by md by: Sophie Paul on 1/9/2024 11:46 AM      Electronically signed by:  CHARLOTTE PERSAUD MD 1/10/2024 5:51 AM

## 2024-01-12 ENCOUNTER — NURSING HOME VISIT (OUTPATIENT)
Dept: POST ACUTE CARE | Facility: EXTERNAL LOCATION | Age: 84
End: 2024-01-12
Payer: MEDICARE

## 2024-01-12 VITALS
OXYGEN SATURATION: 95 % | BODY MASS INDEX: 31.11 KG/M2 | WEIGHT: 175.6 LBS | RESPIRATION RATE: 18 BRPM | TEMPERATURE: 97.8 F | DIASTOLIC BLOOD PRESSURE: 68 MMHG | SYSTOLIC BLOOD PRESSURE: 152 MMHG | HEART RATE: 64 BPM

## 2024-01-12 DIAGNOSIS — C91.10 CHRONIC LYMPHOCYTIC LEUKEMIA (MULTI): ICD-10-CM

## 2024-01-12 DIAGNOSIS — N30.01 ACUTE CYSTITIS WITH HEMATURIA: Primary | ICD-10-CM

## 2024-01-12 DIAGNOSIS — A04.72 CLOSTRIDIUM DIFFICILE COLITIS: ICD-10-CM

## 2024-01-12 DIAGNOSIS — R21 RASH: ICD-10-CM

## 2024-01-12 DIAGNOSIS — I50.33 CONGESTIVE HEART FAILURE WITH LEFT VENTRICULAR DIASTOLIC DYSFUNCTION, ACUTE ON CHRONIC (MULTI): ICD-10-CM

## 2024-01-12 DIAGNOSIS — N18.4 CHRONIC KIDNEY DISEASE, STAGE 4 (SEVERE) (MULTI): ICD-10-CM

## 2024-01-12 DIAGNOSIS — I10 BENIGN ESSENTIAL HYPERTENSION: ICD-10-CM

## 2024-01-12 DIAGNOSIS — I48.91 ATRIAL FIBRILLATION, UNSPECIFIED TYPE (MULTI): ICD-10-CM

## 2024-01-12 PROCEDURE — 99309 SBSQ NF CARE MODERATE MDM 30: CPT | Performed by: PHYSICIAN ASSISTANT

## 2024-01-12 ASSESSMENT — ENCOUNTER SYMPTOMS
HEADACHES: 0
HEMATURIA: 0
VOMITING: 0
FREQUENCY: 0
CONFUSION: 0
WHEEZING: 0
NERVOUS/ANXIOUS: 0
CHILLS: 0
DYSURIA: 0
DIZZINESS: 0
ABDOMINAL PAIN: 0
FEVER: 0
APPETITE CHANGE: 0
SHORTNESS OF BREATH: 0
CONSTIPATION: 0
COUGH: 0
DIARRHEA: 0
TREMORS: 0
NAUSEA: 0
WEAKNESS: 0

## 2024-01-12 NOTE — PROGRESS NOTES
No chief complaint on file.      Subjective   [id number] : Tracie Baltazar is a 83 y.o. female admitted to [facility] for rehab.   HPI  Pt with h/o CLL, CKD 4, and CHF  treated for UTI and c-diff.  Pt seen while sitting in wheelchair.  She is alert, pleasant and appears to be feeling much better.  Pt started on medrol dose aniket for rash this week.  Symptoms are improving.  She does still have some rash to the back of her legs. She reports that the itching has improved. She has completed vancomycin for C-diff.  She reports no diarrhea.   She was given IVF this week and labs are improving.  Lasix and potassium supplement were stopped.   She has no lower leg edema.  No shortness of breath or cough reported.  She has been weaning off of the oxygen and is stable on room air today.  She will likely discharge to home soon.  She is eating and drinking well.  No abdominal pain. No nausea or emesis.   Code status is DNRcca DNI.   Review of Systems   Constitutional:  Negative for appetite change, chills and fever.   Respiratory:  Negative for cough, shortness of breath and wheezing.    Cardiovascular:  Negative for chest pain and leg swelling.   Gastrointestinal:  Negative for abdominal pain, constipation, diarrhea, nausea and vomiting.   Genitourinary:  Negative for dysuria, frequency and hematuria.   Skin:  Positive for rash.   Neurological:  Negative for dizziness, tremors, weakness and headaches.   Psychiatric/Behavioral:  Negative for confusion. The patient is not nervous/anxious.    All other systems reviewed and are negative.      Objective   /68   Pulse 64   Temp 36.6 °C (97.8 °F)   Resp 18   Wt 79.7 kg (175 lb 9.6 oz)   SpO2 95%   BMI 31.11 kg/m²    Physical Exam  Constitutional:       General: She is not in acute distress.  Eyes:      Conjunctiva/sclera: Conjunctivae normal.      Pupils: Pupils are equal, round, and reactive to light.   Cardiovascular:      Rate and Rhythm: Normal rate and regular  "rhythm.      Heart sounds: No murmur heard.  Pulmonary:      Effort: Pulmonary effort is normal.      Breath sounds: No wheezing, rhonchi or rales.   Abdominal:      General: Abdomen is flat. Bowel sounds are normal. There is no distension.      Palpations: Abdomen is soft. There is no mass.      Tenderness: There is no abdominal tenderness.   Musculoskeletal:         General: No swelling. Normal range of motion.   Skin:     General: Skin is warm and dry.      Findings: Rash (erythematous rash to back,neck, arms and thighs.) present.   Neurological:      General: No focal deficit present.      Mental Status: She is alert and oriented to person, place, and time. Mental status is at baseline.       No lab exists for component: \"CBC BMP\"  Assessment/Plan   Chronic kidney disease, stage 4 (severe) (CMS/[facility]) N18.4        Monitor weekly labs.  On sodium bicarb.     acute on chronic kidney injury.  Labs are improving after IVF and stopping lasix.          Chronic lymphocytic leukemia (CMS/[facility]) C91.10       Seen by Dr Larson in hospital.  Follow up with oncologist.  Elevated WBC is trending down.  No sign of infection.           Benign essential hypertension I10       Monitor blood pressures and adjust meds as needed.            Congestive heart failure with left ventricular diastolic dysfunction, acute on chronic (CMS/HCC) I50.33       Stop lasix due to JANET -  Monitor weights and labs.  Weaning off oxygen. - stable on RA today.  Maintaining pulse ox of 97% on RA.  Will hopefully be able to discharge without oxygen.            Atrial fibrillation (CMS/[facility]) I48.91       Rate controlled with amiodarone and metoprolol   anticoagulated with apixaban.    Follow up with Dr Chisholm.            Acute cystitis with hematuria - Primary N30.01       Treated with course of keflex   Planning for discharge home on 1/15.   She will have home health for RN/PT/OT.          Clostridium difficile colitis A04.72       Stop vanco today..    " resolved.  Taking Questran.          Rash -  medrol dose aniket ordered.    Benadryl 25mg Q6 hour prn for itching.   Can continue to apply hydrocortisone cream apply to rash BID prn.  Rash has improved with steroid and stopping of vanco.       Time spent: 30 min in review of chart, labs and orders, consultation with pt and documentation.

## 2024-01-12 NOTE — LETTER
Patient: Tracie Baltazar  : 1940    Encounter Date: 2024    No chief complaint on file.      Subjective  [id number] : Tracie Baltazar is a 83 y.o. female admitted to [facility] for rehab.   HPI  Pt with h/o CLL, CKD 4, and CHF  treated for UTI and c-diff.  Pt seen while sitting in wheelchair.  She is alert, pleasant and appears to be feeling much better.  Pt started on medrol dose aniket for rash this week.  Symptoms are improving.  She does still have some rash to the back of her legs. She reports that the itching has improved. She has completed vancomycin for C-diff.  She reports no diarrhea.   She was given IVF this week and labs are improving.  Lasix and potassium supplement were stopped.   She has no lower leg edema.  No shortness of breath or cough reported.  She has been weaning off of the oxygen and is stable on room air today.  She will likely discharge to home soon.  She is eating and drinking well.  No abdominal pain. No nausea or emesis.   Code status is DNRcca DNI.   Review of Systems   Constitutional:  Negative for appetite change, chills and fever.   Respiratory:  Negative for cough, shortness of breath and wheezing.    Cardiovascular:  Negative for chest pain and leg swelling.   Gastrointestinal:  Negative for abdominal pain, constipation, diarrhea, nausea and vomiting.   Genitourinary:  Negative for dysuria, frequency and hematuria.   Skin:  Positive for rash.   Neurological:  Negative for dizziness, tremors, weakness and headaches.   Psychiatric/Behavioral:  Negative for confusion. The patient is not nervous/anxious.    All other systems reviewed and are negative.      Objective  /68   Pulse 64   Temp 36.6 °C (97.8 °F)   Resp 18   Wt 79.7 kg (175 lb 9.6 oz)   SpO2 95%   BMI 31.11 kg/m²    Physical Exam  Constitutional:       General: She is not in acute distress.  Eyes:      Conjunctiva/sclera: Conjunctivae normal.      Pupils: Pupils are equal, round, and reactive to  "light.   Cardiovascular:      Rate and Rhythm: Normal rate and regular rhythm.      Heart sounds: No murmur heard.  Pulmonary:      Effort: Pulmonary effort is normal.      Breath sounds: No wheezing, rhonchi or rales.   Abdominal:      General: Abdomen is flat. Bowel sounds are normal. There is no distension.      Palpations: Abdomen is soft. There is no mass.      Tenderness: There is no abdominal tenderness.   Musculoskeletal:         General: No swelling. Normal range of motion.   Skin:     General: Skin is warm and dry.      Findings: Rash (erythematous rash to back,neck, arms and thighs.) present.   Neurological:      General: No focal deficit present.      Mental Status: She is alert and oriented to person, place, and time. Mental status is at baseline.       No lab exists for component: \"CBC BMP\"  Assessment/Plan  Chronic kidney disease, stage 4 (severe) (CMS/HCC) N18.4        Monitor weekly labs.  On sodium bicarb.     acute on chronic kidney injury.  Labs are improving after IVF and stopping lasix.          Chronic lymphocytic leukemia (CMS/[facility]) C91.10       Seen by Dr Larson in hospital.  Follow up with oncologist.  Elevated WBC is trending down.  No sign of infection.           Benign essential hypertension I10       Monitor blood pressures and adjust meds as needed.            Congestive heart failure with left ventricular diastolic dysfunction, acute on chronic (CMS/HCC) I50.33       Stop lasix due to JANET -  Monitor weights and labs.  Weaning off oxygen. - stable on RA today.  Maintaining pulse ox of 97% on RA.  Will hopefully be able to discharge without oxygen.            Atrial fibrillation (CMS/[facility]) I48.91       Rate controlled with amiodarone and metoprolol   anticoagulated with apixaban.    Follow up with Dr Chisholm.            Acute cystitis with hematuria - Primary N30.01       Treated with course of keflex   Planning for discharge home on 1/15.   She will have home health for RN/PT/OT.          " Clostridium difficile colitis A04.72       Stop vanco today..    resolved.  Taking Questran.          Rash -  medrol dose aniket ordered.    Benadryl 25mg Q6 hour prn for itching.   Can continue to apply hydrocortisone cream apply to rash BID prn.  Rash has improved with steroid and stopping of vanco.       Time spent: 30 min in review of chart, labs and orders, consultation with pt and documentation.     Electronically Signed By: Mariam Berry PA-C   1/12/24 10:51 AM

## 2024-01-15 ENCOUNTER — APPOINTMENT (OUTPATIENT)
Dept: PRIMARY CARE | Facility: CLINIC | Age: 84
End: 2024-01-15
Payer: MEDICARE

## 2024-01-15 ENCOUNTER — NURSING HOME VISIT (OUTPATIENT)
Dept: POST ACUTE CARE | Facility: EXTERNAL LOCATION | Age: 84
End: 2024-01-15

## 2024-01-15 VITALS
RESPIRATION RATE: 16 BRPM | BODY MASS INDEX: 31.15 KG/M2 | OXYGEN SATURATION: 98 % | DIASTOLIC BLOOD PRESSURE: 75 MMHG | TEMPERATURE: 98.2 F | SYSTOLIC BLOOD PRESSURE: 164 MMHG | WEIGHT: 175.8 LBS | HEART RATE: 61 BPM

## 2024-01-15 DIAGNOSIS — I48.91 ATRIAL FIBRILLATION, UNSPECIFIED TYPE (MULTI): ICD-10-CM

## 2024-01-15 DIAGNOSIS — R21 RASH: ICD-10-CM

## 2024-01-15 DIAGNOSIS — N30.01 ACUTE CYSTITIS WITH HEMATURIA: Primary | ICD-10-CM

## 2024-01-15 DIAGNOSIS — I48.0 PAROXYSMAL ATRIAL FIBRILLATION (MULTI): ICD-10-CM

## 2024-01-15 DIAGNOSIS — M06.09 RHEUMATOID ARTHRITIS OF MULTIPLE SITES WITH NEGATIVE RHEUMATOID FACTOR (MULTI): ICD-10-CM

## 2024-01-15 DIAGNOSIS — N18.4 CHRONIC KIDNEY DISEASE, STAGE 4 (SEVERE) (MULTI): ICD-10-CM

## 2024-01-15 DIAGNOSIS — I50.33 CONGESTIVE HEART FAILURE WITH LEFT VENTRICULAR DIASTOLIC DYSFUNCTION, ACUTE ON CHRONIC (MULTI): ICD-10-CM

## 2024-01-15 DIAGNOSIS — I10 BENIGN ESSENTIAL HYPERTENSION: ICD-10-CM

## 2024-01-15 DIAGNOSIS — A04.72 CLOSTRIDIUM DIFFICILE COLITIS: ICD-10-CM

## 2024-01-15 DIAGNOSIS — C91.10 CHRONIC LYMPHOCYTIC LEUKEMIA (MULTI): ICD-10-CM

## 2024-01-15 PROCEDURE — 99315 NF DSCHRG MGMT 30 MIN/LESS: CPT | Performed by: PHYSICIAN ASSISTANT

## 2024-01-15 RX ORDER — AMIODARONE HYDROCHLORIDE 200 MG/1
200 TABLET ORAL DAILY
Qty: 30 TABLET | Refills: 0 | Status: SHIPPED | OUTPATIENT
Start: 2024-01-15 | End: 2024-02-19 | Stop reason: SDUPTHER

## 2024-01-15 RX ORDER — METRONIDAZOLE 500 MG/1
500 TABLET ORAL 3 TIMES DAILY
Qty: 30 TABLET | Refills: 0 | Status: SHIPPED | OUTPATIENT
Start: 2024-01-15 | End: 2024-01-25

## 2024-01-15 RX ORDER — METOPROLOL SUCCINATE 25 MG/1
12.5 TABLET, EXTENDED RELEASE ORAL DAILY
Qty: 30 TABLET | Refills: 0 | Status: SHIPPED | OUTPATIENT
Start: 2024-01-15 | End: 2024-03-13 | Stop reason: SDUPTHER

## 2024-01-15 RX ORDER — AMITRIPTYLINE HYDROCHLORIDE 10 MG/1
10 TABLET, FILM COATED ORAL NIGHTLY
Qty: 30 TABLET | Refills: 0 | Status: SHIPPED | OUTPATIENT
Start: 2024-01-15 | End: 2024-02-12 | Stop reason: WASHOUT

## 2024-01-15 ASSESSMENT — ENCOUNTER SYMPTOMS
VOMITING: 0
DYSURIA: 0
ABDOMINAL PAIN: 0
HEMATURIA: 0
SHORTNESS OF BREATH: 0
CONFUSION: 0
TREMORS: 0
FEVER: 0
DIZZINESS: 0
COUGH: 0
APPETITE CHANGE: 0
NERVOUS/ANXIOUS: 0
WHEEZING: 0
DIARRHEA: 0
CONSTIPATION: 0
FREQUENCY: 0
WEAKNESS: 0
HEADACHES: 0
NAUSEA: 0
CHILLS: 0

## 2024-01-15 NOTE — PROGRESS NOTES
No chief complaint on file.      Subjective   [id number] : Tracie Baltazar is a 83 y.o. female admitted to [facility] for rehab.   HPI  Pt with h/o CLL, CKD 4, and CHF  treated for UTI and c-diff.  Pt seen while sitting in wheelchair.  She is alert, pleasant and appears to be feeling much better.   She appears to be clinically stable.   She will discharge to home this morning.  She has some mild diarrhea over the weekend and stools sample was sent this morning.  On Call physician ordered dificid.   Pt had rash with oral vanco.  Rash has resolved.  She still has some mild itching. Treated with medrol dose aniket.  She  has weaned off of oxygen and has been stable on room air over the weekend.  No shortness of breath or cough.  Pt has been off lasix due to JANET.  She has no lower leg edema.  Labs are improved off of lasix.    Code status is DNRcca DNI.   Review of Systems   Constitutional:  Negative for appetite change, chills and fever.   Respiratory:  Negative for cough, shortness of breath and wheezing.    Cardiovascular:  Negative for chest pain and leg swelling.   Gastrointestinal:  Negative for abdominal pain, constipation, diarrhea, nausea and vomiting.   Genitourinary:  Negative for dysuria, frequency and hematuria.   Skin:  Positive for rash.   Neurological:  Negative for dizziness, tremors, weakness and headaches.   Psychiatric/Behavioral:  Negative for confusion. The patient is not nervous/anxious.    All other systems reviewed and are negative.      Objective   /75   Pulse 61   Temp 36.8 °C (98.2 °F)   Resp 16   Wt 79.7 kg (175 lb 12.8 oz)   SpO2 98%   BMI 31.15 kg/m²    Physical Exam  Constitutional:       General: She is not in acute distress.  Eyes:      Conjunctiva/sclera: Conjunctivae normal.      Pupils: Pupils are equal, round, and reactive to light.   Cardiovascular:      Rate and Rhythm: Normal rate and regular rhythm.      Heart sounds: No murmur heard.  Pulmonary:      Effort:  "Pulmonary effort is normal.      Breath sounds: No wheezing, rhonchi or rales.   Abdominal:      General: Abdomen is flat. Bowel sounds are normal. There is no distension.      Palpations: Abdomen is soft. There is no mass.      Tenderness: There is no abdominal tenderness.   Musculoskeletal:         General: No swelling. Normal range of motion.   Skin:     General: Skin is warm and dry.      Findings: Rash (rash improved.  skin is dry and pealing on her back.) present.   Neurological:      General: No focal deficit present.      Mental Status: She is alert and oriented to person, place, and time. Mental status is at baseline.       No lab exists for component: \"CBC BMP\"  Assessment/Plan   Chronic kidney disease, stage 4 (severe) (CMS/HCC) N18.4        Monitor weekly labs.  On sodium bicarb.     acute on chronic kidney injury.  Labs are back to baseline - after IVF and stopping lasix.          Chronic lymphocytic leukemia (CMS/HCC) C91.10       Seen by Dr Larson in hospital.  Follow up with oncologist.  Elevated WBC.             Benign essential hypertension I10       Monitor blood pressures and adjust meds as needed.  Norvasc was added           Congestive heart failure with left ventricular diastolic dysfunction, acute on chronic (CMS/HCC) I50.33       Stop lasix due to JANET -  Monitor weights and labs.    Pt has weaned off oxygen and is stable on room air.   She has no signs of fluid overload.         Atrial fibrillation (CMS/HCC) I48.91       Rate controlled with amiodarone and metoprolol   anticoagulated with apixaban.    Follow up with Dr Chisholm.   Pt has cardiology follow up next week per the daughter.            Acute cystitis with hematuria - Primary N30.01       Treated with course of keflex   Planning for discharge home today.   She will have home health for RN/PT/OT.          Clostridium difficile colitis A04.72       Completed course of vanco.    had loose stools over the weekend.  Stool sent for culture " and continues to be positive for C-diff.  Pt have multiple loose stools.  Unable to take vancomycin due to severe rash.  dificid has price barrier to treatment (pharmacy quoted a price of $5000)  Will treat with 10 course of flagyl 500mg q 8 hours .         Rash -  secondary to vancomycin.   Treated with  medrol dose aniket   Rash has resolved.     Pt followed by House calls program.   Call placed to Lakia Leyva NP to update on pt's discharge.   Pt will be scheduled for follow up with house calls provider.       Called pt to discussed stool sample results.   Daughter stated that when she reviewed her mothers medications there were 3 more that she did not have at home - Metoprolol amitriptyline and amiodarone refilled per family request.   Course of flagyl ordered for C-diff infection.  Daughter expressed understanding and will  medications today.         Time spent: 30 min in review of chart, labs and orders, consultation with pt and documentation.

## 2024-01-15 NOTE — LETTER
Patient: Tracie Baltazar  : 1940    Encounter Date: 01/15/2024    No chief complaint on file.      Subjective  [id number] : Tracie Baltazar is a 83 y.o. female admitted to [facility] for rehab.   HPI  Pt with h/o CLL, CKD 4, and CHF  treated for UTI and c-diff.  Pt seen while sitting in wheelchair.  She is alert, pleasant and appears to be feeling much better.   She appears to be clinically stable.   She will discharge to home this morning.  She has some mild diarrhea over the weekend and stools sample was sent this morning.  On Call physician ordered dificid.   Pt had rash with oral vanco.  Rash has resolved.  She still has some mild itching. Treated with medrol dose aniket.  She  has weaned off of oxygen and has been stable on room air over the weekend.  No shortness of breath or cough.  Pt has been off lasix due to JANET.  She has no lower leg edema.  Labs are improved off of lasix.    Code status is DNRcca DNI.   Review of Systems   Constitutional:  Negative for appetite change, chills and fever.   Respiratory:  Negative for cough, shortness of breath and wheezing.    Cardiovascular:  Negative for chest pain and leg swelling.   Gastrointestinal:  Negative for abdominal pain, constipation, diarrhea, nausea and vomiting.   Genitourinary:  Negative for dysuria, frequency and hematuria.   Skin:  Positive for rash.   Neurological:  Negative for dizziness, tremors, weakness and headaches.   Psychiatric/Behavioral:  Negative for confusion. The patient is not nervous/anxious.    All other systems reviewed and are negative.      Objective  /75   Pulse 61   Temp 36.8 °C (98.2 °F)   Resp 16   Wt 79.7 kg (175 lb 12.8 oz)   SpO2 98%   BMI 31.15 kg/m²    Physical Exam  Constitutional:       General: She is not in acute distress.  Eyes:      Conjunctiva/sclera: Conjunctivae normal.      Pupils: Pupils are equal, round, and reactive to light.   Cardiovascular:      Rate and Rhythm: Normal rate and regular  "rhythm.      Heart sounds: No murmur heard.  Pulmonary:      Effort: Pulmonary effort is normal.      Breath sounds: No wheezing, rhonchi or rales.   Abdominal:      General: Abdomen is flat. Bowel sounds are normal. There is no distension.      Palpations: Abdomen is soft. There is no mass.      Tenderness: There is no abdominal tenderness.   Musculoskeletal:         General: No swelling. Normal range of motion.   Skin:     General: Skin is warm and dry.      Findings: Rash (rash improved.  skin is dry and pealing on her back.) present.   Neurological:      General: No focal deficit present.      Mental Status: She is alert and oriented to person, place, and time. Mental status is at baseline.       No lab exists for component: \"CBC BMP\"  Assessment/Plan  Chronic kidney disease, stage 4 (severe) (CMS/HCC) N18.4        Monitor weekly labs.  On sodium bicarb.     acute on chronic kidney injury.  Labs are back to baseline - after IVF and stopping lasix.          Chronic lymphocytic leukemia (CMS/[facility]) C91.10       Seen by Dr Larson in hospital.  Follow up with oncologist.  Elevated WBC.             Benign essential hypertension I10       Monitor blood pressures and adjust meds as needed.            Congestive heart failure with left ventricular diastolic dysfunction, acute on chronic (CMS/HCC) I50.33       Stop lasix due to JANET -  Monitor weights and labs.    Pt has weaned off oxygen and is stable on room air.   She has no signs of fluid overload.         Atrial fibrillation (CMS/HCC) I48.91       Rate controlled with amiodarone and metoprolol   anticoagulated with apixaban.    Follow up with Dr Chisholm.            Acute cystitis with hematuria - Primary N30.01       Treated with course of keflex   Planning for discharge home today.   She will have home health for RN/PT/OT.          Clostridium difficile colitis A04.72       Completed course of vanco.    had loose stools over the weekend.  Stool sent for culture - " results pending at the time of this note.          Rash -  secondary to vancomycin.   medrol dose aniket ordered.    Benadryl 25mg Q6 hour prn for itching.   Can continue to apply hydrocortisone cream apply to rash BID prn.  Rash has resolved.     Pt followed by House calls program.   [facility] to update on pt's discharge.   Pt will be scheduled for follow up with house calls provider.       Time spent: 30 min in review of chart, labs and orders, consultation with pt and documentation.       Electronically Signed By: Mariam Berry PA-C   1/15/24  1:56 PM

## 2024-01-16 ENCOUNTER — TELEPHONE (OUTPATIENT)
Dept: PRIMARY CARE | Facility: CLINIC | Age: 84
End: 2024-01-16
Payer: MEDICARE

## 2024-01-16 ENCOUNTER — PATIENT OUTREACH (OUTPATIENT)
Dept: CASE MANAGEMENT | Facility: HOSPITAL | Age: 84
End: 2024-01-16
Payer: MEDICARE

## 2024-01-16 NOTE — TELEPHONE ENCOUNTER
Daughter Linda called due to Pt needing a physical.  Was seen by MJM previously would like to see him again, it's been 3 years. PT would be an update. IS MJM willing to see patient? Please call Lauren Mckeon to advise at [phone].

## 2024-01-17 ENCOUNTER — DOCUMENTATION (OUTPATIENT)
Dept: CARE COORDINATION | Facility: CLINIC | Age: 84
End: 2024-01-17
Payer: MEDICARE

## 2024-01-17 ENCOUNTER — PATIENT OUTREACH (OUTPATIENT)
Dept: CARE COORDINATION | Facility: CLINIC | Age: 84
End: 2024-01-17
Payer: MEDICARE

## 2024-01-17 NOTE — PROGRESS NOTES
"Outreach call to patient to support a smooth transition of care from recent admission.  Spoke with patient, reviewed discharge medications, discharge instructions, assessed social needs, and provided education on importance of follow-up appointment with provider. Enrolled patient in [employer] chatbot for additional support and education through transition period.  Will continue to monitor through transition period.  Medications  Medications reviewed with patient/caregiver?: Yes (Daughter is concerned about her taking 2 antidepressants.  Will refer to [facility] pharmacy for review.   Permission received from patient.  rachana) (1/17/2024  2:28 PM)  Is the patient having any side effects they believe may be caused by any medication additions or changes?: No (1/17/2024  2:28 PM)  Does the patient have all medications ordered at discharge?: Yes (1/17/2024  2:28 PM)  Care Management Interventions: Advised patient to call provider (1/17/2024  2:28 PM)  Is the patient taking all medications as directed (includes completed medication regime)?: Yes (1/17/2024  2:28 PM)    Appointments  Does the patient have a primary care provider?: Yes (Linda will be calling the pcp for an appointment.  rachana) (1/17/2024  2:28 PM)    Patient Teaching  What is the patient's perception of their health status since discharge?: Same (1/17/2024  2:28 PM)    Wrap Up  Is the patient/caregiver familiar with Advance Care Planning?: No (1/17/2024  2:28 PM)  Would the patient like more information on Advance Care Planning?: No (1/17/2024  2:28 PM)  Wrap Up Additional Comments: Patient was discharged from [facility].   Per daughter she does \"still\" have c diff.   Her sacral are looks ok per daughter.Linda was concerned about her intake, and also her medications.  She stated she was better yesterday and seems really tired today.  She will be reaching out to the pcp office to get an appt.   We did speak about possibly home care, nurse, pt ot depending on the " assessment of the pcp.   rachana (1/17/2024  2:28 PM)    Made referral with pharmacy and dietician.    rachana

## 2024-01-19 ENCOUNTER — TELEPHONE (OUTPATIENT)
Dept: PRIMARY CARE | Facility: CLINIC | Age: 84
End: 2024-01-19

## 2024-01-19 ENCOUNTER — APPOINTMENT (OUTPATIENT)
Dept: PRIMARY CARE | Facility: CLINIC | Age: 84
End: 2024-01-19
Payer: MEDICARE

## 2024-01-19 ENCOUNTER — PATIENT OUTREACH (OUTPATIENT)
Dept: CARE COORDINATION | Facility: CLINIC | Age: 84
End: 2024-01-19

## 2024-01-19 NOTE — PROGRESS NOTES
This RD spoke to pt's daughter Linda. Pt referred to [facility] RD 2/2 poor PO and possible poor protein intake and concerns for skin breakdown. Daughter agreed to appt with this RD and pt for such. Scheduled initial appt for 1/24 @ 2p

## 2024-01-19 NOTE — TELEPHONE ENCOUNTER
Patient discharged 1/15/24 from [facility]. Phoned patient in follow up to schedule a House Calls visit, patient asked that Linda Vail/ daughter is called as she is managing her doctor's appointments. Phoned daughter, she reported patient has visits scheduled next week with her PCP and Cardiology. House Calls visit scheduled with Lakia Leyva NP 1/31/24 at 1:00 pm.

## 2024-01-24 ENCOUNTER — OFFICE VISIT (OUTPATIENT)
Dept: CARDIOLOGY | Facility: CLINIC | Age: 84
End: 2024-01-24
Payer: MEDICARE

## 2024-01-24 ENCOUNTER — TELEPHONE (OUTPATIENT)
Dept: PRIMARY CARE | Facility: CLINIC | Age: 84
End: 2024-01-24

## 2024-01-24 ENCOUNTER — HOSPITAL ENCOUNTER (OUTPATIENT)
Dept: CARDIOLOGY | Facility: CLINIC | Age: 84
Discharge: HOME | End: 2024-01-24
Payer: MEDICARE

## 2024-01-24 ENCOUNTER — OFFICE VISIT (OUTPATIENT)
Dept: PRIMARY CARE | Facility: CLINIC | Age: 84
End: 2024-01-24
Payer: MEDICARE

## 2024-01-24 ENCOUNTER — PATIENT OUTREACH (OUTPATIENT)
Dept: CASE MANAGEMENT | Facility: HOSPITAL | Age: 84
End: 2024-01-24

## 2024-01-24 VITALS
WEIGHT: 172 LBS | SYSTOLIC BLOOD PRESSURE: 102 MMHG | DIASTOLIC BLOOD PRESSURE: 56 MMHG | HEART RATE: 66 BPM | BODY MASS INDEX: 30.48 KG/M2 | RESPIRATION RATE: 22 BRPM | OXYGEN SATURATION: 96 %

## 2024-01-24 VITALS — SYSTOLIC BLOOD PRESSURE: 128 MMHG | HEART RATE: 61 BPM | OXYGEN SATURATION: 96 % | DIASTOLIC BLOOD PRESSURE: 58 MMHG

## 2024-01-24 DIAGNOSIS — I50.33 CONGESTIVE HEART FAILURE WITH LEFT VENTRICULAR DIASTOLIC DYSFUNCTION, ACUTE ON CHRONIC (MULTI): ICD-10-CM

## 2024-01-24 DIAGNOSIS — N18.4 CHRONIC KIDNEY DISEASE, STAGE 4 (SEVERE) (MULTI): Primary | ICD-10-CM

## 2024-01-24 DIAGNOSIS — I48.0 PAROXYSMAL ATRIAL FIBRILLATION (MULTI): Primary | ICD-10-CM

## 2024-01-24 DIAGNOSIS — I48.0 PAROXYSMAL ATRIAL FIBRILLATION (MULTI): ICD-10-CM

## 2024-01-24 DIAGNOSIS — I10 BENIGN ESSENTIAL HYPERTENSION: ICD-10-CM

## 2024-01-24 DIAGNOSIS — C95.91 LEUKEMIA IN REMISSION, UNSPECIFIED LEUKEMIA TYPE (MULTI): Primary | ICD-10-CM

## 2024-01-24 DIAGNOSIS — I48.91 ATRIAL FIBRILLATION, UNSPECIFIED TYPE (MULTI): ICD-10-CM

## 2024-01-24 DIAGNOSIS — N18.4 CHRONIC KIDNEY DISEASE, STAGE 4 (SEVERE) (MULTI): ICD-10-CM

## 2024-01-24 PROCEDURE — 1159F MED LIST DOCD IN RCRD: CPT | Performed by: INTERNAL MEDICINE

## 2024-01-24 PROCEDURE — 1157F ADVNC CARE PLAN IN RCRD: CPT | Performed by: INTERNAL MEDICINE

## 2024-01-24 PROCEDURE — 1036F TOBACCO NON-USER: CPT | Performed by: FAMILY MEDICINE

## 2024-01-24 PROCEDURE — 3074F SYST BP LT 130 MM HG: CPT | Performed by: INTERNAL MEDICINE

## 2024-01-24 PROCEDURE — 1036F TOBACCO NON-USER: CPT | Performed by: INTERNAL MEDICINE

## 2024-01-24 PROCEDURE — 3078F DIAST BP <80 MM HG: CPT | Performed by: FAMILY MEDICINE

## 2024-01-24 PROCEDURE — 93246 EXT ECG>7D<15D RECORDING: CPT

## 2024-01-24 PROCEDURE — 1125F AMNT PAIN NOTED PAIN PRSNT: CPT | Performed by: INTERNAL MEDICINE

## 2024-01-24 PROCEDURE — 1125F AMNT PAIN NOTED PAIN PRSNT: CPT | Performed by: FAMILY MEDICINE

## 2024-01-24 PROCEDURE — 1160F RVW MEDS BY RX/DR IN RCRD: CPT | Performed by: INTERNAL MEDICINE

## 2024-01-24 PROCEDURE — 3078F DIAST BP <80 MM HG: CPT | Performed by: INTERNAL MEDICINE

## 2024-01-24 PROCEDURE — 93248 EXT ECG>7D<15D REV&INTERPJ: CPT | Performed by: INTERNAL MEDICINE

## 2024-01-24 PROCEDURE — 1160F RVW MEDS BY RX/DR IN RCRD: CPT | Performed by: FAMILY MEDICINE

## 2024-01-24 PROCEDURE — 1159F MED LIST DOCD IN RCRD: CPT | Performed by: FAMILY MEDICINE

## 2024-01-24 PROCEDURE — 1111F DSCHRG MED/CURRENT MED MERGE: CPT | Performed by: INTERNAL MEDICINE

## 2024-01-24 PROCEDURE — 99214 OFFICE O/P EST MOD 30 MIN: CPT | Performed by: INTERNAL MEDICINE

## 2024-01-24 PROCEDURE — 1111F DSCHRG MED/CURRENT MED MERGE: CPT | Performed by: FAMILY MEDICINE

## 2024-01-24 PROCEDURE — 1157F ADVNC CARE PLAN IN RCRD: CPT | Performed by: FAMILY MEDICINE

## 2024-01-24 PROCEDURE — 99214 OFFICE O/P EST MOD 30 MIN: CPT | Performed by: FAMILY MEDICINE

## 2024-01-24 PROCEDURE — 3074F SYST BP LT 130 MM HG: CPT | Performed by: FAMILY MEDICINE

## 2024-01-24 RX ORDER — ONDANSETRON 4 MG/1
4 TABLET, ORALLY DISINTEGRATING ORAL
Status: ON HOLD | COMMUNITY
Start: 2023-12-17 | End: 2024-06-06

## 2024-01-24 RX ORDER — POLYETHYLENE GLYCOL 3350 17 G/17G
17 POWDER, FOR SOLUTION ORAL DAILY PRN
COMMUNITY
Start: 2023-12-14

## 2024-01-24 RX ORDER — AMLODIPINE BESYLATE 5 MG/1
5 TABLET ORAL DAILY
COMMUNITY
Start: 2024-01-15 | End: 2024-02-07 | Stop reason: SDUPTHER

## 2024-01-24 RX ORDER — HYDRALAZINE HYDROCHLORIDE 10 MG/1
TABLET, FILM COATED ORAL
COMMUNITY
End: 2024-06-06 | Stop reason: HOSPADM

## 2024-01-24 RX ORDER — TRAMADOL HYDROCHLORIDE 50 MG/1
TABLET ORAL EVERY 24 HOURS
COMMUNITY
End: 2024-06-06 | Stop reason: HOSPADM

## 2024-01-24 ASSESSMENT — ENCOUNTER SYMPTOMS
DIZZINESS: 0
SHORTNESS OF BREATH: 0
LOSS OF SENSATION IN FEET: 0
DYSPNEA ON EXERTION: 0
ORTHOPNEA: 0
DEPRESSION: 0
SYNCOPE: 0
MYALGIAS: 0
FEVER: 0
DEPRESSION: 0
OCCASIONAL FEELINGS OF UNSTEADINESS: 1
WEIGHT GAIN: 0
LOSS OF SENSATION IN FEET: 0
PALPITATIONS: 0
COUGH: 0
PND: 0
WEAKNESS: 1
CLAUDICATION: 0
WEIGHT LOSS: 0
DIAPHORESIS: 0
OCCASIONAL FEELINGS OF UNSTEADINESS: 1
NEAR-SYNCOPE: 1
IRREGULAR HEARTBEAT: 0
WHEEZING: 0

## 2024-01-24 ASSESSMENT — PATIENT HEALTH QUESTIONNAIRE - PHQ9
SUM OF ALL RESPONSES TO PHQ9 QUESTIONS 1 & 2: 0
2. FEELING DOWN, DEPRESSED OR HOPELESS: NOT AT ALL
2. FEELING DOWN, DEPRESSED OR HOPELESS: NOT AT ALL
1. LITTLE INTEREST OR PLEASURE IN DOING THINGS: NOT AT ALL
1. LITTLE INTEREST OR PLEASURE IN DOING THINGS: NOT AT ALL
SUM OF ALL RESPONSES TO PHQ9 QUESTIONS 1 AND 2: 0

## 2024-01-24 ASSESSMENT — PAIN SCALES - GENERAL
PAINLEVEL: 6
PAINLEVEL: 0-NO PAIN

## 2024-01-24 NOTE — PROGRESS NOTES
Subjective   Patient ID: Tracie Baltazar is a 83 y.o. female who presents for Follow-up ([facility] follow-up/AFIB and CHF).    HPI Here for follow-up to hospitalizations and rehab stay.  Patient found itself to be quite weak and had difficult time standing from a seated position, in fact she fell when standing.  She also complained of change in vision.  She was brought to the emergency department and was found to be in A-fib.  Also with heart failure. She had a lengthy hospital stay with follow-up and management by cardiology.  She also has chronic renal failure and has been seen by nephrology.  While hospitalized however she did develop Clostridium just of difficile and was on antibiotics and followed by infectious disease.  She then was released and went to rehab.  She has now been released from rehab and is at home with PT, OT, home nursing looking and Chris.  Today she feels weak but improved.    Review of Systems  Constitutional: Patient is positive for fatigue.  She is negative for fever, weight change.  HEENT: Patient is negative for change in vision, hearing, swallow.  Cardio: Patient is negative for chest pain, lower extremity edema.  Pulmonary: Patient is negative for cough, shortness of breath  Objective   /56   Pulse 66   Resp 22   Wt 78 kg (172 lb)   SpO2 96%   BMI 30.48 kg/m²     Physical Exam  General: Awake and alert no apparent distress.  HEENT: Moist oral mucosa no cervical lymphadenopathy.  Cardio: Heart S1-S2 no murmur rub or gallop.  Pulmonary: Lungs clear to auscultation bilaterally  Assessment/Plan   Problem List Items Addressed This Visit             ICD-10-CM    Leukemia (CMS/HCC) - Primary  In remission. C95.90    Chronic kidney disease, stage 4 (severe) (CMS/HCC)  Chronic.  Patient sees nephrology. N18.4    Congestive heart failure with left ventricular diastolic dysfunction, acute on chronic (CMS/HCC)  Improved.  Patient to see cardiology in the near future. I50.33     Relevant Medications    amLODIPine (Norvasc) 5 mg tablet    Atrial fibrillation (CMS/HCC)  Improved.Patient will follow-up here in about 3 months. I48.91    Relevant Medications    amLODIPine (Norvasc) 5 mg tablet

## 2024-01-24 NOTE — TELEPHONE ENCOUNTER
From previous message will MJM follow patient? I advised patient is being seen today. Wanted another message sent.

## 2024-01-24 NOTE — PROGRESS NOTES
Follow up phone call made by CHF Clinical Nurse Navigator. Called Tracie today, no answer & unable to leave message. Tracie was discharged from SNF[facility] ( 1/15).  Opened HF case 12/18/23-Tracie was readmitted to [facility] (12/24/23) Dx -weakness, JANET d/t C-Diff.  She is now active with [facility] [place].  Closing HF case today.

## 2024-01-24 NOTE — PROGRESS NOTES
Subjective      Chief Complaint   Patient presents with    Hospital Follow-up     Hospital follow up        83-year-old female seen in the hospital last month where she presented with a presyncopal episode.  Infectious workup was negative.  She has atrial fibrillation with rapid ventricular response and was noted to have significant postconversion pause when reverting back to sinus rhythm.  She was seen by electrophysiology and her AV amna agents were adjusted to where she left on amiodarone and a low-dose beta-blocker.  Prior to discharge it was noted that her pauses subsided.  Plan was to have her follow-up with us and with electrophysiology, an event monitor for further surveillance.  During the hospital stay it is noted that she had an echocardiogram showing normal left ventricular size and function and trivial valvular abnormalities.  She had a Lexiscan SPECT stress test that was normal or low risk.    She has trouble ambulating, attributes it to strength. Daughter goes back to prior to xmas when she was experiencing lightheadedness this is when the diffculty w ambulation started.          Review of Systems   Constitutional: Negative for diaphoresis, fever, weight gain and weight loss.   Eyes:  Negative for visual disturbance.   Cardiovascular:  Positive for near-syncope. Negative for chest pain, claudication, dyspnea on exertion, irregular heartbeat, leg swelling, orthopnea, palpitations, paroxysmal nocturnal dyspnea and syncope.   Respiratory:  Negative for cough, shortness of breath and wheezing.    Musculoskeletal:  Positive for muscle weakness. Negative for myalgias.   Neurological:  Positive for weakness. Negative for dizziness.   All other systems reviewed and are negative.       Past Medical History:   Diagnosis Date    Asthma     Essential (primary) hypertension     Hypertension    Kidney failure     Leukemia (CMS/HCC)     Rheumatoid arthritis (CMS/HCC)         Past Surgical History:   Procedure  Laterality Date    BACK SURGERY      Back Surgery    CHOLECYSTECTOMY      Cholecystectomy    HYSTERECTOMY      Hysterectomy    KNEE SURGERY      arthroscopic surgery?    OTHER SURGICAL HISTORY      Shoulder Surgery Left    SHOULDER SURGERY Left     Left shoulder impingement surgery    TOTAL KNEE ARTHROPLASTY Left 02/13/2014        Social History     Socioeconomic History    Marital status:      Spouse name: Not on file    Number of children: Not on file    Years of education: Not on file    Highest education level: Not on file   Occupational History    Not on file   Tobacco Use    Smoking status: Never     Passive exposure: Never    Smokeless tobacco: Never   Vaping Use    Vaping Use: Never used   Substance and Sexual Activity    Alcohol use: Yes     Comment: rarely    Drug use: Never    Sexual activity: Defer   Other Topics Concern    Not on file   Social History Narrative    Not on file     Social Determinants of Health     Financial Resource Strain: Low Risk  (12/24/2023)    Overall Financial Resource Strain (CARDIA)     Difficulty of Paying Living Expenses: Not hard at all   Food Insecurity: No Food Insecurity (12/14/2023)    Hunger Vital Sign     Worried About Running Out of Food in the Last Year: Never true     Ran Out of Food in the Last Year: Never true   Transportation Needs: No Transportation Needs (12/24/2023)    PRAPARE - Transportation     Lack of Transportation (Medical): No     Lack of Transportation (Non-Medical): No   Physical Activity: Inactive (12/14/2023)    Exercise Vital Sign     Days of Exercise per Week: 0 days     Minutes of Exercise per Session: 0 min   Stress: No Stress Concern Present (12/14/2023)    [employer] [place] [employer] - Occupational Stress Questionnaire     Feeling of Stress : Not at all   Social Connections: Socially Isolated (12/14/2023)    Social Connection and Isolation Panel [NHANES]     Frequency of Communication with Friends and Family: More than three  times a week     Frequency of Social Gatherings with Friends and Family: More than three times a week     Attends Tenriism Services: Never     Active Member of Clubs or Organizations: No     Attends Club or Organization Meetings: Never     Marital Status:    Intimate Partner Violence: Not At Risk (12/14/2023)    Humiliation, Afraid, Rape, and Kick questionnaire     Fear of Current or Ex-Partner: No     Emotionally Abused: No     Physically Abused: No     Sexually Abused: No   Housing Stability: High Risk (12/24/2023)    Housing Stability Vital Sign     Unable to Pay for Housing in the Last Year: No     Number of Places Lived in the Last Year: 1     Unstable Housing in the Last Year: Yes        Family History   Problem Relation Name Age of Onset    Emphysema Mother      Emphysema Father      Heart attack Sister      Lung cancer Brother          Agent orange    Lymphoma Son      Multiple sclerosis Son          OBJECTIVE:    Vitals:    01/24/24 1447   BP: 128/58   Pulse: 61   SpO2: 96%        Vitals reviewed.   Constitutional:       Appearance: Normal and healthy appearance. Not in distress.   Pulmonary:      Effort: Pulmonary effort is normal.      Breath sounds: Normal breath sounds.   Cardiovascular:      Normal rate. Regular rhythm. Normal S1. Normal S2.       Murmurs: There is no murmur.      No gallop.  No click.   Pulses:     Intact distal pulses.   Edema:     Peripheral edema absent.   Skin:     General: Skin is warm and dry.   Neurological:      General: No focal deficit present.          Lab Review:   Lab Results   Component Value Date     01/15/2024    K 4.2 01/15/2024     01/15/2024    CO2 27 01/15/2024    BUN 35 (H) 01/15/2024    CREATININE 2.10 (H) 01/15/2024    GLUCOSE 101 (H) 01/15/2024    CALCIUM 9.2 01/15/2024     Lab Results   Component Value Date    CHOL 247 (H) 12/08/2022    TRIG 156 (H) 12/08/2022    HDL 52 12/08/2022       Lab Results   Component Value Date    LDLCALC 164 (H)  12/08/2022        Atrial fibrillation (CMS/HCC)  With tachy ana syndrome relieved by medication adjustment. Nonetheless she is still symptomatic. Will arrange for an event monitor, consider EP eval for PPM should we identify significant pauses. Continue Amio and low dose metoprolol for now. Continue Eliquis    Benign essential hypertension  Controlled, continue current medical therapy    Congestive heart failure with left ventricular diastolic dysfunction, acute on chronic (CMS/HCC)  Euvolemic. Continue current medical therapy. Sees Dr Mt hutchinson

## 2024-01-24 NOTE — ASSESSMENT & PLAN NOTE
With tachy ana syndrome relieved by medication adjustment. Nonetheless she is still symptomatic. Will arrange for an event monitor, consider EP eval for PPM should we identify significant pauses. Continue Amio and low dose metoprolol for now. Continue Eliquis

## 2024-01-25 ENCOUNTER — TELEPHONE (OUTPATIENT)
Dept: PRIMARY CARE | Facility: CLINIC | Age: 84
End: 2024-01-25
Payer: MEDICARE

## 2024-01-25 NOTE — TELEPHONE ENCOUNTER
Rosaura from [facility] [phone] is calling because she is at the home with patient and her blood pressure taken twice was 114/50 and 114/49 with a heart rate of 58. Rosaura would also like a copy of the DNR faxed over to them at [phone].

## 2024-01-25 NOTE — TELEPHONE ENCOUNTER
According to what we can see, the DNR was only in place while patient was in the hospital and not a permanent DNR. There is nothing in scanned images either regarding this. The patient would have to obtain another DNR outside of the hospital setting.

## 2024-01-25 NOTE — TELEPHONE ENCOUNTER
Called Rosaura and let her know that per M he is not concerned with these blood pressure readings. As far as the DNR order I will ask someone to fax this over. She is also asking for a copy to be mailed to her daughter.

## 2024-01-29 ENCOUNTER — APPOINTMENT (OUTPATIENT)
Dept: PHARMACY | Facility: HOSPITAL | Age: 84
End: 2024-01-29
Payer: MEDICARE

## 2024-01-29 ENCOUNTER — PATIENT OUTREACH (OUTPATIENT)
Dept: CARE COORDINATION | Facility: CLINIC | Age: 84
End: 2024-01-29

## 2024-01-29 NOTE — PROGRESS NOTES
Pt dtr emailed this RD needing to reschedule 1/24 appt with this RD, her and pt. Moved appt to 1/31. Pt's dtr then emailed this RD again today, has decided to work with [facility] for pt's needs. Requesting to cancel 1/31 appt.

## 2024-01-31 ENCOUNTER — APPOINTMENT (OUTPATIENT)
Dept: PRIMARY CARE | Facility: CLINIC | Age: 84
End: 2024-01-31
Payer: MEDICARE

## 2024-01-31 ENCOUNTER — TELEPHONE (OUTPATIENT)
Dept: CARDIOLOGY | Facility: HOSPITAL | Age: 84
End: 2024-01-31
Payer: MEDICARE

## 2024-02-01 ENCOUNTER — TELEPHONE (OUTPATIENT)
Dept: PRIMARY CARE | Facility: CLINIC | Age: 84
End: 2024-02-01
Payer: MEDICARE

## 2024-02-01 ENCOUNTER — TELEPHONE (OUTPATIENT)
Dept: CARDIOLOGY | Facility: CLINIC | Age: 84
End: 2024-02-01
Payer: MEDICARE

## 2024-02-01 DIAGNOSIS — I50.33 CONGESTIVE HEART FAILURE WITH LEFT VENTRICULAR DIASTOLIC DYSFUNCTION, ACUTE ON CHRONIC (MULTI): ICD-10-CM

## 2024-02-01 NOTE — TELEPHONE ENCOUNTER
Patient called script line asking for refill on furosemide 60mg and she takes it 3 tablets daily. This was originally prescribed by another provider, patient asking if you will send in refill.

## 2024-02-01 NOTE — TELEPHONE ENCOUNTER
Called daughter to inform that patient should be getting this refilled by cardiology. She will contact Dr. Wright office.

## 2024-02-01 NOTE — TELEPHONE ENCOUNTER
Rx Refill Request Telephone Encounter    Name:  Tracie Baltazar  :  [id number]  Medication Name:  Furosemide 20 mg 3 x a day            Specific Pharmacy location:  [facility]  Date of last appointment:    Date of next appointment:    Best number to reach patient:

## 2024-02-02 RX ORDER — FUROSEMIDE 20 MG/1
60 TABLET ORAL DAILY
Qty: 90 TABLET | Refills: 11 | Status: SHIPPED | OUTPATIENT
Start: 2024-02-02 | End: 2024-04-02 | Stop reason: SDUPTHER

## 2024-02-07 ENCOUNTER — TELEPHONE (OUTPATIENT)
Dept: PRIMARY CARE | Facility: CLINIC | Age: 84
End: 2024-02-07
Payer: MEDICARE

## 2024-02-07 ENCOUNTER — DOCUMENTATION (OUTPATIENT)
Dept: CARE COORDINATION | Facility: CLINIC | Age: 84
End: 2024-02-07
Payer: MEDICARE

## 2024-02-07 DIAGNOSIS — I10 BENIGN ESSENTIAL HYPERTENSION: ICD-10-CM

## 2024-02-07 DIAGNOSIS — I48.0 PAROXYSMAL ATRIAL FIBRILLATION (MULTI): ICD-10-CM

## 2024-02-07 DIAGNOSIS — R06.02 SHORTNESS OF BREATH: ICD-10-CM

## 2024-02-07 DIAGNOSIS — M06.09 RHEUMATOID ARTHRITIS OF MULTIPLE SITES WITH NEGATIVE RHEUMATOID FACTOR (MULTI): ICD-10-CM

## 2024-02-07 DIAGNOSIS — M79.7 FIBROMYALGIA: ICD-10-CM

## 2024-02-07 RX ORDER — AMLODIPINE BESYLATE 5 MG/1
5 TABLET ORAL DAILY
Qty: 90 TABLET | Refills: 0 | Status: SHIPPED | OUTPATIENT
Start: 2024-02-07 | End: 2024-05-01

## 2024-02-07 RX ORDER — GABAPENTIN 300 MG/1
300 CAPSULE ORAL 2 TIMES DAILY
Qty: 6 CAPSULE | Refills: 0 | Status: SHIPPED | OUTPATIENT
Start: 2024-02-07 | End: 2024-02-16 | Stop reason: SDUPTHER

## 2024-02-07 RX ORDER — LEVOTHYROXINE SODIUM 150 UG/1
150 TABLET ORAL DAILY
Qty: 30 TABLET | Refills: 0 | Status: SHIPPED | OUTPATIENT
Start: 2024-02-07 | End: 2024-03-06

## 2024-02-07 RX ORDER — DULOXETIN HYDROCHLORIDE 20 MG/1
20 CAPSULE, DELAYED RELEASE ORAL DAILY
Qty: 3 CAPSULE | Refills: 0 | Status: SHIPPED | OUTPATIENT
Start: 2024-02-07 | End: 2024-02-16 | Stop reason: SDUPTHER

## 2024-02-07 NOTE — PROGRESS NOTES
Outreach call to patient, spoke with Linda her daughter following up on appointment with primary care provider.    No additional questions at this time.  Will continue to follow.  rachana

## 2024-02-07 NOTE — TELEPHONE ENCOUNTER
RX REQUEST   LEVOTHYROXINE  GABAPENTIN  CYMBALTA  AMLODIPINE  ELIQUIS  JACK SOLORIO     PT IS OUT

## 2024-02-07 NOTE — TELEPHONE ENCOUNTER
BRENDAN FROM [facility] CALLED  [phone].   WOULD LIKE ORDER FOR C DIFF  DUE TO DIARRHEA , ODOR, X 5 DAYS . PLEASE CALL AND LET HER KNOW

## 2024-02-08 ENCOUNTER — LAB (OUTPATIENT)
Dept: LAB | Facility: LAB | Age: 84
End: 2024-02-08
Payer: MEDICARE

## 2024-02-08 ENCOUNTER — TELEPHONE (OUTPATIENT)
Dept: PRIMARY CARE | Facility: CLINIC | Age: 84
End: 2024-02-08
Payer: MEDICARE

## 2024-02-08 NOTE — TELEPHONE ENCOUNTER
Pieter called from [facility] [phone] she had a fall today fell out of bed did not hit head no injuries was able to get back into bed with help of EMS, declined skilled nursing visit, physical therapy will be seeing her tomorrow

## 2024-02-09 ENCOUNTER — LAB REQUISITION (OUTPATIENT)
Dept: LAB | Facility: LAB | Age: 84
End: 2024-02-09
Payer: MEDICARE

## 2024-02-09 DIAGNOSIS — A04.72 ENTEROCOLITIS DUE TO CLOSTRIDIUM DIFFICILE, NOT SPECIFIED AS RECURRENT: ICD-10-CM

## 2024-02-09 PROCEDURE — 87493 C DIFF AMPLIFIED PROBE: CPT

## 2024-02-10 LAB — C DIF TOX TCDA+TCDB STL QL NAA+PROBE: DETECTED

## 2024-02-12 ENCOUNTER — TELEPHONE (OUTPATIENT)
Dept: PRIMARY CARE | Facility: CLINIC | Age: 84
End: 2024-02-12
Payer: MEDICARE

## 2024-02-12 DIAGNOSIS — A04.72 CLOSTRIDIUM DIFFICILE COLITIS: Primary | ICD-10-CM

## 2024-02-12 DIAGNOSIS — A04.72 C. DIFFICILE DIARRHEA: ICD-10-CM

## 2024-02-12 DIAGNOSIS — I48.0 PAROXYSMAL ATRIAL FIBRILLATION (MULTI): ICD-10-CM

## 2024-02-12 DIAGNOSIS — M79.7 FIBROMYALGIA: ICD-10-CM

## 2024-02-12 DIAGNOSIS — M06.09 RHEUMATOID ARTHRITIS OF MULTIPLE SITES WITH NEGATIVE RHEUMATOID FACTOR (MULTI): ICD-10-CM

## 2024-02-12 RX ORDER — GABAPENTIN 300 MG/1
300 CAPSULE ORAL 2 TIMES DAILY
Qty: 60 CAPSULE | Refills: 0 | OUTPATIENT
Start: 2024-02-12 | End: 2024-03-13

## 2024-02-12 RX ORDER — DULOXETIN HYDROCHLORIDE 20 MG/1
20 CAPSULE, DELAYED RELEASE ORAL DAILY
Qty: 30 CAPSULE | Refills: 0 | OUTPATIENT
Start: 2024-02-12 | End: 2024-03-13

## 2024-02-12 RX ORDER — AMIODARONE HYDROCHLORIDE 200 MG/1
200 TABLET ORAL DAILY
Qty: 60 TABLET | Refills: 0 | OUTPATIENT
Start: 2024-02-12 | End: 2024-04-12

## 2024-02-12 RX ORDER — METOPROLOL SUCCINATE 25 MG/1
12.5 TABLET, EXTENDED RELEASE ORAL DAILY
Qty: 30 TABLET | Refills: 0 | OUTPATIENT
Start: 2024-02-12 | End: 2024-04-12

## 2024-02-12 RX ORDER — METRONIDAZOLE 500 MG/1
500 TABLET ORAL 3 TIMES DAILY
Qty: 30 TABLET | Refills: 0 | Status: SHIPPED | OUTPATIENT
Start: 2024-02-12 | End: 2024-02-22

## 2024-02-12 NOTE — TELEPHONE ENCOUNTER
PT's daughter called and stated that refills are needed for Gabapentin and Cymbalta. Levothyroxine ,Apixaban and Amlopine, were filled however; need refills put in the system. PT would like to order mail order prescriptions so they need refills added to each prescription.        Gabapentin 300 MG  (6 Capsules given, no refills in system)  Cymbalta 20MG (3 Capsules given, no refills in system)    Levothyroxine 150 MG ( No refills in system)  Apixaban 2.5 MG ( No refills in system)  Amlodipine 5MG (No Refills in system)

## 2024-02-12 NOTE — TELEPHONE ENCOUNTER
DAUGHTER , MIMI FOUND OUT THAT PT TESTED + FOR DIFF, PER [facility].  CAN RX FOR THIS BE CALLED IN . [facility] . NO VANCOMYCIN  GIVES PT A RASH.

## 2024-02-14 ENCOUNTER — TELEPHONE (OUTPATIENT)
Dept: PRIMARY CARE | Facility: CLINIC | Age: 84
End: 2024-02-14
Payer: MEDICARE

## 2024-02-14 DIAGNOSIS — M79.7 FIBROMYALGIA: ICD-10-CM

## 2024-02-14 DIAGNOSIS — M06.09 RHEUMATOID ARTHRITIS OF MULTIPLE SITES WITH NEGATIVE RHEUMATOID FACTOR (MULTI): ICD-10-CM

## 2024-02-14 RX ORDER — GABAPENTIN 300 MG/1
300 CAPSULE ORAL 2 TIMES DAILY
Qty: 6 CAPSULE | Refills: 0 | OUTPATIENT
Start: 2024-02-14 | End: 2024-02-17

## 2024-02-14 RX ORDER — DULOXETIN HYDROCHLORIDE 20 MG/1
20 CAPSULE, DELAYED RELEASE ORAL DAILY
Qty: 3 CAPSULE | Refills: 0 | OUTPATIENT
Start: 2024-02-14 | End: 2024-02-17

## 2024-02-14 NOTE — TELEPHONE ENCOUNTER
Rx Refill Request Telephone Encounter    Name:  Tracie Baltazar  :  [id number]  Medication Name:  gabapentin (Neurontin) 300 mg capsule     Specific Pharmacy location:  [facility]     Best number to reach patient:  [phone]          Rx Refill Request Telephone Encounter    Name:  Tracie Baltazar  :  [id number]  Medication Name: DULoxetine (Cymbalta) 20 mg DR capsule     Specific Pharmacy location:  [facility]    Best number to reach patient:  [phone]      Rx Refill Request Telephone Encounter    Name:  Tracie Baltazar  :  [id number]  Medication Name:  amiodarone (Pacerone) 200 mg tablet       Specific Pharmacy location:  [facility]     Best number to reach patient:  [phone]

## 2024-02-16 ENCOUNTER — TELEPHONE (OUTPATIENT)
Dept: PRIMARY CARE | Facility: CLINIC | Age: 84
End: 2024-02-16
Payer: MEDICARE

## 2024-02-16 DIAGNOSIS — M79.7 FIBROMYALGIA: ICD-10-CM

## 2024-02-16 DIAGNOSIS — M06.09 RHEUMATOID ARTHRITIS OF MULTIPLE SITES WITH NEGATIVE RHEUMATOID FACTOR (MULTI): ICD-10-CM

## 2024-02-16 RX ORDER — DULOXETIN HYDROCHLORIDE 20 MG/1
20 CAPSULE, DELAYED RELEASE ORAL DAILY
Qty: 90 CAPSULE | Refills: 1 | Status: SHIPPED | OUTPATIENT
Start: 2024-02-16 | End: 2024-08-14

## 2024-02-16 RX ORDER — GABAPENTIN 300 MG/1
300 CAPSULE ORAL 2 TIMES DAILY
Qty: 180 CAPSULE | Refills: 0 | Status: SHIPPED | OUTPATIENT
Start: 2024-02-16 | End: 2024-05-14 | Stop reason: SDUPTHER

## 2024-02-16 NOTE — TELEPHONE ENCOUNTER
PT's Daughter Linda is upset that her Mom's 2 prescriptions have not been called in. She stated she has called several times.    Gabapentin 300 MG ( she received 6 pills on 02-07-24)  Cymbalta 20 MG ( she received 3 pills on 02-07-24)    Linda can be reached at [phone].

## 2024-02-16 NOTE — TELEPHONE ENCOUNTER
Prescriptions pended to [facility] and informed daughter they will be sent today, apologized for delay.

## 2024-02-19 ENCOUNTER — LAB (OUTPATIENT)
Dept: LAB | Facility: LAB | Age: 84
End: 2024-02-19
Payer: MEDICARE

## 2024-02-19 ENCOUNTER — PATIENT MESSAGE (OUTPATIENT)
Dept: PRIMARY CARE | Facility: CLINIC | Age: 84
End: 2024-02-19

## 2024-02-19 DIAGNOSIS — I48.0 PAROXYSMAL ATRIAL FIBRILLATION (MULTI): ICD-10-CM

## 2024-02-19 DIAGNOSIS — N18.4 CHRONIC KIDNEY DISEASE, STAGE 4 (SEVERE) (MULTI): ICD-10-CM

## 2024-02-19 LAB
25(OH)D3 SERPL-MCNC: 35 NG/ML (ref 31–100)
ALBUMIN SERPL-MCNC: 3.2 G/DL (ref 3.5–5)
ANION GAP SERPL CALC-SCNC: 11 MMOL/L
BUN SERPL-MCNC: 26 MG/DL (ref 8–25)
CALCIUM SERPL-MCNC: 8.6 MG/DL (ref 8.5–10.4)
CHLORIDE SERPL-SCNC: 99 MMOL/L (ref 97–107)
CO2 SERPL-SCNC: 31 MMOL/L (ref 24–31)
CREAT SERPL-MCNC: 2.6 MG/DL (ref 0.4–1.6)
EGFRCR SERPLBLD CKD-EPI 2021: 18 ML/MIN/1.73M*2
ERYTHROCYTE [DISTWIDTH] IN BLOOD BY AUTOMATED COUNT: 16 % (ref 11.5–14.5)
GLUCOSE SERPL-MCNC: 120 MG/DL (ref 65–99)
HCT VFR BLD AUTO: 31.2 % (ref 36–46)
HGB BLD-MCNC: 9.4 G/DL (ref 12–16)
MCH RBC QN AUTO: 30.7 PG (ref 26–34)
MCHC RBC AUTO-ENTMCNC: 30.1 G/DL (ref 32–36)
MCV RBC AUTO: 102 FL (ref 80–100)
NRBC BLD-RTO: 0 /100 WBCS (ref 0–0)
PHOSPHATE SERPL-MCNC: 2.8 MG/DL (ref 2.5–4.5)
PLATELET # BLD AUTO: 320 X10*3/UL (ref 150–450)
POTASSIUM SERPL-SCNC: 3.6 MMOL/L (ref 3.4–5.1)
PTH-INTACT SERPL-MCNC: 219.5 PG/ML (ref 18.5–88)
RBC # BLD AUTO: 3.06 X10*6/UL (ref 4–5.2)
SODIUM SERPL-SCNC: 141 MMOL/L (ref 133–145)
WBC # BLD AUTO: 30.5 X10*3/UL (ref 4.4–11.3)

## 2024-02-19 PROCEDURE — 85027 COMPLETE CBC AUTOMATED: CPT

## 2024-02-19 PROCEDURE — 80069 RENAL FUNCTION PANEL: CPT

## 2024-02-19 PROCEDURE — 83970 ASSAY OF PARATHORMONE: CPT

## 2024-02-19 PROCEDURE — 36415 COLL VENOUS BLD VENIPUNCTURE: CPT

## 2024-02-19 PROCEDURE — 82306 VITAMIN D 25 HYDROXY: CPT

## 2024-02-20 ENCOUNTER — DOCUMENTATION (OUTPATIENT)
Dept: CARE COORDINATION | Facility: CLINIC | Age: 84
End: 2024-02-20
Payer: MEDICARE

## 2024-02-20 RX ORDER — AMIODARONE HYDROCHLORIDE 200 MG/1
200 TABLET ORAL DAILY
Qty: 90 TABLET | Refills: 0 | Status: SHIPPED | OUTPATIENT
Start: 2024-02-20 | End: 2024-05-17 | Stop reason: SDUPTHER

## 2024-02-20 NOTE — PROGRESS NOTES
Attempted outreach call to patient to check in 30 days after hospital discharge to support smooth transition of care.  Left a voice message with my contact information.  No additional outreach needed at this time.   rachana

## 2024-02-21 ENCOUNTER — APPOINTMENT (OUTPATIENT)
Dept: CARDIOLOGY | Facility: CLINIC | Age: 84
End: 2024-02-21
Payer: MEDICARE

## 2024-02-21 ENCOUNTER — TELEPHONE (OUTPATIENT)
Dept: PRIMARY CARE | Facility: CLINIC | Age: 84
End: 2024-02-21
Payer: MEDICARE

## 2024-02-21 DIAGNOSIS — I48.0 PAROXYSMAL ATRIAL FIBRILLATION (MULTI): ICD-10-CM

## 2024-02-21 RX ORDER — AMIODARONE HYDROCHLORIDE 200 MG/1
200 TABLET ORAL DAILY
Qty: 90 TABLET | Refills: 0 | OUTPATIENT
Start: 2024-02-21

## 2024-02-21 NOTE — TELEPHONE ENCOUNTER
Rx Refill Request Telephone Encounter    Name:  Tracie Baltazar  :  [id number]  Medication Name:  AMIODARONE 200 MG            Specific Pharmacy location:  [facility] [place]  Date of last appointment:    Date of next appointment:    Best number to reach patient:

## 2024-02-21 NOTE — PROGRESS NOTES
Subjective      No chief complaint on file.       83-year-old female seen in the hospital in December where she presented with a presyncopal episode.  Infectious workup was negative.  She has atrial fibrillation with rapid ventricular response and was noted to have significant postconversion pause when reverting back to sinus rhythm.  She was seen by electrophysiology and her AV amna agents were adjusted to where she left on amiodarone and a low-dose beta-blocker.  Prior to discharge it was noted that her pauses subsided.  Plan was to have her follow-up with us and with electrophysiology, an event monitor for further surveillance.  During the hospital stay it is noted that she had an echocardiogram showing normal left ventricular size and function and trivial valvular abnormalities.  She had a Lexiscan SPECT stress test that was normal or low risk.  I saw her in follow-up we placed an event monitor she is here to discuss the results.       ROS     Past Medical History:   Diagnosis Date   • Asthma    • Essential (primary) hypertension     Hypertension   • Kidney failure    • Leukemia (CMS/HCC)    • Rheumatoid arthritis (CMS/HCC)         Past Surgical History:   Procedure Laterality Date   • BACK SURGERY      Back Surgery   • CHOLECYSTECTOMY      Cholecystectomy   • HYSTERECTOMY      Hysterectomy   • KNEE SURGERY      arthroscopic surgery?   • OTHER SURGICAL HISTORY      Shoulder Surgery Left   • SHOULDER SURGERY Left     Left shoulder impingement surgery   • TOTAL KNEE ARTHROPLASTY Left 02/13/2014        Social History     Socioeconomic History   • Marital status:      Spouse name: Not on file   • Number of children: Not on file   • Years of education: Not on file   • Highest education level: Not on file   Occupational History   • Not on file   Tobacco Use   • Smoking status: Never     Passive exposure: Never   • Smokeless tobacco: Never   Vaping Use   • Vaping Use: Never used   Substance and Sexual Activity    • Alcohol use: Yes     Comment: rarely   • Drug use: Never   • Sexual activity: Defer   Other Topics Concern   • Not on file   Social History Narrative   • Not on file     Social Determinants of Health     Financial Resource Strain: Low Risk  (12/24/2023)    Overall Financial Resource Strain (CARDIA)    • Difficulty of Paying Living Expenses: Not hard at all   Food Insecurity: No Food Insecurity (12/14/2023)    Hunger Vital Sign    • Worried About Running Out of Food in the Last Year: Never true    • Ran Out of Food in the Last Year: Never true   Transportation Needs: No Transportation Needs (12/24/2023)    PRAPARE - Transportation    • Lack of Transportation (Medical): No    • Lack of Transportation (Non-Medical): No   Physical Activity: Inactive (12/14/2023)    Exercise Vital Sign    • Days of Exercise per Week: 0 days    • Minutes of Exercise per Session: 0 min   Stress: No Stress Concern Present (12/14/2023)    Citizen of [place] of Occupational Health - Occupational Stress Questionnaire    • Feeling of Stress : Not at all   Social Connections: Socially Isolated (12/14/2023)    Social Connection and Isolation Panel [NHANES]    • Frequency of Communication with Friends and Family: More than three times a week    • Frequency of Social Gatherings with Friends and Family: More than three times a week    • Attends Scientologist Services: Never    • Active Member of Clubs or Organizations: No    • Attends Club or Organization Meetings: Never    • Marital Status:    Intimate Partner Violence: Not At Risk (12/14/2023)    Humiliation, Afraid, Rape, and Kick questionnaire    • Fear of Current or Ex-Partner: No    • Emotionally Abused: No    • Physically Abused: No    • Sexually Abused: No   Housing Stability: High Risk (12/24/2023)    Housing Stability Vital Sign    • Unable to Pay for Housing in the Last Year: No    • Number of Places Lived in the Last Year: 1    • Unstable Housing in the Last Year: Yes        Family  "History   Problem Relation Name Age of Onset   • Emphysema Mother     • Emphysema Father     • Heart attack Sister     • Lung cancer Brother          Agent orange   • Lymphoma Son     • Multiple sclerosis Son          OBJECTIVE:    There were no vitals filed for this visit.     Physical Exam     Lab Review:   {Recent labs:19471::\"not applicable\"}    Lab Results   Component Value Date    LDLCALC 164 (H) 12/08/2022        No problem-specific Assessment & Plan notes found for this encounter.       "

## 2024-02-28 ENCOUNTER — APPOINTMENT (OUTPATIENT)
Dept: CARDIOLOGY | Facility: CLINIC | Age: 84
End: 2024-02-28
Payer: MEDICARE

## 2024-02-28 NOTE — PROGRESS NOTES
Subjective      No chief complaint on file.       83-year-old female seen in the hospital last month where she presented with a presyncopal episode.  Infectious workup was negative.  She has atrial fibrillation with rapid ventricular response and was noted to have significant postconversion pause when reverting back to sinus rhythm.  She was seen by electrophysiology and her AV amna agents were adjusted to where she left on amiodarone and a low-dose beta-blocker.  Prior to discharge it was noted that her pauses subsided.  Plan was to have her follow-up with us and with electrophysiology, an event monitor for further surveillance.  During the hospital stay it is noted that she had an echocardiogram showing normal left ventricular size and function and trivial valvular abnormalities.  She had a Lexiscan SPECT stress test that was normal or low risk.     She has trouble ambulating, attributes it to strength. Daughter goes back to prior to xmas when she was experiencing lightheadedness this is when the diffculty w ambulation started.  We arranged for an event monitor with BECKY rangel contingent on the results.           ROS     Past Medical History:   Diagnosis Date    Asthma     Essential (primary) hypertension     Hypertension    Kidney failure     Leukemia (CMS/HCC)     Rheumatoid arthritis (CMS/HCC)         Past Surgical History:   Procedure Laterality Date    BACK SURGERY      Back Surgery    CHOLECYSTECTOMY      Cholecystectomy    HYSTERECTOMY      Hysterectomy    KNEE SURGERY      arthroscopic surgery?    OTHER SURGICAL HISTORY      Shoulder Surgery Left    SHOULDER SURGERY Left     Left shoulder impingement surgery    TOTAL KNEE ARTHROPLASTY Left 02/13/2014        Social History     Socioeconomic History    Marital status:      Spouse name: Not on file    Number of children: Not on file    Years of education: Not on file    Highest education level: Not on file   Occupational History    Not on file   Tobacco  Use    Smoking status: Never     Passive exposure: Never    Smokeless tobacco: Never   Vaping Use    Vaping Use: Never used   Substance and Sexual Activity    Alcohol use: Yes     Comment: rarely    Drug use: Never    Sexual activity: Defer   Other Topics Concern    Not on file   Social History Narrative    Not on file     Social Determinants of Health     Financial Resource Strain: Low Risk  (12/24/2023)    Overall Financial Resource Strain (CARDIA)     Difficulty of Paying Living Expenses: Not hard at all   Food Insecurity: No Food Insecurity (12/14/2023)    Hunger Vital Sign     Worried About Running Out of Food in the Last Year: Never true     Ran Out of Food in the Last Year: Never true   Transportation Needs: No Transportation Needs (12/24/2023)    PRAPARE - Transportation     Lack of Transportation (Medical): No     Lack of Transportation (Non-Medical): No   Physical Activity: Inactive (12/14/2023)    Exercise Vital Sign     Days of Exercise per Week: 0 days     Minutes of Exercise per Session: 0 min   Stress: No Stress Concern Present (12/14/2023)    [place] of Occupational Health - Occupational Stress Questionnaire     Feeling of Stress : Not at all   Social Connections: Socially Isolated (12/14/2023)    Social Connection and Isolation Panel [NHANES]     Frequency of Communication with Friends and Family: More than three times a week     Frequency of Social Gatherings with Friends and Family: More than three times a week     Attends Pentecostal Services: Never     Active Member of Clubs or Organizations: No     Attends Club or Organization Meetings: Never     Marital Status:    Intimate Partner Violence: Not At Risk (12/14/2023)    Humiliation, Afraid, Rape, and Kick questionnaire     Fear of Current or Ex-Partner: No     Emotionally Abused: No     Physically Abused: No     Sexually Abused: No   Housing Stability: High Risk (12/24/2023)    Housing Stability Vital Sign     Unable to Pay for  "Housing in the Last Year: No     Number of Places Lived in the Last Year: 1     Unstable Housing in the Last Year: Yes        Family History   Problem Relation Name Age of Onset    Emphysema Mother      Emphysema Father      Heart attack Sister      Lung cancer Brother          Agent orange    Lymphoma Son      Multiple sclerosis Son          OBJECTIVE:    There were no vitals filed for this visit.     Physical Exam     Lab Review:   {Recent labs:19471::\"not applicable\"}    Lab Results   Component Value Date    LDLCALC 164 (H) 12/08/2022        No problem-specific Assessment & Plan notes found for this encounter.       "

## 2024-03-01 ENCOUNTER — OFFICE VISIT (OUTPATIENT)
Dept: CARDIOLOGY | Facility: CLINIC | Age: 84
End: 2024-03-01
Payer: MEDICARE

## 2024-03-01 VITALS
BODY MASS INDEX: 29.41 KG/M2 | WEIGHT: 166 LBS | SYSTOLIC BLOOD PRESSURE: 130 MMHG | HEART RATE: 57 BPM | OXYGEN SATURATION: 98 % | DIASTOLIC BLOOD PRESSURE: 68 MMHG

## 2024-03-01 DIAGNOSIS — I48.0 PAROXYSMAL ATRIAL FIBRILLATION (MULTI): Primary | ICD-10-CM

## 2024-03-01 DIAGNOSIS — I10 BENIGN ESSENTIAL HYPERTENSION: ICD-10-CM

## 2024-03-01 PROCEDURE — 1126F AMNT PAIN NOTED NONE PRSNT: CPT | Performed by: INTERNAL MEDICINE

## 2024-03-01 PROCEDURE — 99212 OFFICE O/P EST SF 10 MIN: CPT | Performed by: INTERNAL MEDICINE

## 2024-03-01 PROCEDURE — 3075F SYST BP GE 130 - 139MM HG: CPT | Performed by: INTERNAL MEDICINE

## 2024-03-01 PROCEDURE — 1036F TOBACCO NON-USER: CPT | Performed by: INTERNAL MEDICINE

## 2024-03-01 PROCEDURE — 1159F MED LIST DOCD IN RCRD: CPT | Performed by: INTERNAL MEDICINE

## 2024-03-01 PROCEDURE — 1157F ADVNC CARE PLAN IN RCRD: CPT | Performed by: INTERNAL MEDICINE

## 2024-03-01 PROCEDURE — 3078F DIAST BP <80 MM HG: CPT | Performed by: INTERNAL MEDICINE

## 2024-03-01 ASSESSMENT — ENCOUNTER SYMPTOMS
WHEEZING: 0
SHORTNESS OF BREATH: 0
DIAPHORESIS: 0
SYNCOPE: 0
IRREGULAR HEARTBEAT: 0
PND: 0
FEVER: 0
WEAKNESS: 0
DYSPNEA ON EXERTION: 0
CLAUDICATION: 0
MYALGIAS: 0
ORTHOPNEA: 0
WEIGHT GAIN: 0
COUGH: 0
DIZZINESS: 0
NEAR-SYNCOPE: 0
PALPITATIONS: 0
WEIGHT LOSS: 0

## 2024-03-01 ASSESSMENT — PAIN SCALES - GENERAL: PAINLEVEL: 0-NO PAIN

## 2024-03-01 NOTE — PROGRESS NOTES
Subjective      Chief Complaint   Patient presents with    monitor results        83-year-old female seen in the hospital last month where she presented with a presyncopal episode.  Infectious workup was negative.  She has atrial fibrillation with rapid ventricular response and was noted to have significant postconversion pause when reverting back to sinus rhythm.  She was seen by electrophysiology and her AV amna agents were adjusted to where she left on amiodarone and a low-dose beta-blocker.  Prior to discharge it was noted that her pauses subsided.  Plan was to have her follow-up with us and with electrophysiology, an event monitor for further surveillance.  During the hospital stay it is noted that she had an echocardiogram showing normal left ventricular size and function and trivial valvular abnormalities.  She had a Lexiscan SPECT stress test that was normal or low risk.     She has trouble ambulating, attributes it to strength. Daughter goes back to prior to xmas when she was experiencing lightheadedness this is when the diffculty w ambulation started.     We arranged for an event monitor to assess for pauses on current medical regimen         Review of Systems   Constitutional: Negative for diaphoresis, fever, weight gain and weight loss.   Eyes:  Negative for visual disturbance.   Cardiovascular:  Negative for chest pain, claudication, dyspnea on exertion, irregular heartbeat, leg swelling, near-syncope, orthopnea, palpitations, paroxysmal nocturnal dyspnea and syncope.   Respiratory:  Negative for cough, shortness of breath and wheezing.    Musculoskeletal:  Negative for muscle weakness and myalgias.   Neurological:  Negative for dizziness and weakness.   All other systems reviewed and are negative.       Past Medical History:   Diagnosis Date    Asthma     Essential (primary) hypertension     Hypertension    Kidney failure     Leukemia (CMS/HCC)     Rheumatoid arthritis (CMS/[facility])         Past Surgical  History:   Procedure Laterality Date    BACK SURGERY      Back Surgery    CHOLECYSTECTOMY      Cholecystectomy    HYSTERECTOMY      Hysterectomy    KNEE SURGERY      arthroscopic surgery?    OTHER SURGICAL HISTORY      Shoulder Surgery Left    SHOULDER SURGERY Left     Left shoulder impingement surgery    TOTAL KNEE ARTHROPLASTY Left 02/13/2014        Social History     Socioeconomic History    Marital status:      Spouse name: Not on file    Number of children: Not on file    Years of education: Not on file    Highest education level: Not on file   Occupational History    Not on file   Tobacco Use    Smoking status: Never     Passive exposure: Never    Smokeless tobacco: Never   Vaping Use    Vaping Use: Never used   Substance and Sexual Activity    Alcohol use: Yes     Comment: rarely    Drug use: Never    Sexual activity: Defer   Other Topics Concern    Not on file   Social History Narrative    Not on file     Social Determinants of Health     Financial Resource Strain: Low Risk  (12/24/2023)    Overall Financial Resource Strain (CARDIA)     Difficulty of Paying Living Expenses: Not hard at all   Food Insecurity: No Food Insecurity (12/14/2023)    Hunger Vital Sign     Worried About Running Out of Food in the Last Year: Never true     Ran Out of Food in the Last Year: Never true   Transportation Needs: No Transportation Needs (12/24/2023)    PRAPARE - Transportation     Lack of Transportation (Medical): No     Lack of Transportation (Non-Medical): No   Physical Activity: Inactive (12/14/2023)    Exercise Vital Sign     Days of Exercise per Week: 0 days     Minutes of Exercise per Session: 0 min   Stress: No Stress Concern Present (12/14/2023)    [place] of Occupational Health - Occupational Stress Questionnaire     Feeling of Stress : Not at all   Social Connections: Socially Isolated (12/14/2023)    Social Connection and Isolation Panel [NHANES]     Frequency of Communication with Friends and  Family: More than three times a week     Frequency of Social Gatherings with Friends and Family: More than three times a week     Attends Episcopal Services: Never     Active Member of Clubs or Organizations: No     Attends Club or Organization Meetings: Never     Marital Status:    Intimate Partner Violence: Not At Risk (12/14/2023)    Humiliation, Afraid, Rape, and Kick questionnaire     Fear of Current or Ex-Partner: No     Emotionally Abused: No     Physically Abused: No     Sexually Abused: No   Housing Stability: High Risk (12/24/2023)    Housing Stability Vital Sign     Unable to Pay for Housing in the Last Year: No     Number of Places Lived in the Last Year: 1     Unstable Housing in the Last Year: Yes        Family History   Problem Relation Name Age of Onset    Emphysema Mother      Emphysema Father      Heart attack Sister      Lung cancer Brother          Agent orange    Lymphoma Son      Multiple sclerosis Son          OBJECTIVE:    Vitals:    03/01/24 1108   BP: 130/68   Pulse: 57   SpO2: 98%        Physical Exam     Lab Review:   Lab Results   Component Value Date     02/19/2024    K 3.6 02/19/2024    CL 99 02/19/2024    CO2 31 02/19/2024    BUN 26 (H) 02/19/2024    CREATININE 2.60 (H) 02/19/2024    GLUCOSE 120 (H) 02/19/2024    CALCIUM 8.6 02/19/2024     Lab Results   Component Value Date    CHOL 247 (H) 12/08/2022    TRIG 156 (H) 12/08/2022    HDL 52 12/08/2022       Lab Results   Component Value Date    LDLCALC 164 (H) 12/08/2022        No problem-specific Assessment & Plan notes found for this encounter.

## 2024-03-01 NOTE — ASSESSMENT & PLAN NOTE
Monitor failed to show any evidence of pauses or significant bradycardia and there were no concerning symptoms reported.  I do not feel that she needs further evaluation for a permanent pacemaker at this juncture.  Will see her back in 3 months.

## 2024-03-06 DIAGNOSIS — R06.02 SHORTNESS OF BREATH: ICD-10-CM

## 2024-03-06 RX ORDER — LEVOTHYROXINE SODIUM 150 UG/1
TABLET ORAL
Qty: 30 TABLET | Refills: 2 | Status: SHIPPED | OUTPATIENT
Start: 2024-03-06

## 2024-03-13 DIAGNOSIS — A04.72 CLOSTRIDIUM DIFFICILE COLITIS: Primary | ICD-10-CM

## 2024-03-13 DIAGNOSIS — I48.0 PAROXYSMAL ATRIAL FIBRILLATION (MULTI): ICD-10-CM

## 2024-03-15 RX ORDER — METOPROLOL SUCCINATE 25 MG/1
12.5 TABLET, EXTENDED RELEASE ORAL DAILY
Qty: 45 TABLET | Refills: 3 | Status: SHIPPED | OUTPATIENT
Start: 2024-03-15

## 2024-03-18 DIAGNOSIS — E78.00 HYPERCHOLESTEROLEMIA: ICD-10-CM

## 2024-03-19 RX ORDER — SIMVASTATIN 10 MG/1
10 TABLET, FILM COATED ORAL NIGHTLY
Qty: 30 TABLET | Refills: 0 | Status: SHIPPED | OUTPATIENT
Start: 2024-03-19 | End: 2024-04-11 | Stop reason: SDUPTHER

## 2024-03-21 ENCOUNTER — TELEPHONE (OUTPATIENT)
Dept: PRIMARY CARE | Facility: CLINIC | Age: 84
End: 2024-03-21
Payer: MEDICARE

## 2024-03-21 NOTE — TELEPHONE ENCOUNTER
Rosaura from [facility] called to let us know patient is done with home care but when she checked patient's heart rate it was in between 48-51. She was bradycardic and asymptomatic. She said when she bends over she gets lightheaded. Rosaura was asking for the patient if she should make an appt. To see us or her cardiologist sooner than 4/20( this is with her Cardiologist.    Rosaura: [phone]

## 2024-03-22 NOTE — TELEPHONE ENCOUNTER
Spoke with Rosaura at [facility] and also patient's dtr Linda.  They both are aware to call cardiologist office and get in sooner than 4/20 or see what cardio recommends she do.

## 2024-03-28 ENCOUNTER — LAB (OUTPATIENT)
Dept: LAB | Facility: LAB | Age: 84
End: 2024-03-28
Payer: MEDICARE

## 2024-03-28 DIAGNOSIS — C91.10 CHRONIC LYMPHATIC LEUKEMIA (MULTI): ICD-10-CM

## 2024-03-28 LAB
ALBUMIN SERPL-MCNC: 4.1 G/DL (ref 3.5–5)
ALP BLD-CCNC: 87 U/L (ref 35–125)
ALT SERPL-CCNC: 6 U/L (ref 5–40)
ANION GAP SERPL CALC-SCNC: 15 MMOL/L
AST SERPL-CCNC: 15 U/L (ref 5–40)
BASOPHILS # BLD MANUAL: 0.37 X10*3/UL (ref 0–0.1)
BASOPHILS NFR BLD MANUAL: 1 %
BILIRUB SERPL-MCNC: 0.3 MG/DL (ref 0.1–1.2)
BUN SERPL-MCNC: 25 MG/DL (ref 8–25)
CALCIUM SERPL-MCNC: 9.3 MG/DL (ref 8.5–10.4)
CHLORIDE SERPL-SCNC: 99 MMOL/L (ref 97–107)
CO2 SERPL-SCNC: 27 MMOL/L (ref 24–31)
CREAT SERPL-MCNC: 2.3 MG/DL (ref 0.4–1.6)
EGFRCR SERPLBLD CKD-EPI 2021: 21 ML/MIN/1.73M*2
EOSINOPHIL # BLD MANUAL: 0.37 X10*3/UL (ref 0–0.4)
EOSINOPHIL NFR BLD MANUAL: 1 %
ERYTHROCYTE [DISTWIDTH] IN BLOOD BY AUTOMATED COUNT: 15.4 % (ref 11.5–14.5)
GLUCOSE SERPL-MCNC: 99 MG/DL (ref 65–99)
HCT VFR BLD AUTO: 36.8 % (ref 36–46)
HGB BLD-MCNC: 11.2 G/DL (ref 12–16)
IMM GRANULOCYTES # BLD AUTO: 0.12 X10*3/UL (ref 0–0.5)
IMM GRANULOCYTES NFR BLD AUTO: 0.3 % (ref 0–0.9)
LYMPHOCYTES # BLD MANUAL: 28.55 X10*3/UL (ref 0.8–3)
LYMPHOCYTES NFR BLD MANUAL: 78 %
MCH RBC QN AUTO: 31 PG (ref 26–34)
MCHC RBC AUTO-ENTMCNC: 30.4 G/DL (ref 32–36)
MCV RBC AUTO: 102 FL (ref 80–100)
MONOCYTES # BLD MANUAL: 1.1 X10*3/UL (ref 0.05–0.8)
MONOCYTES NFR BLD MANUAL: 3 %
NEUTS SEG # BLD MANUAL: 6.22 X10*3/UL (ref 1.6–5)
NEUTS SEG NFR BLD MANUAL: 17 %
NRBC BLD-RTO: 0 /100 WBCS (ref 0–0)
PLATELET # BLD AUTO: 256 X10*3/UL (ref 150–450)
POTASSIUM SERPL-SCNC: 3.4 MMOL/L (ref 3.4–5.1)
PROT SERPL-MCNC: 6.6 G/DL (ref 5.9–7.9)
RBC # BLD AUTO: 3.61 X10*6/UL (ref 4–5.2)
RBC MORPH BLD: ABNORMAL
SODIUM SERPL-SCNC: 141 MMOL/L (ref 133–145)
TOTAL CELLS COUNTED BLD: 100
WBC # BLD AUTO: 36.6 X10*3/UL (ref 4.4–11.3)

## 2024-03-28 PROCEDURE — 36415 COLL VENOUS BLD VENIPUNCTURE: CPT

## 2024-03-28 PROCEDURE — 80053 COMPREHEN METABOLIC PANEL: CPT

## 2024-03-28 PROCEDURE — 85060 BLOOD SMEAR INTERPRETATION: CPT | Performed by: NURSE PRACTITIONER

## 2024-03-28 PROCEDURE — 85007 BL SMEAR W/DIFF WBC COUNT: CPT

## 2024-03-28 PROCEDURE — 85027 COMPLETE CBC AUTOMATED: CPT

## 2024-03-29 LAB — PATH REVIEW-CBC DIFFERENTIAL: NORMAL

## 2024-04-01 DIAGNOSIS — N18.4 CHRONIC KIDNEY DISEASE, STAGE 4 (SEVERE) (MULTI): Primary | ICD-10-CM

## 2024-04-02 ENCOUNTER — LAB (OUTPATIENT)
Dept: LAB | Facility: LAB | Age: 84
End: 2024-04-02
Payer: MEDICARE

## 2024-04-02 DIAGNOSIS — I48.0 PAROXYSMAL ATRIAL FIBRILLATION (MULTI): ICD-10-CM

## 2024-04-02 DIAGNOSIS — N18.4 CHRONIC KIDNEY DISEASE, STAGE 4 (SEVERE) (MULTI): ICD-10-CM

## 2024-04-02 DIAGNOSIS — I50.33 CONGESTIVE HEART FAILURE WITH LEFT VENTRICULAR DIASTOLIC DYSFUNCTION, ACUTE ON CHRONIC (MULTI): ICD-10-CM

## 2024-04-02 LAB
ALBUMIN SERPL-MCNC: 4 G/DL (ref 3.5–5)
ANION GAP SERPL CALC-SCNC: 13 MMOL/L
APPEARANCE UR: CLEAR
BACTERIA #/AREA URNS AUTO: ABNORMAL /HPF
BILIRUB UR STRIP.AUTO-MCNC: NEGATIVE MG/DL
BUN SERPL-MCNC: 30 MG/DL (ref 8–25)
CALCIUM SERPL-MCNC: 9.2 MG/DL (ref 8.5–10.4)
CHLORIDE SERPL-SCNC: 100 MMOL/L (ref 97–107)
CO2 SERPL-SCNC: 27 MMOL/L (ref 24–31)
COLOR UR: YELLOW
CREAT SERPL-MCNC: 2.8 MG/DL (ref 0.4–1.6)
EGFRCR SERPLBLD CKD-EPI 2021: 16 ML/MIN/1.73M*2
ERYTHROCYTE [DISTWIDTH] IN BLOOD BY AUTOMATED COUNT: 15.2 % (ref 11.5–14.5)
GLUCOSE SERPL-MCNC: 141 MG/DL (ref 65–99)
GLUCOSE UR STRIP.AUTO-MCNC: NEGATIVE MG/DL
HCT VFR BLD AUTO: 35.6 % (ref 36–46)
HGB BLD-MCNC: 10.9 G/DL (ref 12–16)
HYALINE CASTS #/AREA URNS AUTO: ABNORMAL /LPF
KETONES UR STRIP.AUTO-MCNC: NEGATIVE MG/DL
LEUKOCYTE ESTERASE UR QL STRIP.AUTO: NEGATIVE
MCH RBC QN AUTO: 31.2 PG (ref 26–34)
MCHC RBC AUTO-ENTMCNC: 30.6 G/DL (ref 32–36)
MCV RBC AUTO: 102 FL (ref 80–100)
NITRITE UR QL STRIP.AUTO: NEGATIVE
NRBC BLD-RTO: 0 /100 WBCS (ref 0–0)
PH UR STRIP.AUTO: 6 [PH]
PHOSPHATE SERPL-MCNC: 4.2 MG/DL (ref 2.5–4.5)
PLATELET # BLD AUTO: 247 X10*3/UL (ref 150–450)
POTASSIUM SERPL-SCNC: 3.4 MMOL/L (ref 3.4–5.1)
PROT UR STRIP.AUTO-MCNC: ABNORMAL MG/DL
RBC # BLD AUTO: 3.49 X10*6/UL (ref 4–5.2)
RBC # UR STRIP.AUTO: NEGATIVE /UL
RBC #/AREA URNS AUTO: ABNORMAL /HPF
SODIUM SERPL-SCNC: 140 MMOL/L (ref 133–145)
SP GR UR STRIP.AUTO: 1.01
SQUAMOUS #/AREA URNS AUTO: ABNORMAL /HPF
UROBILINOGEN UR STRIP.AUTO-MCNC: 0.2 MG/DL
WBC # BLD AUTO: 36.4 X10*3/UL (ref 4.4–11.3)
WBC #/AREA URNS AUTO: ABNORMAL /HPF

## 2024-04-02 PROCEDURE — 81001 URINALYSIS AUTO W/SCOPE: CPT

## 2024-04-02 PROCEDURE — 36415 COLL VENOUS BLD VENIPUNCTURE: CPT

## 2024-04-02 PROCEDURE — 80069 RENAL FUNCTION PANEL: CPT

## 2024-04-02 PROCEDURE — 85027 COMPLETE CBC AUTOMATED: CPT

## 2024-04-03 LAB — HOLD SPECIMEN: NORMAL

## 2024-04-04 ENCOUNTER — APPOINTMENT (OUTPATIENT)
Dept: CARDIOLOGY | Facility: CLINIC | Age: 84
End: 2024-04-04
Payer: MEDICARE

## 2024-04-05 RX ORDER — FUROSEMIDE 20 MG/1
60 TABLET ORAL DAILY
Qty: 90 TABLET | Refills: 3 | Status: SHIPPED | OUTPATIENT
Start: 2024-04-05

## 2024-04-10 ENCOUNTER — PATIENT MESSAGE (OUTPATIENT)
Dept: PRIMARY CARE | Facility: CLINIC | Age: 84
End: 2024-04-10
Payer: MEDICARE

## 2024-04-10 DIAGNOSIS — E78.00 HYPERCHOLESTEROLEMIA: ICD-10-CM

## 2024-04-11 RX ORDER — SIMVASTATIN 10 MG/1
10 TABLET, FILM COATED ORAL NIGHTLY
Qty: 90 TABLET | Refills: 3 | Status: SHIPPED | OUTPATIENT
Start: 2024-04-11

## 2024-04-11 NOTE — TELEPHONE ENCOUNTER
From: Tracie Baltazar  To: Javier Floyd  Sent: 4/10/2024 7:08 PM EDT  Subject: Tracie Burger    Please refill Simvastatin 10mg. Thank you.     **cannot refill per bottle myself.

## 2024-04-22 ENCOUNTER — TELEPHONE (OUTPATIENT)
Dept: PRIMARY CARE | Facility: CLINIC | Age: 84
End: 2024-04-22
Payer: MEDICARE

## 2024-04-22 DIAGNOSIS — A04.72 C. DIFFICILE DIARRHEA: Primary | ICD-10-CM

## 2024-04-22 RX ORDER — VANCOMYCIN HYDROCHLORIDE 125 MG/1
125 CAPSULE ORAL 4 TIMES DAILY
Qty: 24 CAPSULE | Refills: 0 | Status: SHIPPED | OUTPATIENT
Start: 2024-04-22 | End: 2024-04-28

## 2024-04-22 NOTE — TELEPHONE ENCOUNTER
Son Portillo called and stated Patient has C Difficile and needs an antibiotic. Patient is scared to leave the house and that's why she cancelled her appointment for 04-24-24.    Pharmacy is [facility] in [place]    Portillo can be reached at [phone].

## 2024-04-23 NOTE — TELEPHONE ENCOUNTER
JOBYM states he call and spoke to patient with instructions and recommendations.   Patient dad called and wants Dr. Dowling thoughts on children getting the covid vaccine since he heard by the end on may all adults will be vaccinated so he wants to get ahead of the curve before they start offering it to children

## 2024-04-24 ENCOUNTER — APPOINTMENT (OUTPATIENT)
Dept: PRIMARY CARE | Facility: CLINIC | Age: 84
End: 2024-04-24
Payer: MEDICARE

## 2024-05-01 DIAGNOSIS — I10 BENIGN ESSENTIAL HYPERTENSION: ICD-10-CM

## 2024-05-01 RX ORDER — AMLODIPINE BESYLATE 5 MG/1
5 TABLET ORAL DAILY
Qty: 90 TABLET | Refills: 1 | Status: SHIPPED | OUTPATIENT
Start: 2024-05-01 | End: 2024-06-06 | Stop reason: HOSPADM

## 2024-05-14 ENCOUNTER — TELEPHONE (OUTPATIENT)
Dept: PRIMARY CARE | Facility: CLINIC | Age: 84
End: 2024-05-14
Payer: MEDICARE

## 2024-05-14 DIAGNOSIS — M79.7 FIBROMYALGIA: ICD-10-CM

## 2024-05-14 RX ORDER — GABAPENTIN 300 MG/1
300 CAPSULE ORAL 2 TIMES DAILY
Qty: 60 CAPSULE | Refills: 3 | Status: SHIPPED | OUTPATIENT
Start: 2024-05-14 | End: 2024-08-12

## 2024-05-14 NOTE — TELEPHONE ENCOUNTER
----- Message from Tracie Baltazar sent at 5/12/2024  8:21 PM EDT -----  Regarding: Tracie Wilks  Contact: [phone]  Can you please call in to [place] [facility]   Gabapentin 300mg? Quanta 180.  No refills available. Thank you.

## 2024-05-14 NOTE — TELEPHONE ENCOUNTER
----- Message from Tracie Baltazar sent at 5/12/2024  8:21 PM EDT -----  Regarding: Tracie Wilks  Contact: [phone]  Can you please call in to [place] [facility]   Gabapentin 300mg? Quanta 180.  No refills available. Thank you.

## 2024-05-17 DIAGNOSIS — I48.0 PAROXYSMAL ATRIAL FIBRILLATION (MULTI): ICD-10-CM

## 2024-05-20 RX ORDER — AMIODARONE HYDROCHLORIDE 200 MG/1
200 TABLET ORAL DAILY
Qty: 90 TABLET | Refills: 0 | Status: SHIPPED | OUTPATIENT
Start: 2024-05-20

## 2024-05-22 DIAGNOSIS — A04.72 CLOSTRIDIUM DIFFICILE COLITIS: Primary | ICD-10-CM

## 2024-05-22 RX ORDER — VANCOMYCIN HYDROCHLORIDE 125 MG/1
125 CAPSULE ORAL 4 TIMES DAILY
Qty: 40 CAPSULE | Refills: 0 | Status: SHIPPED | OUTPATIENT
Start: 2024-05-22 | End: 2024-06-06 | Stop reason: HOSPADM

## 2024-06-01 ENCOUNTER — APPOINTMENT (OUTPATIENT)
Dept: RADIOLOGY | Facility: HOSPITAL | Age: 84
End: 2024-06-01
Payer: MEDICARE

## 2024-06-01 ENCOUNTER — HOSPITAL ENCOUNTER (INPATIENT)
Facility: HOSPITAL | Age: 84
LOS: 5 days | Discharge: NURSING, REHAB OR LONG-TERM CARE | End: 2024-06-06
Attending: HOSPITALIST | Admitting: HOSPITALIST
Payer: MEDICARE

## 2024-06-01 ENCOUNTER — APPOINTMENT (OUTPATIENT)
Dept: CARDIOLOGY | Facility: HOSPITAL | Age: 84
End: 2024-06-01
Payer: MEDICARE

## 2024-06-01 DIAGNOSIS — A04.72 CLOSTRIDIUM DIFFICILE COLITIS: ICD-10-CM

## 2024-06-01 DIAGNOSIS — I48.0 PAROXYSMAL ATRIAL FIBRILLATION (MULTI): ICD-10-CM

## 2024-06-01 DIAGNOSIS — I21.3 STEMI (ST ELEVATION MYOCARDIAL INFARCTION) (MULTI): ICD-10-CM

## 2024-06-01 DIAGNOSIS — R55 SYNCOPE, UNSPECIFIED SYNCOPE TYPE: ICD-10-CM

## 2024-06-01 DIAGNOSIS — R00.1 BRADYCARDIA: Primary | ICD-10-CM

## 2024-06-01 DIAGNOSIS — G89.4 CHRONIC PAIN SYNDROME: ICD-10-CM

## 2024-06-01 DIAGNOSIS — S09.90XA HEAD INJURY, INITIAL ENCOUNTER: ICD-10-CM

## 2024-06-01 DIAGNOSIS — C91.10 CHRONIC LYMPHOCYTIC LEUKEMIA (MULTI): ICD-10-CM

## 2024-06-01 LAB
ALBUMIN SERPL-MCNC: 3.2 G/DL (ref 3.5–5)
ALP BLD-CCNC: 70 U/L (ref 35–125)
ALT SERPL-CCNC: 7 U/L (ref 5–40)
ANION GAP SERPL CALC-SCNC: 9 MMOL/L
AST SERPL-CCNC: 17 U/L (ref 5–40)
BASOPHILS # BLD MANUAL: 0 X10*3/UL (ref 0–0.1)
BASOPHILS NFR BLD MANUAL: 0 %
BILIRUB SERPL-MCNC: 0.3 MG/DL (ref 0.1–1.2)
BUN SERPL-MCNC: 27 MG/DL (ref 8–25)
CALCIUM SERPL-MCNC: 8.7 MG/DL (ref 8.5–10.4)
CHLORIDE SERPL-SCNC: 98 MMOL/L (ref 97–107)
CO2 SERPL-SCNC: 30 MMOL/L (ref 24–31)
CREAT SERPL-MCNC: 2.6 MG/DL (ref 0.4–1.6)
EGFRCR SERPLBLD CKD-EPI 2021: 18 ML/MIN/1.73M*2
EOSINOPHIL # BLD MANUAL: 0.41 X10*3/UL (ref 0–0.4)
EOSINOPHIL NFR BLD MANUAL: 1 %
ERYTHROCYTE [DISTWIDTH] IN BLOOD BY AUTOMATED COUNT: 15.8 % (ref 11.5–14.5)
GLUCOSE SERPL-MCNC: 88 MG/DL (ref 65–99)
HCT VFR BLD AUTO: 33.1 % (ref 36–46)
HGB BLD-MCNC: 10.2 G/DL (ref 12–16)
IMM GRANULOCYTES # BLD AUTO: 0.14 X10*3/UL (ref 0–0.5)
IMM GRANULOCYTES NFR BLD AUTO: 0.3 % (ref 0–0.9)
INR PPP: 1.2 (ref 0.9–1.2)
LYMPHOCYTES # BLD MANUAL: 28.49 X10*3/UL (ref 0.8–3)
LYMPHOCYTES NFR BLD MANUAL: 70 %
MCH RBC QN AUTO: 30.7 PG (ref 26–34)
MCHC RBC AUTO-ENTMCNC: 30.8 G/DL (ref 32–36)
MCV RBC AUTO: 100 FL (ref 80–100)
MONOCYTES # BLD MANUAL: 0.41 X10*3/UL (ref 0.05–0.8)
MONOCYTES NFR BLD MANUAL: 1 %
NEUTS SEG # BLD MANUAL: 11.4 X10*3/UL (ref 1.6–5)
NEUTS SEG NFR BLD MANUAL: 28 %
NRBC BLD-RTO: 0 /100 WBCS (ref 0–0)
PLATELET # BLD AUTO: 247 X10*3/UL (ref 150–450)
POTASSIUM SERPL-SCNC: 4.1 MMOL/L (ref 3.4–5.1)
PROT SERPL-MCNC: 6 G/DL (ref 5.9–7.9)
PROTHROMBIN TIME: 12.1 SECONDS (ref 9.3–12.7)
RBC # BLD AUTO: 3.32 X10*6/UL (ref 4–5.2)
RBC MORPH BLD: ABNORMAL
SODIUM SERPL-SCNC: 137 MMOL/L (ref 133–145)
TOTAL CELLS COUNTED BLD: 100
TROPONIN T SERPL-MCNC: 46 NG/L
TROPONIN T SERPL-MCNC: 47 NG/L
TROPONIN T SERPL-MCNC: 47 NG/L
TSH SERPL DL<=0.05 MIU/L-ACNC: 9.18 MIU/L (ref 0.27–4.2)
WBC # BLD AUTO: 40.7 X10*3/UL (ref 4.4–11.3)

## 2024-06-01 PROCEDURE — 84075 ASSAY ALKALINE PHOSPHATASE: CPT | Performed by: NURSE PRACTITIONER

## 2024-06-01 PROCEDURE — 99291 CRITICAL CARE FIRST HOUR: CPT

## 2024-06-01 PROCEDURE — 2500000004 HC RX 250 GENERAL PHARMACY W/ HCPCS (ALT 636 FOR OP/ED): Performed by: HOSPITALIST

## 2024-06-01 PROCEDURE — 93005 ELECTROCARDIOGRAM TRACING: CPT

## 2024-06-01 PROCEDURE — 2500000002 HC RX 250 W HCPCS SELF ADMINISTERED DRUGS (ALT 637 FOR MEDICARE OP, ALT 636 FOR OP/ED): Mod: MUE | Performed by: HOSPITALIST

## 2024-06-01 PROCEDURE — 72131 CT LUMBAR SPINE W/O DYE: CPT | Performed by: RADIOLOGY

## 2024-06-01 PROCEDURE — 84484 ASSAY OF TROPONIN QUANT: CPT | Mod: 91 | Performed by: NURSE PRACTITIONER

## 2024-06-01 PROCEDURE — 84443 ASSAY THYROID STIM HORMONE: CPT | Performed by: HOSPITALIST

## 2024-06-01 PROCEDURE — 36415 COLL VENOUS BLD VENIPUNCTURE: CPT | Performed by: NURSE PRACTITIONER

## 2024-06-01 PROCEDURE — 71045 X-RAY EXAM CHEST 1 VIEW: CPT

## 2024-06-01 PROCEDURE — 73502 X-RAY EXAM HIP UNI 2-3 VIEWS: CPT | Mod: LEFT SIDE | Performed by: RADIOLOGY

## 2024-06-01 PROCEDURE — 2500000004 HC RX 250 GENERAL PHARMACY W/ HCPCS (ALT 636 FOR OP/ED): Performed by: NURSE PRACTITIONER

## 2024-06-01 PROCEDURE — 72125 CT NECK SPINE W/O DYE: CPT

## 2024-06-01 PROCEDURE — 70450 CT HEAD/BRAIN W/O DYE: CPT

## 2024-06-01 PROCEDURE — 72125 CT NECK SPINE W/O DYE: CPT | Performed by: RADIOLOGY

## 2024-06-01 PROCEDURE — 96374 THER/PROPH/DIAG INJ IV PUSH: CPT

## 2024-06-01 PROCEDURE — 85007 BL SMEAR W/DIFF WBC COUNT: CPT | Performed by: NURSE PRACTITIONER

## 2024-06-01 PROCEDURE — 84484 ASSAY OF TROPONIN QUANT: CPT | Performed by: NURSE PRACTITIONER

## 2024-06-01 PROCEDURE — 93010 ELECTROCARDIOGRAM REPORT: CPT | Performed by: INTERNAL MEDICINE

## 2024-06-01 PROCEDURE — 2060000001 HC INTERMEDIATE ICU ROOM DAILY

## 2024-06-01 PROCEDURE — 70450 CT HEAD/BRAIN W/O DYE: CPT | Performed by: RADIOLOGY

## 2024-06-01 PROCEDURE — 71045 X-RAY EXAM CHEST 1 VIEW: CPT | Performed by: RADIOLOGY

## 2024-06-01 PROCEDURE — 85610 PROTHROMBIN TIME: CPT | Performed by: NURSE PRACTITIONER

## 2024-06-01 PROCEDURE — 2500000001 HC RX 250 WO HCPCS SELF ADMINISTERED DRUGS (ALT 637 FOR MEDICARE OP): Performed by: HOSPITALIST

## 2024-06-01 PROCEDURE — 73502 X-RAY EXAM HIP UNI 2-3 VIEWS: CPT | Mod: LT

## 2024-06-01 PROCEDURE — 72131 CT LUMBAR SPINE W/O DYE: CPT

## 2024-06-01 PROCEDURE — 82040 ASSAY OF SERUM ALBUMIN: CPT | Mod: 91 | Performed by: NURSE PRACTITIONER

## 2024-06-01 PROCEDURE — 85027 COMPLETE CBC AUTOMATED: CPT | Performed by: NURSE PRACTITIONER

## 2024-06-01 RX ORDER — SODIUM CHLORIDE 9 MG/ML
100 INJECTION, SOLUTION INTRAVENOUS CONTINUOUS
Status: DISCONTINUED | OUTPATIENT
Start: 2024-06-01 | End: 2024-06-02

## 2024-06-01 RX ORDER — MORPHINE SULFATE 4 MG/ML
4 INJECTION, SOLUTION INTRAMUSCULAR; INTRAVENOUS ONCE
Status: COMPLETED | OUTPATIENT
Start: 2024-06-01 | End: 2024-06-01

## 2024-06-01 RX ORDER — SIMVASTATIN 10 MG/1
10 TABLET, FILM COATED ORAL NIGHTLY
Status: DISCONTINUED | OUTPATIENT
Start: 2024-06-01 | End: 2024-06-06 | Stop reason: HOSPADM

## 2024-06-01 RX ORDER — GABAPENTIN 300 MG/1
300 CAPSULE ORAL 2 TIMES DAILY
Status: DISCONTINUED | OUTPATIENT
Start: 2024-06-01 | End: 2024-06-06 | Stop reason: HOSPADM

## 2024-06-01 RX ORDER — VANCOMYCIN HYDROCHLORIDE 125 MG/1
125 CAPSULE ORAL 4 TIMES DAILY
Status: COMPLETED | OUTPATIENT
Start: 2024-06-01 | End: 2024-06-01

## 2024-06-01 RX ORDER — LEVOTHYROXINE SODIUM 150 UG/1
150 TABLET ORAL DAILY
Status: DISCONTINUED | OUTPATIENT
Start: 2024-06-02 | End: 2024-06-06 | Stop reason: HOSPADM

## 2024-06-01 RX ORDER — DULOXETIN HYDROCHLORIDE 20 MG/1
20 CAPSULE, DELAYED RELEASE ORAL DAILY
Status: DISCONTINUED | OUTPATIENT
Start: 2024-06-02 | End: 2024-06-06 | Stop reason: HOSPADM

## 2024-06-01 RX ORDER — PNV NO.95/FERROUS FUM/FOLIC AC 28MG-0.8MG
100 TABLET ORAL DAILY
Status: DISCONTINUED | OUTPATIENT
Start: 2024-06-02 | End: 2024-06-06 | Stop reason: HOSPADM

## 2024-06-01 RX ORDER — POLYETHYLENE GLYCOL 3350 17 G/17G
17 POWDER, FOR SOLUTION ORAL DAILY PRN
Status: DISCONTINUED | OUTPATIENT
Start: 2024-06-01 | End: 2024-06-06 | Stop reason: HOSPADM

## 2024-06-01 RX ORDER — ACETAMINOPHEN 325 MG/1
650 TABLET ORAL EVERY 6 HOURS PRN
Status: DISCONTINUED | OUTPATIENT
Start: 2024-06-01 | End: 2024-06-02

## 2024-06-01 RX ORDER — OXYCODONE HYDROCHLORIDE 5 MG/1
5 TABLET ORAL EVERY 6 HOURS PRN
Status: DISCONTINUED | OUTPATIENT
Start: 2024-06-01 | End: 2024-06-06 | Stop reason: HOSPADM

## 2024-06-01 RX ORDER — HEPARIN SODIUM 5000 [USP'U]/ML
5000 INJECTION, SOLUTION INTRAVENOUS; SUBCUTANEOUS EVERY 8 HOURS
Status: DISCONTINUED | OUTPATIENT
Start: 2024-06-01 | End: 2024-06-03

## 2024-06-01 RX ADMIN — SODIUM CHLORIDE 100 ML/HR: 900 INJECTION, SOLUTION INTRAVENOUS at 17:24

## 2024-06-01 RX ADMIN — OXYCODONE 5 MG: 5 TABLET ORAL at 21:24

## 2024-06-01 RX ADMIN — VANCOMYCIN HYDROCHLORIDE 125 MG: 125 CAPSULE ORAL at 23:53

## 2024-06-01 RX ADMIN — MORPHINE SULFATE 4 MG: 4 INJECTION, SOLUTION INTRAMUSCULAR; INTRAVENOUS at 18:18

## 2024-06-01 RX ADMIN — SODIUM CHLORIDE 100 ML/HR: 900 INJECTION, SOLUTION INTRAVENOUS at 23:04

## 2024-06-01 RX ADMIN — GABAPENTIN 300 MG: 300 CAPSULE ORAL at 21:24

## 2024-06-01 RX ADMIN — HEPARIN SODIUM 5000 UNITS: 5000 INJECTION, SOLUTION INTRAVENOUS; SUBCUTANEOUS at 21:24

## 2024-06-01 RX ADMIN — SIMVASTATIN 10 MG: 10 TABLET, FILM COATED ORAL at 21:24

## 2024-06-01 ASSESSMENT — COGNITIVE AND FUNCTIONAL STATUS - GENERAL
DRESSING REGULAR UPPER BODY CLOTHING: A LITTLE
DAILY ACTIVITIY SCORE: 18
MOVING FROM LYING ON BACK TO SITTING ON SIDE OF FLAT BED WITH BEDRAILS: A LITTLE
HELP NEEDED FOR BATHING: A LOT
TURNING FROM BACK TO SIDE WHILE IN FLAT BAD: A LITTLE
TOILETING: A LITTLE
CLIMB 3 TO 5 STEPS WITH RAILING: A LOT
WALKING IN HOSPITAL ROOM: A LOT
MOBILITY SCORE: 14
DRESSING REGULAR LOWER BODY CLOTHING: A LOT
MOVING TO AND FROM BED TO CHAIR: A LOT
STANDING UP FROM CHAIR USING ARMS: A LOT

## 2024-06-01 ASSESSMENT — PAIN - FUNCTIONAL ASSESSMENT
PAIN_FUNCTIONAL_ASSESSMENT: 0-10

## 2024-06-01 ASSESSMENT — PAIN SCALES - GENERAL
PAINLEVEL_OUTOF10: 6
PAINLEVEL_OUTOF10: 2
PAINLEVEL_OUTOF10: 6

## 2024-06-01 ASSESSMENT — PAIN DESCRIPTION - FREQUENCY: FREQUENCY: CONSTANT/CONTINUOUS

## 2024-06-01 ASSESSMENT — LIFESTYLE VARIABLES
HAVE PEOPLE ANNOYED YOU BY CRITICIZING YOUR DRINKING: NO
EVER HAD A DRINK FIRST THING IN THE MORNING TO STEADY YOUR NERVES TO GET RID OF A HANGOVER: NO
TOTAL SCORE: 0
EVER FELT BAD OR GUILTY ABOUT YOUR DRINKING: NO
HAVE YOU EVER FELT YOU SHOULD CUT DOWN ON YOUR DRINKING: NO

## 2024-06-01 ASSESSMENT — PAIN DESCRIPTION - LOCATION
LOCATION: BACK
LOCATION: ABDOMEN

## 2024-06-01 ASSESSMENT — PAIN DESCRIPTION - DESCRIPTORS: DESCRIPTORS: STABBING

## 2024-06-01 ASSESSMENT — PAIN DESCRIPTION - ORIENTATION
ORIENTATION: LEFT
ORIENTATION: LEFT

## 2024-06-01 NOTE — Clinical Note
Temporary pacemaker inserted. Temporary pacemaker location through right femoral vein. Heart rate: 48. Device set to pace at 40 at 1.5 yes

## 2024-06-01 NOTE — Clinical Note
The PACEMAKER, GENERATOR, DUAL ASSURITY MRI - [id number] device was inserted. The leads were placed into the connector and visually verified to be in correct position. Injury current obtained.

## 2024-06-01 NOTE — Clinical Note
The PACEMAKER, GENERATOR, DUAL ASSURITY MRI - [id number] device was inserted. The leads were placed into the connector and visually verified to be in correct position. Injury current obtained.

## 2024-06-01 NOTE — ED PROVIDER NOTES
HPI   Chief Complaint   Patient presents with    Fall     Pt states she slipped on bathroom floor at approx 0930. Pt states she hit left arm and left side of flank on side of tub. Pt denies hitting head. Pt on eliquis.        HPI  See my MDM                  [place] Coma Scale Score: 15                     Patient History   Past Medical History:   Diagnosis Date    Asthma (HHS-HCC)     Essential (primary) hypertension     Hypertension    Kidney failure     Leukemia (Multi)     Rheumatoid arthritis (Multi)      Past Surgical History:   Procedure Laterality Date    BACK SURGERY      Back Surgery    CARDIAC ELECTROPHYSIOLOGY PROCEDURE Right 6/2/2024    Procedure: Temporary Pacemaker Insertion;  Surgeon: Javier Steel DO;  Location: [facility] Cardiac Cath Lab;  Service: Cardiovascular;  Laterality: Right;  pacemaker line in place sutured in with 2-0 silk    CARDIAC ELECTROPHYSIOLOGY PROCEDURE N/A 6/3/2024    Procedure: PPM IMPLANT DUAL;  Surgeon: Timmy Chisholm MD;  Location: [facility] Cardiac Cath Lab;  Service: Electrophysiology;  Laterality: N/A;  [place]ott    CHOLECYSTECTOMY      Cholecystectomy    HYSTERECTOMY      Hysterectomy    KNEE SURGERY      arthroscopic surgery?    OTHER SURGICAL HISTORY      Shoulder Surgery Left    SHOULDER SURGERY Left     Left shoulder impingement surgery    TOTAL KNEE ARTHROPLASTY Left 02/13/2014     Family History   Problem Relation Name Age of Onset    Emphysema Mother      Emphysema Father      Heart attack Sister      Lung cancer Brother          Agent orange    Lymphoma Son      Multiple sclerosis Son       Social History     Tobacco Use    Smoking status: Never     Passive exposure: Never    Smokeless tobacco: Never   Vaping Use    Vaping status: Never Used   Substance Use Topics    Alcohol use: Yes     Comment: rarely    Drug use: Never       Physical Exam   ED Triage Vitals   Temperature Heart Rate Respirations BP   06/01/24 1525 06/01/24 1525 06/01/24 1525 06/01/24 1525   36.1 °C (97  °F) (!) 49 16 (!) 111/46      Pulse Ox Temp Source Heart Rate Source Patient Position   06/01/24 1525 06/01/24 1525 06/01/24 1630 06/01/24 1525   99 % Oral Monitor Lying      BP Location FiO2 (%)     06/01/24 1525 --     Left arm        Physical Exam  CONSTITUTIONAL: Vital signs reviewed as charted, well-developed and in no distress  Eyes: Extraocular muscles are intact. Pupils equal round and reactive to light. Conjunctiva are pink.    ENT: Mucous membranes are moist. Tongue in the midline. Pharynx was without erythema or exudates, uvula midline  LUNGS: Breath sounds equal and clear to auscultation. Good air exchange, no wheezes rales or retractions, pulse oximetry is charted.  HEART: Bradycardic rate and rhythm without murmur thrill or rub, strong tones, auscultation is normal.  ABDOMEN: Soft and nontender without guarding rebound rigidity or mass. Bowel sounds are present and normal in all quadrants. There is no palpable masses or aneurysms identified. No hepatosplenomegaly, normal abdominal exam.  Neuro: The patient is awake, alert and oriented ×3. Moving all 4 extremities and answering questions appropriately.   MUSCULOSKELETAL: There is no midline tenderness deformities or crepitus noted on palpation cervical thoracic or lumbar spine.  Does have left-sided paraspinal muscle tenderness of the lower lumbar spine.  Lower extremity motor exquisite 5 and equal bilaterally.  Sensation pulses are intact.  Deep tendon reflexes present equal bilaterally.  No saddle anesthesia  PSYCH: Awake alert oriented, normal mood and affect.  Skin:  Dry, normal color, warm to the touch, no rash present.      ED Course & MDM   ED Course as of 06/06/24 1305   Sat Jun 01, 2024   1717 I spoke with Dr. Diop covering for Dr. Wright recommended stopping the patient's metoprolol and amiodarone, hydrating with a liter of fluids over 10 hours.  Recommended admission to the hospital consult to electrophysiology for likely pacemaker  placement. [RJ]   1955 Patient complaining of some left-sided hip pain.  She has been up and ambulated on it.  I initially saw her I did test her pelvis and range of motion of both hips that were normal. [RJ]      ED Course User Index  [RJ] Timmy Sadler, APRN-CNP         Diagnoses as of 06/06/24 1305   Bradycardia   Head injury, initial encounter   Syncope, unspecified syncope type       Medical Decision Making  History obtained from: patient    Vital signs, nursing notes, current medications, past medical history, Surgical history, allergies, social history, family History were reviewed.         HPI:  Patient 84-year-old female present emergency room today after sustaining a fall in her bathroom.  She thinks she may have slipped but states she has had multiple episodes over the last week or 2 where she is fallen for no apparent reason.  She was noted to be significantly bradycardic here in the ER with heart rate As low as 32 at times.  She states she has had a slower heart rate and her cardiologist has talked to her about getting a pacemaker.  States it started following like this she called her cardiologist to have an appointment scheduled which is this week where she planned to discuss getting the pacemaker.  She is on a low-dose beta-blocker 12.5 mg daily, she is on amiodarone.  She is also on apixaban.  She denies dizziness, chest pain, shortness of breath, abdominal pain extremity edema.      10 point ROS was reviewed and negative except Noted above in HPI.  DDX: as listed above          MDM Summary/considerations:  EMERGENCY DEPARTMENT COURSE and DIFFERENTIAL DIAGNOSIS/MDM:    The patient presented with a chief complaint of fall, pain in the left-sided low back. The differential diagnosis associated with this patient's presentation includes spinal fracture, contusion, sprain or strain.  Syncope versus cardiac arrhythmia versus bradycardia.     Vitals:    Vitals:    06/05/24 1351 06/05/24 1500 06/05/24 2305  06/06/24 0700   BP: 125/62 (!) 98/41 (!) 109/34 (!) 134/49   BP Location:  Left arm Left arm Left arm   Patient Position:  Lying Lying Lying   Pulse: 78 60 60 60   Resp: 16 16 18 17   Temp: 36.4 °C (97.5 °F) 36.6 °C (97.9 °F) 36.6 °C (97.9 °F) 36.7 °C (98.1 °F)   TempSrc: Oral Oral Oral Axillary   SpO2: 98% 97% 94% 94%   Weight:       Height:           ED Course as of 06/06/24 1305   Sat Jun 01, 2024   1717 I spoke with Dr. Diop covering for Dr. Wright recommended stopping the patient's metoprolol and amiodarone, hydrating with a liter of fluids over 10 hours.  Recommended admission to the hospital consult to electrophysiology for likely pacemaker placement. [RJ]   1955 Patient complaining of some left-sided hip pain.  She has been up and ambulated on it.  I initially saw her I did test her pelvis and range of motion of both hips that were normal. [RJ]      ED Course User Index  [RJ] Timmy Sadler, APRN-CNP         Diagnoses as of 06/06/24 1305   Bradycardia   Head injury, initial encounter   Syncope, unspecified syncope type       History Limited by:    None    Independent history obtained from:    Family Member daughter    External records reviewed:    Outpatient Note from her cardiologist 2 months ago    Diagnostics interpreted by me:    EKG interpreted by my attending physician, Xray(s) chest, and CT Scan(s) of the head neck and lumbar spine    Discussions with other clinicians:    [facility]    Chronic conditions impacting care:    Hypertension and A-fib    Social determinants of health affecting care:    None    Diagnostic tests considered but not performed: none    ED Medications managed:    Medications   cyanocobalamin (Vitamin B-12) tablet 100 mcg (100 mcg oral Given 6/6/24 0930)   DULoxetine (Cymbalta) DR capsule 20 mg (20 mg oral Given 6/6/24 0930)   gabapentin (Neurontin) capsule 300 mg (300 mg oral Given 6/6/24 0930)   levothyroxine (Synthroid, Levoxyl) tablet 150 mcg  (150 mcg oral Given 6/6/24 0558)   simvastatin (Zocor) tablet 10 mg (10 mg oral Given 6/5/24 2037)   polyethylene glycol (Glycolax, Miralax) packet 17 g ( oral MAR Unhold 6/4/24 1926)   oxyCODONE (Roxicodone) immediate release tablet 5 mg (5 mg oral Given 6/5/24 0907)   lidocaine 4 % patch 2 patch (2 patches transdermal Medication Applied 6/6/24 0929)   acetaminophen (Tylenol) tablet 975 mg (975 mg oral Given 6/6/24 0930)   ondansetron (Zofran) injection 4 mg ( intravenous MAR Unhold 6/4/24 1926)   amiodarone (Pacerone) tablet 200 mg ( oral Dose Auto Held 6/12/24 0900)   amLODIPine (Norvasc) tablet 5 mg ( oral Dose Auto Held 6/12/24 0900)   furosemide (Lasix) tablet 60 mg ( oral Dose Auto Held 6/12/24 0900)   hydrALAZINE (Apresoline) tablet 10 mg ( oral Dose Auto Held 6/12/24 2100)   metoprolol succinate XL (Toprol-XL) 24 hr tablet 12.5 mg ( oral Dose Auto Held 6/12/24 0900)   traMADol (Ultram) tablet 50 mg ( oral Dose Auto Held 6/12/24 1945)   amiodarone (Pacerone) tablet 200 mg (200 mg oral Given 6/6/24 0930)   metoprolol succinate XL (Toprol-XL) 24 hr tablet 12.5 mg ( oral Dose Auto Held 6/13/24 0900)   apixaban (Eliquis) tablet 2.5 mg ( oral Dose Auto Held 6/13/24 2100)   HYDROmorphone (Dilaudid) injection 0.2 mg (0.2 mg intravenous Given 6/5/24 1746)   morphine injection 4 mg (4 mg intravenous Given 6/1/24 1818)   vancomycin (Vancocin) capsule 125 mg (125 mg oral Given 6/1/24 2353)   atropine syringe 1 mg (1 mg intravenous Given 6/2/24 1508)   ondansetron (Zofran) injection 4 mg (4 mg intravenous Given 6/2/24 1651)   oxygen (O2) therapy (3 L/min inhalation New Bag 6/2/24 1746)   vancomycin (Xellia) 1 g in 200 mL (Xellia) IVPB 1 g (0 g intravenous Stopped 6/3/24 1456)   lactated Ringer's bolus 250 mL (0 mL intravenous Stopped 6/4/24 0616)   lactated Ringer's bolus 1,000 mL (0 mL intravenous Stopped 6/4/24 1125)   sodium chloride 0.9 % bolus 500 mL ( intravenous Rate/Dose Verify 6/4/24 1606)       Prescription  drugs considered:    None    Screenings:          Labs Reviewed   COMPREHENSIVE METABOLIC PANEL - Abnormal       Result Value    Glucose 88      Sodium 137      Potassium 4.1      Chloride 98      Bicarbonate 30      Urea Nitrogen 27 (*)     Creatinine 2.60 (*)     eGFR 18 (*)     Calcium 8.7      Albumin 3.2 (*)     Alkaline Phosphatase 70      Total Protein 6.0      AST 17      Bilirubin, Total 0.3      ALT 7      Anion Gap 9     CBC WITH AUTO DIFFERENTIAL - Abnormal    WBC 40.7 (*)     nRBC 0.0      RBC 3.32 (*)     Hemoglobin 10.2 (*)     Hematocrit 33.1 (*)           MCH 30.7      MCHC 30.8 (*)     RDW 15.8 (*)     Platelets 247      Immature Granulocytes %, Automated 0.3      Immature Granulocytes Absolute, Automated 0.14      Narrative:     The previously reported component Neutrophils % is no longer being reported.  The previously reported component Lymphocytes % is no longer being reported.  The previously reported component Monocytes % is no longer being reported.  The previously   reported component Eosinophils % is no longer being reported.  The previously reported component Basophils % is no longer being reported.  The previously reported component Absolute Neutrophils is no longer being reported.  The previously reported   component Absolute Lymphocytes is no longer being reported.  The previously reported component Absolute Monocytes is no longer being reported.  The previously reported component Absolute Eosinophils is no longer being reported.  The previously reported   component Absolute Basophils is no longer being reported.   SERIAL TROPONIN, INITIAL ([facility]) - Abnormal    Troponin T, High Sensitivity 47 (*)    SERIAL TROPONIN,  2 HOUR ([facility]) - Abnormal    Troponin T, High Sensitivity 46 (*)    SERIAL TROPONIN, 6 HOUR ([facility]) - Abnormal    Troponin T, High Sensitivity 47 (*)    TSH - Abnormal    Thyroid Stimulating Hormone 9.18 (*)    RENAL FUNCTION PANEL - Abnormal    Glucose 83      Sodium  141      Potassium 3.7      Chloride 103      Bicarbonate 30      Urea Nitrogen 27 (*)     Creatinine 2.50 (*)     eGFR 19 (*)     Calcium 8.3 (*)     Phosphorus 4.1      Albumin 3.1 (*)     Anion Gap 8     CBC - Abnormal    WBC 34.0 (*)     nRBC 0.0      RBC 3.16 (*)     Hemoglobin 9.6 (*)     Hematocrit 31.8 (*)      (*)     MCH 30.4      MCHC 30.2 (*)     RDW 15.9 (*)     Platelets 212     BASIC METABOLIC PANEL - Abnormal    Glucose 94      Sodium 138      Potassium 4.0      Chloride 101      Bicarbonate 27      Urea Nitrogen 26 (*)     Creatinine 2.40 (*)     eGFR 19 (*)     Calcium 8.4 (*)     Anion Gap 10     CBC WITH AUTO DIFFERENTIAL - Abnormal    WBC 33.6 (*)     nRBC 0.0      RBC 3.25 (*)     Hemoglobin 10.0 (*)     Hematocrit 32.4 (*)           MCH 30.8      MCHC 30.9 (*)     RDW 15.9 (*)     Platelets 200      Immature Granulocytes %, Automated 0.2      Immature Granulocytes Absolute, Automated 0.06      Narrative:     The previously reported component Neutrophils % is no longer being reported.  The previously reported component Lymphocytes % is no longer being reported.  The previously reported component Monocytes % is no longer being reported.  The previously   reported component Eosinophils % is no longer being reported.  The previously reported component Basophils % is no longer being reported.  The previously reported component Absolute Neutrophils is no longer being reported.  The previously reported   component Absolute Lymphocytes is no longer being reported.  The previously reported component Absolute Monocytes is no longer being reported.  The previously reported component Absolute Eosinophils is no longer being reported.  The previously reported   component Absolute Basophils is no longer being reported.   COMPREHENSIVE METABOLIC PANEL - Abnormal    Glucose 81      Sodium 140      Potassium 4.3      Chloride 104      Bicarbonate 27      Urea Nitrogen 24      Creatinine 2.30 (*)      eGFR 20 (*)     Calcium 8.3 (*)     Albumin 3.0 (*)     Alkaline Phosphatase 81      Total Protein 5.4 (*)     AST 15      Bilirubin, Total 0.3      ALT 7      Anion Gap 9     CBC WITH AUTO DIFFERENTIAL - Abnormal    WBC 37.2 (*)     nRBC 0.0      RBC 2.94 (*)     Hemoglobin 9.2 (*)     Hematocrit 29.0 (*)     MCV 99      MCH 31.3      MCHC 31.7 (*)     RDW 15.9 (*)     Platelets 203      Immature Granulocytes %, Automated 0.3      Immature Granulocytes Absolute, Automated 0.12      Narrative:     The previously reported component Neutrophils % is no longer being reported.  The previously reported component Lymphocytes % is no longer being reported.  The previously reported component Monocytes % is no longer being reported.  The previously   reported component Eosinophils % is no longer being reported.  The previously reported component Basophils % is no longer being reported.  The previously reported component Absolute Neutrophils is no longer being reported.  The previously reported   component Absolute Lymphocytes is no longer being reported.  The previously reported component Absolute Monocytes is no longer being reported.  The previously reported component Absolute Eosinophils is no longer being reported.  The previously reported   component Absolute Basophils is no longer being reported.   COMPREHENSIVE METABOLIC PANEL - Abnormal    Glucose 103 (*)     Sodium 138      Potassium 4.6      Chloride 102      Bicarbonate 24      Urea Nitrogen 22      Creatinine 2.20 (*)     eGFR 22 (*)     Calcium 8.4 (*)     Albumin 3.0 (*)     Alkaline Phosphatase 77      Total Protein 5.2 (*)     AST 14      Bilirubin, Total 0.3      ALT 6      Anion Gap 12     PHOSPHORUS - Abnormal    Phosphorus 4.6 (*)    IRON AND TIBC - Abnormal    Iron 32      UIBC 93 (*)     TIBC 125 (*)     % Saturation 26     FERRITIN - Abnormal    Ferritin 899 (*)    IMMUNOGLOBULINS (IGG, IGA, IGM) - Abnormal    IgG 673 (*)     IgA 152      IgM 30 (*)      Narrative:     MONOCLONAL PROTEINS MAY CAUSE FALSELY LOW  RESULTS IN THIS ASSAY. SERUM PROTEIN  ELECTROPHORESIS SHOULD BE DONE AS THE  FIRST TEST TO EVALUATE MONOCLONAL GAMMOPATHY.   URIC ACID - Abnormal    Uric Acid 7.3 (*)    CBC WITH AUTO DIFFERENTIAL - Abnormal    WBC 34.6 (*)     nRBC 0.0      RBC 2.26 (*)     Hemoglobin 7.0 (*)     Hematocrit 22.0 (*)     MCV 97      MCH 31.0      MCHC 31.8 (*)     RDW 16.4 (*)     Platelets 161      Immature Granulocytes %, Automated 0.3      Immature Granulocytes Absolute, Automated 0.11      Narrative:     The previously reported component Neutrophils % is no longer being reported.  The previously reported component Lymphocytes % is no longer being reported.  The previously reported component Monocytes % is no longer being reported.  The previously   reported component Eosinophils % is no longer being reported.  The previously reported component Basophils % is no longer being reported.  The previously reported component Absolute Neutrophils is no longer being reported.  The previously reported   component Absolute Lymphocytes is no longer being reported.  The previously reported component Absolute Monocytes is no longer being reported.  The previously reported component Absolute Eosinophils is no longer being reported.  The previously reported   component Absolute Basophils is no longer being reported.   COMPREHENSIVE METABOLIC PANEL - Abnormal    Glucose 82      Sodium 137      Potassium 4.1      Chloride 104      Bicarbonate 24      Urea Nitrogen 28 (*)     Creatinine 2.50 (*)     eGFR 19 (*)     Calcium 8.1 (*)     Albumin 2.6 (*)     Alkaline Phosphatase 69      Total Protein 4.8 (*)     AST 12      Bilirubin, Total <0.2      ALT 6      Anion Gap 9     HEMOGLOBIN - Abnormal    Hemoglobin 8.6 (*)    CBC WITH AUTO DIFFERENTIAL - Abnormal    WBC 35.4 (*)     nRBC 0.1 (*)     RBC 2.60 (*)     Hemoglobin 8.1 (*)     Hematocrit 24.6 (*)     MCV 95      MCH 31.2      MCHC  32.9      RDW 17.3 (*)     Platelets 172      Immature Granulocytes %, Automated 0.3      Immature Granulocytes Absolute, Automated 0.12      Narrative:     The previously reported component Neutrophils % is no longer being reported.  The previously reported component Lymphocytes % is no longer being reported.  The previously reported component Monocytes % is no longer being reported.  The previously   reported component Eosinophils % is no longer being reported.  The previously reported component Basophils % is no longer being reported.  The previously reported component Absolute Neutrophils is no longer being reported.  The previously reported   component Absolute Lymphocytes is no longer being reported.  The previously reported component Absolute Monocytes is no longer being reported.  The previously reported component Absolute Eosinophils is no longer being reported.  The previously reported   component Absolute Basophils is no longer being reported.   COMPREHENSIVE METABOLIC PANEL - Abnormal    Glucose 85      Sodium 138      Potassium 4.5      Chloride 104      Bicarbonate 24      Urea Nitrogen 29 (*)     Creatinine 2.50 (*)     eGFR 19 (*)     Calcium 8.3 (*)     Albumin 2.6 (*)     Alkaline Phosphatase 84      Total Protein 4.9 (*)     AST 15      Bilirubin, Total 0.3      ALT 6      Anion Gap 10     MANUAL DIFFERENTIAL - Abnormal    Neutrophils %, Manual 28.0      Lymphocytes %, Manual 70.0      Monocytes %, Manual 1.0      Eosinophils %, Manual 1.0      Basophils %, Manual 0.0      Seg Neutrophils Absolute, Manual 11.40 (*)     Lymphocytes Absolute, Manual 28.49 (*)     Monocytes Absolute, Manual 0.41      Eosinophils Absolute, Manual 0.41 (*)     Basophils Absolute, Manual 0.00      Total Cells Counted 100      RBC Morphology No significant RBC morphology present     MANUAL DIFFERENTIAL - Abnormal    Neutrophils %, Manual 24.0      Lymphocytes %, Manual 65.0      Monocytes %, Manual 4.0      Eosinophils  %, Manual 4.0      Basophils %, Manual 1.0      Atypical Lymphocytes %, Manual 1.0      Metamyelocytes %, Manual 1.0      Seg Neutrophils Absolute, Manual 8.06 (*)     Lymphocytes Absolute, Manual 21.84 (*)     Monocytes Absolute, Manual 1.34 (*)     Eosinophils Absolute, Manual 1.34 (*)     Basophils Absolute, Manual 0.34 (*)     Atypical Lymphs Absolute, Manual 0.34 (*)     Metamyelocytes Absolute, Manual 0.34      Total Cells Counted 100      RBC Morphology No significant RBC morphology present     MANUAL DIFFERENTIAL - Abnormal    Neutrophils %, Manual 25.0      Bands %, Manual 1.0      Lymphocytes %, Manual 73.0      Monocytes %, Manual 0.0      Eosinophils %, Manual 1.0      Basophils %, Manual 0.0      Seg Neutrophils Absolute, Manual 9.30 (*)     Bands Absolute, Manual 0.37      Lymphocytes Absolute, Manual 27.16 (*)     Monocytes Absolute, Manual 0.00 (*)     Eosinophils Absolute, Manual 0.37      Basophils Absolute, Manual 0.00      Total Cells Counted 100      Neutrophils Absolute, Manual 9.67 (*)     RBC Morphology No significant RBC morphology present     MANUAL DIFFERENTIAL - Abnormal    Neutrophils %, Manual 21.0      Lymphocytes %, Manual 77.0      Monocytes %, Manual 1.0      Eosinophils %, Manual 1.0      Basophils %, Manual 0.0      Seg Neutrophils Absolute, Manual 7.27 (*)     Lymphocytes Absolute, Manual 26.64 (*)     Monocytes Absolute, Manual 0.35      Eosinophils Absolute, Manual 0.35      Basophils Absolute, Manual 0.00      Total Cells Counted 100      RBC Morphology No significant RBC morphology present     MANUAL DIFFERENTIAL - Abnormal    Neutrophils %, Manual 19.0      Lymphocytes %, Manual 75.0      Monocytes %, Manual 1.0      Eosinophils %, Manual 2.0      Basophils %, Manual 1.0      Atypical Lymphocytes %, Manual 2.0      Seg Neutrophils Absolute, Manual 6.73 (*)     Lymphocytes Absolute, Manual 26.55 (*)     Monocytes Absolute, Manual 0.35      Eosinophils Absolute, Manual 0.71  (*)     Basophils Absolute, Manual 0.35 (*)     Atypical Lymphs Absolute, Manual 0.71 (*)     Total Cells Counted 100      RBC Morphology No significant RBC morphology present     PROTIME-INR - Normal    Protime 12.1      INR 1.2      Narrative:     INR Therapeutic Range: 2.0-3.5   MAGNESIUM - Normal    Magnesium 2.10     THYROXINE, FREE - Normal    Thyroxine, Free 1.60     MAGNESIUM - Normal    Magnesium 2.00     PHOSPHORUS - Normal    Phosphorus 4.3     MAGNESIUM - Normal    Magnesium 2.00     LACTATE DEHYDROGENASE - Normal         MAGNESIUM - Normal    Magnesium 2.00     PHOSPHORUS - Normal    Phosphorus 4.5     MAGNESIUM - Normal    Magnesium 2.00     PHOSPHORUS - Normal    Phosphorus 3.7     POCT GLUCOSE - Normal    POCT Glucose 90     TROPONIN T SERIES, HIGH SENSITIVITY (0, 2 HR, 6 HR)    Narrative:     The following orders were created for panel order Troponin T Series, High Sensitivity (0, 2HR, 6HR).  Procedure                               Abnormality         Status                     ---------                               -----------         ------                     Serial Troponin, Initial...[[id number]]  Abnormal            Final result               Serial Troponin, 2 Hour ...[[id number]]  Abnormal            Final result               Serial Troponin, 6 Hour ...[[id number]]  Abnormal            Final result                 Please view results for these tests on the individual orders.   LIPID PANEL    Cholesterol 151      HDL-Cholesterol 59.0      Cholesterol/HDL Ratio 2.6      LDL Calculated 70      Triglycerides 108     TYPE AND SCREEN    ABO TYPE B      Rh TYPE NEG      ANTIBODY SCREEN POS     VERAB/VERIFY ABORH    ABO TYPE B      Rh TYPE NEG     BB ORDER ONLY - ANTIBODY IDENTIFICATION    Antibody ID Anti-D      CASE #       PREPARE RBC    PRODUCT CODE [id number]      Unit Number [id number]      Unit ABO B      Unit RH NEG      XM INTEP COMP      Dispense Status TR      Blood Expiration  Date June 20, 2024 23:59 EDT      PRODUCT BLOOD TYPE 1700      UNIT VOLUME 350     FLOW CYTOMETRY TEST    Narrative:     The following orders were created for panel order Flow Cytometry Test.  Procedure                               Abnormality         Status                     ---------                               -----------         ------                     Flow Cytometry Test[[id number]]                              Final result                 Please view results for these tests on the individual orders.   FLOW CYTOMETRY TEST (PERFORMABLE)- LAB ONLY    Case Report        Value: Flow Cytometry                                    Case: [id number]                                   Authorizing Provider:  Min Graff MD        Collected:           06/04/2024 1405              Ordering Location:     [facility]       Received:            06/04/2024 1415                                     Center [facility] Intensive                                                                             Care                                                                         Pathologist:           Yasir Scanlon MD                                                         Specimen:    Blood, Venous, CLL                                                                         Diagnosis        Value: This result contains rich text formatting which cannot be displayed here.             Value: This result contains rich text formatting which cannot be displayed here.    Cell Populations        Value: This result contains rich text formatting which cannot be displayed here.    Flow Differential        Value: This result contains rich text formatting which cannot be displayed here.    Flow Test Ordered Low Grade Panel      Specimen Viability        Cell Count 37.20      Number of Cells Collected 100,000.00      Methodology        Value: This result contains rich text formatting which cannot be displayed here.     US  abdomen complete   Final Result   1. Limited exam.   2. Status post cholecystectomy.   3. Somewhat atrophic appearance of the kidneys bilaterally.   4. No evidence for hepatosplenomegaly.        MACRO:   None.        Signed by: Nick Luciano 6/5/2024 8:26 AM   Dictation workstation:   [id number]      XR chest 2 views   Final Result   Subsegmental atelectasis retrocardiac left lower lobe with small left   pleural effusion.        Bipolar pacemaker leads intact and unchanged in positioning with no   delayed pneumothorax.        Signed by: Jessica Pinto 6/4/2024 4:01 PM   Dictation workstation:   [id number]      XR chest 1 view   Final Result   There is a new bipolar cardiac pacemaker present with the pacer   overlying the right shoulder without evidence for pneumothorax.   Remaining findings are stable.        MACRO:   none        Signed by: Nick Luciano 6/4/2024 8:10 AM   Dictation workstation:   [id number]      XR chest 1 view   Final Result   No focal infiltrate or pneumothorax is identified.        MACRO:   None.        Signed by: Carmelo Olivera 6/2/2024 4:38 PM   Dictation workstation:   [id number]      XR hip left with pelvis when performed 2 or 3 views   Final Result   1. No radiographically evident fracture identified. If ongoing   concern then CT may be performed in further assessment.                  Signed by: Laron Trujillo 6/1/2024 8:52 PM   Dictation workstation:   [id number]      CT head wo IV contrast   Final Result   1.  No acute intracranial hemorrhage or depressed calvarial fracture.   2.  No acute fracture at the cervical spine. Degenerative changes.                  MACRO:   None.        Signed by: Lissett Joyce 6/1/2024 7:06 PM   Dictation workstation:   [id number]      CT cervical spine wo IV contrast   Final Result   1.  No acute intracranial hemorrhage or depressed calvarial fracture.   2.  No acute fracture at the cervical spine. Degenerative changes.                  MACRO:   None.         Signed by: Lissett Joyce 6/1/2024 7:06 PM   Dictation workstation:   [id number]      CT lumbar spine wo IV contrast   Final Result   1.  No acute fracture at the lumbar spine. Postsurgical and   degenerative changes.   2. Diffuse wall thickening at the visualized sigmoid colon, may be   secondary to underdistention versus colitis.   3. Sigmoid diverticulosis with mild adjacent soft tissue stranding,   may represent chronic changes versus mild/early acute diverticulitis.   Clinical correlation recommended.                  MACRO:   None.        Signed by: Lissett Joyce 6/1/2024 7:19 PM   Dictation workstation:   [id number]      XR chest 1 view   Final Result   1.  No evidence of acute cardiopulmonary process.                  MACRO:   None        Signed by: Joseph Schoenberger 6/1/2024 4:52 PM   Dictation workstation:   [id number]      Cardiac Catheterization Procedure    (Results Pending)   Electrophysiology procedure    (Results Pending)     Medications   cyanocobalamin (Vitamin B-12) tablet 100 mcg (100 mcg oral Given 6/6/24 0930)   DULoxetine (Cymbalta) DR capsule 20 mg (20 mg oral Given 6/6/24 0930)   gabapentin (Neurontin) capsule 300 mg (300 mg oral Given 6/6/24 0930)   levothyroxine (Synthroid, Levoxyl) tablet 150 mcg (150 mcg oral Given 6/6/24 0558)   simvastatin (Zocor) tablet 10 mg (10 mg oral Given 6/5/24 2037)   polyethylene glycol (Glycolax, Miralax) packet 17 g ( oral MAR Unhold 6/4/24 1926)   oxyCODONE (Roxicodone) immediate release tablet 5 mg (5 mg oral Given 6/5/24 0907)   lidocaine 4 % patch 2 patch (2 patches transdermal Medication Applied 6/6/24 0929)   acetaminophen (Tylenol) tablet 975 mg (975 mg oral Given 6/6/24 0930)   ondansetron (Zofran) injection 4 mg ( intravenous MAR Unhold 6/4/24 1926)   amiodarone (Pacerone) tablet 200 mg ( oral Dose Auto Held 6/12/24 0900)   amLODIPine (Norvasc) tablet 5 mg ( oral Dose Auto Held 6/12/24 0900)   furosemide (Lasix) tablet 60 mg ( oral  Dose Auto Held 6/12/24 0900)   hydrALAZINE (Apresoline) tablet 10 mg ( oral Dose Auto Held 6/12/24 2100)   metoprolol succinate XL (Toprol-XL) 24 hr tablet 12.5 mg ( oral Dose Auto Held 6/12/24 0900)   traMADol (Ultram) tablet 50 mg ( oral Dose Auto Held 6/12/24 1945)   amiodarone (Pacerone) tablet 200 mg (200 mg oral Given 6/6/24 0930)   metoprolol succinate XL (Toprol-XL) 24 hr tablet 12.5 mg ( oral Dose Auto Held 6/13/24 0900)   apixaban (Eliquis) tablet 2.5 mg ( oral Dose Auto Held 6/13/24 2100)   HYDROmorphone (Dilaudid) injection 0.2 mg (0.2 mg intravenous Given 6/5/24 1746)   morphine injection 4 mg (4 mg intravenous Given 6/1/24 1818)   vancomycin (Vancocin) capsule 125 mg (125 mg oral Given 6/1/24 2353)   atropine syringe 1 mg (1 mg intravenous Given 6/2/24 1508)   ondansetron (Zofran) injection 4 mg (4 mg intravenous Given 6/2/24 1651)   oxygen (O2) therapy (3 L/min inhalation New Bag 6/2/24 1746)   vancomycin (Xellia) 1 g in 200 mL (Xellia) IVPB 1 g (0 g intravenous Stopped 6/3/24 1456)   lactated Ringer's bolus 250 mL (0 mL intravenous Stopped 6/4/24 0616)   lactated Ringer's bolus 1,000 mL (0 mL intravenous Stopped 6/4/24 1125)   sodium chloride 0.9 % bolus 500 mL ( intravenous Rate/Dose Verify 6/4/24 1606)     Current Discharge Medication List        START taking these medications    Details   lidocaine 4 % patch Place 2 patches over 12 hours on the skin once daily. Remove & discard patch within 12 hours or as directed by MD.    Associated Diagnoses: Chronic pain syndrome      oxyCODONE (Roxicodone) 5 mg immediate release tablet Take 1 tablet (5 mg) by mouth every 6 hours if needed for moderate pain (4 - 6).  Qty: 4 tablet, Refills: 0    Associated Diagnoses: Chronic pain syndrome           I spoke with Dr. Dietrich. We thoroughly discussed the history, physical exam, laboratory and imaging studies, as well as, emergency department course. Based upon that discussion, we've decided to admit for  further observation and evaluation of their syncope, bradycardia.  As I have deemed necessary from their history, physical, and studies, I have considered and evaluated for the following diagnoses: ACUTE CORONARY SYNDROME, PERICARDIAL TAMPONADE, PNEUMOTHORAX, P ULMONARY EMBOLISM, and THORACIC DISSECTION.     A consult cardiology recommended admission for electrophysiology consult.  Patient does have leukocytosis of 40,000 at her baseline for her leukemia.  Did consult cardiology who recommended stopping her metoprolol and amiodarone and admitting for pacemaker placement.  Patient CT scan of the head neck and lumbar spine negative for acute process.  Patient was admitted for further evaluation and care.  Blood pressure remained stable here in the ED heart rate in the mid 40s.    After reviewing patient's comorbidities, severity of history of presenting illness, labs and imaging if obtained in conjunction with physical exam and course in emergency department, deemed to have potential for deterioration/progression of symptoms that could lead to multiple morbidities or mortality, decision made that patient requires further observation/evaluation/treatment and patient admitted to appropriate service, patient/family understand and agree with plan.    Discussed H&P with supervising physician, aware of results and agrees with plan/ disposition.                  Critical Care:CRITICAL CARE NOTE     The patient was reevaluated/re-examined multiple times during the visit. Critical care time includes management at bedside, discussion with other providers and consultants, family counseling and answering questions, and documentation. Care involves decision making of high complexity to assess, manipulate, and support vital organ system failure and/or to prevent further life threatening deterioration of the patient's condition. Failure to initiate these interventions on an urgent basis would likely result in sudden, clinically  significant or life threatening deterioration in the patient's condition of syncope, bradycardia       Critical care time total at least 35 minutes of non concurrent critical care time provided by myself. This did not include any separate billable procedures.        This chart was completed using voice recognition transcription software. Please excuse any errors of transcription including grammatical, punctuation, syntax and spelling errors.  Please contact me with any questions regarding this chart.    Procedure  Procedures     JUMA Meredith-CNP  06/01/24 1946       ERAN Meredith  06/01/24 1956       ERAN Meredith  06/06/24 1305

## 2024-06-01 NOTE — Clinical Note
Sutured the skin and subcutaneous tissue. Deep Tissue closed with 0-Vicryl,   Subcutaneous closed with 3-0 VLOC  Skin closure competed with 4-0 VLOC

## 2024-06-02 ENCOUNTER — APPOINTMENT (OUTPATIENT)
Dept: RADIOLOGY | Facility: HOSPITAL | Age: 84
End: 2024-06-02
Payer: MEDICARE

## 2024-06-02 LAB
ALBUMIN SERPL-MCNC: 3.1 G/DL (ref 3.5–5)
ANION GAP SERPL CALC-SCNC: 10 MMOL/L
ANION GAP SERPL CALC-SCNC: 8 MMOL/L
BUN SERPL-MCNC: 26 MG/DL (ref 8–25)
BUN SERPL-MCNC: 27 MG/DL (ref 8–25)
CALCIUM SERPL-MCNC: 8.3 MG/DL (ref 8.5–10.4)
CALCIUM SERPL-MCNC: 8.4 MG/DL (ref 8.5–10.4)
CHLORIDE SERPL-SCNC: 101 MMOL/L (ref 97–107)
CHLORIDE SERPL-SCNC: 103 MMOL/L (ref 97–107)
CO2 SERPL-SCNC: 27 MMOL/L (ref 24–31)
CO2 SERPL-SCNC: 30 MMOL/L (ref 24–31)
CREAT SERPL-MCNC: 2.4 MG/DL (ref 0.4–1.6)
CREAT SERPL-MCNC: 2.5 MG/DL (ref 0.4–1.6)
EGFRCR SERPLBLD CKD-EPI 2021: 19 ML/MIN/1.73M*2
EGFRCR SERPLBLD CKD-EPI 2021: 19 ML/MIN/1.73M*2
ERYTHROCYTE [DISTWIDTH] IN BLOOD BY AUTOMATED COUNT: 15.9 % (ref 11.5–14.5)
GLUCOSE BLD MANUAL STRIP-MCNC: 90 MG/DL (ref 74–99)
GLUCOSE SERPL-MCNC: 83 MG/DL (ref 65–99)
GLUCOSE SERPL-MCNC: 94 MG/DL (ref 65–99)
HCT VFR BLD AUTO: 31.8 % (ref 36–46)
HGB BLD-MCNC: 9.6 G/DL (ref 12–16)
MAGNESIUM SERPL-MCNC: 2.1 MG/DL (ref 1.6–3.1)
MCH RBC QN AUTO: 30.4 PG (ref 26–34)
MCHC RBC AUTO-ENTMCNC: 30.2 G/DL (ref 32–36)
MCV RBC AUTO: 101 FL (ref 80–100)
NRBC BLD-RTO: 0 /100 WBCS (ref 0–0)
PHOSPHATE SERPL-MCNC: 4.1 MG/DL (ref 2.5–4.5)
PLATELET # BLD AUTO: 212 X10*3/UL (ref 150–450)
POTASSIUM SERPL-SCNC: 3.7 MMOL/L (ref 3.4–5.1)
POTASSIUM SERPL-SCNC: 4 MMOL/L (ref 3.4–5.1)
RBC # BLD AUTO: 3.16 X10*6/UL (ref 4–5.2)
SODIUM SERPL-SCNC: 138 MMOL/L (ref 133–145)
SODIUM SERPL-SCNC: 141 MMOL/L (ref 133–145)
T4 FREE SERPL-MCNC: 1.6 NG/DL (ref 0.9–1.7)
WBC # BLD AUTO: 34 X10*3/UL (ref 4.4–11.3)

## 2024-06-02 PROCEDURE — 2500000002 HC RX 250 W HCPCS SELF ADMINISTERED DRUGS (ALT 637 FOR MEDICARE OP, ALT 636 FOR OP/ED): Mod: MUE | Performed by: INTERNAL MEDICINE

## 2024-06-02 PROCEDURE — 85027 COMPLETE CBC AUTOMATED: CPT | Performed by: HOSPITALIST

## 2024-06-02 PROCEDURE — 80069 RENAL FUNCTION PANEL: CPT | Performed by: HOSPITALIST

## 2024-06-02 PROCEDURE — 2500000001 HC RX 250 WO HCPCS SELF ADMINISTERED DRUGS (ALT 637 FOR MEDICARE OP): Performed by: INTERNAL MEDICINE

## 2024-06-02 PROCEDURE — 84439 ASSAY OF FREE THYROXINE: CPT | Performed by: HOSPITALIST

## 2024-06-02 PROCEDURE — 2500000004 HC RX 250 GENERAL PHARMACY W/ HCPCS (ALT 636 FOR OP/ED)

## 2024-06-02 PROCEDURE — 2500000004 HC RX 250 GENERAL PHARMACY W/ HCPCS (ALT 636 FOR OP/ED): Performed by: HOSPITALIST

## 2024-06-02 PROCEDURE — 36415 COLL VENOUS BLD VENIPUNCTURE: CPT | Performed by: HOSPITALIST

## 2024-06-02 PROCEDURE — 71045 X-RAY EXAM CHEST 1 VIEW: CPT | Performed by: RADIOLOGY

## 2024-06-02 PROCEDURE — 2720000007 HC OR 272 NO HCPCS: Performed by: INTERNAL MEDICINE

## 2024-06-02 PROCEDURE — 82947 ASSAY GLUCOSE BLOOD QUANT: CPT

## 2024-06-02 PROCEDURE — 5A1223Z PERFORMANCE OF CARDIAC PACING, CONTINUOUS: ICD-10-PCS | Performed by: INTERNAL MEDICINE

## 2024-06-02 PROCEDURE — 97530 THERAPEUTIC ACTIVITIES: CPT | Mod: GP

## 2024-06-02 PROCEDURE — 99153 MOD SED SAME PHYS/QHP EA: CPT | Performed by: INTERNAL MEDICINE

## 2024-06-02 PROCEDURE — 83735 ASSAY OF MAGNESIUM: CPT | Performed by: HOSPITALIST

## 2024-06-02 PROCEDURE — C1894 INTRO/SHEATH, NON-LASER: HCPCS | Performed by: INTERNAL MEDICINE

## 2024-06-02 PROCEDURE — 36415 COLL VENOUS BLD VENIPUNCTURE: CPT

## 2024-06-02 PROCEDURE — 02HK3JZ INSERTION OF PACEMAKER LEAD INTO RIGHT VENTRICLE, PERCUTANEOUS APPROACH: ICD-10-PCS | Performed by: INTERNAL MEDICINE

## 2024-06-02 PROCEDURE — 2500000005 HC RX 250 GENERAL PHARMACY W/O HCPCS

## 2024-06-02 PROCEDURE — 2500000005 HC RX 250 GENERAL PHARMACY W/O HCPCS: Performed by: HOSPITALIST

## 2024-06-02 PROCEDURE — 2500000004 HC RX 250 GENERAL PHARMACY W/ HCPCS (ALT 636 FOR OP/ED): Performed by: INTERNAL MEDICINE

## 2024-06-02 PROCEDURE — 33210 INSERT ELECTRD/PM CATH SNGL: CPT

## 2024-06-02 PROCEDURE — 2500000005 HC RX 250 GENERAL PHARMACY W/O HCPCS: Performed by: INTERNAL MEDICINE

## 2024-06-02 PROCEDURE — 99291 CRITICAL CARE FIRST HOUR: CPT

## 2024-06-02 PROCEDURE — 2020000001 HC ICU ROOM DAILY

## 2024-06-02 PROCEDURE — 71045 X-RAY EXAM CHEST 1 VIEW: CPT

## 2024-06-02 PROCEDURE — 2500000001 HC RX 250 WO HCPCS SELF ADMINISTERED DRUGS (ALT 637 FOR MEDICARE OP): Performed by: HOSPITALIST

## 2024-06-02 PROCEDURE — 97161 PT EVAL LOW COMPLEX 20 MIN: CPT | Mod: GP

## 2024-06-02 PROCEDURE — 80048 BASIC METABOLIC PNL TOTAL CA: CPT | Mod: CCI

## 2024-06-02 PROCEDURE — 99152 MOD SED SAME PHYS/QHP 5/>YRS: CPT | Performed by: INTERNAL MEDICINE

## 2024-06-02 RX ORDER — DOPAMINE HYDROCHLORIDE 160 MG/100ML
0-10 INJECTION, SOLUTION INTRAVENOUS CONTINUOUS
Status: DISCONTINUED | OUTPATIENT
Start: 2024-06-02 | End: 2024-06-02

## 2024-06-02 RX ORDER — LIDOCAINE 560 MG/1
2 PATCH PERCUTANEOUS; TOPICAL; TRANSDERMAL DAILY
Status: DISCONTINUED | OUTPATIENT
Start: 2024-06-02 | End: 2024-06-06 | Stop reason: HOSPADM

## 2024-06-02 RX ORDER — NOREPINEPHRINE BITARTRATE/D5W 8 MG/250ML
0-.2 PLASTIC BAG, INJECTION (ML) INTRAVENOUS CONTINUOUS
Status: DISCONTINUED | OUTPATIENT
Start: 2024-06-02 | End: 2024-06-03

## 2024-06-02 RX ORDER — MIDAZOLAM HYDROCHLORIDE 1 MG/ML
INJECTION, SOLUTION INTRAMUSCULAR; INTRAVENOUS AS NEEDED
Status: DISCONTINUED | OUTPATIENT
Start: 2024-06-02 | End: 2024-06-02 | Stop reason: HOSPADM

## 2024-06-02 RX ORDER — ONDANSETRON HYDROCHLORIDE 2 MG/ML
INJECTION, SOLUTION INTRAVENOUS
Status: COMPLETED
Start: 2024-06-02 | End: 2024-06-02

## 2024-06-02 RX ORDER — ATROPINE SULFATE 0.1 MG/ML
1 INJECTION INTRAVENOUS ONCE
Status: COMPLETED | OUTPATIENT
Start: 2024-06-02 | End: 2024-06-02

## 2024-06-02 RX ORDER — FENTANYL CITRATE 50 UG/ML
INJECTION, SOLUTION INTRAMUSCULAR; INTRAVENOUS AS NEEDED
Status: DISCONTINUED | OUTPATIENT
Start: 2024-06-02 | End: 2024-06-02 | Stop reason: HOSPADM

## 2024-06-02 RX ORDER — ACETAMINOPHEN 325 MG/1
975 TABLET ORAL 3 TIMES DAILY
Status: DISCONTINUED | OUTPATIENT
Start: 2024-06-02 | End: 2024-06-06 | Stop reason: HOSPADM

## 2024-06-02 RX ORDER — DOPAMINE HYDROCHLORIDE 160 MG/100ML
0-5 INJECTION, SOLUTION INTRAVENOUS CONTINUOUS
Status: DISCONTINUED | OUTPATIENT
Start: 2024-06-02 | End: 2024-06-02

## 2024-06-02 RX ORDER — ONDANSETRON HYDROCHLORIDE 2 MG/ML
4 INJECTION, SOLUTION INTRAVENOUS ONCE
Status: COMPLETED | OUTPATIENT
Start: 2024-06-02 | End: 2024-06-02

## 2024-06-02 RX ORDER — LIDOCAINE HYDROCHLORIDE 10 MG/ML
INJECTION, SOLUTION EPIDURAL; INFILTRATION; INTRACAUDAL; PERINEURAL AS NEEDED
Status: DISCONTINUED | OUTPATIENT
Start: 2024-06-02 | End: 2024-06-02 | Stop reason: HOSPADM

## 2024-06-02 RX ADMIN — HEPARIN SODIUM 5000 UNITS: 5000 INJECTION, SOLUTION INTRAVENOUS; SUBCUTANEOUS at 20:45

## 2024-06-02 RX ADMIN — OXYCODONE 5 MG: 5 TABLET ORAL at 09:50

## 2024-06-02 RX ADMIN — SODIUM CHLORIDE 100 ML/HR: 900 INJECTION, SOLUTION INTRAVENOUS at 09:50

## 2024-06-02 RX ADMIN — ACETAMINOPHEN 975 MG: 325 TABLET ORAL at 12:28

## 2024-06-02 RX ADMIN — ATROPINE SULFATE 1 MG: 0.1 INJECTION INTRAVENOUS at 15:08

## 2024-06-02 RX ADMIN — LEVOTHYROXINE SODIUM 150 MCG: 0.15 TABLET ORAL at 06:12

## 2024-06-02 RX ADMIN — Medication 100 MCG: at 09:50

## 2024-06-02 RX ADMIN — ONDANSETRON 4 MG: 2 INJECTION INTRAMUSCULAR; INTRAVENOUS at 16:51

## 2024-06-02 RX ADMIN — GABAPENTIN 300 MG: 300 CAPSULE ORAL at 20:45

## 2024-06-02 RX ADMIN — GABAPENTIN 300 MG: 300 CAPSULE ORAL at 09:50

## 2024-06-02 RX ADMIN — ACETAMINOPHEN 975 MG: 325 TABLET ORAL at 20:45

## 2024-06-02 RX ADMIN — DOPAMINE HYDROCHLORIDE IN DEXTROSE 2 MCG/KG/MIN: 1.6 INJECTION, SOLUTION INTRAVENOUS at 15:45

## 2024-06-02 RX ADMIN — HEPARIN SODIUM 5000 UNITS: 5000 INJECTION, SOLUTION INTRAVENOUS; SUBCUTANEOUS at 12:28

## 2024-06-02 RX ADMIN — DULOXETINE HYDROCHLORIDE 20 MG: 20 CAPSULE, DELAYED RELEASE ORAL at 09:50

## 2024-06-02 RX ADMIN — ONDANSETRON HYDROCHLORIDE 4 MG: 2 INJECTION, SOLUTION INTRAVENOUS at 16:51

## 2024-06-02 RX ADMIN — NOREPINEPHRINE BITARTRATE 0.01 MCG/KG/MIN: 8 INJECTION, SOLUTION INTRAVENOUS at 17:07

## 2024-06-02 RX ADMIN — LIDOCAINE 2 PATCH: 4 PATCH TOPICAL at 12:28

## 2024-06-02 RX ADMIN — SIMVASTATIN 10 MG: 10 TABLET, FILM COATED ORAL at 20:45

## 2024-06-02 RX ADMIN — HEPARIN SODIUM 5000 UNITS: 5000 INJECTION, SOLUTION INTRAVENOUS; SUBCUTANEOUS at 06:12

## 2024-06-02 SDOH — SOCIAL STABILITY: SOCIAL INSECURITY: DO YOU FEEL UNSAFE GOING BACK TO THE PLACE WHERE YOU ARE LIVING?: NO

## 2024-06-02 SDOH — ECONOMIC STABILITY: HOUSING INSECURITY: IN THE LAST 12 MONTHS, HOW MANY PLACES HAVE YOU LIVED?: 1

## 2024-06-02 SDOH — ECONOMIC STABILITY: INCOME INSECURITY: HOW HARD IS IT FOR YOU TO PAY FOR THE VERY BASICS LIKE FOOD, HOUSING, MEDICAL CARE, AND HEATING?: NOT VERY HARD

## 2024-06-02 SDOH — SOCIAL STABILITY: SOCIAL INSECURITY: ARE YOU OR HAVE YOU BEEN THREATENED OR ABUSED PHYSICALLY, EMOTIONALLY, OR SEXUALLY BY ANYONE?: NO

## 2024-06-02 SDOH — SOCIAL STABILITY: SOCIAL INSECURITY: ABUSE: ADULT

## 2024-06-02 SDOH — SOCIAL STABILITY: SOCIAL INSECURITY: HAS ANYONE EVER THREATENED TO HURT YOUR FAMILY OR YOUR PETS?: NO

## 2024-06-02 SDOH — SOCIAL STABILITY: SOCIAL INSECURITY: HAVE YOU HAD ANY THOUGHTS OF HARMING ANYONE ELSE?: NO

## 2024-06-02 SDOH — SOCIAL STABILITY: SOCIAL INSECURITY: ARE THERE ANY APPARENT SIGNS OF INJURIES/BEHAVIORS THAT COULD BE RELATED TO ABUSE/NEGLECT?: NO

## 2024-06-02 SDOH — ECONOMIC STABILITY: INCOME INSECURITY: IN THE LAST 12 MONTHS, WAS THERE A TIME WHEN YOU WERE NOT ABLE TO PAY THE MORTGAGE OR RENT ON TIME?: NO

## 2024-06-02 SDOH — SOCIAL STABILITY: SOCIAL INSECURITY: HAVE YOU HAD THOUGHTS OF HARMING ANYONE ELSE?: NO

## 2024-06-02 SDOH — SOCIAL STABILITY: SOCIAL INSECURITY: DO YOU FEEL ANYONE HAS EXPLOITED OR TAKEN ADVANTAGE OF YOU FINANCIALLY OR OF YOUR PERSONAL PROPERTY?: NO

## 2024-06-02 SDOH — SOCIAL STABILITY: SOCIAL INSECURITY: WERE YOU ABLE TO COMPLETE ALL THE BEHAVIORAL HEALTH SCREENINGS?: YES

## 2024-06-02 SDOH — SOCIAL STABILITY: SOCIAL INSECURITY: DOES ANYONE TRY TO KEEP YOU FROM HAVING/CONTACTING OTHER FRIENDS OR DOING THINGS OUTSIDE YOUR HOME?: NO

## 2024-06-02 ASSESSMENT — COGNITIVE AND FUNCTIONAL STATUS - GENERAL
CLIMB 3 TO 5 STEPS WITH RAILING: TOTAL
MOVING TO AND FROM BED TO CHAIR: A LOT
MOVING TO AND FROM BED TO CHAIR: A LITTLE
TURNING FROM BACK TO SIDE WHILE IN FLAT BAD: A LITTLE
WALKING IN HOSPITAL ROOM: A LOT
PATIENT BASELINE BEDBOUND: NO
DAILY ACTIVITIY SCORE: 20
STANDING UP FROM CHAIR USING ARMS: A LITTLE
MOVING FROM LYING ON BACK TO SITTING ON SIDE OF FLAT BED WITH BEDRAILS: A LITTLE
TURNING FROM BACK TO SIDE WHILE IN FLAT BAD: A LOT
MOBILITY SCORE: 12
HELP NEEDED FOR BATHING: A LITTLE
STANDING UP FROM CHAIR USING ARMS: A LOT
WALKING IN HOSPITAL ROOM: A LOT
CLIMB 3 TO 5 STEPS WITH RAILING: A LOT
TOILETING: A LITTLE
MOVING FROM LYING ON BACK TO SITTING ON SIDE OF FLAT BED WITH BEDRAILS: A LITTLE
MOBILITY SCORE: 16
DRESSING REGULAR LOWER BODY CLOTHING: A LITTLE
DRESSING REGULAR UPPER BODY CLOTHING: A LITTLE

## 2024-06-02 ASSESSMENT — PAIN SCALES - GENERAL
PAINLEVEL_OUTOF10: 5 - MODERATE PAIN
PAINLEVEL_OUTOF10: 0 - NO PAIN
PAINLEVEL_OUTOF10: 5 - MODERATE PAIN
PAINLEVEL_OUTOF10: 2

## 2024-06-02 ASSESSMENT — ACTIVITIES OF DAILY LIVING (ADL)
HEARING - LEFT EAR: HEARING AID
ADEQUATE_TO_COMPLETE_ADL: YES
TOILETING: NEEDS ASSISTANCE
HEARING - RIGHT EAR: HEARING AID
BATHING: NEEDS ASSISTANCE
JUDGMENT_ADEQUATE_SAFELY_COMPLETE_DAILY_ACTIVITIES: YES
PATIENT'S MEMORY ADEQUATE TO SAFELY COMPLETE DAILY ACTIVITIES?: YES
GROOMING: INDEPENDENT
WALKS IN HOME: NEEDS ASSISTANCE
FEEDING YOURSELF: INDEPENDENT
ASSISTIVE_DEVICE: WALKER
DRESSING YOURSELF: INDEPENDENT

## 2024-06-02 ASSESSMENT — PAIN DESCRIPTION - DESCRIPTORS
DESCRIPTORS: ACHING
DESCRIPTORS: ACHING

## 2024-06-02 ASSESSMENT — PAIN - FUNCTIONAL ASSESSMENT
PAIN_FUNCTIONAL_ASSESSMENT: 0-10

## 2024-06-02 ASSESSMENT — LIFESTYLE VARIABLES
AUDIT-C TOTAL SCORE: 0
HOW MANY STANDARD DRINKS CONTAINING ALCOHOL DO YOU HAVE ON A TYPICAL DAY: PATIENT DOES NOT DRINK
HOW OFTEN DO YOU HAVE A DRINK CONTAINING ALCOHOL: NEVER
HOW OFTEN DO YOU HAVE 6 OR MORE DRINKS ON ONE OCCASION: NEVER
SKIP TO QUESTIONS 9-10: 1
AUDIT-C TOTAL SCORE: 0

## 2024-06-02 ASSESSMENT — ENCOUNTER SYMPTOMS
NEAR-SYNCOPE: 1
FALLS: 1

## 2024-06-02 ASSESSMENT — PATIENT HEALTH QUESTIONNAIRE - PHQ9
SUM OF ALL RESPONSES TO PHQ9 QUESTIONS 1 & 2: 0
1. LITTLE INTEREST OR PLEASURE IN DOING THINGS: NOT AT ALL
2. FEELING DOWN, DEPRESSED OR HOPELESS: NOT AT ALL

## 2024-06-02 NOTE — NURSING NOTE
Pt arrived to the unit, room 455. Pt is alert, lying in bed. Son is visiting at bedside. Orienting to room. Vital signs being done. Telemetry being set up. Respirations even and unlabored on room air. Pt is c/o some pain to her L abdomen which is from her fall. Call light and belongings within reach. Will be continuing to monitor

## 2024-06-02 NOTE — CONSULTS
Inpatient consult to Cardiology  Consult performed by: JUMA Mg-CNP  Consult ordered by: Ina Dietrich DO  Reason for consult: bradycardia        History Of Present Illness:    Tracie Baltazar is a 84 y.o. female presenting with a fall at home. She follows with Dr. Wright for cardiology. She has a past medical history of atrial fibrillation, hypertension, CKD stage IV, leukemia in remission and recurrent C. Difficile infection.  Of note the patient was hospitalized in December with atrial fibrillation with rapid ventriclar response and was noted to have a significant postconversion pause when converting back into sinus rhythm.  She was evaluated by electrophysiology who adjusted AV amna agents.  During that hospitalization patient had an echocardiogram revealing normal left ventricular size and preserved ejection fraction of 60-65% with trivial valvular abnormalities.  The patient also had a Lexiscan stress test which was normal or low risk.     Patient reports that she was in the bathroom yesterday and fell.  Reports that her legs just gave out on her.  While falling patient struck her left side and hip against side of the shower and hit her arm.  Patient reports her legs giving out on her frequently over the last month and that she sometimes begins lightheaded when this happens.  She denies chest pain, pressure or palpitations. Denies shortness of breath. Patient presented to the [facility] emergency department at which time an EKG was completed revealing a sinus bradycardia with a rate of 43 bpm and no evidence of ischemia.  Troponins were drawn and were elevated but essentially flat at 47, 46 and 47.  Other lab work revealed a sodium of 137, potassium 4.1, creatinine at 2.60 and a hemoglobin of 10.2.  Chest x-ray without evidence of acute cardiopulmonary process.  CT of the lumbar spine without acute fracture.  CT of the cervical spine without fracture or hemorrhage.  CT of the head without  "acute intracranial hemorrhage or fracture.  X-ray of the left hip and pelvis with no evidence of fracture.  Patient was started on IV vancomycin and was given IV morphine and admitted to the hospital for further assessment and management.    Review of Systems   Cardiovascular:  Positive for near-syncope.   Musculoskeletal:  Positive for falls.   All other systems reviewed and are negative.         Last Recorded Vitals:  Vitals:    06/01/24 2030 06/01/24 2351 06/02/24 0350 06/02/24 0722   BP: (!) 117/49 (!) 107/37 (!) 101/37 (!) 105/37   BP Location: Left arm Left arm Left arm Left arm   Patient Position: Lying Lying Lying Lying   Pulse: (!) 45   (!) 47   Resp:  16 17 16   Temp: 36 °C (96.8 °F) 36.1 °C (97 °F) 36.4 °C (97.5 °F) 36.3 °C (97.3 °F)   TempSrc: Temporal Temporal Temporal Temporal   SpO2: 96% 92% 92% 97%   Weight: 70.3 kg (154 lb 15.7 oz)  69.2 kg (152 lb 8.9 oz)    Height: 1.6 m (5' 3\")          Last Labs:  CBC - 6/2/2024:  5:57 AM  34.0 9.6 212    31.8      CMP - 6/2/2024:  5:57 AM  8.3 6.0 17 --- 0.3   4.1 3.1 7 70      PTT - No results in last year.  1.2   12.1 _     Hemoglobin A1C   Date/Time Value Ref Range Status   12/19/2019 10:50 AM 5.4 4.0 - 6.0 % Final     Comment:     Hemoglobin A1C levels are related to mean blood glucose during the   preceding 2-3 months. The relationship table below may be used as a   general guide. Each 1% increase in HGB A1C is a reflection of an   increase in mean glucose of approximately 30 mg/dl.   Reference: Diabetes Care, volume 29, supplement 1 Jan. 2006                        HGB A1C ................. Approx. Mean Glucose   _______________________________________________   6%   ...............................  120 mg/dl   7%   ...............................  150 mg/dl   8%   ...............................  180 mg/dl   9%   ...............................  210 mg/dl   10%  ...............................  240 mg/dl  Performed at [place] [address] " [facility] [id number]       LDL Calculated   Date/Time Value Ref Range Status   12/08/2022 09:43  (H) 65 - 130 MG/DL Final   01/25/2021 02:53  65 - 130 MG/DL Final      Last I/O:  I/O last 3 completed shifts:  In: 1121.7 (16.2 mL/kg) [I.V.:1121.7 (16.2 mL/kg)]  Out: - (0 mL/kg)   Weight: 69.2 kg     Past Cardiology Tests (Last 3 Years):  EKG:  ECG 12 lead 12/24/2023    Echo:  Transthoracic Echo (TTE) Complete 12/15/2023    Ejection Fractions:  EF   Date/Time Value Ref Range Status   12/15/2023 08:53 AM 56       Cath:  No results found for this or any previous visit from the past 1095 days.    Stress Test:  Nuclear Stress Test 12/26/2023    Cardiac Imaging:  No results found for this or any previous visit from the past 1095 days.      Past Medical History:  She has a past medical history of Asthma ([facility]), Essential (primary) hypertension, Kidney failure, Leukemia (Multi), and Rheumatoid arthritis (Multi).    Past Surgical History:  She has a past surgical history that includes Hysterectomy; Back surgery; Cholecystectomy; Other surgical history; Knee surgery; Shoulder surgery (Left); and Total knee arthroplasty (Left, 02/13/2014).      Social History:  She reports that she has never smoked. She has never been exposed to tobacco smoke. She has never used smokeless tobacco. She reports current alcohol use. She reports that she does not use drugs.    Family History:  Family History   Problem Relation Name Age of Onset    Emphysema Mother      Emphysema Father      Heart attack Sister      Lung cancer Brother          Agent orange    Lymphoma Son      Multiple sclerosis Son          Allergies:  Ibuprofen and Amoxicillin    Inpatient Medications:  Scheduled medications   Medication Dose Route Frequency    cyanocobalamin  100 mcg oral Daily    DULoxetine  20 mg oral Daily    gabapentin  300 mg oral BID    heparin (porcine)  5,000 Units subcutaneous q8h    levothyroxine  150 mcg oral Daily    simvastatin  10 mg  oral Nightly     PRN medications   Medication    acetaminophen    oxyCODONE    polyethylene glycol     Continuous Medications   Medication Dose Last Rate    sodium chloride 0.9%  100 mL/hr 100 mL/hr (06/02/24 0664)     Outpatient Medications:  Current Outpatient Medications   Medication Instructions    acetaminophen (TYLENOL) 650 mg, oral, Every 4 hours PRN    amiodarone (PACERONE) 200 mg, oral, Daily    amLODIPine (NORVASC) 5 mg, oral, Daily    apixaban (ELIQUIS) 2.5 mg, oral, 2 times daily    cholecalciferol (Vitamin D-3) 50 MCG (2000 UT) tablet Vitamin D 50 MCG (2000 UT) Oral Tablet   Refills: 0       Active    cyanocobalamin (Vitamin B-12) 100 mcg tablet = 1 tab(s) ( 100 mcg ), Oral, daily, 0 Refill(s), Type: Maintenance    DULoxetine (CYMBALTA) 20 mg, oral, Daily, Do not crush or chew.    furosemide (LASIX) 60 mg, oral, Daily    gabapentin (NEURONTIN) 300 mg, oral, 2 times daily    hydrALAZINE (Apresoline) 10 mg tablet oral    levothyroxine (Synthroid, Levoxyl) 150 mcg tablet TAKE 1 TABLET BY MOUTH ONCE DAILY IN THE MORNING AS DIRECTED    metoprolol succinate XL (TOPROL-XL) 12.5 mg, oral, Daily, Do not crush or chew.    ondansetron ODT (ZOFRAN-ODT) 4 mg, oral    polyethylene glycol (GLYCOLAX, MIRALAX) 17 g, oral, Daily PRN    simvastatin (ZOCOR) 10 mg, oral, Nightly    traMADol (Ultram) 50 mg tablet Every 24 hours       Physical Exam  Cardiovascular:      Rate and Rhythm: Regular rhythm. Bradycardia present.      Heart sounds: No murmur heard.     No friction rub. No gallop.   Pulmonary:      Effort: Pulmonary effort is normal.      Breath sounds: Normal breath sounds. No wheezing, rhonchi or rales.   Musculoskeletal:      Right lower leg: No edema.      Left lower leg: No edema.   Skin:     General: Skin is warm and dry.      Capillary Refill: Capillary refill takes less than 2 seconds.   Neurological:      Mental Status: She is alert and oriented to person, place, and time.   Psychiatric:         Mood and  "Affect: Mood normal.         Behavior: Behavior normal.           Assessment/Plan   Bradycardia  Paroxysmal Atrial Fibrillation  Near Syncope  Hypertensive Disorder  Chronic Kidney Disease    Impression and Plan:    6/2: As above.  Patient with past medical history of paroxysmal atrial fibrillation with known significant postconversion pauses presents after a fall at home. She has been evaluated by electrophysiology in the past. Patient tells me that she has fallen approximately 4 times over the last month and that each time her legs \"just gave out\". Patient states that she was not lightheaded prior to this fall. Denies chest pain, pressure or palpitations. Denies shortness of breath.  On my exam the patient is alert and oriented.  She is breathing comfortably on room air and appears euvolemic on examination.  Blood pressures have been low normal/recorded 105/37.  I completed a telemetry review which revealed sinus bradycardia with rates going as low as 36 bpm at times.  Agree with holding amiodarone and metoprolol at this time to avoid further bradycardia. At this time it is unclear to me whether this patient is falling due to symptomatic bradycardia, but given her multiple falls in the last month I think we should be suspicious.  Echocardiogram and Lexiscan stress test were completed in December 2023 and these tests do not need to be repeated.  Electrophysiology have been consulted and appreciate their recommendations. Eliquis has been placed on hold in case pacemaker placement is recommended by electrophysiology. We will follow with you.      Peripheral IV 06/01/24 20 G Antecubital (Active)   Site Assessment Clean;Dry;Intact 06/01/24 2100   Dressing Status Clean;Dry;Occlusive 06/01/24 2100   Number of days: 1       Code Status:  Full Code    I spent 45 minutes in the professional and overall care of this patient.        Sandra Whyte, APRN-CNP  "

## 2024-06-02 NOTE — CARE PLAN
The patient's goals for the shift include      The clinical goals for the shift include Monitor VS, manage pain    Over the shift, the patient did make progress toward the following goals.    Pt admitted to unit.  Pt remained bradycardic (in the 40's) throughout most of the shift.   Reported some pain on her left side d/t the fall  No SOB

## 2024-06-02 NOTE — H&P
Critical Care Medicine       Date:  6/2/2024  Patient:  Tracie Baltazar  YOB: 1940  MRN:  [id number]   Admit Date:  6/1/2024      Chief Complaint   Patient presents with    Fall     Pt states she slipped on bathroom floor at approx 0930. Pt states she hit left arm and left side of flank on side of tub. Pt denies hitting head. Pt on eliquis.          History of Present Illness:  Tracie Baltazar is a 84 y.o. year old female patient with Past Medical History of   atrial fibrillation, hypertension, CKD stage IV, leukemia in remission, recurrent C. difficile infections, hypothyroidism, depression who presents to the emergency room after a fall.  Patient was in the bathroom and she fell.  She reports that her legs just gave out on her.  She fell striking her left side and hip against the side of the shower.  Said she also hit her arm which is now bruised.  She reports a lot of pain in her left hip that radiates into her anterior pelvis.  She says her sons were able to help her get back up with her walker and get her to a seated position.  911 was then called.     Her family is bedside. They all confirmed that the patient has been told several times that she might need a pacemaker. On arrival to the emergency room her heart rate was found to be in the 30s to 40s. She takes amiodarone and metoprolol. Blood pressures preserved.     Patient requiring dopamine gtt for symptomatic bradycardia. Patient had 4 second pause were she lost her vision. Patient states that she has also been feeling more SOB. Patient will continue to be monitored in ICU and plan for Pacemaker placement tomorrow.     Interval ICU Events:  6/2: Patient arrived to ICU A+Ox3. With complaints of mild SOB. Dopamine gtt was started at 2. Patient receiving a transcutaneous pacer today. Plan for permanent pacemaker tomorrow.     Objective     Past Medical History:   Diagnosis Date    Asthma ([facility])     Essential (primary) hypertension      Hypertension    Kidney failure     Leukemia (Multi)     Rheumatoid arthritis (Multi)      Past Surgical History:   Procedure Laterality Date    BACK SURGERY      Back Surgery    CHOLECYSTECTOMY      Cholecystectomy    HYSTERECTOMY      Hysterectomy    KNEE SURGERY      arthroscopic surgery?    OTHER SURGICAL HISTORY      Shoulder Surgery Left    SHOULDER SURGERY Left     Left shoulder impingement surgery    TOTAL KNEE ARTHROPLASTY Left 02/13/2014     Medications Prior to Admission   Medication Sig Dispense Refill Last Dose    acetaminophen (Tylenol) 325 mg tablet Take 2 tablets (650 mg) by mouth every 4 hours if needed for mild pain (1 - 3). 30 tablet 0 6/1/2024    amiodarone (Pacerone) 200 mg tablet Take 1 tablet (200 mg) by mouth once daily. 90 tablet 0 6/1/2024    amLODIPine (Norvasc) 5 mg tablet Take 1 tablet by mouth once daily 90 tablet 1 6/1/2024    apixaban (Eliquis) 2.5 mg tablet Take 1 tablet (2.5 mg) by mouth 2 times a day. 180 tablet 3 6/1/2024    cholecalciferol (Vitamin D-3) 50 MCG (2000 UT) tablet Vitamin D 50 MCG (2000 UT) Oral Tablet   Refills: 0       Active   6/1/2024    cyanocobalamin (Vitamin B-12) 100 mcg tablet = 1 tab(s) ( 100 mcg ), Oral, daily, 0 Refill(s), Type: Maintenance   6/1/2024    DULoxetine (Cymbalta) 20 mg DR capsule Take 1 capsule (20 mg) by mouth once daily. Do not crush or chew. 90 capsule 1 6/1/2024    furosemide (Lasix) 20 mg tablet Take 3 tablets (60 mg) by mouth once daily. 90 tablet 3 6/1/2024    gabapentin (Neurontin) 300 mg capsule Take 1 capsule (300 mg) by mouth 2 times a day. 60 capsule 3 6/1/2024    levothyroxine (Synthroid, Levoxyl) 150 mcg tablet TAKE 1 TABLET BY MOUTH ONCE DAILY IN THE MORNING AS DIRECTED 30 tablet 2 6/1/2024    metoprolol succinate XL (Toprol-XL) 25 mg 24 hr tablet Take 0.5 tablets (12.5 mg) by mouth once daily. Do not crush or chew. 45 tablet 3 6/1/2024    ondansetron ODT (Zofran-ODT) 4 mg disintegrating tablet Take 1 tablet (4 mg) by mouth.    Past Week    polyethylene glycol (Glycolax, Miralax) 17 gram packet Take 17 g by mouth once daily as needed.   Past Week    simvastatin (Zocor) 10 mg tablet Take 1 tablet (10 mg) by mouth once daily at bedtime. 90 tablet 3 2024    traMADol (Ultram) 50 mg tablet once every 24 hours.   Past Week    [] vancomycin (Vancocin) 125 mg capsule Take 1 capsule (125 mg) by mouth 4 times a day for 10 days. 40 capsule 0 2024    hydrALAZINE (Apresoline) 10 mg tablet Take by mouth.   More than a month     Ibuprofen and Amoxicillin  Social History     Tobacco Use    Smoking status: Never     Passive exposure: Never    Smokeless tobacco: Never   Vaping Use    Vaping status: Never Used   Substance Use Topics    Alcohol use: Yes     Comment: rarely    Drug use: Never     Family History   Problem Relation Name Age of Onset    Emphysema Mother      Emphysema Father      Heart attack Sister      Lung cancer Brother          Agent orange    Lymphoma Son      Multiple sclerosis Son         Hospital Medications:           Current Facility-Administered Medications:     acetaminophen (Tylenol) tablet 975 mg, 975 mg, oral, TID, Ina Dietrich DO, 975 mg at 24 1228    cyanocobalamin (Vitamin B-12) tablet 100 mcg, 100 mcg, oral, Daily, Ina Dietrich DO, 100 mcg at 24 0950    DULoxetine (Cymbalta) DR capsule 20 mg, 20 mg, oral, Daily, Ina Dietrich DO, 20 mg at 24 0950    gabapentin (Neurontin) capsule 300 mg, 300 mg, oral, BID, Ina Dietrich DO, 300 mg at 24 0950    heparin (porcine) injection 5,000 Units, 5,000 Units, subcutaneous, q8h, Ina Dietrich DO, 5,000 Units at 24 1228    levothyroxine (Synthroid, Levoxyl) tablet 150 mcg, 150 mcg, oral, Daily, Ina Dietrich DO, 150 mcg at 24 0612    lidocaine 4 % patch 2 patch, 2 patch, transdermal, Daily, Ina Dietrich DO, 2 patch at 24 1228    oxyCODONE (Roxicodone) immediate release tablet 5 mg, 5 mg,  "oral, q6h PRN, Ina Dietrich DO, 5 mg at 06/02/24 0950    polyethylene glycol (Glycolax, Miralax) packet 17 g, 17 g, oral, Daily PRN, Ina Dietrich DO    simvastatin (Zocor) tablet 10 mg, 10 mg, oral, Nightly, Ina Dietrich DO, 10 mg at 06/01/24 9669    Physical Exam:    Heart Rate:  [36-52]   Temp:  [36 °C (96.8 °F)-36.4 °C (97.5 °F)]   Resp:  [8-21]   BP: (100-139)/(37-50)   Height:  [160 cm (5' 3\")]   Weight:  [69.2 kg (152 lb 8.9 oz)-70.3 kg (154 lb 15.7 oz)]   SpO2:  [92 %-100 %]     Physical Exam  Constitutional:       General: She is not in acute distress.  HENT:      Head: Normocephalic and atraumatic.   Eyes:      Pupils: Pupils are equal, round, and reactive to light.   Cardiovascular:      Rate and Rhythm: Regular rhythm. Bradycardia present.      Pulses: Normal pulses.      Heart sounds: Normal heart sounds.   Pulmonary:      Effort: Pulmonary effort is normal.      Breath sounds: Normal breath sounds.   Musculoskeletal:         General: No swelling.   Skin:     General: Skin is warm and dry.      Capillary Refill: Capillary refill takes less than 2 seconds.   Neurological:      General: No focal deficit present.      Mental Status: She is alert.         Review of Systems:  14 point review of systems was completed and negative except for those specially mention in my HPI    I have reviewed all medications, laboratory results, and imaging pertinent for today's encounter.           Intake/Output Summary (Last 24 hours) at 6/2/2024 1527  Last data filed at 6/2/2024 1249  Gross per 24 hour   Intake 2323.33 ml   Output --   Net 2323.33 ml            Assessment/Plan:    I am currently managing this critically ill patient for the following problems:    Bradycardia  HTN  A fib  CKD  Depression  Chronic pain  Hypothyroidism    Neuro/Psych/Pain Ctrl/Sedation:  Depression  Chronic pain  - Pain Management: tylenol, oxycodone, lidocaine patch  - CAM ICU  - Cont home duloxetine and " gabapentin    Respiratory/ENT:  - Maintain SPO2 >92%  - Continuous pulse ox monitoring   - Pulm hygiene    Cardiovascular:  Bradycardia  HTN  A fib  - Continuous cardiac monitoring per ICU protocol  - Maintain MAPS >65  - Daily EKGs  - Stress test 12/2023: EF 65%  - Cardiology consulted  -- Permanent Pacemaker tomorrow, transcutaneous one today  -- Dopamine gtt  - Cont home statin  - Holding home amio, amlodipine, lasix, hydralazine and metoprolol    GI:  Chronic C diff  - Regular Diet  - BR with miralax prn  - One dose on vanc 6/1    Renal/Volume Status (Intra & Extravascular):  CKD  - Maintain quinn catheter  - Maintain urine output 0.5-1.0cc/kg/hr  - Monitor I/O's  - Replete electrolytes to maintain K >4.0 and Mg >2.0  - Daily BMP, Mg  - Cr: 2.5    Endocrine  Hypothyroidism  - Monitor for hyper/hypoglycemia   - Cont home synthroid    Infectious Disease:  - Vanc dose for C diff prophylactic  - Monitor SIRS criteria  - WBC: 34    Heme/Onc:  - Monitor for s/sx of anemia such as bleeding and bruising   - Transfuse if Hgb <7.0   - Holding eliquis for procedure tomorrow  - Daily CBC  - Hgb: 9.6    MSK:  - Padded pressure points   - Ambulatory at baseline    Skin  - ICU skin protocol    Ethics/Code Status:  DNR-CCA, DNI    :  DVT Prophylaxis: Holding eliquid-=s  GI Prophylaxis: None  Bowel Regimen: Miralax prn  Diet: Regular  CVC: None  Atiya: None  Quinn: None  Restraints: None  Dispo: Admit to ICU    Critical Care Time:  60 minutes spent in preparing to see patient (I.e.labs,imaging, etc.), documentation, discussion plan of care with patient/family/caregiver, and/ or coordination of care with multidisciplinary team including the attending. Time does not include completion of procedure time.     Tatiana Rainey PA-C  Pulmonology & Critical Care Medicine   [facility]

## 2024-06-02 NOTE — SIGNIFICANT EVENT
"Called by RN that patient's HR was slower and now with a few pauses, one of which was 4.8 sec. I reviewed telemetry, now having episodes of HR in the 20s with pauses, usually 2 sec, but occasionally with longer pauses, including one at 4.8 sec.     Patient and daughter report that when she had the 4 sec pause, she said she lost vision. For another pause that was shorter, she reported \"feeling funnY' and stopped talking. Denies any pain. Reports having some episodes of SOB this afternoon.       A/P:  Symptomatic bradycardia- now with pauses, overall slower rates  - reviewed telemetry, occasionally has Mobitz 2   - pacer pads placed  - atropine 1 mg x1 given  - discussed with Dr. Steel, recommends transfer to ICU and starting dopamine  - discussed with Do Landaverde CNP with Dr. Chisholm. She agrees patient needs PPM. Will put patient on schedule tomorrow  - NPO at midnight  - Discussed with Dr. Burnett, intensivist, who is agreeable with transfer  - Dr. Steel recommends clear liquid diet for now in case worsening issues tonight and patient requires more urgent procedure for transvenous pacing    Decreased urine output  - check stat BMP, bladder scan    Acute respiratory failure with hypoxia  - development of SOB on arrival to ICU and pulsox 88% on RA.   - stat CXR  - could be developing acute heart failure (on chronic diastolic) in setting of high grade block.       Discussed with ICU NP Tatiana Rainey and Dr. Burnett.    I confirmed code status with patient and family. Patient wants to be DNR CCA, DNI. She is agreeable to intubation for surgery if that is needed.     CCT: 65 min    Update: discussed with Dr. Steel and Do Landaverde again. BP's overall lower. On 5 of dopamine, HR 40-50 but still with SOB and intermittent symptoms. CXR done and reviewed. Dr. Steel planning to come in and do transvenous pacer urgently. Family updated and will stay to talk to him shortly. Patient NPO. ICU team updated.      "

## 2024-06-02 NOTE — PROGRESS NOTES
Physical Therapy    Physical Therapy Evaluation & Treatment    Patient Name: Tracie Baltazar  MRN: [id number]  Today's Date: 6/2/2024   Time Calculation  Start Time: 0906  Stop Time: 0931  Time Calculation (min): 25 min    Assessment/Plan   PT Assessment  PT Assessment Results: Decreased strength, Decreased range of motion, Decreased endurance, Impaired balance, Decreased mobility, Decreased coordination, Impaired hearing, Pain  Rehab Prognosis: Good  Evaluation/Treatment Tolerance: Patient limited by pain, Patient limited by fatigue  Medical Staff Made Aware: Yes  Barriers to Participation: Comorbidities  End of Session Communication: Bedside nurse  Assessment Comment: Patient presents with impaird functional mobility/activity tolerance with moderate to severe general weakness.  In addition; c/o of L lateral hip and lower back pain symptoms limiting mobility and patient presents with precaution of bradycardia but cleared for evaluation today.  Recommend moderate intensity rehabilitation at discharge.  End of Session Patient Position: Bed, 3 rail up, Alarm off, not on at start of session   IP OR SWING BED PT PLAN  Inpatient or Swing Bed: Inpatient  PT Plan  Treatment/Interventions: Transfer training, Bed mobility, Gait training, Stair training, Balance training, Neuromuscular re-education, Neurodevelopmental intervention, Strengthening, Endurance training, Range of motion, Therapeutic exercise, Therapeutic activity, Home exercise program, Positioning, Postural re-education  PT Plan: Skilled PT  PT Frequency: 5 times per week  PT Discharge Recommendations: Moderate intensity level of continued care  Equipment Recommended upon Discharge: Wheeled walker  PT Recommended Transfer Status: Assist x1  PT - OK to Discharge: Yes      Subjective     General Visit Information:  General  Reason for Referral: Impaired mobility  Referred By: Ina Dietrich DO  Past Medical History Relevant to Rehab: 83 y/o F admitted s/p  fall on L side with bradycardia (30-40's).  XR L hip negative.  No plans for additional radiology or ortho at this time.  RN cleared for treatment.  Past medical history includes: a-fib, HTH, CKD IV, leukemia in remission, RA, hysterectomy, cholecystecomy, L TKA, L shoulder sx  Prior to Session Communication: Bedside nurse  Patient Position Received: Bed, 3 rail up, Alarm off, not on at start of session  Preferred Learning Style: verbal, visual, auditory  General Comment: [place].  RN cleared for PT evaluation.  Patient agreeable.  Home Living:  Home Living  Type of Home: House  Lives With: Adult children (Son has MS)  Home Adaptive Equipment: Walker rolling or standard (2WW and 4WW at home)  Home Layout: Multi-level  Home Access: Stairs to enter with rails  Entrance Stairs-Rails: Both  Entrance Stairs-Number of Steps: 2  Bathroom Shower/Tub: Walk-in shower  Bathroom Toilet: Standard  Bathroom Equipment: Grab bars in shower, Shower chair with back  Home Living Comments: Patient lives at home with son who has MS.  Patient reports son cannot provide much physical assist for mobility. H/O 5 + falls in last 3 months per patient report.  Prior Level of Function:  Prior Function Per Pt/Caregiver Report  Level of [place]: Independent with ADLs and functional transfers, Independent with homemaking with ambulation  Ambulatory Assistance: Independent  Precautions:  Precautions  Medical Precautions: Fall precautions, Cardiac precautions  Precautions Comment: Bradycardia, IV, catheter  Vital Signs:  Vital Signs  Heart Rate: (!) 46 (Visualized on portable monitor.  Patient asymptomatic.)  Heart Rate Source: Brachial    Objective   Pain:  Pain Assessment  Pain Assessment: 0-10  Pain Score: 5 - Moderate pain  Pain Type: Acute pain  Pain Location: Back (and L gluteus medius region/L lateral hip)  Pain Orientation: Left  Pain Descriptors: Aching  Pain Frequency: Constant/continuous  Pain Interventions: Medication (See  MAR)  Cognition:  Cognition  Overall Cognitive Status: Within Functional Limits    General Assessments:     Activity Tolerance  Endurance: Tolerates less than 10 min exercise, no significant change in vital signs  Early Mobility/Exercise Safety Screen: Proceed with mobilization - No exclusion criteria met    Sensation  Light Touch: No apparent deficits    Strength  Strength Comments: Impairments of trunk and B/L LE's  Strength  Strength Comments: Impairments of trunk and B/L LE's    Perception  Inattention/Neglect: Appears intact      Coordination  Movements are Fluid and Coordinated: Yes    Postural Control  Postural Control: Impaired  Trunk Control: Fair trunk control sit to stand ad stand to sit.  Moderatre difficulty initiating flexion for supine to sit.  Moderate assist from therpaist to support trunk    Static Sitting Balance  Static Sitting-Balance Support: Bilateral upper extremity supported, Feet supported  Static Sitting-Level of Assistance: Close supervision    Static Standing Balance  Static Standing-Balance Support: Bilateral upper extremity supported (via 2WW and therapist assist (moderate))  Static Standing-Level of Assistance: Moderate assistance  Static Standing-Comment/Number of Minutes: 2  Dynamic Standing Balance  Dynamic Standing-Comments: Pt. unable to advance LLE due to lower back pain.  Functional Assessments:  Bed Mobility  Bed Mobility: Yes  Bed Mobility 1  Bed Mobility 1: Supine to sitting, Sitting to supine  Level of Assistance 1: Moderate assistance, Maximum assistance  Bed Mobility Comments 1: Moderate assist to trunk for supine to sit.  Able to mobilize LE's out of bed.  Maximal assist x 1 to B/L LE's and trunk for sit to supine.    Transfers  Transfer: Yes  Transfer 1  Transfer From 1: Sit to, Stand to  Transfer to 1: Sit  Technique 1: Sit to stand, Stand to sit  Transfer Device 1: Walker  Transfer Level of Assistance 1: Maximum assistance  Trials/Comments 1: Allowing pt. to place  UE's on walker.  Maximal assist from PT to trunk for forward  weight shift and elevation from EOB.  Attempted push off from bed with UE's; pt. unable.    Ambulation/Gait Training  Ambulation/Gait Training Performed: No    Stairs  Stairs: No  Extremity/Trunk Assessments:  RLE   RLE : Exceptions to WFL  AROM RLE (degrees)  RLE AROM Comment: Impaired  R Hip Flexion 0-125: 90  R Hip ADduction 0-25: 25  Strength RLE  RLE Overall Strength: Deficits  R Hip Flexion: 2+/5  R Hip ABduction: 2+/5  R Ankle Dorsiflexion: 3/5  LLE   LLE : Exceptions to WFL  AROM LLE (degrees)  LLE AROM Comment: Impaired L hip  L Hip Flexion 0-125: 80  L Hip ABduction 0-45: 10  Strength LLE  LLE Overall Strength: Deficits, Due to pain  L Hip Flexion: 2-/5  L Hip ABduction: 2-/5  L Ankle Dorsiflexion: 3/5  Treatments:  Therapeutic Activity  Therapeutic Activity Performed: Yes  Therapeutic Activity 1: Bed mobility/transfer training x 15 minutes.  See below.  Outcome Measures:  [facility] Basic Mobility  Turning from your back to your side while in a flat bed without using bedrails: A little  Moving from lying on your back to sitting on the side of a flat bed without using bedrails: A lot  Moving to and from bed to chair (including a wheelchair): A lot  Standing up from a chair using your arms (e.g. wheelchair or bedside chair): A lot  To walk in hospital room: A lot  Climbing 3-5 steps with railing: Total  Basic Mobility - Total Score: 12    Encounter Problems       Encounter Problems (Active)       Mobility       LTG - Patient will navigate 4-6 steps with rails/device and close supervision (Progressing)       Start:  06/02/24    Expected End:  06/16/24            STG - Patient will ambulate 150' x 1 with use of 2WW and close S. (Progressing)       Start:  06/02/24    Expected End:  06/16/24            STG - Patient will increase ROM of L hip flexion to pain free WFL (Progressing)       Start:  06/02/24    Expected End:  06/16/24                   PT  Transfers       STG - Transfer from bed to chair with close supervision and use of 2WW for UE/balance support. (Progressing)       Start:  06/02/24    Expected End:  06/16/24            STG - Patient will perform bed mobility at independent level.  (Progressing)       Start:  06/02/24    Expected End:  06/16/24            STG - Patient will transfer sit to and from stand to sit with use of 2WW at close S level.  (Progressing)       Start:  06/02/24    Expected End:  06/16/24                   Education Documentation  Handouts, taught by Itz Tan, PT at 6/2/2024  9:54 AM.  Learner: Patient  Readiness: Acceptance  Method: Explanation  Response: Verbalizes Understanding    Home Exercise Program, taught by Itz Tan PT at 6/2/2024  9:54 AM.  Learner: Patient  Readiness: Acceptance  Method: Explanation  Response: Verbalizes Understanding    Body Mechanics, taught by Itz Tan PT at 6/2/2024  9:54 AM.  Learner: Patient  Readiness: Acceptance  Method: Explanation  Response: Verbalizes Understanding    Precautions, taught by Itz Tan PT at 6/2/2024  9:54 AM.  Learner: Patient  Readiness: Acceptance  Method: Explanation  Response: Verbalizes Understanding    ADL Training, taught by Itz Tan PT at 6/2/2024  9:54 AM.  Learner: Patient  Readiness: Acceptance  Method: Explanation  Response: Verbalizes Understanding    Handouts, taught by Itz Tan PT at 6/2/2024  9:54 AM.  Learner: Patient  Readiness: Acceptance  Method: Explanation  Response: Verbalizes Understanding    Precautions, taught by Itz Tan PT at 6/2/2024  9:54 AM.  Learner: Patient  Readiness: Acceptance  Method: Explanation  Response: Verbalizes Understanding    Body Mechanics, taught by Itz Tan PT at 6/2/2024  9:54 AM.  Learner: Patient  Readiness: Acceptance  Method: Explanation  Response: Verbalizes Understanding    Home Exercise Program, taught by Itz Tan PT at 6/2/2024  9:54 AM.  Learner:  Patient  Readiness: Acceptance  Method: Explanation  Response: Verbalizes Understanding    Mobility Training, taught by Itz Tan PT at 6/2/2024  9:54 AM.  Learner: Patient  Readiness: Acceptance  Method: Explanation  Response: Verbalizes Understanding    Education Comments  No comments found.

## 2024-06-02 NOTE — PROGRESS NOTES
Tracie Baltazar is a 84 y.o. female on day 1 of admission presenting with Bradycardia.      Subjective   Continues to complain of pain in her left lower lumbar region. Still able to walk with assistance but pain especially with movement.     No other issues-- no SOB, CP, light-headedness       Objective     Last Recorded Vitals  BP (!) 139/40 (BP Location: Left arm, Patient Position: Lying)   Pulse (!) 48   Temp 36.2 °C (97.2 °F) (Temporal)   Resp 15   Wt 69.2 kg (152 lb 8.9 oz)   SpO2 99%   Intake/Output last 3 Shifts:    Intake/Output Summary (Last 24 hours) at 6/2/2024 1232  Last data filed at 6/2/2024 1130  Gross per 24 hour   Intake 2210 ml   Output --   Net 2210 ml       Admission Weight  Weight: 66.7 kg (147 lb) (06/01/24 1525)    Daily Weight  06/02/24 : 69.2 kg (152 lb 8.9 oz)    Image Results  XR hip left with pelvis when performed 2 or 3 views  Narrative: Interpreted By:  Laron Trujillo,   STUDY:  XR HIP LEFT WITH PELVIS WHEN PERFORMED 2 OR 3 VIEWS;  6/1/2024 8:11 pm      INDICATION:  Signs/Symptoms:pain.      COMPARISON:  None.      ACCESSION NUMBER(S):  [id number]      ORDERING CLINICIAN:  PANDA OCONNOR      FINDINGS:  AP view pelvis with AP and lateral views of the left hip      No acute fracture or dislocation.  Mild osteoarthritic changes of the bilateral hips.  Soft tissues are within normal limits.      Impression: 1. No radiographically evident fracture identified. If ongoing  concern then CT may be performed in further assessment.              Signed by: Laron Trujillo 6/1/2024 8:52 PM  Dictation workstation:   [id number]  CT lumbar spine wo IV contrast  Narrative: Interpreted By:  Lissett Joyce,   STUDY:  CT LUMBAR SPINE WO IV CONTRAST;  6/1/2024 6:40 pm      INDICATION:  Signs/Symptoms:fall      COMPARISON:  CT lumbar spine 09/29/2017. CT abdomen and pelvis 05/14/2018.      ACCESSION NUMBER(S):  [id number]      ORDERING CLINICIAN:  PANDA OCONNOR      TECHNIQUE:  Noncontrast CT  axial images were obtained through the lumbar spine.  Coronal and sagittal reformats were performed.      FINDINGS:  There is diffuse osteopenia.      ALIGNMENT:  Within normal limits.      VERTEBRAE/DISC SPACES:  The vertebral body heights are maintained.  There is no acute  fracture. Status post laminectomy at L5. There is multilevel  degenerative disc disease with osteophytosis.  Multilevel facet  arthropathy is also noted. TENS stimulator wires are entering the  spinal canal at T12-L1 and extend cranially along the dorsal aspect  of the spinal canal.      ADDITIONAL FINDINGS:  The prevertebral soft tissues are unremarkable.  The battery pack from the TENS stimulator is noted at the right  paraspinal soft tissues.      Atherosclerotic calcifications at the visualized abdominal aorta and  its branches. Incidental note is made of sigmoid diverticulosis.  There is mild adjacent soft tissue stranding. The visualized sigmoid  colon is decompressed with diffuse wall thickening.      Impression: 1.  No acute fracture at the lumbar spine. Postsurgical and  degenerative changes.  2. Diffuse wall thickening at the visualized sigmoid colon, may be  secondary to underdistention versus colitis.  3. Sigmoid diverticulosis with mild adjacent soft tissue stranding,  may represent chronic changes versus mild/early acute diverticulitis.  Clinical correlation recommended.              MACRO:  None.      Signed by: Lissett Joyce 6/1/2024 7:19 PM  Dictation workstation:   [id number]  CT head wo IV contrast, CT cervical spine wo IV contrast  Narrative: Interpreted By:  Lissett Joyce,   STUDY:  CT HEAD WO IV CONTRAST; CT CERVICAL SPINE WO IV CONTRAST;  6/1/2024  6:40 pm      INDICATION:  Signs/Symptoms:injury of head; Signs/Symptoms:fall.      COMPARISON:  CT head 12/14/2023. Cervical spine x-ray 02/28/2012.      ACCESSION NUMBER(S):  [id number]; [id number]      ORDERING CLINICIAN:  PANDA OCONNOR      TECHNIQUE:  Axial  noncontrast images of the head  with coronal  and sagittal  reconstructed images . Axial noncontrast images of the cervical spine  with coronal and sagittal reconstructed images.      FINDINGS:  BRAIN PARENCHYMA: There are periventricular white matter  hypodensities, probably representing chronic microvascular ischemic  changes. Otherwise, the gray-white matter interfaces are preserved.  No acute territorial infarct, mass effect or midline shift.  HEMORRHAGE: No acute intracranial hemorrhage. VENTRICLES and  EXTRA-AXIAL SPACES: Prominent, consistent with diffuse parenchymal  volume loss. No extra-axial fluid collection. EXTRACRANIAL SOFT  TISSUES: Within normal limits. CALVARIUM: No depressed calvarial  fracture. PARANASAL SINUSES: No air-fluid levels.  MASTOIDS: Within normal limits.      CERVICAL SPINE:  ALIGNMENT: There is increased cervical lordosis. There is mild  retrolisthesis of C2 on C3 and of C3 on C4, probably on a  degenerative basis. No traumatic facet widening. VERTEBRAE: No acute  fracture. DISCS: There is multilevel degenerative disc disease with  osteophytosis. There is multilevel facet arthropathy. PREVERTEBRAL  SOFT TISSUES: No prevertebral soft tissue swelling. LUNG APICES: No  acute findings at the visualized lung apices.          Impression: 1.  No acute intracranial hemorrhage or depressed calvarial fracture.  2.  No acute fracture at the cervical spine. Degenerative changes.              MACRO:  None.      Signed by: Lissett Joyce 6/1/2024 7:06 PM  Dictation workstation:   [id number]  XR chest 1 view  Narrative: Interpreted By:  Schoenberger, Joseph,   STUDY:  XR CHEST 1 VIEW;  6/1/2024 4:23 pm      INDICATION:  Signs/Symptoms:fall, syncope.      COMPARISON:  12/31/2023      ACCESSION NUMBER(S):  [id number]      ORDERING CLINICIAN:  PANDA OCONNOR      FINDINGS:  Spinal neurostimulator leads unchanged from prior.              CARDIOMEDIASTINAL SILHOUETTE:  Cardiac silhouette normal in  size without venous congestion.      LUNGS:  No significant focal lung opacity.      ABDOMEN:  No remarkable upper abdominal findings.      BONES:  No acute osseous changes.      Impression: 1.  No evidence of acute cardiopulmonary process.              MACRO:  None      Signed by: Joseph Schoenberger 6/1/2024 4:52 PM  Dictation workstation:   [id number]      Physical Exam  General: alert, no diaphoresis   HENT: mucous membranes moist, external ears normal, no rhinorrhea   Eyes: no icterus or injection, no discharge   Lungs: CTA BL   Heart: Regular and slow, no LE edema BL   GI: abdomen soft, nontender, nondistended, BS present   MSK: no joint effusion or deformity   Skin: no rashes, erythema, or ecchymosis   Neuro: grossly normal cognition, motor strength, sensation      Relevant Results               Assessment/Plan                  Principal Problem:    Bradycardia    Bradycardia  -Heart rates 30s to 40s.  Blood pressure with systolics 110s to 120s.  Will hold her metoprolol and amiodarone.  Will also hold her other antihypertensives given her very normal blood pressure.  She has been told previously by her cardiologist that she might need a pacemaker at some point.  Unclear if these episodes of lightheadedness and leg weakness are related to her bradycardia but it does seem suspicious.  -Consult cardiology and EP  -Holding meds as above.  Will also hold Eliquis in case patient does require procedure  -TSH elevated (actually elevated over past year), will check T4 to confirm. May need to adjust synthroid (I usually avoid adjusting synthroid doses in setting of acute hospital admissions, especially with her recent c diff infection, however this may contributing to bradycardia)  -Patient needs continuous telemetry- appeared to have short episode of Mobitz 2 this morning  - discussed with cardiology-- unclear what EP will decide to do with case-- her rate lower meds on hold and her HR remains low (although may  "takke longer for amio to clear). Her symptoms are not \"classic\" symptomatic bradycardia symptoms, but I think they are suspicious. In this setting, will make her NPO at midnight in case they decide on PPM, but we can resume diet if not     Left hip pain  -more flank pain today. XR hip is normal  -Pain control-- schedule tylenol, start lidocaine patch  -PT OT      Recurrent C. difficile infections  -Has been treated multiple times since December.  She ports having 1 dose of oral vancomycin left which has been ordered  -No current diarrhea     CKD stage IV  -Creatinine stable at this time.  Follows with Dr. Amor and we will get him involved with any issues     Atrial fibrillation  -Again, as above, holding Eliquis, amiodarone, metoprolol.  Follows with Dr. Wright and has been cardioverted by him previously which resulted in a long pause.     Hypertension  -Holding metoprolol as well as her Norvasc and Lasix.  BP's remain low normal. Will continue to monitor off meds     Depression  -Continue duloxetine     Chronic pain  - continue gabapentin     Hypothyroidism  - synthroid. Tsh high. Confirm with T4. See above for plan    H/o CLL  - not currently under treatment. Chronic leukocytosis     DVT ppx  -heparin until we can resume eliquis              Ina Dietrich,       "

## 2024-06-02 NOTE — CARE PLAN
The patient's goals for the shift include      The clinical goals for the shift include maintain safety, no falls      Problem: Pain  Goal: My pain/discomfort is manageable  Outcome: Progressing     Problem: Safety  Goal: Patient will be injury free during hospitalization  Outcome: Progressing  Goal: I will remain free of falls  Outcome: Progressing     Problem: Daily Care  Goal: Daily care needs are met  Outcome: Progressing     Problem: Psychosocial Needs  Goal: Demonstrates ability to cope with hospitalization/illness  Outcome: Progressing  Goal: Collaborate with me, my family, and caregiver to identify my specific goals  Outcome: Progressing     Problem: Discharge Barriers  Goal: My discharge needs are met  Outcome: Progressing     Problem: Fall/Injury  Goal: Not fall by end of shift  Outcome: Progressing  Goal: Be free from injury by end of the shift  Outcome: Progressing  Goal: Verbalize understanding of personal risk factors for fall in the hospital  Outcome: Progressing  Goal: Verbalize understanding of risk factor reduction measures to prevent injury from fall in the home  Outcome: Progressing  Goal: Use assistive devices by end of the shift  Outcome: Progressing  Goal: Pace activities to prevent fatigue by end of the shift  Outcome: Progressing     Problem: Pain  Goal: Takes deep breaths with improved pain control throughout the shift  Outcome: Progressing  Goal: Turns in bed with improved pain control throughout the shift  Outcome: Progressing  Goal: Walks with improved pain control throughout the shift  Outcome: Progressing  Goal: Performs ADL's with improved pain control throughout shift  Outcome: Progressing  Goal: Participates in PT with improved pain control throughout the shift  Outcome: Progressing  Goal: Free from opioid side effects throughout the shift  Outcome: Progressing  Goal: Free from acute confusion related to pain meds throughout the shift  Outcome: Progressing

## 2024-06-02 NOTE — H&P
History Of Present Illness  Tracie Baltazar is a 84 y.o. female history of atrial fibrillation, hypertension, CKD stage IV, leukemia in remission, recurrent C. difficile infections, who presents to the emergency room after a fall.  Patient was in the bathroom and she fell.  She reports that her legs just gave out on her.  She fell striking her left side and hip against the side of the shower.  Said she also hit her arm which is now bruised.  She reports a lot of pain in her left hip that radiates into her anterior pelvis.  She says her sons were able to help her get back up with her walker and get her to a seated position.  911 was then called.    She reports that her legs have been giving out onto her frequently over the last month.  She says sometimes she feels lightheaded when this happens.    Patient reports that she currently has no complaints other than the pain she is having.  No chest pain or shortness of breath.  She says she was feeling short of breath earlier when all this happened.  She reports an episode of feeling like her heart was racing last night while she was in bed.  She said it woke her up.  Otherwise she has no chest pain or palpitations today.     Her family is bedside.  They all confirmed that the patient has been told several times that she might need a pacemaker.  On arrival to the emergency room her heart rate was found to be in the 30s to 40s.  She takes amiodarone and metoprolol.  Blood pressures preserved.     Past Medical History  She has a past medical history of Asthma (HHS-HCC), Essential (primary) hypertension, Kidney failure, Leukemia (Multi), and Rheumatoid arthritis (Multi).    Surgical History  She has a past surgical history that includes Hysterectomy; Back surgery; Cholecystectomy; Other surgical history; Knee surgery; Shoulder surgery (Left); and Total knee arthroplasty (Left, 02/13/2014).     Social History  She reports that she has never smoked. She has never been  exposed to tobacco smoke. She has never used smokeless tobacco. She reports current alcohol use. She reports that she does not use drugs.    Family History  Family History   Problem Relation Name Age of Onset    Emphysema Mother      Emphysema Father      Heart attack Sister      Lung cancer Brother          Agent orange    Lymphoma Son      Multiple sclerosis Son          Allergies  Ibuprofen and Amoxicillin    Review of Systems  General: no fatigue, no malaise, no fevers/chills   HENT: no rhinorrhea, no sore throat, no ear pain   Eyes: no change in vision, denies eye pain or discharge   Lungs: + SOB, no cough, no hemoptysis   CV: no chest pain, + palpitations, no leg edema   Abd: no nausea/vomiting, no constipation/diarrhea, no abdominal pain   : no dysuria, no frequency, no nocturia, no flank pain   Endocrine: no polydipsia/polyuria, no hot or cold intolerance   Neuro: no headaches, no syncope, no seizures   MSK: + back pain, no neck pain, + joint problems   Psych: no anxiety, no depression, no hallucinations       Physical Exam  General: alert, no diaphoresis   HENT: mucous membranes moist, external ears normal, no rhinorrhea   Eyes: no icterus or injection, no discharge   Lungs: CTA BL   Heart: irregular and slow, no LE edema BL   GI: abdomen soft, nontender, nondistended, BS present   MSK: left leg appears shortened. TTP to left hip and buttocks.   Skin: ecchymosis noted left anterior arm   Neuro: grossly normal cognition, motor strength, sensation       Last Recorded Vitals  BP (!) 116/48   Pulse (!) 44   Temp 36.1 °C (97 °F) (Oral)   Resp 10   Wt 66.7 kg (147 lb)   SpO2 98%     Relevant Results        Results for orders placed or performed during the hospital encounter of 06/01/24 (from the past 24 hour(s))   Comprehensive Metabolic Panel   Result Value Ref Range    Glucose 88 65 - 99 mg/dL    Sodium 137 133 - 145 mmol/L    Potassium 4.1 3.4 - 5.1 mmol/L    Chloride 98 97 - 107 mmol/L    Bicarbonate  30 24 - 31 mmol/L    Urea Nitrogen 27 (H) 8 - 25 mg/dL    Creatinine 2.60 (H) 0.40 - 1.60 mg/dL    eGFR 18 (L) >60 mL/min/1.73m*2    Calcium 8.7 8.5 - 10.4 mg/dL    Albumin 3.2 (L) 3.5 - 5.0 g/dL    Alkaline Phosphatase 70 35 - 125 U/L    Total Protein 6.0 5.9 - 7.9 g/dL    AST 17 5 - 40 U/L    Bilirubin, Total 0.3 0.1 - 1.2 mg/dL    ALT 7 5 - 40 U/L    Anion Gap 9 <=19 mmol/L   CBC and Auto Differential   Result Value Ref Range    WBC 40.7 (H) 4.4 - 11.3 x10*3/uL    nRBC 0.0 0.0 - 0.0 /100 WBCs    RBC 3.32 (L) 4.00 - 5.20 x10*6/uL    Hemoglobin 10.2 (L) 12.0 - 16.0 g/dL    Hematocrit 33.1 (L) 36.0 - 46.0 %     80 - 100 fL    MCH 30.7 26.0 - 34.0 pg    MCHC 30.8 (L) 32.0 - 36.0 g/dL    RDW 15.8 (H) 11.5 - 14.5 %    Platelets 247 150 - 450 x10*3/uL    Immature Granulocytes %, Automated 0.3 0.0 - 0.9 %    Immature Granulocytes Absolute, Automated 0.14 0.00 - 0.50 x10*3/uL   Protime-INR   Result Value Ref Range    Protime 12.1 9.3 - 12.7 seconds    INR 1.2 0.9 - 1.2   Serial Troponin, Initial ([facility])   Result Value Ref Range    Troponin T, High Sensitivity 47 (HH) <=14 ng/L   Manual Differential   Result Value Ref Range    Neutrophils %, Manual 28.0 40.0 - 80.0 %    Lymphocytes %, Manual 70.0 13.0 - 44.0 %    Monocytes %, Manual 1.0 2.0 - 10.0 %    Eosinophils %, Manual 1.0 0.0 - 6.0 %    Basophils %, Manual 0.0 0.0 - 2.0 %    Seg Neutrophils Absolute, Manual 11.40 (H) 1.60 - 5.00 x10*3/uL    Lymphocytes Absolute, Manual 28.49 (H) 0.80 - 3.00 x10*3/uL    Monocytes Absolute, Manual 0.41 0.05 - 0.80 x10*3/uL    Eosinophils Absolute, Manual 0.41 (H) 0.00 - 0.40 x10*3/uL    Basophils Absolute, Manual 0.00 0.00 - 0.10 x10*3/uL    Total Cells Counted 100     RBC Morphology No significant RBC morphology present    Serial Troponin, 2 Hour ([facility])   Result Value Ref Range    Troponin T, High Sensitivity 46 (HH) <=14 ng/L     CT lumbar spine wo IV contrast    Result Date: 6/1/2024  Interpreted By:  Lissett Joyce, STUDY:  CT LUMBAR SPINE WO IV CONTRAST;  6/1/2024 6:40 pm   INDICATION: Signs/Symptoms:fall   COMPARISON: CT lumbar spine 09/29/2017. CT abdomen and pelvis 05/14/2018.   ACCESSION NUMBER(S): [id number]   ORDERING CLINICIAN: PANDA OCONNOR   TECHNIQUE: Noncontrast CT axial images were obtained through the lumbar spine. Coronal and sagittal reformats were performed.   FINDINGS: There is diffuse osteopenia.   ALIGNMENT: Within normal limits.   VERTEBRAE/DISC SPACES: The vertebral body heights are maintained.  There is no acute fracture. Status post laminectomy at L5. There is multilevel degenerative disc disease with osteophytosis.  Multilevel facet arthropathy is also noted. TENS stimulator wires are entering the spinal canal at T12-L1 and extend cranially along the dorsal aspect of the spinal canal.   ADDITIONAL FINDINGS: The prevertebral soft tissues are unremarkable. The battery pack from the TENS stimulator is noted at the right paraspinal soft tissues.   Atherosclerotic calcifications at the visualized abdominal aorta and its branches. Incidental note is made of sigmoid diverticulosis. There is mild adjacent soft tissue stranding. The visualized sigmoid colon is decompressed with diffuse wall thickening.       1.  No acute fracture at the lumbar spine. Postsurgical and degenerative changes. 2. Diffuse wall thickening at the visualized sigmoid colon, may be secondary to underdistention versus colitis. 3. Sigmoid diverticulosis with mild adjacent soft tissue stranding, may represent chronic changes versus mild/early acute diverticulitis. Clinical correlation recommended.       MACRO: None.   Signed by: Lissett Joyce 6/1/2024 7:19 PM Dictation workstation:   [id number]    CT head wo IV contrast    Result Date: 6/1/2024  Interpreted By:  Lissett Joyce, STUDY: CT HEAD WO IV CONTRAST; CT CERVICAL SPINE WO IV CONTRAST;  6/1/2024 6:40 pm   INDICATION: Signs/Symptoms:injury of head; Signs/Symptoms:fall.   COMPARISON: CT  head 12/14/2023. Cervical spine x-ray 02/28/2012.   ACCESSION NUMBER(S): [id number]; [id number]   ORDERING CLINICIAN: PANDA OCONNOR   TECHNIQUE: Axial noncontrast images of the head  with coronal  and sagittal reconstructed images . Axial noncontrast images of the cervical spine with coronal and sagittal reconstructed images.   FINDINGS: BRAIN PARENCHYMA: There are periventricular white matter hypodensities, probably representing chronic microvascular ischemic changes. Otherwise, the gray-white matter interfaces are preserved. No acute territorial infarct, mass effect or midline shift. HEMORRHAGE: No acute intracranial hemorrhage. VENTRICLES and EXTRA-AXIAL SPACES: Prominent, consistent with diffuse parenchymal volume loss. No extra-axial fluid collection. EXTRACRANIAL SOFT TISSUES: Within normal limits. CALVARIUM: No depressed calvarial fracture. PARANASAL SINUSES: No air-fluid levels. MASTOIDS: Within normal limits.   CERVICAL SPINE: ALIGNMENT: There is increased cervical lordosis. There is mild retrolisthesis of C2 on C3 and of C3 on C4, probably on a degenerative basis. No traumatic facet widening. VERTEBRAE: No acute fracture. DISCS: There is multilevel degenerative disc disease with osteophytosis. There is multilevel facet arthropathy. PREVERTEBRAL SOFT TISSUES: No prevertebral soft tissue swelling. LUNG APICES: No acute findings at the visualized lung apices.         1.  No acute intracranial hemorrhage or depressed calvarial fracture. 2.  No acute fracture at the cervical spine. Degenerative changes.       MACRO: None.   Signed by: Lissett Joyce 6/1/2024 7:06 PM Dictation workstation:   [id number]    CT cervical spine wo IV contrast    Result Date: 6/1/2024  Interpreted By:  Lissett Joyce, STUDY: CT HEAD WO IV CONTRAST; CT CERVICAL SPINE WO IV CONTRAST;  6/1/2024 6:40 pm   INDICATION: Signs/Symptoms:injury of head; Signs/Symptoms:fall.   COMPARISON: CT head 12/14/2023. Cervical spine x-ray  02/28/2012.   ACCESSION NUMBER(S): [id number]; [id number]   ORDERING CLINICIAN: PANDA OCONNOR   TECHNIQUE: Axial noncontrast images of the head  with coronal  and sagittal reconstructed images . Axial noncontrast images of the cervical spine with coronal and sagittal reconstructed images.   FINDINGS: BRAIN PARENCHYMA: There are periventricular white matter hypodensities, probably representing chronic microvascular ischemic changes. Otherwise, the gray-white matter interfaces are preserved. No acute territorial infarct, mass effect or midline shift. HEMORRHAGE: No acute intracranial hemorrhage. VENTRICLES and EXTRA-AXIAL SPACES: Prominent, consistent with diffuse parenchymal volume loss. No extra-axial fluid collection. EXTRACRANIAL SOFT TISSUES: Within normal limits. CALVARIUM: No depressed calvarial fracture. PARANASAL SINUSES: No air-fluid levels. MASTOIDS: Within normal limits.   CERVICAL SPINE: ALIGNMENT: There is increased cervical lordosis. There is mild retrolisthesis of C2 on C3 and of C3 on C4, probably on a degenerative basis. No traumatic facet widening. VERTEBRAE: No acute fracture. DISCS: There is multilevel degenerative disc disease with osteophytosis. There is multilevel facet arthropathy. PREVERTEBRAL SOFT TISSUES: No prevertebral soft tissue swelling. LUNG APICES: No acute findings at the visualized lung apices.         1.  No acute intracranial hemorrhage or depressed calvarial fracture. 2.  No acute fracture at the cervical spine. Degenerative changes.       MACRO: None.   Signed by: Lissett Joyce 6/1/2024 7:06 PM Dictation workstation:   [id number]    XR chest 1 view    Result Date: 6/1/2024  Interpreted By:  Schoenberger, Joseph, STUDY: XR CHEST 1 VIEW;  6/1/2024 4:23 pm   INDICATION: Signs/Symptoms:fall, syncope.   COMPARISON: 12/31/2023   ACCESSION NUMBER(S): [id number]   ORDERING CLINICIAN: PANDA OCONNOR   FINDINGS: Spinal neurostimulator leads unchanged from prior.        CARDIOMEDIASTINAL SILHOUETTE: Cardiac silhouette normal in size without venous congestion.   LUNGS: No significant focal lung opacity.   ABDOMEN: No remarkable upper abdominal findings.   BONES: No acute osseous changes.       1.  No evidence of acute cardiopulmonary process.       MACRO: None   Signed by: Joseph Schoenberger 6/1/2024 4:52 PM Dictation workstation:   [id number]        Assessment/Plan   Principal Problem:    Bradycardia    Bradycardia  -Heart rates 30s to 40s.  Blood pressure with systolics 110s to 120s.  Will hold her metoprolol and amiodarone.  Will also hold her other antihypertensives given her very normal blood pressure.  She has been told previously by her cardiologist that she might need a pacemaker at some point.  Unclear if these episodes of lightheadedness and leg weakness are related to her bradycardia but it does seem suspicious.  -Consult cardiology and EP  -Holding meds as above.  Will also hold Eliquis in case patient does require procedure  -Check TSH  -Patient needs continuous telemetry    Left hip pain  -Was complaining of more flank and lumbar pain for the emergency room but from he was describing hip pain.  Will get x-ray of hip to r/o acute injury. Discussed with ED staff  -Pain control  -PT OT after we confirm no fracture    Recurrent C. difficile infections  -Has been treated multiple times since December.  She ports having 1 dose of oral vancomycin left which has been ordered  -No current diarrhea    CKD stage IV  -Creatinine stable at this time.  Follows with Dr. Amor and we will get him involved with any issues    Atrial fibrillation  -Again, as above, holding Eliquis, amiodarone, metoprolol.  Follows with Dr. Wright and has been cardioverted by him previously which resulted in a long pause.    Hypertension  -Holding metoprolol as well as her Norvasc and Lasix.  Will likely resume Lasix tomorrow if her vitals are stable.    Depression  -Continue duloxetine    Chronic  pain  - continue gabapentin    Hypothyroidism  - synthroid. Check TSH    DVT ppx  -heparin until we can resume hardeep Dietrich,

## 2024-06-02 NOTE — POST-PROCEDURE NOTE
Physician Transition of Care Summary  Invasive Cardiovascular Lab    Procedure Date: 6/2/2024  Attending:    * Invasive Cardiologist Tremaineid - Primary  Resident/Fellow/Other Assistant: Surgeons and Role:  * No surgeons found with a matching role *    Indications:   Pre-op Diagnosis     * STEMI (ST elevation myocardial infarction) (Multi) [I21.3] symptomatic bradycardia    Post-procedure diagnosis:   Post-op Diagnosis     * STEMI (ST elevation myocardial infarction) (Multi) [I21.3] symptomatic bradycardia    Procedure(s):     * Temporary Pacemaker Insertion      Procedure Findings:   Patient was brought to the catheterization lab after informed consent for temporary pacemaker has been obtained in the setting where she was having symptomatic bradycardia with hypotension and heart rates in the 30s beats per minute range.    She was sedated with Versed 0.5 mg and fentanyl 25 mcg and local 1% lidocaine 7 cc used to the right groin for local anesthesia.  A micropuncture technique was used to access the right common femoral vein and micropuncture sheath was placed and then exchanged for 6 Arabic temporary pacemaker sheath.  The temporary pacemaker was placed without difficulty into the apex of the right ventricle.  Her underlying heart rate was 49 bpm and we continue to achieve capture at 0.4 mA of current and then set the maker current to 1.5 mA.  Heart rate set at 40 bpm in an attempt to avoid competition with her AV synchrony with her own spontaneous heart rate of 49 bpm.  No complications and she was returned to the ICU asymptomatic in stable condition    Description of the Procedure:   The dictated report in syngo    Complications:   None    Stents/Implants: Temporary pacemaker insertion  Implants       No implant documentation for this case.            Anticoagulation/Antiplatelet Plan:   None    Estimated Blood Loss:   5 mL    Anesthesia: Moderate Sedation Anesthesia Staff: No anesthesia staff entered.    Any  Specimen(s) Removed:   No specimens collected during this procedure.    Disposition:   Transferred back to the ICU in stable condition      Electronically signed by: Javier Steel DO, 6/2/2024 6:29 PM

## 2024-06-03 ENCOUNTER — APPOINTMENT (OUTPATIENT)
Dept: CARDIOLOGY | Facility: HOSPITAL | Age: 84
End: 2024-06-03
Payer: MEDICARE

## 2024-06-03 ENCOUNTER — APPOINTMENT (OUTPATIENT)
Dept: RADIOLOGY | Facility: HOSPITAL | Age: 84
End: 2024-06-03
Payer: MEDICARE

## 2024-06-03 ENCOUNTER — APPOINTMENT (OUTPATIENT)
Dept: PRIMARY CARE | Facility: CLINIC | Age: 84
End: 2024-06-03
Payer: MEDICARE

## 2024-06-03 PROBLEM — R55 SYNCOPE: Status: ACTIVE | Noted: 2024-06-01

## 2024-06-03 LAB
ALBUMIN SERPL-MCNC: 3 G/DL (ref 3.5–5)
ALP BLD-CCNC: 81 U/L (ref 35–125)
ALT SERPL-CCNC: 7 U/L (ref 5–40)
ANION GAP SERPL CALC-SCNC: 9 MMOL/L
AST SERPL-CCNC: 15 U/L (ref 5–40)
ATRIAL RATE: 43 BPM
BASOPHILS # BLD MANUAL: 0.34 X10*3/UL (ref 0–0.1)
BASOPHILS NFR BLD MANUAL: 1 %
BILIRUB SERPL-MCNC: 0.3 MG/DL (ref 0.1–1.2)
BUN SERPL-MCNC: 24 MG/DL (ref 8–25)
CALCIUM SERPL-MCNC: 8.3 MG/DL (ref 8.5–10.4)
CHLORIDE SERPL-SCNC: 104 MMOL/L (ref 97–107)
CHOLEST SERPL-MCNC: 151 MG/DL (ref 133–200)
CHOLEST/HDLC SERPL: 2.6 {RATIO}
CO2 SERPL-SCNC: 27 MMOL/L (ref 24–31)
CREAT SERPL-MCNC: 2.3 MG/DL (ref 0.4–1.6)
EGFRCR SERPLBLD CKD-EPI 2021: 20 ML/MIN/1.73M*2
EOSINOPHIL # BLD MANUAL: 1.34 X10*3/UL (ref 0–0.4)
EOSINOPHIL NFR BLD MANUAL: 4 %
ERYTHROCYTE [DISTWIDTH] IN BLOOD BY AUTOMATED COUNT: 15.9 % (ref 11.5–14.5)
GLUCOSE SERPL-MCNC: 81 MG/DL (ref 65–99)
HCT VFR BLD AUTO: 32.4 % (ref 36–46)
HDLC SERPL-MCNC: 59 MG/DL
HGB BLD-MCNC: 10 G/DL (ref 12–16)
IMM GRANULOCYTES # BLD AUTO: 0.06 X10*3/UL (ref 0–0.5)
IMM GRANULOCYTES NFR BLD AUTO: 0.2 % (ref 0–0.9)
LDLC SERPL CALC-MCNC: 70 MG/DL (ref 65–130)
LYMPHOCYTES # BLD MANUAL: 21.84 X10*3/UL (ref 0.8–3)
LYMPHOCYTES NFR BLD MANUAL: 65 %
MAGNESIUM SERPL-MCNC: 2 MG/DL (ref 1.6–3.1)
MCH RBC QN AUTO: 30.8 PG (ref 26–34)
MCHC RBC AUTO-ENTMCNC: 30.9 G/DL (ref 32–36)
MCV RBC AUTO: 100 FL (ref 80–100)
METAMYELOCYTES # BLD MANUAL: 0.34 X10*3/UL
METAMYELOCYTES NFR BLD MANUAL: 1 %
MONOCYTES # BLD MANUAL: 1.34 X10*3/UL (ref 0.05–0.8)
MONOCYTES NFR BLD MANUAL: 4 %
NEUTS SEG # BLD MANUAL: 8.06 X10*3/UL (ref 1.6–5)
NEUTS SEG NFR BLD MANUAL: 24 %
NRBC BLD-RTO: 0 /100 WBCS (ref 0–0)
P AXIS: 74 DEGREES
P OFFSET: 166 MS
P ONSET: 136 MS
PHOSPHATE SERPL-MCNC: 4.3 MG/DL (ref 2.5–4.5)
PLATELET # BLD AUTO: 200 X10*3/UL (ref 150–450)
POTASSIUM SERPL-SCNC: 4.3 MMOL/L (ref 3.4–5.1)
PR INTERVAL: 166 MS
PROT SERPL-MCNC: 5.4 G/DL (ref 5.9–7.9)
Q ONSET: 219 MS
QRS COUNT: 7 BEATS
QRS DURATION: 90 MS
QT INTERVAL: 478 MS
QTC CALCULATION(BAZETT): 403 MS
QTC FREDERICIA: 427 MS
R AXIS: -20 DEGREES
RBC # BLD AUTO: 3.25 X10*6/UL (ref 4–5.2)
RBC MORPH BLD: ABNORMAL
SODIUM SERPL-SCNC: 140 MMOL/L (ref 133–145)
T AXIS: 20 DEGREES
T OFFSET: 458 MS
TOTAL CELLS COUNTED BLD: 100
TRIGL SERPL-MCNC: 108 MG/DL (ref 40–150)
VARIANT LYMPHS # BLD MANUAL: 0.34 X10*3/UL (ref 0–0.3)
VARIANT LYMPHS NFR BLD: 1 %
VENTRICULAR RATE: 43 BPM
WBC # BLD AUTO: 33.6 X10*3/UL (ref 4.4–11.3)

## 2024-06-03 PROCEDURE — C1892 INTRO/SHEATH,FIXED,PEEL-AWAY: HCPCS | Performed by: INTERNAL MEDICINE

## 2024-06-03 PROCEDURE — 85027 COMPLETE CBC AUTOMATED: CPT | Performed by: INTERNAL MEDICINE

## 2024-06-03 PROCEDURE — 2550000001 HC RX 255 CONTRASTS: Performed by: INTERNAL MEDICINE

## 2024-06-03 PROCEDURE — 99152 MOD SED SAME PHYS/QHP 5/>YRS: CPT | Performed by: INTERNAL MEDICINE

## 2024-06-03 PROCEDURE — 99291 CRITICAL CARE FIRST HOUR: CPT

## 2024-06-03 PROCEDURE — 3E0102A INTRODUCTION OF ANTI-INFECTIVE ENVELOPE INTO SUBCUTANEOUS TISSUE, OPEN APPROACH: ICD-10-PCS | Performed by: INTERNAL MEDICINE

## 2024-06-03 PROCEDURE — 02H63JZ INSERTION OF PACEMAKER LEAD INTO RIGHT ATRIUM, PERCUTANEOUS APPROACH: ICD-10-PCS | Performed by: INTERNAL MEDICINE

## 2024-06-03 PROCEDURE — 71045 X-RAY EXAM CHEST 1 VIEW: CPT | Performed by: RADIOLOGY

## 2024-06-03 PROCEDURE — 2500000004 HC RX 250 GENERAL PHARMACY W/ HCPCS (ALT 636 FOR OP/ED): Mod: JZ | Performed by: INTERNAL MEDICINE

## 2024-06-03 PROCEDURE — 2500000004 HC RX 250 GENERAL PHARMACY W/ HCPCS (ALT 636 FOR OP/ED): Mod: JZ | Performed by: REGISTERED NURSE

## 2024-06-03 PROCEDURE — 80061 LIPID PANEL: CPT | Performed by: INTERNAL MEDICINE

## 2024-06-03 PROCEDURE — C1898 LEAD, PMKR, OTHER THAN TRANS: HCPCS | Performed by: INTERNAL MEDICINE

## 2024-06-03 PROCEDURE — 37799 UNLISTED PX VASCULAR SURGERY: CPT | Performed by: INTERNAL MEDICINE

## 2024-06-03 PROCEDURE — 2500000004 HC RX 250 GENERAL PHARMACY W/ HCPCS (ALT 636 FOR OP/ED): Performed by: INTERNAL MEDICINE

## 2024-06-03 PROCEDURE — 33208 INSRT HEART PM ATRIAL & VENT: CPT | Performed by: INTERNAL MEDICINE

## 2024-06-03 PROCEDURE — C1785 PMKR, DUAL, RATE-RESP: HCPCS | Performed by: INTERNAL MEDICINE

## 2024-06-03 PROCEDURE — 93005 ELECTROCARDIOGRAM TRACING: CPT

## 2024-06-03 PROCEDURE — 99232 SBSQ HOSP IP/OBS MODERATE 35: CPT | Performed by: INTERNAL MEDICINE

## 2024-06-03 PROCEDURE — 2500000002 HC RX 250 W HCPCS SELF ADMINISTERED DRUGS (ALT 637 FOR MEDICARE OP, ALT 636 FOR OP/ED): Performed by: INTERNAL MEDICINE

## 2024-06-03 PROCEDURE — 2500000001 HC RX 250 WO HCPCS SELF ADMINISTERED DRUGS (ALT 637 FOR MEDICARE OP): Performed by: INTERNAL MEDICINE

## 2024-06-03 PROCEDURE — 99153 MOD SED SAME PHYS/QHP EA: CPT | Performed by: INTERNAL MEDICINE

## 2024-06-03 PROCEDURE — 2750000001 HC OR 275 NO HCPCS: Performed by: INTERNAL MEDICINE

## 2024-06-03 PROCEDURE — C1894 INTRO/SHEATH, NON-LASER: HCPCS | Performed by: INTERNAL MEDICINE

## 2024-06-03 PROCEDURE — C1889 IMPLANT/INSERT DEVICE, NOC: HCPCS | Performed by: INTERNAL MEDICINE

## 2024-06-03 PROCEDURE — 0JH606Z INSERTION OF PACEMAKER, DUAL CHAMBER INTO CHEST SUBCUTANEOUS TISSUE AND FASCIA, OPEN APPROACH: ICD-10-PCS | Performed by: INTERNAL MEDICINE

## 2024-06-03 PROCEDURE — 2780000003 HC OR 278 NO HCPCS: Performed by: INTERNAL MEDICINE

## 2024-06-03 PROCEDURE — 71045 X-RAY EXAM CHEST 1 VIEW: CPT

## 2024-06-03 PROCEDURE — C1769 GUIDE WIRE: HCPCS | Performed by: INTERNAL MEDICINE

## 2024-06-03 PROCEDURE — 02HK3JZ INSERTION OF PACEMAKER LEAD INTO RIGHT VENTRICLE, PERCUTANEOUS APPROACH: ICD-10-PCS | Performed by: INTERNAL MEDICINE

## 2024-06-03 PROCEDURE — 83735 ASSAY OF MAGNESIUM: CPT | Performed by: INTERNAL MEDICINE

## 2024-06-03 PROCEDURE — 2720000007 HC OR 272 NO HCPCS: Performed by: INTERNAL MEDICINE

## 2024-06-03 PROCEDURE — 2020000001 HC ICU ROOM DAILY

## 2024-06-03 PROCEDURE — 80053 COMPREHEN METABOLIC PANEL: CPT | Performed by: INTERNAL MEDICINE

## 2024-06-03 PROCEDURE — 85007 BL SMEAR W/DIFF WBC COUNT: CPT | Performed by: INTERNAL MEDICINE

## 2024-06-03 PROCEDURE — 84100 ASSAY OF PHOSPHORUS: CPT | Performed by: INTERNAL MEDICINE

## 2024-06-03 DEVICE — PACING LEAD
Type: IMPLANTABLE DEVICE | Site: HEART | Status: FUNCTIONAL
Brand: TENDRIL™

## 2024-06-03 DEVICE — PULSE GENERATOR
Type: IMPLANTABLE DEVICE | Site: CHEST | Status: FUNCTIONAL
Brand: ASSURITY MRI™

## 2024-06-03 RX ORDER — IODIXANOL 320 MG/ML
INJECTION, SOLUTION INTRAVASCULAR AS NEEDED
Status: DISCONTINUED | OUTPATIENT
Start: 2024-06-03 | End: 2024-06-03 | Stop reason: HOSPADM

## 2024-06-03 RX ORDER — MIDAZOLAM HYDROCHLORIDE 1 MG/ML
INJECTION, SOLUTION INTRAMUSCULAR; INTRAVENOUS AS NEEDED
Status: DISCONTINUED | OUTPATIENT
Start: 2024-06-03 | End: 2024-06-03 | Stop reason: HOSPADM

## 2024-06-03 RX ORDER — VANCOMYCIN 1 G/200ML
1 INJECTION, SOLUTION INTRAVENOUS ONCE
Status: COMPLETED | OUTPATIENT
Start: 2024-06-03 | End: 2024-06-03

## 2024-06-03 RX ORDER — BUPIVACAINE HYDROCHLORIDE 2.5 MG/ML
INJECTION, SOLUTION EPIDURAL; INFILTRATION; INTRACAUDAL AS NEEDED
Status: DISCONTINUED | OUTPATIENT
Start: 2024-06-03 | End: 2024-06-03 | Stop reason: HOSPADM

## 2024-06-03 RX ORDER — SODIUM CHLORIDE 9 MG/ML
1.5 INJECTION, SOLUTION INTRAVENOUS CONTINUOUS
Status: DISCONTINUED | OUTPATIENT
Start: 2024-06-03 | End: 2024-06-03

## 2024-06-03 RX ORDER — CLOPIDOGREL BISULFATE 75 MG/1
75 TABLET ORAL DAILY
Status: DISCONTINUED | OUTPATIENT
Start: 2024-06-03 | End: 2024-06-03

## 2024-06-03 RX ORDER — HEPARIN SODIUM 5000 [USP'U]/ML
5000 INJECTION, SOLUTION INTRAVENOUS; SUBCUTANEOUS EVERY 8 HOURS
Status: DISCONTINUED | OUTPATIENT
Start: 2024-06-03 | End: 2024-06-03 | Stop reason: SDUPTHER

## 2024-06-03 RX ORDER — FENTANYL CITRATE 50 UG/ML
INJECTION, SOLUTION INTRAMUSCULAR; INTRAVENOUS AS NEEDED
Status: DISCONTINUED | OUTPATIENT
Start: 2024-06-03 | End: 2024-06-03 | Stop reason: HOSPADM

## 2024-06-03 RX ADMIN — SODIUM CHLORIDE 1.5 ML/KG/HR: 900 INJECTION, SOLUTION INTRAVENOUS at 01:08

## 2024-06-03 RX ADMIN — SIMVASTATIN 10 MG: 10 TABLET, FILM COATED ORAL at 20:17

## 2024-06-03 RX ADMIN — VANCOMYCIN 1 G: 1 INJECTION, SOLUTION INTRAVENOUS at 10:56

## 2024-06-03 RX ADMIN — HEPARIN SODIUM 5000 UNITS: 5000 INJECTION, SOLUTION INTRAVENOUS; SUBCUTANEOUS at 04:44

## 2024-06-03 RX ADMIN — GABAPENTIN 300 MG: 300 CAPSULE ORAL at 20:17

## 2024-06-03 RX ADMIN — ACETAMINOPHEN 975 MG: 325 TABLET ORAL at 20:17

## 2024-06-03 RX ADMIN — LEVOTHYROXINE SODIUM 150 MCG: 0.15 TABLET ORAL at 05:04

## 2024-06-03 SDOH — SOCIAL STABILITY: SOCIAL INSECURITY: WITHIN THE LAST YEAR, HAVE YOU BEEN AFRAID OF YOUR PARTNER OR EX-PARTNER?: NO

## 2024-06-03 SDOH — SOCIAL STABILITY: SOCIAL INSECURITY: WITHIN THE LAST YEAR, HAVE YOU BEEN HUMILIATED OR EMOTIONALLY ABUSED IN OTHER WAYS BY YOUR PARTNER OR EX-PARTNER?: NO

## 2024-06-03 SDOH — HEALTH STABILITY: MENTAL HEALTH: HOW OFTEN DO YOU HAVE A DRINK CONTAINING ALCOHOL?: NEVER

## 2024-06-03 SDOH — ECONOMIC STABILITY: INCOME INSECURITY: IN THE PAST 12 MONTHS, HAS THE ELECTRIC, GAS, OIL, OR WATER COMPANY THREATENED TO SHUT OFF SERVICE IN YOUR HOME?: NO

## 2024-06-03 SDOH — HEALTH STABILITY: PHYSICAL HEALTH: ON AVERAGE, HOW MANY MINUTES DO YOU ENGAGE IN EXERCISE AT THIS LEVEL?: 0 MIN

## 2024-06-03 SDOH — HEALTH STABILITY: MENTAL HEALTH
HOW OFTEN DO YOU NEED TO HAVE SOMEONE HELP YOU WHEN YOU READ INSTRUCTIONS, PAMPHLETS, OR OTHER WRITTEN MATERIAL FROM YOUR DOCTOR OR PHARMACY?: OFTEN

## 2024-06-03 SDOH — SOCIAL STABILITY: SOCIAL NETWORK
DO YOU BELONG TO ANY CLUBS OR ORGANIZATIONS SUCH AS CHURCH GROUPS UNIONS, FRATERNAL OR ATHLETIC GROUPS, OR SCHOOL GROUPS?: YES

## 2024-06-03 SDOH — ECONOMIC STABILITY: FOOD INSECURITY: WITHIN THE PAST 12 MONTHS, YOU WORRIED THAT YOUR FOOD WOULD RUN OUT BEFORE YOU GOT MONEY TO BUY MORE.: NEVER TRUE

## 2024-06-03 SDOH — ECONOMIC STABILITY: INCOME INSECURITY: IN THE LAST 12 MONTHS, WAS THERE A TIME WHEN YOU WERE NOT ABLE TO PAY THE MORTGAGE OR RENT ON TIME?: NO

## 2024-06-03 SDOH — SOCIAL STABILITY: SOCIAL NETWORK: HOW OFTEN DO YOU ATTEND CHURCH OR RELIGIOUS SERVICES?: NEVER

## 2024-06-03 SDOH — ECONOMIC STABILITY: INCOME INSECURITY: HOW HARD IS IT FOR YOU TO PAY FOR THE VERY BASICS LIKE FOOD, HOUSING, MEDICAL CARE, AND HEATING?: NOT VERY HARD

## 2024-06-03 SDOH — ECONOMIC STABILITY: FOOD INSECURITY: WITHIN THE PAST 12 MONTHS, THE FOOD YOU BOUGHT JUST DIDN'T LAST AND YOU DIDN'T HAVE MONEY TO GET MORE.: NEVER TRUE

## 2024-06-03 SDOH — HEALTH STABILITY: PHYSICAL HEALTH: ON AVERAGE, HOW MANY DAYS PER WEEK DO YOU ENGAGE IN MODERATE TO STRENUOUS EXERCISE (LIKE A BRISK WALK)?: 0 DAYS

## 2024-06-03 SDOH — SOCIAL STABILITY: SOCIAL NETWORK: HOW OFTEN DO YOU GET TOGETHER WITH FRIENDS OR RELATIVES?: MORE THAN THREE TIMES A WEEK

## 2024-06-03 SDOH — HEALTH STABILITY: MENTAL HEALTH: HOW OFTEN DO YOU HAVE 6 OR MORE DRINKS ON ONE OCCASION?: NEVER

## 2024-06-03 SDOH — SOCIAL STABILITY: SOCIAL NETWORK: ARE YOU MARRIED, WIDOWED, DIVORCED, SEPARATED, NEVER MARRIED, OR LIVING WITH A PARTNER?: WIDOWED

## 2024-06-03 SDOH — HEALTH STABILITY: MENTAL HEALTH: HOW MANY STANDARD DRINKS CONTAINING ALCOHOL DO YOU HAVE ON A TYPICAL DAY?: PATIENT DOES NOT DRINK

## 2024-06-03 SDOH — SOCIAL STABILITY: SOCIAL NETWORK: HOW OFTEN DO YOU ATTENT MEETINGS OF THE CLUB OR ORGANIZATION YOU BELONG TO?: 1 TO 4 TIMES PER YEAR

## 2024-06-03 SDOH — ECONOMIC STABILITY: HOUSING INSECURITY: IN THE LAST 12 MONTHS, HOW MANY PLACES HAVE YOU LIVED?: 1

## 2024-06-03 ASSESSMENT — PAIN SCALES - GENERAL
PAINLEVEL_OUTOF10: 0 - NO PAIN
PAINLEVEL_OUTOF10: 3
PAINLEVEL_OUTOF10: 0 - NO PAIN

## 2024-06-03 ASSESSMENT — PAIN - FUNCTIONAL ASSESSMENT
PAIN_FUNCTIONAL_ASSESSMENT: 0-10

## 2024-06-03 ASSESSMENT — PAIN DESCRIPTION - ORIENTATION: ORIENTATION: RIGHT;UPPER

## 2024-06-03 ASSESSMENT — LIFESTYLE VARIABLES
AUDIT-C TOTAL SCORE: 0
SKIP TO QUESTIONS 9-10: 1

## 2024-06-03 ASSESSMENT — ACTIVITIES OF DAILY LIVING (ADL): LACK_OF_TRANSPORTATION: NO

## 2024-06-03 ASSESSMENT — PAIN DESCRIPTION - DESCRIPTORS: DESCRIPTORS: ACHING

## 2024-06-03 ASSESSMENT — PAIN DESCRIPTION - LOCATION: LOCATION: CHEST

## 2024-06-03 NOTE — POST-PROCEDURE NOTE
Physician Transition of Care Summary  Invasive Cardiovascular Lab    Procedure Date: 6/2/2024  Attending:    * Javier Steel - Primary  Resident/Fellow/Other Assistant: Surgeons and Role:  * No surgeons found with a matching role *    Indications:   Pre-op Diagnosis     * STEMI (ST elevation myocardial infarction) (Multi) [I21.3]    Post-procedure diagnosis:   Post-op Diagnosis     * STEMI (ST elevation myocardial infarction) (Multi) [I21.3]    Procedure(s):     * Temporary Pacemaker Insertion      Procedure Findings:   #1 the left main is a moderate vessel bifurcating into the LAD and circumflex, had modest calcification and mild nonobstructive disease.    2.  The LAD is a densely calcified vessel in the ostium and proximal LAD had up to 50% stenosis.  The LAD was a large vessel that extended around the apex by the distal inferoapical wall.  There were a few small diagonal branches without obstructive disease.    3.  Circumflex is a moderate-sized nondominant vessel that had severe proximal and mid calcification with a 70% proximal stenosis and then the remaining diagonal branch and posterolateral branches had no significant obstructive disease.    4.  The right coronary artery is a large and dominant vessel but severely diffusely calcified throughout its proximal mid and distal segments.  There is a 80% very proximal stenosis in the region of previous coronary stent, then severe 90% stenosis that was calcified in the mid vessel, followed by an eccentric 70% stenosis just before the bifurcation of the PDA, and the PDA branch was a modest size but small caliber vessel without obstructive disease.  The distal right coronary artery had a 70% obstruction before a large posterolateral branch.    Left ventriculogram performed in the NEELY 25 projection showed mild hypokinesis of the mid inferior wall but left ventricular ejection fraction estimated at 60%.  There is no significant gradient across the aortic valve  suggesting aortic valve sclerosis without significant stenosis.    PCI prevention report    Patient was on chronic clopidogrel and was compliant with all of her dosing and no clopidogrel loading was performed in this setting.  She had received 324 mg of aspirin prior to arrival in the cardiac Cath Lab    She received 5000 unit heparin bolus achieving an ACT of 324.    Using a 6 Georgian JR4 intervention guide the right coronary artery was cannulated without difficulty and a 0.1 482 cm Choice PT to intermediate wire was advanced to the level of the PDA branch due to eccentric and dense calcification the guidewire would not advance into the distal right coronary artery.  A 3 /12 mm Euphora compliant balloon would not cross the severe mid vessel stenosis despite multiple attempts due to the dense calcification throughout the vessel.  This balloon was exchanged for a 2.5/12 mm Euphora balloon, but not inflated due to concern that PTCA had a high likelihood of suboptimal results due to the dense calcification and concern that coronary stents would not be deliverable in the mid and distal vessel.  At this point the patient started to develop some mild angina and was placed on intravenous heparin infusion along with intravenous nitroglycerin infusion, and had received intravenous metoprolol prior to this, and the balloon catheter was withdrawn and her angina resolved.    After discussion with the [facility] cardiology team including Dr. Mcnally covering the CICU, and  covering interventions, we decided to transfer the patient to [facility] with intra-aortic balloon pump support, with DREW grade III flow in all vessels and angina free status.    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  A 40 cm intra-aortic balloon pump was deployed under fluoroscopic guidance without difficulty and hemodynamics showed blood pressure of 115/66 with an augmented pressure of 156 mmHg, heart rate of 71, and the patient was  angina free i and was monitored in the cardiac catheterization lab until the  transfer team arrived to transport to [facility].  Description of the Procedure:   See cardiac catheterization final report    Complications:   None    Stents/Implants: Intra-aortic balloon pump inserted  Implants       No implant documentation for this case.            Anticoagulation/Antiplatelet Plan:   She has been maintained on clopidogrel 75 mg daily on a chronic basis, and is on intravenous heparin low-dose protocol    Estimated Blood Loss:   25mL    Anesthesia: Moderate Sedation Anesthesia Staff: No anesthesia staff entered.    Any Specimen(s) Removed:   No specimens collected during this procedure.    Disposition:   Transfer by  transfer team to [facility]      Electronically signed by: Javier Steel DO, 6/2/2024 10:10 PM

## 2024-06-03 NOTE — PROGRESS NOTES
Physical Therapy                 Therapy Communication Note    Patient Name: Tracie Baltazar  MRN: [id number]  Today's Date: 6/3/2024     Discipline: Physical Therapy    Missed Visit Reason: Missed Visit Reason: Cancel (per nurse Hernandez---pt going for a pacemaker today; Will cancel and wait to see post-op as appropriate)    Missed Time: Cancel    Comment:

## 2024-06-03 NOTE — PROGRESS NOTES
06/03/24 1536   [facility] Disability Status   Are you deaf or do you have serious difficulty hearing? N   Are you blind or do you have serious difficulty seeing, even when wearing glasses? N   Because of a physical, mental, or emotional condition, do you have serious difficulty concentrating, remembering, or making decisions? (5 years old or older) N   Do you have serious difficulty walking or climbing stairs? N   Do you have serious difficulty dressing or bathing? N   Because of a physical, mental, or emotional condition, do you have serious difficulty doing errands alone such as visiting the doctor? N

## 2024-06-03 NOTE — CONSULTS
Inpatient consult to Cardiology  Consult performed by: JUMA Campbell-CNP  Consult ordered by: Javier Steel DO  Reason for consult: PPM placement        History Of Present Illness:    Tracie Baltazar is a 84 y.o. female presenting with recurrent falls over the last month.  Patient has a history of paroxysmal atrial fibrillation and leukemia, hypertension, dyslipidemia,  CKD stage III-IV and chronic C. difficile.  Patient was maintained on amiodarone and low-dose beta-blocker and upon admission noted to be quite bradycardic with a heart rate of 32 bpm.  Patient states she has had 5 falls within the last month without any warning.  The patient was admitted in December started on diltiazem and had a spontaneous conversion to sinus with a asymptomatic 6-second pause.  At that time her beta-blocker was discontinued but a tiny dose we added prior to admission of 12.5 mg daily.  There was plans for her to wear a Holter monitor with results not revealing any marked bradycardia or pauses.  Patient has been weak but attributes it to her weight loss from C. difficile, recent completion of oral vancomycin approximately 2 weeks ago.  Patient was admitted to telemetry with continued symptoms and is symptomatic near 5-second pause, transferred to the ICU for dopamine, heart rates in the 40s had hypoxemia and lightheadedness.  She was taken to the cardiac catheterization lab for placement of a right femoral temporary wire.  With improvement in symptoms.  Trickle rate was set at 40 since been anywhere 40-50 bpm laboratory studies were unremarkable baseline creatinine of 2.3 elevated WBC with no leukocytosis 33.6, TSH elevated 9.18 December echocardiogram nl left ventricular function, nuclear stress test no reversible ischemia ejection fraction 65%  Last Recorded Vitals:  Vitals:    06/03/24 0600 06/03/24 0641 06/03/24 0800 06/03/24 1015   BP:   145/52 137/54   Pulse: (!) 47  (!) 46 (!) 49   Resp:       Temp:        [facility]:       SpO2: 95%  96% 95%   Weight:  72.2 kg (159 lb 2.8 oz)     Height:           Last Labs:  CBC - 6/3/2024:  4:23 AM  33.6 10.0 200    32.4      CMP - 6/3/2024:  4:23 AM  8.3 5.4 15 --- 0.3   4.3 3.0 7 81      PTT - No results in last year.  1.2   12.1 _     Hemoglobin A1C   Date/Time Value Ref Range Status   12/19/2019 10:50 AM 5.4 4.0 - 6.0 % Final     Comment:     Hemoglobin A1C levels are related to mean blood glucose during the   preceding 2-3 months. The relationship table below may be used as a   general guide. Each 1% increase in HGB A1C is a reflection of an   increase in mean glucose of approximately 30 mg/dl.   Reference: Diabetes Care, volume 29, supplement 1 Jan. 2006                        HGB A1C ................. Approx. Mean Glucose   _______________________________________________   6%   ...............................  120 mg/dl   7%   ...............................  150 mg/dl   8%   ...............................  180 mg/dl   9%   ...............................  210 mg/dl   10%  ...............................  240 mg/dl  Performed at [address] [address]       LDL Calculated   Date/Time Value Ref Range Status   06/03/2024 04:23 AM 70 65 - 130 mg/dL Final   12/08/2022 09:43  (H) 65 - 130 MG/DL Final   01/25/2021 02:53  65 - 130 MG/DL Final      Last I/O:  I/O last 3 completed shifts:  In: 2323.3 (32.2 mL/kg) [P.O.:550; I.V.:1773.3 (24.6 mL/kg)]  Out: 1155 (16 mL/kg) [Urine:1150 (0.4 mL/kg/hr); Blood:5]  Weight: 72.2 kg     Past Cardiology Tests (Last 3 Years):  EKG:  ECG 12 Lead 06/01/2024 (Preliminary)      ECG 12 lead 12/24/2023    Echo:  Transthoracic Echo (TTE) Complete 12/15/2023    Ejection Fractions:  EF   Date/Time Value Ref Range Status   12/15/2023 08:53 AM 56       Cath:  No results found for this or any previous visit from the past 1095 days.    Stress Test:  Nuclear Stress Test 12/26/2023    Cardiac Imaging:  No results found for  this or any previous visit from the past 1095 days.      Past Medical History:  She has a past medical history of Asthma (HHS-HCC), Essential (primary) hypertension, Kidney failure, Leukemia (Multi), and Rheumatoid arthritis (Multi).    Past Surgical History:  She has a past surgical history that includes Hysterectomy; Back surgery; Cholecystectomy; Other surgical history; Knee surgery; Shoulder surgery (Left); and Total knee arthroplasty (Left, 02/13/2014).      Social History:  She reports that she has never smoked. She has never been exposed to tobacco smoke. She has never used smokeless tobacco. She reports current alcohol use. She reports that she does not use drugs.    Family History:  Family History   Problem Relation Name Age of Onset    Emphysema Mother      Emphysema Father      Heart attack Sister      Lung cancer Brother          Agent orange    Lymphoma Son      Multiple sclerosis Son          Allergies:  Ibuprofen and Amoxicillin    Inpatient Medications:  Scheduled medications   Medication Dose Route Frequency    acetaminophen  975 mg oral TID    clopidogrel  75 mg oral Daily    cyanocobalamin  100 mcg oral Daily    DULoxetine  20 mg oral Daily    gabapentin  300 mg oral BID    [Held by provider] heparin (porcine)  5,000 Units subcutaneous q8h    levothyroxine  150 mcg oral Daily    lidocaine  2 patch transdermal Daily    simvastatin  10 mg oral Nightly    vancomycin  1 g intravenous Once     PRN medications   Medication    oxyCODONE    polyethylene glycol     Continuous Medications   Medication Dose Last Rate    norepinephrine  0-0.2 mcg/kg/min Stopped (06/03/24 0015)     Outpatient Medications:  Current Outpatient Medications   Medication Instructions    acetaminophen (TYLENOL) 650 mg, oral, Every 4 hours PRN    amiodarone (PACERONE) 200 mg, oral, Daily    amLODIPine (NORVASC) 5 mg, oral, Daily    apixaban (ELIQUIS) 2.5 mg, oral, 2 times daily    cholecalciferol (Vitamin D-3) 50 MCG (2000 UT) tablet  Vitamin D 50 MCG (2000 UT) Oral Tablet   Refills: 0       Active    cyanocobalamin (Vitamin B-12) 100 mcg tablet = 1 tab(s) ( 100 mcg ), Oral, daily, 0 Refill(s), Type: Maintenance    DULoxetine (CYMBALTA) 20 mg, oral, Daily, Do not crush or chew.    furosemide (LASIX) 60 mg, oral, Daily    gabapentin (NEURONTIN) 300 mg, oral, 2 times daily    hydrALAZINE (Apresoline) 10 mg tablet oral    levothyroxine (Synthroid, Levoxyl) 150 mcg tablet TAKE 1 TABLET BY MOUTH ONCE DAILY IN THE MORNING AS DIRECTED    metoprolol succinate XL (TOPROL-XL) 12.5 mg, oral, Daily, Do not crush or chew.    ondansetron ODT (ZOFRAN-ODT) 4 mg, oral    polyethylene glycol (GLYCOLAX, MIRALAX) 17 g, oral, Daily PRN    simvastatin (ZOCOR) 10 mg, oral, Nightly    traMADol (Ultram) 50 mg tablet Every 24 hours       Physical Exam:  Gen: A+O x 3, no acute distress, lying flat comfortably  HEENT: Normocephalic/atraumatic pupils equal react light  Neck: No JVD, upstrokes and volumes nl, no bruits   Lung: Lungs clear anteriorly   C V:  nl sounding S1, S2, softmurmur, PMI nondisplaced  Abdomen: soft, non tender, + BS x 4   Extremities: Right groin femoral line with temporary pacing wire no hematoma or ecchymosis, pulses palpable bilaterally no edema   Neuro: no neurologic deficitswith normal free thyroxine     Assessment/Plan   84-year-old female history of paroxysmal atrial fibrillation maintained on amiodarone for hybrid approach with low-dose beta-blocker now with progressive conduction disease marked bradycardia occasional junctional escape with suspected pauses for episodes of syncope.  Required temporary line yesterday and has been stable since  Current status with need for permanent pacemaker implantation then will allow for treatment of her PAF  N.p.o. after midnight  1 g vancomycin preprocedure, has a history of C. difficile on liquid vancomycin developed a rash pharmacy recommends infusing vancomycin over 2 hours unable to utilize Ancef for  documented amoxicillin allergy  Removal of temporary wire  Procedure discussed with patient and daughter  Discussed with Dr. Chisholm                Peripheral IV 06/01/24 20 G Antecubital (Active)   Site Assessment Clean;Dry;Intact 06/03/24 0900   Dressing Status Dry;Clean 06/03/24 0900   Number of days: 2       Peripheral IV 06/02/24 22 G Distal;Right;Ventral Forearm (Active)   Site Assessment Clean;Dry;Intact 06/03/24 0900   Dressing Status Dry;Clean 06/03/24 0900   Number of days: 1       Pacer Wires (Active)   Site Assessment Clean;Dry;Intact 06/03/24 0907   Dressing Status Dry;Clean;Intact 06/03/24 0907   Number of days: 1       Arterial Sheath 6 Fr. Right Femoral (Active)   Site Assessment Clean;Dry;Intact 06/03/24 0500   Line Status Intact and in place 06/03/24 0500   Dressing Occlusive 06/03/24 0500   Dressing Status Clean;Dry;Intact 06/03/24 0500   Color/Movement/Sensation Capillary refill less than 3 sec 06/03/24 0500   Number of days: 1       Urethral Catheter 16 Fr. (Active)   Site Assessment Clean;Skin intact 06/03/24 0907   Number of days: 1       Code Status:  DNR and No Intubation    I spent 40 minutes in the professional and overall care of this patient.        JUMA Campbell-CNP

## 2024-06-03 NOTE — PROGRESS NOTES
TCC met with patient and family. Assessment complete. Patient lives with her son. Patient is fairly independent. Patient has had several falls in the past few weeks. Patient uses a rollator. Patient recently active with palliative care, family unsure what agency. Patient follows Dr. Javier Floyd. Patient fills prescriptions at [facility] in [place]. At this time, patient wants to see how she does with therapy after her pacemaker placement before deciding on discharge plan. Will follow.      06/03/24 6646   Discharge Planning   Living Arrangements Children   Support Systems Children;Family members   Type of Residence Private residence   Home or Post Acute Services Other (Comment)  (TBD)   Patient expects to be discharged to: TBD   Does the patient need discharge transport arranged? No   Financial Resource Strain   How hard is it for you to pay for the very basics like food, housing, medical care, and heating? Not very   Housing Stability   In the last 12 months, was there a time when you were not able to pay the mortgage or rent on time? N   In the last 12 months, how many places have you lived? 1   In the last 12 months, was there a time when you did not have a steady place to sleep or slept in a shelter (including now)? N   Transportation Needs   In the past 12 months, has lack of transportation kept you from medical appointments or from getting medications? no   In the past 12 months, has lack of transportation kept you from meetings, work, or from getting things needed for daily living? No

## 2024-06-03 NOTE — PROGRESS NOTES
"Tracie Baltazar is a 84 y.o. female on day 2 of admission presenting with Bradycardia.    Subjective   She awake alert no complaints of chest pain or discomforts.  Does not get dizzy or lightheaded.  Her heart rate is in the 40s pacemaker is rarely used       Objective     Physical Exam  HENT:      Head: Normocephalic.   Cardiovascular:      Rate and Rhythm: Bradycardia present.      Heart sounds: No murmur heard.  Pulmonary:      Breath sounds: Normal breath sounds.   Abdominal:      Palpations: Abdomen is soft.   Neurological:      Mental Status: She is alert.         Last Recorded Vitals  Blood pressure (!) 146/49, pulse (!) 47, temperature 36.8 °C (98.3 °F), temperature source Temporal, resp. rate 18, height 1.6 m (5' 3\"), weight 72.2 kg (159 lb 2.8 oz), SpO2 95%.  Intake/Output last 3 Shifts:  I/O last 3 completed shifts:  In: 2323.3 (32.2 mL/kg) [P.O.:550; I.V.:1773.3 (24.6 mL/kg)]  Out: 1155 (16 mL/kg) [Urine:1150 (0.4 mL/kg/hr); Blood:5]  Weight: 72.2 kg     Relevant Results           This patient currently has cardiac telemetry ordered; if you would like to modify or discontinue the telemetry order, click here to go to the orders activity to modify/discontinue the order.  Results for orders placed or performed during the hospital encounter of 06/01/24 (from the past 24 hour(s))   Basic Metabolic Panel   Result Value Ref Range    Glucose 94 65 - 99 mg/dL    Sodium 138 133 - 145 mmol/L    Potassium 4.0 3.4 - 5.1 mmol/L    Chloride 101 97 - 107 mmol/L    Bicarbonate 27 24 - 31 mmol/L    Urea Nitrogen 26 (H) 8 - 25 mg/dL    Creatinine 2.40 (H) 0.40 - 1.60 mg/dL    eGFR 19 (L) >60 mL/min/1.73m*2    Calcium 8.4 (L) 8.5 - 10.4 mg/dL    Anion Gap 10 <=19 mmol/L   POCT GLUCOSE   Result Value Ref Range    POCT Glucose 90 74 - 99 mg/dL   CBC and Auto Differential   Result Value Ref Range    WBC 33.6 (H) 4.4 - 11.3 x10*3/uL    nRBC 0.0 0.0 - 0.0 /100 WBCs    RBC 3.25 (L) 4.00 - 5.20 x10*6/uL    Hemoglobin 10.0 (L) " 12.0 - 16.0 g/dL    Hematocrit 32.4 (L) 36.0 - 46.0 %     80 - 100 fL    MCH 30.8 26.0 - 34.0 pg    MCHC 30.9 (L) 32.0 - 36.0 g/dL    RDW 15.9 (H) 11.5 - 14.5 %    Platelets 200 150 - 450 x10*3/uL    Immature Granulocytes %, Automated 0.2 0.0 - 0.9 %    Immature Granulocytes Absolute, Automated 0.06 0.00 - 0.50 x10*3/uL   Comprehensive Metabolic Panel   Result Value Ref Range    Glucose 81 65 - 99 mg/dL    Sodium 140 133 - 145 mmol/L    Potassium 4.3 3.4 - 5.1 mmol/L    Chloride 104 97 - 107 mmol/L    Bicarbonate 27 24 - 31 mmol/L    Urea Nitrogen 24 8 - 25 mg/dL    Creatinine 2.30 (H) 0.40 - 1.60 mg/dL    eGFR 20 (L) >60 mL/min/1.73m*2    Calcium 8.3 (L) 8.5 - 10.4 mg/dL    Albumin 3.0 (L) 3.5 - 5.0 g/dL    Alkaline Phosphatase 81 35 - 125 U/L    Total Protein 5.4 (L) 5.9 - 7.9 g/dL    AST 15 5 - 40 U/L    Bilirubin, Total 0.3 0.1 - 1.2 mg/dL    ALT 7 5 - 40 U/L    Anion Gap 9 <=19 mmol/L   Magnesium   Result Value Ref Range    Magnesium 2.00 1.60 - 3.10 mg/dL   Phosphorus   Result Value Ref Range    Phosphorus 4.3 2.5 - 4.5 mg/dL   Lipid Panel   Result Value Ref Range    Cholesterol 151 133 - 200 mg/dL    HDL-Cholesterol 59.0 >50.0 mg/dL    Cholesterol/HDL Ratio 2.6 SEE COMMENT    LDL Calculated 70 65 - 130 mg/dL    Triglycerides 108 40 - 150 mg/dL   Manual Differential   Result Value Ref Range    Neutrophils %, Manual 24.0 40.0 - 80.0 %    Lymphocytes %, Manual 65.0 13.0 - 44.0 %    Monocytes %, Manual 4.0 2.0 - 10.0 %    Eosinophils %, Manual 4.0 0.0 - 6.0 %    Basophils %, Manual 1.0 0.0 - 2.0 %    Atypical Lymphocytes %, Manual 1.0 0.0 - 2.0 %    Metamyelocytes %, Manual 1.0 0.0 - 0.0 %    Seg Neutrophils Absolute, Manual 8.06 (H) 1.60 - 5.00 x10*3/uL    Lymphocytes Absolute, Manual 21.84 (H) 0.80 - 3.00 x10*3/uL    Monocytes Absolute, Manual 1.34 (H) 0.05 - 0.80 x10*3/uL    Eosinophils Absolute, Manual 1.34 (H) 0.00 - 0.40 x10*3/uL    Basophils Absolute, Manual 0.34 (H) 0.00 - 0.10 x10*3/uL    Atypical  Lymphs Absolute, Manual 0.34 (H) 0.00 - 0.30 x10*3/uL    Metamyelocytes Absolute, Manual 0.34 0.00 - 0.00 x10*3/uL    Total Cells Counted 100     RBC Morphology No significant RBC morphology present                     Assessment/Plan   Principal Problem:    Bradycardia    She is awake alert today blood pressure stable she remains bradycardic in the 40s.  This is sinus rhythm.  Evidently she is to get a permanent pacemaker.  Then we will remove the temporary pacemaker       I spent 15 minutes in the professional and overall care of this patient.      Bar Lucero MD

## 2024-06-03 NOTE — POST-PROCEDURE NOTE
Physician Transition of Care Summary  Invasive Cardiovascular Lab    Procedure Date: 6/3/2024  Attending:    * Timmy Chisholm - Primary  Resident/Fellow/Other Assistant: Surgeons and Role:  * No surgeons found with a matching role *    Indications:   Pre-op Diagnosis     * Bradycardia [R00.1]     * Syncope, unspecified syncope type [R55]    Post-procedure diagnosis:   Post-op Diagnosis     * Bradycardia [R00.1]     * Syncope, unspecified syncope type [R55]    Procedure(s):     * PPM IMPLANT DUAL      Procedure Findings:   See below    Description of the Procedure:   After written witnessed informed consent was obtained the procedure from the patient, the patient was transferred to the EP laboratory. The patient was in the fasting state. A grounding pad was placed. The patient was set up for monitoring of surface ECG. The left upper chest was prepped and draped in the usual sterile fashion. Bupivacaine was administered locally for anesthetic. A 4 cm incision was placed at the left deltopectoral groove.  A prepectoral pocket was made to accommodate the device generator. Left Cephalic vein access was obtained via direct cutdown. Contrast venogram was performed however the patient had a persistent SVC precluding a left-sided device placement at which time we proceeded to place the device in the right side.  Left-sided pocket was closed with 3 layers of suture  Contrast venogram was done on both sides    The right ventricular lead was delivered to the RV via a peel away 7Fr sheath to the RV mid septal region under fluoroscopic guidance. Position was confirmed in the MANSOOR and NEELY projections. The helix was deployed, and two way communication was set up with the device interrogator. The sensing was 6 mv, with threshold for ventricular capture at 0.8V at  0.5 ms pulse width, impedance was   630   ohms. The lead was sutured at the suture sleeves the pectoralis minor muscle. There was no phrenic nerve stimulation maximal  output.    The right atrial lead was delivered to the RA via a peel- away 7Fr sheath to the RAA region under fluoroscopic guidance. Position was confirmed in the [place] and NEELY projections. The helix was deployed, and two way communication was set up with the device interrogator. The sensing was 0.8 mv, with threshold for atrial capture at 2.5 V at  0.5 ms pulse width, impedance was 1165 ohms. The lead was sutured at the suture sleeves the pectoralis minor muscle. There was no phrenic nerve stimulation maximal output.    Both leads with an attached to the generator header and placed in a preformed generator pocket.   A Tyrex antibiotic pouch was utilized  The incision was closed with 3 layers of suture. A 0 Vicryl interrupted layer, followed by a 3.0 B. V-loc continuous layer, and a 4.0 V-loc continuous layer for the subcuticular layer. Steri Strips and a dressing were applied.    Summary  Successful dual chamber pacemaker implant.  Device was implanted on the right side given the persistent left-sided SVC    Complications:   none    Stents/Implants:   Implants       Pacemaker    Lead, Tendril, Sts Domestic, 52cm - [id number] - Implanted        Inventory item: LEAD, TENDRIL, STS DOMESTIC, 52CM Model/Cat number: 2088TC/52    Serial number: [id number] : [employer]    Lot number: [id number] Device identifier: [id number]    Implant Date: 6/3/2024        As of 6/3/2024       Status: Implanted                      Lead, Tendril, Sts Domestic, 46cm - [id number] - Implanted        Inventory item: LEAD, TENDRIL, STS DOMESTIC, 46CM Model/Cat number: 2088TC/46    Serial number: [id number] : [employer]    Lot number: [id number] Device identifier: [id number]    Implant Date: 6/3/2024        As of 6/3/2024       Status: Implanted                      Pacemaker, Generator, Dual Assurity Mri - [id number] - Implanted        Inventory item: PACEMAKER, GENERATOR, DUAL ASSURITY MRI Model/Cat number:  [id number]    Serial number: [id number] : [employer]    Lot number: [id number] Device identifier: [id number]    Implant Date: 6/3/2024        As of 6/3/2024       Status: Implanted                              Anticoagulation/Antiplatelet Plan:   none    Estimated Blood Loss:   15 mL    Anesthesia: Moderate Sedation Anesthesia Staff: No anesthesia staff entered.    Any Specimen(s) Removed:   No specimens collected during this procedure.    Disposition:   stable      Electronically signed by: Timmy Chisholm MD, 6/3/2024 4:32 PM

## 2024-06-03 NOTE — CARE PLAN
The patient's goals for the shift include  rest, pain control    The clinical goals for the shift include hemodynamic stability, pain control    Problem: Pain  Goal: My pain/discomfort is manageable  Outcome: Progressing     Problem: Safety  Goal: Patient will be injury free during hospitalization  Outcome: Progressing  Goal: I will remain free of falls  Outcome: Progressing     Problem: Daily Care  Goal: Daily care needs are met  Outcome: Progressing     Problem: Psychosocial Needs  Goal: Demonstrates ability to cope with hospitalization/illness  Outcome: Progressing  Goal: Collaborate with me, my family, and caregiver to identify my specific goals  Outcome: Progressing     Problem: Fall/Injury  Goal: Not fall by end of shift  Outcome: Progressing  Goal: Be free from injury by end of the shift  Outcome: Progressing  Goal: Verbalize understanding of personal risk factors for fall in the hospital  Outcome: Progressing  Goal: Verbalize understanding of risk factor reduction measures to prevent injury from fall in the home  Outcome: Progressing  Goal: Use assistive devices by end of the shift  Outcome: Progressing  Goal: Pace activities to prevent fatigue by end of the shift  Outcome: Progressing     Problem: Pain  Goal: Takes deep breaths with improved pain control throughout the shift  Outcome: Progressing  Goal: Turns in bed with improved pain control throughout the shift  Outcome: Progressing  Goal: Walks with improved pain control throughout the shift  Outcome: Progressing  Goal: Performs ADL's with improved pain control throughout shift  Outcome: Progressing  Goal: Participates in PT with improved pain control throughout the shift  Outcome: Progressing  Goal: Free from opioid side effects throughout the shift  Outcome: Progressing  Goal: Free from acute confusion related to pain meds throughout the shift  Outcome: Progressing     Problem: Skin  Goal: Participates in plan/prevention/treatment measures  Outcome:  Progressing  Flowsheets (Taken 6/3/2024 1948)  Participates in plan/prevention/treatment measures:   Discuss with provider PT/OT consult   Elevate heels   Increase activity/out of bed for meals  Goal: Prevent/minimize sheer/friction injuries  Outcome: Progressing  Flowsheets (Taken 6/3/2024 1948)  Prevent/minimize sheer/friction injuries:   Complete micro-shifts as needed if patient unable. Adjust patient position to relieve pressure points, not a full turn   Increase activity/out of bed for meals   Use pull sheet   HOB 30 degrees or less   Utilize specialty bed per algorithm  Goal: Promote/optimize nutrition  Outcome: Progressing  Flowsheets (Taken 6/3/2024 1948)  Promote/optimize nutrition:   Assist with feeding   Monitor/record intake including meals   Offer water/supplements/favorite foods   Consume > 50% meals/supplements   Discuss with provider if NPO > 2 days   Reassess MST if dietician not consulted

## 2024-06-03 NOTE — PROGRESS NOTES
Occupational Therapy                 Therapy Communication Note    Patient Name: Tracie Baltazar  MRN: [id number]  Today's Date: 6/3/2024     Discipline: Occupational Therapy    Missed Visit Reason: Missed Visit Reason: Cancel    Missed Time: Cancel    Comment:  Patient is a cancel due to scheduled for pacemaker placement today

## 2024-06-03 NOTE — PROGRESS NOTES
Spiritual Care Visit    Clinical Encounter Type  Visited With: Patient and family together  Routine Visit: Introduction  Continue Visiting: Yes         Values/Beliefs  Spiritual Requests During Hospitalization: Anointing today    Sacramental Encounters  Sacrament of Sick-Anointing: Anointed     Daughter=Linda Bakerut

## 2024-06-03 NOTE — PROGRESS NOTES
06/03/24 1529   Physical Activity   On average, how many days per week do you engage in moderate to strenuous exercise (like a brisk walk)? 0 days   On average, how many minutes do you engage in exercise at this level? 0 min   Financial Resource Strain   How hard is it for you to pay for the very basics like food, housing, medical care, and heating? Not very   Housing Stability   In the last 12 months, was there a time when you were not able to pay the mortgage or rent on time? N   In the last 12 months, how many places have you lived? 1   In the last 12 months, was there a time when you did not have a steady place to sleep or slept in a shelter (including now)? N   Transportation Needs   In the past 12 months, has lack of transportation kept you from medical appointments or from getting medications? no   In the past 12 months, has lack of transportation kept you from meetings, work, or from getting things needed for daily living? No   Food Insecurity   Within the past 12 months, you worried that your food would run out before you got the money to buy more. Never true   Within the past 12 months, the food you bought just didn't last and you didn't have money to get more. Never true   Stress   Do you feel stress - tense, restless, nervous, or anxious, or unable to sleep at night because your mind is troubled all the time - these days? Not at all   Social Connections   In a typical week, how many times do you talk on the phone with family, friends, or neighbors? More than 3   How often do you get together with friends or relatives? More than 3   How often do you attend Orthodoxy or Amish services? Never   Do you belong to any clubs or organizations such as Orthodoxy groups, unions, fraternal or athletic groups, or school groups? Yes   How often do you attend meetings of the clubs or organizations you belong to? 1 to 4   Are you , , , , never , or living with a partner?     Intimate Partner Violence   Within the last year, have you been afraid of your partner or ex-partner? No   Within the last year, have you been humiliated or emotionally abused in other ways by your partner or ex-partner? No   Within the last year, have you been kicked, hit, slapped, or otherwise physically hurt by your partner or ex-partner? No   Within the last year, have you been raped or forced to have any kind of sexual activity by your partner or ex-partner? No   Alcohol Use   Q1: How often do you have a drink containing alcohol? Never   Q2: How many drinks containing alcohol do you have on a typical day when you are drinking? None   Q3: How often do you have six or more drinks on one occasion? Never   Utilities   In the past 12 months has the electric, gas, oil, or water company threatened to shut off services in your home? No   Health Literacy   How often do you need to have someone help you when you read instructions, pamphlets, or other written material from your doctor or pharmacy? Often

## 2024-06-04 ENCOUNTER — APPOINTMENT (OUTPATIENT)
Dept: RADIOLOGY | Facility: HOSPITAL | Age: 84
End: 2024-06-04
Payer: MEDICARE

## 2024-06-04 ENCOUNTER — APPOINTMENT (OUTPATIENT)
Dept: RADIOLOGY | Facility: HOSPITAL | Age: 84
DRG: 242 | End: 2024-06-04
Payer: MEDICARE

## 2024-06-04 LAB
ALBUMIN SERPL-MCNC: 3 G/DL (ref 3.5–5)
ALP BLD-CCNC: 77 U/L (ref 35–125)
ALT SERPL-CCNC: 6 U/L (ref 5–40)
ANION GAP SERPL CALC-SCNC: 12 MMOL/L
AST SERPL-CCNC: 14 U/L (ref 5–40)
BASOPHILS # BLD MANUAL: 0 X10*3/UL (ref 0–0.1)
BASOPHILS NFR BLD MANUAL: 0 %
BILIRUB SERPL-MCNC: 0.3 MG/DL (ref 0.1–1.2)
BUN SERPL-MCNC: 22 MG/DL (ref 8–25)
CALCIUM SERPL-MCNC: 8.4 MG/DL (ref 8.5–10.4)
CHLORIDE SERPL-SCNC: 102 MMOL/L (ref 97–107)
CO2 SERPL-SCNC: 24 MMOL/L (ref 24–31)
CREAT SERPL-MCNC: 2.2 MG/DL (ref 0.4–1.6)
EGFRCR SERPLBLD CKD-EPI 2021: 22 ML/MIN/1.73M*2
EOSINOPHIL # BLD MANUAL: 0.37 X10*3/UL (ref 0–0.4)
EOSINOPHIL NFR BLD MANUAL: 1 %
ERYTHROCYTE [DISTWIDTH] IN BLOOD BY AUTOMATED COUNT: 15.9 % (ref 11.5–14.5)
FERRITIN SERPL-MCNC: 899 NG/ML (ref 13–150)
GLUCOSE SERPL-MCNC: 103 MG/DL (ref 65–99)
HCT VFR BLD AUTO: 29 % (ref 36–46)
HGB BLD-MCNC: 9.2 G/DL (ref 12–16)
IMM GRANULOCYTES # BLD AUTO: 0.12 X10*3/UL (ref 0–0.5)
IMM GRANULOCYTES NFR BLD AUTO: 0.3 % (ref 0–0.9)
IRON SATN MFR SERPL: 26 % (ref 12–50)
IRON SERPL-MCNC: 32 UG/DL (ref 30–160)
LDH SERPL-CCNC: 177 U/L (ref 91–227)
LYMPHOCYTES # BLD MANUAL: 27.16 X10*3/UL (ref 0.8–3)
LYMPHOCYTES NFR BLD MANUAL: 73 %
MAGNESIUM SERPL-MCNC: 2 MG/DL (ref 1.6–3.1)
MCH RBC QN AUTO: 31.3 PG (ref 26–34)
MCHC RBC AUTO-ENTMCNC: 31.7 G/DL (ref 32–36)
MCV RBC AUTO: 99 FL (ref 80–100)
MONOCYTES # BLD MANUAL: 0 X10*3/UL (ref 0.05–0.8)
MONOCYTES NFR BLD MANUAL: 0 %
NEUTROPHILS # BLD MANUAL: 9.67 X10*3/UL (ref 1.6–5.5)
NEUTS BAND # BLD MANUAL: 0.37 X10*3/UL (ref 0–0.5)
NEUTS BAND NFR BLD MANUAL: 1 %
NEUTS SEG # BLD MANUAL: 9.3 X10*3/UL (ref 1.6–5)
NEUTS SEG NFR BLD MANUAL: 25 %
NRBC BLD-RTO: 0 /100 WBCS (ref 0–0)
PHOSPHATE SERPL-MCNC: 4.6 MG/DL (ref 2.5–4.5)
PLATELET # BLD AUTO: 203 X10*3/UL (ref 150–450)
POTASSIUM SERPL-SCNC: 4.6 MMOL/L (ref 3.4–5.1)
PROT SERPL-MCNC: 5.2 G/DL (ref 5.9–7.9)
RBC # BLD AUTO: 2.94 X10*6/UL (ref 4–5.2)
RBC MORPH BLD: ABNORMAL
SODIUM SERPL-SCNC: 138 MMOL/L (ref 133–145)
TIBC SERPL-MCNC: 125 UG/DL (ref 228–428)
TOTAL CELLS COUNTED BLD: 100
UIBC SERPL-MCNC: 93 UG/DL (ref 110–370)
URATE SERPL-MCNC: 7.3 MG/DL (ref 2.5–6.8)
WBC # BLD AUTO: 37.2 X10*3/UL (ref 4.4–11.3)

## 2024-06-04 PROCEDURE — 2500000002 HC RX 250 W HCPCS SELF ADMINISTERED DRUGS (ALT 637 FOR MEDICARE OP, ALT 636 FOR OP/ED)

## 2024-06-04 PROCEDURE — 99232 SBSQ HOSP IP/OBS MODERATE 35: CPT | Performed by: INTERNAL MEDICINE

## 2024-06-04 PROCEDURE — 97166 OT EVAL MOD COMPLEX 45 MIN: CPT | Mod: GO

## 2024-06-04 PROCEDURE — 2500000004 HC RX 250 GENERAL PHARMACY W/ HCPCS (ALT 636 FOR OP/ED)

## 2024-06-04 PROCEDURE — 80053 COMPREHEN METABOLIC PANEL: CPT | Performed by: INTERNAL MEDICINE

## 2024-06-04 PROCEDURE — 36415 COLL VENOUS BLD VENIPUNCTURE: CPT | Performed by: INTERNAL MEDICINE

## 2024-06-04 PROCEDURE — 2500000005 HC RX 250 GENERAL PHARMACY W/O HCPCS: Performed by: INTERNAL MEDICINE

## 2024-06-04 PROCEDURE — 83550 IRON BINDING TEST: CPT | Mod: 91 | Performed by: INTERNAL MEDICINE

## 2024-06-04 PROCEDURE — 84550 ASSAY OF BLOOD/URIC ACID: CPT | Performed by: INTERNAL MEDICINE

## 2024-06-04 PROCEDURE — 99291 CRITICAL CARE FIRST HOUR: CPT

## 2024-06-04 PROCEDURE — 76700 US EXAM ABDOM COMPLETE: CPT

## 2024-06-04 PROCEDURE — 2500000002 HC RX 250 W HCPCS SELF ADMINISTERED DRUGS (ALT 637 FOR MEDICARE OP, ALT 636 FOR OP/ED): Mod: MUE | Performed by: REGISTERED NURSE

## 2024-06-04 PROCEDURE — 97164 PT RE-EVAL EST PLAN CARE: CPT | Mod: GP

## 2024-06-04 PROCEDURE — 71046 X-RAY EXAM CHEST 2 VIEWS: CPT

## 2024-06-04 PROCEDURE — 97530 THERAPEUTIC ACTIVITIES: CPT | Mod: GP

## 2024-06-04 PROCEDURE — 2500000001 HC RX 250 WO HCPCS SELF ADMINISTERED DRUGS (ALT 637 FOR MEDICARE OP): Performed by: INTERNAL MEDICINE

## 2024-06-04 PROCEDURE — 85007 BL SMEAR W/DIFF WBC COUNT: CPT | Performed by: INTERNAL MEDICINE

## 2024-06-04 PROCEDURE — 84100 ASSAY OF PHOSPHORUS: CPT | Performed by: INTERNAL MEDICINE

## 2024-06-04 PROCEDURE — 82784 ASSAY IGA/IGD/IGG/IGM EACH: CPT | Mod: WESLAB | Performed by: INTERNAL MEDICINE

## 2024-06-04 PROCEDURE — 1200000002 HC GENERAL ROOM WITH TELEMETRY DAILY

## 2024-06-04 PROCEDURE — 2500000001 HC RX 250 WO HCPCS SELF ADMINISTERED DRUGS (ALT 637 FOR MEDICARE OP): Performed by: REGISTERED NURSE

## 2024-06-04 PROCEDURE — 71046 X-RAY EXAM CHEST 2 VIEWS: CPT | Performed by: RADIOLOGY

## 2024-06-04 PROCEDURE — 85027 COMPLETE CBC AUTOMATED: CPT | Performed by: INTERNAL MEDICINE

## 2024-06-04 PROCEDURE — 2500000004 HC RX 250 GENERAL PHARMACY W/ HCPCS (ALT 636 FOR OP/ED): Performed by: INTERNAL MEDICINE

## 2024-06-04 PROCEDURE — 82728 ASSAY OF FERRITIN: CPT | Performed by: INTERNAL MEDICINE

## 2024-06-04 PROCEDURE — 2500000001 HC RX 250 WO HCPCS SELF ADMINISTERED DRUGS (ALT 637 FOR MEDICARE OP)

## 2024-06-04 PROCEDURE — 88185 FLOWCYTOMETRY/TC ADD-ON: CPT | Mod: TC,91,WESLAB | Performed by: INTERNAL MEDICINE

## 2024-06-04 PROCEDURE — 76700 US EXAM ABDOM COMPLETE: CPT | Performed by: RADIOLOGY

## 2024-06-04 PROCEDURE — 99223 1ST HOSP IP/OBS HIGH 75: CPT | Performed by: INTERNAL MEDICINE

## 2024-06-04 PROCEDURE — 83735 ASSAY OF MAGNESIUM: CPT | Performed by: INTERNAL MEDICINE

## 2024-06-04 PROCEDURE — 97530 THERAPEUTIC ACTIVITIES: CPT | Mod: GO

## 2024-06-04 PROCEDURE — 83615 LACTATE (LD) (LDH) ENZYME: CPT | Performed by: INTERNAL MEDICINE

## 2024-06-04 RX ORDER — ACETAMINOPHEN 160 MG/5ML
650 SOLUTION ORAL EVERY 4 HOURS PRN
Status: DISCONTINUED | OUTPATIENT
Start: 2024-06-04 | End: 2024-06-05

## 2024-06-04 RX ORDER — TRAMADOL HYDROCHLORIDE 50 MG/1
50 TABLET ORAL EVERY 24 HOURS
Status: DISCONTINUED | OUTPATIENT
Start: 2024-06-04 | End: 2024-06-06 | Stop reason: HOSPADM

## 2024-06-04 RX ORDER — AMLODIPINE BESYLATE 5 MG/1
5 TABLET ORAL DAILY
Status: DISCONTINUED | OUTPATIENT
Start: 2024-06-04 | End: 2024-06-06 | Stop reason: HOSPADM

## 2024-06-04 RX ORDER — ONDANSETRON HYDROCHLORIDE 2 MG/ML
4 INJECTION, SOLUTION INTRAVENOUS EVERY 4 HOURS PRN
Status: DISCONTINUED | OUTPATIENT
Start: 2024-06-04 | End: 2024-06-06 | Stop reason: HOSPADM

## 2024-06-04 RX ORDER — HYDRALAZINE HYDROCHLORIDE 10 MG/1
10 TABLET, FILM COATED ORAL 2 TIMES DAILY
Status: DISCONTINUED | OUTPATIENT
Start: 2024-06-04 | End: 2024-06-06 | Stop reason: HOSPADM

## 2024-06-04 RX ORDER — AMIODARONE HYDROCHLORIDE 200 MG/1
200 TABLET ORAL DAILY
Status: DISCONTINUED | OUTPATIENT
Start: 2024-06-04 | End: 2024-06-06 | Stop reason: HOSPADM

## 2024-06-04 RX ORDER — METOPROLOL SUCCINATE 25 MG/1
12.5 TABLET, EXTENDED RELEASE ORAL DAILY
Status: DISCONTINUED | OUTPATIENT
Start: 2024-06-05 | End: 2024-06-06 | Stop reason: HOSPADM

## 2024-06-04 RX ORDER — ACETAMINOPHEN 650 MG/1
650 SUPPOSITORY RECTAL EVERY 4 HOURS PRN
Status: DISCONTINUED | OUTPATIENT
Start: 2024-06-04 | End: 2024-06-05

## 2024-06-04 RX ORDER — ACETAMINOPHEN 325 MG/1
650 TABLET ORAL EVERY 4 HOURS PRN
Status: DISCONTINUED | OUTPATIENT
Start: 2024-06-04 | End: 2024-06-05

## 2024-06-04 RX ORDER — METOPROLOL SUCCINATE 25 MG/1
12.5 TABLET, EXTENDED RELEASE ORAL DAILY
Status: DISCONTINUED | OUTPATIENT
Start: 2024-06-04 | End: 2024-06-06 | Stop reason: HOSPADM

## 2024-06-04 RX ADMIN — Medication 100 MCG: at 08:07

## 2024-06-04 RX ADMIN — SIMVASTATIN 10 MG: 10 TABLET, FILM COATED ORAL at 21:15

## 2024-06-04 RX ADMIN — SODIUM CHLORIDE 500 ML: 900 INJECTION, SOLUTION INTRAVENOUS at 15:25

## 2024-06-04 RX ADMIN — AMIODARONE HYDROCHLORIDE 200 MG: 200 TABLET ORAL at 15:23

## 2024-06-04 RX ADMIN — GABAPENTIN 300 MG: 300 CAPSULE ORAL at 08:07

## 2024-06-04 RX ADMIN — SODIUM CHLORIDE, SODIUM LACTATE, POTASSIUM CHLORIDE, AND CALCIUM CHLORIDE 250 ML: 600; 310; 30; 20 INJECTION, SOLUTION INTRAVENOUS at 05:16

## 2024-06-04 RX ADMIN — DULOXETINE HYDROCHLORIDE 20 MG: 20 CAPSULE, DELAYED RELEASE ORAL at 08:07

## 2024-06-04 RX ADMIN — OXYCODONE 5 MG: 5 TABLET ORAL at 03:40

## 2024-06-04 RX ADMIN — LEVOTHYROXINE SODIUM 150 MCG: 0.15 TABLET ORAL at 05:16

## 2024-06-04 RX ADMIN — LIDOCAINE 2 PATCH: 4 PATCH TOPICAL at 09:00

## 2024-06-04 RX ADMIN — ACETAMINOPHEN 975 MG: 325 TABLET ORAL at 15:06

## 2024-06-04 RX ADMIN — GABAPENTIN 300 MG: 300 CAPSULE ORAL at 21:15

## 2024-06-04 RX ADMIN — OXYCODONE 5 MG: 5 TABLET ORAL at 21:15

## 2024-06-04 RX ADMIN — SODIUM CHLORIDE, SODIUM LACTATE, POTASSIUM CHLORIDE, AND CALCIUM CHLORIDE 1000 ML: 600; 310; 30; 20 INJECTION, SOLUTION INTRAVENOUS at 09:25

## 2024-06-04 RX ADMIN — ACETAMINOPHEN 975 MG: 325 TABLET ORAL at 08:07

## 2024-06-04 RX ADMIN — APIXABAN 2.5 MG: 2.5 TABLET, FILM COATED ORAL at 08:07

## 2024-06-04 RX ADMIN — ACETAMINOPHEN 975 MG: 325 TABLET ORAL at 21:15

## 2024-06-04 ASSESSMENT — COGNITIVE AND FUNCTIONAL STATUS - GENERAL
TOILETING: A LOT
DRESSING REGULAR UPPER BODY CLOTHING: A LOT
MOVING TO AND FROM BED TO CHAIR: A LOT
CLIMB 3 TO 5 STEPS WITH RAILING: TOTAL
WALKING IN HOSPITAL ROOM: TOTAL
STANDING UP FROM CHAIR USING ARMS: TOTAL
MOBILITY SCORE: 9
DRESSING REGULAR LOWER BODY CLOTHING: TOTAL
HELP NEEDED FOR BATHING: A LOT
PERSONAL GROOMING: A LITTLE
HELP NEEDED FOR BATHING: A LOT
TOILETING: A LOT
WALKING IN HOSPITAL ROOM: TOTAL
EATING MEALS: A LITTLE
MOBILITY SCORE: 9
MOVING TO AND FROM BED TO CHAIR: A LOT
STANDING UP FROM CHAIR USING ARMS: TOTAL
DAILY ACTIVITIY SCORE: 13
PERSONAL GROOMING: A LITTLE
DRESSING REGULAR UPPER BODY CLOTHING: A LOT
MOVING FROM LYING ON BACK TO SITTING ON SIDE OF FLAT BED WITH BEDRAILS: A LOT
CLIMB 3 TO 5 STEPS WITH RAILING: TOTAL
TURNING FROM BACK TO SIDE WHILE IN FLAT BAD: A LOT
DAILY ACTIVITIY SCORE: 13
EATING MEALS: A LITTLE
DRESSING REGULAR LOWER BODY CLOTHING: TOTAL
MOVING FROM LYING ON BACK TO SITTING ON SIDE OF FLAT BED WITH BEDRAILS: A LOT
TURNING FROM BACK TO SIDE WHILE IN FLAT BAD: A LOT

## 2024-06-04 ASSESSMENT — PAIN - FUNCTIONAL ASSESSMENT
PAIN_FUNCTIONAL_ASSESSMENT: 0-10

## 2024-06-04 ASSESSMENT — PAIN DESCRIPTION - LOCATION
LOCATION: SHOULDER
LOCATION: BACK

## 2024-06-04 ASSESSMENT — PAIN DESCRIPTION - ORIENTATION: ORIENTATION: RIGHT

## 2024-06-04 ASSESSMENT — PAIN SCALES - GENERAL
PAINLEVEL_OUTOF10: 8
PAINLEVEL_OUTOF10: 0 - NO PAIN
PAINLEVEL_OUTOF10: 3
PAINLEVEL_OUTOF10: 3
PAINLEVEL_OUTOF10: 7
PAINLEVEL_OUTOF10: 0 - NO PAIN
PAINLEVEL_OUTOF10: 5 - MODERATE PAIN
PAINLEVEL_OUTOF10: 2
PAINLEVEL_OUTOF10: 2
PAINLEVEL_OUTOF10: 7
PAINLEVEL_OUTOF10: 5 - MODERATE PAIN

## 2024-06-04 ASSESSMENT — PAIN DESCRIPTION - DESCRIPTORS: DESCRIPTORS: ACHING

## 2024-06-04 ASSESSMENT — ACTIVITIES OF DAILY LIVING (ADL): BATHING_ASSISTANCE: MAXIMAL

## 2024-06-04 NOTE — NURSING NOTE
Patient received from ICU in stable condition assumed care. Family at bedside, plan of care reviewed with patient and family.  Patient placed on tele A-paced. Safety and room orientation reviewed at bedside with pt and family. Bed locked in lowest position with call light within reach.

## 2024-06-04 NOTE — PROGRESS NOTES
Physical Therapy                 Therapy Communication Note    Patient Name: Tracie Baltazar  MRN: [id number]  Today's Date: 6/4/2024     Discipline: Physical Therapy    Missed Visit Reason: Missed Visit Reason: Other (Comment) (Attempted treatment;  RN reports hold due to low BP and receiving fluid bolus.)    Missed Time: Cancel

## 2024-06-04 NOTE — CARE PLAN
Problem: Pain  Goal: My pain/discomfort is manageable  6/4/2024 1608 by Jennifer Cardenas RN  Outcome: Progressing  6/4/2024 1334 by Jennifer Cardenas RN  Outcome: Progressing     Problem: Safety  Goal: Patient will be injury free during hospitalization  6/4/2024 1608 by Jennifer Cardenas RN  Outcome: Progressing  6/4/2024 1334 by Jennifer Cardenas RN  Outcome: Progressing  Goal: I will remain free of falls  6/4/2024 1608 by Jennifer Cardenas RN  Outcome: Progressing  6/4/2024 1334 by Jennifer Cardenas RN  Outcome: Progressing     Problem: Daily Care  Goal: Daily care needs are met  6/4/2024 1608 by Jennifer Cardenas RN  Outcome: Progressing  6/4/2024 1334 by Jennifer Cardenas RN  Outcome: Progressing     Problem: Psychosocial Needs  Goal: Demonstrates ability to cope with hospitalization/illness  6/4/2024 1608 by Jennifer Cardenas RN  Outcome: Progressing  6/4/2024 1334 by Jennifer Cardenas RN  Outcome: Progressing  Goal: Collaborate with me, my family, and caregiver to identify my specific goals  6/4/2024 1608 by Jennifer Cardenas RN  Outcome: Progressing  6/4/2024 1334 by Jennifer Cardneas RN  Outcome: Progressing     Problem: Discharge Barriers  Goal: My discharge needs are met  6/4/2024 1608 by Jennifer Cardenas RN  Outcome: Progressing  6/4/2024 1334 by Jennifer Cardenas RN  Outcome: Progressing     Problem: Fall/Injury  Goal: Not fall by end of shift  6/4/2024 1608 by Jennifer Cardenas RN  Outcome: Progressing  6/4/2024 1334 by Jennifer Cardenas RN  Outcome: Progressing  Goal: Be free from injury by end of the shift  6/4/2024 1608 by Jennifer Cardenas RN  Outcome: Progressing  6/4/2024 1334 by Jennifer Cardenas RN  Outcome: Progressing  Goal: Verbalize understanding of personal risk factors for fall in the hospital  6/4/2024 1608 by Jennifer Cardenas RN  Outcome: Progressing  6/4/2024 1334 by Jennifer Cardenas RN  Outcome: Progressing  Goal: Verbalize understanding of risk factor reduction measures to prevent injury from fall in the home  6/4/2024 1608 by Jennifer Cardenas, RN  Outcome:  Progressing  6/4/2024 1334 by Jennifer Cardenas RN  Outcome: Progressing  Goal: Use assistive devices by end of the shift  6/4/2024 1608 by Jennifer Cardenas RN  Outcome: Progressing  6/4/2024 1334 by Jennifer Cardenas RN  Outcome: Progressing  Goal: Pace activities to prevent fatigue by end of the shift  6/4/2024 1608 by Jennifer Cardenas RN  Outcome: Progressing  6/4/2024 1334 by Jennifer Cardenas RN  Outcome: Progressing     Problem: Pain  Goal: Takes deep breaths with improved pain control throughout the shift  6/4/2024 1608 by Jennifer Cardenas RN  Outcome: Progressing  6/4/2024 1334 by Jennifer Cardenas RN  Outcome: Progressing  Goal: Turns in bed with improved pain control throughout the shift  6/4/2024 1608 by Jennifer Cardenas RN  Outcome: Progressing  6/4/2024 1334 by Jennifer Cardenas RN  Outcome: Progressing  Goal: Walks with improved pain control throughout the shift  6/4/2024 1608 by Jennifer Cardenas RN  Outcome: Progressing  6/4/2024 1334 by Jennifer Cardenas RN  Outcome: Progressing  Goal: Performs ADL's with improved pain control throughout shift  6/4/2024 1608 by Jennifer Cardensa RN  Outcome: Progressing  6/4/2024 1334 by Jennifer Cardenas RN  Outcome: Progressing  Goal: Participates in PT with improved pain control throughout the shift  6/4/2024 1608 by Jennifer Cardenas RN  Outcome: Progressing  6/4/2024 1334 by Jennifer Cardenas RN  Outcome: Progressing  Goal: Free from opioid side effects throughout the shift  6/4/2024 1608 by Jennifer Cardenas RN  Outcome: Progressing  6/4/2024 1334 by Jennifer Cardenas RN  Outcome: Progressing  Goal: Free from acute confusion related to pain meds throughout the shift  6/4/2024 1608 by Jennifer Cardenas RN  Outcome: Progressing  6/4/2024 1334 by Jennifer Cardenas RN  Outcome: Progressing     Problem: Skin  Goal: Participates in plan/prevention/treatment measures  6/4/2024 1608 by Jennifer Cardenas RN  Outcome: Progressing  Flowsheets (Taken 6/4/2024 1608)  Participates in plan/prevention/treatment measures:   Increase activity/out of  bed for meals   Discuss with provider PT/OT consult  6/4/2024 1334 by Jennifer Cardenas RN  Outcome: Progressing  Goal: Prevent/minimize sheer/friction injuries  6/4/2024 1608 by Jennifer Cardenas RN  Outcome: Progressing  Flowsheets (Taken 6/4/2024 1608)  Prevent/minimize sheer/friction injuries:   Increase activity/out of bed for meals   Turn/reposition every 2 hours/use positioning/transfer devices   Use pull sheet  6/4/2024 1334 by Jennifer Cardenas RN  Outcome: Progressing  Goal: Promote/optimize nutrition  6/4/2024 1608 by Jennifer Cardenas RN  Outcome: Progressing  Flowsheets (Taken 6/4/2024 1608)  Promote/optimize nutrition:   Consume > 50% meals/supplements   Monitor/record intake including meals   Offer water/supplements/favorite foods  6/4/2024 1334 by Jennifer Cardenas RN  Outcome: Progressing     Problem: Bathing  Goal: STG - Patient will bathe upper body with set up and close supervision  Outcome: Progressing     Problem: Dressings Lower Extremities  Goal: LTG - Patient will dress lower body with minimal assist and AE as needed  Outcome: Progressing     Problem: Dressing Upper Extremities  Goal: LTG - Patient will complete upper body dressing with set up  Outcome: Progressing     Problem: Eating  Goal: LTG - Patient will feed self independent  Outcome: Progressing     Problem: Grooming  Goal: STG - Patient completes grooming independent  Outcome: Progressing     Problem: Toileting  Goal: LTG - Patient will complete daily toileting tasks with minimal assist and 2ww  Outcome: Progressing   The patient's goals for the shift include      The clinical goals for the shift include pain control, hemodynamically

## 2024-06-04 NOTE — PROGRESS NOTES
Physical Therapy                 Therapy Communication Note    Patient Name: Tracie Baltazar  MRN: [id number]  Today's Date: 6/4/2024     Discipline: Physical Therapy    Missed Visit Reason: Missed Visit Reason: Patient in a medical procedure (2nd attempt;  patient off nursing dorantes at testing (Xray);)    Missed Time: Attempt

## 2024-06-04 NOTE — PROGRESS NOTES
"Tracie Baltazar is a 84 y.o. female on day 3 of admission presenting with Bradycardia.    Subjective   Resting comfortably.  Antihypertensives are on hold for relative hypotension.  Sinus rhythm on telemetry with occasional AV pacing       Objective     Physical Exam   Gen: NAD   Neck: no JVD, carotid upstroke is brisk and without delay   Heart: rrr, s1s2+ no mrg   Lungs: CTA   Ext: warm no edema  Last Recorded Vitals  Blood pressure 99/65, pulse 76, temperature 36.7 °C (98.1 °F), temperature source Oral, resp. rate 18, height 1.6 m (5' 3\"), weight 70.1 kg (154 lb 8.7 oz), SpO2 96%.  Intake/Output last 3 Shifts:  I/O last 3 completed shifts:  In: 404 (5.8 mL/kg) [I.V.:20.6 (0.3 mL/kg); IV Piggyback:383.3]  Out: 2240 (32 mL/kg) [Urine:2225 (0.9 mL/kg/hr); Blood:15]  Weight: 70.1 kg                Assessment/Plan   Principal Problem:    Bradycardia  Active Problems:    Syncope    Bradycardia  Paroxysmal Atrial Fibrillation  Sick sinus syndrome  Near Syncope  Hypertensive Disorder  Chronic Kidney Disease     Impression and Plan:     6/2: As above.  Patient with past medical history of paroxysmal atrial fibrillation with known significant postconversion pauses presents after a fall at home. She has been evaluated by electrophysiology in the past. Patient tells me that she has fallen approximately 4 times over the last month and that each time her legs \"just gave out\". Patient states that she was not lightheaded prior to this fall. Denies chest pain, pressure or palpitations. Denies shortness of breath.  On my exam the patient is alert and oriented.  She is breathing comfortably on room air and appears euvolemic on examination.  Blood pressures have been low normal/recorded 105/37.  I completed a telemetry review which revealed sinus bradycardia with rates going as low as 36 bpm at times.  Agree with holding amiodarone and metoprolol at this time to avoid further bradycardia. At this time it is unclear to me whether " this patient is falling due to symptomatic bradycardia, but given her multiple falls in the last month I think we should be suspicious.  Echocardiogram and Lexiscan stress test were completed in December 2023 and these tests do not need to be repeated.  Electrophysiology have been consulted and appreciate their recommendations. Eliquis has been placed on hold in case pacemaker placement is recommended by electrophysiology. We will follow with you.    6/4: She is status post [facility] dual-chamber pacemaker implant performed yesterday.  Device appears to be functioning appropriately.  Follow-up chest x-ray shows the device and is without pneumothorax.  As mentioned we will continue to hold her amlodipine due to lower blood pressures.  Oral anticoagulation has been resumed, Eliquis 2.5 mg twice daily.  No further intervention or testing from our standpoint.  Will sign off.  Would have her follow-up with me in the office in 1 month, we can resume antihypertensives if necessary at that time.       I spent 35 minutes in the professional and overall care of this patient.      Aidan Wright, DO

## 2024-06-04 NOTE — PROGRESS NOTES
Physical Therapy    Physical Therapy Evaluation & Treatment    Patient Name: Tracie Baltazar  MRN: [id number]  Today's Date: 6/4/2024   Time Calculation  Start Time: 1338  Stop Time: 1408  Time Calculation (min): 30 min    Assessment/Plan   PT Assessment  PT Assessment Results: Decreased strength, Decreased endurance, Impaired balance, Decreased mobility, Decreased safety awareness  Rehab Prognosis: Good  Evaluation/Treatment Tolerance: Patient limited by fatigue (Dizziness)  End of Session Communication: Bedside nurse  Assessment Comment: 84 year old female presents with decline from baseline functional mobility, impaired balance, decreased tolerance to activity, and decreased safety awareness regarding pacemaker precuations.  End of Session Patient Position: Bed, 3 rail up, Alarm on   IP OR SWING BED PT PLAN  Inpatient or Swing Bed: Inpatient  PT Plan  Treatment/Interventions: Bed mobility, Transfer training, Gait training, Stair training, Balance training, Strengthening, Endurance training, Therapeutic exercise, Therapeutic activity  PT Plan: Skilled PT  PT Frequency: 5 times per week  PT Discharge Recommendations: Moderate intensity level of continued care  Equipment Recommended upon Discharge: Wheeled walker  PT Recommended Transfer Status: Assist x2  PT - OK to Discharge: Yes (with skilled physical therapy services at next level of care.)      Subjective     General Visit Information:  General  Reason for Referral: Impaired mobility with bradycardia;  s/p PPM 6/3/2024  Past Medical History Relevant to Rehab: AFIB, leukemia, HTN, dyslipidemia, CKD, C-diff, asthma, RA, hysterectomy, back surgery, left TKR, left shoulder surgery  Missed Visit: Yes  Missed Visit Reason: Patient in a medical procedure (2nd attempt;  patient off nursing dorantes at testing (Xray);)  Co-Treatment: OT  Co-Treatment Reason: Medical complexity with ICU status;  need for 2nd skilled person for therapuetic handling during functional mobility  to optimize safety and outcomes.  Prior to Session Communication: Bedside nurse  Patient Position Received: On cart  General Comment: 84 year old female admit from home wtih falls;  patient diagnosed with bradycardia and had pacemaker placed 6/3/2024.  Home Living:  Home Living  Type of Home: House  Lives With: Adult children (Son)  Home Adaptive Equipment: Walker rolling or standard  Home Layout: Multi-level  Home Access: Stairs to enter with rails  Entrance Stairs-Rails: Both  Entrance Stairs-Number of Steps: 2  Bathroom Shower/Tub: Walk-in shower  Bathroom Equipment: Grab bars in shower, Shower chair with back  Home Living Comments: Son has MS  Prior Level of Function:  Prior Function Per Pt/Caregiver Report  Ambulatory Assistance: Independent (mod independent with rollator)  Precautions:  Precautions  Medical Precautions: Fall precautions  Precautions Comment: Pacemaker precautions right UE  Vital Signs:  Vital Signs  Heart Rate: 60    Objective   Pain:  Pain Assessment  Pain Assessment: 0-10  Pain Score: 7  Pain Type: Acute pain (with weightbearing)  Pain Location: Hip  Pain Orientation: Left  Cognition:  Cognition  Overall Cognitive Status: Within Functional Limits (appears anxious during mobility due to fear of falling.)    General Assessments:                  Activity Tolerance  Endurance: Decreased tolerance for upright activites  Activity Tolerance Comments: Fatigue;  intermittnet dizziness    Sensation  Sensation Comment: intact       Coordination  Movements are Fluid and Coordinated: No  Lower Body Coordination: slower rate of movement catarina LE    Static Sitting Balance  Static Sitting-Balance Support: Bilateral upper extremity supported  Static Sitting-Level of Assistance: Moderate assistance  Static Sitting-Comment/Number of Minutes: Intermittent episodes of assist required to maintain midline while sitting on side of bed due to decreased balance posterior.    Static Standing Balance  Static  Standing-Balance Support: Bilateral upper extremity supported  Static Standing-Level of Assistance: Moderate assistance (x 2)  Static Standing-Comment/Number of Minutes: Assist with trunk support and balance during static standing with rolling walker.  Functional Assessments:  Bed Mobility  Bed Mobility: Yes  Bed Mobility 1  Bed Mobility 1: Supine to sitting  Level of Assistance 1: Moderate assistance (x 2)  Bed Mobility Comments 1: Assist with trunk-up and catarina LE during supine to sit;  mod assist to scoot hips to edge of bed during supine to sit.  Bed Mobility 2  Bed Mobility  2: Sitting to supine  Level of Assistance 2: Maximum assistance (x 1-2)  Bed Mobility Comments 2: Assist with trunk and catarina LE.    Transfers  Transfer: Yes  Transfer 1  Transfer From 1: Sit to  Transfer to 1: Stand  Technique 1: Sit to stand  Transfer Device 1: Walker  Transfer Level of Assistance 1: Moderate assistance (x 2)  Trials/Comments 1: Assist with elevating buttocks from sitting surface and positioning COG over BERTRAM;  verbal cues for hand placement.  Transfers 2  Transfer From 2: Stand to  Transfer to 2: Sit  Technique 2: Stand to sit  Transfer Device 2: Walker  Transfer Level of Assistance 2: Moderate assistance (x 2)  Trials/Comments 2: Assist with trunk support and balance.  Transfers 3  Transfer From 3:  (Cart)  Transfer to 3: Bed  Technique 3: Stand pivot  Transfer Device 3: Walker  Transfer Level of Assistance 3: Moderate assistance (x 2)  Trials/Comments 3: Assist with trunk support, balance, and walker positioning;  patient appeared to support body weight with catarina LE;  patient had difficutly with weightshifting and advancing catarina LE during pivot portion of transfer. (Patient developed dizziness during transfers requiring increased assist to complete transfer to bed.)    Ambulation/Gait Training  Ambulation/Gait Training Performed: No    Stairs  Stairs: No  Extremity/Trunk Assessments:  RLE   RLE : Exceptions to WFL  Strength  RLE  R Hip Flexion: 3-/5  R Knee Extension: 3/5  R Ankle Dorsiflexion: 3+/5  LLE   LLE : Exceptions to WFL  Strength LLE  L Hip Flexion: 2+/5  L Knee Extension: 3/5  L Ankle Dorsiflexion: 3+/5  Treatments:            Bed Mobility  Bed Mobility: Yes  Bed Mobility 1  Bed Mobility 1: Supine to sitting  Level of Assistance 1: Moderate assistance (x 2)  Bed Mobility Comments 1: Assist with trunk-up and catarina LE during supine to sit;  mod assist to scoot hips to edge of bed during supine to sit.  Bed Mobility 2  Bed Mobility  2: Sitting to supine  Level of Assistance 2: Maximum assistance (x 1-2)  Bed Mobility Comments 2: Assist with trunk and catarina LE.    Ambulation/Gait Training  Ambulation/Gait Training Performed: No  Transfers  Transfer: Yes  Transfer 1  Transfer From 1: Sit to  Transfer to 1: Stand  Technique 1: Sit to stand  Transfer Device 1: Walker  Transfer Level of Assistance 1: Moderate assistance (x 2)  Trials/Comments 1: Assist with elevating buttocks from sitting surface and positioning COG over BERTRAM;  verbal cues for hand placement.  Transfers 2  Transfer From 2: Stand to  Transfer to 2: Sit  Technique 2: Stand to sit  Transfer Device 2: Walker  Transfer Level of Assistance 2: Moderate assistance (x 2)  Trials/Comments 2: Assist with trunk support and balance.  Transfers 3  Transfer From 3:  (Cart)  Transfer to 3: Bed  Technique 3: Stand pivot  Transfer Device 3: Walker  Transfer Level of Assistance 3: Moderate assistance (x 2)  Trials/Comments 3: Assist with trunk support, balance, and walker positioning;  patient appeared to support body weight with catarina LE;  patient had difficutly with weightshifting and advancing catarina LE during pivot portion of transfer. (Patient developed dizziness during transfers requiring increased assist to complete transfer to bed.)    Stairs  Stairs: No       Outcome Measures:  [facility] Basic Mobility  Turning from your back to your side while in a flat bed without using bedrails: ANGELA  lot  Moving from lying on your back to sitting on the side of a flat bed without using bedrails: A lot  Moving to and from bed to chair (including a wheelchair): A lot  Standing up from a chair using your arms (e.g. wheelchair or bedside chair): Total  To walk in hospital room: Total  Climbing 3-5 steps with railing: Total  Basic Mobility - Total Score: 9    Encounter Problems       Encounter Problems (Active)       Mobility       LTG - Patient will navigate 4-6 steps with rails/device and close supervision (Progressing)       Start:  06/02/24    Expected End:  06/16/24            STG - Patient will ambulate 150' x 1 with use of 2WW and close S. (Progressing)       Start:  06/02/24    Expected End:  06/16/24            STG - Patient will increase ROM of L hip flexion to pain free WFL (Progressing)       Start:  06/02/24    Expected End:  06/16/24                   PT Transfers       STG - Transfer from bed to chair with close supervision and use of 2WW for UE/balance support. (Progressing)       Start:  06/02/24    Expected End:  06/16/24            STG - Patient will perform bed mobility at independent level.  (Progressing)       Start:  06/02/24    Expected End:  06/16/24            STG - Patient will transfer sit to and from stand to sit with use of 2WW at close S level.  (Progressing)       Start:  06/02/24    Expected End:  06/16/24                   Education Documentation  Mobility Training, taught by Nick Greenberg, PT at 6/4/2024  2:24 PM.  Learner: Patient  Readiness: Acceptance  Method: Demonstration, Explanation  Response: Needs Reinforcement    Education Comments  No comments found.

## 2024-06-04 NOTE — CARE PLAN
Problem: PT Transfers  Goal: STG - Transfer from bed to chair with close supervision and use of 2WW for UE/balance support.  Outcome: Progressing  Goal: STG - Patient will perform bed mobility at independent level.   Outcome: Progressing  Goal: STG - Patient will transfer sit to and from stand to sit with use of 2WW at close S level.   Outcome: Progressing

## 2024-06-04 NOTE — PROGRESS NOTES
"Tracie Baltazar is a 84 y.o. female on day 3 of admission presenting with Bradycardia.    Subjective   Light headed upon standing with physical therapy, hypotensive       Objective     Physical Exam  Gen: A+O x 3, no acute distress  HEENT: Normocephalic/atraumatic pupils equal react light  Neck: No JVD, upstrokes and volumes nl, no bruits   Lung: CTA, nl AP diameter  CV:  nl sounding S1, S2, no murmur, PMI nondisplaced  Right prepectoral incision site intact with dressing, soft hematoma, ecchymosis in axillary area, left site intact with dressing no hematoma or ecchymosis  Abdomen: soft, non tender, + BS x 4   Extremities: warm to touch, palpable pulses bilaterally, no edema   Neuro: no neurologic deficits  Last Recorded Vitals  Blood pressure (!) 123/47, pulse 60, temperature 37.2 °C (99 °F), temperature source Oral, resp. rate 19, height 1.6 m (5' 3\"), weight 70.1 kg (154 lb 8.7 oz), SpO2 95%.  Intake/Output last 3 Shifts:  I/O last 3 completed shifts:  In: 404 (5.8 mL/kg) [I.V.:20.6 (0.3 mL/kg); IV Piggyback:383.3]  Out: 2240 (32 mL/kg) [Urine:2225 (0.9 mL/kg/hr); Blood:15]  Weight: 70.1 kg     Relevant Results  Results for orders placed or performed during the hospital encounter of 06/01/24 (from the past 24 hour(s))   CBC and Auto Differential   Result Value Ref Range    WBC 37.2 (H) 4.4 - 11.3 x10*3/uL    nRBC 0.0 0.0 - 0.0 /100 WBCs    RBC 2.94 (L) 4.00 - 5.20 x10*6/uL    Hemoglobin 9.2 (L) 12.0 - 16.0 g/dL    Hematocrit 29.0 (L) 36.0 - 46.0 %    MCV 99 80 - 100 fL    MCH 31.3 26.0 - 34.0 pg    MCHC 31.7 (L) 32.0 - 36.0 g/dL    RDW 15.9 (H) 11.5 - 14.5 %    Platelets 203 150 - 450 x10*3/uL    Immature Granulocytes %, Automated 0.3 0.0 - 0.9 %    Immature Granulocytes Absolute, Automated 0.12 0.00 - 0.50 x10*3/uL   Comprehensive Metabolic Panel   Result Value Ref Range    Glucose 103 (H) 65 - 99 mg/dL    Sodium 138 133 - 145 mmol/L    Potassium 4.6 3.4 - 5.1 mmol/L    Chloride 102 97 - 107 mmol/L    " Bicarbonate 24 24 - 31 mmol/L    Urea Nitrogen 22 8 - 25 mg/dL    Creatinine 2.20 (H) 0.40 - 1.60 mg/dL    eGFR 22 (L) >60 mL/min/1.73m*2    Calcium 8.4 (L) 8.5 - 10.4 mg/dL    Albumin 3.0 (L) 3.5 - 5.0 g/dL    Alkaline Phosphatase 77 35 - 125 U/L    Total Protein 5.2 (L) 5.9 - 7.9 g/dL    AST 14 5 - 40 U/L    Bilirubin, Total 0.3 0.1 - 1.2 mg/dL    ALT 6 5 - 40 U/L    Anion Gap 12 <=19 mmol/L   Magnesium   Result Value Ref Range    Magnesium 2.00 1.60 - 3.10 mg/dL   Phosphorus   Result Value Ref Range    Phosphorus 4.6 (H) 2.5 - 4.5 mg/dL   Manual Differential   Result Value Ref Range    Neutrophils %, Manual 25.0 40.0 - 80.0 %    Bands %, Manual 1.0 0.0 - 5.0 %    Lymphocytes %, Manual 73.0 13.0 - 44.0 %    Monocytes %, Manual 0.0 2.0 - 10.0 %    Eosinophils %, Manual 1.0 0.0 - 6.0 %    Basophils %, Manual 0.0 0.0 - 2.0 %    Seg Neutrophils Absolute, Manual 9.30 (H) 1.60 - 5.00 x10*3/uL    Bands Absolute, Manual 0.37 0.00 - 0.50 x10*3/uL    Lymphocytes Absolute, Manual 27.16 (H) 0.80 - 3.00 x10*3/uL    Monocytes Absolute, Manual 0.00 (L) 0.05 - 0.80 x10*3/uL    Eosinophils Absolute, Manual 0.37 0.00 - 0.40 x10*3/uL    Basophils Absolute, Manual 0.00 0.00 - 0.10 x10*3/uL    Total Cells Counted 100     Neutrophils Absolute, Manual 9.67 (H) 1.60 - 5.50 x10*3/uL    RBC Morphology No significant RBC morphology present    Iron and TIBC   Result Value Ref Range    Iron 32 30 - 160 ug/dL    UIBC 93 (L) 110 - 370 ug/dL    TIBC 125 (L) 228 - 428 ug/dL    % Saturation 26 12 - 50 %   Ferritin   Result Value Ref Range    Ferritin 899 (H) 13 - 150 ng/mL   Lactate dehydrogenase   Result Value Ref Range     91 - 227 U/L   Uric Acid   Result Value Ref Range    Uric Acid 7.3 (H) 2.5 - 6.8 mg/dL         Assessment/Plan   Principal Problem:    Bradycardia  Active Problems:    Syncope    84-year-old female history of paroxysmal atrial fibrillation maintained on amiodarone for hybrid approach with low-dose beta-blocker now with  progressive conduction disease marked bradycardia occasional junctional escape with suspected pauses for episodes of syncope.  Required temporary line yesterday and has been stable since  Current status with need for permanent pacemaker implantation then will allow for treatment of her PAF  N.p.o. after midnight  1 g vancomycin preprocedure, has a history of C. difficile on liquid vancomycin developed a rash pharmacy recommends infusing vancomycin over 2 hours unable to utilize Ancef for documented amoxicillin allergy  Removal of temporary wire  Procedure discussed with patient and daughter  6/4:  Status post permanent pacemaker unable to access left, procedure done via right subclavian, small hematoma with ecchymosis, chest x-ray with good lead placement, interrogation within normal limits some under sensing atrial capture increased  Was hypotensive today upon standing, on no antihypertensives, will hold on initiating metoprolol and resume amiodarone  Will hold apixaban this evening with hematoma and can resume in a.m. if unchanged            I spent 30 minutes in the professional and overall care of this patient.      Do Landaverde, APRN-CNP

## 2024-06-04 NOTE — PROGRESS NOTES
Spiritual Care Visit    Clinical Encounter Type  Visited With: Patient not available  Continue Visiting: Yes     Vikas Ervin

## 2024-06-04 NOTE — CARE PLAN
Problem: Bathing  Goal: STG - Patient will bathe upper body with set up and close supervision  Outcome: Progressing     Problem: Dressings Lower Extremities  Goal: LTG - Patient will dress lower body with minimal assist and AE as needed  Outcome: Progressing     Problem: Dressing Upper Extremities  Goal: LTG - Patient will complete upper body dressing with set up  Outcome: Progressing     Problem: Eating  Goal: LTG - Patient will feed self independent  Outcome: Progressing     Problem: Grooming  Goal: STG - Patient completes grooming independent  Outcome: Progressing     Problem: Functional Mobility  Goal: Perform functional mobility to and from the bathroom with 2ww and minimal assist  Outcome: Progressing     Problem: Toileting  Goal: LTG - Patient will complete daily toileting tasks with minimal assist and 2ww  Outcome: Progressing     Problem: OT Transfers  Goal: LTG - Patient will perform all functional transfers with minimal assist and 2ww  Outcome: Progressing

## 2024-06-04 NOTE — PROGRESS NOTES
Tracie Baltazar is a 84 y.o. female on day 3 of admission presenting with Bradycardia.      Subjective   Pacemaker placed yesterday.  Some pain in arm.  No worsening shortness of breath.  Patient was hypotensive overnight requiring small bolus.  Patient transferred out of the ICU.  Pending physical therapy Occupational Therapy.       Objective     Last Recorded Vitals  BP 99/65   Pulse 76   Temp 36.9 °C (98.5 °F) (Axillary)   Resp 18   Wt 70.1 kg (154 lb 8.7 oz)   SpO2 96%   Intake/Output last 3 Shifts:    Intake/Output Summary (Last 24 hours) at 6/4/2024 1305  Last data filed at 6/4/2024 1017  Gross per 24 hour   Intake 203.96 ml   Output 1090 ml   Net -886.04 ml       Admission Weight  Weight: 66.7 kg (147 lb) (06/01/24 1525)    Daily Weight  06/04/24 : 70.1 kg (154 lb 8.7 oz)    Image Results  XR chest 1 view  Narrative: Interpreted By:  Nick Luciano,   STUDY:  XR CHEST 1 VIEW; 6/3/2024 5:41 pm      INDICATION:  CLINICAL INFORMATION: Signs/Symptoms:post R sided permanent pacemaker  SC stick assess for pneumothorax.      COMPARISON:  06/02/2024      ACCESSION NUMBER(S):  [id number]      ORDERING CLINICIAN:  ROMY GRIGGS      TECHNIQUE:  Portable chest one view.      FINDINGS:  There is a new right-sided bipolar cardiac pacemaker present. There  is no evidence for pneumothorax. Pacer leads are present within the  right atrial and right ventricular distribution. Neurostimulator  device overlies the lower thoracic spine.   No infiltrates or  effusions are identified.      Impression: There is a new bipolar cardiac pacemaker present with the pacer  overlying the right shoulder without evidence for pneumothorax.  Remaining findings are stable.      MACRO:  none      Signed by: Nick Luciano 6/4/2024 8:10 AM  Dictation workstation:   [id number]      Physical Exam    Relevant Results               Assessment/Plan   This patient currently has cardiac telemetry ordered; if you would like to modify or  discontinue the telemetry order, click here to go to the orders activity to modify/discontinue the order.              Principal Problem:    Bradycardia  Active Problems:    Syncope    Bradycardia  -Status post pacemaker.  Heart rate in the 60s currently  -Cardiology has signed off.  -Electrophysiology on consult.    Fall  -With pain in left hip.  Initial plain film not showing fracture  -Continue lidocaine, scheduled Tylenol.  Oxycodone as needed.  -Physical therapy to see the patient today, but again she is getting a chest x-ray.    CML/CLL  -Persistent leukocytosis with lymphocytic domination  -Still complicated with her recurrent C. difficile colitis in the past, though no evidence of this currently.  -I see that hematology-oncology was consulted today, and I see multiple orders from their service already.    Hypotension  -Improved.  Will monitor, and add back antihypertensives as needed and able.    Disposition: Stepdown.  Physical therapy Occupational Therapy.              Jorge Silverio,

## 2024-06-04 NOTE — PROGRESS NOTES
[facility] Critical Care Medicine       Date:  6/4/2024  Patient:  Tracie Baltazar  YOB: 1940  MRN:  [id number]   Admit Date:  6/1/2024  ========================================================================================================    Chief Complaint   Patient presents with    Fall     Pt states she slipped on bathroom floor at approx 0930. Pt states she hit left arm and left side of flank on side of tub. Pt denies hitting head. Pt on eliquis.          History of Present Illness:  Tracie Baltazar is a 84 y.o. year old female patient with Past Medical History of pAfib on Eliquis, hypertension, CKD stage IV, leukemia in remission, recurrent C. difficile infections, hypothyroidism, depression who presents to the emergency room after a fall.  Patient was in the bathroom and she fell.  She reports that her legs just gave out on her.  She fell striking her left side and hip against the side of the shower.  Said she also hit her arm which is now bruised.  She reports a lot of pain in her left hip that radiates into her anterior pelvis.  She says her sons were able to help her get back up with her walker and get her to a seated position.  911 was then called.     Her family is bedside. They all confirmed that the patient has been told several times that she might need a pacemaker. On arrival to the emergency room her heart rate was found to be in the 30s to 40s. She takes amiodarone and metoprolol. Blood pressures preserved.      Patient requiring dopamine gtt for symptomatic bradycardia. Patient had 4 second pause were she lost her vision. Patient states that she has also been feeling more SOB. Patient will continue to be monitored in ICU and plan for Pacemaker placement tomorrow       Interval ICU Events:  6/2: Patient arrived to ICU A+Ox3. With complaints of mild SOB. Dopamine gtt was started at 2. Patient receiving a transcutaneous pacer today. Plan for permanent pacemaker tomorrow.   6/3:  Patient off levo and dopamine since midnight last night. Has transcutaneous pacemaker and must lay flat. Plan for permanent pacemaker today.   6/4: S/p PPM yesterday, labile hypotension overnight received 250mL IVF.     Medical History:  Past Medical History:   Diagnosis Date    Asthma (HHS-HCC)     Essential (primary) hypertension     Hypertension    Kidney failure     Leukemia (Multi)     Rheumatoid arthritis (Multi)      Past Surgical History:   Procedure Laterality Date    BACK SURGERY      Back Surgery    CARDIAC ELECTROPHYSIOLOGY PROCEDURE Right 6/2/2024    Procedure: Temporary Pacemaker Insertion;  Surgeon: Javier Steel DO;  Location: [facility];  Service: Cardiovascular;  Laterality: Right;  pacemaker line in place sutured in with 2-0 silk    CHOLECYSTECTOMY      Cholecystectomy    HYSTERECTOMY      Hysterectomy    KNEE SURGERY      arthroscopic surgery?    OTHER SURGICAL HISTORY      Shoulder Surgery Left    SHOULDER SURGERY Left     Left shoulder impingement surgery    TOTAL KNEE ARTHROPLASTY Left 02/13/2014     Medications Prior to Admission   Medication Sig Dispense Refill Last Dose    acetaminophen (Tylenol) 325 mg tablet Take 2 tablets (650 mg) by mouth every 4 hours if needed for mild pain (1 - 3). 30 tablet 0 6/1/2024    amiodarone (Pacerone) 200 mg tablet Take 1 tablet (200 mg) by mouth once daily. 90 tablet 0 6/1/2024    amLODIPine (Norvasc) 5 mg tablet Take 1 tablet by mouth once daily 90 tablet 1 6/1/2024    apixaban (Eliquis) 2.5 mg tablet Take 1 tablet (2.5 mg) by mouth 2 times a day. 180 tablet 3 6/1/2024    cholecalciferol (Vitamin D-3) 50 MCG (2000 UT) tablet Vitamin D 50 MCG (2000 UT) Oral Tablet   Refills: 0       Active   6/1/2024    cyanocobalamin (Vitamin B-12) 100 mcg tablet = 1 tab(s) ( 100 mcg ), Oral, daily, 0 Refill(s), Type: Maintenance   6/1/2024    DULoxetine (Cymbalta) 20 mg DR capsule Take 1 capsule (20 mg) by mouth once daily. Do not crush or chew. 90 capsule 1  2024    furosemide (Lasix) 20 mg tablet Take 3 tablets (60 mg) by mouth once daily. 90 tablet 3 2024    gabapentin (Neurontin) 300 mg capsule Take 1 capsule (300 mg) by mouth 2 times a day. 60 capsule 3 2024    levothyroxine (Synthroid, Levoxyl) 150 mcg tablet TAKE 1 TABLET BY MOUTH ONCE DAILY IN THE MORNING AS DIRECTED 30 tablet 2 2024    metoprolol succinate XL (Toprol-XL) 25 mg 24 hr tablet Take 0.5 tablets (12.5 mg) by mouth once daily. Do not crush or chew. 45 tablet 3 2024    ondansetron ODT (Zofran-ODT) 4 mg disintegrating tablet Take 1 tablet (4 mg) by mouth.   Past Week    polyethylene glycol (Glycolax, Miralax) 17 gram packet Take 17 g by mouth once daily as needed.   Past Week    simvastatin (Zocor) 10 mg tablet Take 1 tablet (10 mg) by mouth once daily at bedtime. 90 tablet 3 2024    traMADol (Ultram) 50 mg tablet once every 24 hours.   Past Week    [] vancomycin (Vancocin) 125 mg capsule Take 1 capsule (125 mg) by mouth 4 times a day for 10 days. 40 capsule 0 2024    hydrALAZINE (Apresoline) 10 mg tablet Take by mouth.   More than a month     Ibuprofen and Amoxicillin  Social History     Tobacco Use    Smoking status: Never     Passive exposure: Never    Smokeless tobacco: Never   Vaping Use    Vaping status: Never Used   Substance Use Topics    Alcohol use: Yes     Comment: rarely    Drug use: Never     Family History   Problem Relation Name Age of Onset    Emphysema Mother      Emphysema Father      Heart attack Sister      Lung cancer Brother          Agent orange    Lymphoma Son      Multiple sclerosis Son         Review of Systems:  14 point review of systems was completed and negative except for those specially mention in my HPI    Physical Exam:    Heart Rate:  [45-76]   Temp:  [36.5 °C (97.7 °F)-36.8 °C (98.2 °F)]   Resp:  [8-18]   BP: ()/()   Weight:  [70.1 kg (154 lb 8.7 oz)-72.2 kg (159 lb 2.8 oz)]   SpO2:  [91 %-100 %]     Physical Exam  Vitals  and nursing note reviewed.   Constitutional:       General: She is not in acute distress.     Appearance: Normal appearance. She is not toxic-appearing.   HENT:      Mouth/Throat:      Mouth: Mucous membranes are dry.      Pharynx: Oropharynx is clear.   Eyes:      Extraocular Movements: Extraocular movements intact.      Pupils: Pupils are equal, round, and reactive to light.   Cardiovascular:      Rate and Rhythm: Normal rate and regular rhythm.      Pulses: Normal pulses.      Heart sounds: Normal heart sounds.   Pulmonary:      Effort: Pulmonary effort is normal.      Breath sounds: Normal breath sounds.   Chest:      Comments: PPM incision Right chest  Abdominal:      General: Abdomen is flat. There is no distension.      Palpations: Abdomen is soft.      Tenderness: There is no abdominal tenderness.   Musculoskeletal:      Right lower leg: No edema.      Left lower leg: No edema.   Skin:     General: Skin is warm and dry.      Capillary Refill: Capillary refill takes less than 2 seconds.   Neurological:      General: No focal deficit present.      Mental Status: She is alert and oriented to person, place, and time. Mental status is at baseline.      GCS: GCS eye subscore is 4. GCS verbal subscore is 5. GCS motor subscore is 6.   Psychiatric:         Behavior: Behavior is cooperative.         Objective:    I have reviewed all medications, laboratory results, and imaging pertinent for today's encounter    Assessment/Plan:    I am currently managing this critically ill patient for the following problems:    Neuro/Psych/Pain Ctrl/Sedation:  Depression  Chronic pain  - Pain Management: tylenol, oxycodone, lidocaine patch  - CAM ICU  - Cont home duloxetine and gabapentin    Respiratory/ENT:  - Maintain SPO2 >92%  - Continuous pulse ox monitoring   - Pulm hygiene    Cardiovascular:  Bradycardia s/p PPM 6/3  HTN now with labile hypotension  pAfib  - Continuous cardiac monitoring per ICU protocol  - Maintain MAPS  >65  - Daily EKGs  - Cont Eliquis, statin  - Holding home amio, amlodipine, lasix, hydralazine and metoprolol  - 1L LR bolus today  - XR 2V today  - Cardiology consult  - EP consult    GI:  Chronic C diff  - Regular Diet  - BR with miralax prn  - One dose on vanc 6/1    Renal/Volume Status (Intra & Extravascular):  JANET on CKD improving  - Maintain quinn catheter  - Maintain urine output 0.5-1.0cc/kg/hr  - Monitor I/O's  - Replete electrolytes to maintain K >4.0 and Mg >2.0  - Daily BMP, Mg    Endocrine  Hypothyroidism  - Monitor for hyper/hypoglycemia   - Cont home synthroid    Infectious Disease:  Leukocytosis predominantly lymphocytic  - Vanc dose for C diff prophylactic  - Monitor SIRS criteria  - CXR: no infiltrates or effusions    Heme/Onc:  Hx CML  - Monitor for s/sx of anemia such as bleeding and bruising   - Resumed Eliquis  - Daily CBC  - Consult to Hem/Onc for evaluation    OBGYN/MSK:  - Activity as tolerated  - Maintain right arm sling post-op    Ethics/Code Status:  DNR-CCA, DNI     :  DVT Prophylaxis: Eliquis  GI Prophylaxis: None  Bowel Regimen: PRN  Diet: Regular  CVC: None  Atiya: None  Quinn: None  Restraints: None  Dispo: Monitor BP s/p IVF, if remains stable likely downgrade today    Critical Care Time:  40 minutes spent in preparing to see patient (I.e. review of medical records), evaluation of diagnostics (I.e. labs, imaging, etc.), documentation, discussing plan of care with patient/ family/ caregiver, and/ or coordination of care with multidisciplinary team. Time does not include completion of procedure time.      This note was prepared using voice recognition software. The details of this note are correct and have been reviewed, and corrected to the best of my ability. Some grammatical areas may persist related to the Dragon software.     Mercedes Pelaez APRN-CNP CCRN  Pulmonology & Critical Care Medicine  [facility]

## 2024-06-04 NOTE — PROGRESS NOTES
Patient not medically clear. Patient had a pacemaker placed on 6/3/2024. TCC met with patient and family to discuss discharge planning. Patient would like to talk to her other children before choosing a SNF. Will follow up tomorrow.      06/04/24 0046   Discharge Planning   Home or Post Acute Services Post acute facilities (Rehab/SNF/etc)   Type of Post Acute Facility Services Rehab   Patient expects to be discharged to: SNF   Does the patient need discharge transport arranged? No

## 2024-06-04 NOTE — PROGRESS NOTES
Occupational Therapy    Evaluation/Treatment    Patient Name: Tracie Baltazar  MRN: [id number]  : 1940  Today's Date: 24  Time Calculation  Start Time: 1339  Stop Time: 1407  Time Calculation (min): 28 min       Assessment:  OT Assessment: Referral received, chart reviewed, and evaluation complete.  Presents with bradycardia, and fall.  She is s/p pacemaker placement on the right side 6/3/24.  As a result of she has a decreased ability to perform BUE integrated actvities.  Needs assist with all ADLs, transfers, and mobilty.  Would benefit from acute OT services.  Recommend moderate intensity reha upon discharge.  Prognosis: Good  Barriers to Discharge: None  Evaluation/Treatment Tolerance: Patient limited by fatigue, Patient limited by pain  Medical Staff Made Aware: Yes  End of Session Communication: Bedside nurse  End of Session Patient Position: Bed, 3 rail up    Plan:  Treatment Interventions: ADL retraining, Functional transfer training, Endurance training, Patient/family training, Equipment evaluation/education, Neuromuscular reeducation, Compensatory technique education  OT Frequency: 5 times per week  OT Discharge Recommendations: Moderate intensity level of continued care  Equipment Recommended upon Discharge: Wheeled walker  OT Recommended Transfer Status: Moderate assist, Assist of 2  OT - OK to Discharge: Yes  Treatment Interventions: ADL retraining, Functional transfer training, Endurance training, Patient/family training, Equipment evaluation/education, Neuromuscular reeducation, Compensatory technique education    Subjective   Current Problem:  1. Bradycardia  Case Request Cath Lab: PPM IMPLANT DUAL    Case Request Cath Lab: PPM IMPLANT DUAL    Electrophysiology procedure    Electrophysiology procedure      2. Head injury, initial encounter        3. Syncope, unspecified syncope type  Case Request Cath Lab: PPM IMPLANT DUAL    Case Request Cath Lab: PPM IMPLANT DUAL    Electrophysiology  procedure    Electrophysiology procedure      4. STEMI (ST elevation myocardial infarction) (Multi)  Cardiac Catheterization Procedure    Cardiac Catheterization Procedure        General:   OT Received On: 06/04/24  General  Reason for Referral: decreased functional status  Referred By: Jorge Silverio DO  Past Medical History Relevant to Rehab: AFIB, leukemia, HTN, dyslipidemia, CKD, C-diff, asthma, RA, hysterectomy, back surgery, left TKR, left shoulder surgery  Family/Caregiver Present: No  Co-Treatment: PT  Co-Treatment Reason: medically complex patient in the ICU  Prior to Session Communication: Bedside nurse  Patient Position Received: On cart  General Comment: 84 year old WF admitted with c/o fall in the bathroom at home. S/p pacemaker placement 6/3/24  Precautions:  Medical Precautions: Fall precautions  Precautions Comment: Pacer precautions on the right side of chest.  Has a sling.  Vital Signs:  Heart Rate: 99  Heart Rate Source: Monitor  SpO2: 96 %  BP: 120/62  BP Location: Left leg  BP Method: Automatic  Patient Position: Lying  Pain:  Pain Assessment  Pain Assessment: 0-10  Pain Score: 7  Pain Type: Acute pain  Pain Location: Hip  Pain Orientation: Left    Objective   Cognition:  Overall Cognitive Status: Within Functional Limits  Orientation Level: Oriented X4           Home Living:  Type of Home: House  Lives With: Adult children  Home Adaptive Equipment: Walker rolling or standard  Home Layout: Multi-level  Home Access: Stairs to enter with rails  Entrance Stairs-Rails: Both  Entrance Stairs-Number of Steps: 2  Bathroom Shower/Tub: Walk-in shower  Bathroom Equipment: Grab bars in shower  Prior Function:  Level of [place]: Independent with ADLs and functional transfers, Needs assistance with homemaking  Receives Help From: Family  Vocational: Retired  Hand Dominance: Right  IADL History:  Current License: No  Mode of Transportation: Family  ADL:  Eating Assistance: Minimal  Grooming  Assistance: Minimal  Bathing Assistance: Maximal  UE Dressing Assistance: Moderate  LE Dressing Assistance: Maximal  Toileting Assistance with Device: Maximal    Activity Tolerance:  Endurance: Decreased tolerance for upright activites       Bed Mobility/Transfers: Bed Mobility  Bed Mobility: Yes  Bed Mobility 1  Bed Mobility 1: Supine to sitting  Level of Assistance 1: Moderate assistance  Bed Mobility 2  Bed Mobility  2: Sitting to supine  Level of Assistance 2: Maximum assistance    Transfers  Transfer: Yes  Transfer 1  Transfer From 1: Sit to  Transfer to 1: Stand  Technique 1: Sit to stand  Transfer Device 1: Walker  Transfer Level of Assistance 1: Moderate assistance  Trials/Comments 1: 2 person assist  Transfers 2  Transfer From 2: Stand to  Transfer to 2: Sit  Technique 2: Stand to sit  Transfer Device 2: Walker  Transfer Level of Assistance 2: Moderate assistance  Trials/Comments 2: 2 person assist    Sitting Balance:  Static Sitting Balance  Static Sitting-Balance Support: Feet supported  Static Sitting-Level of Assistance: Close supervision  Standing Balance:  Static Standing Balance  Static Standing-Balance Support: Bilateral upper extremity supported  Static Standing-Level of Assistance: Moderate assistance  Sensation:  Light Touch: No apparent deficits  Strength:  Strength Comments: RUE n/t in a sling s/p pacer placement, LUE 3/5 grossly     Hand Function:  Hand Function  Gross Grasp: Functional  Coordination: Impaired    Outcome Measures: [facility] Daily Activity  Putting on and taking off regular lower body clothing: Total  Bathing (including washing, rinsing, drying): A lot  Putting on and taking off regular upper body clothing: A lot  Toileting, which includes using toilet, bedpan or urinal: A lot  Taking care of personal grooming such as brushing teeth: A little  Eating Meals: A little  Daily Activity - Total Score: 13    Goals:         Problem: Bathing  Goal: STG - Patient will bathe upper body with  set up and close supervision  Outcome: Progressing     Problem: Dressings Lower Extremities  Goal: LTG - Patient will dress lower body with minimal assist and AE as needed  Outcome: Progressing     Problem: Dressing Upper Extremities  Goal: LTG - Patient will complete upper body dressing with set up  Outcome: Progressing     Problem: Eating  Goal: LTG - Patient will feed self independent  Outcome: Progressing     Problem: Grooming  Goal: STG - Patient completes grooming independent  Outcome: Progressing     Problem: Functional Mobility  Goal: Perform functional mobility to and from the bathroom with 2ww and minimal assist  Outcome: Progressing     Problem: Toileting  Goal: LTG - Patient will complete daily toileting tasks with minimal assist and 2ww  Outcome: Progressing     Problem: OT Transfers  Goal: LTG - Patient will perform all functional transfers with minimal assist and 2ww  Outcome: Progressing

## 2024-06-05 ENCOUNTER — APPOINTMENT (OUTPATIENT)
Dept: CARDIOLOGY | Facility: CLINIC | Age: 84
End: 2024-06-05
Payer: MEDICARE

## 2024-06-05 LAB
ABO GROUP (TYPE) IN BLOOD: NORMAL
ABO GROUP (TYPE) IN BLOOD: NORMAL
ALBUMIN SERPL-MCNC: 2.6 G/DL (ref 3.5–5)
ALP BLD-CCNC: 69 U/L (ref 35–125)
ALT SERPL-CCNC: 6 U/L (ref 5–40)
ANION GAP SERPL CALC-SCNC: 9 MMOL/L
ANTIBODY SCREEN: NORMAL
AST SERPL-CCNC: 12 U/L (ref 5–40)
BASOPHILS # BLD MANUAL: 0 X10*3/UL (ref 0–0.1)
BASOPHILS NFR BLD MANUAL: 0 %
BB ANTIBODY IDENTIFICATION: NORMAL
BILIRUB SERPL-MCNC: <0.2 MG/DL (ref 0.1–1.2)
BLOOD EXPIRATION DATE: NORMAL
BUN SERPL-MCNC: 28 MG/DL (ref 8–25)
CALCIUM SERPL-MCNC: 8.1 MG/DL (ref 8.5–10.4)
CASE #: NORMAL
CELL COUNT (BLOOD): 37.2 X10*3/UL
CELL POPULATIONS: NORMAL
CHLORIDE SERPL-SCNC: 104 MMOL/L (ref 97–107)
CO2 SERPL-SCNC: 24 MMOL/L (ref 24–31)
CREAT SERPL-MCNC: 2.5 MG/DL (ref 0.4–1.6)
DIAGNOSIS: NORMAL
DISPENSE STATUS: NORMAL
EGFRCR SERPLBLD CKD-EPI 2021: 19 ML/MIN/1.73M*2
EOSINOPHIL # BLD MANUAL: 0.35 X10*3/UL (ref 0–0.4)
EOSINOPHIL NFR BLD MANUAL: 1 %
ERYTHROCYTE [DISTWIDTH] IN BLOOD BY AUTOMATED COUNT: 16.4 % (ref 11.5–14.5)
FLOW DIFFERENTIAL: NORMAL
FLOW TEST ORDERED: NORMAL
GLUCOSE SERPL-MCNC: 82 MG/DL (ref 65–99)
HCT VFR BLD AUTO: 22 % (ref 36–46)
HGB BLD-MCNC: 7 G/DL (ref 12–16)
HGB BLD-MCNC: 8.6 G/DL (ref 12–16)
IGA SERPL-MCNC: 152 MG/DL (ref 70–400)
IGG SERPL-MCNC: 673 MG/DL (ref 700–1600)
IGM SERPL-MCNC: 30 MG/DL (ref 40–230)
IMM GRANULOCYTES # BLD AUTO: 0.11 X10*3/UL (ref 0–0.5)
IMM GRANULOCYTES NFR BLD AUTO: 0.3 % (ref 0–0.9)
LAB TEST METHOD: NORMAL
LYMPHOCYTES # BLD MANUAL: 26.64 X10*3/UL (ref 0.8–3)
LYMPHOCYTES NFR BLD MANUAL: 77 %
MAGNESIUM SERPL-MCNC: 2 MG/DL (ref 1.6–3.1)
MCH RBC QN AUTO: 31 PG (ref 26–34)
MCHC RBC AUTO-ENTMCNC: 31.8 G/DL (ref 32–36)
MCV RBC AUTO: 97 FL (ref 80–100)
MONOCYTES # BLD MANUAL: 0.35 X10*3/UL (ref 0.05–0.8)
MONOCYTES NFR BLD MANUAL: 1 %
NEUTS SEG # BLD MANUAL: 7.27 X10*3/UL (ref 1.6–5)
NEUTS SEG NFR BLD MANUAL: 21 %
NRBC BLD-RTO: 0 /100 WBCS (ref 0–0)
NUMBER OF CELLS COLLECTED: NORMAL PER TUBE
PATH REPORT.TOTAL CANCER: NORMAL
PHOSPHATE SERPL-MCNC: 4.5 MG/DL (ref 2.5–4.5)
PLATELET # BLD AUTO: 161 X10*3/UL (ref 150–450)
POTASSIUM SERPL-SCNC: 4.1 MMOL/L (ref 3.4–5.1)
PRODUCT BLOOD TYPE: 1700
PRODUCT CODE: NORMAL
PROT SERPL-MCNC: 4.8 G/DL (ref 5.9–7.9)
RBC # BLD AUTO: 2.26 X10*6/UL (ref 4–5.2)
RBC MORPH BLD: ABNORMAL
RH FACTOR (ANTIGEN D): NORMAL
RH FACTOR (ANTIGEN D): NORMAL
SIGNATURE COMMENT: NORMAL
SODIUM SERPL-SCNC: 137 MMOL/L (ref 133–145)
SPECIMEN VIABILITY: NORMAL
TOTAL CELLS COUNTED BLD: 100
UNIT ABO: NORMAL
UNIT NUMBER: NORMAL
UNIT RH: NORMAL
UNIT VOLUME: 350
WBC # BLD AUTO: 34.6 X10*3/UL (ref 4.4–11.3)
XM INTEP: NORMAL

## 2024-06-05 PROCEDURE — 86922 COMPATIBILITY TEST ANTIGLOB: CPT

## 2024-06-05 PROCEDURE — 85027 COMPLETE CBC AUTOMATED: CPT

## 2024-06-05 PROCEDURE — 36415 COLL VENOUS BLD VENIPUNCTURE: CPT | Performed by: INTERNAL MEDICINE

## 2024-06-05 PROCEDURE — 97530 THERAPEUTIC ACTIVITIES: CPT | Mod: GO,CO

## 2024-06-05 PROCEDURE — 86901 BLOOD TYPING SEROLOGIC RH(D): CPT | Performed by: INTERNAL MEDICINE

## 2024-06-05 PROCEDURE — 2500000004 HC RX 250 GENERAL PHARMACY W/ HCPCS (ALT 636 FOR OP/ED): Performed by: INTERNAL MEDICINE

## 2024-06-05 PROCEDURE — 86900 BLOOD TYPING SEROLOGIC ABO: CPT | Performed by: INTERNAL MEDICINE

## 2024-06-05 PROCEDURE — 30233N1 TRANSFUSION OF NONAUTOLOGOUS RED BLOOD CELLS INTO PERIPHERAL VEIN, PERCUTANEOUS APPROACH: ICD-10-PCS | Performed by: INTERNAL MEDICINE

## 2024-06-05 PROCEDURE — P9016 RBC LEUKOCYTES REDUCED: HCPCS

## 2024-06-05 PROCEDURE — 97530 THERAPEUTIC ACTIVITIES: CPT | Mod: GP

## 2024-06-05 PROCEDURE — 2500000002 HC RX 250 W HCPCS SELF ADMINISTERED DRUGS (ALT 637 FOR MEDICARE OP, ALT 636 FOR OP/ED): Mod: MUE

## 2024-06-05 PROCEDURE — 1200000002 HC GENERAL ROOM WITH TELEMETRY DAILY

## 2024-06-05 PROCEDURE — 36430 TRANSFUSION BLD/BLD COMPNT: CPT

## 2024-06-05 PROCEDURE — 85018 HEMOGLOBIN: CPT | Performed by: INTERNAL MEDICINE

## 2024-06-05 PROCEDURE — 80053 COMPREHEN METABOLIC PANEL: CPT

## 2024-06-05 PROCEDURE — 97110 THERAPEUTIC EXERCISES: CPT | Mod: GP

## 2024-06-05 PROCEDURE — 2500000001 HC RX 250 WO HCPCS SELF ADMINISTERED DRUGS (ALT 637 FOR MEDICARE OP)

## 2024-06-05 PROCEDURE — 2500000005 HC RX 250 GENERAL PHARMACY W/O HCPCS

## 2024-06-05 PROCEDURE — 83735 ASSAY OF MAGNESIUM: CPT

## 2024-06-05 PROCEDURE — 97535 SELF CARE MNGMENT TRAINING: CPT | Mod: GO,CO

## 2024-06-05 PROCEDURE — 36415 COLL VENOUS BLD VENIPUNCTURE: CPT

## 2024-06-05 PROCEDURE — 85007 BL SMEAR W/DIFF WBC COUNT: CPT

## 2024-06-05 PROCEDURE — 84100 ASSAY OF PHOSPHORUS: CPT | Performed by: INTERNAL MEDICINE

## 2024-06-05 PROCEDURE — 2500000002 HC RX 250 W HCPCS SELF ADMINISTERED DRUGS (ALT 637 FOR MEDICARE OP, ALT 636 FOR OP/ED): Mod: MUE | Performed by: INTERNAL MEDICINE

## 2024-06-05 RX ADMIN — ACETAMINOPHEN 650 MG: 325 TABLET ORAL at 06:32

## 2024-06-05 RX ADMIN — HYDROMORPHONE HYDROCHLORIDE 0.2 MG: 1 INJECTION, SOLUTION INTRAMUSCULAR; INTRAVENOUS; SUBCUTANEOUS at 17:46

## 2024-06-05 RX ADMIN — ACETAMINOPHEN 325 MG: 325 TABLET ORAL at 14:37

## 2024-06-05 RX ADMIN — SIMVASTATIN 10 MG: 10 TABLET, FILM COATED ORAL at 20:37

## 2024-06-05 RX ADMIN — LEVOTHYROXINE SODIUM 150 MCG: 0.15 TABLET ORAL at 06:32

## 2024-06-05 RX ADMIN — GABAPENTIN 300 MG: 300 CAPSULE ORAL at 09:05

## 2024-06-05 RX ADMIN — OXYCODONE 5 MG: 5 TABLET ORAL at 09:07

## 2024-06-05 RX ADMIN — Medication 100 MCG: at 09:05

## 2024-06-05 RX ADMIN — DULOXETINE HYDROCHLORIDE 20 MG: 20 CAPSULE, DELAYED RELEASE ORAL at 09:07

## 2024-06-05 RX ADMIN — AMIODARONE HYDROCHLORIDE 200 MG: 200 TABLET ORAL at 09:04

## 2024-06-05 RX ADMIN — GABAPENTIN 300 MG: 300 CAPSULE ORAL at 20:37

## 2024-06-05 RX ADMIN — LIDOCAINE 2 PATCH: 4 PATCH TOPICAL at 09:05

## 2024-06-05 RX ADMIN — ACETAMINOPHEN 975 MG: 325 TABLET ORAL at 20:37

## 2024-06-05 RX ADMIN — HYDROMORPHONE HYDROCHLORIDE 0.2 MG: 1 INJECTION, SOLUTION INTRAMUSCULAR; INTRAVENOUS; SUBCUTANEOUS at 12:13

## 2024-06-05 RX ADMIN — ACETAMINOPHEN 975 MG: 325 TABLET ORAL at 09:04

## 2024-06-05 ASSESSMENT — COGNITIVE AND FUNCTIONAL STATUS - GENERAL
DAILY ACTIVITIY SCORE: 13
PERSONAL GROOMING: A LITTLE
DRESSING REGULAR LOWER BODY CLOTHING: TOTAL
WALKING IN HOSPITAL ROOM: A LOT
DRESSING REGULAR UPPER BODY CLOTHING: A LOT
TOILETING: TOTAL
PERSONAL GROOMING: A LITTLE
PERSONAL GROOMING: A LOT
HELP NEEDED FOR BATHING: A LOT
DRESSING REGULAR UPPER BODY CLOTHING: A LOT
CLIMB 3 TO 5 STEPS WITH RAILING: TOTAL
MOBILITY SCORE: 13
HELP NEEDED FOR BATHING: A LOT
TURNING FROM BACK TO SIDE WHILE IN FLAT BAD: A LOT
STANDING UP FROM CHAIR USING ARMS: A LOT
HELP NEEDED FOR BATHING: A LOT
DRESSING REGULAR LOWER BODY CLOTHING: TOTAL
CLIMB 3 TO 5 STEPS WITH RAILING: TOTAL
TOILETING: A LOT
DRESSING REGULAR LOWER BODY CLOTHING: A LOT
MOVING TO AND FROM BED TO CHAIR: A LOT
CLIMB 3 TO 5 STEPS WITH RAILING: TOTAL
EATING MEALS: A LITTLE
TOILETING: A LOT
MOBILITY SCORE: 10
DRESSING REGULAR UPPER BODY CLOTHING: A LITTLE
MOVING TO AND FROM BED TO CHAIR: A LOT
MOVING TO AND FROM BED TO CHAIR: A LOT
WALKING IN HOSPITAL ROOM: A LOT
STANDING UP FROM CHAIR USING ARMS: A LOT
EATING MEALS: A LITTLE
TURNING FROM BACK TO SIDE WHILE IN FLAT BAD: A LOT
STANDING UP FROM CHAIR USING ARMS: A LOT
DAILY ACTIVITIY SCORE: 13
DAILY ACTIVITIY SCORE: 14
MOBILITY SCORE: 13
WALKING IN HOSPITAL ROOM: A LOT
TURNING FROM BACK TO SIDE WHILE IN FLAT BAD: A LOT
MOVING FROM LYING ON BACK TO SITTING ON SIDE OF FLAT BED WITH BEDRAILS: TOTAL

## 2024-06-05 ASSESSMENT — PAIN SCALES - GENERAL
PAINLEVEL_OUTOF10: 5 - MODERATE PAIN
PAINLEVEL_OUTOF10: 0 - NO PAIN
PAINLEVEL_OUTOF10: 3
PAINLEVEL_OUTOF10: 7
PAINLEVEL_OUTOF10: 7
PAINLEVEL_OUTOF10: 4
PAINLEVEL_OUTOF10: 7
PAINLEVEL_OUTOF10: 7
PAINLEVEL_OUTOF10: 3
PAINLEVEL_OUTOF10: 3

## 2024-06-05 ASSESSMENT — ACTIVITIES OF DAILY LIVING (ADL)
BATHING_WHERE_ASSESSED: OTHER (COMMENT)
BATHING_LEVEL_OF_ASSISTANCE: MODERATE ASSISTANCE
BATHING_EQUIPMENT_NEEDED: LONG-HANDLED SPONGE
HOME_MANAGEMENT_TIME_ENTRY: 19

## 2024-06-05 ASSESSMENT — PAIN - FUNCTIONAL ASSESSMENT
PAIN_FUNCTIONAL_ASSESSMENT: 0-10

## 2024-06-05 ASSESSMENT — PAIN SCALES - PAIN ASSESSMENT IN ADVANCED DEMENTIA (PAINAD): TOTALSCORE: MEDICATION (SEE MAR)

## 2024-06-05 ASSESSMENT — PAIN DESCRIPTION - DESCRIPTORS
DESCRIPTORS: ACHING

## 2024-06-05 ASSESSMENT — PAIN DESCRIPTION - LOCATION
LOCATION: SHOULDER
LOCATION: SHOULDER

## 2024-06-05 ASSESSMENT — PAIN DESCRIPTION - ORIENTATION
ORIENTATION: RIGHT
ORIENTATION: RIGHT

## 2024-06-05 NOTE — PROGRESS NOTES
Tracie Baltazar is a 84 y.o. female on day 4 of admission presenting with Bradycardia.      Subjective     Patient was orthostatic on standing yesterday.  Fullness in the site of the right-sided pacemaker.  Complaining of shoulder pain but manageable enough with pain medications and ice.    Eliquis held yesterday by electrophysiology due to possible hematoma.         Objective     Last Recorded Vitals  BP (!) 131/38 (BP Location: Left arm, Patient Position: Sitting) Comment: RN notified  Pulse 61   Temp 35.4 °C (95.7 °F) (Oral)   Resp 18   Wt 70.1 kg (154 lb 8.7 oz)   SpO2 95%   Intake/Output last 3 Shifts:    Intake/Output Summary (Last 24 hours) at 6/5/2024 0846  Last data filed at 6/4/2024 2327  Gross per 24 hour   Intake 2225 ml   Output 175 ml   Net 2050 ml       Admission Weight  Weight: 66.7 kg (147 lb) (06/01/24 1525)    Daily Weight  06/04/24 : 70.1 kg (154 lb 8.7 oz)    Image Results  US abdomen complete  Narrative: Interpreted By:  Nick Luciano,   STUDY:  US ABDOMEN COMPLETE; 6/4/2024 8:20 pm      INDICATION:  Signs/Symptoms:rule out hepatosplenomegaly.      COMPARISON:  None.      ACCESSION NUMBER(S):  [id number]      ORDERING CLINICIAN:  DU BROWNE      TECHNIQUE:  Grayscale and color Doppler imaging of the abdomen was performed.      FINDINGS:  COMMENTS: Exam is technically limited. Technologist note indicates  that study is limited by large amount of bowel gas as well as limited  patient mobility. Patient is status post surgery and fall limiting  mobility.      Liver: No focal hepatic masses are identified. Liver does not appear  to be enlarged.      Gallbladder: Patient is status post cholecystectomy.      Biliary tree: There is no intrahepatic biliary distention. CBD  measures 6 mm      Pancreas: Pancreas is obscured by bowel gas.      Spleen: Normal. There is no evidence for splenomegaly. Spleen  measures 8.6 cm in craniocaudal extent.      Kidneys: Both kidneys are somewhat  atrophic in appearance. There is  thinning of the renal cortex bilaterally. Right kidney measuring 8.4  cm cm in length; left kidney measuring 7.4 cm cm in length.      Proximal Aorta & Inferior vena cava: Unremarkable.      Impression: 1. Limited exam.  2. Status post cholecystectomy.  3. Somewhat atrophic appearance of the kidneys bilaterally.  4. No evidence for hepatosplenomegaly.      MACRO:  None.      Signed by: Nick Luciano 6/5/2024 8:26 AM  Dictation workstation:   [id number]      Physical Exam  Constitutional:       Appearance: Normal appearance.   HENT:      Right Ear: External ear normal.      Left Ear: External ear normal.   Eyes:      Conjunctiva/sclera: Conjunctivae normal.   Cardiovascular:      Rate and Rhythm: Normal rate.   Pulmonary:      Effort: Pulmonary effort is normal.      Breath sounds: Normal breath sounds.   Abdominal:      Palpations: Abdomen is soft.   Musculoskeletal:         General: Normal range of motion.   Skin:     Comments: Swelling over the area of the right side pacemaker.  No obvious external hemorrhage.  Tender to touch.   Neurological:      General: No focal deficit present.      Mental Status: She is alert and oriented to person, place, and time.   Psychiatric:         Mood and Affect: Mood normal.         Relevant Results               Assessment/Plan                  Principal Problem:    Bradycardia  Active Problems:    Syncope    Bradycardia  -Status post pacemaker.  Heart rate in the 60s currently  -Electrophysiology on consult.  May be some evidence of hematoma at the site of pacemaker, and will be assessed by electrophysiology today    Anemia  -Acute on chronic, hemoglobin at 7 today.  Suspect secondary to procedure and hematoma and hemodilution from IV boluses yesterday  -With the patient's orthostasis yesterday, will transfuse 1 unit.  Consent obtained.  -Discussed with electrophysiology service, and will hold Eliquis again today.  -Repeat hemoglobin this  afternoon.  -Daily CBC.    Atrial fibrillation  -Electrophysiology on consult.  Amiodarone started.  Holding Eliquis as above.    Fall  -With pain in left hip.  Initial plain film not showing fracture  -Continue lidocaine, scheduled Tylenol.  Oxycodone as needed.  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  -I hematology oncology consulted, and no evidence of progression of CLL.    Hypotension  -Improved, but orthostatic yesterday as above described above.  Treatment as above.    Disposition: Stepdown.  Physical therapy Occupational Therapy recommending moderate rehab, the patient states that she would like to go to [facility], and will bring this up momentarily at our disposition meeting.              Jorge Silverio, DO

## 2024-06-05 NOTE — PROGRESS NOTES
06/05/24 1141   Discharge Planning   Living Arrangements Children   Support Systems Children;Family members   Assistance Needed ambulating, SNF for PT/OT   Type of Residence Private residence   Home or Post Acute Services Post acute facilities (Rehab/SNF/etc)   Type of Post Acute Facility Services Rehab   Patient expects to be discharged to: SNF   Does the patient need discharge transport arranged? Yes   RoundTrip coordination needed? Yes   Has discharge transport been arranged? No     Met with patient at bedside to discuss discharge planning for PT/OT MOD rec. For SNF, patient stated chose [facility], referral sent, awaiting acceptance.     1423 [facility] accepted.  direct submit team started precert on 6/5, patient medically ready for discharge when receive precert, [facility] aware.    1540 received precert, patient not medically ready for discharge. SNF aware. Provider aware.

## 2024-06-05 NOTE — CONSULTS
.Reason For Consult  Chronic kidney disease stage IV    History Of Present Illness  Tracie Baltazar is a 84 y.o. female who is very well-known to our practice he follows up with Dr. Darnell Amor as an outpatient she does have underlying CKD stage IV, she also had a history of anemia chronic kidney disease chronic lymphocytic leukemia which is in remission arthritis left total knee arthroplasty in the past, hypertension who basically was admitted to hospital after she had sustained a fall she hurt her left hip however x-ray did not show any fractured bones patient was found to be extremely anemic she is receiving blood transfusion at this time also I should mention that she was fairly bradycardic scheduled to have a pacemaker placement patient denies any dizziness now any shortness of breath any chest pain nausea vomiting or diarrhea.     Review of Systems  Point review of system was done all negative except was positive with history of present illness    Past Medical History  She has a past medical history of Asthma (HHS-HCC), Essential (primary) hypertension, Kidney failure, Leukemia (Multi), and Rheumatoid arthritis (Multi).    Surgical History  She has a past surgical history that includes Hysterectomy; Back surgery; Cholecystectomy; Other surgical history; Knee surgery; Shoulder surgery (Left); Total knee arthroplasty (Left, 02/13/2014); Cardiac electrophysiology procedure (Right, 6/2/2024); and Cardiac electrophysiology procedure (N/A, 6/3/2024).     Social History  She reports that she has never smoked. She has never been exposed to tobacco smoke. She has never used smokeless tobacco. She reports current alcohol use. She reports that she does not use drugs.    Family History  Family History   Problem Relation Name Age of Onset    Emphysema Mother      Emphysema Father      Heart attack Sister      Lung cancer Brother          Agent orange    Lymphoma Son      Multiple sclerosis Son          Current  Facility-Administered Medications:     acetaminophen (Tylenol) tablet 975 mg, 975 mg, oral, TID, ERAN Morales, 975 mg at 06/05/24 0904    [Held by provider] amiodarone (Pacerone) tablet 200 mg, 200 mg, oral, Daily, ERAN Morales    amiodarone (Pacerone) tablet 200 mg, 200 mg, oral, Daily, Jorge Silverio DO, 200 mg at 06/05/24 0904    [Held by provider] amLODIPine (Norvasc) tablet 5 mg, 5 mg, oral, Daily, ERAN Morales    [Held by provider] apixaban (Eliquis) tablet 2.5 mg, 2.5 mg, oral, q12h, ERAN Campbell    cyanocobalamin (Vitamin B-12) tablet 100 mcg, 100 mcg, oral, Daily, ERAN Morales, 100 mcg at 06/05/24 0905    DULoxetine (Cymbalta) DR capsule 20 mg, 20 mg, oral, Daily, ERAN Morales, 20 mg at 06/05/24 0907    [Held by provider] furosemide (Lasix) tablet 60 mg, 60 mg, oral, Daily, ERAN Morales    gabapentin (Neurontin) capsule 300 mg, 300 mg, oral, BID, ERAN Morales, 300 mg at 06/05/24 0905    [Held by provider] hydrALAZINE (Apresoline) tablet 10 mg, 10 mg, oral, BID, ERAN Morales    HYDROmorphone (Dilaudid) injection 0.2 mg, 0.2 mg, intravenous, q3h PRN, Jorge Silverio DO, 0.2 mg at 06/05/24 1213    levothyroxine (Synthroid, Levoxyl) tablet 150 mcg, 150 mcg, oral, Daily, ERAN Morales, 150 mcg at 06/05/24 0632    lidocaine 4 % patch 2 patch, 2 patch, transdermal, Daily, ERAN Morales, 2 patch at 06/05/24 0905    [Held by provider] metoprolol succinate XL (Toprol-XL) 24 hr tablet 12.5 mg, 12.5 mg, oral, Daily, ERAN Morales    [Held by provider] metoprolol succinate XL (Toprol-XL) 24 hr tablet 12.5 mg, 12.5 mg, oral, Daily, ERAN Campbell    ondansetron (Zofran) injection 4 mg, 4 mg, intravenous, q4h PRN, Jorge Silverio DO    oxyCODONE (Roxicodone) immediate release tablet 5 mg, 5 mg, oral, q6h PRN, ERAN Morales, 5 mg at 06/05/24 0907     polyethylene glycol (Glycolax, Miralax) packet 17 g, 17 g, oral, Daily PRN, Mercedes DARIEN ERAN Pelaez    simvastatin (Zocor) tablet 10 mg, 10 mg, oral, Nightly, ERAN Morales, 10 mg at 06/04/24 2115    [Held by provider] traMADol (Ultram) tablet 50 mg, 50 mg, oral, q24h, ERAN Morales   Allergies  Ibuprofen and Amoxicillin         Physical Exam  Physical Exam  Constitutional:       General: She is not in acute distress.     Appearance: She is not toxic-appearing.   HENT:      Head: Normocephalic and atraumatic.   Eyes:      Extraocular Movements: Extraocular movements intact.      Pupils: Pupils are equal, round, and reactive to light.   Neck:      Vascular: No carotid bruit.   Cardiovascular:      Rate and Rhythm: Normal rate. Rhythm irregular.   Pulmonary:      Effort: No respiratory distress.      Breath sounds: No stridor. No wheezing, rhonchi or rales.   Chest:      Chest wall: No tenderness.   Abdominal:      General: There is no distension.      Palpations: There is no mass.      Tenderness: There is no abdominal tenderness. There is no right CVA tenderness, left CVA tenderness or guarding.      Hernia: No hernia is present.   Musculoskeletal:         General: No swelling or tenderness.      Cervical back: No rigidity.      Right lower leg: No edema.      Left lower leg: No edema.   Lymphadenopathy:      Cervical: No cervical adenopathy.   Skin:     General: Skin is warm and dry.      Coloration: Skin is not jaundiced or pale.      Findings: No bruising or erythema.   Neurological:      General: No focal deficit present.      Mental Status: She is alert.              Intake/Output Summary (Last 24 hours) at 6/5/2024 1250  Last data filed at 6/5/2024 1100  Gross per 24 hour   Intake 2325 ml   Output 625 ml   Net 1700 ml       Vitals 24HR  Heart Rate:  [59-99]   Temp:  [35.4 °C (95.7 °F)-37.2 °C (99 °F)]   Resp:  [14-19]   BP: ()/(28-85)   SpO2:  [92 %-96 %]     Relevant Results         Results for orders placed or performed during the hospital encounter of 06/01/24 (from the past 96 hour(s))   Comprehensive Metabolic Panel   Result Value Ref Range    Glucose 88 65 - 99 mg/dL    Sodium 137 133 - 145 mmol/L    Potassium 4.1 3.4 - 5.1 mmol/L    Chloride 98 97 - 107 mmol/L    Bicarbonate 30 24 - 31 mmol/L    Urea Nitrogen 27 (H) 8 - 25 mg/dL    Creatinine 2.60 (H) 0.40 - 1.60 mg/dL    eGFR 18 (L) >60 mL/min/1.73m*2    Calcium 8.7 8.5 - 10.4 mg/dL    Albumin 3.2 (L) 3.5 - 5.0 g/dL    Alkaline Phosphatase 70 35 - 125 U/L    Total Protein 6.0 5.9 - 7.9 g/dL    AST 17 5 - 40 U/L    Bilirubin, Total 0.3 0.1 - 1.2 mg/dL    ALT 7 5 - 40 U/L    Anion Gap 9 <=19 mmol/L   CBC and Auto Differential   Result Value Ref Range    WBC 40.7 (H) 4.4 - 11.3 x10*3/uL    nRBC 0.0 0.0 - 0.0 /100 WBCs    RBC 3.32 (L) 4.00 - 5.20 x10*6/uL    Hemoglobin 10.2 (L) 12.0 - 16.0 g/dL    Hematocrit 33.1 (L) 36.0 - 46.0 %     80 - 100 fL    MCH 30.7 26.0 - 34.0 pg    MCHC 30.8 (L) 32.0 - 36.0 g/dL    RDW 15.8 (H) 11.5 - 14.5 %    Platelets 247 150 - 450 x10*3/uL    Immature Granulocytes %, Automated 0.3 0.0 - 0.9 %    Immature Granulocytes Absolute, Automated 0.14 0.00 - 0.50 x10*3/uL   Protime-INR   Result Value Ref Range    Protime 12.1 9.3 - 12.7 seconds    INR 1.2 0.9 - 1.2   Serial Troponin, Initial ([facility])   Result Value Ref Range    Troponin T, High Sensitivity 47 (HH) <=14 ng/L   Manual Differential   Result Value Ref Range    Neutrophils %, Manual 28.0 40.0 - 80.0 %    Lymphocytes %, Manual 70.0 13.0 - 44.0 %    Monocytes %, Manual 1.0 2.0 - 10.0 %    Eosinophils %, Manual 1.0 0.0 - 6.0 %    Basophils %, Manual 0.0 0.0 - 2.0 %    Seg Neutrophils Absolute, Manual 11.40 (H) 1.60 - 5.00 x10*3/uL    Lymphocytes Absolute, Manual 28.49 (H) 0.80 - 3.00 x10*3/uL    Monocytes Absolute, Manual 0.41 0.05 - 0.80 x10*3/uL    Eosinophils Absolute, Manual 0.41 (H) 0.00 - 0.40 x10*3/uL    Basophils Absolute, Manual 0.00 0.00 - 0.10  x10*3/uL    Total Cells Counted 100     RBC Morphology No significant RBC morphology present    ECG 12 Lead   Result Value Ref Range    Ventricular Rate 43 BPM    Atrial Rate 43 BPM    HI Interval 166 ms    QRS Duration 90 ms    QT Interval 478 ms    QTC Calculation(Bazett) 403 ms    P Axis 74 degrees    R Axis -20 degrees    T Axis 20 degrees    QRS Count 7 beats    Q Onset 219 ms    P Onset 136 ms    P Offset 166 ms    T Offset 458 ms    QTC Fredericia 427 ms   Serial Troponin, 2 Hour ([facility])   Result Value Ref Range    Troponin T, High Sensitivity 46 () <=14 ng/L   TSH   Result Value Ref Range    Thyroid Stimulating Hormone 9.18 (H) 0.27 - 4.20 mIU/L   Serial Troponin, 6 Hour ([facility])   Result Value Ref Range    Troponin T, High Sensitivity 47 () <=14 ng/L   Renal Function Panel   Result Value Ref Range    Glucose 83 65 - 99 mg/dL    Sodium 141 133 - 145 mmol/L    Potassium 3.7 3.4 - 5.1 mmol/L    Chloride 103 97 - 107 mmol/L    Bicarbonate 30 24 - 31 mmol/L    Urea Nitrogen 27 (H) 8 - 25 mg/dL    Creatinine 2.50 (H) 0.40 - 1.60 mg/dL    eGFR 19 (L) >60 mL/min/1.73m*2    Calcium 8.3 (L) 8.5 - 10.4 mg/dL    Phosphorus 4.1 2.5 - 4.5 mg/dL    Albumin 3.1 (L) 3.5 - 5.0 g/dL    Anion Gap 8 <=19 mmol/L   Magnesium   Result Value Ref Range    Magnesium 2.10 1.60 - 3.10 mg/dL   CBC   Result Value Ref Range    WBC 34.0 (H) 4.4 - 11.3 x10*3/uL    nRBC 0.0 0.0 - 0.0 /100 WBCs    RBC 3.16 (L) 4.00 - 5.20 x10*6/uL    Hemoglobin 9.6 (L) 12.0 - 16.0 g/dL    Hematocrit 31.8 (L) 36.0 - 46.0 %     (H) 80 - 100 fL    MCH 30.4 26.0 - 34.0 pg    MCHC 30.2 (L) 32.0 - 36.0 g/dL    RDW 15.9 (H) 11.5 - 14.5 %    Platelets 212 150 - 450 x10*3/uL   T4, free   Result Value Ref Range    Thyroxine, Free 1.60 0.90 - 1.70 ng/dL   Basic Metabolic Panel   Result Value Ref Range    Glucose 94 65 - 99 mg/dL    Sodium 138 133 - 145 mmol/L    Potassium 4.0 3.4 - 5.1 mmol/L    Chloride 101 97 - 107 mmol/L    Bicarbonate 27 24 - 31 mmol/L     Urea Nitrogen 26 (H) 8 - 25 mg/dL    Creatinine 2.40 (H) 0.40 - 1.60 mg/dL    eGFR 19 (L) >60 mL/min/1.73m*2    Calcium 8.4 (L) 8.5 - 10.4 mg/dL    Anion Gap 10 <=19 mmol/L   POCT GLUCOSE   Result Value Ref Range    POCT Glucose 90 74 - 99 mg/dL   CBC and Auto Differential   Result Value Ref Range    WBC 33.6 (H) 4.4 - 11.3 x10*3/uL    nRBC 0.0 0.0 - 0.0 /100 WBCs    RBC 3.25 (L) 4.00 - 5.20 x10*6/uL    Hemoglobin 10.0 (L) 12.0 - 16.0 g/dL    Hematocrit 32.4 (L) 36.0 - 46.0 %     80 - 100 fL    MCH 30.8 26.0 - 34.0 pg    MCHC 30.9 (L) 32.0 - 36.0 g/dL    RDW 15.9 (H) 11.5 - 14.5 %    Platelets 200 150 - 450 x10*3/uL    Immature Granulocytes %, Automated 0.2 0.0 - 0.9 %    Immature Granulocytes Absolute, Automated 0.06 0.00 - 0.50 x10*3/uL   Comprehensive Metabolic Panel   Result Value Ref Range    Glucose 81 65 - 99 mg/dL    Sodium 140 133 - 145 mmol/L    Potassium 4.3 3.4 - 5.1 mmol/L    Chloride 104 97 - 107 mmol/L    Bicarbonate 27 24 - 31 mmol/L    Urea Nitrogen 24 8 - 25 mg/dL    Creatinine 2.30 (H) 0.40 - 1.60 mg/dL    eGFR 20 (L) >60 mL/min/1.73m*2    Calcium 8.3 (L) 8.5 - 10.4 mg/dL    Albumin 3.0 (L) 3.5 - 5.0 g/dL    Alkaline Phosphatase 81 35 - 125 U/L    Total Protein 5.4 (L) 5.9 - 7.9 g/dL    AST 15 5 - 40 U/L    Bilirubin, Total 0.3 0.1 - 1.2 mg/dL    ALT 7 5 - 40 U/L    Anion Gap 9 <=19 mmol/L   Magnesium   Result Value Ref Range    Magnesium 2.00 1.60 - 3.10 mg/dL   Phosphorus   Result Value Ref Range    Phosphorus 4.3 2.5 - 4.5 mg/dL   Lipid Panel   Result Value Ref Range    Cholesterol 151 133 - 200 mg/dL    HDL-Cholesterol 59.0 >50.0 mg/dL    Cholesterol/HDL Ratio 2.6 SEE COMMENT    LDL Calculated 70 65 - 130 mg/dL    Triglycerides 108 40 - 150 mg/dL   Manual Differential   Result Value Ref Range    Neutrophils %, Manual 24.0 40.0 - 80.0 %    Lymphocytes %, Manual 65.0 13.0 - 44.0 %    Monocytes %, Manual 4.0 2.0 - 10.0 %    Eosinophils %, Manual 4.0 0.0 - 6.0 %    Basophils %, Manual 1.0  0.0 - 2.0 %    Atypical Lymphocytes %, Manual 1.0 0.0 - 2.0 %    Metamyelocytes %, Manual 1.0 0.0 - 0.0 %    Seg Neutrophils Absolute, Manual 8.06 (H) 1.60 - 5.00 x10*3/uL    Lymphocytes Absolute, Manual 21.84 (H) 0.80 - 3.00 x10*3/uL    Monocytes Absolute, Manual 1.34 (H) 0.05 - 0.80 x10*3/uL    Eosinophils Absolute, Manual 1.34 (H) 0.00 - 0.40 x10*3/uL    Basophils Absolute, Manual 0.34 (H) 0.00 - 0.10 x10*3/uL    Atypical Lymphs Absolute, Manual 0.34 (H) 0.00 - 0.30 x10*3/uL    Metamyelocytes Absolute, Manual 0.34 0.00 - 0.00 x10*3/uL    Total Cells Counted 100     RBC Morphology No significant RBC morphology present    CBC and Auto Differential   Result Value Ref Range    WBC 37.2 (H) 4.4 - 11.3 x10*3/uL    nRBC 0.0 0.0 - 0.0 /100 WBCs    RBC 2.94 (L) 4.00 - 5.20 x10*6/uL    Hemoglobin 9.2 (L) 12.0 - 16.0 g/dL    Hematocrit 29.0 (L) 36.0 - 46.0 %    MCV 99 80 - 100 fL    MCH 31.3 26.0 - 34.0 pg    MCHC 31.7 (L) 32.0 - 36.0 g/dL    RDW 15.9 (H) 11.5 - 14.5 %    Platelets 203 150 - 450 x10*3/uL    Immature Granulocytes %, Automated 0.3 0.0 - 0.9 %    Immature Granulocytes Absolute, Automated 0.12 0.00 - 0.50 x10*3/uL   Comprehensive Metabolic Panel   Result Value Ref Range    Glucose 103 (H) 65 - 99 mg/dL    Sodium 138 133 - 145 mmol/L    Potassium 4.6 3.4 - 5.1 mmol/L    Chloride 102 97 - 107 mmol/L    Bicarbonate 24 24 - 31 mmol/L    Urea Nitrogen 22 8 - 25 mg/dL    Creatinine 2.20 (H) 0.40 - 1.60 mg/dL    eGFR 22 (L) >60 mL/min/1.73m*2    Calcium 8.4 (L) 8.5 - 10.4 mg/dL    Albumin 3.0 (L) 3.5 - 5.0 g/dL    Alkaline Phosphatase 77 35 - 125 U/L    Total Protein 5.2 (L) 5.9 - 7.9 g/dL    AST 14 5 - 40 U/L    Bilirubin, Total 0.3 0.1 - 1.2 mg/dL    ALT 6 5 - 40 U/L    Anion Gap 12 <=19 mmol/L   Magnesium   Result Value Ref Range    Magnesium 2.00 1.60 - 3.10 mg/dL   Phosphorus   Result Value Ref Range    Phosphorus 4.6 (H) 2.5 - 4.5 mg/dL   Manual Differential   Result Value Ref Range    Neutrophils %, Manual 25.0  40.0 - 80.0 %    Bands %, Manual 1.0 0.0 - 5.0 %    Lymphocytes %, Manual 73.0 13.0 - 44.0 %    Monocytes %, Manual 0.0 2.0 - 10.0 %    Eosinophils %, Manual 1.0 0.0 - 6.0 %    Basophils %, Manual 0.0 0.0 - 2.0 %    Seg Neutrophils Absolute, Manual 9.30 (H) 1.60 - 5.00 x10*3/uL    Bands Absolute, Manual 0.37 0.00 - 0.50 x10*3/uL    Lymphocytes Absolute, Manual 27.16 (H) 0.80 - 3.00 x10*3/uL    Monocytes Absolute, Manual 0.00 (L) 0.05 - 0.80 x10*3/uL    Eosinophils Absolute, Manual 0.37 0.00 - 0.40 x10*3/uL    Basophils Absolute, Manual 0.00 0.00 - 0.10 x10*3/uL    Total Cells Counted 100     Neutrophils Absolute, Manual 9.67 (H) 1.60 - 5.50 x10*3/uL    RBC Morphology No significant RBC morphology present    Iron and TIBC   Result Value Ref Range    Iron 32 30 - 160 ug/dL    UIBC 93 (L) 110 - 370 ug/dL    TIBC 125 (L) 228 - 428 ug/dL    % Saturation 26 12 - 50 %   Ferritin   Result Value Ref Range    Ferritin 899 (H) 13 - 150 ng/mL   Lactate dehydrogenase   Result Value Ref Range     91 - 227 U/L   Uric Acid   Result Value Ref Range    Uric Acid 7.3 (H) 2.5 - 6.8 mg/dL   CBC and Auto Differential   Result Value Ref Range    WBC 34.6 (H) 4.4 - 11.3 x10*3/uL    nRBC 0.0 0.0 - 0.0 /100 WBCs    RBC 2.26 (L) 4.00 - 5.20 x10*6/uL    Hemoglobin 7.0 (L) 12.0 - 16.0 g/dL    Hematocrit 22.0 (L) 36.0 - 46.0 %    MCV 97 80 - 100 fL    MCH 31.0 26.0 - 34.0 pg    MCHC 31.8 (L) 32.0 - 36.0 g/dL    RDW 16.4 (H) 11.5 - 14.5 %    Platelets 161 150 - 450 x10*3/uL    Immature Granulocytes %, Automated 0.3 0.0 - 0.9 %    Immature Granulocytes Absolute, Automated 0.11 0.00 - 0.50 x10*3/uL   Comprehensive Metabolic Panel   Result Value Ref Range    Glucose 82 65 - 99 mg/dL    Sodium 137 133 - 145 mmol/L    Potassium 4.1 3.4 - 5.1 mmol/L    Chloride 104 97 - 107 mmol/L    Bicarbonate 24 24 - 31 mmol/L    Urea Nitrogen 28 (H) 8 - 25 mg/dL    Creatinine 2.50 (H) 0.40 - 1.60 mg/dL    eGFR 19 (L) >60 mL/min/1.73m*2    Calcium 8.1 (L) 8.5  - 10.4 mg/dL    Albumin 2.6 (L) 3.5 - 5.0 g/dL    Alkaline Phosphatase 69 35 - 125 U/L    Total Protein 4.8 (L) 5.9 - 7.9 g/dL    AST 12 5 - 40 U/L    Bilirubin, Total <0.2 0.1 - 1.2 mg/dL    ALT 6 5 - 40 U/L    Anion Gap 9 <=19 mmol/L   Magnesium   Result Value Ref Range    Magnesium 2.00 1.60 - 3.10 mg/dL   Phosphorus   Result Value Ref Range    Phosphorus 4.5 2.5 - 4.5 mg/dL   Manual Differential   Result Value Ref Range    Neutrophils %, Manual 21.0 40.0 - 80.0 %    Lymphocytes %, Manual 77.0 13.0 - 44.0 %    Monocytes %, Manual 1.0 2.0 - 10.0 %    Eosinophils %, Manual 1.0 0.0 - 6.0 %    Basophils %, Manual 0.0 0.0 - 2.0 %    Seg Neutrophils Absolute, Manual 7.27 (H) 1.60 - 5.00 x10*3/uL    Lymphocytes Absolute, Manual 26.64 (H) 0.80 - 3.00 x10*3/uL    Monocytes Absolute, Manual 0.35 0.05 - 0.80 x10*3/uL    Eosinophils Absolute, Manual 0.35 0.00 - 0.40 x10*3/uL    Basophils Absolute, Manual 0.00 0.00 - 0.10 x10*3/uL    Total Cells Counted 100     RBC Morphology No significant RBC morphology present    Prepare RBC: 1 Units   Result Value Ref Range    PRODUCT CODE N3858W52     Unit Number [id number]     Unit ABO B     Unit RH NEG     XM INTEP COMP     Dispense Status IS     Blood Expiration Date June 20, 2024 23:59 EDT     PRODUCT BLOOD TYPE 1700     UNIT VOLUME 350    Type and screen   Result Value Ref Range    ABO TYPE B     Rh TYPE NEG     ANTIBODY SCREEN POS    BB ORDER ONLY - Antibody Identification   Result Value Ref Range    Antibody ID Anti-D     CASE #     VERIFY ABO/Rh Group Test   Result Value Ref Range    ABO TYPE B     Rh TYPE NEG           Assessment/Plan     US abdomen complete    Result Date: 6/5/2024  Interpreted By:  Nick Luciano, STUDY: US ABDOMEN COMPLETE; 6/4/2024 8:20 pm   INDICATION: Signs/Symptoms:rule out hepatosplenomegaly.   COMPARISON: None.   ACCESSION NUMBER(S): [id number]   ORDERING CLINICIAN: DU BROWNE   TECHNIQUE: Grayscale and color Doppler imaging of the abdomen  was performed.   FINDINGS: COMMENTS: Exam is technically limited. Technologist note indicates that study is limited by large amount of bowel gas as well as limited patient mobility. Patient is status post surgery and fall limiting mobility.   Liver: No focal hepatic masses are identified. Liver does not appear to be enlarged.   Gallbladder: Patient is status post cholecystectomy.   Biliary tree: There is no intrahepatic biliary distention. CBD measures 6 mm   Pancreas: Pancreas is obscured by bowel gas.   Spleen: Normal. There is no evidence for splenomegaly. Spleen measures 8.6 cm in craniocaudal extent.   Kidneys: Both kidneys are somewhat atrophic in appearance. There is thinning of the renal cortex bilaterally. Right kidney measuring 8.4 cm cm in length; left kidney measuring 7.4 cm cm in length.   Proximal Aorta & Inferior vena cava: Unremarkable.       1. Limited exam. 2. Status post cholecystectomy. 3. Somewhat atrophic appearance of the kidneys bilaterally. 4. No evidence for hepatosplenomegaly.   MACRO: None.   Signed by: Nick Luciano 6/5/2024 8:26 AM Dictation workstation:   [id number]    XR chest 2 views    Result Date: 6/4/2024  Interpreted By:  Jessica Pinto, STUDY: XR CHEST 2 VIEWS 6/4/2024 1:30 pm   INDICATION: Status post pacemaker insertion   COMPARISON: 06/03/2024   ACCESSION NUMBER(S): [id number]   ORDERING CLINICIAN: ROMY GRIGGS   TECHNIQUE: AP erect and lateral views of the chest were acquired.   FINDINGS: Utilizing a right subclavian approach, bipolar pacemaker leads have been inserted with leads terminating in the right atrium and right ventricle.   The cardiac size is stable. There is subsegmental atelectasis within retrocardiac left lower lobe. The right lung is clear. There is small left pleural effusion.   Dorsal neurostimulator device is seen.       Subsegmental atelectasis retrocardiac left lower lobe with small left pleural effusion.   Bipolar pacemaker leads intact and unchanged in  positioning with no delayed pneumothorax.   Signed by: Jessica Pinto 6/4/2024 4:01 PM Dictation workstation:   [id number]    XR chest 1 view    Result Date: 6/4/2024  Interpreted By:  Nick Luciano, STUDY: XR CHEST 1 VIEW; 6/3/2024 5:41 pm   INDICATION: CLINICAL INFORMATION: Signs/Symptoms:post R sided permanent pacemaker SC stick assess for pneumothorax.   COMPARISON: 06/02/2024   ACCESSION NUMBER(S): [id number]   ORDERING CLINICIAN: ROMY GRIGGS   TECHNIQUE: Portable chest one view.   FINDINGS: There is a new right-sided bipolar cardiac pacemaker present. There is no evidence for pneumothorax. Pacer leads are present within the right atrial and right ventricular distribution. Neurostimulator device overlies the lower thoracic spine.   No infiltrates or effusions are identified.       There is a new bipolar cardiac pacemaker present with the pacer overlying the right shoulder without evidence for pneumothorax. Remaining findings are stable.   MACRO: none   Signed by: Nick Luciano 6/4/2024 8:10 AM Dictation workstation:   [id number]       Impression:  Chronic disease stage IV it seems that her renal function is at baseline no uremia  Status post a fall  Sinus bradycardia  Anemia of chronic kidney disease  Arthritis  Hypertension    Recommendations:  I do agree with the current management, blood transfusion as needed, will benefit from erythropoietin therapy, pacemaker placement, no indication for dialysis therapy at this time we will continue to monitor very closely.      You very much for your consultation    I spent 30 minutes in the professional and overall care of this patient.      Epifanio Amor MDInpatient consult to Nephrology  Consult performed by: Epifanio Amor MD  Consult ordered by: Jorge Silverio DO

## 2024-06-05 NOTE — CARE PLAN
The patient's goals for the shift include      The clinical goals for the shift include pain control, hemodynamically      Problem: Pain  Goal: My pain/discomfort is manageable  Outcome: Progressing     Problem: Safety  Goal: Patient will be injury free during hospitalization  Outcome: Progressing  Goal: I will remain free of falls  Outcome: Progressing     Problem: Daily Care  Goal: Daily care needs are met  Outcome: Progressing     Problem: Psychosocial Needs  Goal: Demonstrates ability to cope with hospitalization/illness  Outcome: Progressing  Goal: Collaborate with me, my family, and caregiver to identify my specific goals  Outcome: Progressing     Problem: Discharge Barriers  Goal: My discharge needs are met  Outcome: Progressing

## 2024-06-05 NOTE — RESEARCH NOTES
Artificial Intelligence Monitoring in Nursing (AIMS Nursing) Study    Principle Investigator - Dr. Magno Her  Research Coordinator - Shahnaz Tan     Patient Name - Tracie Baltazar  Date - 6/5/2024 11:52 AM  Location - [facility]    Tracie Baltazar was approached by Shahnaz Tan to talk about participating in the AIMS Nursing Study. The consent form was signed by the patient and they were given a copy for their records. Study protocol was followed and patient was given study contact information.     Shahnaz Tan

## 2024-06-05 NOTE — CONSULTS
Reason For Consult  CLL    History Of Present Illness  Tracie Baltazar is a 84 y.o. female presenting with after a fall.  Patient was in the bathroom and she fell.  She reports that her legs just gave out on her.  She fell striking her left side and hip against the side of the shower.  Said she also hit her arm which is now bruised.  She reports a lot of pain in her left hip that radiates into her anterior pelvis.  She says her sons were able to help her get back up with her walker and get her to a seated position.  911 was then called.     Her family is bedside. They all confirmed that the patient has been told several times that she might need a pacemaker. On arrival to the emergency room her heart rate was found to be in the 30s to 40s. She takes amiodarone and metoprolol. Blood pressures preserved.      Patient requiring dopamine gtt for symptomatic bradycardia. Patient had 4 second pause were she lost her vision. Patient states that she has also been feeling more SOB. Patient will continue to be monitored in ICU and plan for Pacemaker placement tomorrow.      Past Medical History  She has a past medical history of Asthma (HHS-HCC), Essential (primary) hypertension, Kidney failure, Leukemia (Multi), and Rheumatoid arthritis (Multi).    Surgical History  She has a past surgical history that includes Hysterectomy; Back surgery; Cholecystectomy; Other surgical history; Knee surgery; Shoulder surgery (Left); Total knee arthroplasty (Left, 02/13/2014); and Cardiac electrophysiology procedure (Right, 6/2/2024).     Social History  She reports that she has never smoked. She has never been exposed to tobacco smoke. She has never used smokeless tobacco. She reports current alcohol use. She reports that she does not use drugs.    Family History  Family History   Problem Relation Name Age of Onset    Emphysema Mother      Emphysema Father      Heart attack Sister      Lung cancer Brother          Shamar montenegro     "Lymphoma Son      Multiple sclerosis Son          Allergies  Ibuprofen and Amoxicillin    Review of Systems  As per in HPI otherwise all other ROS are negative     Physical Exam  Constitutional:       General: She is not in acute distress.  HENT:      Head: Normocephalic and atraumatic.   Eyes:      Pupils: Pupils are equal, round, and reactive to light.   Cardiovascular:      Rate and Rhythm: Regular rhythm. Bradycardia present.      Pulses: Normal pulses.      Heart sounds: Normal heart sounds.   Pulmonary:      Effort: Pulmonary effort is normal.      Breath sounds: Normal breath sounds.   Musculoskeletal:         General: No swelling.   Skin:     General: Skin is warm and dry.      Capillary Refill: Capillary refill takes less than 2 seconds.   Neurological:      General: No focal deficit present.      Mental Status: She is alert.      Last Recorded Vitals  Blood pressure 123/85, pulse 60, temperature 36.4 °C (97.5 °F), temperature source Oral, resp. rate 19, height 1.6 m (5' 3\"), weight 70.1 kg (154 lb 8.7 oz), SpO2 95%.    Relevant Results  Labs are reviewed     Assessment/Plan     CLL: labs are reviewed. There is no indicated of CLL progression that could result in her current symptoms. Flow cytometry, IgG/M/A levels/LDH/uric acid are ordered. Abd US is completed but report is pending. We will continue FU as OP.    I spent 60 minutes in the professional and overall care of this patient.      Min Graff MD    "

## 2024-06-05 NOTE — PROGRESS NOTES
Occupational Therapy    OT Treatment    Patient Name: Tracie Baltazar  MRN: [id number]  Today's Date: 6/5/2024  Time Calculation  Start Time: 0905  Stop Time: 0944  Time Calculation (min): 39 min         Assessment:  OT Assessment: Pt progressing with POC following pace maker precautions.  Will continue to address remaining deficits with skilled therapeutic intervention.  Prognosis: Good  Barriers to Discharge: None  Evaluation/Treatment Tolerance: Patient limited by fatigue, Patient limited by pain  Medical Staff Made Aware: Yes  End of Session Communication: Bedside nurse  End of Session Patient Position: Up in chair, Alarm on (call light in reach with Pt demonstrate use all needs met)  OT Assessment Results: Decreased ADL status, Decreased upper extremity range of motion, Decreased upper extremity strength, Decreased endurance, Decreased functional mobility  Prognosis: Good  Barriers to Discharge: None  Evaluation/Treatment Tolerance: Patient limited by fatigue, Patient limited by pain  Medical Staff Made Aware: Yes  Strengths: Ability to acquire knowledge, Attitude of self, Coping skills, Premorbid level of function, Support of Caregivers, Support of extended family/friends  Barriers to Participation: Comorbidities  Plan:  Treatment Interventions: ADL retraining, Functional transfer training, Endurance training, Patient/family training, Equipment evaluation/education, Compensatory technique education  OT Frequency: 5 times per week  OT Discharge Recommendations: Moderate intensity level of continued care  Equipment Recommended upon Discharge: Wheeled walker  OT Recommended Transfer Status: Moderate assist, Assist of 1, Assist of 2  OT - OK to Discharge: Yes  Treatment Interventions: ADL retraining, Functional transfer training, Endurance training, Patient/family training, Equipment evaluation/education, Compensatory technique education    Subjective   Previous Visit Info:  OT Last Visit  OT Received On:  06/05/24  General:  General  Reason for Referral: decreased functional status  Referred By: Jorge Silverio DO  Past Medical History Relevant to Rehab: AFIB, leukemia, HTN, dyslipidemia, CKD, C-diff, asthma, RA, hysterectomy, back surgery, left TKR, left shoulder surgery  Co-Treatment: PT  Co-Treatment Reason: medically complex patient  Prior to Session Communication: Bedside nurse  Patient Position Received: Bed, 3 rail up  Preferred Learning Style: verbal, visual, auditory  General Comment: 84 year old WF admitted with c/o fall in the bathroom at home. S/p pacemaker placement 6/3/24 (Cleared by NSG. Pt agreeable to skilled therapeutc intervention.)  Precautions:  Medical Precautions: Fall precautions  Precautions Comment: Pacer precautions on the right side of chest.  Has a sling.   Pain:  Pain Assessment  Pain Assessment: 0-10  Pain Score: 7  Pain Type: Acute pain  Pain Location: Shoulder  Pain Orientation: Right  Pain Interventions: Repositioned, Environmental changes, Relaxation technique, Therapeutic presence  Response to Interventions: Pt able to participate in treartment session    Objective    Cognition:  Cognition  Overall Cognitive Status: Within Functional Limits  Orientation Level: Oriented X4  Coordination:  Movements are Fluid and Coordinated: No  Lower Body Coordination: slower rate of movement [place] LE  Coordination Comment: decreased rate of movements  Activities of Daily Living:      Grooming  Grooming Level of Assistance: Setup  Grooming Where Assessed: Chair  Grooming Comments: Pt combing hair, remove clean replace dentures, wash face    UE Bathing  UE Bathing Level of Assistance: Setup  UE Bathing Where Assessed: Other (Comment) (chair)  UE Bathing Comments: CHG bathing cues initial with pacemaker precautions    LE Bathing  LE Bathing Adaptive Equipment: Long-handled sponge (recommended for home)  LE Bathing Level of Assistance: Moderate assistance  LE Bathing Where Assessed: Other (Comment)  (chair level)  LE Bathing Comments: CHG bathing cues initial with pacemaker precautions, assist B feet/buttocks recommeded LH sponge    UE Dressing  UE Dressing Level of Assistance: Close supervision  UE Dressing Where Assessed: Chair  UE Dressing Comments: Pt doff/don hospital gown instructed adaptive technique for pacemaker precautions    LE Dressing  LE Dressing: Yes  LE Dressing Adaptive Equipment: Reacher, Sock aide  Sock Level of Assistance: Close supervision  LE Dressing Where Assessed: Chair  LE Dressing Comments: instructed use AE doff/don socks with pacemaker precautions application    Toileting  Toileting Adaptive Equipment: Cathertization equipment (+Hodgson)  Functional Standing Tolerance:  Time: 1 minute  Activity: stance phase ADL skills  Functional Standing Tolerance Comments: at RW limited RUE support  Bed Mobility/Transfers: Bed Mobility  Bed Mobility: Yes  Bed Mobility 1  Bed Mobility 1: Supine to sitting  Level of Assistance 1: Moderate assistance  Bed Mobility Comments 1: bed to neutral Pt advance BLE Mod A trunk up following pace maker precautions  Bed Mobility 2  Bed Mobility  2: Scooting  Level of Assistance 2: Moderate assistance  Bed Mobility Comments 2: to edge of bed draw sheet to assist    Transfers  Transfer: Yes  Transfer 1  Transfer From 1: Bed to  Transfer to 1: Stand, Chair with arms  Technique 1: Sit to stand, Stand to sit  Transfer Device 1: Walker  Transfer Level of Assistance 1: Minimum assistance, +2  Trials/Comments 1: 2 person assist vc hand placement    Outcome Measures:[facility] Daily Activity  Putting on and taking off regular lower body clothing: A lot  Bathing (including washing, rinsing, drying): A lot  Putting on and taking off regular upper body clothing: A little  Toileting, which includes using toilet, bedpan or urinal: Total  Taking care of personal grooming such as brushing teeth: A little  Eating Meals: A little  Daily Activity - Total Score: 14    Education  Documentation  Body Mechanics, taught by AUDELIA Ramos at 6/5/2024 10:03 AM.  Learner: Patient  Readiness: Acceptance  Method: Explanation  Response: Verbalizes Understanding, Demonstrated Understanding  Comment: Instructed Pt with application pace maker precautions to safe transfers, adaptive ADL skills,compensatory strategies    Precautions, taught by AUDELIA Ramos at 6/5/2024 10:03 AM.  Learner: Patient  Readiness: Acceptance  Method: Explanation  Response: Verbalizes Understanding, Demonstrated Understanding  Comment: Instructed Pt with application pace maker precautions to safe transfers, adaptive ADL skills,compensatory strategies    ADL Training, taught by AUDELIA Ramos at 6/5/2024 10:03 AM.  Learner: Patient  Readiness: Acceptance  Method: Explanation  Response: Verbalizes Understanding, Demonstrated Understanding  Comment: Instructed Pt with application pace maker precautions to safe transfers, adaptive ADL skills,compensatory strategies    Education Comments  No comments found.        OP EDUCATION:       Goals:  Encounter Problems       Encounter Problems (Active)       Bathing       STG - Patient will bathe upper body with set up and close supervision (Progressing)       Start:  06/04/24    Expected End:  07/04/24               Dressing Upper Extremities       LTG - Patient will complete upper body dressing with set up (Progressing)       Start:  06/04/24    Expected End:  07/04/24               Dressings Lower Extremities       LTG - Patient will dress lower body with minimal assist and AE as needed (Progressing)       Start:  06/04/24    Expected End:  07/04/24               Eating       LTG - Patient will feed self independent (Progressing)       Start:  06/04/24    Expected End:  07/04/24               Functional Mobility       Perform functional mobility to and from the bathroom with 2ww and minimal assist (Progressing)       Start:  06/04/24    Expected End:  07/04/24                Grooming       STG - Patient completes grooming independent (Progressing)       Start:  06/04/24    Expected End:  07/04/24               OT Transfers       LTG - Patient will perform all functional transfers with minimal assist and 2ww (Progressing)       Start:  06/04/24    Expected End:  07/04/24               Toileting       LTG - Patient will complete daily toileting tasks with minimal assist and 2ww (Progressing)       Start:  06/04/24    Expected End:  07/04/24

## 2024-06-05 NOTE — CARE PLAN
The patient's goals for the shift include      The clinical goals for the shift include pain management, safety      Problem: Pain  Goal: My pain/discomfort is manageable  Outcome: Progressing     Problem: Safety  Goal: Patient will be injury free during hospitalization  Outcome: Progressing  Goal: I will remain free of falls  Outcome: Progressing     Problem: Daily Care  Goal: Daily care needs are met  Outcome: Progressing     Problem: Psychosocial Needs  Goal: Demonstrates ability to cope with hospitalization/illness  Outcome: Progressing  Goal: Collaborate with me, my family, and caregiver to identify my specific goals  Outcome: Progressing     Problem: Discharge Barriers  Goal: My discharge needs are met  Outcome: Progressing     Problem: Fall/Injury  Goal: Not fall by end of shift  Outcome: Progressing  Goal: Be free from injury by end of the shift  Outcome: Progressing  Goal: Verbalize understanding of personal risk factors for fall in the hospital  Outcome: Progressing  Goal: Verbalize understanding of risk factor reduction measures to prevent injury from fall in the home  Outcome: Progressing  Goal: Use assistive devices by end of the shift  Outcome: Progressing  Goal: Pace activities to prevent fatigue by end of the shift  Outcome: Progressing     Problem: Pain  Goal: Takes deep breaths with improved pain control throughout the shift  Outcome: Progressing  Goal: Turns in bed with improved pain control throughout the shift  Outcome: Progressing  Goal: Walks with improved pain control throughout the shift  Outcome: Progressing  Goal: Performs ADL's with improved pain control throughout shift  Outcome: Progressing  Goal: Participates in PT with improved pain control throughout the shift  Outcome: Progressing  Goal: Free from opioid side effects throughout the shift  Outcome: Progressing  Goal: Free from acute confusion related to pain meds throughout the shift  Outcome: Progressing     Problem: Skin  Goal:  Participates in plan/prevention/treatment measures  Outcome: Progressing  Goal: Prevent/minimize sheer/friction injuries  Outcome: Progressing  Goal: Promote/optimize nutrition  Outcome: Progressing     Problem: Bathing  Goal: STG - Patient will bathe upper body with set up and close supervision  Outcome: Progressing     Problem: Dressings Lower Extremities  Goal: LTG - Patient will dress lower body with minimal assist and AE as needed  Outcome: Progressing     Problem: Dressing Upper Extremities  Goal: LTG - Patient will complete upper body dressing with set up  Outcome: Progressing     Problem: Eating  Goal: LTG - Patient will feed self independent  Outcome: Progressing     Problem: Grooming  Goal: STG - Patient completes grooming independent  Outcome: Progressing     Problem: Toileting  Goal: LTG - Patient will complete daily toileting tasks with minimal assist and 2ww  Outcome: Progressing     Problem: Pain - Adult  Goal: Verbalizes/displays adequate comfort level or baseline comfort level  Outcome: Progressing     Problem: Safety - Adult  Goal: Free from fall injury  Outcome: Progressing     Problem: Discharge Planning  Goal: Discharge to home or other facility with appropriate resources  Outcome: Progressing     Problem: Chronic Conditions and Co-morbidities  Goal: Patient's chronic conditions and co-morbidity symptoms are monitored and maintained or improved  Outcome: Progressing

## 2024-06-05 NOTE — PROGRESS NOTES
Physical Therapy    Physical Therapy Treatment    Patient Name: Tracie Baltazar  MRN: [id number]  Today's Date: 6/5/2024  Time Calculation  Start Time: 0848  Stop Time: 0914  Time Calculation (min): 26 min    Assessment/Plan   PT Assessment  PT Assessment Results: Decreased strength, Decreased endurance, Impaired balance, Decreased mobility, Decreased safety awareness  Rehab Prognosis: Good  Evaluation/Treatment Tolerance: Patient tolerated treatment well  End of Session Communication: Bedside nurse  Assessment Comment: pt participatory; pt required MIN/MOD A x 2 for transfers/mobility  End of Session Patient Position: Up in room, Alarm on (call button in reach)  PT Plan  Inpatient/Swing Bed or Outpatient: Inpatient    PT Plan  Treatment/Interventions: Bed mobility, Transfer training, Gait training, Stair training, Balance training, Strengthening, Endurance training, Therapeutic exercise, Therapeutic activity  PT Plan: Skilled PT  PT Frequency: 5 times per week  PT Discharge Recommendations: Moderate intensity level of continued care  Equipment Recommended upon Discharge: Wheeled walker  PT Recommended Transfer Status: Assist x2  PT - OK to Discharge: Yes      General Visit Information:   PT  Visit  PT Received On: 06/05/24  General  Reason for Referral: bradycardia; s/p Right sided pacemaker placement 6/3/24; impaired mobility  Past Medical History Relevant to Rehab: AFIB, leukemia, HTN, dyslipidemia, CKD, C-diff, asthma, RA, hysterectomy, back surgery, left TKR, left shoulder surgery  Prior to Session Communication: Bedside nurse  Patient Position Received: Bed, 3 rail up  General Comment: pt pleasant and [place]; pt willing to get OOB; cleared by RN    Subjective   Precautions:  Precautions  Hearing/Visual Limitations: wears glassesl [place]; has B hearing aides  Medical Precautions: Fall precautions  Precautions Comment: Pacer precautions on the right side of chest.  Has a sling.       Objective   Pain:  Pain  Assessment  Pain Assessment:  (7/10 at pacemaker site; RN to medicate)    Cognition:  Cognition  Overall Cognitive Status: Within Functional Limits    Coordination:  Coordination Comment: decreased rate of movements    Postural Control:  Postural Control  Posture Comment: mild head down posture    Activity Tolerance:  Activity Tolerance  Endurance:  (FAIR+ activity tolerance)      Treatments:    Therapeutic Exercise Performed:  (B LE AAROM/AROM therex: AP, GS, QS, HS, ABD and SAQ x 15 reps;)         Bed Mobility:  supine to sit with log rolling technique and  MOD A x 2 for trunk up, B LE and scooting to EOB. limited use of R UE. GOOD- sitting balance on EOB.    Ambulation/Gait Training Performed:  pt took 6-7 short steps using rolling walker with MIN A x 2 to steady and guide RW toward chair. mild unsteadiness noted. increased fatigue/MANCILLA after transfer      Transfer:  sit <-> stand with MOD A x 2; increased time and effort noted. FAIR+ eccentric control during stand to sit.         Outcome Measures:  [facility] Basic Mobility  Turning from your back to your side while in a flat bed without using bedrails: Total  Moving from lying on your back to sitting on the side of a flat bed without using bedrails: A lot  Moving to and from bed to chair (including a wheelchair): A lot  Standing up from a chair using your arms (e.g. wheelchair or bedside chair): A lot  To walk in hospital room: A lot  Climbing 3-5 steps with railing: Total  Basic Mobility - Total Score: 10         Encounter Problems       Encounter Problems (Active)       Mobility       LTG - Patient will navigate 4-6 steps with rails/device and close supervision (Progressing)       Start:  06/02/24    Expected End:  06/16/24            STG - Patient will ambulate 150' x 1 with use of 2WW and close S. (Progressing)       Start:  06/02/24    Expected End:  06/16/24            STG - Patient will increase ROM of L hip flexion to pain free WFL (Progressing)       Start:   06/02/24    Expected End:  06/16/24                   PT Transfers       STG - Transfer from bed to chair with close supervision and use of 2WW for UE/balance support. (Progressing)       Start:  06/02/24    Expected End:  06/16/24            STG - Patient will perform bed mobility at independent level.  (Progressing)       Start:  06/02/24    Expected End:  06/16/24            STG - Patient will transfer sit to and from stand to sit with use of 2WW at close S level.  (Progressing)       Start:  06/02/24    Expected End:  06/16/24               Pain - Adult

## 2024-06-06 VITALS
SYSTOLIC BLOOD PRESSURE: 118 MMHG | RESPIRATION RATE: 17 BRPM | HEART RATE: 59 BPM | DIASTOLIC BLOOD PRESSURE: 46 MMHG | BODY MASS INDEX: 27.38 KG/M2 | HEIGHT: 63 IN | TEMPERATURE: 98.1 F | OXYGEN SATURATION: 100 % | WEIGHT: 154.54 LBS

## 2024-06-06 LAB
ALBUMIN SERPL-MCNC: 2.6 G/DL (ref 3.5–5)
ALP BLD-CCNC: 84 U/L (ref 35–125)
ALT SERPL-CCNC: 6 U/L (ref 5–40)
ANION GAP SERPL CALC-SCNC: 10 MMOL/L
AST SERPL-CCNC: 15 U/L (ref 5–40)
BASOPHILS # BLD MANUAL: 0.35 X10*3/UL (ref 0–0.1)
BASOPHILS NFR BLD MANUAL: 1 %
BILIRUB SERPL-MCNC: 0.3 MG/DL (ref 0.1–1.2)
BUN SERPL-MCNC: 29 MG/DL (ref 8–25)
CALCIUM SERPL-MCNC: 8.3 MG/DL (ref 8.5–10.4)
CHLORIDE SERPL-SCNC: 104 MMOL/L (ref 97–107)
CO2 SERPL-SCNC: 24 MMOL/L (ref 24–31)
CREAT SERPL-MCNC: 2.5 MG/DL (ref 0.4–1.6)
EGFRCR SERPLBLD CKD-EPI 2021: 19 ML/MIN/1.73M*2
EOSINOPHIL # BLD MANUAL: 0.71 X10*3/UL (ref 0–0.4)
EOSINOPHIL NFR BLD MANUAL: 2 %
ERYTHROCYTE [DISTWIDTH] IN BLOOD BY AUTOMATED COUNT: 17.3 % (ref 11.5–14.5)
GLUCOSE SERPL-MCNC: 85 MG/DL (ref 65–99)
HCT VFR BLD AUTO: 24.6 % (ref 36–46)
HGB BLD-MCNC: 8.1 G/DL (ref 12–16)
IMM GRANULOCYTES # BLD AUTO: 0.12 X10*3/UL (ref 0–0.5)
IMM GRANULOCYTES NFR BLD AUTO: 0.3 % (ref 0–0.9)
LYMPHOCYTES # BLD MANUAL: 26.55 X10*3/UL (ref 0.8–3)
LYMPHOCYTES NFR BLD MANUAL: 75 %
MAGNESIUM SERPL-MCNC: 2 MG/DL (ref 1.6–3.1)
MCH RBC QN AUTO: 31.2 PG (ref 26–34)
MCHC RBC AUTO-ENTMCNC: 32.9 G/DL (ref 32–36)
MCV RBC AUTO: 95 FL (ref 80–100)
MONOCYTES # BLD MANUAL: 0.35 X10*3/UL (ref 0.05–0.8)
MONOCYTES NFR BLD MANUAL: 1 %
NEUTS SEG # BLD MANUAL: 6.73 X10*3/UL (ref 1.6–5)
NEUTS SEG NFR BLD MANUAL: 19 %
NRBC BLD-RTO: 0.1 /100 WBCS (ref 0–0)
PHOSPHATE SERPL-MCNC: 3.7 MG/DL (ref 2.5–4.5)
PLATELET # BLD AUTO: 172 X10*3/UL (ref 150–450)
POTASSIUM SERPL-SCNC: 4.5 MMOL/L (ref 3.4–5.1)
PROT SERPL-MCNC: 4.9 G/DL (ref 5.9–7.9)
RBC # BLD AUTO: 2.6 X10*6/UL (ref 4–5.2)
RBC MORPH BLD: ABNORMAL
SODIUM SERPL-SCNC: 138 MMOL/L (ref 133–145)
TOTAL CELLS COUNTED BLD: 100
VARIANT LYMPHS # BLD MANUAL: 0.71 X10*3/UL (ref 0–0.3)
VARIANT LYMPHS NFR BLD: 2 %
WBC # BLD AUTO: 35.4 X10*3/UL (ref 4.4–11.3)

## 2024-06-06 PROCEDURE — 2500000005 HC RX 250 GENERAL PHARMACY W/O HCPCS

## 2024-06-06 PROCEDURE — 36415 COLL VENOUS BLD VENIPUNCTURE: CPT

## 2024-06-06 PROCEDURE — 85027 COMPLETE CBC AUTOMATED: CPT

## 2024-06-06 PROCEDURE — 80053 COMPREHEN METABOLIC PANEL: CPT

## 2024-06-06 PROCEDURE — 2500000002 HC RX 250 W HCPCS SELF ADMINISTERED DRUGS (ALT 637 FOR MEDICARE OP, ALT 636 FOR OP/ED): Mod: MUE | Performed by: INTERNAL MEDICINE

## 2024-06-06 PROCEDURE — 84100 ASSAY OF PHOSPHORUS: CPT | Performed by: INTERNAL MEDICINE

## 2024-06-06 PROCEDURE — 85007 BL SMEAR W/DIFF WBC COUNT: CPT

## 2024-06-06 PROCEDURE — 2500000001 HC RX 250 WO HCPCS SELF ADMINISTERED DRUGS (ALT 637 FOR MEDICARE OP)

## 2024-06-06 PROCEDURE — 83735 ASSAY OF MAGNESIUM: CPT

## 2024-06-06 RX ORDER — LIDOCAINE 560 MG/1
2 PATCH PERCUTANEOUS; TOPICAL; TRANSDERMAL DAILY
Start: 2024-06-07

## 2024-06-06 RX ORDER — ONDANSETRON 4 MG/1
4 TABLET, ORALLY DISINTEGRATING ORAL EVERY 8 HOURS PRN
Start: 2024-06-06

## 2024-06-06 RX ORDER — OXYCODONE HYDROCHLORIDE 5 MG/1
5 TABLET ORAL EVERY 6 HOURS PRN
Qty: 4 TABLET | Refills: 0 | Status: SHIPPED | OUTPATIENT
Start: 2024-06-06

## 2024-06-06 RX ADMIN — ACETAMINOPHEN 975 MG: 325 TABLET ORAL at 09:30

## 2024-06-06 RX ADMIN — AMIODARONE HYDROCHLORIDE 200 MG: 200 TABLET ORAL at 09:30

## 2024-06-06 RX ADMIN — Medication 100 MCG: at 09:30

## 2024-06-06 RX ADMIN — DULOXETINE HYDROCHLORIDE 20 MG: 20 CAPSULE, DELAYED RELEASE ORAL at 09:30

## 2024-06-06 RX ADMIN — GABAPENTIN 300 MG: 300 CAPSULE ORAL at 09:30

## 2024-06-06 RX ADMIN — LEVOTHYROXINE SODIUM 150 MCG: 0.15 TABLET ORAL at 05:58

## 2024-06-06 RX ADMIN — LIDOCAINE 2 PATCH: 4 PATCH TOPICAL at 09:29

## 2024-06-06 RX ADMIN — ACETAMINOPHEN 975 MG: 325 TABLET ORAL at 15:10

## 2024-06-06 ASSESSMENT — PAIN SCALES - GENERAL
PAINLEVEL_OUTOF10: 4
PAINLEVEL_OUTOF10: 0 - NO PAIN

## 2024-06-06 ASSESSMENT — PAIN DESCRIPTION - LOCATION: LOCATION: SHOULDER

## 2024-06-06 ASSESSMENT — PAIN - FUNCTIONAL ASSESSMENT
PAIN_FUNCTIONAL_ASSESSMENT: 0-10
PAIN_FUNCTIONAL_ASSESSMENT: FLACC (FACE, LEGS, ACTIVITY, CRY, CONSOLABILITY)

## 2024-06-06 ASSESSMENT — PAIN DESCRIPTION - ORIENTATION: ORIENTATION: RIGHT

## 2024-06-06 NOTE — PROGRESS NOTES
06/06/24 1420   Discharge Planning   Patient expects to be discharged to: [facility]     Patient has an active discharge order. Precert has been approved.   [facility] is able to accept patient at 1530.  [facility] wheelchair transportation arranged for 1530 .  [place], AVS and DC summary have been sent to [facility] via [employer]. [id number] has been completed.  Both patient and son have been updated at bedside.  Ally GONZALEZ is aware of discharge plan.      DC PLAN IS SECURE

## 2024-06-06 NOTE — PROGRESS NOTES
Spiritual Care Visit    Clinical Encounter Type  Visited With: Patient  Routine Visit: Follow-up  Continue Visiting: Yes         Values/Beliefs  Spiritual Requests During Hospitalization: [place] today     Vikas Ervin

## 2024-06-06 NOTE — PROGRESS NOTES
"Tracie Baltazar is a 84 y.o. female on day 5 of admission presenting with Bradycardia.    Subjective          Objective     Physical Exam    Last Recorded Vitals  Blood pressure (!) 134/49, pulse 60, temperature 36.7 °C (98.1 °F), temperature source Axillary, resp. rate 17, height 1.6 m (5' 3\"), weight 70.1 kg (154 lb 8.7 oz), SpO2 94%.    Intake/Output last 3 Shifts:  I/O last 3 completed shifts:  In: 1483.3 (21.2 mL/kg) [P.O.:325; Blood:500; IV Piggyback:658.3]  Out: 1675 (23.9 mL/kg) [Urine:1675 (0.7 mL/kg/hr)]  Weight: 70.1 kg     Current Facility-Administered Medications:     acetaminophen (Tylenol) tablet 975 mg, 975 mg, oral, TID, ERAN Morales, 975 mg at 06/06/24 0930    [Held by provider] amiodarone (Pacerone) tablet 200 mg, 200 mg, oral, Daily, ERAN Morales    amiodarone (Pacerone) tablet 200 mg, 200 mg, oral, Daily, Jorge Silverio DO, 200 mg at 06/06/24 0930    [Held by provider] amLODIPine (Norvasc) tablet 5 mg, 5 mg, oral, Daily, ERAN Morales    [Held by provider] apixaban (Eliquis) tablet 2.5 mg, 2.5 mg, oral, q12h, ERAN Campbell    cyanocobalamin (Vitamin B-12) tablet 100 mcg, 100 mcg, oral, Daily, ERAN Morales, 100 mcg at 06/06/24 0930    DULoxetine (Cymbalta) DR capsule 20 mg, 20 mg, oral, Daily, ERAN Morales, 20 mg at 06/06/24 0930    [Held by provider] furosemide (Lasix) tablet 60 mg, 60 mg, oral, Daily, ERAN Morales    gabapentin (Neurontin) capsule 300 mg, 300 mg, oral, BID, ERAN Morales, 300 mg at 06/06/24 0930    [Held by provider] hydrALAZINE (Apresoline) tablet 10 mg, 10 mg, oral, BID, ERAN Morales    HYDROmorphone (Dilaudid) injection 0.2 mg, 0.2 mg, intravenous, q3h PRN, Jorge Silverio DO, 0.2 mg at 06/05/24 7066    levothyroxine (Synthroid, Levoxyl) tablet 150 mcg, 150 mcg, oral, Daily, ERAN Morales, 150 mcg at 06/06/24 0558    lidocaine 4 % patch 2 patch, 2 " patch, transdermal, Daily, ERAN Morales, 2 patch at 06/06/24 0929    [Held by provider] metoprolol succinate XL (Toprol-XL) 24 hr tablet 12.5 mg, 12.5 mg, oral, Daily, ERAN Morales    [Held by provider] metoprolol succinate XL (Toprol-XL) 24 hr tablet 12.5 mg, 12.5 mg, oral, Daily, ERAN Campbell    ondansetron (Zofran) injection 4 mg, 4 mg, intravenous, q4h PRN, Jorge Silverio DO    oxyCODONE (Roxicodone) immediate release tablet 5 mg, 5 mg, oral, q6h PRN, ERAN Morales, 5 mg at 06/05/24 0907    polyethylene glycol (Glycolax, Miralax) packet 17 g, 17 g, oral, Daily PRN, ERAN Morales    simvastatin (Zocor) tablet 10 mg, 10 mg, oral, Nightly, ERAN Morales, 10 mg at 06/05/24 2037    [Held by provider] traMADol (Ultram) tablet 50 mg, 50 mg, oral, q24h, ERAN Morales   Relevant Results    Results for orders placed or performed during the hospital encounter of 06/01/24 (from the past 96 hour(s))   Basic Metabolic Panel   Result Value Ref Range    Glucose 94 65 - 99 mg/dL    Sodium 138 133 - 145 mmol/L    Potassium 4.0 3.4 - 5.1 mmol/L    Chloride 101 97 - 107 mmol/L    Bicarbonate 27 24 - 31 mmol/L    Urea Nitrogen 26 (H) 8 - 25 mg/dL    Creatinine 2.40 (H) 0.40 - 1.60 mg/dL    eGFR 19 (L) >60 mL/min/1.73m*2    Calcium 8.4 (L) 8.5 - 10.4 mg/dL    Anion Gap 10 <=19 mmol/L   POCT GLUCOSE   Result Value Ref Range    POCT Glucose 90 74 - 99 mg/dL   CBC and Auto Differential   Result Value Ref Range    WBC 33.6 (H) 4.4 - 11.3 x10*3/uL    nRBC 0.0 0.0 - 0.0 /100 WBCs    RBC 3.25 (L) 4.00 - 5.20 x10*6/uL    Hemoglobin 10.0 (L) 12.0 - 16.0 g/dL    Hematocrit 32.4 (L) 36.0 - 46.0 %     80 - 100 fL    MCH 30.8 26.0 - 34.0 pg    MCHC 30.9 (L) 32.0 - 36.0 g/dL    RDW 15.9 (H) 11.5 - 14.5 %    Platelets 200 150 - 450 x10*3/uL    Immature Granulocytes %, Automated 0.2 0.0 - 0.9 %    Immature Granulocytes Absolute, Automated 0.06 0.00 - 0.50  x10*3/uL   Comprehensive Metabolic Panel   Result Value Ref Range    Glucose 81 65 - 99 mg/dL    Sodium 140 133 - 145 mmol/L    Potassium 4.3 3.4 - 5.1 mmol/L    Chloride 104 97 - 107 mmol/L    Bicarbonate 27 24 - 31 mmol/L    Urea Nitrogen 24 8 - 25 mg/dL    Creatinine 2.30 (H) 0.40 - 1.60 mg/dL    eGFR 20 (L) >60 mL/min/1.73m*2    Calcium 8.3 (L) 8.5 - 10.4 mg/dL    Albumin 3.0 (L) 3.5 - 5.0 g/dL    Alkaline Phosphatase 81 35 - 125 U/L    Total Protein 5.4 (L) 5.9 - 7.9 g/dL    AST 15 5 - 40 U/L    Bilirubin, Total 0.3 0.1 - 1.2 mg/dL    ALT 7 5 - 40 U/L    Anion Gap 9 <=19 mmol/L   Magnesium   Result Value Ref Range    Magnesium 2.00 1.60 - 3.10 mg/dL   Phosphorus   Result Value Ref Range    Phosphorus 4.3 2.5 - 4.5 mg/dL   Lipid Panel   Result Value Ref Range    Cholesterol 151 133 - 200 mg/dL    HDL-Cholesterol 59.0 >50.0 mg/dL    Cholesterol/HDL Ratio 2.6 SEE COMMENT    LDL Calculated 70 65 - 130 mg/dL    Triglycerides 108 40 - 150 mg/dL   Manual Differential   Result Value Ref Range    Neutrophils %, Manual 24.0 40.0 - 80.0 %    Lymphocytes %, Manual 65.0 13.0 - 44.0 %    Monocytes %, Manual 4.0 2.0 - 10.0 %    Eosinophils %, Manual 4.0 0.0 - 6.0 %    Basophils %, Manual 1.0 0.0 - 2.0 %    Atypical Lymphocytes %, Manual 1.0 0.0 - 2.0 %    Metamyelocytes %, Manual 1.0 0.0 - 0.0 %    Seg Neutrophils Absolute, Manual 8.06 (H) 1.60 - 5.00 x10*3/uL    Lymphocytes Absolute, Manual 21.84 (H) 0.80 - 3.00 x10*3/uL    Monocytes Absolute, Manual 1.34 (H) 0.05 - 0.80 x10*3/uL    Eosinophils Absolute, Manual 1.34 (H) 0.00 - 0.40 x10*3/uL    Basophils Absolute, Manual 0.34 (H) 0.00 - 0.10 x10*3/uL    Atypical Lymphs Absolute, Manual 0.34 (H) 0.00 - 0.30 x10*3/uL    Metamyelocytes Absolute, Manual 0.34 0.00 - 0.00 x10*3/uL    Total Cells Counted 100     RBC Morphology No significant RBC morphology present    CBC and Auto Differential   Result Value Ref Range    WBC 37.2 (H) 4.4 - 11.3 x10*3/uL    nRBC 0.0 0.0 - 0.0 /100  WBCs    RBC 2.94 (L) 4.00 - 5.20 x10*6/uL    Hemoglobin 9.2 (L) 12.0 - 16.0 g/dL    Hematocrit 29.0 (L) 36.0 - 46.0 %    MCV 99 80 - 100 fL    MCH 31.3 26.0 - 34.0 pg    MCHC 31.7 (L) 32.0 - 36.0 g/dL    RDW 15.9 (H) 11.5 - 14.5 %    Platelets 203 150 - 450 x10*3/uL    Immature Granulocytes %, Automated 0.3 0.0 - 0.9 %    Immature Granulocytes Absolute, Automated 0.12 0.00 - 0.50 x10*3/uL   Comprehensive Metabolic Panel   Result Value Ref Range    Glucose 103 (H) 65 - 99 mg/dL    Sodium 138 133 - 145 mmol/L    Potassium 4.6 3.4 - 5.1 mmol/L    Chloride 102 97 - 107 mmol/L    Bicarbonate 24 24 - 31 mmol/L    Urea Nitrogen 22 8 - 25 mg/dL    Creatinine 2.20 (H) 0.40 - 1.60 mg/dL    eGFR 22 (L) >60 mL/min/1.73m*2    Calcium 8.4 (L) 8.5 - 10.4 mg/dL    Albumin 3.0 (L) 3.5 - 5.0 g/dL    Alkaline Phosphatase 77 35 - 125 U/L    Total Protein 5.2 (L) 5.9 - 7.9 g/dL    AST 14 5 - 40 U/L    Bilirubin, Total 0.3 0.1 - 1.2 mg/dL    ALT 6 5 - 40 U/L    Anion Gap 12 <=19 mmol/L   Magnesium   Result Value Ref Range    Magnesium 2.00 1.60 - 3.10 mg/dL   Phosphorus   Result Value Ref Range    Phosphorus 4.6 (H) 2.5 - 4.5 mg/dL   Manual Differential   Result Value Ref Range    Neutrophils %, Manual 25.0 40.0 - 80.0 %    Bands %, Manual 1.0 0.0 - 5.0 %    Lymphocytes %, Manual 73.0 13.0 - 44.0 %    Monocytes %, Manual 0.0 2.0 - 10.0 %    Eosinophils %, Manual 1.0 0.0 - 6.0 %    Basophils %, Manual 0.0 0.0 - 2.0 %    Seg Neutrophils Absolute, Manual 9.30 (H) 1.60 - 5.00 x10*3/uL    Bands Absolute, Manual 0.37 0.00 - 0.50 x10*3/uL    Lymphocytes Absolute, Manual 27.16 (H) 0.80 - 3.00 x10*3/uL    Monocytes Absolute, Manual 0.00 (L) 0.05 - 0.80 x10*3/uL    Eosinophils Absolute, Manual 0.37 0.00 - 0.40 x10*3/uL    Basophils Absolute, Manual 0.00 0.00 - 0.10 x10*3/uL    Total Cells Counted 100     Neutrophils Absolute, Manual 9.67 (H) 1.60 - 5.50 x10*3/uL    RBC Morphology No significant RBC morphology present    Iron and TIBC   Result  Value Ref Range    Iron 32 30 - 160 ug/dL    UIBC 93 (L) 110 - 370 ug/dL    TIBC 125 (L) 228 - 428 ug/dL    % Saturation 26 12 - 50 %   Ferritin   Result Value Ref Range    Ferritin 899 (H) 13 - 150 ng/mL   IgG, IgA, IgM   Result Value Ref Range    IgG 673 (L) 700 - 1,600 mg/dL    IgA 152 70 - 400 mg/dL    IgM 30 (L) 40 - 230 mg/dL   Lactate dehydrogenase   Result Value Ref Range     91 - 227 U/L   Uric Acid   Result Value Ref Range    Uric Acid 7.3 (H) 2.5 - 6.8 mg/dL   Flow Cytometry Test   Result Value Ref Range    Case Report       Flow Cytometry                                    Case: [id number]                                   Authorizing Provider:  Min Graff MD        Collected:           06/04/2024 1405              Ordering Location:     [facility]       Received:            06/04/2024 [address] [place] [address] Intensive                                                                             Care                                                                         Pathologist:           Yasir Scanlon MD                                                         Specimen:    Blood, Venous, CLL                                                                         Diagnosis       PERIPHERAL BLOOD, FLOW CYTOMETRY:  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    Note: Clinical and morphologic correlation is suggested.              By the signature on this report, the individual or group listed as making the Final Interpretation/Diagnosis certifies that they have reviewed this case and the staining reactivity of the antibodies and reagents in the analysis were determined to be acceptable. Diagnostic interpretation performed at [facility]      Cell Populations       Abnormal Cell Population: Lymphocytes    Percentage:  51 %        Phenotype   Marker Interpretation      CD1c Negative   CD2  Negative   CD3 Negative   CD4 Negative   CD5 Positive moderate   CD7 Negative   CD8 Negative   CD10 Negative   CD11c Negative   CD13 Negative   CD14 Negative   CD16 Negative   CD19 Positive dim   CD20 Positive dim   CD23 Positive moderate   CD25 Dim-negative   CD38 Negative   CD43 Positive dim-moderate   CD45 Positive moderate   CD56 Negative   CD71 Negative   CD79b Positive dim    Dim-negative    Positive moderate   HLA-DR Positive moderate      IgD Positive dim   Kappa Negative   Lambda Positive dim                  Flow Differential       Lymphocyte: 63 %       CD3+CD4+: 6 % ;  Polyclonal (TRBC1)          CD3+CD8+: 7 % ;  Polyclonal (TRBC1)          Natural Killer Cells: 4 %         CD19+: 82 %         B Cell Light Chain Expression: Monoclonal, see description above.    Monocyte: 3 %       Granulocyte: 32 %             Flow Test Ordered Low Grade Panel not established    Specimen Viability      Cell Count 37.20 not established x10*3/uL    Number of Cells Collected 100,000.00 not established per tube    Methodology       Reference ranges not established.    This test is a multicolor, whole blood lysis assay. It was developed and its performance characteristics determined by the Department of Pathology, [facility], and has not been cleared or approved by the U.S. Food and Drug Administration. The laboratory is regulated under CLIA as qualified to perform high complexity testing. This test is used for clinical purposes. It should not be regarded as investigational or for research.    Immunophenotypic analysis was performed using the following antibodies: 1A: CD45. 1B: CD71, CD13, , CD14, HLA-DR, CD45. 1C: CD2, CD7, CD4, CD5, CD3, CD45. 1D: CD16, CD56, CD4, CD8, CD3, CD45. 1E: CD56, CD10, CD20, CD38, CD19, CD45. 1F: CD43, CD23, CD1c, CD5, CD19, CD45. 1G: CD25, , CD11c, CD79b, CD19, CD45. 1H: Kappa Surface, Lambda Surface, IgD, CD5, CD19, CD45. 1I: TRBC1, TCR Gamma/Delta,  CD4, CD8, CD3, CD45.         CBC and Auto Differential   Result Value Ref Range    WBC 34.6 (H) 4.4 - 11.3 x10*3/uL    nRBC 0.0 0.0 - 0.0 /100 WBCs    RBC 2.26 (L) 4.00 - 5.20 x10*6/uL    Hemoglobin 7.0 (L) 12.0 - 16.0 g/dL    Hematocrit 22.0 (L) 36.0 - 46.0 %    MCV 97 80 - 100 fL    MCH 31.0 26.0 - 34.0 pg    MCHC 31.8 (L) 32.0 - 36.0 g/dL    RDW 16.4 (H) 11.5 - 14.5 %    Platelets 161 150 - 450 x10*3/uL    Immature Granulocytes %, Automated 0.3 0.0 - 0.9 %    Immature Granulocytes Absolute, Automated 0.11 0.00 - 0.50 x10*3/uL   Comprehensive Metabolic Panel   Result Value Ref Range    Glucose 82 65 - 99 mg/dL    Sodium 137 133 - 145 mmol/L    Potassium 4.1 3.4 - 5.1 mmol/L    Chloride 104 97 - 107 mmol/L    Bicarbonate 24 24 - 31 mmol/L    Urea Nitrogen 28 (H) 8 - 25 mg/dL    Creatinine 2.50 (H) 0.40 - 1.60 mg/dL    eGFR 19 (L) >60 mL/min/1.73m*2    Calcium 8.1 (L) 8.5 - 10.4 mg/dL    Albumin 2.6 (L) 3.5 - 5.0 g/dL    Alkaline Phosphatase 69 35 - 125 U/L    Total Protein 4.8 (L) 5.9 - 7.9 g/dL    AST 12 5 - 40 U/L    Bilirubin, Total <0.2 0.1 - 1.2 mg/dL    ALT 6 5 - 40 U/L    Anion Gap 9 <=19 mmol/L   Magnesium   Result Value Ref Range    Magnesium 2.00 1.60 - 3.10 mg/dL   Phosphorus   Result Value Ref Range    Phosphorus 4.5 2.5 - 4.5 mg/dL   Manual Differential   Result Value Ref Range    Neutrophils %, Manual 21.0 40.0 - 80.0 %    Lymphocytes %, Manual 77.0 13.0 - 44.0 %    Monocytes %, Manual 1.0 2.0 - 10.0 %    Eosinophils %, Manual 1.0 0.0 - 6.0 %    Basophils %, Manual 0.0 0.0 - 2.0 %    Seg Neutrophils Absolute, Manual 7.27 (H) 1.60 - 5.00 x10*3/uL    Lymphocytes Absolute, Manual 26.64 (H) 0.80 - 3.00 x10*3/uL    Monocytes Absolute, Manual 0.35 0.05 - 0.80 x10*3/uL    Eosinophils Absolute, Manual 0.35 0.00 - 0.40 x10*3/uL    Basophils Absolute, Manual 0.00 0.00 - 0.10 x10*3/uL    Total Cells Counted 100     RBC Morphology No significant RBC morphology present    Prepare RBC: 1 Units   Result Value Ref  Range    PRODUCT CODE P0949S09     Unit Number [id number]     Unit ABO B     Unit RH NEG     XM INTEP COMP     Dispense Status TR     Blood Expiration Date June 20, 2024 23:59 EDT     PRODUCT BLOOD TYPE 1700     UNIT VOLUME 350    Type and screen   Result Value Ref Range    ABO TYPE B     Rh TYPE NEG     ANTIBODY SCREEN POS    BB ORDER ONLY - Antibody Identification   Result Value Ref Range    Antibody ID Anti-D     CASE #     VERIFY ABO/Rh Group Test   Result Value Ref Range    ABO TYPE B     Rh TYPE NEG    Hemoglobin   Result Value Ref Range    Hemoglobin 8.6 (L) 12.0 - 16.0 g/dL   CBC and Auto Differential   Result Value Ref Range    WBC 35.4 (H) 4.4 - 11.3 x10*3/uL    nRBC 0.1 (H) 0.0 - 0.0 /100 WBCs    RBC 2.60 (L) 4.00 - 5.20 x10*6/uL    Hemoglobin 8.1 (L) 12.0 - 16.0 g/dL    Hematocrit 24.6 (L) 36.0 - 46.0 %    MCV 95 80 - 100 fL    MCH 31.2 26.0 - 34.0 pg    MCHC 32.9 32.0 - 36.0 g/dL    RDW 17.3 (H) 11.5 - 14.5 %    Platelets 172 150 - 450 x10*3/uL    Immature Granulocytes %, Automated 0.3 0.0 - 0.9 %    Immature Granulocytes Absolute, Automated 0.12 0.00 - 0.50 x10*3/uL   Comprehensive Metabolic Panel   Result Value Ref Range    Glucose 85 65 - 99 mg/dL    Sodium 138 133 - 145 mmol/L    Potassium 4.5 3.4 - 5.1 mmol/L    Chloride 104 97 - 107 mmol/L    Bicarbonate 24 24 - 31 mmol/L    Urea Nitrogen 29 (H) 8 - 25 mg/dL    Creatinine 2.50 (H) 0.40 - 1.60 mg/dL    eGFR 19 (L) >60 mL/min/1.73m*2    Calcium 8.3 (L) 8.5 - 10.4 mg/dL    Albumin 2.6 (L) 3.5 - 5.0 g/dL    Alkaline Phosphatase 84 35 - 125 U/L    Total Protein 4.9 (L) 5.9 - 7.9 g/dL    AST 15 5 - 40 U/L    Bilirubin, Total 0.3 0.1 - 1.2 mg/dL    ALT 6 5 - 40 U/L    Anion Gap 10 <=19 mmol/L   Magnesium   Result Value Ref Range    Magnesium 2.00 1.60 - 3.10 mg/dL   Phosphorus   Result Value Ref Range    Phosphorus 3.7 2.5 - 4.5 mg/dL   Manual Differential   Result Value Ref Range    Neutrophils %, Manual 19.0 40.0 - 80.0 %    Lymphocytes %, Manual  75.0 13.0 - 44.0 %    Monocytes %, Manual 1.0 2.0 - 10.0 %    Eosinophils %, Manual 2.0 0.0 - 6.0 %    Basophils %, Manual 1.0 0.0 - 2.0 %    Atypical Lymphocytes %, Manual 2.0 0.0 - 2.0 %    Seg Neutrophils Absolute, Manual 6.73 (H) 1.60 - 5.00 x10*3/uL    Lymphocytes Absolute, Manual 26.55 (H) 0.80 - 3.00 x10*3/uL    Monocytes Absolute, Manual 0.35 0.05 - 0.80 x10*3/uL    Eosinophils Absolute, Manual 0.71 (H) 0.00 - 0.40 x10*3/uL    Basophils Absolute, Manual 0.35 (H) 0.00 - 0.10 x10*3/uL    Atypical Lymphs Absolute, Manual 0.71 (H) 0.00 - 0.30 x10*3/uL    Total Cells Counted 100     RBC Morphology No significant RBC morphology present        Assessment/Plan   Chronic disease stage IV it seems that her renal function is at baseline creatinine 2.0 mg/dL and there is no evidence of uremia continue to monitor renal function no indication for dialysis therapy  Status post a fall  Sinus bradycardia  Anemia of chronic kidney disease  Arthritis  Hypertension  CLL evaluated by hematology there is no evidence of progression of disease               Epifanio Amor MD

## 2024-06-06 NOTE — DISCHARGE INSTRUCTIONS
Please keep Dressing on for 7 days then can remove can get wet but do not soak, allow Steri-Strips under dressing to dry and fall off naturally, avoid any lifting over right side greater than 10 to 12 pounds for 6 weeks, no driving 5-7 days, please call Dr. Chisholm's office if any signs of warmth, redness, tenderness or any other questions or concerns

## 2024-06-06 NOTE — CARE PLAN
The patient's goals for the shift include      The clinical goals for the shift include adeqaute pain managment      Problem: Pain  Goal: My pain/discomfort is manageable  Outcome: Progressing     Problem: Safety  Goal: Patient will be injury free during hospitalization  Outcome: Progressing  Goal: I will remain free of falls  Outcome: Progressing     Problem: Daily Care  Goal: Daily care needs are met  Outcome: Progressing     Problem: Psychosocial Needs  Goal: Demonstrates ability to cope with hospitalization/illness  Outcome: Progressing  Goal: Collaborate with me, my family, and caregiver to identify my specific goals  Outcome: Progressing     Problem: Discharge Barriers  Goal: My discharge needs are met  Outcome: Progressing     Problem: Fall/Injury  Goal: Not fall by end of shift  Outcome: Progressing  Goal: Be free from injury by end of the shift  Outcome: Progressing  Goal: Verbalize understanding of personal risk factors for fall in the hospital  Outcome: Progressing  Goal: Verbalize understanding of risk factor reduction measures to prevent injury from fall in the home  Outcome: Progressing  Goal: Use assistive devices by end of the shift  Outcome: Progressing  Goal: Pace activities to prevent fatigue by end of the shift  Outcome: Progressing     Problem: Pain  Goal: Takes deep breaths with improved pain control throughout the shift  Outcome: Progressing  Goal: Turns in bed with improved pain control throughout the shift  Outcome: Progressing  Goal: Walks with improved pain control throughout the shift  Outcome: Progressing  Goal: Performs ADL's with improved pain control throughout shift  Outcome: Progressing  Goal: Participates in PT with improved pain control throughout the shift  Outcome: Progressing  Goal: Free from opioid side effects throughout the shift  Outcome: Progressing  Goal: Free from acute confusion related to pain meds throughout the shift  Outcome: Progressing     Problem: Skin  Goal:  Participates in plan/prevention/treatment measures  Outcome: Progressing  Goal: Prevent/minimize sheer/friction injuries  Outcome: Progressing  Goal: Promote/optimize nutrition  Outcome: Progressing     Problem: Bathing  Goal: STG - Patient will bathe upper body with set up and close supervision  Outcome: Progressing     Problem: Dressings Lower Extremities  Goal: LTG - Patient will dress lower body with minimal assist and AE as needed  Outcome: Progressing     Problem: Dressing Upper Extremities  Goal: LTG - Patient will complete upper body dressing with set up  Outcome: Progressing     Problem: Toileting  Goal: LTG - Patient will complete daily toileting tasks with minimal assist and 2ww  Outcome: Progressing

## 2024-06-06 NOTE — DISCHARGE SUMMARY
Discharge Diagnosis  Bradycardia    Issues Requiring Follow-Up  Continue follow-up with electrophysiology.        Discharge Meds     Your medication list        START taking these medications        Instructions Last Dose Given Next Dose Due   lidocaine 4 % patch  Start taking on: June 7, 2024      Place 2 patches over 12 hours on the skin once daily. Remove & discard patch within 12 hours or as directed by MD.       oxyCODONE 5 mg immediate release tablet  Commonly known as: Roxicodone      Take 1 tablet (5 mg) by mouth every 6 hours if needed for moderate pain (4 - 6).              CHANGE how you take these medications        Instructions Last Dose Given Next Dose Due   apixaban 2.5 mg tablet  Commonly known as: Eliquis  What changed: additional instructions      Take 1 tablet (2.5 mg) by mouth 2 times a day. To facility: Please hold until June 8 due to hematoma around the pacemaker site.              CONTINUE taking these medications        Instructions Last Dose Given Next Dose Due   acetaminophen 325 mg tablet  Commonly known as: Tylenol      Take 2 tablets (650 mg) by mouth every 4 hours if needed for mild pain (1 - 3).       amiodarone 200 mg tablet  Commonly known as: Pacerone      Take 1 tablet (200 mg) by mouth once daily.       cholecalciferol 50 MCG (2000 UT) tablet  Commonly known as: Vitamin D-3           DULoxetine 20 mg DR capsule  Commonly known as: Cymbalta      Take 1 capsule (20 mg) by mouth once daily. Do not crush or chew.       furosemide 20 mg tablet  Commonly known as: Lasix      Take 3 tablets (60 mg) by mouth once daily.       gabapentin 300 mg capsule  Commonly known as: Neurontin      Take 1 capsule (300 mg) by mouth 2 times a day.       levothyroxine 150 mcg tablet  Commonly known as: Synthroid, Levoxyl      TAKE 1 TABLET BY MOUTH ONCE DAILY IN THE MORNING AS DIRECTED       metoprolol succinate XL 25 mg 24 hr tablet  Commonly known as: Toprol-XL      Take 0.5 tablets (12.5 mg) by mouth  once daily. Do not crush or chew.       ondansetron ODT 4 mg disintegrating tablet  Commonly known as: Zofran-ODT           polyethylene glycol 17 gram packet  Commonly known as: Glycolax, Miralax           simvastatin 10 mg tablet  Commonly known as: Zocor      Take 1 tablet (10 mg) by mouth once daily at bedtime.       Vitamin B-12 100 mcg tablet  Generic drug: cyanocobalamin                  STOP taking these medications      amLODIPine 5 mg tablet  Commonly known as: Norvasc        hydrALAZINE 10 mg tablet  Commonly known as: Apresoline        traMADol 50 mg tablet  Commonly known as: Ultram        vancomycin 125 mg capsule  Commonly known as: Vancocin                  Where to Get Your Medications        You can get these medications from any pharmacy    Bring a paper prescription for each of these medications  oxyCODONE 5 mg immediate release tablet       Information about where to get these medications is not yet available    Ask your nurse or doctor about these medications  apixaban 2.5 mg tablet  lidocaine 4 % patch         Test Results Pending At Discharge  Pending Labs       No current pending labs.            Hospital Course   84-year-old female past medical history of atrial fibrillation presenting with fall.  Patient was initially admitted to the hospital service, then episodes of significant pauses and heart rate in 30s to 40s requiring dopamine drip and transfer the ICU.  Seen by electrophysiology and cardiology.  A pacemaker was successfully placed, though attempt was made on the left side but could not pass a stenosis, and placed on the right side with hematoma.  No new falls.  Seen by physical therapy Occupational Therapy and moderate rehab was recommended and agreed upon.  Eliquis is held until June 8 given the hematoma.  Patient already has an appointment with electrophysiology.  Evaluated by hematology oncology for CLL, there has been no progression.  Her chronic kidney disease is also stable  and seen by nephrology.  She did receive 1 unit of packed red blood cells with a hematoma and hemodilution from fluids, with improvement.  Discharged to skilled facility in stable condition.    Pertinent Physical Exam At Time of Discharge  Physical Exam    General: Elderly female, nontoxic  Eyes: Clear sclera  Neck: No JVD  Skin: Area of hematoma over right pacemaker site with healing ecchymosis.  Tender.  Abdomen: Soft nondistended  Cardiac: Regular rate rhythm  Respiratory: Clear to auscultation.    Outpatient Follow-Up  Future Appointments   Date Time Provider Department Center   6/25/2024  9:00 AM [id number] DEISY Silverio DO

## 2024-06-06 NOTE — PROGRESS NOTES
Subjective Data:  Feels better pacemaker site tender      Overnight Events:    No Events     Objective Data:  Last Recorded Vitals:  Vitals:    06/05/24 1351 06/05/24 1500 06/05/24 2305 06/06/24 0700   BP: 125/62 (!) 98/41 (!) 109/34 (!) 134/49   BP Location:  Left arm Left arm Left arm   Patient Position:  Lying Lying Lying   Pulse: 78 60 60 60   Resp: 16 16 18 17   Temp: 36.4 °C (97.5 °F) 36.6 °C (97.9 °F) 36.6 °C (97.9 °F) 36.7 °C (98.1 °F)   TempSrc: Oral Oral Oral Axillary   SpO2: 98% 97% 94% 94%   Weight:       Height:           Last Labs:  CBC - 6/6/2024:  6:20 AM  35.4 8.1 172    24.6      CMP - 6/6/2024:  6:20 AM  8.3 4.9 15 --- 0.3   3.7 2.6 6 84      PTT - No results in last year.  1.2   12.1 _     HGBA1C   Date/Time Value Ref Range Status   12/19/2019 10:50 AM 5.4 4.0 - 6.0 % Final     Comment:     Hemoglobin A1C levels are related to mean blood glucose during the   preceding 2-3 months. The relationship table below may be used as a   general guide. Each 1% increase in HGB A1C is a reflection of an   increase in mean glucose of approximately 30 mg/dl.   Reference: Diabetes Care, volume 29, supplement 1 Jan. 2006                        HGB A1C ................. Approx. Mean Glucose   _______________________________________________   6%   ...............................  120 mg/dl   7%   ...............................  150 mg/dl   8%   ...............................  180 mg/dl   9%   ...............................  210 mg/dl   10%  ...............................  240 mg/dl  Performed at [address] [address]       LDLCALC   Date/Time Value Ref Range Status   06/03/2024 04:23 AM 70 65 - 130 mg/dL Final   12/08/2022 09:43  65 - 130 MG/DL Final   01/25/2021 02:53  65 - 130 MG/DL Final      Last I/O:  I/O last 3 completed shifts:  In: 1483.3 (21.2 mL/kg) [P.O.:325; Blood:500; IV Piggyback:658.3]  Out: 1675 (23.9 mL/kg) [Urine:1675 (0.7 mL/kg/hr)]  Weight: 70.1 kg      Past Cardiology Tests (Last 3 Years):  EKG:  ECG 12 Lead 06/01/2024      ECG 12 lead 12/24/2023    Echo:  Transthoracic Echo (TTE) Complete 12/15/2023    Ejection Fractions:  EF   Date/Time Value Ref Range Status   12/15/2023 08:53 AM 56       Cath:  No results found for this or any previous visit from the past 1095 days.    Stress Test:  Nuclear Stress Test 12/26/2023    Cardiac Imaging:  No results found for this or any previous visit from the past 1095 days.      Inpatient Medications:  Scheduled medications   Medication Dose Route Frequency    acetaminophen  975 mg oral TID    [Held by provider] amiodarone  200 mg oral Daily    amiodarone  200 mg oral Daily    [Held by provider] amLODIPine  5 mg oral Daily    [Held by provider] apixaban  2.5 mg oral q12h    cyanocobalamin  100 mcg oral Daily    DULoxetine  20 mg oral Daily    [Held by provider] furosemide  60 mg oral Daily    gabapentin  300 mg oral BID    [Held by provider] hydrALAZINE  10 mg oral BID    levothyroxine  150 mcg oral Daily    lidocaine  2 patch transdermal Daily    [Held by provider] metoprolol succinate XL  12.5 mg oral Daily    [Held by provider] metoprolol succinate XL  12.5 mg oral Daily    simvastatin  10 mg oral Nightly    [Held by provider] traMADol  50 mg oral q24h     PRN medications   Medication    HYDROmorphone    ondansetron    oxyCODONE    polyethylene glycol     Continuous Medications   Medication Dose Last Rate       Physical Exam:  Gen: A+O x 3, no acute distress  HEENT: Normocephalic/atraumatic pupils equal react light  Neck: No JVD, upstrokes and volumes nl, no bruits   Lung: CTA, nl AP diameter  CV:  nl sounding S1, S2, no murmur, PMI nondisplaced, right-sided incision with small hematoma tender to touch axillary ecchymosis  Abdomen: soft, non tender, + BS x 4   Extremities: warm to touch, palpable pulses bilaterally, no edema   Neuro: no neurologic deficits     Assessment/Plan     84-year-old female history of paroxysmal  atrial fibrillation maintained on amiodarone for hybrid approach with low-dose beta-blocker now with progressive conduction disease marked bradycardia occasional junctional escape with suspected pauses for episodes of syncope.  Required temporary line yesterday and has been stable since  Current status with need for permanent pacemaker implantation then will allow for treatment of her PAF  N.p.o. after midnight  1 g vancomycin preprocedure, has a history of C. difficile on liquid vancomycin developed a rash pharmacy recommends infusing vancomycin over 2 hours unable to utilize Ancef for documented amoxicillin allergy  Removal of temporary wire  Procedure discussed with patient and daughter  Discussed with Dr. Chisholm    6/6:  -Status PPM aborted left side 2/2 persistent SVC right side sc no pneumothorax though developed small hematoma has only received 1 dose of apixaban morning after and has since been held  -Yesterday received 2 units RBCs for hemoglobin of 7.0 currently 8.2, history of leukemia and anemia  -Paroxysmal atrial fibrillation resumed on amiodarone 200 mg daily, metoprolol was 12.5 mg daily held w/ low bp since has improved   -Would resume if BP tolerates  -Would cont to hold Eliquis for 3 more days to allow hematoma and ecchymosis to improve  -Plan for discharge to SNF   D/w hospitalist Dr. Silverio  Patient seen on rounds with Dr. Chisholm  Code Status:  DNR and No Intubation    I spent 30 minutes in the professional and overall care of this patient.        Do Landaverde, APRN-CNP

## 2024-06-06 NOTE — NURSING NOTE
Quinn out per protocol and 10ml removed from balloon and quinn removed and patient tolerated well and 800 ml of yellow urine in bag.

## 2024-06-07 ENCOUNTER — NURSING HOME VISIT (OUTPATIENT)
Dept: POST ACUTE CARE | Facility: EXTERNAL LOCATION | Age: 84
End: 2024-06-07
Payer: MEDICARE

## 2024-06-07 DIAGNOSIS — I49.5 SICK SINUS SYNDROME (MULTI): Primary | ICD-10-CM

## 2024-06-07 DIAGNOSIS — I48.91 ATRIAL FIBRILLATION, UNSPECIFIED TYPE (MULTI): ICD-10-CM

## 2024-06-07 DIAGNOSIS — I10 BENIGN ESSENTIAL HYPERTENSION: ICD-10-CM

## 2024-06-07 DIAGNOSIS — C91.10 CHRONIC LYMPHOCYTIC LEUKEMIA (MULTI): ICD-10-CM

## 2024-06-07 DIAGNOSIS — Z95.0 PACEMAKER: ICD-10-CM

## 2024-06-07 DIAGNOSIS — E66.01 MORBID OBESITY (MULTI): ICD-10-CM

## 2024-06-07 DIAGNOSIS — M06.09 RHEUMATOID ARTHRITIS OF MULTIPLE SITES WITH NEGATIVE RHEUMATOID FACTOR (MULTI): ICD-10-CM

## 2024-06-07 PROCEDURE — 99306 1ST NF CARE HIGH MDM 50: CPT | Performed by: INTERNAL MEDICINE

## 2024-06-07 ASSESSMENT — ENCOUNTER SYMPTOMS
ABDOMINAL PAIN: 0
SHORTNESS OF BREATH: 0
FEVER: 0

## 2024-06-07 NOTE — LETTER
Patient: Tracie Baltazar  : 1940    Encounter Date: 2024    HISTORY AND PHYSICAL    History of present illness:    Tracie Baltazar is a 84 y.o. female admitted to SNF after hospitalization for fall and bradycardia requiring placement of PPM.  Difficulty passing on left so had to be placed on right.  She is up and moving.  No issues per staff.  She was up to the toilet and on the commode this morning which limited my interaction with her. Discussed w/ PA, nursing staff.    Past Medical History:   Diagnosis Date   • Asthma ([facility])    • Essential (primary) hypertension     Hypertension   • Kidney failure    • Leukemia (Multi)    • Rheumatoid arthritis (Multi)         Past Surgical History:   Procedure Laterality Date   • BACK SURGERY      Back Surgery   • CARDIAC ELECTROPHYSIOLOGY PROCEDURE Right 2024    Procedure: Temporary Pacemaker Insertion;  Surgeon: Javier Steel DO;  Location: [facility] Cardiac Cath Lab;  Service: Cardiovascular;  Laterality: Right;  pacemaker line in place sutured in with 2-0 silk   • CARDIAC ELECTROPHYSIOLOGY PROCEDURE N/A 6/3/2024    Procedure: PPM IMPLANT DUAL;  Surgeon: Timmy Chisholm MD;  Location: [facility] Cardiac Cath Lab;  Service: Electrophysiology;  Laterality: N/A;  [place]   • CHOLECYSTECTOMY      Cholecystectomy   • HYSTERECTOMY      Hysterectomy   • KNEE SURGERY      arthroscopic surgery?   • OTHER SURGICAL HISTORY      Shoulder Surgery Left   • SHOULDER SURGERY Left     Left shoulder impingement surgery   • TOTAL KNEE ARTHROPLASTY Left 2014        Family History   Problem Relation Name Age of Onset   • Emphysema Mother     • Emphysema Father     • Heart attack Sister     • Lung cancer Brother          Agent orange   • Lymphoma Son     • Multiple sclerosis Son         Social History     Socioeconomic History   • Marital status:      Spouse name: Not on file   • Number of children: Not on file   • Years of education: Not on file   • Highest education  level: Not on file   Occupational History   • Not on file   Tobacco Use   • Smoking status: Never     Passive exposure: Never   • Smokeless tobacco: Never   Vaping Use   • Vaping status: Never Used   Substance and Sexual Activity   • Alcohol use: Yes     Comment: rarely   • Drug use: Never   • Sexual activity: Defer   Other Topics Concern   • Not on file   Social History Narrative   • Not on file     Social Determinants of Health     Financial Resource Strain: Low Risk  (6/3/2024)    Overall Financial Resource Strain (CARDIA)    • Difficulty of Paying Living Expenses: Not very hard   Food Insecurity: No Food Insecurity (6/3/2024)    Hunger Vital Sign    • Worried About Running Out of Food in the Last Year: Never true    • Ran Out of Food in the Last Year: Never true   Transportation Needs: No Transportation Needs (6/3/2024)    PRAPARE - Transportation    • Lack of Transportation (Medical): No    • Lack of Transportation (Non-Medical): No   Physical Activity: Inactive (6/3/2024)    Exercise Vital Sign    • Days of Exercise per Week: 0 days    • Minutes of Exercise per Session: 0 min   Stress: No Stress Concern Present (6/3/2024)    [place] [employer] - Occupational Stress Questionnaire    • Feeling of Stress : Not at all   Social Connections: Moderately Isolated (6/3/2024)    Social Connection and Isolation Panel [NHANES]    • Frequency of Communication with Friends and Family: More than three times a week    • Frequency of Social Gatherings with Friends and Family: More than three times a week    • Attends [employer] Services: Never    • Active Member of Clubs or Organizations: Yes    • Attends Club or Organization Meetings: 1 to 4 times per year    • Marital Status:    Intimate Partner Violence: Not At Risk (6/3/2024)    Humiliation, Afraid, Rape, and Kick questionnaire    • Fear of Current or Ex-Partner: No    • Emotionally Abused: No    • Physically Abused: No    • Sexually Abused: No    Housing Stability: Low Risk  (6/3/2024)    Housing Stability Vital Sign    • Unable to Pay for Housing in the Last Year: No    • Number of Places Lived in the Last Year: 1    • Unstable Housing in the Last Year: No       Review of Systems   Constitutional:  Negative for fever.   Respiratory:  Negative for shortness of breath.    Cardiovascular:  Negative for chest pain.   Gastrointestinal:  Negative for abdominal pain.   All other systems reviewed and are negative.       Objective  Vital signs reviewed in [facility].    Physical Exam  Vitals reviewed.   Constitutional:       Appearance: Normal appearance.   Cardiovascular:      Rate and Rhythm: Normal rate and regular rhythm.      Heart sounds: No murmur heard.  Pulmonary:      Breath sounds: Normal breath sounds. No wheezing, rhonchi or rales.   Musculoskeletal:      Right lower leg: No edema.      Left lower leg: No edema.     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 C/w amio for rhythm control as well as metoprolol.  Anticoagulated w/ Eliquis.    Seems euvolemic on lasix 20 mg daily.    Fall, impaired gait - PT, OT.    The essential elements of the hospital History and Physical as well as the Discharge Summary have been confirmed to the best of my ability by means of interviewing the patient plus a review of the hospital records. Please note that this entry was created in a shared electronic medical record.     All available hospital and outpatient records have been reviewed. Will continue other current  drug therapies as ordered on the continuation of care documents. Physical and Occupational Therapy will assess and treat per POC. Analgesia for identified pain symptoms. Continue the various medicines for bowel and bladder symptoms as well as the vitamins and supplements per hospital instructions. Will assess and provide local care to abnormalities of skin integrity. Drug to drug interaction data as noted by the pharmacy has been reviewed. The patient's condition warrants the continuation of these drugs as prescribed by the medical specialists. Discharge planning will be coordinated through the  department. I have reviewed any advanced directive documentation that is contained in the admission packet as directives indicating whether a surrogate decision maker has been identified.       Electronically Signed By: Jimi Marcos MD   6/7/24  1:01 PM

## 2024-06-07 NOTE — PROGRESS NOTES
HISTORY AND PHYSICAL    History of present illness:    Tracie Baltazar is a 84 y.o. female admitted to SNF after hospitalization for fall and bradycardia requiring placement of PPM.  Difficulty passing on left so had to be placed on right.  She is up and moving.  No issues per staff.  She was up to the toilet and on the commode this morning which limited my interaction with her. Discussed w/ PA, nursing staff.    Past Medical History:   Diagnosis Date    Asthma ([facility])     Essential (primary) hypertension     Hypertension    Kidney failure     Leukemia (Multi)     Rheumatoid arthritis (Multi)         Past Surgical History:   Procedure Laterality Date    BACK SURGERY      Back Surgery    CARDIAC ELECTROPHYSIOLOGY PROCEDURE Right 6/2/2024    Procedure: Temporary Pacemaker Insertion;  Surgeon: Javier Steel DO;  Location: [facility] Cardiac Cath Lab;  Service: Cardiovascular;  Laterality: Right;  pacemaker line in place sutured in with 2-0 silk    CARDIAC ELECTROPHYSIOLOGY PROCEDURE N/A 6/3/2024    Procedure: PPM IMPLANT DUAL;  Surgeon: Timmy Chisholm MD;  Location: [facility] Cardiac Cath Lab;  Service: Electrophysiology;  Laterality: N/A;  [place]    CHOLECYSTECTOMY      Cholecystectomy    HYSTERECTOMY      Hysterectomy    KNEE SURGERY      arthroscopic surgery?    OTHER SURGICAL HISTORY      Shoulder Surgery Left    SHOULDER SURGERY Left     Left shoulder impingement surgery    TOTAL KNEE ARTHROPLASTY Left 02/13/2014        Family History   Problem Relation Name Age of Onset    Emphysema Mother      Emphysema Father      Heart attack Sister      Lung cancer Brother          Agent orange    Lymphoma Son      Multiple sclerosis Son         Social History     Socioeconomic History    Marital status:      Spouse name: Not on file    Number of children: Not on file    Years of education: Not on file    Highest education level: Not on file   Occupational History    Not on file   Tobacco Use    Smoking status: Never      Passive exposure: Never    Smokeless tobacco: Never   Vaping Use    Vaping status: Never Used   Substance and Sexual Activity    Alcohol use: Yes     Comment: rarely    Drug use: Never    Sexual activity: Defer   Other Topics Concern    Not on file   Social History Narrative    Not on file     Social Determinants of Health     Financial Resource Strain: Low Risk  (6/3/2024)    Overall Financial Resource Strain (CARDIA)     Difficulty of Paying Living Expenses: Not very hard   Food Insecurity: No Food Insecurity (6/3/2024)    Hunger Vital Sign     Worried About Running Out of Food in the Last Year: Never true     Ran Out of Food in the Last Year: Never true   Transportation Needs: No Transportation Needs (6/3/2024)    PRAPARE - Transportation     Lack of Transportation (Medical): No     Lack of Transportation (Non-Medical): No   Physical Activity: Inactive (6/3/2024)    Exercise Vital Sign     Days of Exercise per Week: 0 days     Minutes of Exercise per Session: 0 min   Stress: No Stress Concern Present (6/3/2024)    Citizen of the [place] of Occupational Health - Occupational Stress Questionnaire     Feeling of Stress : Not at all   Social Connections: Moderately Isolated (6/3/2024)    Social Connection and Isolation Panel [NHANES]     Frequency of Communication with Friends and Family: More than three times a week     Frequency of Social Gatherings with Friends and Family: More than three times a week     Attends [employer] Services: Never     Active Member of Clubs or Organizations: Yes     Attends Club or Organization Meetings: 1 to 4 times per year     Marital Status:    Intimate Partner Violence: Not At Risk (6/3/2024)    Humiliation, Afraid, Rape, and Kick questionnaire     Fear of Current or Ex-Partner: No     Emotionally Abused: No     Physically Abused: No     Sexually Abused: No   Housing Stability: Low Risk  (6/3/2024)    Housing Stability Vital Sign     Unable to Pay for Housing in the Last Year: No      Number of Places Lived in the Last Year: 1     Unstable Housing in the Last Year: No       Review of Systems   Constitutional:  Negative for fever.   Respiratory:  Negative for shortness of breath.    Cardiovascular:  Negative for chest pain.   Gastrointestinal:  Negative for abdominal pain.   All other systems reviewed and are negative.       Objective   Vital signs reviewed in [facility].    Physical Exam  Vitals reviewed.   Constitutional:       Appearance: Normal appearance.   Cardiovascular:      Rate and Rhythm: Normal rate and regular rhythm.      Heart sounds: No murmur heard.  Pulmonary:      Breath sounds: Normal breath sounds. No wheezing, rhonchi or rales.   Musculoskeletal:      Right lower leg: No edema.      Left lower leg: No edema.     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 C/w amio for rhythm control as well as metoprolol.  Anticoagulated w/ Eliquis.    Seems euvolemic on lasix 20 mg daily.    Fall, impaired gait - PT, OT.    The essential elements of the hospital History and Physical as well as the Discharge Summary have been confirmed to the best of my ability by means of interviewing the patient plus a review of the hospital records. Please note that this entry was created in a shared electronic medical record.     All available hospital and outpatient records have been reviewed. Will continue other current drug therapies as ordered on the continuation of care documents. Physical and Occupational Therapy will assess and treat per  POC. Analgesia for identified pain symptoms. Continue the various medicines for bowel and bladder symptoms as well as the vitamins and supplements per hospital instructions. Will assess and provide local care to abnormalities of skin integrity. Drug to drug interaction data as noted by the pharmacy has been reviewed. The patient's condition warrants the continuation of these drugs as prescribed by the medical specialists. Discharge planning will be coordinated through the  department. I have reviewed any advanced directive documentation that is contained in the admission packet as directives indicating whether a surrogate decision maker has been identified.

## 2024-06-10 ENCOUNTER — NURSING HOME VISIT (OUTPATIENT)
Dept: POST ACUTE CARE | Facility: EXTERNAL LOCATION | Age: 84
End: 2024-06-10
Payer: MEDICARE

## 2024-06-10 VITALS
OXYGEN SATURATION: 95 % | TEMPERATURE: 98.3 F | HEART RATE: 82 BPM | WEIGHT: 159.2 LBS | DIASTOLIC BLOOD PRESSURE: 70 MMHG | BODY MASS INDEX: 28.2 KG/M2 | RESPIRATION RATE: 18 BRPM | SYSTOLIC BLOOD PRESSURE: 130 MMHG

## 2024-06-10 DIAGNOSIS — Z95.0 PACEMAKER: Primary | ICD-10-CM

## 2024-06-10 DIAGNOSIS — C91.10 CHRONIC LYMPHOCYTIC LEUKEMIA (MULTI): ICD-10-CM

## 2024-06-10 DIAGNOSIS — N18.4 CHRONIC KIDNEY DISEASE, STAGE 4 (SEVERE) (MULTI): ICD-10-CM

## 2024-06-10 DIAGNOSIS — D64.9 ANEMIA, UNSPECIFIED TYPE: ICD-10-CM

## 2024-06-10 DIAGNOSIS — T14.8XXA HEMATOMA: ICD-10-CM

## 2024-06-10 DIAGNOSIS — I48.91 ATRIAL FIBRILLATION, UNSPECIFIED TYPE (MULTI): ICD-10-CM

## 2024-06-10 DIAGNOSIS — I49.5 SINUS NODE DYSFUNCTION (MULTI): ICD-10-CM

## 2024-06-10 DIAGNOSIS — I10 BENIGN ESSENTIAL HYPERTENSION: ICD-10-CM

## 2024-06-10 DIAGNOSIS — I50.33 CONGESTIVE HEART FAILURE WITH LEFT VENTRICULAR DIASTOLIC DYSFUNCTION, ACUTE ON CHRONIC (MULTI): ICD-10-CM

## 2024-06-10 PROCEDURE — 99309 SBSQ NF CARE MODERATE MDM 30: CPT | Performed by: PHYSICIAN ASSISTANT

## 2024-06-10 ASSESSMENT — ENCOUNTER SYMPTOMS
FEVER: 0
CHILLS: 0
HEMATURIA: 0
CONSTIPATION: 0
TREMORS: 0
COUGH: 0
SHORTNESS OF BREATH: 0
DYSURIA: 0
VOMITING: 0
HEADACHES: 0
ABDOMINAL PAIN: 0
DIARRHEA: 0
APPETITE CHANGE: 0
NAUSEA: 0
WEAKNESS: 0
DIZZINESS: 0
FREQUENCY: 0
CONFUSION: 0
WHEEZING: 0
NERVOUS/ANXIOUS: 0

## 2024-06-10 NOTE — LETTER
Patient: Tracie Baltazar  : 1940    Encounter Date: 06/10/2024    No chief complaint on file.      Subjective   1940 : Tracie Baltazar is a 84 y.o. female admitted to [facility] for rehab.   HPI  Pt with h/o CLL, CKD 4, and CHF presented to ER with weakness and fall.  She was found to be bradycardic and had pacemaker placed.  Initially tried to place pacemaker on the left but ultimately it was placed on the right side of chest wall.  Pt developed a post op hematoma to the right pacemaker site.  Apixaban was held and restarted on .  She was anemic and was given 2 units of PRBC.    Pt seen while sitting in wheelchair.  She is alert, pleasant and offers no new complaints.  She continues to have some pain in the right chest wall due to pacemaker placement.  She had routine labs today which were reviewed and are stable.   Pt has a h/o CLL with chronically elevated WBC.  She was seen by heme/onc and there has been on progression of her CLL.  She was also seen by nephrology for CKD.   Pt denies any chest pain or palpitations.  No shortness of breath or cough.  She reports a good appetite.  NO n/v/d/c.   She has no lower leg edema.  Pt lives with her son.    Code status is DNRcca DNI.   Review of Systems   Constitutional:  Negative for appetite change, chills and fever.   Respiratory:  Negative for cough, shortness of breath and wheezing.    Cardiovascular:  Negative for chest pain and leg swelling.   Gastrointestinal:  Negative for abdominal pain, constipation, diarrhea, nausea and vomiting.   Genitourinary:  Negative for dysuria, frequency and hematuria.   Neurological:  Negative for dizziness, tremors, weakness and headaches.   Psychiatric/Behavioral:  Negative for confusion. The patient is not nervous/anxious.    All other systems reviewed and are negative.      Objective   /70   Pulse 82   Temp 36.8 °C (98.3 °F)   Resp 18   Wt 72.2 kg (159 lb 3.2 oz)   SpO2 95%   BMI 28.20 kg/m²   "  Physical Exam  Constitutional:       General: She is not in acute distress.  Eyes:      Conjunctiva/sclera: Conjunctivae normal.      Pupils: Pupils are equal, round, and reactive to light.   Cardiovascular:      Rate and Rhythm: Normal rate and regular rhythm.      Heart sounds: No murmur heard.  Pulmonary:      Effort: Pulmonary effort is normal.      Breath sounds: No wheezing, rhonchi or rales.   Abdominal:      General: Abdomen is flat. Bowel sounds are normal. There is no distension.      Palpations: Abdomen is soft. There is no mass.      Tenderness: There is no abdominal tenderness.   Musculoskeletal:         General: No swelling. Normal range of motion.   Skin:     General: Skin is warm and dry.      Comments: She has incisions to both right and left upper chest from pacemaker surgeries.  She has swelling and bruising to the right chest well.  Area is tender with touch and movement    Neurological:      General: No focal deficit present.      Mental Status: She is alert and oriented to person, place, and time. Mental status is at baseline.       No lab exists for component: \"CBC BMP\"  Assessment/Plan   Chronic kidney disease, stage 4 (severe) (CMS/[facility]) N18.4        Monitor weekly labs.  seen by nephrology.         Chronic lymphocytic leukemia (CMS/[facility]) C91.10        Follows with oncologist.  Elevated WBC.             Benign essential hypertension I10       Monitor blood pressures and adjust meds as needed.             Congestive heart failure with left ventricular diastolic dysfunction, acute on chronic (CMS/[facility]) I50.33       Continue lasix 60mg daily.  Monitor weights and labs.            Atrial fibrillation (CMS/[facility]) I48.91       Rate controlled with amiodarone and metoprolol   anticoagulated with apixaban.    Follow up with Cardiology.         Anemia        S/p 2 units of PRBC. Hemoglobin 9.0 today.   Monitor weekly labs.         bradycardia      S/p pacemaker placement on right side.  Pt follows with " [facility].    Hematoma:  at right sided pacemaker insertion site.  Apixaban was held and restarted on 6/8.   Pain management with oxycodone.        Time spent: 30 min in review of chart, labs and orders, consultation with pt and documentation.       Electronically Signed By: Mariam Berry PA-C   6/10/24  5:55 PM

## 2024-06-10 NOTE — PROGRESS NOTES
No chief complaint on file.      Subjective   [id number] : Tracie Baltazar is a 84 y.o. female admitted to [facility] for rehab.   HPI  Pt with h/o CLL, CKD 4, and CHF presented to ER with weakness and fall.  She was found to be bradycardic and had pacemaker placed.  Initially tried to place pacemaker on the left but ultimately it was placed on the right side of chest wall.  Pt developed a post op hematoma to the right pacemaker site.  Apixaban was held and restarted on 5/8.  She was anemic and was given 2 units of PRBC.    Pt seen while sitting in wheelchair.  She is alert, pleasant and offers no new complaints.  She continues to have some pain in the right chest wall due to pacemaker placement.  She had routine labs today which were reviewed and are stable.   Pt has a h/o CLL with chronically elevated WBC.  She was seen by heme/onc and there has been on progression of her CLL.  She was also seen by nephrology for CKD.   Pt denies any chest pain or palpitations.  No shortness of breath or cough.  She reports a good appetite.  NO n/v/d/c.   She has no lower leg edema.  Pt lives with her son.    Code status is DNRcca DNI.   Review of Systems   Constitutional:  Negative for appetite change, chills and fever.   Respiratory:  Negative for cough, shortness of breath and wheezing.    Cardiovascular:  Negative for chest pain and leg swelling.   Gastrointestinal:  Negative for abdominal pain, constipation, diarrhea, nausea and vomiting.   Genitourinary:  Negative for dysuria, frequency and hematuria.   Neurological:  Negative for dizziness, tremors, weakness and headaches.   Psychiatric/Behavioral:  Negative for confusion. The patient is not nervous/anxious.    All other systems reviewed and are negative.      Objective   /70   Pulse 82   Temp 36.8 °C (98.3 °F)   Resp 18   Wt 72.2 kg (159 lb 3.2 oz)   SpO2 95%   BMI 28.20 kg/m²    Physical Exam  Constitutional:       General: She is not in acute  "distress.  Eyes:      Conjunctiva/sclera: Conjunctivae normal.      Pupils: Pupils are equal, round, and reactive to light.   Cardiovascular:      Rate and Rhythm: Normal rate and regular rhythm.      Heart sounds: No murmur heard.  Pulmonary:      Effort: Pulmonary effort is normal.      Breath sounds: No wheezing, rhonchi or rales.   Abdominal:      General: Abdomen is flat. Bowel sounds are normal. There is no distension.      Palpations: Abdomen is soft. There is no mass.      Tenderness: There is no abdominal tenderness.   Musculoskeletal:         General: No swelling. Normal range of motion.   Skin:     General: Skin is warm and dry.      Comments: She has incisions to both right and left upper chest from pacemaker surgeries.  She has swelling and bruising to the right chest well.  Area is tender with touch and movement    Neurological:      General: No focal deficit present.      Mental Status: She is alert and oriented to person, place, and time. Mental status is at baseline.       No lab exists for component: \"CBC BMP\"  Assessment/Plan   Chronic kidney disease, stage 4 (severe) (CMS/[facility]) N18.4        Monitor weekly labs.  seen by nephrology.         Chronic lymphocytic leukemia (CMS/[facility]) C91.10        Follows with oncologist.  Elevated WBC.             Benign essential hypertension I10       Monitor blood pressures and adjust meds as needed.             Congestive heart failure with left ventricular diastolic dysfunction, acute on chronic (CMS/[facility]) I50.33       Continue lasix 60mg daily.  Monitor weights and labs.            Atrial fibrillation (CMS/[facility]) I48.91       Rate controlled with amiodarone and metoprolol   anticoagulated with apixaban.    Follow up with Cardiology.         Anemia        S/p 2 units of PRBC. Hemoglobin 9.0 today.   Monitor weekly labs.         bradycardia      S/p pacemaker placement on right side.  Pt follows with [facility].    Hematoma:  at right sided pacemaker insertion site.  " Apixaban was held and restarted on 6/8.   Pain management with oxycodone.        Time spent: 30 min in review of chart, labs and orders, consultation with pt and documentation.

## 2024-06-14 ENCOUNTER — NURSING HOME VISIT (OUTPATIENT)
Dept: POST ACUTE CARE | Facility: EXTERNAL LOCATION | Age: 84
End: 2024-06-14
Payer: MEDICARE

## 2024-06-14 VITALS
SYSTOLIC BLOOD PRESSURE: 122 MMHG | TEMPERATURE: 98 F | RESPIRATION RATE: 18 BRPM | HEART RATE: 60 BPM | DIASTOLIC BLOOD PRESSURE: 62 MMHG | BODY MASS INDEX: 26.93 KG/M2 | OXYGEN SATURATION: 97 % | WEIGHT: 152 LBS

## 2024-06-14 DIAGNOSIS — Z95.0 PACEMAKER: Primary | ICD-10-CM

## 2024-06-14 DIAGNOSIS — C91.10 CHRONIC LYMPHOCYTIC LEUKEMIA (MULTI): ICD-10-CM

## 2024-06-14 DIAGNOSIS — T14.8XXA HEMATOMA: ICD-10-CM

## 2024-06-14 DIAGNOSIS — N18.4 CHRONIC KIDNEY DISEASE, STAGE 4 (SEVERE) (MULTI): ICD-10-CM

## 2024-06-14 DIAGNOSIS — I10 BENIGN ESSENTIAL HYPERTENSION: ICD-10-CM

## 2024-06-14 DIAGNOSIS — D64.9 ANEMIA, UNSPECIFIED TYPE: ICD-10-CM

## 2024-06-14 DIAGNOSIS — I48.91 ATRIAL FIBRILLATION, UNSPECIFIED TYPE (MULTI): ICD-10-CM

## 2024-06-14 DIAGNOSIS — I50.33 CONGESTIVE HEART FAILURE WITH LEFT VENTRICULAR DIASTOLIC DYSFUNCTION, ACUTE ON CHRONIC (MULTI): ICD-10-CM

## 2024-06-14 PROCEDURE — 99309 SBSQ NF CARE MODERATE MDM 30: CPT | Performed by: PHYSICIAN ASSISTANT

## 2024-06-14 ASSESSMENT — ENCOUNTER SYMPTOMS
WHEEZING: 0
VOMITING: 0
CONFUSION: 0
SHORTNESS OF BREATH: 0
NERVOUS/ANXIOUS: 0
HEMATURIA: 0
DIARRHEA: 0
FEVER: 0
COUGH: 0
FREQUENCY: 0
HEADACHES: 0
NAUSEA: 0
WEAKNESS: 0
DYSURIA: 0
CHILLS: 0
CONSTIPATION: 0
ABDOMINAL PAIN: 0
DIZZINESS: 0
TREMORS: 0
APPETITE CHANGE: 0

## 2024-06-14 NOTE — PROGRESS NOTES
No chief complaint on file.      Subjective   [id number] : Tracie Baltazar is a 84 y.o. female admitted to [facility] for rehab.   HPI  Pt with h/o CLL, CKD 4, and CHF presented to ER with weakness and fall due to bradycardia s/p pacemaker placement.    Pt noted to have bleeding from the right pacemaker insertion site.  She was restarted on eliquis on 6/8.    Dressing changed with nursing.  Not actively bleeding at time of my visit.  Moderate amount of dried blood on the dressing.  She has a moderate sized hematoma on the right chest wall.  She has significant bruising to the right breast and axilla.  Pain and swelling is much better.   She ambulating with a walker.   No shortness of breath or cough.  She reports a good appetite.  NO n/v/d/c.   She has no lower leg edema.  Pt lives with her son.    Code status is DNRcca DNI.   Review of Systems   Constitutional:  Negative for appetite change, chills and fever.   Respiratory:  Negative for cough, shortness of breath and wheezing.    Cardiovascular:  Negative for chest pain and leg swelling.   Gastrointestinal:  Negative for abdominal pain, constipation, diarrhea, nausea and vomiting.   Genitourinary:  Negative for dysuria, frequency and hematuria.   Neurological:  Negative for dizziness, tremors, weakness and headaches.   Psychiatric/Behavioral:  Negative for confusion. The patient is not nervous/anxious.    All other systems reviewed and are negative.      Objective   /62   Pulse 60   Temp 36.7 °C (98 °F)   Resp 18   Wt 68.9 kg (152 lb)   SpO2 97%   BMI 26.93 kg/m²    Physical Exam  Constitutional:       General: She is not in acute distress.  Eyes:      Conjunctiva/sclera: Conjunctivae normal.      Pupils: Pupils are equal, round, and reactive to light.   Cardiovascular:      Rate and Rhythm: Normal rate and regular rhythm.      Heart sounds: No murmur heard.  Pulmonary:      Effort: Pulmonary effort is normal.      Breath sounds: No wheezing,  "rhonchi or rales.   Abdominal:      General: Abdomen is flat. Bowel sounds are normal. There is no distension.      Palpations: Abdomen is soft. There is no mass.      Tenderness: There is no abdominal tenderness.   Musculoskeletal:         General: No swelling. Normal range of motion.   Skin:     General: Skin is warm and dry.      Comments: She has incisions to both right and left upper chest from pacemaker surgeries.  She has a moderate sized hematoma below the right chest incision.   Bruising to the right chest wall and breast. No active bleeding but there is blood on the dressing.    Neurological:      General: No focal deficit present.      Mental Status: She is alert and oriented to person, place, and time. Mental status is at baseline.       No lab exists for component: \"CBC BMP\"  Assessment/Plan   Chronic kidney disease, stage 4 (severe) (CMS/[facility]) N18.4        Monitor weekly labs.  seen by nephrology.         Chronic lymphocytic leukemia (CMS/[facility]) C91.10        Follows with oncologist.  Elevated WBC.             Benign essential hypertension I10       Monitor blood pressures and adjust meds as needed.             Congestive heart failure with left ventricular diastolic dysfunction, acute on chronic (CMS/[facility]) I50.33       Continue lasix 60mg daily.  Monitor weights and labs.            Atrial fibrillation (CMS/[facility]) I48.91       Rate controlled with amiodarone and metoprolol   anticoagulated with apixaban.    Follow up with Cardiology.         Anemia        S/p 2 units of PRBC. Hemoglobin 9.0 today.   Monitor weekly labs.         bradycardia      S/p pacemaker placement on right side.  Pt follows with [facility].    Hematoma:  at right sided pacemaker insertion site.  Pt having bleeding from insertion site.  She was restarted on eliquis on 6/8.   Will hold eliquis tonight and tomorrow - restart on 6/16.    Pain and swelling much better.  Pain management.        Time spent: 30 min in review of chart, labs and " orders, consultation with pt and documentation.

## 2024-06-14 NOTE — LETTER
Patient: Tracie Baltazar  : 1940    Encounter Date: 2024    No chief complaint on file.      Subjective  [id number] : Tracie Baltazar is a 84 y.o. female admitted to [facility] for rehab.   HPI  Pt with h/o CLL, CKD 4, and CHF presented to ER with weakness and fall due to bradycardia s/p pacemaker placement.    Pt noted to have bleeding from the right pacemaker insertion site.  She was restarted on eliquis on .    Dressing changed with nursing.  Not actively bleeding at time of my visit.  Moderate amount of dried blood on the dressing.  She has a moderate sized hematoma on the right chest wall.  She has significant bruising to the right breast and axilla.  Pain and swelling is much better.   She ambulating with a walker.   No shortness of breath or cough.  She reports a good appetite.  NO n/v/d/c.   She has no lower leg edema.  Pt lives with her son.    Code status is DNRcca DNI.   Review of Systems   Constitutional:  Negative for appetite change, chills and fever.   Respiratory:  Negative for cough, shortness of breath and wheezing.    Cardiovascular:  Negative for chest pain and leg swelling.   Gastrointestinal:  Negative for abdominal pain, constipation, diarrhea, nausea and vomiting.   Genitourinary:  Negative for dysuria, frequency and hematuria.   Neurological:  Negative for dizziness, tremors, weakness and headaches.   Psychiatric/Behavioral:  Negative for confusion. The patient is not nervous/anxious.    All other systems reviewed and are negative.      Objective  /62   Pulse 60   Temp 36.7 °C (98 °F)   Resp 18   Wt 68.9 kg (152 lb)   SpO2 97%   BMI 26.93 kg/m²    Physical Exam  Constitutional:       General: She is not in acute distress.  Eyes:      Conjunctiva/sclera: Conjunctivae normal.      Pupils: Pupils are equal, round, and reactive to light.   Cardiovascular:      Rate and Rhythm: Normal rate and regular rhythm.      Heart sounds: No murmur heard.  Pulmonary:       "Effort: Pulmonary effort is normal.      Breath sounds: No wheezing, rhonchi or rales.   Abdominal:      General: Abdomen is flat. Bowel sounds are normal. There is no distension.      Palpations: Abdomen is soft. There is no mass.      Tenderness: There is no abdominal tenderness.   Musculoskeletal:         General: No swelling. Normal range of motion.   Skin:     General: Skin is warm and dry.      Comments: She has incisions to both right and left upper chest from pacemaker surgeries.  She has a moderate sized hematoma below the right chest incision.   Bruising to the right chest wall and breast. No active bleeding but there is blood on the dressing.    Neurological:      General: No focal deficit present.      Mental Status: She is alert and oriented to person, place, and time. Mental status is at baseline.       No lab exists for component: \"CBC BMP\"  Assessment/Plan  Chronic kidney disease, stage 4 (severe) (CMS/[facility]) N18.4        Monitor weekly labs.  seen by nephrology.         Chronic lymphocytic leukemia (CMS/[facility]) C91.10        Follows with oncologist.  Elevated WBC.             Benign essential hypertension I10       Monitor blood pressures and adjust meds as needed.             Congestive heart failure with left ventricular diastolic dysfunction, acute on chronic (CMS/[facility]) I50.33       Continue lasix 60mg daily.  Monitor weights and labs.            Atrial fibrillation (CMS/[facility]) I48.91       Rate controlled with amiodarone and metoprolol   anticoagulated with apixaban.    Follow up with Cardiology.         Anemia        S/p 2 units of PRBC. Hemoglobin 9.0 today.   Monitor weekly labs.         bradycardia      S/p pacemaker placement on right side.  Pt follows with [facility].    Hematoma:  at right sided pacemaker insertion site.  Pt having bleeding from insertion site.  She was restarted on eliquis on 6/8.   Will hold eliquis tonight and tomorrow - restart on 6/16.    Pain and swelling much better.  " Pain management.        Time spent: 30 min in review of chart, labs and orders, consultation with pt and documentation.       Electronically Signed By: Mariam Berry PA-C   6/14/24  6:31 PM

## 2024-06-17 ENCOUNTER — NURSING HOME VISIT (OUTPATIENT)
Dept: POST ACUTE CARE | Facility: EXTERNAL LOCATION | Age: 84
End: 2024-06-17
Payer: MEDICARE

## 2024-06-17 VITALS
SYSTOLIC BLOOD PRESSURE: 122 MMHG | HEART RATE: 60 BPM | RESPIRATION RATE: 18 BRPM | TEMPERATURE: 98.1 F | OXYGEN SATURATION: 97 % | DIASTOLIC BLOOD PRESSURE: 62 MMHG | WEIGHT: 152 LBS | BODY MASS INDEX: 26.93 KG/M2

## 2024-06-17 DIAGNOSIS — T14.8XXA HEMATOMA: ICD-10-CM

## 2024-06-17 DIAGNOSIS — N18.4 CHRONIC KIDNEY DISEASE, STAGE 4 (SEVERE) (MULTI): ICD-10-CM

## 2024-06-17 DIAGNOSIS — D64.9 ANEMIA, UNSPECIFIED TYPE: ICD-10-CM

## 2024-06-17 DIAGNOSIS — I50.33 CONGESTIVE HEART FAILURE WITH LEFT VENTRICULAR DIASTOLIC DYSFUNCTION, ACUTE ON CHRONIC (MULTI): ICD-10-CM

## 2024-06-17 DIAGNOSIS — I48.91 ATRIAL FIBRILLATION, UNSPECIFIED TYPE (MULTI): ICD-10-CM

## 2024-06-17 DIAGNOSIS — Z95.0 PACEMAKER: Primary | ICD-10-CM

## 2024-06-17 DIAGNOSIS — I10 BENIGN ESSENTIAL HYPERTENSION: ICD-10-CM

## 2024-06-17 DIAGNOSIS — C91.10 CHRONIC LYMPHOCYTIC LEUKEMIA (MULTI): ICD-10-CM

## 2024-06-17 PROCEDURE — 99309 SBSQ NF CARE MODERATE MDM 30: CPT | Performed by: PHYSICIAN ASSISTANT

## 2024-06-17 ASSESSMENT — ENCOUNTER SYMPTOMS
NERVOUS/ANXIOUS: 0
DYSURIA: 0
VOMITING: 0
WHEEZING: 0
DIZZINESS: 0
CHILLS: 0
HEADACHES: 0
CONFUSION: 0
WEAKNESS: 0
NAUSEA: 0
CONSTIPATION: 0
SHORTNESS OF BREATH: 0
FREQUENCY: 0
DIARRHEA: 0
COUGH: 0
ABDOMINAL PAIN: 0
TREMORS: 0
FEVER: 0
HEMATURIA: 0
APPETITE CHANGE: 0

## 2024-06-17 NOTE — PROGRESS NOTES
No chief complaint on file.      Subjective   [id number] : Tracie Baltazar is a 84 y.o. female admitted to [facility] for rehab.   HPI  Pt with h/o CLL, CKD 4, and CHF presented to ER with weakness and fall due to bradycardia s/p pacemaker placement.    Pt seen while sitting up in wheelchair.  She reports that she continues to have a small amount of bleeding from her right sided pacemaker placement.  She  has a clean dressing in place - there is no blood on the dressing.  She still has a hematoma to the right chest wall.  Her pain is improved.   She had routine labs today which were reviewed.  Her hemoglobin is 8.4 today.    Pt offers no new complaints.  She is hoping to discharge to home this week.   She has significant bruising to the right breast and axilla which is improving.   She ambulating with a walker.   No shortness of breath or cough.  She reports a good appetite.  NO n/v/d/c.   She has no lower leg edema.  Pt lives with her son.    Code status is DNRcca DNI.   Review of Systems   Constitutional:  Negative for appetite change, chills and fever.   Respiratory:  Negative for cough, shortness of breath and wheezing.    Cardiovascular:  Negative for chest pain and leg swelling.   Gastrointestinal:  Negative for abdominal pain, constipation, diarrhea, nausea and vomiting.   Genitourinary:  Negative for dysuria, frequency and hematuria.   Neurological:  Negative for dizziness, tremors, weakness and headaches.   Psychiatric/Behavioral:  Negative for confusion. The patient is not nervous/anxious.    All other systems reviewed and are negative.      Objective   /62   Pulse 60   Temp 36.7 °C (98.1 °F)   Resp 18   Wt 68.9 kg (152 lb)   SpO2 97%   BMI 26.93 kg/m²    Physical Exam  Constitutional:       General: She is not in acute distress.  Eyes:      Conjunctiva/sclera: Conjunctivae normal.      Pupils: Pupils are equal, round, and reactive to light.   Cardiovascular:      Rate and Rhythm: Normal  "rate and regular rhythm.      Heart sounds: No murmur heard.  Pulmonary:      Effort: Pulmonary effort is normal.      Breath sounds: No wheezing, rhonchi or rales.   Abdominal:      General: Abdomen is flat. Bowel sounds are normal. There is no distension.      Palpations: Abdomen is soft. There is no mass.      Tenderness: There is no abdominal tenderness.   Musculoskeletal:         General: No swelling. Normal range of motion.   Skin:     General: Skin is warm and dry.      Comments: She has incisions to both right and left upper chest from pacemaker surgeries.  She has a moderate sized hematoma below the right chest incision.   Bruising to the right chest wall and breast.  Dressing is clean and dry.    Neurological:      General: No focal deficit present.      Mental Status: She is alert and oriented to person, place, and time. Mental status is at baseline.       No lab exists for component: \"CBC BMP\"  Assessment/Plan   Chronic kidney disease, stage 4 (severe) (CMS/[facility]) N18.4        Monitor weekly labs.  seen by nephrology.         Chronic lymphocytic leukemia (CMS/[facility]) C91.10        Follows with oncologist.  Elevated WBC.             Benign essential hypertension I10       Monitor blood pressures and adjust meds as needed.             Congestive heart failure with left ventricular diastolic dysfunction, acute on chronic (CMS/[facility]) I50.33       Continue lasix 60mg daily.  Monitor weights and labs.            Atrial fibrillation (CMS/[facility]) I48.91       Rate controlled with amiodarone and metoprolol   anticoagulated with apixaban.    Follow up with Cardiology.         Anemia        S/p 2 units of PRBC. Hemoglobin 8.4 today.   Monitor weekly labs.         bradycardia      S/p pacemaker placement on right side.  Pt follows with [facility].   Discharge planning.  Home health for RN/PT/OT recommended.      Hematoma:  at right sided pacemaker insertion site.  Bleeding is better but still having small amount of bleeding. "   Pain and swelling much better.  Pain management.        Time spent: 30 min in review of chart, labs and orders, consultation with pt and documentation.

## 2024-06-17 NOTE — LETTER
Patient: Tracie Baltazar  : 1940    Encounter Date: 2024    No chief complaint on file.      Subjective  [id number] : Tracie Baltazar is a 84 y.o. female admitted to [facility] for rehab.   HPI  Pt with h/o CLL, CKD 4, and CHF presented to ER with weakness and fall due to bradycardia s/p pacemaker placement.    Pt seen while sitting up in wheelchair.  She reports that she continues to have a small amount of bleeding from her right sided pacemaker placement.  She  has a clean dressing in place - there is no blood on the dressing.  She still has a hematoma to the right chest wall.  Her pain is improved.   She had routine labs today which were reviewed.  Her hemoglobin is 8.4 today.    Pt offers no new complaints.  She is hoping to discharge to home this week.   She has significant bruising to the right breast and axilla which is improving.   She ambulating with a walker.   No shortness of breath or cough.  She reports a good appetite.  NO n/v/d/c.   She has no lower leg edema.  Pt lives with her son.    Code status is DNRcca DNI.   Review of Systems   Constitutional:  Negative for appetite change, chills and fever.   Respiratory:  Negative for cough, shortness of breath and wheezing.    Cardiovascular:  Negative for chest pain and leg swelling.   Gastrointestinal:  Negative for abdominal pain, constipation, diarrhea, nausea and vomiting.   Genitourinary:  Negative for dysuria, frequency and hematuria.   Neurological:  Negative for dizziness, tremors, weakness and headaches.   Psychiatric/Behavioral:  Negative for confusion. The patient is not nervous/anxious.    All other systems reviewed and are negative.      Objective  /62   Pulse 60   Temp 36.7 °C (98.1 °F)   Resp 18   Wt 68.9 kg (152 lb)   SpO2 97%   BMI 26.93 kg/m²    Physical Exam  Constitutional:       General: She is not in acute distress.  Eyes:      Conjunctiva/sclera: Conjunctivae normal.      Pupils: Pupils are equal,  "round, and reactive to light.   Cardiovascular:      Rate and Rhythm: Normal rate and regular rhythm.      Heart sounds: No murmur heard.  Pulmonary:      Effort: Pulmonary effort is normal.      Breath sounds: No wheezing, rhonchi or rales.   Abdominal:      General: Abdomen is flat. Bowel sounds are normal. There is no distension.      Palpations: Abdomen is soft. There is no mass.      Tenderness: There is no abdominal tenderness.   Musculoskeletal:         General: No swelling. Normal range of motion.   Skin:     General: Skin is warm and dry.      Comments: She has incisions to both right and left upper chest from pacemaker surgeries.  She has a moderate sized hematoma below the right chest incision.   Bruising to the right chest wall and breast.  Dressing is clean and dry.    Neurological:      General: No focal deficit present.      Mental Status: She is alert and oriented to person, place, and time. Mental status is at baseline.       No lab exists for component: \"CBC BMP\"  Assessment/Plan  Chronic kidney disease, stage 4 (severe) (CMS/[facility]) N18.4        Monitor weekly labs.  seen by nephrology.         Chronic lymphocytic leukemia (CMS/[facility]) C91.10        Follows with oncologist.  Elevated WBC.             Benign essential hypertension I10       Monitor blood pressures and adjust meds as needed.             Congestive heart failure with left ventricular diastolic dysfunction, acute on chronic (CMS/HCC) I50.33       Continue lasix 60mg daily.  Monitor weights and labs.            Atrial fibrillation (CMS/[facility]) I48.91       Rate controlled with amiodarone and metoprolol   anticoagulated with apixaban.    Follow up with Cardiology.         Anemia        S/p 2 units of PRBC. Hemoglobin 8.4 today.   Monitor weekly labs.         bradycardia      S/p pacemaker placement on right side.  Pt follows with [facility].   Discharge planning.  Home health for RN/PT/OT recommended.      Hematoma:  at right sided pacemaker " insertion site.  Bleeding is better but still having small amount of bleeding.   Pain and swelling much better.  Pain management.        Time spent: 30 min in review of chart, labs and orders, consultation with pt and documentation.       Electronically Signed By: Mariam Berry PA-C   6/17/24  7:40 PM

## 2024-06-19 ENCOUNTER — TELEPHONE (OUTPATIENT)
Dept: PRIMARY CARE | Facility: CLINIC | Age: 84
End: 2024-06-19
Payer: MEDICARE

## 2024-06-19 NOTE — TELEPHONE ENCOUNTER
LESLYE FROM [facility] , [phone], CALLING  TO SEE IF [facility] WILL FOLLOW FOR CARE, OT, PT, SKILLED NURSING

## 2024-06-20 NOTE — TELEPHONE ENCOUNTER
Spoke with Hoa, she is aware that M will follow up with patient for requested services.   They will fax over orders.

## 2024-06-21 ENCOUNTER — NURSING HOME VISIT (OUTPATIENT)
Dept: POST ACUTE CARE | Facility: EXTERNAL LOCATION | Age: 84
End: 2024-06-21
Payer: MEDICARE

## 2024-06-21 VITALS
DIASTOLIC BLOOD PRESSURE: 70 MMHG | HEART RATE: 61 BPM | BODY MASS INDEX: 26.54 KG/M2 | OXYGEN SATURATION: 97 % | SYSTOLIC BLOOD PRESSURE: 160 MMHG | RESPIRATION RATE: 18 BRPM | WEIGHT: 149.8 LBS | TEMPERATURE: 98.2 F

## 2024-06-21 DIAGNOSIS — I50.33 CONGESTIVE HEART FAILURE WITH LEFT VENTRICULAR DIASTOLIC DYSFUNCTION, ACUTE ON CHRONIC (MULTI): ICD-10-CM

## 2024-06-21 DIAGNOSIS — D64.9 ANEMIA, UNSPECIFIED TYPE: ICD-10-CM

## 2024-06-21 DIAGNOSIS — I10 BENIGN ESSENTIAL HYPERTENSION: ICD-10-CM

## 2024-06-21 DIAGNOSIS — Z95.0 PACEMAKER: Primary | ICD-10-CM

## 2024-06-21 DIAGNOSIS — I48.91 ATRIAL FIBRILLATION, UNSPECIFIED TYPE (MULTI): ICD-10-CM

## 2024-06-21 DIAGNOSIS — N18.4 CHRONIC KIDNEY DISEASE, STAGE 4 (SEVERE) (MULTI): ICD-10-CM

## 2024-06-21 DIAGNOSIS — C91.10 CHRONIC LYMPHOCYTIC LEUKEMIA (MULTI): ICD-10-CM

## 2024-06-21 DIAGNOSIS — T14.8XXA HEMATOMA: ICD-10-CM

## 2024-06-21 PROCEDURE — 99315 NF DSCHRG MGMT 30 MIN/LESS: CPT | Performed by: PHYSICIAN ASSISTANT

## 2024-06-21 ASSESSMENT — ENCOUNTER SYMPTOMS
APPETITE CHANGE: 0
NERVOUS/ANXIOUS: 0
HEADACHES: 0
COUGH: 0
CHILLS: 0
ABDOMINAL PAIN: 0
DYSURIA: 0
TREMORS: 0
HEMATURIA: 0
FEVER: 0
FREQUENCY: 0
DIZZINESS: 0
WHEEZING: 0
CONFUSION: 0
CONSTIPATION: 0
VOMITING: 0
NAUSEA: 0
DIARRHEA: 0
WEAKNESS: 0
SHORTNESS OF BREATH: 0

## 2024-06-21 NOTE — PROGRESS NOTES
No chief complaint on file.      Subjective   [id number] : Tracie Baltazar is a 84 y.o. female admitted to [facility] for rehab.   HPI  Pt with h/o CLL, CKD 4, and CHF presented to ER with weakness and fall due to bradycardia s/p pacemaker placement.    Pt seen while sitting up in wheelchair.  She is planning for discharge to home today.  She reports that she is feeling well.   She has had no bleeding from pacemaker site in the last couple of days.  She still has some tenderness and swelling to the right chest wall.  Overall pain, bruising and swelling are much better.  She is taking tylenol for discomfort.   She offers no questions or concerns regarding her discharge.   No shortness of breath or cough.  She reports a good appetite.  NO n/v/d/c.   She has no lower leg edema.  Pt lives with her son.    Code status is DNRcca DNI.   Review of Systems   Constitutional:  Negative for appetite change, chills and fever.   Respiratory:  Negative for cough, shortness of breath and wheezing.    Cardiovascular:  Negative for chest pain and leg swelling.   Gastrointestinal:  Negative for abdominal pain, constipation, diarrhea, nausea and vomiting.   Genitourinary:  Negative for dysuria, frequency and hematuria.   Neurological:  Negative for dizziness, tremors, weakness and headaches.   Psychiatric/Behavioral:  Negative for confusion. The patient is not nervous/anxious.    All other systems reviewed and are negative.      Objective   /70   Pulse 61   Temp 36.8 °C (98.2 °F)   Resp 18   Wt 67.9 kg (149 lb 12.8 oz)   SpO2 97%   BMI 26.54 kg/m²    Physical Exam  Constitutional:       General: She is not in acute distress.  Eyes:      Conjunctiva/sclera: Conjunctivae normal.      Pupils: Pupils are equal, round, and reactive to light.   Cardiovascular:      Rate and Rhythm: Normal rate and regular rhythm.      Heart sounds: No murmur heard.  Pulmonary:      Effort: Pulmonary effort is normal.      Breath sounds: No  Patient alert and pleasant with no concerns  Here today for Depo-provera. AMN  #[id number], [place] Vee, available  via iPAD for interpretation. Urine for pregnancy obtained with negative results  Depo-provera 150 mg IM given in the right dorsogluteal without difficulty. Discharge instructions have been discussed with the patient. Patient advised to call our office with any questions or concerns. Voiced understanding. "wheezing, rhonchi or rales.   Abdominal:      General: Abdomen is flat. Bowel sounds are normal. There is no distension.      Palpations: Abdomen is soft. There is no mass.      Tenderness: There is no abdominal tenderness.   Musculoskeletal:         General: No swelling. Normal range of motion.   Skin:     General: Skin is warm and dry.      Comments: She has incisions to both right and left upper chest from pacemaker surgeries.  She has a moderate sized hematoma below the right chest incision.   Bruising to the right chest wall and breast.  Dressing is clean and dry.    Neurological:      General: No focal deficit present.      Mental Status: She is alert and oriented to person, place, and time. Mental status is at baseline.       No lab exists for component: \"CBC BMP\"  Assessment/Plan   Chronic kidney disease, stage 4 (severe) (CMS/[facility]) N18.4        Monitor weekly labs.  seen by nephrology.         Chronic lymphocytic leukemia (CMS/[facility]) C91.10        Follows with oncologist.  Elevated WBC.             Benign essential hypertension I10       Monitor blood pressures and adjust meds as needed.             Congestive heart failure with left ventricular diastolic dysfunction, acute on chronic (CMS/[facility]) I50.33       Continue lasix 60mg daily.  Monitor weights and labs.            Atrial fibrillation (CMS/[facility]) I48.91       Rate controlled with amiodarone and metoprolol   anticoagulated with apixaban.    Follow up with Cardiology.         Anemia        S/p 2 units of PRBC. Hemoglobin 8.4 on last labs. Monitor weekly labs.         bradycardia      S/p pacemaker placement on right side.  Pt follows with [facility].   Discharge planning. - discharge to home today.  Home health for RN/PT/OT recommended.      Hematoma:  at right sided pacemaker insertion site.  No bleeding in last couple of days.  Continue pain management.        Time spent: 30 min in review of chart, labs and orders, consultation with pt and documentation. "

## 2024-06-21 NOTE — LETTER
Patient: Tracie Baltazar  : 1940    Encounter Date: 2024    No chief complaint on file.      Subjective  [id number] : Tracie Baltazar is a 84 y.o. female admitted to [facility] for rehab.   HPI  Pt with h/o CLL, CKD 4, and CHF presented to ER with weakness and fall due to bradycardia s/p pacemaker placement.    Pt seen while sitting up in wheelchair.  She is planning for discharge to home today.  She reports that she is feeling well.   She has had no bleeding from pacemaker site in the last couple of days.  She still has some tenderness and swelling to the right chest wall.  Overall pain, bruising and swelling are much better.  She is taking tylenol for discomfort.   She offers no questions or concerns regarding her discharge.   No shortness of breath or cough.  She reports a good appetite.  NO n/v/d/c.   She has no lower leg edema.  Pt lives with her son.    Code status is DNRcca DNI.   Review of Systems   Constitutional:  Negative for appetite change, chills and fever.   Respiratory:  Negative for cough, shortness of breath and wheezing.    Cardiovascular:  Negative for chest pain and leg swelling.   Gastrointestinal:  Negative for abdominal pain, constipation, diarrhea, nausea and vomiting.   Genitourinary:  Negative for dysuria, frequency and hematuria.   Neurological:  Negative for dizziness, tremors, weakness and headaches.   Psychiatric/Behavioral:  Negative for confusion. The patient is not nervous/anxious.    All other systems reviewed and are negative.      Objective  /70   Pulse 61   Temp 36.8 °C (98.2 °F)   Resp 18   Wt 67.9 kg (149 lb 12.8 oz)   SpO2 97%   BMI 26.54 kg/m²    Physical Exam  Constitutional:       General: She is not in acute distress.  Eyes:      Conjunctiva/sclera: Conjunctivae normal.      Pupils: Pupils are equal, round, and reactive to light.   Cardiovascular:      Rate and Rhythm: Normal rate and regular rhythm.      Heart sounds: No murmur  "heard.  Pulmonary:      Effort: Pulmonary effort is normal.      Breath sounds: No wheezing, rhonchi or rales.   Abdominal:      General: Abdomen is flat. Bowel sounds are normal. There is no distension.      Palpations: Abdomen is soft. There is no mass.      Tenderness: There is no abdominal tenderness.   Musculoskeletal:         General: No swelling. Normal range of motion.   Skin:     General: Skin is warm and dry.      Comments: She has incisions to both right and left upper chest from pacemaker surgeries.  She has a moderate sized hematoma below the right chest incision.   Bruising to the right chest wall and breast.  Dressing is clean and dry.    Neurological:      General: No focal deficit present.      Mental Status: She is alert and oriented to person, place, and time. Mental status is at baseline.       No lab exists for component: \"CBC BMP\"  Assessment/Plan  Chronic kidney disease, stage 4 (severe) (CMS/[facility]) N18.4        Monitor weekly labs.  seen by nephrology.         Chronic lymphocytic leukemia (CMS/[facility]) C91.10        Follows with oncologist.  Elevated WBC.             Benign essential hypertension I10       Monitor blood pressures and adjust meds as needed.             Congestive heart failure with left ventricular diastolic dysfunction, acute on chronic (CMS/[facility]) I50.33       Continue lasix 60mg daily.  Monitor weights and labs.            Atrial fibrillation (CMS/[facility]) I48.91       Rate controlled with amiodarone and metoprolol   anticoagulated with apixaban.    Follow up with Cardiology.         Anemia        S/p 2 units of PRBC. Hemoglobin 8.4 on last labs. Monitor weekly labs.         bradycardia      S/p pacemaker placement on right side.  Pt follows with [facility].   Discharge planning. - discharge to home today.  Home health for RN/PT/OT recommended.      Hematoma:  at right sided pacemaker insertion site.  No bleeding in last couple of days.  Continue pain management.        Time spent: 30 " min in review of chart, labs and orders, consultation with pt and documentation.       Electronically Signed By: Mariam Berry PA-C   6/21/24  2:00 PM

## 2024-06-22 ENCOUNTER — PATIENT MESSAGE (OUTPATIENT)
Dept: PRIMARY CARE | Facility: CLINIC | Age: 84
End: 2024-06-22
Payer: MEDICARE

## 2024-06-22 DIAGNOSIS — R06.02 SHORTNESS OF BREATH: ICD-10-CM

## 2024-06-24 ENCOUNTER — TELEPHONE (OUTPATIENT)
Dept: PRIMARY CARE | Facility: CLINIC | Age: 84
End: 2024-06-24
Payer: MEDICARE

## 2024-06-24 DIAGNOSIS — R06.02 SHORTNESS OF BREATH: ICD-10-CM

## 2024-06-24 RX ORDER — LEVOTHYROXINE SODIUM 150 UG/1
TABLET ORAL
Qty: 90 TABLET | Refills: 0 | Status: SHIPPED | OUTPATIENT
Start: 2024-06-24

## 2024-06-24 NOTE — TELEPHONE ENCOUNTER
----- Message from Tracie Baltazar sent at 6/22/2024 12:09 PM EDT -----  Regarding: Tracie Baltazar  Contact: [phone]  No refills on bottle. Please refill Levothyroxin 150MCG.  If you can do a 90-day supply, that would be great. [facility], [place]. Thank you.

## 2024-06-24 NOTE — TELEPHONE ENCOUNTER
From: Tracie Baltazar  To: Javier Floyd  Sent: 6/22/2024 12:09 PM EDT  Subject: Tracie Baltazar    No refills on bottle. Please refill Levothyroxin 150MCG. If you can do a 90-day supply, that would be great. [facility], [place]. Thank you.

## 2024-06-25 ENCOUNTER — APPOINTMENT (OUTPATIENT)
Dept: CARDIOLOGY | Facility: CLINIC | Age: 84
End: 2024-06-25
Payer: MEDICARE

## 2024-06-26 ENCOUNTER — TELEPHONE (OUTPATIENT)
Dept: PRIMARY CARE | Facility: CLINIC | Age: 84
End: 2024-06-26
Payer: MEDICARE

## 2024-06-26 NOTE — TELEPHONE ENCOUNTER
Just an FYI:    Patient started home care on Monday 06/24/24 with [facility].  She does have an appointment with you this Friday on 06/28/24.

## 2024-06-28 ENCOUNTER — OFFICE VISIT (OUTPATIENT)
Dept: PRIMARY CARE | Facility: CLINIC | Age: 84
End: 2024-06-28
Payer: MEDICARE

## 2024-06-28 ENCOUNTER — TELEPHONE (OUTPATIENT)
Dept: PRIMARY CARE | Facility: CLINIC | Age: 84
End: 2024-06-28

## 2024-06-28 VITALS — HEIGHT: 63 IN | BODY MASS INDEX: 26.05 KG/M2 | WEIGHT: 147 LBS

## 2024-06-28 DIAGNOSIS — E03.9 ACQUIRED HYPOTHYROIDISM: ICD-10-CM

## 2024-06-28 DIAGNOSIS — Z79.899 MEDICATION MANAGEMENT: ICD-10-CM

## 2024-06-28 DIAGNOSIS — Z00.00 WELL ADULT EXAM: Primary | ICD-10-CM

## 2024-06-28 DIAGNOSIS — I10 BENIGN ESSENTIAL HYPERTENSION: ICD-10-CM

## 2024-06-28 DIAGNOSIS — E78.00 HYPERCHOLESTEROLEMIA: ICD-10-CM

## 2024-06-28 DIAGNOSIS — I49.5 SICK SINUS SYNDROME (MULTI): ICD-10-CM

## 2024-06-28 DIAGNOSIS — T14.8XXA HEMATOMA AND CONTUSION: ICD-10-CM

## 2024-06-28 DIAGNOSIS — N18.4 CHRONIC KIDNEY DISEASE, STAGE 4 (SEVERE) (MULTI): ICD-10-CM

## 2024-06-28 ASSESSMENT — ENCOUNTER SYMPTOMS
LOSS OF SENSATION IN FEET: 0
OCCASIONAL FEELINGS OF UNSTEADINESS: 1
DEPRESSION: 0

## 2024-06-28 ASSESSMENT — PAIN SCALES - GENERAL: PAINLEVEL: 0-NO PAIN

## 2024-06-28 NOTE — TELEPHONE ENCOUNTER
FT-10/02/24  Annual Medicare Wellness   Problem: PAIN - ADULT  Goal: Verbalizes/displays adequate comfort level or baseline comfort level  Description  Interventions:  - Encourage patient to monitor pain and request assistance  - Assess pain using appropriate pain scale  - Administer analgesics based on type and severity of pain and evaluate response  - Implement non-pharmacological measures as appropriate and evaluate response  - Consider cultural and social influences on pain and pain management  - Notify physician/advanced practitioner if interventions unsuccessful or patient reports new pain  Outcome: Progressing     Problem: INFECTION - ADULT  Goal: Absence or prevention of progression during hospitalization  Description  INTERVENTIONS:  - Assess and monitor for signs and symptoms of infection  - Monitor lab/diagnostic results  - Monitor all insertion sites, i e  indwelling lines, tubes, and drains  - Administer medications as ordered  - Instruct and encourage patient and family to use good hand hygiene technique  Outcome: Progressing     Problem: SAFETY ADULT  Goal: Patient will remain free of falls  Description  INTERVENTIONS:  - Assess patient frequently for physical needs  -  Identify cognitive and physical deficits and behaviors that affect risk of falls    -  [place] fall precautions as indicated by assessment   - Educate patient/family on patient safety including physical limitations  - Instruct patient to call for assistance with activity based on assessment  - Modify environment to reduce risk of injury  - Consider OT/PT consult to assist with strengthening/mobility  Outcome: Progressing  Goal: Maintain or return to baseline ADL function  Description  INTERVENTIONS:  -  Assess patient's ability to carry out ADLs; assess patient's baseline for ADL function and identify physical deficits which impact ability to perform ADLs (bathing, care of mouth/teeth, toileting, grooming, dressing, etc )  - Assess/evaluate cause of self-care deficits   - Assess range of motion  - Assess patient's mobility; develop plan if impaired  - Assess patient's need for assistive devices and provide as appropriate  - Encourage maximum independence but intervene and supervise when necessary  ¯ Involve family in performance of ADLs  ¯ Assess for home care needs following discharge   ¯ Request OT consult to assist with ADL evaluation and planning for discharge  ¯ Provide patient education as appropriate  Outcome: Progressing  Goal: Maintain or return mobility status to optimal level  Description  INTERVENTIONS:  - Assess patient's baseline mobility status (ambulation, transfers, stairs, etc )    - Identify cognitive and physical deficits and behaviors that affect mobility  - Identify mobility aids required to assist with transfers and/or ambulation (gait belt, sit-to-stand, lift, walker, cane, etc )  - [place] fall precautions as indicated by assessment  - Record patient progress and toleration of activity level on Mobility SBAR; progress patient to next Phase/Stage  - Instruct patient to call for assistance with activity based on assessment  - Request Rehabilitation consult to assist with strengthening/weightbearing, etc   Outcome: Progressing     Problem: DISCHARGE PLANNING  Goal: Discharge to home or other facility with appropriate resources  Description  INTERVENTIONS:  - Identify barriers to discharge w/patient and caregiver  - Arrange for needed discharge resources and transportation as appropriate  - Identify discharge learning needs (meds, wound care, etc )  - Arrange for interpretive services to assist at discharge as needed  - Refer to Case Management Department for coordinating discharge planning if the patient needs post-hospital services based on physician/advanced practitioner order or complex needs related to functional status, cognitive ability, or social support system  Outcome: Progressing     Problem: Knowledge Deficit  Goal: Patient/family/caregiver demonstrates understanding of disease process, treatment plan, medications, and discharge instructions  Description  Complete learning assessment and assess knowledge base  Interventions:  - Provide teaching at level of understanding  - Provide teaching via preferred learning methods  Outcome: Progressing     Problem: Potential for Falls  Goal: Patient will remain free of falls  Description  INTERVENTIONS:  - Assess patient frequently for physical needs  -  Identify cognitive and physical deficits and behaviors that affect risk of falls    -  [place] fall precautions as indicated by assessment   - Educate patient/family on patient safety including physical limitations  - Instruct patient to call for assistance with activity based on assessment  - Modify environment to reduce risk of injury  - Consider OT/PT consult to assist with strengthening/mobility  Outcome: Progressing     Problem: SKIN/TISSUE INTEGRITY - ADULT  Goal: Skin integrity remains intact  Description  INTERVENTIONS  - Identify patients at risk for skin breakdown  - Assess and monitor skin integrity  - Assess and monitor nutrition and hydration status  - Monitor labs (i e  albumin)  - Assess for incontinence   - Turn and reposition patient  - Assist with mobility/ambulation  - Relieve pressure over bony prominences  - Avoid friction and shearing  - Provide appropriate hygiene as needed including keeping skin clean and dry  - Evaluate need for skin moisturizer/barrier cream  - Collaborate with interdisciplinary team (i e  Nutrition, Rehabilitation, etc )   - Patient/family teaching  Outcome: Progressing  Goal: Incision(s), wounds(s) or drain site(s) healing without S/S of infection  Description  INTERVENTIONS  - Assess and document risk factors for skin impairment   - Assess and document dressing, incision, wound bed, drain sites and surrounding tissue  - Initiate Nutrition services consult and/or wound management as needed  Outcome: Progressing Problem: MUSCULOSKELETAL - ADULT  Goal: Maintain or return mobility to safest level of function  Description  INTERVENTIONS:  - Assess patient's ability to carry out ADLs; assess patient's baseline for ADL function and identify physical deficits which impact ability to perform ADLs (bathing, care of mouth/teeth, toileting, grooming, dressing, etc )  - Assess/evaluate cause of self-care deficits   - Assess range of motion  - Assess patient's mobility; develop plan if impaired  - Assess patient's need for assistive devices and provide as appropriate  - Encourage maximum independence but intervene and supervise when necessary  - Involve family in performance of ADLs  - Assess for home care needs following discharge   - Request OT consult to assist with ADL evaluation and planning for discharge  - Provide patient education as appropriate  Outcome: Progressing     Problem: Nutrition/Hydration-ADULT  Goal: Nutrient/Hydration intake appropriate for improving, restoring or maintaining nutritional needs  Description  Monitor and assess patient's nutrition/hydration status for malnutrition (ex- brittle hair, bruises, dry skin, pale skin and conjunctiva, muscle wasting, smooth red tongue, and disorientation)  Collaborate with interdisciplinary team and initiate plan and interventions as ordered  Monitor patient's weight and dietary intake as ordered or per policy  Utilize nutrition screening tool and intervene per policy  Determine patient's food preferences and provide high-protein, high-caloric foods as appropriate       INTERVENTIONS:  - Monitor oral intake, urinary output, labs, and treatment plans  - Assess nutrition and hydration status and recommend course of action  - Evaluate amount of meals eaten  - Assist patient with eating if necessary   - Allow adequate time for meals  - Recommend/ encourage appropriate diets, oral nutritional supplements, and vitamin/mineral supplements  - Order, calculate, and assess calorie counts as needed  - Recommend, monitor, and adjust tube feedings and TPN/PPN based on assessed needs  - Assess need for intravenous fluids  - Provide specific nutrition/hydration education as appropriate  - Include patient/family/caregiver in decisions related to nutrition  Outcome: Progressing     Problem: Prexisting or High Potential for Compromised Skin Integrity  Goal: Skin integrity is maintained or improved  Description  INTERVENTIONS:  - Identify patients at risk for skin breakdown  - Assess and monitor skin integrity  - Assess and monitor nutrition and hydration status  - Monitor labs (i e  albumin)  - Assess for incontinence   - Turn and reposition patient  - Assist with mobility/ambulation  - Relieve pressure over bony prominences  - Avoid friction and shearing  - Provide appropriate hygiene as needed including keeping skin clean and dry  - Evaluate need for skin moisturizer/barrier cream  - Collaborate with interdisciplinary team (i e  Nutrition, Rehabilitation, etc )   - Patient/family teaching  Outcome: Progressing

## 2024-06-28 NOTE — PROGRESS NOTES
"Subjective   Patient ID: Tracie Baltazar is a 84 y.o. female who presents for Follow-up (Follow up [facility] admitted 6/1/2024 - pacemaker placed/Went to rehab afterwards and discharged on 6/21/2024//Has appointments scheduled with Dr. Wright and  in July 2024 to follow up with pacemaker/).    HPI here for hospital follow-up.  Patient was admitted on 6/1/2024 and discharged on around 6/7/2024.  She then had a lengthy rehab stay up until about 6/21/2024.  While hospitalized she presented with very slow heart rate and was found to have sick sinus syndrome.  She now has a pacemaker placed.  She will be seeing cardiology and electrophysiology in the near future.  She feels much better at this time.  She is on she is on amiodarone and Eliquis.  For her coronary disease she is also on metoprolol succinate 25 mg, half tablet daily.  Patient has hyperlipidemia and she is on simvastatin 10 mg daily.  Patient has significant chronic joint and muscle pain and suffers from fibromyalgia.  She is on duloxetine 20 mg daily as well as gabapentin 300 mg twice daily.  Patient has hypothyroidism.  She is on levothyroxine 150 mcg daily.  Review of Systems  Constitutional: Patient is positive for a steady weight loss.  She is negative for fever, fatigue.  HEENT: Patient denies negative for change in vision, hearing, swallow.  Cardio: Patient is negative for chest pain, lower extremity edema.  Pulmonary: Patient is negative for cough, shortness of breath.  Objective   Ht 1.6 m (5' 3\")   Wt 66.7 kg (147 lb)   BMI 26.04 kg/m²     Physical Exam  General: Awake and alert no apparent distress.  HEENT: Moist oral mucosa no cervical lymphadenopathy.  Cardio: Heart S1-S2, distant.  Pulmonary: Lungs clear to auscultation bilaterally.  Chest: Patient has a very well-healed incision site in the left upper chest from an aborted pacer placement.  She has she has a healing pacer placement in the right upper chest.  " Inferior to this is what appears to be a rather large firm hematoma approximately 4 cm x 8 cm in length.  Assessment/Plan   Problem List Items Addressed This Visit             ICD-10-CM    Chronic kidney disease, stage 4 (severe) (Multi)   chronic.  Patient has follow-up with nephrology. N18.4    Benign essential hypertension   presumed stable. I10     Other Visit Diagnoses         Codes    Well adult exam    -  Primary Z00.00    Relevant Orders    Follow Up In Primary Care - Established    Hematoma and contusion    resolving. T14.8XXA    Sick sinus syndrome (Multi)    improved.  Management by cardiology and electrophysiology.  Patient will follow-up here in 3 months for physical exam. I49.5

## 2024-07-05 ENCOUNTER — PATIENT MESSAGE (OUTPATIENT)
Dept: PRIMARY CARE | Facility: CLINIC | Age: 84
End: 2024-07-05
Payer: MEDICARE

## 2024-07-05 ENCOUNTER — TELEPHONE (OUTPATIENT)
Dept: PRIMARY CARE | Facility: CLINIC | Age: 84
End: 2024-07-05
Payer: MEDICARE

## 2024-07-05 DIAGNOSIS — E03.9 ACQUIRED HYPOTHYROIDISM: ICD-10-CM

## 2024-07-05 RX ORDER — LEVOTHYROXINE SODIUM 150 UG/1
TABLET ORAL
Qty: 90 TABLET | Refills: 1 | Status: SHIPPED | OUTPATIENT
Start: 2024-07-05

## 2024-07-05 NOTE — TELEPHONE ENCOUNTER
----- Message from Tracie Baltazar sent at 7/5/2024  4:30 PM EDT -----  Regarding: Tracie Baltazar  Contact: [phone]  Please refill Levothyroxin 150 MCG. Can you do a 90-day?  Thank you.

## 2024-07-05 NOTE — TELEPHONE ENCOUNTER
From: Tracie Baltazar  To: Javier Floyd  Sent: 7/5/2024 4:30 PM EDT  Subject: Tracie Baltazar    Please refill Levothyroxin 150 MCG. Can you do a 90-day? Thank you.

## 2024-07-09 ENCOUNTER — TELEPHONE (OUTPATIENT)
Dept: PRIMARY CARE | Facility: CLINIC | Age: 84
End: 2024-07-09
Payer: MEDICARE

## 2024-07-09 NOTE — TELEPHONE ENCOUNTER
Home care nurse, Linda, called in. Her number is [phone]. She reports patient experiencing dizziness and BP changes.     Sitting /54   Standing BP 90/44

## 2024-07-12 ENCOUNTER — APPOINTMENT (OUTPATIENT)
Dept: CARDIOLOGY | Facility: CLINIC | Age: 84
End: 2024-07-12
Payer: MEDICARE

## 2024-07-15 ENCOUNTER — OFFICE VISIT (OUTPATIENT)
Dept: CARDIOLOGY | Facility: CLINIC | Age: 84
End: 2024-07-15
Payer: MEDICARE

## 2024-07-15 VITALS
OXYGEN SATURATION: 97 % | DIASTOLIC BLOOD PRESSURE: 58 MMHG | WEIGHT: 149 LBS | BODY MASS INDEX: 26.39 KG/M2 | HEART RATE: 60 BPM | SYSTOLIC BLOOD PRESSURE: 120 MMHG

## 2024-07-15 DIAGNOSIS — I48.0 PAROXYSMAL ATRIAL FIBRILLATION (MULTI): Primary | ICD-10-CM

## 2024-07-15 DIAGNOSIS — R00.1 BRADYCARDIA: ICD-10-CM

## 2024-07-15 DIAGNOSIS — I10 BENIGN ESSENTIAL HYPERTENSION: ICD-10-CM

## 2024-07-15 DIAGNOSIS — I50.33 CONGESTIVE HEART FAILURE WITH LEFT VENTRICULAR DIASTOLIC DYSFUNCTION, ACUTE ON CHRONIC (MULTI): ICD-10-CM

## 2024-07-15 PROCEDURE — 3078F DIAST BP <80 MM HG: CPT | Performed by: INTERNAL MEDICINE

## 2024-07-15 PROCEDURE — 1159F MED LIST DOCD IN RCRD: CPT | Performed by: INTERNAL MEDICINE

## 2024-07-15 PROCEDURE — 99214 OFFICE O/P EST MOD 30 MIN: CPT | Performed by: INTERNAL MEDICINE

## 2024-07-15 PROCEDURE — 99024 POSTOP FOLLOW-UP VISIT: CPT | Performed by: INTERNAL MEDICINE

## 2024-07-15 PROCEDURE — 1125F AMNT PAIN NOTED PAIN PRSNT: CPT | Performed by: INTERNAL MEDICINE

## 2024-07-15 PROCEDURE — 3074F SYST BP LT 130 MM HG: CPT | Performed by: INTERNAL MEDICINE

## 2024-07-15 PROCEDURE — 1157F ADVNC CARE PLAN IN RCRD: CPT | Performed by: INTERNAL MEDICINE

## 2024-07-15 RX ORDER — AMLODIPINE BESYLATE 5 MG/1
5 TABLET ORAL DAILY
COMMUNITY

## 2024-07-15 RX ORDER — FUROSEMIDE 20 MG/1
40 TABLET ORAL DAILY
Qty: 90 TABLET | Refills: 3 | Status: SHIPPED | OUTPATIENT
Start: 2024-07-15

## 2024-07-15 ASSESSMENT — PAIN SCALES - GENERAL: PAINLEVEL: 4

## 2024-07-15 NOTE — PROGRESS NOTES
Subjective      Chief Complaint   Patient presents with    Hospital Follow-up        83 yo female with h/o PAF, most recently dual chamber PPM (SJM) for symptomatic bradycardia presents for followup. She still has episodes of lightheadedness and knees giving out while standing. She has no chest pain or dyspnea.          ROS     Past Medical History:   Diagnosis Date    Asthma (HHS-HCC)     Essential (primary) hypertension     Hypertension    Kidney failure     Leukemia (Multi)     Rheumatoid arthritis (Multi)         Past Surgical History:   Procedure Laterality Date    BACK SURGERY      Back Surgery    CARDIAC ELECTROPHYSIOLOGY PROCEDURE Right 6/2/2024    Procedure: Temporary Pacemaker Insertion;  Surgeon: Javier Steel DO;  Location: [facility] Cardiac Cath Lab;  Service: Cardiovascular;  Laterality: Right;  pacemaker line in place sutured in with 2-0 silk    CARDIAC ELECTROPHYSIOLOGY PROCEDURE N/A 6/3/2024    Procedure: PPM IMPLANT DUAL;  Surgeon: Timmy Chisholm MD;  Location: [facility] Cardiac Cath Lab;  Service: Electrophysiology;  Laterality: N/A;  abbott    CHOLECYSTECTOMY      Cholecystectomy    HYSTERECTOMY      Hysterectomy    KNEE SURGERY      arthroscopic surgery?    OTHER SURGICAL HISTORY      Shoulder Surgery Left    SHOULDER SURGERY Left     Left shoulder impingement surgery    TOTAL KNEE ARTHROPLASTY Left 02/13/2014        Social History     Socioeconomic History    Marital status:      Spouse name: Not on file    Number of children: Not on file    Years of education: Not on file    Highest education level: Not on file   Occupational History    Not on file   Tobacco Use    Smoking status: Never     Passive exposure: Never    Smokeless tobacco: Never   Vaping Use    Vaping status: Never Used   Substance and Sexual Activity    Alcohol use: Not Currently     Comment: rarely    Drug use: Never    Sexual activity: Defer   Other Topics Concern    Not on file   Social History Narrative    Not on file      Social Determinants of Health     Financial Resource Strain: Low Risk  (6/3/2024)    Overall Financial Resource Strain (CARDIA)     Difficulty of Paying Living Expenses: Not very hard   Food Insecurity: No Food Insecurity (6/3/2024)    Hunger Vital Sign     Worried About Running Out of Food in the Last Year: Never true     Ran Out of Food in the Last Year: Never true   Transportation Needs: No Transportation Needs (6/3/2024)    PRAPARE - Transportation     Lack of Transportation (Medical): No     Lack of Transportation (Non-Medical): No   Physical Activity: Inactive (6/3/2024)    Exercise Vital Sign     Days of Exercise per Week: 0 days     Minutes of Exercise per Session: 0 min   Stress: No Stress Concern Present (6/3/2024)    [place] [employer] - Occupational Stress Questionnaire     Feeling of Stress : Not at all   Social Connections: Moderately Isolated (6/3/2024)    Social Connection and Isolation Panel [NHANES]     Frequency of Communication with Friends and Family: More than three times a week     Frequency of Social Gatherings with Friends and Family: More than three times a week     Attends Jew Services: Never     Active Member of Clubs or Organizations: Yes     Attends Club or Organization Meetings: 1 to 4 times per year     Marital Status:    Intimate Partner Violence: Not At Risk (6/3/2024)    Humiliation, Afraid, Rape, and Kick questionnaire     Fear of Current or Ex-Partner: No     Emotionally Abused: No     Physically Abused: No     Sexually Abused: No   Housing Stability: Low Risk  (6/3/2024)    Housing Stability Vital Sign     Unable to Pay for Housing in the Last Year: No     Number of Places Lived in the Last Year: 1     Unstable Housing in the Last Year: No        Family History   Problem Relation Name Age of Onset    Emphysema Mother      Emphysema Father      Heart attack Sister      Lung cancer Brother          Agent orange    Lymphoma Son      Multiple  sclerosis Son          OBJECTIVE:    Vitals:    07/15/24 1541   BP: 120/58   Pulse: 60   SpO2: 97%        Physical Exam     Lab Review:   Lab Results   Component Value Date     06/17/2024    K 3.8 06/17/2024     06/17/2024    CO2 27 06/17/2024    BUN 26 (H) 06/17/2024    CREATININE 2.10 (H) 06/17/2024    GLUCOSE 91 06/17/2024    CALCIUM 8.7 06/17/2024     Lab Results   Component Value Date    CHOL 151 06/03/2024    TRIG 108 06/03/2024    HDL 59.0 06/03/2024       Lab Results   Component Value Date    LDLCALC 70 06/03/2024        Congestive heart failure with left ventricular diastolic dysfunction, acute on chronic (Multi)  Reducing lasix to 40mg every day given her symptoms. Will monitor for wt gain or swelling    Atrial fibrillation (Multi)  Will get her in for device check. Stopping metoprolol    Benign essential hypertension  Controlled.

## 2024-07-16 ENCOUNTER — TELEPHONE (OUTPATIENT)
Dept: CARDIOLOGY | Facility: CLINIC | Age: 84
End: 2024-07-16
Payer: MEDICARE

## 2024-07-16 ENCOUNTER — TELEPHONE (OUTPATIENT)
Dept: PRIMARY CARE | Facility: CLINIC | Age: 84
End: 2024-07-16
Payer: MEDICARE

## 2024-07-16 NOTE — TELEPHONE ENCOUNTER
Samantha from [facility] ([phone]) called to let you know that the patient, Tracie Baltazar 1940 fell on Friday.  She has no pain but is sore and does have bruising behind both knees and right thigh, also a cut on her lip.    Patient's number:  [phone]

## 2024-07-16 NOTE — TELEPHONE ENCOUNTER
[facility] Tracie called the office looking for a verbal order stating  Pt can leave  incision can be left open air   Please advice   Good call back   [phone]

## 2024-07-17 NOTE — TELEPHONE ENCOUNTER
Spoke with Samantha at [facility] and also Tracie herself.  She will make appointment to come in for evaluation of the fall she had.   She will make appointment

## 2024-07-19 ENCOUNTER — OFFICE VISIT (OUTPATIENT)
Dept: PRIMARY CARE | Facility: CLINIC | Age: 84
End: 2024-07-19
Payer: MEDICARE

## 2024-07-19 VITALS
BODY MASS INDEX: 26.75 KG/M2 | WEIGHT: 151 LBS | SYSTOLIC BLOOD PRESSURE: 144 MMHG | DIASTOLIC BLOOD PRESSURE: 72 MMHG | HEIGHT: 63 IN | HEART RATE: 110 BPM | OXYGEN SATURATION: 92 %

## 2024-07-19 DIAGNOSIS — M25.562 ACUTE PAIN OF LEFT KNEE: Primary | ICD-10-CM

## 2024-07-19 DIAGNOSIS — M25.561 ACUTE PAIN OF RIGHT KNEE: ICD-10-CM

## 2024-07-19 PROCEDURE — 1036F TOBACCO NON-USER: CPT | Performed by: FAMILY MEDICINE

## 2024-07-19 PROCEDURE — 1125F AMNT PAIN NOTED PAIN PRSNT: CPT | Performed by: FAMILY MEDICINE

## 2024-07-19 PROCEDURE — 1157F ADVNC CARE PLAN IN RCRD: CPT | Performed by: FAMILY MEDICINE

## 2024-07-19 PROCEDURE — 99214 OFFICE O/P EST MOD 30 MIN: CPT | Performed by: FAMILY MEDICINE

## 2024-07-19 PROCEDURE — 1123F ACP DISCUSS/DSCN MKR DOCD: CPT | Performed by: FAMILY MEDICINE

## 2024-07-19 PROCEDURE — 1159F MED LIST DOCD IN RCRD: CPT | Performed by: FAMILY MEDICINE

## 2024-07-19 PROCEDURE — 1158F ADVNC CARE PLAN TLK DOCD: CPT | Performed by: FAMILY MEDICINE

## 2024-07-19 PROCEDURE — 3077F SYST BP >= 140 MM HG: CPT | Performed by: FAMILY MEDICINE

## 2024-07-19 PROCEDURE — 3078F DIAST BP <80 MM HG: CPT | Performed by: FAMILY MEDICINE

## 2024-07-19 ASSESSMENT — ENCOUNTER SYMPTOMS
OCCASIONAL FEELINGS OF UNSTEADINESS: 1
LOSS OF SENSATION IN FEET: 0
DEPRESSION: 0

## 2024-07-19 ASSESSMENT — PAIN SCALES - GENERAL: PAINLEVEL: 4

## 2024-07-19 NOTE — PROGRESS NOTES
"Subjective   Patient ID: Tracie Baltazar is a 84 y.o. female who presents for Follow-up (After falling 7/12/2024 at home in bathroom   Fell on both knees- has some bruising on the knees and right upper thigh/hip area.  Some pain left upper leg.   Still has some tenderness).    HPI patient fell at home about a week ago.  She was in her bathroom and fell to her knees and very tight spaces in her bathroom.  She bruised both of her knees and legs hurt.  She is status post a left TKA.  She also has a bruise on her right posterior thigh.  She is using Tylenol for the discomfort.  The pain in the left knee is not improving however.  She does not feel any clicking or loose sensation.    Review of Systems  Constitutional: Patient is negative for fever, fatigue, weight change.  HEENT: Patient is negative for change in vision, hearing, swallow.  Cardio: Patient is negative for chest pain, lower extremity edema.  Pulmonary: Patient is negative for cough, shortness of breath.  Musculoskeletal: Patient is positive for bilateral knee pain status post fall.  Objective   /72 (BP Location: Left arm, Patient Position: Sitting, BP Cuff Size: Adult)   Pulse 110   Ht 1.6 m (5' 3\")   Wt 68.5 kg (151 lb)   SpO2 92%   BMI 26.75 kg/m²     Physical Exam  General: Awake and alert no apparent distress.  HEENT: Moist oral mucosa no cervical lymphadenopathy.  Cardiac: Heart S1-S2 no murmur rub or gallop.  Pulmonary: Lungs clear to auscultation bilaterally.  Musculoskeletal: Patient with resolving large 10 cm ecchymosis on each patella.  Patient also has a 8 x 15 cm ecchymosis in the right posterior thigh.  Assessment/Plan   Problem List Items Addressed This Visit             ICD-10-CM    Chronic pain of left knee - Primary   needs better control.  This is acute pain and chronic pain.  Will get an x-ray of the left knee.  This is due to her current pain and the fact that she has a replaced joint.  She can use Tylenol for discomfort.  " She may have some oxycodone left.  She may use a half a tablet at night as needed for sleep and pain relief. M25.562, G89.29     Other Visit Diagnoses         Codes    Acute pain of right knee    needs better control.  Management as above. M25.561    Relevant Orders    XR knee right 1-2 views

## 2024-07-20 ENCOUNTER — HOSPITAL ENCOUNTER (OUTPATIENT)
Dept: RADIOLOGY | Facility: HOSPITAL | Age: 84
Discharge: HOME | End: 2024-07-20
Payer: MEDICARE

## 2024-07-20 DIAGNOSIS — M25.561 ACUTE PAIN OF RIGHT KNEE: ICD-10-CM

## 2024-07-20 DIAGNOSIS — M25.562 ACUTE PAIN OF LEFT KNEE: ICD-10-CM

## 2024-07-20 PROCEDURE — 73564 X-RAY EXAM KNEE 4 OR MORE: CPT | Mod: LT

## 2024-07-20 PROCEDURE — 73564 X-RAY EXAM KNEE 4 OR MORE: CPT | Mod: RT

## 2024-07-20 PROCEDURE — 73564 X-RAY EXAM KNEE 4 OR MORE: CPT | Mod: RIGHT SIDE | Performed by: RADIOLOGY

## 2024-07-20 PROCEDURE — 73564 X-RAY EXAM KNEE 4 OR MORE: CPT | Mod: LEFT SIDE | Performed by: RADIOLOGY

## 2024-07-31 ENCOUNTER — TELEPHONE (OUTPATIENT)
Dept: CARDIOLOGY | Facility: CLINIC | Age: 84
End: 2024-07-31
Payer: MEDICARE

## 2024-07-31 NOTE — TELEPHONE ENCOUNTER
Linda from [facility] called concerned about possible hematoma over area of her pacemaker. Measures approx [id number]. Patient denies any pain or tenderness. Says she feel fine. But nurse concerned and felt she should make you aware. Patient BP also trending higher in the past week. Last /88

## 2024-08-06 ENCOUNTER — TELEPHONE (OUTPATIENT)
Dept: PRIMARY CARE | Facility: CLINIC | Age: 84
End: 2024-08-06
Payer: MEDICARE

## 2024-08-06 ENCOUNTER — TELEPHONE (OUTPATIENT)
Dept: CARDIOLOGY | Facility: CLINIC | Age: 84
End: 2024-08-06
Payer: MEDICARE

## 2024-08-06 NOTE — TELEPHONE ENCOUNTER
Linda from [facility] called regarding Patient's elevated BP. She stated she has reached out to Patient's Cardiologist Dr. Wright and hasn't received a callback. She stated Patient's BP became elevated when Patient's Lasix was decreased from 60 MG to 40 MG. The systolic number ranges from 140 to 160 and the diastolic changes as well.    Linda is also trying to reach out to Dr Chisholm that replaced Patient's pacemaker, there's a lump. Patient was scheduled to stop home care next week. Due to the remaining health issues Linda is requesting a Verbal order to extend Home Care.    Linda can be reached at [phone]

## 2024-08-06 NOTE — TELEPHONE ENCOUNTER
Nurse Linda from [facility] called the office today stating that Tracie's metoprolol was discontinued.  Her BP has been creeping up 140-168.  I see that this rx was cancelled by you, she is asking if it can be reordered for the elevated BP?  Please advise, thanks

## 2024-08-08 ENCOUNTER — HOSPITAL ENCOUNTER (OUTPATIENT)
Dept: CARDIOLOGY | Facility: CLINIC | Age: 84
Discharge: HOME | End: 2024-08-08
Payer: MEDICARE

## 2024-08-08 DIAGNOSIS — I49.5 SINUS NODE DYSFUNCTION (MULTI): ICD-10-CM

## 2024-08-08 DIAGNOSIS — Z95.0 PACEMAKER: ICD-10-CM

## 2024-08-08 PROCEDURE — 93288 INTERROG EVL PM/LDLS PM IP: CPT

## 2024-08-08 PROCEDURE — 93288 INTERROG EVL PM/LDLS PM IP: CPT | Performed by: INTERNAL MEDICINE

## 2024-08-12 ENCOUNTER — OFFICE VISIT (OUTPATIENT)
Dept: CARDIOLOGY | Facility: CLINIC | Age: 84
End: 2024-08-12
Payer: MEDICARE

## 2024-08-12 VITALS
DIASTOLIC BLOOD PRESSURE: 70 MMHG | SYSTOLIC BLOOD PRESSURE: 134 MMHG | BODY MASS INDEX: 26.93 KG/M2 | WEIGHT: 152 LBS | OXYGEN SATURATION: 98 % | HEART RATE: 60 BPM

## 2024-08-12 DIAGNOSIS — I10 BENIGN ESSENTIAL HYPERTENSION: ICD-10-CM

## 2024-08-12 DIAGNOSIS — I48.0 PAROXYSMAL ATRIAL FIBRILLATION (MULTI): Primary | ICD-10-CM

## 2024-08-12 DIAGNOSIS — I50.33 CONGESTIVE HEART FAILURE WITH LEFT VENTRICULAR DIASTOLIC DYSFUNCTION, ACUTE ON CHRONIC (MULTI): ICD-10-CM

## 2024-08-12 PROCEDURE — 1123F ACP DISCUSS/DSCN MKR DOCD: CPT | Performed by: INTERNAL MEDICINE

## 2024-08-12 PROCEDURE — 1157F ADVNC CARE PLAN IN RCRD: CPT | Performed by: INTERNAL MEDICINE

## 2024-08-12 PROCEDURE — 3075F SYST BP GE 130 - 139MM HG: CPT | Performed by: INTERNAL MEDICINE

## 2024-08-12 PROCEDURE — 99024 POSTOP FOLLOW-UP VISIT: CPT | Performed by: INTERNAL MEDICINE

## 2024-08-12 PROCEDURE — 1159F MED LIST DOCD IN RCRD: CPT | Performed by: INTERNAL MEDICINE

## 2024-08-12 PROCEDURE — 3078F DIAST BP <80 MM HG: CPT | Performed by: INTERNAL MEDICINE

## 2024-08-12 PROCEDURE — 1126F AMNT PAIN NOTED NONE PRSNT: CPT | Performed by: INTERNAL MEDICINE

## 2024-08-12 PROCEDURE — 99214 OFFICE O/P EST MOD 30 MIN: CPT | Performed by: INTERNAL MEDICINE

## 2024-08-12 PROCEDURE — 1036F TOBACCO NON-USER: CPT | Performed by: INTERNAL MEDICINE

## 2024-08-12 ASSESSMENT — ENCOUNTER SYMPTOMS
NEAR-SYNCOPE: 0
ORTHOPNEA: 0
LOSS OF SENSATION IN FEET: 0
DYSPNEA ON EXERTION: 0
OCCASIONAL FEELINGS OF UNSTEADINESS: 1
WHEEZING: 0
SYNCOPE: 0
COUGH: 0
DIZZINESS: 0
IRREGULAR HEARTBEAT: 0
CLAUDICATION: 0
SHORTNESS OF BREATH: 0
DEPRESSION: 0
WEAKNESS: 0
FEVER: 0
MYALGIAS: 0
PALPITATIONS: 0
WEIGHT GAIN: 0
DIAPHORESIS: 0
WEIGHT LOSS: 0
PND: 0

## 2024-08-12 ASSESSMENT — PATIENT HEALTH QUESTIONNAIRE - PHQ9
1. LITTLE INTEREST OR PLEASURE IN DOING THINGS: NOT AT ALL
2. FEELING DOWN, DEPRESSED OR HOPELESS: NOT AT ALL
SUM OF ALL RESPONSES TO PHQ9 QUESTIONS 1 AND 2: 0

## 2024-08-12 ASSESSMENT — COLUMBIA-SUICIDE SEVERITY RATING SCALE - C-SSRS: 1. IN THE PAST MONTH, HAVE YOU WISHED YOU WERE DEAD OR WISHED YOU COULD GO TO SLEEP AND NOT WAKE UP?: NO

## 2024-08-12 ASSESSMENT — PAIN SCALES - GENERAL: PAINLEVEL: 0-NO PAIN

## 2024-08-12 NOTE — TELEPHONE ENCOUNTER
Linda from [facility] calling again please see previous note to dr guerra , she hasn't responded and patients bp has been elevated .[phone]

## 2024-08-12 NOTE — PROGRESS NOTES
Subjective      Chief Complaint   Patient presents with    Follow-up        83 yo female with h/o PAF, most recently dual chamber PPM (SJM) for symptomatic bradycardia presents for followup. When I saw her last she was still dizzy. I stopped her metpoprolol. Her Eliquis was on hold due to a pocket hematoma this was never restarted. Her blood pressures have been running a little high, 150s systolic occasionally, 130s to 140s usually. She is having issues with her PPM pocket, is lateral and PM is catching her arm, also makes it difficult to wear a bra         Review of Systems   Constitutional: Negative for diaphoresis, fever, weight gain and weight loss.   Eyes:  Negative for visual disturbance.   Cardiovascular:  Negative for chest pain, claudication, dyspnea on exertion, irregular heartbeat, leg swelling, near-syncope, orthopnea, palpitations, paroxysmal nocturnal dyspnea and syncope.   Respiratory:  Negative for cough, shortness of breath and wheezing.    Musculoskeletal:  Negative for muscle weakness and myalgias.   Neurological:  Negative for dizziness and weakness.   All other systems reviewed and are negative.       Past Medical History:   Diagnosis Date    Asthma (HHS-HCC)     Essential (primary) hypertension     Hypertension    Kidney failure     Leukemia (Multi)     Rheumatoid arthritis (Multi)         Past Surgical History:   Procedure Laterality Date    BACK SURGERY      Back Surgery    CARDIAC ELECTROPHYSIOLOGY PROCEDURE Right 6/2/2024    Procedure: Temporary Pacemaker Insertion;  Surgeon: Javier Steel DO;  Location: [facility] Cardiac Cath Lab;  Service: Cardiovascular;  Laterality: Right;  pacemaker line in place sutured in with 2-0 silk    CARDIAC ELECTROPHYSIOLOGY PROCEDURE N/A 6/3/2024    Procedure: PPM IMPLANT DUAL;  Surgeon: Timmy Chisholm MD;  Location: [facility] Cardiac Cath Lab;  Service: Electrophysiology;  Laterality: N/A;  abbott    CHOLECYSTECTOMY      Cholecystectomy    HYSTERECTOMY       Hysterectomy    KNEE SURGERY      arthroscopic surgery?    OTHER SURGICAL HISTORY      Shoulder Surgery Left    SHOULDER SURGERY Left     Left shoulder impingement surgery    TOTAL KNEE ARTHROPLASTY Left 02/13/2014        Social History     Socioeconomic History    Marital status:      Spouse name: Not on file    Number of children: Not on file    Years of education: Not on file    Highest education level: Not on file   Occupational History    Not on file   Tobacco Use    Smoking status: Never     Passive exposure: Never    Smokeless tobacco: Never   Vaping Use    Vaping status: Never Used   Substance and Sexual Activity    Alcohol use: Not Currently     Comment: rarely    Drug use: Never    Sexual activity: Defer   Other Topics Concern    Not on file   Social History Narrative    Not on file     Social Determinants of Health     Financial Resource Strain: Low Risk  (6/3/2024)    Overall Financial Resource Strain (CARDIA)     Difficulty of Paying Living Expenses: Not very hard   Food Insecurity: No Food Insecurity (6/3/2024)    Hunger Vital Sign     Worried About Running Out of Food in the Last Year: Never true     Ran Out of Food in the Last Year: Never true   Transportation Needs: No Transportation Needs (6/3/2024)    PRAPARE - Transportation     Lack of Transportation (Medical): No     Lack of Transportation (Non-Medical): No   Physical Activity: Inactive (6/3/2024)    Exercise Vital Sign     Days of Exercise per Week: 0 days     Minutes of Exercise per Session: 0 min   Stress: No Stress Concern Present (6/3/2024)    [place] [employer]ational [employer] - Occupational Stress Questionnaire     Feeling of Stress : Not at all   Social Connections: Moderately Isolated (6/3/2024)    Social Connection and Isolation Panel [NHANES]     Frequency of Communication with Friends and Family: More than three times a week     Frequency of Social Gatherings with Friends and Family: More than three times a week      Attends Ep[employer]opalian Services: Never     Active Member of Clubs or Organizations: Yes     Attends Club or Organization Meetings: 1 to 4 times per year     Marital Status:    Intimate Partner Violence: Not At Risk (6/3/2024)    Humiliation, Afraid, Rape, and Kick questionnaire     Fear of Current or Ex-Partner: No     Emotionally Abused: No     Physically Abused: No     Sexually Abused: No   Housing Stability: Low Risk  (6/3/2024)    Housing Stability Vital Sign     Unable to Pay for Housing in the Last Year: No     Number of Places Lived in the Last Year: 1     Unstable Housing in the Last Year: No        Family History   Problem Relation Name Age of Onset    Emphysema Mother      Emphysema Father      Heart attack Sister      Lung cancer Brother          Agent orange    Lymphoma Son      Multiple sclerosis Son          OBJECTIVE:    Vitals:    08/12/24 1536   BP: 134/70   Pulse: 60   SpO2: 98%        Vitals reviewed.   Constitutional:       Appearance: Normal and healthy appearance. Not in distress.   Pulmonary:      Effort: Pulmonary effort is normal.      Breath sounds: Normal breath sounds.   Cardiovascular:      Normal rate. Regular rhythm. Normal S1. Normal S2.       Murmurs: There is no murmur.      No gallop.  No click.   Pulses:     Intact distal pulses.   Edema:     Peripheral edema absent.   Skin:     General: Skin is warm and dry.   Neurological:      General: No focal deficit present.          Lab Review:   Lab Results   Component Value Date     06/17/2024    K 3.8 06/17/2024     06/17/2024    CO2 27 06/17/2024    BUN 26 (H) 06/17/2024    CREATININE 2.10 (H) 06/17/2024    GLUCOSE 91 06/17/2024    CALCIUM 8.7 06/17/2024     Lab Results   Component Value Date    CHOL 151 06/03/2024    TRIG 108 06/03/2024    HDL 59.0 06/03/2024       Lab Results   Component Value Date    LDLCALC 70 06/03/2024        Atrial fibrillation (Multi)  Will resume Eliquis at 2.5mg BID. Has TFT/LFT orders from July,  reminded pt and her son    Benign essential hypertension  Bps have been reviewed, am ok with the control as it is. Continue current medical therapy    Congestive heart failure with left ventricular diastolic dysfunction, acute on chronic (Multi)  Euvolemic. Continue lasix at current dose

## 2024-08-13 ENCOUNTER — LAB (OUTPATIENT)
Dept: LAB | Facility: LAB | Age: 84
End: 2024-08-13
Payer: MEDICARE

## 2024-08-13 DIAGNOSIS — I10 BENIGN ESSENTIAL HYPERTENSION: ICD-10-CM

## 2024-08-13 DIAGNOSIS — E03.9 ACQUIRED HYPOTHYROIDISM: ICD-10-CM

## 2024-08-13 DIAGNOSIS — E78.00 HYPERCHOLESTEROLEMIA: ICD-10-CM

## 2024-08-13 DIAGNOSIS — Z79.899 MEDICATION MANAGEMENT: ICD-10-CM

## 2024-08-13 LAB
ALBUMIN SERPL-MCNC: 4.2 G/DL (ref 3.5–5)
ALP BLD-CCNC: 108 U/L (ref 35–125)
ALT SERPL-CCNC: 6 U/L (ref 5–40)
ANION GAP SERPL CALC-SCNC: 14 MMOL/L
APPEARANCE UR: CLEAR
AST SERPL-CCNC: 14 U/L (ref 5–40)
BASOPHILS # BLD MANUAL: 0.28 X10*3/UL (ref 0–0.1)
BASOPHILS NFR BLD MANUAL: 1 %
BILIRUB SERPL-MCNC: 0.2 MG/DL (ref 0.1–1.2)
BILIRUB UR STRIP.AUTO-MCNC: NEGATIVE MG/DL
BUN SERPL-MCNC: 48 MG/DL (ref 8–25)
CALCIUM SERPL-MCNC: 9.2 MG/DL (ref 8.5–10.4)
CHLORIDE SERPL-SCNC: 104 MMOL/L (ref 97–107)
CHOLEST SERPL-MCNC: 203 MG/DL (ref 133–200)
CHOLEST/HDLC SERPL: 2.5 {RATIO}
CO2 SERPL-SCNC: 23 MMOL/L (ref 24–31)
COLOR UR: COLORLESS
CREAT SERPL-MCNC: 2.9 MG/DL (ref 0.4–1.6)
EGFRCR SERPLBLD CKD-EPI 2021: 16 ML/MIN/1.73M*2
EOSINOPHIL # BLD MANUAL: 0 X10*3/UL (ref 0–0.4)
EOSINOPHIL NFR BLD MANUAL: 0 %
ERYTHROCYTE [DISTWIDTH] IN BLOOD BY AUTOMATED COUNT: 14.1 % (ref 11.5–14.5)
GLUCOSE SERPL-MCNC: 89 MG/DL (ref 65–99)
GLUCOSE UR STRIP.AUTO-MCNC: NORMAL MG/DL
HCT VFR BLD AUTO: 34.2 % (ref 36–46)
HDLC SERPL-MCNC: 80 MG/DL
HGB BLD-MCNC: 10.5 G/DL (ref 12–16)
IMM GRANULOCYTES # BLD AUTO: 0.07 X10*3/UL (ref 0–0.5)
IMM GRANULOCYTES NFR BLD AUTO: 0.3 % (ref 0–0.9)
KETONES UR STRIP.AUTO-MCNC: NEGATIVE MG/DL
LDLC SERPL CALC-MCNC: 102 MG/DL (ref 65–130)
LEUKOCYTE ESTERASE UR QL STRIP.AUTO: NEGATIVE
LYMPHOCYTES # BLD MANUAL: 21.73 X10*3/UL (ref 0.8–3)
LYMPHOCYTES NFR BLD MANUAL: 79 %
MCH RBC QN AUTO: 31.7 PG (ref 26–34)
MCHC RBC AUTO-ENTMCNC: 30.7 G/DL (ref 32–36)
MCV RBC AUTO: 103 FL (ref 80–100)
MONOCYTES # BLD MANUAL: 0.28 X10*3/UL (ref 0.05–0.8)
MONOCYTES NFR BLD MANUAL: 1 %
NEUTROPHILS # BLD MANUAL: 5.23 X10*3/UL (ref 1.6–5.5)
NEUTS BAND # BLD MANUAL: 0.28 X10*3/UL (ref 0–0.5)
NEUTS BAND NFR BLD MANUAL: 1 %
NEUTS SEG # BLD MANUAL: 4.95 X10*3/UL (ref 1.6–5)
NEUTS SEG NFR BLD MANUAL: 18 %
NITRITE UR QL STRIP.AUTO: NEGATIVE
NRBC BLD-RTO: 0 /100 WBCS (ref 0–0)
PH UR STRIP.AUTO: 5 [PH]
PLATELET # BLD AUTO: 238 X10*3/UL (ref 150–450)
POTASSIUM SERPL-SCNC: 4.3 MMOL/L (ref 3.4–5.1)
PROT SERPL-MCNC: 6.7 G/DL (ref 5.9–7.9)
PROT UR STRIP.AUTO-MCNC: NEGATIVE MG/DL
RBC # BLD AUTO: 3.31 X10*6/UL (ref 4–5.2)
RBC # UR STRIP.AUTO: NEGATIVE /UL
RBC MORPH BLD: ABNORMAL
SODIUM SERPL-SCNC: 141 MMOL/L (ref 133–145)
SP GR UR STRIP.AUTO: 1.01
TOTAL CELLS COUNTED BLD: 100
TRIGL SERPL-MCNC: 107 MG/DL (ref 40–150)
TSH SERPL DL<=0.05 MIU/L-ACNC: 1.42 MIU/L (ref 0.27–4.2)
UROBILINOGEN UR STRIP.AUTO-MCNC: NORMAL MG/DL
WBC # BLD AUTO: 27.5 X10*3/UL (ref 4.4–11.3)

## 2024-08-13 PROCEDURE — 80061 LIPID PANEL: CPT

## 2024-08-13 PROCEDURE — 36415 COLL VENOUS BLD VENIPUNCTURE: CPT

## 2024-08-13 PROCEDURE — 85007 BL SMEAR W/DIFF WBC COUNT: CPT

## 2024-08-13 PROCEDURE — 84443 ASSAY THYROID STIM HORMONE: CPT

## 2024-08-13 PROCEDURE — 80053 COMPREHEN METABOLIC PANEL: CPT

## 2024-08-13 PROCEDURE — 81003 URINALYSIS AUTO W/O SCOPE: CPT

## 2024-08-13 PROCEDURE — 85027 COMPLETE CBC AUTOMATED: CPT

## 2024-08-14 DIAGNOSIS — I48.0 PAROXYSMAL ATRIAL FIBRILLATION (MULTI): ICD-10-CM

## 2024-08-15 RX ORDER — AMIODARONE HYDROCHLORIDE 200 MG/1
200 TABLET ORAL DAILY
Qty: 90 TABLET | Refills: 0 | Status: SHIPPED | OUTPATIENT
Start: 2024-08-15

## 2024-08-16 ENCOUNTER — TELEPHONE (OUTPATIENT)
Dept: PRIMARY CARE | Facility: CLINIC | Age: 84
End: 2024-08-16
Payer: MEDICARE

## 2024-08-19 ENCOUNTER — DOCUMENTATION (OUTPATIENT)
Dept: PRIMARY CARE | Facility: CLINIC | Age: 84
End: 2024-08-19
Payer: MEDICARE

## 2024-08-19 RX ORDER — METOPROLOL SUCCINATE 25 MG/1
12.5 TABLET, EXTENDED RELEASE ORAL DAILY
COMMUNITY

## 2024-08-19 NOTE — PROGRESS NOTES
Updated medication list to reflect patient taking metoprolol er 25 mg 0.5 tab daily from Dr. Wright.

## 2024-08-22 ENCOUNTER — TELEPHONE (OUTPATIENT)
Dept: CARDIOLOGY | Facility: CLINIC | Age: 84
End: 2024-08-22
Payer: MEDICARE

## 2024-09-03 ENCOUNTER — PATIENT MESSAGE (OUTPATIENT)
Dept: PRIMARY CARE | Facility: CLINIC | Age: 84
End: 2024-09-03
Payer: MEDICARE

## 2024-09-03 DIAGNOSIS — M06.09 RHEUMATOID ARTHRITIS OF MULTIPLE SITES WITH NEGATIVE RHEUMATOID FACTOR (MULTI): ICD-10-CM

## 2024-09-03 DIAGNOSIS — I50.33 CONGESTIVE HEART FAILURE WITH LEFT VENTRICULAR DIASTOLIC DYSFUNCTION, ACUTE ON CHRONIC (MULTI): ICD-10-CM

## 2024-09-04 ENCOUNTER — HOSPITAL ENCOUNTER (OUTPATIENT)
Dept: CARDIOLOGY | Facility: CLINIC | Age: 84
Discharge: HOME | End: 2024-09-04
Payer: MEDICARE

## 2024-09-04 ENCOUNTER — TELEPHONE (OUTPATIENT)
Dept: CARDIOLOGY | Facility: CLINIC | Age: 84
End: 2024-09-04
Payer: MEDICARE

## 2024-09-04 DIAGNOSIS — Z95.0 PACEMAKER: ICD-10-CM

## 2024-09-04 DIAGNOSIS — I49.5 SINUS NODE DYSFUNCTION (MULTI): ICD-10-CM

## 2024-09-04 RX ORDER — DULOXETIN HYDROCHLORIDE 20 MG/1
20 CAPSULE, DELAYED RELEASE ORAL DAILY
Qty: 90 CAPSULE | Refills: 1 | Status: SHIPPED | OUTPATIENT
Start: 2024-09-04 | End: 2025-03-03

## 2024-09-04 RX ORDER — DULOXETIN HYDROCHLORIDE 20 MG/1
CAPSULE, DELAYED RELEASE ORAL
Qty: 90 CAPSULE | Refills: 0 | OUTPATIENT
Start: 2024-09-04

## 2024-09-04 RX ORDER — AMLODIPINE BESYLATE 5 MG/1
5 TABLET ORAL DAILY
Qty: 90 TABLET | Refills: 0 | OUTPATIENT
Start: 2024-09-04

## 2024-09-04 RX ORDER — AMLODIPINE BESYLATE 5 MG/1
5 TABLET ORAL DAILY
Qty: 90 TABLET | Refills: 1 | Status: SHIPPED | OUTPATIENT
Start: 2024-09-04

## 2024-09-09 ENCOUNTER — LAB (OUTPATIENT)
Dept: LAB | Facility: LAB | Age: 84
End: 2024-09-09
Payer: MEDICARE

## 2024-09-09 DIAGNOSIS — N18.4 CHRONIC KIDNEY DISEASE, STAGE 4 (SEVERE) (MULTI): Primary | ICD-10-CM

## 2024-09-09 LAB
ALBUMIN SERPL-MCNC: 4 G/DL (ref 3.5–5)
ANION GAP SERPL CALC-SCNC: 13 MMOL/L
BUN SERPL-MCNC: 40 MG/DL (ref 8–25)
CALCIUM SERPL-MCNC: 9 MG/DL (ref 8.5–10.4)
CHLORIDE SERPL-SCNC: 103 MMOL/L (ref 97–107)
CO2 SERPL-SCNC: 26 MMOL/L (ref 24–31)
CREAT SERPL-MCNC: 2.8 MG/DL (ref 0.4–1.6)
EGFRCR SERPLBLD CKD-EPI 2021: 16 ML/MIN/1.73M*2
ERYTHROCYTE [DISTWIDTH] IN BLOOD BY AUTOMATED COUNT: 13.2 % (ref 11.5–14.5)
GLUCOSE SERPL-MCNC: 92 MG/DL (ref 65–99)
HCT VFR BLD AUTO: 33.6 % (ref 36–46)
HGB BLD-MCNC: 10.3 G/DL (ref 12–16)
MCH RBC QN AUTO: 32 PG (ref 26–34)
MCHC RBC AUTO-ENTMCNC: 30.7 G/DL (ref 32–36)
MCV RBC AUTO: 104 FL (ref 80–100)
NRBC BLD-RTO: 0 /100 WBCS (ref 0–0)
PHOSPHATE SERPL-MCNC: 3.8 MG/DL (ref 2.5–4.5)
PLATELET # BLD AUTO: 256 X10*3/UL (ref 150–450)
POTASSIUM SERPL-SCNC: 4.1 MMOL/L (ref 3.4–5.1)
PTH-INTACT SERPL-MCNC: 142.9 PG/ML (ref 18.5–88)
RBC # BLD AUTO: 3.22 X10*6/UL (ref 4–5.2)
SODIUM SERPL-SCNC: 142 MMOL/L (ref 133–145)
WBC # BLD AUTO: 26.1 X10*3/UL (ref 4.4–11.3)

## 2024-09-09 PROCEDURE — 80069 RENAL FUNCTION PANEL: CPT

## 2024-09-09 PROCEDURE — 85027 COMPLETE CBC AUTOMATED: CPT

## 2024-09-09 PROCEDURE — 83970 ASSAY OF PARATHORMONE: CPT

## 2024-09-09 PROCEDURE — 36415 COLL VENOUS BLD VENIPUNCTURE: CPT

## 2024-09-17 ENCOUNTER — HOSPITAL ENCOUNTER (OUTPATIENT)
Dept: CARDIOLOGY | Facility: CLINIC | Age: 84
Discharge: HOME | End: 2024-09-17
Payer: MEDICARE

## 2024-09-17 DIAGNOSIS — Z95.0 PACEMAKER: Primary | ICD-10-CM

## 2024-09-17 DIAGNOSIS — I48.0 PAROXYSMAL ATRIAL FIBRILLATION (MULTI): Primary | ICD-10-CM

## 2024-09-17 DIAGNOSIS — I49.5 SICK SINUS SYNDROME (MULTI): ICD-10-CM

## 2024-09-17 DIAGNOSIS — Z95.0 PACEMAKER: ICD-10-CM

## 2024-09-17 DIAGNOSIS — I48.0 PAROXYSMAL ATRIAL FIBRILLATION (MULTI): ICD-10-CM

## 2024-09-17 PROCEDURE — 93280 PM DEVICE PROGR EVAL DUAL: CPT | Performed by: INTERNAL MEDICINE

## 2024-09-17 PROCEDURE — 93280 PM DEVICE PROGR EVAL DUAL: CPT

## 2024-09-18 ENCOUNTER — TELEPHONE (OUTPATIENT)
Dept: PRIMARY CARE | Facility: CLINIC | Age: 84
End: 2024-09-18
Payer: MEDICARE

## 2024-09-18 NOTE — TELEPHONE ENCOUNTER
Patients daughter Linda calling [phone]. Patient has C Diff again and she is out of the ABX. Can something be called in to [facility] in [place]. Aware that POPPY is out of the office today.

## 2024-09-19 NOTE — TELEPHONE ENCOUNTER
Hide the patient find out she has a recurrent of C. difficile.  Did she have a stool culture or stool test?  Please find out from the patient.

## 2024-09-20 ENCOUNTER — LAB (OUTPATIENT)
Dept: LAB | Facility: LAB | Age: 84
End: 2024-09-20
Payer: MEDICARE

## 2024-09-20 DIAGNOSIS — A04.71 ENTEROCOLITIS DUE TO CLOSTRIDIUM DIFFICILE, RECURRENT: Primary | ICD-10-CM

## 2024-09-20 LAB — C DIF TOX TCDA+TCDB STL QL NAA+PROBE: DETECTED

## 2024-09-20 PROCEDURE — 87493 C DIFF AMPLIFIED PROBE: CPT

## 2024-09-20 PROCEDURE — 87506 IADNA-DNA/RNA PROBE TQ 6-11: CPT

## 2024-09-22 LAB

## 2024-09-25 DIAGNOSIS — M79.7 FIBROMYALGIA: ICD-10-CM

## 2024-09-25 RX ORDER — GABAPENTIN 300 MG/1
300 CAPSULE ORAL 2 TIMES DAILY
Qty: 60 CAPSULE | Refills: 0 | Status: SHIPPED | OUTPATIENT
Start: 2024-09-25

## 2024-10-02 ENCOUNTER — APPOINTMENT (OUTPATIENT)
Dept: PRIMARY CARE | Facility: CLINIC | Age: 84
End: 2024-10-02
Payer: MEDICARE

## 2024-10-04 ENCOUNTER — APPOINTMENT (OUTPATIENT)
Dept: CARDIOLOGY | Facility: HOSPITAL | Age: 84
DRG: 565 | End: 2024-10-04
Payer: MEDICARE

## 2024-10-04 ENCOUNTER — APPOINTMENT (OUTPATIENT)
Dept: RADIOLOGY | Facility: HOSPITAL | Age: 84
DRG: 565 | End: 2024-10-04
Payer: MEDICARE

## 2024-10-04 ENCOUNTER — HOSPITAL ENCOUNTER (INPATIENT)
Facility: HOSPITAL | Age: 84
LOS: 1 days | Discharge: HOME | End: 2024-10-06
Attending: EMERGENCY MEDICINE | Admitting: INTERNAL MEDICINE
Payer: MEDICARE

## 2024-10-04 DIAGNOSIS — N18.9 CHRONIC KIDNEY DISEASE, UNSPECIFIED CKD STAGE: ICD-10-CM

## 2024-10-04 DIAGNOSIS — B02.8 HERPES ZOSTER WITH COMPLICATION: ICD-10-CM

## 2024-10-04 DIAGNOSIS — T79.6XXA TRAUMATIC RHABDOMYOLYSIS, INITIAL ENCOUNTER (CMS-HCC): Primary | ICD-10-CM

## 2024-10-04 LAB
ALBUMIN SERPL BCP-MCNC: 4 G/DL (ref 3.4–5)
ALP SERPL-CCNC: 97 U/L (ref 33–136)
ALT SERPL W P-5'-P-CCNC: 19 U/L (ref 7–45)
ANION GAP SERPL CALCULATED.3IONS-SCNC: 18 MMOL/L (ref 10–20)
AST SERPL W P-5'-P-CCNC: 46 U/L (ref 9–39)
BASOPHILS # BLD MANUAL: 0 X10*3/UL (ref 0–0.1)
BASOPHILS NFR BLD MANUAL: 0 %
BILIRUB SERPL-MCNC: 0.3 MG/DL (ref 0–1.2)
BUN SERPL-MCNC: 39 MG/DL (ref 6–23)
CALCIUM SERPL-MCNC: 9.3 MG/DL (ref 8.6–10.3)
CARDIAC TROPONIN I PNL SERPL HS: 11 NG/L (ref 0–13)
CARDIAC TROPONIN I PNL SERPL HS: 9 NG/L (ref 0–13)
CHLORIDE SERPL-SCNC: 105 MMOL/L (ref 98–107)
CK SERPL-CCNC: 2479 U/L (ref 0–215)
CO2 SERPL-SCNC: 18 MMOL/L (ref 21–32)
CREAT SERPL-MCNC: 2.56 MG/DL (ref 0.5–1.05)
EGFRCR SERPLBLD CKD-EPI 2021: 18 ML/MIN/1.73M*2
EOSINOPHIL # BLD MANUAL: 0 X10*3/UL (ref 0–0.4)
EOSINOPHIL NFR BLD MANUAL: 0 %
ERYTHROCYTE [DISTWIDTH] IN BLOOD BY AUTOMATED COUNT: 13.2 % (ref 11.5–14.5)
GLUCOSE SERPL-MCNC: 103 MG/DL (ref 74–99)
HCT VFR BLD AUTO: 37.6 % (ref 36–46)
HGB BLD-MCNC: 11.7 G/DL (ref 12–16)
IMM GRANULOCYTES # BLD AUTO: 0.14 X10*3/UL (ref 0–0.5)
IMM GRANULOCYTES NFR BLD AUTO: 0.4 % (ref 0–0.9)
LYMPHOCYTES # BLD MANUAL: 20.08 X10*3/UL (ref 0.8–3)
LYMPHOCYTES NFR BLD MANUAL: 55 %
MAGNESIUM SERPL-MCNC: 2.5 MG/DL (ref 1.6–2.4)
MCH RBC QN AUTO: 31.4 PG (ref 26–34)
MCHC RBC AUTO-ENTMCNC: 31.1 G/DL (ref 32–36)
MCV RBC AUTO: 101 FL (ref 80–100)
MONOCYTES # BLD MANUAL: 0.73 X10*3/UL (ref 0.05–0.8)
MONOCYTES NFR BLD MANUAL: 2 %
NEUTS SEG # BLD MANUAL: 15.33 X10*3/UL (ref 1.6–5)
NEUTS SEG NFR BLD MANUAL: 42 %
NRBC BLD-RTO: 0 /100 WBCS (ref 0–0)
PLATELET # BLD AUTO: 235 X10*3/UL (ref 150–450)
POTASSIUM SERPL-SCNC: 4.1 MMOL/L (ref 3.5–5.3)
PROT SERPL-MCNC: 7.3 G/DL (ref 6.4–8.2)
RBC # BLD AUTO: 3.73 X10*6/UL (ref 4–5.2)
RBC MORPH BLD: ABNORMAL
SODIUM SERPL-SCNC: 137 MMOL/L (ref 136–145)
T4 FREE SERPL-MCNC: 1.59 NG/DL (ref 0.61–1.12)
TOTAL CELLS COUNTED BLD: 100
TSH SERPL-ACNC: 0.29 MIU/L (ref 0.44–3.98)
VARIANT LYMPHS # BLD MANUAL: 0.37 X10*3/UL (ref 0–0.3)
VARIANT LYMPHS NFR BLD: 1 %
WBC # BLD AUTO: 36.5 X10*3/UL (ref 4.4–11.3)

## 2024-10-04 PROCEDURE — 73502 X-RAY EXAM HIP UNI 2-3 VIEWS: CPT | Mod: RIGHT SIDE | Performed by: RADIOLOGY

## 2024-10-04 PROCEDURE — 80053 COMPREHEN METABOLIC PANEL: CPT | Performed by: EMERGENCY MEDICINE

## 2024-10-04 PROCEDURE — 84443 ASSAY THYROID STIM HORMONE: CPT | Performed by: EMERGENCY MEDICINE

## 2024-10-04 PROCEDURE — 84439 ASSAY OF FREE THYROXINE: CPT | Performed by: EMERGENCY MEDICINE

## 2024-10-04 PROCEDURE — 2500000002 HC RX 250 W HCPCS SELF ADMINISTERED DRUGS (ALT 637 FOR MEDICARE OP, ALT 636 FOR OP/ED): Performed by: EMERGENCY MEDICINE

## 2024-10-04 PROCEDURE — 96360 HYDRATION IV INFUSION INIT: CPT

## 2024-10-04 PROCEDURE — 84484 ASSAY OF TROPONIN QUANT: CPT | Performed by: EMERGENCY MEDICINE

## 2024-10-04 PROCEDURE — 85027 COMPLETE CBC AUTOMATED: CPT | Performed by: EMERGENCY MEDICINE

## 2024-10-04 PROCEDURE — 93010 ELECTROCARDIOGRAM REPORT: CPT | Performed by: INTERNAL MEDICINE

## 2024-10-04 PROCEDURE — 73502 X-RAY EXAM HIP UNI 2-3 VIEWS: CPT | Mod: RT

## 2024-10-04 PROCEDURE — 2500000001 HC RX 250 WO HCPCS SELF ADMINISTERED DRUGS (ALT 637 FOR MEDICARE OP): Performed by: EMERGENCY MEDICINE

## 2024-10-04 PROCEDURE — 71045 X-RAY EXAM CHEST 1 VIEW: CPT

## 2024-10-04 PROCEDURE — 99291 CRITICAL CARE FIRST HOUR: CPT | Mod: 25 | Performed by: EMERGENCY MEDICINE

## 2024-10-04 PROCEDURE — 73030 X-RAY EXAM OF SHOULDER: CPT | Mod: RIGHT SIDE | Performed by: RADIOLOGY

## 2024-10-04 PROCEDURE — 71045 X-RAY EXAM CHEST 1 VIEW: CPT | Performed by: RADIOLOGY

## 2024-10-04 PROCEDURE — 2500000005 HC RX 250 GENERAL PHARMACY W/O HCPCS: Performed by: EMERGENCY MEDICINE

## 2024-10-04 PROCEDURE — 36415 COLL VENOUS BLD VENIPUNCTURE: CPT | Performed by: EMERGENCY MEDICINE

## 2024-10-04 PROCEDURE — 96361 HYDRATE IV INFUSION ADD-ON: CPT

## 2024-10-04 PROCEDURE — 93005 ELECTROCARDIOGRAM TRACING: CPT

## 2024-10-04 PROCEDURE — 82550 ASSAY OF CK (CPK): CPT | Performed by: EMERGENCY MEDICINE

## 2024-10-04 PROCEDURE — 99285 EMERGENCY DEPT VISIT HI MDM: CPT | Mod: 25

## 2024-10-04 PROCEDURE — 2500000004 HC RX 250 GENERAL PHARMACY W/ HCPCS (ALT 636 FOR OP/ED): Performed by: EMERGENCY MEDICINE

## 2024-10-04 PROCEDURE — 73030 X-RAY EXAM OF SHOULDER: CPT | Mod: RT

## 2024-10-04 PROCEDURE — 85007 BL SMEAR W/DIFF WBC COUNT: CPT | Performed by: EMERGENCY MEDICINE

## 2024-10-04 PROCEDURE — 83735 ASSAY OF MAGNESIUM: CPT | Performed by: EMERGENCY MEDICINE

## 2024-10-04 RX ORDER — ACETAMINOPHEN 325 MG/1
975 TABLET ORAL ONCE
Status: COMPLETED | OUTPATIENT
Start: 2024-10-04 | End: 2024-10-04

## 2024-10-04 RX ORDER — VANCOMYCIN HYDROCHLORIDE 1 G/20ML
INJECTION, POWDER, LYOPHILIZED, FOR SOLUTION INTRAVENOUS DAILY PRN
Status: DISCONTINUED | OUTPATIENT
Start: 2024-10-04 | End: 2024-10-04 | Stop reason: ENTERED-IN-ERROR

## 2024-10-04 RX ORDER — VANCOMYCIN HYDROCHLORIDE 125 MG/1
125 CAPSULE ORAL 4 TIMES DAILY
Status: COMPLETED | OUTPATIENT
Start: 2024-10-04 | End: 2024-10-04

## 2024-10-04 RX ORDER — LIDOCAINE 560 MG/1
1 PATCH PERCUTANEOUS; TOPICAL; TRANSDERMAL ONCE
Status: COMPLETED | OUTPATIENT
Start: 2024-10-04 | End: 2024-10-05

## 2024-10-04 RX ADMIN — LIDOCAINE 1 PATCH: 4 PATCH TOPICAL at 23:10

## 2024-10-04 RX ADMIN — SODIUM CHLORIDE 1000 ML: 900 INJECTION, SOLUTION INTRAVENOUS at 19:55

## 2024-10-04 RX ADMIN — ACETAMINOPHEN 325MG 975 MG: 325 TABLET ORAL at 23:09

## 2024-10-04 RX ADMIN — VANCOMYCIN HYDROCHLORIDE 125 MG: 125 CAPSULE ORAL at 23:10

## 2024-10-04 ASSESSMENT — PAIN SCALES - GENERAL: PAINLEVEL_OUTOF10: 5 - MODERATE PAIN

## 2024-10-04 ASSESSMENT — PAIN - FUNCTIONAL ASSESSMENT: PAIN_FUNCTIONAL_ASSESSMENT: 0-10

## 2024-10-04 ASSESSMENT — PAIN DESCRIPTION - ORIENTATION: ORIENTATION: RIGHT

## 2024-10-04 ASSESSMENT — PAIN DESCRIPTION - LOCATION: LOCATION: SHOULDER

## 2024-10-05 ENCOUNTER — APPOINTMENT (OUTPATIENT)
Dept: RADIOLOGY | Facility: HOSPITAL | Age: 84
DRG: 565 | End: 2024-10-05
Payer: MEDICARE

## 2024-10-05 PROBLEM — T79.6XXA TRAUMATIC RHABDOMYOLYSIS, INITIAL ENCOUNTER (CMS-HCC): Status: ACTIVE | Noted: 2024-10-05

## 2024-10-05 LAB
ALBUMIN SERPL BCP-MCNC: 3.4 G/DL (ref 3.4–5)
ANION GAP SERPL CALCULATED.3IONS-SCNC: 13 MMOL/L (ref 10–20)
BUN SERPL-MCNC: 38 MG/DL (ref 6–23)
CALCIUM SERPL-MCNC: 8.6 MG/DL (ref 8.6–10.3)
CHLORIDE SERPL-SCNC: 108 MMOL/L (ref 98–107)
CK SERPL-CCNC: 1918 U/L (ref 0–215)
CO2 SERPL-SCNC: 21 MMOL/L (ref 21–32)
CREAT SERPL-MCNC: 2.61 MG/DL (ref 0.5–1.05)
EGFRCR SERPLBLD CKD-EPI 2021: 18 ML/MIN/1.73M*2
ERYTHROCYTE [DISTWIDTH] IN BLOOD BY AUTOMATED COUNT: 13.2 % (ref 11.5–14.5)
GLUCOSE SERPL-MCNC: 79 MG/DL (ref 74–99)
HCT VFR BLD AUTO: 33.5 % (ref 36–46)
HGB BLD-MCNC: 10.8 G/DL (ref 12–16)
MCH RBC QN AUTO: 32 PG (ref 26–34)
MCHC RBC AUTO-ENTMCNC: 32.2 G/DL (ref 32–36)
MCV RBC AUTO: 99 FL (ref 80–100)
NRBC BLD-RTO: 0 /100 WBCS (ref 0–0)
PHOSPHATE SERPL-MCNC: 4 MG/DL (ref 2.5–4.9)
PLATELET # BLD AUTO: 221 X10*3/UL (ref 150–450)
POTASSIUM SERPL-SCNC: 3.2 MMOL/L (ref 3.5–5.3)
RBC # BLD AUTO: 3.38 X10*6/UL (ref 4–5.2)
SODIUM SERPL-SCNC: 139 MMOL/L (ref 136–145)
WBC # BLD AUTO: 32.1 X10*3/UL (ref 4.4–11.3)

## 2024-10-05 PROCEDURE — 70450 CT HEAD/BRAIN W/O DYE: CPT

## 2024-10-05 PROCEDURE — 84100 ASSAY OF PHOSPHORUS: CPT | Performed by: INTERNAL MEDICINE

## 2024-10-05 PROCEDURE — 2500000002 HC RX 250 W HCPCS SELF ADMINISTERED DRUGS (ALT 637 FOR MEDICARE OP, ALT 636 FOR OP/ED): Performed by: INTERNAL MEDICINE

## 2024-10-05 PROCEDURE — 2500000001 HC RX 250 WO HCPCS SELF ADMINISTERED DRUGS (ALT 637 FOR MEDICARE OP): Performed by: INTERNAL MEDICINE

## 2024-10-05 PROCEDURE — 2500000004 HC RX 250 GENERAL PHARMACY W/ HCPCS (ALT 636 FOR OP/ED): Performed by: INTERNAL MEDICINE

## 2024-10-05 PROCEDURE — 80069 RENAL FUNCTION PANEL: CPT | Performed by: INTERNAL MEDICINE

## 2024-10-05 PROCEDURE — 36415 COLL VENOUS BLD VENIPUNCTURE: CPT | Performed by: INTERNAL MEDICINE

## 2024-10-05 PROCEDURE — 85027 COMPLETE CBC AUTOMATED: CPT | Performed by: INTERNAL MEDICINE

## 2024-10-05 PROCEDURE — 97165 OT EVAL LOW COMPLEX 30 MIN: CPT | Mod: GO

## 2024-10-05 PROCEDURE — 70450 CT HEAD/BRAIN W/O DYE: CPT | Performed by: STUDENT IN AN ORGANIZED HEALTH CARE EDUCATION/TRAINING PROGRAM

## 2024-10-05 PROCEDURE — 99223 1ST HOSP IP/OBS HIGH 75: CPT | Performed by: INTERNAL MEDICINE

## 2024-10-05 PROCEDURE — 1210000001 HC SEMI-PRIVATE ROOM DAILY

## 2024-10-05 PROCEDURE — 97161 PT EVAL LOW COMPLEX 20 MIN: CPT | Mod: GP

## 2024-10-05 PROCEDURE — 82550 ASSAY OF CK (CPK): CPT | Performed by: INTERNAL MEDICINE

## 2024-10-05 RX ORDER — LEVOTHYROXINE SODIUM 150 UG/1
150 TABLET ORAL DAILY
Status: DISCONTINUED | OUTPATIENT
Start: 2024-10-05 | End: 2024-10-06 | Stop reason: HOSPADM

## 2024-10-05 RX ORDER — ALUMINUM HYDROXIDE, MAGNESIUM HYDROXIDE, AND SIMETHICONE 1200; 120; 1200 MG/30ML; MG/30ML; MG/30ML
30 SUSPENSION ORAL 4 TIMES DAILY PRN
Status: DISCONTINUED | OUTPATIENT
Start: 2024-10-05 | End: 2024-10-06 | Stop reason: HOSPADM

## 2024-10-05 RX ORDER — ONDANSETRON HYDROCHLORIDE 2 MG/ML
4 INJECTION, SOLUTION INTRAVENOUS EVERY 6 HOURS PRN
Status: DISCONTINUED | OUTPATIENT
Start: 2024-10-05 | End: 2024-10-06 | Stop reason: HOSPADM

## 2024-10-05 RX ORDER — ONDANSETRON 4 MG/1
4 TABLET, FILM COATED ORAL 4 TIMES DAILY PRN
Status: DISCONTINUED | OUTPATIENT
Start: 2024-10-05 | End: 2024-10-06 | Stop reason: HOSPADM

## 2024-10-05 RX ORDER — PNV NO.95/FERROUS FUM/FOLIC AC 28MG-0.8MG
100 TABLET ORAL DAILY
Status: DISCONTINUED | OUTPATIENT
Start: 2024-10-05 | End: 2024-10-06 | Stop reason: HOSPADM

## 2024-10-05 RX ORDER — METOPROLOL SUCCINATE 25 MG/1
12.5 TABLET, EXTENDED RELEASE ORAL DAILY
Status: DISCONTINUED | OUTPATIENT
Start: 2024-10-05 | End: 2024-10-06 | Stop reason: HOSPADM

## 2024-10-05 RX ORDER — AMIODARONE HYDROCHLORIDE 200 MG/1
200 TABLET ORAL DAILY
Status: DISCONTINUED | OUTPATIENT
Start: 2024-10-05 | End: 2024-10-06 | Stop reason: HOSPADM

## 2024-10-05 RX ORDER — POTASSIUM CHLORIDE 20 MEQ/1
40 TABLET, EXTENDED RELEASE ORAL ONCE
Status: COMPLETED | OUTPATIENT
Start: 2024-10-05 | End: 2024-10-05

## 2024-10-05 RX ORDER — AMLODIPINE BESYLATE 5 MG/1
5 TABLET ORAL DAILY
Status: DISCONTINUED | OUTPATIENT
Start: 2024-10-05 | End: 2024-10-06 | Stop reason: HOSPADM

## 2024-10-05 RX ORDER — DULOXETIN HYDROCHLORIDE 20 MG/1
20 CAPSULE, DELAYED RELEASE ORAL DAILY
Status: DISCONTINUED | OUTPATIENT
Start: 2024-10-05 | End: 2024-10-06 | Stop reason: HOSPADM

## 2024-10-05 RX ORDER — SIMVASTATIN 10 MG/1
10 TABLET, FILM COATED ORAL NIGHTLY
Status: DISCONTINUED | OUTPATIENT
Start: 2024-10-05 | End: 2024-10-05

## 2024-10-05 RX ORDER — ONDANSETRON 4 MG/1
4 TABLET, ORALLY DISINTEGRATING ORAL EVERY 8 HOURS PRN
Status: DISCONTINUED | OUTPATIENT
Start: 2024-10-05 | End: 2024-10-05 | Stop reason: SDUPTHER

## 2024-10-05 RX ORDER — ACETAMINOPHEN 500 MG
5 TABLET ORAL NIGHTLY PRN
Status: DISCONTINUED | OUTPATIENT
Start: 2024-10-05 | End: 2024-10-06 | Stop reason: HOSPADM

## 2024-10-05 RX ORDER — CHOLECALCIFEROL (VITAMIN D3) 25 MCG
1000 TABLET ORAL DAILY
Status: DISCONTINUED | OUTPATIENT
Start: 2024-10-05 | End: 2024-10-06 | Stop reason: HOSPADM

## 2024-10-05 RX ORDER — ONDANSETRON HYDROCHLORIDE 2 MG/ML
4 INJECTION, SOLUTION INTRAVENOUS ONCE
Status: DISCONTINUED | OUTPATIENT
Start: 2024-10-05 | End: 2024-10-05

## 2024-10-05 RX ORDER — ACETAMINOPHEN 325 MG/1
650 TABLET ORAL EVERY 6 HOURS PRN
Status: DISCONTINUED | OUTPATIENT
Start: 2024-10-05 | End: 2024-10-06 | Stop reason: HOSPADM

## 2024-10-05 RX ORDER — SODIUM CHLORIDE 9 MG/ML
125 INJECTION, SOLUTION INTRAVENOUS CONTINUOUS
Status: DISCONTINUED | OUTPATIENT
Start: 2024-10-05 | End: 2024-10-06 | Stop reason: HOSPADM

## 2024-10-05 RX ORDER — ACETAMINOPHEN 325 MG/1
650 TABLET ORAL EVERY 4 HOURS PRN
Status: DISCONTINUED | OUTPATIENT
Start: 2024-10-05 | End: 2024-10-05 | Stop reason: SDUPTHER

## 2024-10-05 RX ORDER — GABAPENTIN 300 MG/1
300 CAPSULE ORAL 2 TIMES DAILY
Status: DISCONTINUED | OUTPATIENT
Start: 2024-10-05 | End: 2024-10-06 | Stop reason: HOSPADM

## 2024-10-05 RX ORDER — VANCOMYCIN HYDROCHLORIDE 250 MG/1
250 CAPSULE ORAL 4 TIMES DAILY
Status: DISCONTINUED | OUTPATIENT
Start: 2024-10-05 | End: 2024-10-06 | Stop reason: HOSPADM

## 2024-10-05 RX ADMIN — VANCOMYCIN HYDROCHLORIDE 250 MG: 250 CAPSULE ORAL at 08:00

## 2024-10-05 RX ADMIN — APIXABAN 2.5 MG: 2.5 TABLET, FILM COATED ORAL at 10:31

## 2024-10-05 RX ADMIN — SODIUM CHLORIDE 125 ML/HR: 900 INJECTION, SOLUTION INTRAVENOUS at 07:13

## 2024-10-05 RX ADMIN — Medication 100 MCG: at 10:31

## 2024-10-05 RX ADMIN — DULOXETINE HYDROCHLORIDE 20 MG: 20 CAPSULE, DELAYED RELEASE ORAL at 10:31

## 2024-10-05 RX ADMIN — AMIODARONE HYDROCHLORIDE 200 MG: 200 TABLET ORAL at 10:31

## 2024-10-05 RX ADMIN — AMLODIPINE BESYLATE 5 MG: 5 TABLET ORAL at 10:31

## 2024-10-05 RX ADMIN — VANCOMYCIN HYDROCHLORIDE 250 MG: 250 CAPSULE ORAL at 22:40

## 2024-10-05 RX ADMIN — GABAPENTIN 300 MG: 300 CAPSULE ORAL at 10:31

## 2024-10-05 RX ADMIN — ACETAMINOPHEN 325MG 650 MG: 325 TABLET ORAL at 21:41

## 2024-10-05 RX ADMIN — VANCOMYCIN HYDROCHLORIDE 250 MG: 250 CAPSULE ORAL at 13:02

## 2024-10-05 RX ADMIN — APIXABAN 2.5 MG: 2.5 TABLET, FILM COATED ORAL at 21:35

## 2024-10-05 RX ADMIN — POTASSIUM CHLORIDE 40 MEQ: 1500 TABLET, EXTENDED RELEASE ORAL at 16:21

## 2024-10-05 RX ADMIN — LEVOTHYROXINE SODIUM 150 MCG: 0.15 TABLET ORAL at 07:06

## 2024-10-05 RX ADMIN — VANCOMYCIN HYDROCHLORIDE 250 MG: 250 CAPSULE ORAL at 16:44

## 2024-10-05 RX ADMIN — GABAPENTIN 300 MG: 300 CAPSULE ORAL at 21:35

## 2024-10-05 RX ADMIN — CHOLECALCIFEROL TAB 25 MCG (1000 UNIT) 1000 UNITS: 25 TAB at 10:31

## 2024-10-05 RX ADMIN — ACETAMINOPHEN 325MG 650 MG: 325 TABLET ORAL at 16:21

## 2024-10-05 SDOH — ECONOMIC STABILITY: HOUSING INSECURITY: AT ANY TIME IN THE PAST 12 MONTHS, WERE YOU HOMELESS OR LIVING IN A SHELTER (INCLUDING NOW)?: NO

## 2024-10-05 SDOH — SOCIAL STABILITY: SOCIAL INSECURITY: HAVE YOU HAD ANY THOUGHTS OF HARMING ANYONE ELSE?: NO

## 2024-10-05 SDOH — ECONOMIC STABILITY: INCOME INSECURITY: IN THE LAST 12 MONTHS, WAS THERE A TIME WHEN YOU WERE NOT ABLE TO PAY THE MORTGAGE OR RENT ON TIME?: NO

## 2024-10-05 SDOH — ECONOMIC STABILITY: FOOD INSECURITY: WITHIN THE PAST 12 MONTHS, THE FOOD YOU BOUGHT JUST DIDN'T LAST AND YOU DIDN'T HAVE MONEY TO GET MORE.: NEVER TRUE

## 2024-10-05 SDOH — SOCIAL STABILITY: SOCIAL NETWORK: ARE YOU MARRIED, WIDOWED, DIVORCED, SEPARATED, NEVER MARRIED, OR LIVING WITH A PARTNER?: WIDOWED

## 2024-10-05 SDOH — ECONOMIC STABILITY: INCOME INSECURITY: IN THE PAST 12 MONTHS, HAS THE ELECTRIC, GAS, OIL, OR WATER COMPANY THREATENED TO SHUT OFF SERVICE IN YOUR HOME?: NO

## 2024-10-05 SDOH — ECONOMIC STABILITY: INCOME INSECURITY: HOW HARD IS IT FOR YOU TO PAY FOR THE VERY BASICS LIKE FOOD, HOUSING, MEDICAL CARE, AND HEATING?: NOT VERY HARD

## 2024-10-05 SDOH — SOCIAL STABILITY: SOCIAL INSECURITY: WITHIN THE LAST YEAR, HAVE YOU BEEN HUMILIATED OR EMOTIONALLY ABUSED IN OTHER WAYS BY YOUR PARTNER OR EX-PARTNER?: NO

## 2024-10-05 SDOH — SOCIAL STABILITY: SOCIAL INSECURITY: WITHIN THE LAST YEAR, HAVE YOU BEEN AFRAID OF YOUR PARTNER OR EX-PARTNER?: NO

## 2024-10-05 SDOH — ECONOMIC STABILITY: HOUSING INSECURITY: IN THE PAST 12 MONTHS, HOW MANY TIMES HAVE YOU MOVED WHERE YOU WERE LIVING?: 0

## 2024-10-05 SDOH — SOCIAL STABILITY: SOCIAL INSECURITY: DO YOU FEEL ANYONE HAS EXPLOITED OR TAKEN ADVANTAGE OF YOU FINANCIALLY OR OF YOUR PERSONAL PROPERTY?: NO

## 2024-10-05 SDOH — HEALTH STABILITY: MENTAL HEALTH
HOW OFTEN DO YOU NEED TO HAVE SOMEONE HELP YOU WHEN YOU READ INSTRUCTIONS, PAMPHLETS, OR OTHER WRITTEN MATERIAL FROM YOUR DOCTOR OR PHARMACY?: NEVER

## 2024-10-05 SDOH — SOCIAL STABILITY: SOCIAL INSECURITY
ASK PARENT OR GUARDIAN: ARE THERE TIMES WHEN YOU, YOUR CHILD(REN), OR ANY MEMBER OF YOUR HOUSEHOLD FEEL UNSAFE, HARMED, OR THREATENED AROUND PERSONS WITH WHOM YOU KNOW OR LIVE?: NO

## 2024-10-05 SDOH — SOCIAL STABILITY: SOCIAL NETWORK: HOW OFTEN DO YOU GET TOGETHER WITH FRIENDS OR RELATIVES?: MORE THAN THREE TIMES A WEEK

## 2024-10-05 SDOH — HEALTH STABILITY: MENTAL HEALTH: EXPERIENCED ANY OF THE FOLLOWING LIFE EVENTS: DEATH OF FAMILY/FRIEND

## 2024-10-05 SDOH — SOCIAL STABILITY: SOCIAL INSECURITY: DOES ANYONE TRY TO KEEP YOU FROM HAVING/CONTACTING OTHER FRIENDS OR DOING THINGS OUTSIDE YOUR HOME?: NO

## 2024-10-05 SDOH — SOCIAL STABILITY: SOCIAL INSECURITY: ARE THERE ANY APPARENT SIGNS OF INJURIES/BEHAVIORS THAT COULD BE RELATED TO ABUSE/NEGLECT?: NO

## 2024-10-05 SDOH — SOCIAL STABILITY: SOCIAL INSECURITY: POSSIBLE ABUSE REPORTED TO:: ADVOCATE

## 2024-10-05 SDOH — ECONOMIC STABILITY: FOOD INSECURITY: WITHIN THE PAST 12 MONTHS, YOU WORRIED THAT YOUR FOOD WOULD RUN OUT BEFORE YOU GOT MONEY TO BUY MORE.: NEVER TRUE

## 2024-10-05 SDOH — SOCIAL STABILITY: SOCIAL NETWORK: HOW OFTEN DO YOU ATTENT MEETINGS OF THE CLUB OR ORGANIZATION YOU BELONG TO?: 1 TO 4 TIMES PER YEAR

## 2024-10-05 SDOH — SOCIAL STABILITY: SOCIAL INSECURITY: ARE YOU OR HAVE YOU BEEN THREATENED OR ABUSED PHYSICALLY, EMOTIONALLY, OR SEXUALLY BY ANYONE?: NO

## 2024-10-05 SDOH — HEALTH STABILITY: PHYSICAL HEALTH: ON AVERAGE, HOW MANY DAYS PER WEEK DO YOU ENGAGE IN MODERATE TO STRENUOUS EXERCISE (LIKE A BRISK WALK)?: 0 DAYS

## 2024-10-05 SDOH — ECONOMIC STABILITY: HOUSING INSECURITY: DO YOU FEEL UNSAFE GOING BACK TO THE PLACE WHERE YOU LIVE?: NO

## 2024-10-05 SDOH — SOCIAL STABILITY: SOCIAL INSECURITY: ABUSE: ADULT

## 2024-10-05 SDOH — SOCIAL STABILITY: SOCIAL INSECURITY: HAS ANYONE EVER THREATENED TO HURT YOUR FAMILY OR YOUR PETS?: NO

## 2024-10-05 SDOH — SOCIAL STABILITY: SOCIAL NETWORK: HOW OFTEN DO YOU ATTEND CHURCH OR RELIGIOUS SERVICES?: NEVER

## 2024-10-05 SDOH — HEALTH STABILITY: PHYSICAL HEALTH: ON AVERAGE, HOW MANY MINUTES DO YOU ENGAGE IN EXERCISE AT THIS LEVEL?: 0 MIN

## 2024-10-05 SDOH — SOCIAL STABILITY: SOCIAL INSECURITY: HAVE YOU HAD THOUGHTS OF HARMING ANYONE ELSE?: NO

## 2024-10-05 SDOH — SOCIAL STABILITY: SOCIAL INSECURITY: DO YOU FEEL UNSAFE GOING BACK TO THE PLACE WHERE YOU ARE LIVING?: NO

## 2024-10-05 ASSESSMENT — ACTIVITIES OF DAILY LIVING (ADL)
HEARING - LEFT EAR: HEARING AID
LACK_OF_TRANSPORTATION: NO
BATHING_ASSISTANCE: MINIMAL
JUDGMENT_ADEQUATE_SAFELY_COMPLETE_DAILY_ACTIVITIES: YES
LACK_OF_TRANSPORTATION: NO
BATHING: INDEPENDENT
ADEQUATE_TO_COMPLETE_ADL: YES
GROOMING: INDEPENDENT
WALKS IN HOME: INDEPENDENT
ADL_ASSISTANCE: INDEPENDENT
PATIENT'S MEMORY ADEQUATE TO SAFELY COMPLETE DAILY ACTIVITIES?: YES
FEEDING YOURSELF: INDEPENDENT
DRESSING YOURSELF: INDEPENDENT
TOILETING: INDEPENDENT
HEARING - RIGHT EAR: HEARING AID
ADL_ASSISTANCE: INDEPENDENT
LACK_OF_TRANSPORTATION: NO

## 2024-10-05 ASSESSMENT — ENCOUNTER SYMPTOMS
PSYCHIATRIC NEGATIVE: 1
CARDIOVASCULAR NEGATIVE: 1
FEVER: 0
EYES NEGATIVE: 1
CONSTITUTIONAL NEGATIVE: 1
ALLERGIC/IMMUNOLOGIC NEGATIVE: 1
ENDOCRINE NEGATIVE: 1
DIARRHEA: 1
CHILLS: 0
MUSCULOSKELETAL NEGATIVE: 1
RESPIRATORY NEGATIVE: 1
NEUROLOGICAL NEGATIVE: 1
HEMATOLOGIC/LYMPHATIC NEGATIVE: 1

## 2024-10-05 ASSESSMENT — COGNITIVE AND FUNCTIONAL STATUS - GENERAL
DAILY ACTIVITIY SCORE: 23
HELP NEEDED FOR BATHING: A LITTLE
TOILETING: A LITTLE
DRESSING REGULAR LOWER BODY CLOTHING: A LOT
DAILY ACTIVITIY SCORE: 18
PERSONAL GROOMING: A LITTLE
MOBILITY SCORE: 16
PATIENT BASELINE BEDBOUND: NO
CLIMB 3 TO 5 STEPS WITH RAILING: A LOT
DRESSING REGULAR UPPER BODY CLOTHING: A LITTLE
MOVING TO AND FROM BED TO CHAIR: A LITTLE
WALKING IN HOSPITAL ROOM: A LITTLE
CLIMB 3 TO 5 STEPS WITH RAILING: A LITTLE
MOBILITY SCORE: 23
MOVING FROM LYING ON BACK TO SITTING ON SIDE OF FLAT BED WITH BEDRAILS: A LITTLE
TOILETING: A LITTLE
TURNING FROM BACK TO SIDE WHILE IN FLAT BAD: A LOT
STANDING UP FROM CHAIR USING ARMS: A LITTLE

## 2024-10-05 ASSESSMENT — PATIENT HEALTH QUESTIONNAIRE - PHQ9
SUM OF ALL RESPONSES TO PHQ9 QUESTIONS 1 & 2: 0
2. FEELING DOWN, DEPRESSED OR HOPELESS: NOT AT ALL
1. LITTLE INTEREST OR PLEASURE IN DOING THINGS: NOT AT ALL

## 2024-10-05 ASSESSMENT — PAIN SCALES - GENERAL
PAINLEVEL_OUTOF10: 0 - NO PAIN
PAINLEVEL_OUTOF10: 3
PAINLEVEL_OUTOF10: 2
PAINLEVEL_OUTOF10: 8
PAINLEVEL_OUTOF10: 0 - NO PAIN
PAINLEVEL_OUTOF10: 5 - MODERATE PAIN
PAINLEVEL_OUTOF10: 0 - NO PAIN
PAINLEVEL_OUTOF10: 5 - MODERATE PAIN

## 2024-10-05 ASSESSMENT — PAIN - FUNCTIONAL ASSESSMENT
PAIN_FUNCTIONAL_ASSESSMENT: 0-10

## 2024-10-05 ASSESSMENT — PAIN DESCRIPTION - LOCATION: LOCATION: HIP

## 2024-10-05 ASSESSMENT — LIFESTYLE VARIABLES
HOW MANY STANDARD DRINKS CONTAINING ALCOHOL DO YOU HAVE ON A TYPICAL DAY: PATIENT DOES NOT DRINK
SKIP TO QUESTIONS 9-10: 1
HOW OFTEN DO YOU HAVE 6 OR MORE DRINKS ON ONE OCCASION: NEVER
AUDIT-C TOTAL SCORE: 0
AUDIT-C TOTAL SCORE: 0
SUBSTANCE_ABUSE_PAST_12_MONTHS: NO
HOW OFTEN DO YOU HAVE A DRINK CONTAINING ALCOHOL: NEVER
PRESCIPTION_ABUSE_PAST_12_MONTHS: NO

## 2024-10-05 ASSESSMENT — PAIN DESCRIPTION - DESCRIPTORS: DESCRIPTORS: SORE

## 2024-10-05 ASSESSMENT — PAIN DESCRIPTION - ORIENTATION: ORIENTATION: RIGHT

## 2024-10-05 NOTE — PROGRESS NOTES
Physical Therapy    Physical Therapy Evaluation    Patient Name: Tracie Baltazar  MRN: [id number]  Department: [address]  Room: [facility]421  Today's Date: 10/5/2024   Time Calculation  Start Time: 1230  Stop Time: 1253  Time Calculation (min): 23 min    Assessment/Plan   PT Assessment  PT Assessment Results: Decreased strength, Decreased endurance, Impaired balance, Decreased mobility, Pain  Rehab Prognosis: Good  Evaluation/Treatment Tolerance: Patient limited by pain  Medical Staff Made Aware: Yes  Strengths: Ability to acquire knowledge  Barriers to Participation: Comorbidities  End of Session Communication: Bedside nurse  End of Session Patient Position: Alarm on, Up in chair  IP OR SWING BED PT PLAN  Inpatient or Swing Bed: Inpatient  PT Plan  Treatment/Interventions: Bed mobility, Transfer training, Gait training, Stair training, Balance training, Strengthening, Endurance training, Therapeutic activity, Therapeutic exercise  PT Plan: Ongoing PT  PT Frequency: 4 times per week  PT Discharge Recommendations: Low intensity level of continued care, 24 hr supervision due to cognition  Equipment Recommended upon Discharge: Wheeled walker  PT Recommended Transfer Status: Contact guard  PT - OK to Discharge: Yes      Subjective   General Visit Information:  General  Reason for Referral: PT eval and treat; 83 y/o female presenting with mechanical fall, she was too weak to get up so she spent about 18 hours on the floor until she was found by her family members.  Dx w/ traumatic rhabdomyolysis  Referred By: Lynn Mccullough MD  Past Medical History Relevant to Rehab: Asthma, Essential (primary) hypertension, Kidney failure, Leukemia (Multi), and Rheumatoid arthritis, Paroxysmal atrial fibrillation, C. difficile colitis  Family/Caregiver Present: Yes  Caregiver Feedback: son  Co-Treatment: OT  Co-Treatment Reason: to maximize safety and participation  Prior to Session Communication: Bedside nurse  Patient Position  Received: Alarm on, Bed, 3 rail up  General Comment: pt cleared for therapy by nursing, supine upon arrival and agreeable to therapy.  Home Living:  Home Living  Type of Home: House  Lives With: Adult children (son)  Home Adaptive Equipment: Walker rolling or standard  Home Layout: One level  Home Access: Stairs to enter with rails  Entrance Stairs-Rails: Both  Entrance Stairs-Number of Steps: 2  Bathroom Shower/Tub: Walk-in shower  Bathroom Toilet: Standard  Bathroom Equipment: Grab bars in shower, Shower chair with back  Home Living Comments: son available most of day, has another son that is local and able to assist.  Prior Level of Function:  Prior Function Per Pt/Caregiver Report  Level of [place]: Independent with ADLs and functional transfers, Needs assistance with homemaking  Receives Help From: Family  ADL Assistance: Independent  Homemaking Assistance: Needs assistance  Ambulatory Assistance:  (mod ind with RW)  Vocational: Retired  Prior Function Comments: family provides transportation  Precautions:  Precautions  Hearing/Visual Limitations: +glasses  Medical Precautions: Fall precautions, Infection precautions (c-diff)    Objective   Pain:  Pain Assessment  Pain Assessment: 0-10  0-10 (Numeric) Pain Score: 8  Pain Type: Acute pain  Pain Location:  (R shoulder and hip)  Pain Interventions: Repositioned (RN aware)  Cognition:  Cognition  Overall Cognitive Status: Within Functional Limits  Arousal/Alertness: Appropriate responses to stimuli  Orientation Level: Oriented X4    General Assessments:  General Observation  General Observation: alert, NAD     Activity Tolerance  Endurance: Decreased tolerance for upright activites    Sensation  Light Touch: No apparent deficits  Sensation Comment: denies paresthesias    Strength  Strength Comments: BLEs grossly >3+/5  Strength  Strength Comments: BLEs grossly >3+/5    Coordination  Coordination Comment: decreased rate of movements d/t pain    Postural  Control  Postural Control: Within Functional Limits  Posture Comment: rounded shoulders, forward head posture      Functional Assessments:  Bed Mobility  Bed Mobility:  (mod A supine to sit EOB for elevation of trunk with increased time/effort to perform. supervision to scoot to EOB. denies s/s.)    Transfers  Transfer:  (CGA to stand at RW with cues for safe hand placement and upright posture. min A for controlled descent into chair.)    Ambulation/Gait Training  Ambulation/Gait Training Performed:  (pt ambulated 12'x2 with RW and CGA for safety; demonstrates slow, antalgic gait.  no LOB. cues for upright posture and proximity to walker.)  Extremity/Trunk Assessments:  RLE   RLE : Within Functional Limits  LLE   LLE : Within Functional Limits  Outcome Measures:  [facility] Basic Mobility  Turning from your back to your side while in a flat bed without using bedrails: A little  Moving from lying on your back to sitting on the side of a flat bed without using bedrails: A lot  Moving to and from bed to chair (including a wheelchair): A little  Standing up from a chair using your arms (e.g. wheelchair or bedside chair): A little  To walk in hospital room: A little  Climbing 3-5 steps with railing: A lot  Basic Mobility - Total Score: 16    Encounter Problems       Encounter Problems (Active)       PT Problem       Patient will demonstrate improvements in strength        Start:  10/05/24    Expected End:  10/26/24            Patient will transfer supine <> sit independently        Start:  10/05/24    Expected End:  10/26/24            Patient will transfer sit <> stand modified independently and use of rolling walker        Start:  10/05/24    Expected End:  10/26/24            Patient will ambulate 100 ft modified independently and use of rolling walker        Start:  10/05/24    Expected End:  10/26/24            Patient will ascend/descend 2 stairs with BUE support on 2 rail(s) with supervision and use of no assistive  device        Start:  10/05/24    Expected End:  10/26/24                   Education Documentation  Body Mechanics, taught by Mercedes Vickers, PT at 10/5/2024  1:45 PM.  Learner: Patient  Readiness: Acceptance  Method: Explanation  Response: Needs Reinforcement    ADL Training, taught by Mercedes Vickres, PT at 10/5/2024  1:45 PM.  Learner: Patient  Readiness: Acceptance  Method: Explanation  Response: Needs Reinforcement    Education Comments  No comments found.

## 2024-10-05 NOTE — PROGRESS NOTES
Occupational Therapy    Evaluation    Patient Name: Tracie Baltazar  MRN: [id number]  Department: [address]  Room: [facility]/421[address]B  Today's Date: 10/5/2024  Time Calculation  Start Time: 1235  Stop Time: 1250  Time Calculation (min): 15 min        Assessment:  OT Assessment: pt presents with generalized weakness, reduced endurance and decreased balance which impedes ADL performance. pt would benefit from skilled OT services to address these deficits and to facilitate highest level of independence.  Prognosis: Good  Barriers to Discharge: None  Evaluation/Treatment Tolerance: Patient limited by fatigue, Patient limited by pain  Medical Staff Made Aware: Yes  End of Session Communication: Bedside nurse  End of Session Patient Position: Up in chair, Alarm on  OT Assessment Results: Decreased ADL status, Decreased upper extremity range of motion, Decreased endurance, Decreased functional mobility  Prognosis: Good  Barriers to Discharge: None  Evaluation/Treatment Tolerance: Patient limited by fatigue, Patient limited by pain  Medical Staff Made Aware: Yes  Strengths: Ability to acquire knowledge, Attitude of self  Barriers to Participation: Comorbidities  Plan:  Treatment Interventions: ADL retraining, Functional transfer training, UE strengthening/ROM, Endurance training, Equipment evaluation/education, Compensatory technique education  OT Frequency: 4 times per week  OT Discharge Recommendations: Low intensity level of continued care  Equipment Recommended upon Discharge: Wheeled walker  OT Recommended Transfer Status: Assist of 1, Minimal assist  OT - OK to Discharge: Yes  Treatment Interventions: ADL retraining, Functional transfer training, UE strengthening/ROM, Endurance training, Equipment evaluation/education, Compensatory technique education    Subjective     General:  General  Reason for Referral: ADL impairment; 84 y.o. female presenting with mechanical fall, she was too weak to get up so she spent about 18 hours on  the floor until she was found by her family members.  Dx w/ traumatic rhabdomyolysis  Past Medical History Relevant to Rehab: history of Asthma, Essential (primary) hypertension, Kidney failure, Leukemia (Multi), and Rheumatoid arthritis, Paroxysmal atrial fibrillation, C. difficile colitis  Family/Caregiver Present: Yes  Caregiver Feedback: son present for session  Co-Treatment: PT  Co-Treatment Reason: to maximize safety and participation  Prior to Session Communication: Bedside nurse  Patient Position Received: Bed, 3 rail up, Alarm on  Preferred Learning Style: verbal, visual  General Comment: pt agreeable and pleasant throughout session.  complaining of left sided pain from where she was lying on the groud  Precautions:  Medical Precautions: Fall precautions  Precautions Comment: Contact+; C-diff    Pain:  Pain Assessment  Pain Assessment: 0-10  0-10 (Numeric) Pain Score: 5 - Moderate pain  Pain Type: Acute pain  Pain Location:  (whole left side of body)  Pain Interventions: Repositioned    Objective   Cognition:  Overall Cognitive Status: Within Functional Limits  Arousal/Alertness: Appropriate responses to stimuli  Orientation Level: Oriented X4           Home Living:  Type of Home: House  Lives With: Adult children (Son)  Home Adaptive Equipment: Walker rolling or standard  Home Layout: One level  Home Access: Stairs to enter with rails  Entrance Stairs-Rails: Both  Entrance Stairs-Number of Steps: 2  Bathroom Shower/Tub: Walk-in shower  Bathroom Toilet: Standard  Bathroom Equipment: Grab bars in shower, Shower chair with back  Bathroom Accessibility: first floor  Home Living Comments: Son lives with her, he is home most of the day  Prior Function:  Level of [place]: Independent with ADLs and functional transfers, Needs assistance with homemaking  Receives Help From: Family  ADL Assistance: Independent  Homemaking Assistance: Needs assistance (Son does IADLs)  Ambulatory Assistance: Independent (with  FWW)  Vocational: Retired  Prior Function Comments: Family provides transportation     ADL:  Eating Assistance: Independent (anticipated)  Grooming Assistance: Minimal (anticipated)  Bathing Assistance: Minimal (anticipated)  UE Dressing Assistance: Minimal (anticipated)  LE Dressing Assistance: Moderate (anticipated)  Toileting Assistance with Device: Minimal (anticipated)  ADL Comments: Scores based off performance and clinical observation  Activity Tolerance:  Endurance: Decreased tolerance for upright activites  Bed Mobility/Transfers: Bed Mobility  Bed Mobility: Yes  Bed Mobility 1  Bed Mobility 1: Supine to sitting  Level of Assistance 1: Minimum assistance  Bed Mobility Comments 1: pt able to manage BLE to edge of bed, required MIN A for lifting trunk; effortful and extended time to complete due to left sided pain    Transfers  Transfer: Yes  Transfer 1  Technique 1: Sit to stand, Stand to sit  Transfer Device 1: Walker  Transfer Level of Assistance 1: Contact guard  Trials/Comments 1: from edge of bed, effortful and cues for hand placement/positioning  Transfers 2  Transfer to 2: Chair with arms  Technique 2: Stand pivot  Transfer Device 2: Walker  Transfer Level of Assistance 2: Contact guard  Trials/Comments 2: pt transferred to chair at end of session after functional mobility trial with FWW      Functional Mobility:  Functional Mobility  Functional Mobility Performed: Yes  Functional Mobility 1  Surface 1: Level tile  Device 1: Rolling walker  Assistance 1: Contact guard  Comments 1: min household distances including throughout hosptail (to door and back) with FWW.  Pt required CGA for safety due to fatigue, no significant LOB noted throughout  Sitting Balance:  Static Sitting Balance  Static Sitting-Balance Support: Feet supported  Static Sitting-Level of Assistance: Close supervision  Standing Balance:  Static Standing Balance  Static Standing-Balance Support: Bilateral upper extremity  supported  Static Standing-Level of Assistance: Contact guard      Vision:Vision - Basic Assessment  Current Vision: Wears glasses all the time  Sensation:  Light Touch: No apparent deficits  Strength:  Strength Comments: BUE grossly 3+/5  Perception:  Inattention/Neglect: Appears intact  Coordination:  Movements are Fluid and Coordinated: Yes   Hand Function:  Gross Grasp: Functional  Coordination: Functional  Extremities: RUE   RUE : Exceptions to WFL (right shoulder flexion limited to ~90 degrees, reporting pacemaker on that side and needs to be repositioned) and LUE   LUE: Within Functional Limits    Outcome Measures:[facility] Daily Activity  Putting on and taking off regular lower body clothing: A lot  Bathing (including washing, rinsing, drying): A little  Putting on and taking off regular upper body clothing: A little  Toileting, which includes using toilet, bedpan or urinal: A little  Taking care of personal grooming such as brushing teeth: A little  Eating Meals: None  Daily Activity - Total Score: 18        Education Documentation  Body Mechanics, taught by Yoseph Rodriguez OT at 10/5/2024  1:23 PM.  Learner: Patient  Readiness: Acceptance  Method: Explanation, Demonstration  Response: Verbalizes Understanding    ADL Training, taught by Yoseph Rodriguez OT at 10/5/2024  1:23 PM.  Learner: Patient  Readiness: Acceptance  Method: Explanation, Demonstration  Response: Verbalizes Understanding    Education Comments  No comments found.        OP EDUCATION:       Goals:  Encounter Problems       Encounter Problems (Active)       ADLs       Patient with complete upper body dressing with independent level of assistance donning and doffing all UE clothes while edge of bed  (Progressing)       Start:  10/05/24    Expected End:  11/05/24            Patient with complete lower body dressing with modified independent level of assistance donning and doffing all LE clothes  with PRN adaptive equipment while edge of bed   (Progressing)       Start:  10/05/24    Expected End:  11/05/24            Patient will complete daily grooming tasks with independent level of assistance and PRN adaptive equipment while standing. (Progressing)       Start:  10/05/24    Expected End:  11/05/24            Patient will complete toileting including hygiene clothing management/hygiene with modified independent level of assistance and grab bars. (Progressing)       Start:  10/05/24    Expected End:  11/05/24               MOBILITY       Patient will perform Functional mobility max Household distances/Community Distances with modified independent level of assistance and least restrictive device in order to improve safety and functional mobility. (Progressing)       Start:  10/05/24    Expected End:  11/05/24

## 2024-10-05 NOTE — SIGNIFICANT EVENT
10/05/24 1047   Wound 10/05/24 Right   Date First Assessed/Time First Assessed: 10/05/24 1046   Wound Location Orientation: Right   Wound Image Images linked   Shape Blisters    Dressing Open to air      Found on admission

## 2024-10-05 NOTE — CONSULTS
Inpatient consult to Infectious Diseases  Consult performed by: Hipolito Elias MD  Consult ordered by: Lynn Mccullough MD            Primary MD: Javier Floyd MD    Reason For Consult  Left renal lesion infection    History Of Present Illness  Tracie Baltazar is a 84 y.o. female presenting with mechanical fall.  She has a history of recurrent Depression Infection, and Is Currently on Oral Vancomycin.  She had a fall and was unable to get up, and spent about 18 hours on the floor.  She was brought to the hospital for further evaluation of management.  She did not have any loss of consciousness.  She reports that her diarrhea is improving.  She denies any abdominal pain at present.  Her workup was remarkable for rhabdomyolysis.       Past Medical History  She has a past medical history of Asthma, Essential (primary) hypertension, Kidney failure, Leukemia (Multi), and Rheumatoid arthritis.    Surgical History  She has a past surgical history that includes Hysterectomy; Back surgery; Cholecystectomy; Other surgical history; Knee surgery; Shoulder surgery (Left); Total knee arthroplasty (Left, 02/13/2014); Cardiac electrophysiology procedure (Right, 6/2/2024); and Cardiac electrophysiology procedure (N/A, 6/3/2024).     Social History     Occupational History    Not on file   Tobacco Use    Smoking status: Never     Passive exposure: Never    Smokeless tobacco: Never   Vaping Use    Vaping status: Never Used   Substance and Sexual Activity    Alcohol use: Not Currently     Comment: rarely    Drug use: Never    Sexual activity: Defer     Travel History   Travel since 09/05/24    No documented travel since 09/05/24           Family History  Family History   Problem Relation Name Age of Onset    Emphysema Mother      Emphysema Father      Heart attack Sister      Lung cancer Brother          Agent orange    Lymphoma Son      Multiple sclerosis Son       Allergies  Ibuprofen and Amoxicillin     Immunization  History   Administered Date(s) Administered    Flu vaccine, quadrivalent, high-dose, preservative free, age 65y+ (FLUZONE) 10/08/2020, 11/08/2022, 10/30/2023    Flu vaccine, trivalent, preservative free, HIGH-DOSE, age 65y+ (Fluzone) 09/05/2015, 11/30/2017, 10/25/2018, 11/04/2019    Influenza, seasonal, injectable 11/22/2011    Moderna COVID-19 vaccine, 12 years and older (50mcg/0.5mL)(Spikevax) 10/30/2023    Pfizer COVID-19 vaccine, 12 years and older, (30mcg/0.3mL) (Comirnaty) 09/09/2024    Pfizer COVID-19 vaccine, bivalent, age 12 years and older (30 mcg/0.3 mL) 12/22/2022    Pfizer Purple Cap SARS-CoV-2 03/03/2021, 03/24/2021, 09/25/2021    Pneumococcal polysaccharide vaccine, 23-valent, age 2 years and older (PNEUMOVAX 23) 01/01/2015, 09/05/2015, 11/30/2017    RSV, 60 Years And Older (AREXVY) 10/30/2023    Zoster vaccine, recombinant, adult (SHINGRIX) 11/04/2019, 02/26/2020     Medications  Home medications:  Medications Prior to Admission   Medication Sig Dispense Refill Last Dose    acetaminophen (Tylenol) 325 mg tablet Take 2 tablets (650 mg) by mouth every 4 hours if needed for mild pain (1 - 3). 30 tablet 0     amiodarone (Pacerone) 200 mg tablet Take 1 tablet by mouth once daily 90 tablet 0     amLODIPine (Norvasc) 5 mg tablet Take 1 tablet (5 mg) by mouth once daily. 90 tablet 1     apixaban (Eliquis) 2.5 mg tablet Take 1 tablet (2.5 mg) by mouth 2 times a day. To facility: Please hold until June 8 due to hematoma around the pacemaker site. 60 tablet 11     cholecalciferol (Vitamin D-3) 50 MCG (2000 UT) tablet Vitamin D 50 MCG (2000 UT) Oral Tablet   Refills: 0       Active       cyanocobalamin (Vitamin B-12) 100 mcg tablet = 1 tab(s) ( 100 mcg ), Oral, daily, 0 Refill(s), Type: Maintenance       DULoxetine (Cymbalta) 20 mg DR capsule Take 1 capsule (20 mg) by mouth once daily. Do not crush or chew. 90 capsule 1     gabapentin (Neurontin) 300 mg capsule Take 1 capsule by mouth twice daily 60 capsule 0   "   levothyroxine (Synthroid, Levoxyl) 150 mcg tablet TAKE 1 TABLET BY MOUTH ONCE DAILY IN THE MORNING AS DIRECTED 90 tablet 1     metoprolol succinate XL (Toprol-XL) 25 mg 24 hr tablet Take 0.5 tablets (12.5 mg) by mouth once daily. Do not crush or chew.       ondansetron ODT (Zofran-ODT) 4 mg disintegrating tablet Take 1 tablet (4 mg) by mouth every 8 hours if needed for nausea or vomiting.       simvastatin (Zocor) 10 mg tablet Take 1 tablet (10 mg) by mouth once daily at bedtime. 90 tablet 3      Current medications:  Scheduled medications  amiodarone, 200 mg, oral, Daily  amLODIPine, 5 mg, oral, Daily  apixaban, 2.5 mg, oral, BID  cholecalciferol, 1,000 Units, oral, Daily  cyanocobalamin, 100 mcg, oral, Daily  DULoxetine, 20 mg, oral, Daily  gabapentin, 300 mg, oral, BID  levothyroxine, 150 mcg, oral, Daily  lidocaine, 1 patch, transdermal, Once  metoprolol succinate XL, 12.5 mg, oral, Daily  simvastatin, 10 mg, oral, Nightly  vancomycin, 250 mg, oral, 4x daily      Continuous medications  sodium chloride 0.9%, 125 mL/hr, Last Rate: 125 mL/hr (10/05/24 0713)      PRN medications  PRN medications: acetaminophen, alum-mag hydroxide-simeth, melatonin, ondansetron, ondansetron    Review of Systems   Constitutional:  Negative for chills and fever.   Gastrointestinal:  Positive for diarrhea.   All other systems reviewed and are negative.       Objective  Range of Vitals (last 24 hours)  Heart Rate:  [64-78]   Temp:  [36.5 °C (97.7 °F)]   Resp:  [16-18]   BP: ()/(48-87)   Height:  [167.6 cm (5' 6\")]   Weight:  [68 kg (149 lb 14.6 oz)]   SpO2:  [96 %-99 %]   Daily Weight  10/04/24 : 68 kg (149 lb 14.6 oz)    Body mass index is 24.2 kg/m².     Physical Exam  Constitutional:       Appearance: Normal appearance.   HENT:      Head: Normocephalic and atraumatic.      Nose: Nose normal.   Eyes:      General: No scleral icterus.     Extraocular Movements: Extraocular movements intact.      Conjunctiva/sclera: " "Conjunctivae normal.   Cardiovascular:      Rate and Rhythm: Normal rate and regular rhythm.   Pulmonary:      Effort: Pulmonary effort is normal.      Breath sounds: Normal breath sounds.   Abdominal:      General: Bowel sounds are normal.      Palpations: Abdomen is soft.   Musculoskeletal:      Cervical back: Normal range of motion and neck supple.      Right lower leg: No edema.      Left lower leg: No edema.   Skin:     General: Skin is warm and dry.   Neurological:      Mental Status: She is alert.   Psychiatric:         Behavior: Behavior normal.          Relevant Results  Outside Hospital Results    Labs  Results from last 72 hours   Lab Units 10/05/24  0655 10/04/24  1851   WBC AUTO x10*3/uL 32.1* 36.5*   HEMOGLOBIN g/dL 10.8* 11.7*   HEMATOCRIT % 33.5* 37.6   PLATELETS AUTO x10*3/uL 221 235   LYMPHO PCT MAN %  --  55.0   MONO PCT MAN %  --  2.0   EOSINO PCT MAN %  --  0.0     Results from last 72 hours   Lab Units 10/05/24  0655 10/04/24  1850   SODIUM mmol/L 139 137   POTASSIUM mmol/L 3.2* 4.1   CHLORIDE mmol/L 108* 105   CO2 mmol/L 21 18*   BUN mg/dL 38* 39*   CREATININE mg/dL 2.61* 2.56*   GLUCOSE mg/dL 79 103*   CALCIUM mg/dL 8.6 9.3   ANION GAP mmol/L 13 18   EGFR mL/min/1.73m*2 18* 18*   PHOSPHORUS mg/dL 4.0  --      Results from last 72 hours   Lab Units 10/05/24  0655 10/04/24  1850   ALK PHOS U/L  --  97   BILIRUBIN TOTAL mg/dL  --  0.3   PROTEIN TOTAL g/dL  --  7.3   ALT U/L  --  19   AST U/L  --  46*   ALBUMIN g/dL 3.4 4.0     Estimated Creatinine Clearance: 15 mL/min (A) (by C-G formula based on SCr of 2.61 mg/dL (H)).  No results found for: \"CRP\", \"SEDRATE\"  No results found for: \"HIV1X2\", \"HIVCONF\", \"EDXVSJ9OV\"  No results found for: \"HEPCABINIT\", \"HEPCAB\", \"HCVPCRQUANT\"  Microbiology   reviewed  Imaging  CT head wo IV contrast    Result Date: 10/5/2024  Interpreted By:  Vern Davis, STUDY: CT HEAD WO IV CONTRAST;  10/5/2024 3:00 am   INDICATION: Signs/Symptoms:Injury of head.   " COMPARISON: Head CT 06/01/2024.   ACCESSION NUMBER(S): [id number]   ORDERING CLINICIAN: GUILHERME VILLARREAL   TECHNIQUE: Axial noncontrast CT images of the head.   FINDINGS: Gray-white matter differentiation is maintained. There are no extra-axial collections. No mass effect or midline shift. There is no hydrocephalus. Generalized cerebral volume loss and associated ventricular and sulcal enlargement. Scattered periventricular and deep white matter hypodensities and chronic right thalamus lacunar infarct and bilateral basal ganglia lacunar infarcts suggestive of moderate chronic microvascular ischemic change.   HEMORRHAGE: No acute intracranial hemorrhage.   CALVARIUM: No depressed skull fracture.   EXTRACRANIAL SOFT TISSUES:. Unremarkable.   PARANASAL SINUSES/MASTOIDS: There is a small retention cyst versus polyp in the left maxillary antrum. The visualized paranasal sinuses are well aerated. Mastoid air cells are clear.   ORBITS: Grossly normal.       No acute cortical infarct or acute intracranial hemorrhage. Moderate chronic microvascular ischemic change.     Signed by: Vern Davis 10/5/2024 3:18 AM Dictation workstation:   [id number]    XR shoulder right 2+ views    Result Date: 10/4/2024  Interpreted By:  Mohsen Styles, STUDY: XR SHOULDER RIGHT 2+ VIEWS; ;  10/4/2024 7:33 pm   INDICATION: Signs/Symptoms:Pain after fall.     COMPARISON: None.   ACCESSION NUMBER(S): [id number]   ORDERING CLINICIAN: GUILHERME VILLARREAL   FINDINGS: No acute fracture or dislocation of the right shoulder. There is acromioclavicular osteoarthrosis with joint space narrowing and osseous spurring. There is osteopenia.       No acute fracture or dislocation of the right shoulder.     MACRO: None   Signed by: Mohsen Styles 10/4/2024 8:36 PM Dictation workstation:   [id number]    XR chest 1 view    Result Date: 10/4/2024  Interpreted By:  Mohsen Styles, STUDY: XR CHEST 1 VIEW;  10/4/2024 7:33 pm   INDICATION:  Signs/Symptoms:Fall.   COMPARISON: None.   ACCESSION NUMBER(S): [id number]   ORDERING CLINICIAN: GUILHERME VILLARREAL   FINDINGS: There is right side cardiac generator and dual leads. There is cardiomegaly. Atherosclerotic calcification of the thoracic aorta. No airspace consolidation or pleural effusion. No pneumothorax.       No airspace consolidation or pleural effusion.   MACRO: None   Signed by: Mohsen Styles 10/4/2024 8:07 PM Dictation workstation:   [id number]    XR hip right with pelvis when performed 2 or 3 views    Result Date: 10/4/2024  Interpreted By:  Mohsen Styles, STUDY: XR HIP RIGHT WITH PELVIS WHEN PERFORMED 2 OR 3 VIEWS; ;  10/4/2024 7:33 pm   INDICATION: Signs/Symptoms:Right hip pain after fall.     COMPARISON: None.   ACCESSION NUMBER(S): [id number]   ORDERING CLINICIAN: GUILHERME VILLARREAL   FINDINGS: No evidence of acute displaced pelvic or right hip fracture. There is osteopenia. Degenerative change of the imaged lower lumbar spine. Vascular calcifications.       No evidence of acute displaced pelvic or right hip fracture.   If the patient is acutely unable to bear weight or pain increases, cross-sectional imaging is recommended to exclude radiographically occult fracture.     MACRO: None   Signed by: Mohsen Styles 10/4/2024 8:06 PM Dictation workstation:   [id number]    Assessment/Plan   Leukocytosis, secondary to CLL  Clostridium difficile infection  Rhabdomyolysis    Oral vancomycin  Monitor response to therapy  Monitor CK level  Supportive care  Monitor stool  Monitor temperature and WBC        Hipolito Elias MD

## 2024-10-05 NOTE — NURSING NOTE
Assumed care of patient from ED, she is A&Ox3 and is a good historian, Walks with walker and assist and is on PO vanco for active C-diff. 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 She has bilateral hearing aids and  in room.Oriented to room and call light and possessions within reach. No issues at this time.

## 2024-10-05 NOTE — H&P
History Of Present Illness  Tracie Baltazar is a 84 y.o. female presenting with mechanical fall.  Patient has a history of recurrent C. difficile.  She is currently undergoing treatment with oral vancomycin.  Patient has multiple bowel movements.  She normally lives with her son but he was out today.  Patient was in the bathroom and when she turned to grab a towel she fell on the floor.  Unfortunately she was too weak to get up so she spent about 18 hours on the floor until she was found by her family members.  Patient states that she hit her head but did not lose consciousness.  She complains of pain in her right arm and right hip since the fall.  Past Medical History  She has a past medical history of Asthma, Essential (primary) hypertension, Kidney failure, Leukemia (Multi), and Rheumatoid arthritis.  Paroxysmal atrial fibrillation  C. difficile colitis  Surgical History  She has a past surgical history that includes Hysterectomy; Back surgery; Cholecystectomy; Other surgical history; Knee surgery; Shoulder surgery (Left); Total knee arthroplasty (Left, 02/13/2014); Cardiac electrophysiology procedure (Right, 6/2/2024); and Cardiac electrophysiology procedure (N/A, 6/3/2024).  Pacemaker  Social History  She reports that she has never smoked. She has never been exposed to tobacco smoke. She has never used smokeless tobacco. She reports that she does not currently use alcohol. She reports that she does not use drugs.    Family History  Family History   Problem Relation Name Age of Onset    Emphysema Mother      Emphysema Father      Heart attack Sister      Lung cancer Brother          Agent orange    Lymphoma Son      Multiple sclerosis Son          Allergies  Ibuprofen and Amoxicillin    Review of Systems   Constitutional: Negative.    HENT: Negative.     Eyes: Negative.    Respiratory: Negative.     Cardiovascular: Negative.    Gastrointestinal:  Positive for diarrhea.   Endocrine: Negative.    Genitourinary:  Negative.    Musculoskeletal: Negative.    Skin: Negative.    Allergic/Immunologic: Negative.    Neurological: Negative.    Hematological: Negative.    Psychiatric/Behavioral: Negative.          Physical Exam  Location: Emergency department, room 10.  Pi is in no apparent distress.   Cooperative with exam.  Nontoxic-appearing.  Comfortable at rest on room air.  Skin is dry.  There is some erythema and few blisters on the right lateral thigh  Head is atraumatic, normocephalic.  Mouth mucosa is pink and moist.  No mucosal lesions.  No nasal discharge.  Musculoskeletal: No deformities, no muscle swelling.  No point tenderness to palpation.  Neck is supple, no JVD, no carotid bruits.  No lymphadenopathy.  Chest is atraumatic.  No tenderness to palpation.  Lungs are clear to auscultation bilaterally.  No wheezes, no rales, no rhonchi.  Heart: Regular rate and rhythm S1-S2.  No murmurs, rubs, gallops.  Peripheral pulses are palpable in all extremities, equal.  Abdomen is soft, not tender, not distended.  Bowel sounds positive in all quadrants.  No rebound, no guarding.  Rectal exam deferred.  Extremities: No peripheral edema, cords, cyanosis, varices.  Neuro: Patient is alert, oriented x3.  Moves all extremities.  No gross focal neurological deficits.  Face is symmetrical.  Tongue is midline.  No visual abnormalities.  No dysarthria or aphasia.  Psychiatric: Patient is cooperative with exam, maintains good eye contact.  No evidence of psychosis, suicidal ideation or depression.   Last Recorded Vitals  BP 98/81   Pulse 64   Temp 36.5 °C (97.7 °F) (Oral)   Resp 16   Wt 68 kg (149 lb 14.6 oz)   SpO2 97%     Relevant Results        CT head wo IV contrast    Result Date: 10/5/2024  Interpreted By:  Vern Davis, STUDY: CT HEAD WO IV CONTRAST;  10/5/2024 3:00 am   INDICATION: Signs/Symptoms:Injury of head.   COMPARISON: Head CT 06/01/2024.   ACCESSION NUMBER(S): [id number]   ORDERING CLINICIAN: GUILHERME VILLARREAL    TECHNIQUE: Axial noncontrast CT images of the head.   FINDINGS: Gray-white matter differentiation is maintained. There are no extra-axial collections. No mass effect or midline shift. There is no hydrocephalus. Generalized cerebral volume loss and associated ventricular and sulcal enlargement. Scattered periventricular and deep white matter hypodensities and chronic right thalamus lacunar infarct and bilateral basal ganglia lacunar infarcts suggestive of moderate chronic microvascular ischemic change.   HEMORRHAGE: No acute intracranial hemorrhage.   CALVARIUM: No depressed skull fracture.   EXTRACRANIAL SOFT TISSUES:. Unremarkable.   PARANASAL SINUSES/MASTOIDS: There is a small retention cyst versus polyp in the left maxillary antrum. The visualized paranasal sinuses are well aerated. Mastoid air cells are clear.   ORBITS: Grossly normal.       No acute cortical infarct or acute intracranial hemorrhage. Moderate chronic microvascular ischemic change.     Signed by: Vern Davis 10/5/2024 3:18 AM Dictation workstation:   [id number]    XR shoulder right 2+ views    Result Date: 10/4/2024  Interpreted By:  Mohsen Sytles, STUDY: XR SHOULDER RIGHT 2+ VIEWS; ;  10/4/2024 7:33 pm   INDICATION: Signs/Symptoms:Pain after fall.     COMPARISON: None.   ACCESSION NUMBER(S): [id number]   ORDERING CLINICIAN: GUILHERME VILLARREAL   FINDINGS: No acute fracture or dislocation of the right shoulder. There is acromioclavicular osteoarthrosis with joint space narrowing and osseous spurring. There is osteopenia.       No acute fracture or dislocation of the right shoulder.     MACRO: None   Signed by: Mohsen Styles 10/4/2024 8:36 PM Dictation workstation:   [id number]    XR chest 1 view    Result Date: 10/4/2024  Interpreted By:  Mohsen Styles, STUDY: XR CHEST 1 VIEW;  10/4/2024 7:33 pm   INDICATION: Signs/Symptoms:Fall.   COMPARISON: None.   ACCESSION NUMBER(S): [id number]   ORDERING CLINICIAN: GUILHERME VILLARREAL    FINDINGS: There is right side cardiac generator and dual leads. There is cardiomegaly. Atherosclerotic calcification of the thoracic aorta. No airspace consolidation or pleural effusion. No pneumothorax.       No airspace consolidation or pleural effusion.   MACRO: None   Signed by: Mohsen Styles 10/4/2024 8:07 PM Dictation workstation:   [id number]    XR hip right with pelvis when performed 2 or 3 views    Result Date: 10/4/2024  Interpreted By:  Mohsen Styles, STUDY: XR HIP RIGHT WITH PELVIS WHEN PERFORMED 2 OR 3 VIEWS; ;  10/4/2024 7:33 pm   INDICATION: Signs/Symptoms:Right hip pain after fall.     COMPARISON: None.   ACCESSION NUMBER(S): [id number]   ORDERING CLINICIAN: GUILHERME VILLARREAL   FINDINGS: No evidence of acute displaced pelvic or right hip fracture. There is osteopenia. Degenerative change of the imaged lower lumbar spine. Vascular calcifications.       No evidence of acute displaced pelvic or right hip fracture.   If the patient is acutely unable to bear weight or pain increases, cross-sectional imaging is recommended to exclude radiographically occult fracture.     MACRO: None   Signed by: Mohsen Styles 10/4/2024 8:06 PM Dictation workstation:   [id number]       Results for orders placed or performed during the hospital encounter of 10/04/24 (from the past 24 hour(s))   Creatine Kinase   Result Value Ref Range    Creatine Kinase 2,479 (H) 0 - 215 U/L   Comprehensive metabolic panel   Result Value Ref Range    Glucose 103 (H) 74 - 99 mg/dL    Sodium 137 136 - 145 mmol/L    Potassium 4.1 3.5 - 5.3 mmol/L    Chloride 105 98 - 107 mmol/L    Bicarbonate 18 (L) 21 - 32 mmol/L    Anion Gap 18 10 - 20 mmol/L    Urea Nitrogen 39 (H) 6 - 23 mg/dL    Creatinine 2.56 (H) 0.50 - 1.05 mg/dL    eGFR 18 (L) >60 mL/min/1.73m*2    Calcium 9.3 8.6 - 10.3 mg/dL    Albumin 4.0 3.4 - 5.0 g/dL    Alkaline Phosphatase 97 33 - 136 U/L    Total Protein 7.3 6.4 - 8.2 g/dL    AST 46 (H) 9 - 39 U/L    Bilirubin, Total  0.3 0.0 - 1.2 mg/dL    ALT 19 7 - 45 U/L   Magnesium   Result Value Ref Range    Magnesium 2.50 (H) 1.60 - 2.40 mg/dL   TSH with reflex to Free T4 if abnormal   Result Value Ref Range    Thyroid Stimulating Hormone 0.29 (L) 0.44 - 3.98 mIU/L   Troponin I, High Sensitivity, Initial   Result Value Ref Range    Troponin I, High Sensitivity 9 0 - 13 ng/L   Thyroxine, Free   Result Value Ref Range    Thyroxine, Free 1.59 (H) 0.61 - 1.12 ng/dL   CBC and Auto Differential   Result Value Ref Range    WBC 36.5 (H) 4.4 - 11.3 x10*3/uL    nRBC 0.0 0.0 - 0.0 /100 WBCs    RBC 3.73 (L) 4.00 - 5.20 x10*6/uL    Hemoglobin 11.7 (L) 12.0 - 16.0 g/dL    Hematocrit 37.6 36.0 - 46.0 %     (H) 80 - 100 fL    MCH 31.4 26.0 - 34.0 pg    MCHC 31.1 (L) 32.0 - 36.0 g/dL    RDW 13.2 11.5 - 14.5 %    Platelets 235 150 - 450 x10*3/uL    Immature Granulocytes %, Automated 0.4 0.0 - 0.9 %    Immature Granulocytes Absolute, Automated 0.14 0.00 - 0.50 x10*3/uL   Manual Differential   Result Value Ref Range    Neutrophils %, Manual 42.0 40.0 - 80.0 %    Lymphocytes %, Manual 55.0 13.0 - 44.0 %    Monocytes %, Manual 2.0 2.0 - 10.0 %    Eosinophils %, Manual 0.0 0.0 - 6.0 %    Basophils %, Manual 0.0 0.0 - 2.0 %    Atypical Lymphocytes %, Manual 1.0 0.0 - 2.0 %    Seg Neutrophils Absolute, Manual 15.33 (H) 1.60 - 5.00 x10*3/uL    Lymphocytes Absolute, Manual 20.08 (H) 0.80 - 3.00 x10*3/uL    Monocytes Absolute, Manual 0.73 0.05 - 0.80 x10*3/uL    Eosinophils Absolute, Manual 0.00 0.00 - 0.40 x10*3/uL    Basophils Absolute, Manual 0.00 0.00 - 0.10 x10*3/uL    Atypical Lymphs Absolute, Manual 0.37 (H) 0.00 - 0.30 x10*3/uL    Total Cells Counted 100     RBC Morphology No significant RBC morphology present    Troponin, High Sensitivity, 1 Hour   Result Value Ref Range    Troponin I, High Sensitivity 11 0 - 13 ng/L      Assessment/Plan   Assessment & Plan  Traumatic rhabdomyolysis, initial encounter (CMS-[facility])      Rhabdomyolysis.  Continue IV  hydration hold diuresis.  Consult nephrologist.  Monitor renal function and CK levels  Mechanical fall.  PT OT consult.  No traumatic changes on diagnostic workup  Paroxysmal atrial fibrillation, continue Eliquis.  Chronic renal failure.  Consult nephrologist monitor renal function hold diuresis for now  Leukocytosis could be a combination of chronic leukemia and active C. difficile infection  C. difficile infection.  Oral vancomycin, consult Dr. Elias  CODE STATUS: DNR/DNI       Lynn Mccullough MD

## 2024-10-05 NOTE — PROGRESS NOTES
Spiritual Care Visit    Clinical Encounter Type  Visited With: Patient not available  Routine Visit: Introduction  Continue Visiting: Yes     Vikas Ervin

## 2024-10-05 NOTE — ED PROVIDER NOTES
HPI   Chief Complaint   Patient presents with    Fall     Fall last pm. Down since . C/o of right shoulder pain        84-year-old female with a history of asthma, hypertension, rheumatoid arthritis, leukemia and recurrent C. difficile infections is brought to the emergency department with a chief complaint of fall.  At about 1:30 AM this morning the patient had a fall where she landed on her right shoulder and hit the right side of her head and hip.  She denies any loss of consciousness.  She normally lives with her son who is out of town for the day and when family tried to call to check up on her she did not answer her phone.  Her other son came to check up on her and stated that she was on the floor and could not get up and had soiled herself with urine and feces.  She states that she has not had any of her daytime medications.  This includes prescription for C. difficile.  Her son at bedside notes that she is acting normally.      History provided by:  Patient, medical records, relative and EMS personnel   used: No            Patient History   Past Medical History:   Diagnosis Date    Asthma     Essential (primary) hypertension     Hypertension    Kidney failure     Leukemia (Multi)     Rheumatoid arthritis      Past Surgical History:   Procedure Laterality Date    BACK SURGERY      Back Surgery    CARDIAC ELECTROPHYSIOLOGY PROCEDURE Right 6/2/2024    Procedure: Temporary Pacemaker Insertion;  Surgeon: Javier Steel DO;  Location: [facility] Cardiac Cath Lab;  Service: Cardiovascular;  Laterality: Right;  pacemaker line in place sutured in with 2-0 silk    CARDIAC ELECTROPHYSIOLOGY PROCEDURE N/A 6/3/2024    Procedure: PPM IMPLANT DUAL;  Surgeon: Timmy Chisholm MD;  Location: [facility] Cardiac Cath Lab;  Service: Electrophysiology;  Laterality: N/A;  abbott    CHOLECYSTECTOMY      Cholecystectomy    HYSTERECTOMY      Hysterectomy    KNEE SURGERY      arthroscopic surgery?    OTHER SURGICAL HISTORY       Shoulder Surgery Left    SHOULDER SURGERY Left     Left shoulder impingement surgery    TOTAL KNEE ARTHROPLASTY Left 02/13/2014     Family History   Problem Relation Name Age of Onset    Emphysema Mother      Emphysema Father      Heart attack Sister      Lung cancer Brother          Agent orange    Lymphoma Son      Multiple sclerosis Son       Social History     Tobacco Use    Smoking status: Never     Passive exposure: Never    Smokeless tobacco: Never   Vaping Use    Vaping status: Never Used   Substance Use Topics    Alcohol use: Not Currently     Comment: rarely    Drug use: Never       Physical Exam   ED Triage Vitals [10/04/24 1829]   Temperature Heart Rate Respirations BP   36.5 °C (97.7 °F) 78 18 155/87      Pulse Ox Temp Source Heart Rate Source Patient Position   98 % Oral Monitor Lying      BP Location FiO2 (%)     Right arm --       Physical Exam  Vitals and nursing note reviewed.   Constitutional:       General: She is not in acute distress.     Appearance: She is well-developed.   HENT:      Head: Normocephalic.      Comments: Right-sided temporal head contusion and maxillary contusion  Eyes:      Conjunctiva/sclera: Conjunctivae normal.   Cardiovascular:      Rate and Rhythm: Normal rate and regular rhythm.      Heart sounds: No murmur heard.  Pulmonary:      Effort: Pulmonary effort is normal. No respiratory distress.      Breath sounds: Normal breath sounds.   Abdominal:      Palpations: Abdomen is soft.      Tenderness: There is no abdominal tenderness.   Musculoskeletal:         General: No swelling.      Cervical back: Neck supple.   Skin:     General: Skin is warm and dry.      Capillary Refill: Capillary refill takes less than 2 seconds.   Neurological:      Mental Status: She is alert.   Psychiatric:         Mood and Affect: Mood normal.           ED Course & MDM   ED Course as of 10/08/24 0803   Fri Oct 04, 2024   1835 ECG 12 lead  ECG performed on October 4, 2024 at 6:32 PM and  interpreted by me at 6:35 PM showing an atrial paced rhythm with prolonged AV conduction and AZ interval of 228.  Left axis deviation, otherwise intervals within normal limits, nonspecific ST-T wave abnormality, no STEMI. [EG]   2147 CBC and Auto Differential(!)  Significant derangements, but consistent with history of leukemia [EG]   Sat Oct 05, 2024   0322 CT head wo IV contrast  IMPRESSION:  No acute cortical infarct or acute intracranial hemorrhage. Moderate  chronic microvascular ischemic change.   [EG]      ED Course User Index  [EG] Carissa Antunez MD         Diagnoses as of 10/08/24 0803   Traumatic rhabdomyolysis, initial encounter (CMS-Former[facility])   Chronic kidney disease, unspecified CKD stage                 No data recorded     Chris Coma Scale Score: 15 (10/04/24 2301 : Win Garcia RN)                           Medical Decision Making    HPI:  As Above  PMHx/PSHx/Meds/Allergies/SH/FH as per nursing documentation and reviewed.  Review of systems: Total of 10 systems reviewed and otherwise negative except as noted elsewhere    DDX: As described in MDM    If performed, radiology listed above interpreted by me and confirmed by the Radiologist.  Medications administered during this visit (name and route): see MAR  Social determinants of health considered for this visit: lives at home  If performed, EKG interpreted by me and detailed above    MDM Summary/considerations:  See ED course for further details    84-year-old female presenting to the emergency department after nearly a day of unable to get off the floor.  Patient has a significantly elevated CK level and likely in rhabdomyolysis.  She has a history of chronic kidney disease and will need hospitalization to administer IV fluids and protect kidneys.  The patient initially said that she did not hit her head, but does have evidence of a contusion on her right temple which may be from rubbing her head on the floor, however as this was  unwitnessed head injury was part of the differential and she was evaluated with a CT scan that is negative for acute intracranial pathology.  Patient will be admitted to the hospitalist service.    The patient was seen and triaged by our nursing/medic staff, their vitals were taken and the staff notes were reviewed.  If the patient arrived by an EMS squad or an outside agency, we discussed the case with transporting EMS medic, police, or other historians. My initial assessment was attention to their airway, breathing, and circulatory status.  We addressed any immediate or life threatening findings and completed a medical history and a physical exam if the patient or those legally responsible were in agreement with this.   **Disclaimer:  This note was dictated by speech recognition technology.  Minor errors in transcription may be present.  Please contact for clarification or corrections.      Amount and/or Complexity of Data Reviewed  Labs: ordered. Decision-making details documented in ED Course.  Radiology: ordered and independent interpretation performed. Decision-making details documented in ED Course.  ECG/medicine tests: ordered and independent interpretation performed. Decision-making details documented in ED Course.        Procedure  Critical Care    Performed by: Carissa Antunez MD  Authorized by: Carissa Antunez MD    Critical care provider statement:     Critical care time (minutes):  35    Critical care time was exclusive of:  Separately billable procedures and treating other patients    Critical care was necessary to treat or prevent imminent or life-threatening deterioration of the following conditions:  Trauma    Critical care was time spent personally by me on the following activities:  Development of treatment plan with patient or surrogate, evaluation of patient's response to treatment, examination of patient, obtaining history from patient or surrogate, ordering and performing  treatments and interventions, ordering and review of radiographic studies, pulse oximetry, ordering and review of laboratory studies, re-evaluation of patient's condition and review of old charts    Care discussed with: admitting provider         Carissa Antunez MD  10/08/24 0891

## 2024-10-05 NOTE — CONSULTS
Consults    Reason For Consult  rhabdomyolysis    History Of Present Illness  Tracie Baltazar is a 84 y.o. female presenting with fall, reports was down on the floor for 18 hours, patient reports ongoing trouble with her balance as she was washing her hands and fell on the floor. Currently residing with her son , however son was not home the day of fall. Reports pain to her right hip and right leg. Patient has history of CKD stage IV, and reports following up with Dr Amor as outpatient. Also has a referral for vascular sx, Dr Strong but patient has not been able to get an appointment yet. Also reports being diagnosed with c.diff a week ago and is currently under management for C.diff with Dr. Elias. Still experiences small bouts of diarrhea. Otherwise has no complains. In no acute distress.      Past Medical History  She has a past medical history of Asthma, Essential (primary) hypertension, Kidney failure, Leukemia (Multi), and Rheumatoid arthritis.    Surgical History  She has a past surgical history that includes Hysterectomy; Back surgery; Cholecystectomy; Other surgical history; Knee surgery; Shoulder surgery (Left); Total knee arthroplasty (Left, 02/13/2014); Cardiac electrophysiology procedure (Right, 6/2/2024); and Cardiac electrophysiology procedure (N/A, 6/3/2024).     Social History  She reports that she has never smoked. She has never been exposed to tobacco smoke. She has never used smokeless tobacco. She reports that she does not currently use alcohol. She reports that she does not use drugs.    Family History  Family History   Problem Relation Name Age of Onset    Emphysema Mother      Emphysema Father      Heart attack Sister      Lung cancer Brother          Agent orange    Lymphoma Son      Multiple sclerosis Son          Allergies  Ibuprofen and Amoxicillin    Review of Systems  Negative otherwise what is reported as positive in HPI     Physical Exam  Neck:      Vascular: No carotid bruit.  "  Cardiovascular:      Rate and Rhythm: Normal rate and regular rhythm.      Heart sounds: No murmur heard.     No friction rub. No gallop.   Pulmonary:      Breath sounds: No wheezing, rhonchi or rales.   Chest:      Chest wall: No tenderness.   Abdominal:      General: There is no distension.      Tenderness: There is no abdominal tenderness. There is no guarding or rebound.   Musculoskeletal:         General: No swelling or tenderness.      Cervical back: Neck supple.      Right lower leg: No edema.      Left lower leg: No edema.   Lymphadenopathy:      Cervical: No cervical adenopathy    I/O last 3 completed shifts:  In: 1000 (14.7 mL/kg) [IV Piggyback:1000]  Out: - (0 mL/kg)   Weight: 68 kg   No intake/output data recorded.     Visit Vitals  BP (!) 140/45 (BP Location: Right arm, Patient Position: Lying) Comment: R$N notified   Pulse 60   Temp 36.9 °C (98.4 °F) (Oral)   Resp 16   Ht 1.676 m (5' 6\")   Wt 68 kg (149 lb 14.6 oz)   SpO2 98%   BMI 24.20 kg/m²   OB Status Hysterectomy   Smoking Status Never   BSA 1.78 m²         Current Facility-Administered Medications:     acetaminophen (Tylenol) tablet 650 mg, 650 mg, oral, q6h PRN, Lynn Mccullough MD    alum-mag hydroxide-simeth (Mylanta) 200-200-20 mg/5 mL oral suspension 30 mL, 30 mL, oral, 4x daily PRN, Lynn Mccullough MD    amiodarone (Pacerone) tablet 200 mg, 200 mg, oral, Daily, Lynn Mccullough MD, 200 mg at 10/05/24 1031    amLODIPine (Norvasc) tablet 5 mg, 5 mg, oral, Daily, Lynn Mccullough MD, 5 mg at 10/05/24 1031    apixaban (Eliquis) tablet 2.5 mg, 2.5 mg, oral, BID, Lynn Mccullough MD, 2.5 mg at 10/05/24 1031    cholecalciferol (Vitamin D-3) tablet 1,000 Units, 1,000 Units, oral, Daily, Lynn Mccullough MD, 1,000 Units at 10/05/24 1031    cyanocobalamin (Vitamin B-12) tablet 100 mcg, 100 mcg, oral, Daily, Lynn Mccullough MD, 100 mcg at 10/05/24 1031    DULoxetine (Cymbalta) DR capsule 20 mg, 20 mg, oral, Daily, Lynn" MD Jamel, 20 mg at 10/05/24 1031    gabapentin (Neurontin) capsule 300 mg, 300 mg, oral, BID, Lynn Mccullough MD, 300 mg at 10/05/24 1031    levothyroxine (Synthroid, Levoxyl) tablet 150 mcg, 150 mcg, oral, Daily, Lynn Mccullough MD, 150 mcg at 10/05/24 0706    melatonin tablet 5 mg, 5 mg, oral, Nightly PRN, Lynn Mccullough MD    metoprolol succinate XL (Toprol-XL) 24 hr tablet 12.5 mg, 12.5 mg, oral, Daily, Lynn Mccullough MD    ondansetron (Zofran) injection 4 mg, 4 mg, intravenous, q6h PRN, Lynn Mccullough MD    ondansetron (Zofran) tablet 4 mg, 4 mg, oral, 4x daily PRN, Lynn Mccullough MD    simvastatin (Zocor) tablet 10 mg, 10 mg, oral, Nightly, Lynn Mccullough MD    sodium chloride 0.9% infusion, 125 mL/hr, intravenous, Continuous, Lynn Mccullough MD, Last Rate: 125 mL/hr at 10/05/24 0713, 125 mL/hr at 10/05/24 0713    vancomycin (Vancocin) capsule 250 mg, 250 mg, oral, 4x daily, Lynn Mccullough MD, 250 mg at 10/05/24 0800     Results for orders placed or performed during the hospital encounter of 10/04/24 (from the past 24 hour(s))   Creatine Kinase   Result Value Ref Range    Creatine Kinase 2,479 (H) 0 - 215 U/L   Comprehensive metabolic panel   Result Value Ref Range    Glucose 103 (H) 74 - 99 mg/dL    Sodium 137 136 - 145 mmol/L    Potassium 4.1 3.5 - 5.3 mmol/L    Chloride 105 98 - 107 mmol/L    Bicarbonate 18 (L) 21 - 32 mmol/L    Anion Gap 18 10 - 20 mmol/L    Urea Nitrogen 39 (H) 6 - 23 mg/dL    Creatinine 2.56 (H) 0.50 - 1.05 mg/dL    eGFR 18 (L) >60 mL/min/1.73m*2    Calcium 9.3 8.6 - 10.3 mg/dL    Albumin 4.0 3.4 - 5.0 g/dL    Alkaline Phosphatase 97 33 - 136 U/L    Total Protein 7.3 6.4 - 8.2 g/dL    AST 46 (H) 9 - 39 U/L    Bilirubin, Total 0.3 0.0 - 1.2 mg/dL    ALT 19 7 - 45 U/L   Magnesium   Result Value Ref Range    Magnesium 2.50 (H) 1.60 - 2.40 mg/dL   TSH with reflex to Free T4 if abnormal   Result Value Ref Range    Thyroid Stimulating Hormone  0.29 (L) 0.44 - 3.98 mIU/L   Troponin I, High Sensitivity, Initial   Result Value Ref Range    Troponin I, High Sensitivity 9 0 - 13 ng/L   Thyroxine, Free   Result Value Ref Range    Thyroxine, Free 1.59 (H) 0.61 - 1.12 ng/dL   CBC and Auto Differential   Result Value Ref Range    WBC 36.5 (H) 4.4 - 11.3 x10*3/uL    nRBC 0.0 0.0 - 0.0 /100 WBCs    RBC 3.73 (L) 4.00 - 5.20 x10*6/uL    Hemoglobin 11.7 (L) 12.0 - 16.0 g/dL    Hematocrit 37.6 36.0 - 46.0 %     (H) 80 - 100 fL    MCH 31.4 26.0 - 34.0 pg    MCHC 31.1 (L) 32.0 - 36.0 g/dL    RDW 13.2 11.5 - 14.5 %    Platelets 235 150 - 450 x10*3/uL    Immature Granulocytes %, Automated 0.4 0.0 - 0.9 %    Immature Granulocytes Absolute, Automated 0.14 0.00 - 0.50 x10*3/uL   Manual Differential   Result Value Ref Range    Neutrophils %, Manual 42.0 40.0 - 80.0 %    Lymphocytes %, Manual 55.0 13.0 - 44.0 %    Monocytes %, Manual 2.0 2.0 - 10.0 %    Eosinophils %, Manual 0.0 0.0 - 6.0 %    Basophils %, Manual 0.0 0.0 - 2.0 %    Atypical Lymphocytes %, Manual 1.0 0.0 - 2.0 %    Seg Neutrophils Absolute, Manual 15.33 (H) 1.60 - 5.00 x10*3/uL    Lymphocytes Absolute, Manual 20.08 (H) 0.80 - 3.00 x10*3/uL    Monocytes Absolute, Manual 0.73 0.05 - 0.80 x10*3/uL    Eosinophils Absolute, Manual 0.00 0.00 - 0.40 x10*3/uL    Basophils Absolute, Manual 0.00 0.00 - 0.10 x10*3/uL    Atypical Lymphs Absolute, Manual 0.37 (H) 0.00 - 0.30 x10*3/uL    Total Cells Counted 100     RBC Morphology No significant RBC morphology present    Troponin, High Sensitivity, 1 Hour   Result Value Ref Range    Troponin I, High Sensitivity 11 0 - 13 ng/L   Renal Function Panel   Result Value Ref Range    Glucose 79 74 - 99 mg/dL    Sodium 139 136 - 145 mmol/L    Potassium 3.2 (L) 3.5 - 5.3 mmol/L    Chloride 108 (H) 98 - 107 mmol/L    Bicarbonate 21 21 - 32 mmol/L    Anion Gap 13 10 - 20 mmol/L    Urea Nitrogen 38 (H) 6 - 23 mg/dL    Creatinine 2.61 (H) 0.50 - 1.05 mg/dL    eGFR 18 (L) >60  mL/min/1.73m*2    Calcium 8.6 8.6 - 10.3 mg/dL    Phosphorus 4.0 2.5 - 4.9 mg/dL    Albumin 3.4 3.4 - 5.0 g/dL   CBC   Result Value Ref Range    WBC 32.1 (H) 4.4 - 11.3 x10*3/uL    nRBC 0.0 0.0 - 0.0 /100 WBCs    RBC 3.38 (L) 4.00 - 5.20 x10*6/uL    Hemoglobin 10.8 (L) 12.0 - 16.0 g/dL    Hematocrit 33.5 (L) 36.0 - 46.0 %    MCV 99 80 - 100 fL    MCH 32.0 26.0 - 34.0 pg    MCHC 32.2 32.0 - 36.0 g/dL    RDW 13.2 11.5 - 14.5 %    Platelets 221 150 - 450 x10*3/uL   Creatine Kinase   Result Value Ref Range    Creatine Kinase 1,918 (H) 0 - 215 U/L        Chronic kidney disease stage IV- stable and at baseline, no uremia, no need for dialysis at this time, monitor renal function, intake and output closely, avoid nephrotoxins, avoid statins going forward.  Hypokalemia-replete with supplementation  Rhabdomyolysis.  Continue IV hydration, monitor CK levels  Mechanical fall  Paroxysmal atrial fibrillation  Leukocytosis and c.diff- ID on board      JUMA Thompson-CNP

## 2024-10-06 VITALS
TEMPERATURE: 97.5 F | BODY MASS INDEX: 24.09 KG/M2 | OXYGEN SATURATION: 100 % | HEIGHT: 66 IN | WEIGHT: 149.91 LBS | DIASTOLIC BLOOD PRESSURE: 42 MMHG | SYSTOLIC BLOOD PRESSURE: 130 MMHG | RESPIRATION RATE: 18 BRPM | HEART RATE: 60 BPM

## 2024-10-06 LAB
ALBUMIN SERPL BCP-MCNC: 3.3 G/DL (ref 3.4–5)
ALP SERPL-CCNC: 76 U/L (ref 33–136)
ALT SERPL W P-5'-P-CCNC: 20 U/L (ref 7–45)
ANION GAP SERPL CALCULATED.3IONS-SCNC: 10 MMOL/L (ref 10–20)
ANION GAP SERPL CALCULATED.3IONS-SCNC: 10 MMOL/L (ref 10–20)
AST SERPL W P-5'-P-CCNC: 33 U/L (ref 9–39)
BILIRUB SERPL-MCNC: 0.3 MG/DL (ref 0–1.2)
BUN SERPL-MCNC: 34 MG/DL (ref 6–23)
BUN SERPL-MCNC: 36 MG/DL (ref 6–23)
CALCIUM SERPL-MCNC: 8.4 MG/DL (ref 8.6–10.3)
CALCIUM SERPL-MCNC: 8.4 MG/DL (ref 8.6–10.3)
CHLORIDE SERPL-SCNC: 111 MMOL/L (ref 98–107)
CHLORIDE SERPL-SCNC: 111 MMOL/L (ref 98–107)
CO2 SERPL-SCNC: 22 MMOL/L (ref 21–32)
CO2 SERPL-SCNC: 22 MMOL/L (ref 21–32)
CREAT SERPL-MCNC: 2.59 MG/DL (ref 0.5–1.05)
CREAT SERPL-MCNC: 2.64 MG/DL (ref 0.5–1.05)
EGFRCR SERPLBLD CKD-EPI 2021: 17 ML/MIN/1.73M*2
EGFRCR SERPLBLD CKD-EPI 2021: 18 ML/MIN/1.73M*2
ERYTHROCYTE [DISTWIDTH] IN BLOOD BY AUTOMATED COUNT: 13.4 % (ref 11.5–14.5)
GLUCOSE SERPL-MCNC: 82 MG/DL (ref 74–99)
GLUCOSE SERPL-MCNC: 86 MG/DL (ref 74–99)
HCT VFR BLD AUTO: 31.7 % (ref 36–46)
HGB BLD-MCNC: 9.9 G/DL (ref 12–16)
HOLD SPECIMEN: NORMAL
MCH RBC QN AUTO: 31.1 PG (ref 26–34)
MCHC RBC AUTO-ENTMCNC: 31.2 G/DL (ref 32–36)
MCV RBC AUTO: 100 FL (ref 80–100)
NRBC BLD-RTO: 0 /100 WBCS (ref 0–0)
PLATELET # BLD AUTO: 209 X10*3/UL (ref 150–450)
POTASSIUM SERPL-SCNC: 3.7 MMOL/L (ref 3.5–5.3)
POTASSIUM SERPL-SCNC: 3.7 MMOL/L (ref 3.5–5.3)
PROT SERPL-MCNC: 6.1 G/DL (ref 6.4–8.2)
RBC # BLD AUTO: 3.18 X10*6/UL (ref 4–5.2)
SODIUM SERPL-SCNC: 139 MMOL/L (ref 136–145)
SODIUM SERPL-SCNC: 139 MMOL/L (ref 136–145)
WBC # BLD AUTO: 30 X10*3/UL (ref 4.4–11.3)

## 2024-10-06 PROCEDURE — 80053 COMPREHEN METABOLIC PANEL: CPT | Performed by: HOSPITALIST

## 2024-10-06 PROCEDURE — 87529 HSV DNA AMP PROBE: CPT | Mod: WESLAB | Performed by: INTERNAL MEDICINE

## 2024-10-06 PROCEDURE — 2500000001 HC RX 250 WO HCPCS SELF ADMINISTERED DRUGS (ALT 637 FOR MEDICARE OP): Performed by: HOSPITALIST

## 2024-10-06 PROCEDURE — 36415 COLL VENOUS BLD VENIPUNCTURE: CPT | Performed by: INTERNAL MEDICINE

## 2024-10-06 PROCEDURE — 2500000001 HC RX 250 WO HCPCS SELF ADMINISTERED DRUGS (ALT 637 FOR MEDICARE OP): Performed by: INTERNAL MEDICINE

## 2024-10-06 PROCEDURE — 36415 COLL VENOUS BLD VENIPUNCTURE: CPT | Performed by: HOSPITALIST

## 2024-10-06 PROCEDURE — 2500000002 HC RX 250 W HCPCS SELF ADMINISTERED DRUGS (ALT 637 FOR MEDICARE OP, ALT 636 FOR OP/ED): Performed by: INTERNAL MEDICINE

## 2024-10-06 PROCEDURE — 85027 COMPLETE CBC AUTOMATED: CPT | Performed by: HOSPITALIST

## 2024-10-06 PROCEDURE — 99239 HOSP IP/OBS DSCHRG MGMT >30: CPT | Performed by: HOSPITALIST

## 2024-10-06 PROCEDURE — 80048 BASIC METABOLIC PNL TOTAL CA: CPT | Mod: CCI | Performed by: INTERNAL MEDICINE

## 2024-10-06 PROCEDURE — 2500000004 HC RX 250 GENERAL PHARMACY W/ HCPCS (ALT 636 FOR OP/ED): Performed by: INTERNAL MEDICINE

## 2024-10-06 RX ORDER — ACYCLOVIR 800 MG/1
800 TABLET ORAL EVERY 8 HOURS SCHEDULED
Status: DISCONTINUED | OUTPATIENT
Start: 2024-10-06 | End: 2024-10-06 | Stop reason: HOSPADM

## 2024-10-06 RX ORDER — ACYCLOVIR 800 MG/1
800 TABLET ORAL EVERY 8 HOURS SCHEDULED
Qty: 30 TABLET | Refills: 0 | Status: SHIPPED | OUTPATIENT
Start: 2024-10-06 | End: 2024-10-16

## 2024-10-06 RX ORDER — VANCOMYCIN HYDROCHLORIDE 125 MG/1
250 CAPSULE ORAL 4 TIMES DAILY
Qty: 80 CAPSULE | Refills: 0 | Status: SHIPPED | OUTPATIENT
Start: 2024-10-06 | End: 2024-10-16

## 2024-10-06 RX ORDER — ACYCLOVIR 800 MG/1
800 TABLET ORAL EVERY 8 HOURS SCHEDULED
Qty: 30 TABLET | Refills: 0 | Status: SHIPPED | OUTPATIENT
Start: 2024-10-06 | End: 2024-10-06

## 2024-10-06 RX ORDER — VANCOMYCIN HYDROCHLORIDE 125 MG/1
250 CAPSULE ORAL 4 TIMES DAILY
Qty: 80 CAPSULE | Refills: 0 | Status: SHIPPED | OUTPATIENT
Start: 2024-10-06 | End: 2024-10-06

## 2024-10-06 RX ADMIN — ACETAMINOPHEN 325MG 650 MG: 325 TABLET ORAL at 10:38

## 2024-10-06 RX ADMIN — AMLODIPINE BESYLATE 5 MG: 5 TABLET ORAL at 09:00

## 2024-10-06 RX ADMIN — AMIODARONE HYDROCHLORIDE 200 MG: 200 TABLET ORAL at 09:00

## 2024-10-06 RX ADMIN — SODIUM CHLORIDE 125 ML/HR: 900 INJECTION, SOLUTION INTRAVENOUS at 08:59

## 2024-10-06 RX ADMIN — DULOXETINE HYDROCHLORIDE 20 MG: 20 CAPSULE, DELAYED RELEASE ORAL at 09:00

## 2024-10-06 RX ADMIN — VANCOMYCIN HYDROCHLORIDE 250 MG: 250 CAPSULE ORAL at 06:41

## 2024-10-06 RX ADMIN — CHOLECALCIFEROL TAB 25 MCG (1000 UNIT) 1000 UNITS: 25 TAB at 08:59

## 2024-10-06 RX ADMIN — APIXABAN 2.5 MG: 2.5 TABLET, FILM COATED ORAL at 09:00

## 2024-10-06 RX ADMIN — ACYCLOVIR 800 MG: 800 TABLET ORAL at 10:38

## 2024-10-06 RX ADMIN — Medication 100 MCG: at 08:59

## 2024-10-06 RX ADMIN — GABAPENTIN 300 MG: 300 CAPSULE ORAL at 08:59

## 2024-10-06 RX ADMIN — LEVOTHYROXINE SODIUM 150 MCG: 0.15 TABLET ORAL at 06:41

## 2024-10-06 RX ADMIN — METOPROLOL SUCCINATE 12.5 MG: 25 TABLET, EXTENDED RELEASE ORAL at 09:00

## 2024-10-06 SDOH — ECONOMIC STABILITY: FOOD INSECURITY: WITHIN THE PAST 12 MONTHS, YOU WORRIED THAT YOUR FOOD WOULD RUN OUT BEFORE YOU GOT MONEY TO BUY MORE.: NEVER TRUE

## 2024-10-06 SDOH — ECONOMIC STABILITY: FOOD INSECURITY: WITHIN THE PAST 12 MONTHS, THE FOOD YOU BOUGHT JUST DIDN'T LAST AND YOU DIDN'T HAVE MONEY TO GET MORE.: NEVER TRUE

## 2024-10-06 SDOH — HEALTH STABILITY: MENTAL HEALTH: HOW OFTEN DO YOU HAVE A DRINK CONTAINING ALCOHOL?: NEVER

## 2024-10-06 SDOH — SOCIAL STABILITY: SOCIAL NETWORK: ARE YOU MARRIED, WIDOWED, DIVORCED, SEPARATED, NEVER MARRIED, OR LIVING WITH A PARTNER?: WIDOWED

## 2024-10-06 SDOH — ECONOMIC STABILITY: HOUSING INSECURITY: AT ANY TIME IN THE PAST 12 MONTHS, WERE YOU HOMELESS OR LIVING IN A SHELTER (INCLUDING NOW)?: NO

## 2024-10-06 SDOH — ECONOMIC STABILITY: INCOME INSECURITY: HOW HARD IS IT FOR YOU TO PAY FOR THE VERY BASICS LIKE FOOD, HOUSING, MEDICAL CARE, AND HEATING?: NOT HARD AT ALL

## 2024-10-06 SDOH — ECONOMIC STABILITY: INCOME INSECURITY: IN THE PAST 12 MONTHS, HAS THE ELECTRIC, GAS, OIL, OR WATER COMPANY THREATENED TO SHUT OFF SERVICE IN YOUR HOME?: NO

## 2024-10-06 SDOH — SOCIAL STABILITY: SOCIAL NETWORK: HOW OFTEN DO YOU GET TOGETHER WITH FRIENDS OR RELATIVES?: MORE THAN THREE TIMES A WEEK

## 2024-10-06 SDOH — ECONOMIC STABILITY: INCOME INSECURITY: IN THE LAST 12 MONTHS, WAS THERE A TIME WHEN YOU WERE NOT ABLE TO PAY THE MORTGAGE OR RENT ON TIME?: NO

## 2024-10-06 SDOH — HEALTH STABILITY: PHYSICAL HEALTH: ON AVERAGE, HOW MANY MINUTES DO YOU ENGAGE IN EXERCISE AT THIS LEVEL?: 0 MIN

## 2024-10-06 SDOH — HEALTH STABILITY: MENTAL HEALTH: HOW MANY STANDARD DRINKS CONTAINING ALCOHOL DO YOU HAVE ON A TYPICAL DAY?: PATIENT DOES NOT DRINK

## 2024-10-06 SDOH — SOCIAL STABILITY: SOCIAL INSECURITY: WITHIN THE LAST YEAR, HAVE YOU BEEN HUMILIATED OR EMOTIONALLY ABUSED IN OTHER WAYS BY YOUR PARTNER OR EX-PARTNER?: NO

## 2024-10-06 SDOH — HEALTH STABILITY: MENTAL HEALTH: HOW OFTEN DO YOU HAVE 6 OR MORE DRINKS ON ONE OCCASION?: NEVER

## 2024-10-06 SDOH — SOCIAL STABILITY: SOCIAL NETWORK: HOW OFTEN DO YOU ATTENT MEETINGS OF THE CLUB OR ORGANIZATION YOU BELONG TO?: MORE THAN 4 TIMES PER YEAR

## 2024-10-06 SDOH — HEALTH STABILITY: PHYSICAL HEALTH: ON AVERAGE, HOW MANY DAYS PER WEEK DO YOU ENGAGE IN MODERATE TO STRENUOUS EXERCISE (LIKE A BRISK WALK)?: 0 DAYS

## 2024-10-06 SDOH — SOCIAL STABILITY: SOCIAL INSECURITY: WITHIN THE LAST YEAR, HAVE YOU BEEN AFRAID OF YOUR PARTNER OR EX-PARTNER?: NO

## 2024-10-06 SDOH — SOCIAL STABILITY: SOCIAL NETWORK: HOW OFTEN DO YOU ATTEND CHURCH OR RELIGIOUS SERVICES?: NEVER

## 2024-10-06 ASSESSMENT — COGNITIVE AND FUNCTIONAL STATUS - GENERAL
CLIMB 3 TO 5 STEPS WITH RAILING: A LITTLE
TOILETING: A LITTLE
DAILY ACTIVITIY SCORE: 23
MOBILITY SCORE: 23

## 2024-10-06 ASSESSMENT — PAIN DESCRIPTION - DESCRIPTORS: DESCRIPTORS: ACHING;SORE

## 2024-10-06 ASSESSMENT — PAIN DESCRIPTION - LOCATION: LOCATION: SHOULDER

## 2024-10-06 ASSESSMENT — PAIN - FUNCTIONAL ASSESSMENT
PAIN_FUNCTIONAL_ASSESSMENT: 0-10

## 2024-10-06 ASSESSMENT — LIFESTYLE VARIABLES
AUDIT-C TOTAL SCORE: 0
SKIP TO QUESTIONS 9-10: 1

## 2024-10-06 ASSESSMENT — PAIN SCALES - GENERAL
PAINLEVEL_OUTOF10: 0 - NO PAIN
PAINLEVEL_OUTOF10: 0 - NO PAIN
PAINLEVEL_OUTOF10: 3

## 2024-10-06 ASSESSMENT — PAIN DESCRIPTION - ORIENTATION: ORIENTATION: RIGHT

## 2024-10-06 NOTE — NURSING NOTE
Assumed care of patient, patient is in bed awake with call light and possessions within reach and no issues at this time.

## 2024-10-06 NOTE — PROGRESS NOTES
Tracie Baltazar is a 84 y.o. female on day 1 of admission presenting with Traumatic rhabdomyolysis, initial encounter (CMS-[facility]).    Patient lives with her son and cat in a three level house, basement, main and upper levels. She uses a 4ww, wears bilateral hearing aides. She is independent with ADLs, daily tasks. She does not drive, her two sons transport. RNCC suggested a [employer] Alert button. Patient states she has one but it doesn't work, issue with the base. States she and her daughter have made multiple calls to the company for the necessary labels, mailers, to send it back. Neither have been contacted by the company. PCP is Dr Javier Floyd.   ADOD 10/06/2024  [facility]: PT-16, OT-18  RN[facility] offered to set up [facility]-PT. Patient declined. Plan is to discharge home, one of her sons will transport.       Angelina Adkins RN

## 2024-10-06 NOTE — PROGRESS NOTES
10/06/24 0952   Physical Activity   On average, how many days per week do you engage in moderate to strenuous exercise (like a brisk walk)? 0 days   On average, how many minutes do you engage in exercise at this level? 0 min   Financial Resource Strain   How hard is it for you to pay for the very basics like food, housing, medical care, and heating? Not hard   Housing Stability   In the last 12 months, was there a time when you were not able to pay the mortgage or rent on time? N   At any time in the past 12 months, were you homeless or living in a shelter (including now)? N   Transportation Needs   In the past 12 months, has lack of transportation kept you from medical appointments or from getting medications? no   In the past 12 months, has lack of transportation kept you from meetings, work, or from getting things needed for daily living? No   Food Insecurity   Within the past 12 months, you worried that your food would run out before you got the money to buy more. Never true   Within the past 12 months, the food you bought just didn't last and you didn't have money to get more. Never true   Stress   Do you feel stress - tense, restless, nervous, or anxious, or unable to sleep at night because your mind is troubled all the time - these days? Not at all   Social Connections   In a typical week, how many times do you talk on the phone with family, friends, or neighbors? More than 3   How often do you get together with friends or relatives? More than 3   How often do you attend Druze or Mandaeism services? Never   Do you belong to any clubs or organizations such as Druze groups, unions, fraternal or athletic groups, or school groups? Yes   How often do you attend meetings of the clubs or organizations you belong to? More than 4   Are you , , , , never , or living with a partner?    Intimate Partner Violence   Within the last year, have you been afraid of your partner  or ex-partner? No   Within the last year, have you been humiliated or emotionally abused in other ways by your partner or ex-partner? No   Within the last year, have you been kicked, hit, slapped, or otherwise physically hurt by your partner or ex-partner? No   Within the last year, have you been raped or forced to have any kind of sexual activity by your partner or ex-partner? No   Alcohol Use   Q1: How often do you have a drink containing alcohol? Never   Q2: How many drinks containing alcohol do you have on a typical day when you are drinking? None   Q3: How often do you have six or more drinks on one occasion? Never   Utilities   In the past 12 months has the electric, gas, oil, or water company threatened to shut off services in your home? No   Health Literacy   How often do you need to have someone help you when you read instructions, pamphlets, or other written material from your doctor or pharmacy? Never

## 2024-10-06 NOTE — PROGRESS NOTES
"Tracie Baltazar is a 84 y.o. female on day 1 of admission presenting with Traumatic rhabdomyolysis, initial encounter (CMS-[facility]).    Subjective          Objective     Physical Exam    Last Recorded Vitals  Blood pressure (!) 130/42, pulse 60, temperature 36.4 °C (97.5 °F), temperature source Oral, resp. rate 18, height 1.676 m (5' 6\"), weight 68 kg (149 lb 14.6 oz), SpO2 100%.    Intake/Output last 3 Shifts:  I/O last 3 completed shifts:  In: 4252.5 (62.5 mL/kg) [P.O.:840; I.V.:2412.5 (35.5 mL/kg); IV Piggyback:1000]  Out: 2 (0 mL/kg) [Urine:2 (0 mL/kg/hr)]  Weight: 68 kg     Current Facility-Administered Medications:     acetaminophen (Tylenol) tablet 650 mg, 650 mg, oral, q6h PRN, Lynn Mccullough MD, 650 mg at 10/05/24 2141    alum-mag hydroxide-simeth (Mylanta) 200-200-20 mg/5 mL oral suspension 30 mL, 30 mL, oral, 4x daily PRN, Lynn Mccullough MD    amiodarone (Pacerone) tablet 200 mg, 200 mg, oral, Daily, Lynn Mccullough MD, 200 mg at 10/06/24 0900    amLODIPine (Norvasc) tablet 5 mg, 5 mg, oral, Daily, Darnell Amor MD, 5 mg at 10/06/24 0900    apixaban (Eliquis) tablet 2.5 mg, 2.5 mg, oral, BID, Lynn Mccullough MD, 2.5 mg at 10/06/24 0900    cholecalciferol (Vitamin D-3) tablet 1,000 Units, 1,000 Units, oral, Daily, Lynn Mccullough MD, 1,000 Units at 10/06/24 0859    cyanocobalamin (Vitamin B-12) tablet 100 mcg, 100 mcg, oral, Daily, Lynn Mccullough MD, 100 mcg at 10/06/24 0859    DULoxetine (Cymbalta) DR capsule 20 mg, 20 mg, oral, Daily, Lynn Mccullough MD, 20 mg at 10/06/24 0900    gabapentin (Neurontin) capsule 300 mg, 300 mg, oral, BID, Lynn Mccullough MD, 300 mg at 10/06/24 0859    levothyroxine (Synthroid, Levoxyl) tablet 150 mcg, 150 mcg, oral, Daily, Lynn Mccullough MD, 150 mcg at 10/06/24 0641    melatonin tablet 5 mg, 5 mg, oral, Nightly PRN, Lynn Mccullough MD    metoprolol succinate XL (Toprol-XL) 24 hr tablet 12.5 mg, 12.5 mg, oral, Daily, Darnell Amor, " MD, 12.5 mg at 10/06/24 0900    ondansetron (Zofran) injection 4 mg, 4 mg, intravenous, q6h PRN, Lynn Mccullough MD    ondansetron (Zofran) tablet 4 mg, 4 mg, oral, 4x daily PRN, Lynn Mccullough MD    sodium chloride 0.9% infusion, 125 mL/hr, intravenous, Continuous, Lynn Mccullough MD, Last Rate: 125 mL/hr at 10/06/24 0859, 125 mL/hr at 10/06/24 0859    vancomycin (Vancocin) capsule 250 mg, 250 mg, oral, 4x daily, Lynn Mccullough MD, 250 mg at 10/06/24 0641   Relevant Results    Results for orders placed or performed during the hospital encounter of 10/04/24 (from the past 96 hour(s))   Creatine Kinase   Result Value Ref Range    Creatine Kinase 2,479 (H) 0 - 215 U/L   Comprehensive metabolic panel   Result Value Ref Range    Glucose 103 (H) 74 - 99 mg/dL    Sodium 137 136 - 145 mmol/L    Potassium 4.1 3.5 - 5.3 mmol/L    Chloride 105 98 - 107 mmol/L    Bicarbonate 18 (L) 21 - 32 mmol/L    Anion Gap 18 10 - 20 mmol/L    Urea Nitrogen 39 (H) 6 - 23 mg/dL    Creatinine 2.56 (H) 0.50 - 1.05 mg/dL    eGFR 18 (L) >60 mL/min/1.73m*2    Calcium 9.3 8.6 - 10.3 mg/dL    Albumin 4.0 3.4 - 5.0 g/dL    Alkaline Phosphatase 97 33 - 136 U/L    Total Protein 7.3 6.4 - 8.2 g/dL    AST 46 (H) 9 - 39 U/L    Bilirubin, Total 0.3 0.0 - 1.2 mg/dL    ALT 19 7 - 45 U/L   Magnesium   Result Value Ref Range    Magnesium 2.50 (H) 1.60 - 2.40 mg/dL   TSH with reflex to Free T4 if abnormal   Result Value Ref Range    Thyroid Stimulating Hormone 0.29 (L) 0.44 - 3.98 mIU/L   Troponin I, High Sensitivity, Initial   Result Value Ref Range    Troponin I, High Sensitivity 9 0 - 13 ng/L   Thyroxine, Free   Result Value Ref Range    Thyroxine, Free 1.59 (H) 0.61 - 1.12 ng/dL   CBC and Auto Differential   Result Value Ref Range    WBC 36.5 (H) 4.4 - 11.3 x10*3/uL    nRBC 0.0 0.0 - 0.0 /100 WBCs    RBC 3.73 (L) 4.00 - 5.20 x10*6/uL    Hemoglobin 11.7 (L) 12.0 - 16.0 g/dL    Hematocrit 37.6 36.0 - 46.0 %     (H) 80 - 100 fL    MCH  31.4 26.0 - 34.0 pg    MCHC 31.1 (L) 32.0 - 36.0 g/dL    RDW 13.2 11.5 - 14.5 %    Platelets 235 150 - 450 x10*3/uL    Immature Granulocytes %, Automated 0.4 0.0 - 0.9 %    Immature Granulocytes Absolute, Automated 0.14 0.00 - 0.50 x10*3/uL   Manual Differential   Result Value Ref Range    Neutrophils %, Manual 42.0 40.0 - 80.0 %    Lymphocytes %, Manual 55.0 13.0 - 44.0 %    Monocytes %, Manual 2.0 2.0 - 10.0 %    Eosinophils %, Manual 0.0 0.0 - 6.0 %    Basophils %, Manual 0.0 0.0 - 2.0 %    Atypical Lymphocytes %, Manual 1.0 0.0 - 2.0 %    Seg Neutrophils Absolute, Manual 15.33 (H) 1.60 - 5.00 x10*3/uL    Lymphocytes Absolute, Manual 20.08 (H) 0.80 - 3.00 x10*3/uL    Monocytes Absolute, Manual 0.73 0.05 - 0.80 x10*3/uL    Eosinophils Absolute, Manual 0.00 0.00 - 0.40 x10*3/uL    Basophils Absolute, Manual 0.00 0.00 - 0.10 x10*3/uL    Atypical Lymphs Absolute, Manual 0.37 (H) 0.00 - 0.30 x10*3/uL    Total Cells Counted 100     RBC Morphology No significant RBC morphology present    Troponin, High Sensitivity, 1 Hour   Result Value Ref Range    Troponin I, High Sensitivity 11 0 - 13 ng/L   Renal Function Panel   Result Value Ref Range    Glucose 79 74 - 99 mg/dL    Sodium 139 136 - 145 mmol/L    Potassium 3.2 (L) 3.5 - 5.3 mmol/L    Chloride 108 (H) 98 - 107 mmol/L    Bicarbonate 21 21 - 32 mmol/L    Anion Gap 13 10 - 20 mmol/L    Urea Nitrogen 38 (H) 6 - 23 mg/dL    Creatinine 2.61 (H) 0.50 - 1.05 mg/dL    eGFR 18 (L) >60 mL/min/1.73m*2    Calcium 8.6 8.6 - 10.3 mg/dL    Phosphorus 4.0 2.5 - 4.9 mg/dL    Albumin 3.4 3.4 - 5.0 g/dL   CBC   Result Value Ref Range    WBC 32.1 (H) 4.4 - 11.3 x10*3/uL    nRBC 0.0 0.0 - 0.0 /100 WBCs    RBC 3.38 (L) 4.00 - 5.20 x10*6/uL    Hemoglobin 10.8 (L) 12.0 - 16.0 g/dL    Hematocrit 33.5 (L) 36.0 - 46.0 %    MCV 99 80 - 100 fL    MCH 32.0 26.0 - 34.0 pg    MCHC 32.2 32.0 - 36.0 g/dL    RDW 13.2 11.5 - 14.5 %    Platelets 221 150 - 450 x10*3/uL   Creatine Kinase   Result Value Ref  Range    Creatine Kinase 1,918 (H) 0 - 215 U/L   Basic metabolic panel   Result Value Ref Range    Glucose 82 74 - 99 mg/dL    Sodium 139 136 - 145 mmol/L    Potassium 3.7 3.5 - 5.3 mmol/L    Chloride 111 (H) 98 - 107 mmol/L    Bicarbonate 22 21 - 32 mmol/L    Anion Gap 10 10 - 20 mmol/L    Urea Nitrogen 36 (H) 6 - 23 mg/dL    Creatinine 2.64 (H) 0.50 - 1.05 mg/dL    eGFR 17 (L) >60 mL/min/1.73m*2    Calcium 8.4 (L) 8.6 - 10.3 mg/dL   CBC   Result Value Ref Range    WBC 30.0 (H) 4.4 - 11.3 x10*3/uL    nRBC 0.0 0.0 - 0.0 /100 WBCs    RBC 3.18 (L) 4.00 - 5.20 x10*6/uL    Hemoglobin 9.9 (L) 12.0 - 16.0 g/dL    Hematocrit 31.7 (L) 36.0 - 46.0 %     80 - 100 fL    MCH 31.1 26.0 - 34.0 pg    MCHC 31.2 (L) 32.0 - 36.0 g/dL    RDW 13.4 11.5 - 14.5 %    Platelets 209 150 - 450 x10*3/uL       Assessment/Plan   Chronic kidney disease stage IV her renal function remains stable for 17 patient has no uremia my plan to continue hydration continue to monitor renal function there is no indication for any renal replacement therapy at this point  Hypokalemia potassium today is 3.7 continue to monitor  Rhabdomyolysis CPK level down to 1918 from 2479 continue to monitor CPK and continue IV hydration  Mechanical fall  Paroxysmal atrial fibrillation  C. difficile colitis continue antibiotics per infectious disease             Epifanio Amor MD

## 2024-10-06 NOTE — PROGRESS NOTES
Tracie Baltazar is a 84 y.o. female on day 1 of admission presenting with Traumatic rhabdomyolysis, initial encounter (CMS-[facility]).    Subjective   Interval History:   Afebrile, no chills  Patient has right hip blisters, no redness, not painful  Patient seen with nurse and primary attending  No diarrhea reported        Review of Systems   Skin:  Positive for rash.   All other systems reviewed and are negative.      Objective   Range of Vitals (last 24 hours)  Heart Rate:  [60-61]   Temp:  [36.6 °C (97.8 °F)-36.9 °C (98.4 °F)]   Resp:  [16-17]   BP: (125-157)/(40-46)   SpO2:  [98 %-100 %]   Daily Weight  10/04/24 : 68 kg (149 lb 14.6 oz)    Body mass index is 24.2 kg/m².    Physical Exam  Constitutional:       Appearance: Normal appearance.   HENT:      Head: Normocephalic and atraumatic.      Nose: Nose normal.   Eyes:      Extraocular Movements: Extraocular movements intact.      Conjunctiva/sclera: Conjunctivae normal.   Cardiovascular:      Rate and Rhythm: Normal rate and regular rhythm.      Heart sounds: Normal heart sounds.   Pulmonary:      Effort: Pulmonary effort is normal.      Breath sounds: Normal breath sounds.   Abdominal:      General: Bowel sounds are normal.      Palpations: Abdomen is soft.   Musculoskeletal:      Cervical back: Normal range of motion and neck supple.      Right lower leg: No edema.      Left lower leg: No edema.   Skin:     Comments: Right hip blisters   Neurological:      Mental Status: She is alert.   Psychiatric:         Behavior: Behavior normal. Behavior is cooperative.         Antibiotics  vancomycin - 250 mg    Relevant Results  Labs  Results from last 72 hours   Lab Units 10/06/24  0623 10/05/24  0655 10/04/24  1851   WBC AUTO x10*3/uL 30.0* 32.1* 36.5*   HEMOGLOBIN g/dL 9.9* 10.8* 11.7*   HEMATOCRIT % 31.7* 33.5* 37.6   PLATELETS AUTO x10*3/uL 209 221 235   LYMPHO PCT MAN %  --   --  55.0   MONO PCT MAN %  --   --  2.0   EOSINO PCT MAN %  --   --  0.0     Results from  "last 72 hours   Lab Units 10/06/24  0623 10/05/24  0655 10/04/24  1850   SODIUM mmol/L 139 139 137   POTASSIUM mmol/L 3.7 3.2* 4.1   CHLORIDE mmol/L 111* 108* 105   CO2 mmol/L 22 21 18*   BUN mg/dL 36* 38* 39*   CREATININE mg/dL 2.64* 2.61* 2.56*   GLUCOSE mg/dL 82 79 103*   CALCIUM mg/dL 8.4* 8.6 9.3   ANION GAP mmol/L 10 13 18   EGFR mL/min/1.73m*2 17* 18* 18*   PHOSPHORUS mg/dL  --  4.0  --      Results from last 72 hours   Lab Units 10/05/24  0655 10/04/24  1850   ALK PHOS U/L  --  97   BILIRUBIN TOTAL mg/dL  --  0.3   PROTEIN TOTAL g/dL  --  7.3   ALT U/L  --  19   AST U/L  --  46*   ALBUMIN g/dL 3.4 4.0     Estimated Creatinine Clearance: 14.8 mL/min (A) (by C-G formula based on SCr of 2.64 mg/dL (H)).  No results found for: \"CRP\"  Microbiology  Reviewed  Imaging  CT head wo IV contrast    Result Date: 10/5/2024  Interpreted By:  Vern Davis, STUDY: CT HEAD WO IV CONTRAST;  10/5/2024 3:00 am   INDICATION: Signs/Symptoms:Injury of head.   COMPARISON: Head CT 06/01/2024.   ACCESSION NUMBER(S): [id number]   ORDERING CLINICIAN: GUILHERME VILLARREAL   TECHNIQUE: Axial noncontrast CT images of the head.   FINDINGS: Gray-white matter differentiation is maintained. There are no extra-axial collections. No mass effect or midline shift. There is no hydrocephalus. Generalized cerebral volume loss and associated ventricular and sulcal enlargement. Scattered periventricular and deep white matter hypodensities and chronic right thalamus lacunar infarct and bilateral basal ganglia lacunar infarcts suggestive of moderate chronic microvascular ischemic change.   HEMORRHAGE: No acute intracranial hemorrhage.   CALVARIUM: No depressed skull fracture.   EXTRACRANIAL SOFT TISSUES:. Unremarkable.   PARANASAL SINUSES/MASTOIDS: There is a small retention cyst versus polyp in the left maxillary antrum. The visualized paranasal sinuses are well aerated. Mastoid air cells are clear.   ORBITS: Grossly normal.       No acute " cortical infarct or acute intracranial hemorrhage. Moderate chronic microvascular ischemic change.     Signed by: Vern Davis 10/5/2024 3:18 AM Dictation workstation:   [id number]    XR shoulder right 2+ views    Result Date: 10/4/2024  Interpreted By:  Mohsen Styles, STUDY: XR SHOULDER RIGHT 2+ VIEWS; ;  10/4/2024 7:33 pm   INDICATION: Signs/Symptoms:Pain after fall.     COMPARISON: None.   ACCESSION NUMBER(S): [id number]   ORDERING CLINICIAN: GUILHERME VILLARREAL   FINDINGS: No acute fracture or dislocation of the right shoulder. There is acromioclavicular osteoarthrosis with joint space narrowing and osseous spurring. There is osteopenia.       No acute fracture or dislocation of the right shoulder.     MACRO: None   Signed by: Mohsen Styles 10/4/2024 8:36 PM Dictation workstation:   [id number]    XR chest 1 view    Result Date: 10/4/2024  Interpreted By:  Mohsen Styles, STUDY: XR CHEST 1 VIEW;  10/4/2024 7:33 pm   INDICATION: Signs/Symptoms:Fall.   COMPARISON: None.   ACCESSION NUMBER(S): [id number]   ORDERING CLINICIAN: GUILHERME VILLARREAL   FINDINGS: There is right side cardiac generator and dual leads. There is cardiomegaly. Atherosclerotic calcification of the thoracic aorta. No airspace consolidation or pleural effusion. No pneumothorax.       No airspace consolidation or pleural effusion.   MACRO: None   Signed by: Mohsen Styles 10/4/2024 8:07 PM Dictation workstation:   [id number]    XR hip right with pelvis when performed 2 or 3 views    Result Date: 10/4/2024  Interpreted By:  Mohsen Styles, STUDY: XR HIP RIGHT WITH PELVIS WHEN PERFORMED 2 OR 3 VIEWS; ;  10/4/2024 7:33 pm   INDICATION: Signs/Symptoms:Right hip pain after fall.     COMPARISON: None.   ACCESSION NUMBER(S): [id number]   ORDERING CLINICIAN: GUILHERME VILLARREAL   FINDINGS: No evidence of acute displaced pelvic or right hip fracture. There is osteopenia. Degenerative change of the imaged lower lumbar spine. Vascular  calcifications.       No evidence of acute displaced pelvic or right hip fracture.   If the patient is acutely unable to bear weight or pain increases, cross-sectional imaging is recommended to exclude radiographically occult fracture.     MACRO: None   Signed by: Mohsen Styles 10/4/2024 8:06 PM Dictation workstation:   [id number]       Assessment/Plan   Leukocytosis, secondary to CLL  Clostridium difficile infection, improving  Right hip blisters-rule out HSV  Rhabdomyolysis, resolving     Oral vancomycin  HSV PCR from blisters  Empiric therapy with acyclovir  Monitor CK level  Supportive care  Monitor stool  Monitor temperature and WBC  Discharge planning        Hipolito Elias MD

## 2024-10-06 NOTE — DISCHARGE SUMMARY
Discharge Diagnosis  Traumatic rhabdomyolysis, initial encounter (CMS-[facility])    Issues Requiring Follow-Up  Vesicular lesions on right hip:  HSV PCR pending, patient is to follow up with ID    Discharge Meds     Medication List      START taking these medications     acyclovir 800 mg tablet; Commonly known as: Zovirax; Take 1 tablet (800   mg) by mouth every 8 hours for 10 days.   vancomycin 125 mg capsule; Commonly known as: Vancocin; Take 2 capsules   (250 mg) by mouth 4 times a day for 10 days.     CONTINUE taking these medications     acetaminophen 325 mg tablet; Commonly known as: Tylenol; Take 2 tablets   (650 mg) by mouth every 4 hours if needed for mild pain (1 - 3).   amiodarone 200 mg tablet; Commonly known as: Pacerone; Take 1 tablet by   mouth once daily   amLODIPine 5 mg tablet; Commonly known as: Norvasc; Take 1 tablet (5 mg)   by mouth once daily.   apixaban 2.5 mg tablet; Commonly known as: Eliquis; Take 1 tablet (2.5   mg) by mouth 2 times a day. To facility: Please hold until June 8 due to   hematoma around the pacemaker site.   cholecalciferol 50 MCG (2000 UT) tablet; Commonly known as: Vitamin D-3   DULoxetine 20 mg DR capsule; Commonly known as: Cymbalta; Take 1 capsule   (20 mg) by mouth once daily. Do not crush or chew.   gabapentin 300 mg capsule; Commonly known as: Neurontin; Take 1 capsule   by mouth twice daily   levothyroxine 150 mcg tablet; Commonly known as: Synthroid, Levoxyl;   TAKE 1 TABLET BY MOUTH ONCE DAILY IN THE MORNING AS DIRECTED   metoprolol succinate XL 25 mg 24 hr tablet; Commonly known as: Toprol-XL   ondansetron ODT 4 mg disintegrating tablet; Commonly known as:   Zofran-ODT; Take 1 tablet (4 mg) by mouth every 8 hours if needed for   nausea or vomiting.   simvastatin 10 mg tablet; Commonly known as: Zocor; Take 1 tablet (10   mg) by mouth once daily at bedtime.   Vitamin B-12 100 mcg tablet; Generic drug: cyanocobalamin       Test Results Pending At Discharge  Pending Labs        Order Current Status    HSV PCR In process            Hospital Course   Per HPI:  Tracie Baltazar is a 84 y.o. female presenting with mechanical fall.  Patient has a history of recurrent C. difficile.  She is currently undergoing treatment with oral vancomycin.  Patient has multiple bowel movements.  She normally lives with her son but he was out today.  Patient was in the bathroom and when she turned to grab a towel she fell on the floor.  Unfortunately she was too weak to get up so she spent about 18 hours on the floor until she was found by her family members.  Patient states that she hit her head but did not lose consciousness.  She complains of pain in her right arm and right hip since the fall.    84-year-old female was admitted status post mechanical fall.  Patient was on the floor for 18 hours until she was found by family members.  She was admitted for acute on chronic renal failure secondary to rhabdomyolysis.  Patient has a history of recurrent chronic C. difficile and follows up with ID as outpatient.  She was admitted for IV fluid hydration and her renal function is back to her baseline.  She was being followed by nephrology.  Patient was resumed on her oral Vanco and was seen by infectious disease.  During her hospitalization course she did have imaging for hip pain which was negative for fracture however she continued to have pain at the site and developed vesicular lesions this was evaluated by hospitalist and ID concerns for possible shingles and patient was started on renal dosing for acyclovir.  HSV PCR done prior to discharge and patient is to follow-up with ID as outpatient for results.      Pertinent Physical Exam At Time of Discharge  Physical Exam    Outpatient Follow-Up  Future Appointments   Date Time Provider Department Center   10/31/2024 11:30 AM [facility] [id number] [id number] DEISY Salgado   12/5/2024  4:00 PM Javier Floyd MD [id number] [facility]  ANGELA Wilkins MD

## 2024-10-06 NOTE — PROGRESS NOTES
10/06/24 0956   [facility] Disability Status   Are you deaf or do you have serious difficulty hearing? Y  (bilateral hearing aides)   Are you blind or do you have serious difficulty seeing, even when wearing glasses? N   Because of a physical, mental, or emotional condition, do you have serious difficulty concentrating, remembering, or making decisions? (5 years old or older) N   Do you have serious difficulty walking or climbing stairs? N   Do you have serious difficulty dressing or bathing? N   Because of a physical, mental, or emotional condition, do you have serious difficulty doing errands alone such as visiting the doctor? N  (does not drive, sons transport)

## 2024-10-06 NOTE — PROGRESS NOTES
10/06/24 0954   Discharge Planning   Living Arrangements Children   Support Systems Children;Friends/neighbors;Religious/pamela community   Assistance Needed none   Type of Residence Private residence   Number of Stairs to Enter Residence 0   Number of Stairs Within Residence 2  (one flight to basement, one flight to upper level)   Do you have animals or pets at home? Yes   Type of Animals or Pets a cat   Who is requesting discharge planning? Provider   Home or Post Acute Services None   Expected Discharge Disposition Home   Does the patient need discharge transport arranged? No   Patient Choice   Provider Choice list and CMS website (https://medicare.gov/care-compare#search) for post-acute Quality and Resource Measure Data were provided and reviewed with: Other (Comment)  (patient declined [facility])   Patient / Family choosing to utilize agency / facility established prior to hospitalization No

## 2024-10-06 NOTE — CARE PLAN
The patient's goals for the shift include free of falls    The clinical goals for the shift include safety

## 2024-10-07 ENCOUNTER — PATIENT OUTREACH (OUTPATIENT)
Dept: PRIMARY CARE | Facility: CLINIC | Age: 84
End: 2024-10-07
Payer: MEDICARE

## 2024-10-07 ENCOUNTER — DOCUMENTATION (OUTPATIENT)
Dept: PRIMARY CARE | Facility: CLINIC | Age: 84
End: 2024-10-07
Payer: MEDICARE

## 2024-10-07 LAB
HSV1 DNA SKIN QL NAA+PROBE: NOT DETECTED
HSV2 DNA SKIN QL NAA+PROBE: NOT DETECTED

## 2024-10-07 NOTE — PROGRESS NOTES
Discharge Facility: [facility]  Discharge Diagnosis: Traumatic Rhabdomyolysis  Admission Date: 10/05/2024  Discharge Date: 10/06/2024    PCP Appointment Date: patient has declined PCP appointment  Specialist Appointment Date: Cardio 10/31/2024  Hospital Encounter and Summary Linked: Yes    See discharge assessment below for further details    Medications  Medications reviewed with patient/caregiver?: Yes (10/7/2024 11:44 AM)  Is the patient having any side effects they believe may be caused by any medication additions or changes?: No (10/7/2024 11:44 AM)  Does the patient have all medications ordered at discharge?: Yes (10/7/2024 11:44 AM)  Prescription Comments: Scripts given at discharge for Zovirax and Vancocin (10/7/2024 11:44 AM)  Is the patient taking all medications as directed (includes completed medication regime)?: Yes (10/7/2024 11:44 AM)  Medication Comments: Patient denies any issues obtaining or affording medication (10/7/2024 11:44 AM)    Appointments  Does the patient have a primary care provider?: Yes (10/7/2024 11:44 AM)  Care Management Interventions: -- (patient declined PCP appointment) (10/7/2024 11:44 AM)  Has the patient kept scheduled appointments due by today?: Yes (10/7/2024 11:44 AM)    Self Management  What is the home health agency?: N/A (10/7/2024 11:44 AM)  What Durable Medical Equipment (DME) was ordered?: N/A (10/7/2024 11:44 AM)    Patient Teaching  Does the patient have access to their discharge instructions?: Yes (10/7/2024 11:44 AM)  Care Management Interventions: Reviewed instructions with patient (10/7/2024 11:44 AM)  Is the patient/caregiver able to teach back the hierarchy of who to call/visit for symptoms/problems? PCP, Specialist, Home Health nurse, Urgent Care, ED, 911: Yes (10/7/2024 11:44 AM)  Patient/Caregiver Education Comments: CM spoke to patient via phone. She states that she is doing okay at home. She admits to pain and soreness from the fall. she has blisters on  her right hip that her daugher has been dressing. She has all discharge medication at the home. She has declined PCP appointment at this time. She is aware to call PCP with any concerns of infection. She was very thankful for this call. (10/7/2024 11:44 AM)

## 2024-10-08 LAB
ATRIAL RATE: 60 BPM
P OFFSET: 182 MS
P ONSET: 162 MS
PR INTERVAL: 228 MS
Q ONSET: 211 MS
QRS COUNT: 10 BEATS
QRS DURATION: 96 MS
QT INTERVAL: 408 MS
QTC CALCULATION(BAZETT): 408 MS
QTC FREDERICIA: 408 MS
R AXIS: -33 DEGREES
T AXIS: 48 DEGREES
T OFFSET: 415 MS
VENTRICULAR RATE: 60 BPM

## 2024-10-10 NOTE — H&P
History Of Present Illness  Tracie Baltazar is a 84 y.o. female presenting with PMH of  PAF, SSS, s/p  dual chamber PPM placement 6/3/2024. She is having issues with her PPM pocket, is lateral and PM is catching her arm, also makes it difficult to wear a bra. Of note pt admit to hospital 10/4/02204-10/6/2024 with rhabdomyolysis s/p mechanical fall without fractures. Additional history includes HTN, HLD, CHF, atrial fib, CKD stage 4, asthma, RA, leukemia, Cdiff +on 9/20/2024. [facility], no longer having liquid stools.          Past Medical History:  Past Medical History:   Diagnosis Date    Asthma     Essential (primary) hypertension     Hypertension    Kidney failure     Leukemia (Multi)     Rheumatoid arthritis         Past Surgical History:  Past Surgical History:   Procedure Laterality Date    BACK SURGERY      Back Surgery    CARDIAC ELECTROPHYSIOLOGY PROCEDURE Right 6/2/2024    Procedure: Temporary Pacemaker Insertion;  Surgeon: Javier Steel DO;  Location: [facility];  Service: Cardiovascular;  Laterality: Right;  pacemaker line in place sutured in with 2-0 silk    CARDIAC ELECTROPHYSIOLOGY PROCEDURE N/A 6/3/2024    Procedure: PPM IMPLANT DUAL;  Surgeon: Timmy Chisholm MD;  Location: [facility] Cardiac [facility];  Service: Electrophysiology;  Laterality: N/A;  abbott    CHOLECYSTECTOMY      Cholecystectomy    HYSTERECTOMY      Hysterectomy    KNEE SURGERY      arthroscopic surgery?    OTHER SURGICAL HISTORY      Shoulder Surgery Left    SHOULDER SURGERY Left     Left shoulder impingement surgery    TOTAL KNEE ARTHROPLASTY Left 02/13/2014          Social History:   reports that she has never smoked. She has never been exposed to tobacco smoke. She has never used smokeless tobacco. She reports that she does not currently use alcohol. She reports that she does not use drugs.     Family History:  Family History   Problem Relation Name Age of Onset    Emphysema Mother      Emphysema  Father      Heart attack Sister      Lung cancer Brother          Agent orange    Lymphoma Son      Multiple sclerosis Son          Allergies:  Allergies   Allergen Reactions    Ibuprofen Other and Unknown     Hx of kidney failure    Amoxicillin Rash        Home Medications:  Current Outpatient Medications   Medication Instructions    acetaminophen (TYLENOL) 650 mg, oral, Every 4 hours PRN    acyclovir (ZOVIRAX) 800 mg, oral, Every 8 hours scheduled    amiodarone (PACERONE) 200 mg, oral, Daily    amLODIPine (NORVASC) 5 mg, oral, Daily    apixaban (ELIQUIS) 2.5 mg, oral, 2 times daily, To facility: Please hold until June 8 due to hematoma around the pacemaker site.    cholecalciferol (Vitamin D-3) 50 MCG (2000 UT) tablet Vitamin D 50 MCG (2000 UT) Oral Tablet   Refills: 0       Active    cyanocobalamin (Vitamin B-12) 100 mcg tablet = 1 tab(s) ( 100 mcg ), Oral, daily, 0 Refill(s), Type: Maintenance    DULoxetine (CYMBALTA) 20 mg, oral, Daily, Do not crush or chew.    gabapentin (NEURONTIN) 300 mg, oral, 2 times daily    levothyroxine (Synthroid, Levoxyl) 150 mcg tablet TAKE 1 TABLET BY MOUTH ONCE DAILY IN THE MORNING AS DIRECTED    metoprolol succinate XL (TOPROL-XL) 12.5 mg, oral, Daily, Do not crush or chew.    ondansetron ODT (ZOFRAN-ODT) 4 mg, oral, Every 8 hours PRN    simvastatin (ZOCOR) 10 mg, oral, Nightly    vancomycin (VANCOCIN) 250 mg, oral, 4 times daily       Inpatient Medications:  Scheduled medications   Medication Dose Route Frequency    vancomycin  1,000 mg intravenous Once     PRN medications   Medication     Continuous Medications   Medication Dose Last Rate         Review of Systems   Constitutional:  Positive for fatigue.   HENT:  Positive for hearing loss.    Eyes: Negative.    Respiratory: Negative.     Cardiovascular: Negative.    Gastrointestinal: Negative.  Negative for diarrhea and nausea.   Endocrine: Negative.    Genitourinary: Negative.    Musculoskeletal: Negative.    Skin: Negative.   "  Allergic/Immunologic: Negative.    Neurological: Negative.    Hematological: Negative.    Psychiatric/Behavioral: Negative.            Physical Exam  Constitutional:       General: She is awake. She is not in acute distress.     Appearance: She is not ill-appearing.   HENT:      Head: Normocephalic and atraumatic.   Cardiovascular:      Rate and Rhythm: Normal rate and regular rhythm.      Pulses:           Radial pulses are 2+ on the right side and 2+ on the left side.        Dorsalis pedis pulses are 2+ on the right side and 2+ on the left side.      Heart sounds: Murmur heard.   Pulmonary:      Effort: Pulmonary effort is normal.      Breath sounds: Normal breath sounds.   Abdominal:      General: Bowel sounds are normal.      Palpations: Abdomen is soft.   Musculoskeletal:         General: Normal range of motion.      Right lower leg: No edema.      Left lower leg: No edema.   Skin:     General: Skin is warm and dry.      Capillary Refill: Capillary refill takes less than 2 seconds.      Findings: Lesion (right hip s/p fall last week) present.   Neurological:      General: No focal deficit present.      Mental Status: She is alert and oriented to person, place, and time. Mental status is at baseline.      Cranial Nerves: Cranial nerves 2-12 are intact.   Psychiatric:         Mood and Affect: Mood and affect normal.         Behavior: Behavior normal.        Sedation Plan    ASA 3     Mallampati class: III.           NPO since 1800 on 10/13/2024    Last Recorded Vitals  Blood pressure 163/61, pulse 59, temperature 35.8 °C (96.5 °F), temperature source Temporal, resp. rate 18, height 1.6 m (5' 3\"), weight 70.3 kg (155 lb), SpO2 97%.         Vitals from the Past 24 Hours  Heart Rate:  [59]   Temp:  [35.8 °C (96.5 °F)]   Resp:  [18]   BP: (163)/(61)   Height:  [160 cm (5' 3\")]   Weight:  [70.3 kg (155 lb)]   SpO2:  [97 %]          Relevant Results    Labs    CBC:   Recent Labs     10/11/24  1244 10/06/24  0623 " "10/05/24  0655 10/04/24  1851 09/09/24  1318 08/13/24  1137   WBC 32.5* 30.0* 32.1* 36.5* 26.1* 27.5*   HGB 10.8* 9.9* 10.8* 11.7* 10.3* 10.5*   HCT 35.1* 31.7* 33.5* 37.6 33.6* 34.2*    209 221 235 256 238   * 100 99 101* 104* 103*     BMP/CMP:   Recent Labs     10/11/24  1244 10/06/24  0924 10/06/24  0623 10/05/24  0655 10/04/24  1850 09/09/24  1318 08/13/24  1137 06/10/24  0617 06/06/24  0620 06/05/24  0509 06/04/24  0435    139 139 139 137 142 141   < > 138 137 138   K 4.0 3.7 3.7 3.2* 4.1 4.1 4.3   < > 4.5 4.1 4.6    111* 111* 108* 105 103 104   < > 104 104 102   BUN 27* 34* 36* 38* 39* 40* 48*   < > 29* 28* 22   CREATININE 2.23* 2.59* 2.64* 2.61* 2.56* 2.80* 2.90*   < > 2.50* 2.50* 2.20*   CO2 26 22 22 21 18* 26 23*   < > 24 24 24   CALCIUM 8.9 8.4* 8.4* 8.6 9.3 9.0 9.2   < > 8.3* 8.1* 8.4*   PROT  --  6.1*  --   --  7.3  --  6.7  --  4.9* 4.8* 5.2*   BILITOT  --  0.3  --   --  0.3  --  0.2  --  0.3 <0.2 0.3   ALKPHOS  --  76  --   --  97  --  108  --  84 69 77   ALT  --  20  --   --  19  --  6  --  6 6 6   AST  --  33  --   --  46*  --  14  --  15 12 14   GLUCOSE 88 86 82 79 103* 92 89   < > 85 82 103*    < > = values in this interval not displayed.      Lipid Panel:   Recent Labs     08/13/24  1137 06/03/24  0423 12/08/22  0943 01/25/21  1453   CHOL 203* 151 247* 203*   HDL 80.0 59.0 52 66   CHHDL 2.5 2.6 4.8 3.1   TRIG 107 108 156* 122     Cardiac         No lab exists for component: \"CK\", \"CKMBP\"     Hemoglobin A1C:   Recent Labs     12/19/19  1050   HGBA1C 5.4     TSH/ Free T4:   Recent Labs     10/04/24  1850 08/13/24  1137 06/02/24  0557 06/01/24  1817 12/24/23  0408 12/18/23  0429 12/08/22  0943 01/25/21  1453 05/14/18  1045   TSH 0.29* 1.42  --  9.18* 12.17* 20.97* 41.82* 2.64 0.65   FREET4 1.59*  --  1.60  --   --  1.10 0.9  --   --      Iron:   Recent Labs     06/04/24  1405 12/18/23  0429 08/02/22  1436   FERRITIN 899* 589* 567*   TIBC 125* 188* 256   IRONSAT 26 18 16* " "    Coag:     ABO: No results found for: \"ABO\"    Past Cardiology Tests (Last 3 Years):    EKG:  Recent Labs     10/04/24  1855 06/01/24  1611 12/24/23  0319   ATRRATE 60 43 67   VENTRATE 60 43 67   PRINT 228 166 144   QRSDUR 96 90 84   QTCFRED 408 427 386   QTCCALCB 408 403 393     Encounter Date: 10/04/24   ECG 12 lead   Result Value    Ventricular Rate 60    Atrial Rate 60    VT Interval 228    QRS Duration 96    QT Interval 408    QTC Calculation(Bazett) 408    R Axis -33    T Axis 48    QRS Count 10    Q Onset 211    P Onset 162    P Offset 182    T Offset 415    QTC Fredericia 408    Narrative    Atrial-paced rhythm with prolonged AV conduction  Left axis deviation  Nonspecific ST and T wave abnormality  Abnormal ECG  When compared with ECG of 01-JUN-2024 16:10,  Electronic atrial pacemaker has replaced Sinus rhythm  Confirmed by Javier Steel ([id number]) on 10/8/2024 3:51:33 PM     Echo:  Echocardiogram:   Transthoracic Echo (TTE) Complete With 3D 12/15/2023    [place] [address]  Phone [phone]    TRANSTHORACIC ECHOCARDIOGRAM REPORT      Patient Name:      MARK GABRIEL  [place] Physician:    [id number] [facility]  Study Date:        12/15/2023          Ordering Provider:    [id number] ABDULLAHI VANESSA  MRN/PID:           [id number]            Fellow:  Accession#:        [id number]        Nurse:  Date of Birth/Age: 1940 / 83 years Sonographer:          Kristen WESTFALL  Gender:            F                   Additional Staff:  Height:            154.94 cm           Admit Date:           12/14/2023  Weight:            82.10 kg            Admission Status:     Inpatient - Routine  BSA:               1.81 m2             Department Location:  Blood Pressure: 150 /47 mmHg    Study Type:    TRANSTHORACIC ECHO (TTE) COMPLETE  Diagnosis/ICD: Shortness of breath-R06.02  Indication:    Shortness of Breath  CPT Codes:     Echo Complete w Full " Doppler-[id number]    Patient History:  Pertinent History: CKD CHF Depression HTN CP Murmur Dyslipidemia Varicose veins  of leg with swellin ghyperlipidemia hypercholesterolemia.    Study Detail: The following Echo studies were performed: 2D, Doppler, M-Mode,  color flow and 3D.      PHYSICIAN INTERPRETATION:  Left Ventricle: Left ventricular systolic function is normal, with an estimated ejection fraction of 60-65%. There are no regional wall motion abnormalities. The left ventricular cavity size is normal. Left ventricular diastolic filling was indeterminate.  Left Atrium: The left atrium is normal in size.  Right Ventricle: The right ventricle is normal in size. There is normal right ventricular global systolic function.  Right Atrium: The right atrium is normal in size.  Aortic Valve: The aortic valve is trileaflet. There is mild aortic valve cusp calcification. There is no evidence of aortic valve regurgitation. The peak instantaneous gradient of the aortic valve is 12.0 mmHg.  Mitral Valve: The mitral valve is normal in structure. There is trace to mild mitral valve regurgitation.  Tricuspid Valve: The tricuspid valve is structurally normal. No evidence of tricuspid regurgitation.  Pulmonic Valve: The pulmonic valve is not well visualized. There is trace to mild pulmonic valve regurgitation.  Pericardium: There is no pericardial effusion noted.  Aorta: The aortic root is normal.  Systemic Veins: The inferior vena cava appears to be of normal size. There is IVC inspiratory collapse greater than 50%.      CONCLUSIONS:  1. Left ventricular systolic function is normal with a 60-65% estimated ejection fraction.  2. Left ventricular diastolic filling indeterminate.  3. Mild aortic valve sclerosis.    QUANTITATIVE DATA SUMMARY:  2D MEASUREMENTS:  Normal Ranges:  LAs:           3.00 cm   (2.7-4.0cm)  IVSd:          0.85 cm   (0.6-1.1cm)  LVPWd:         1.00 cm   (0.6-1.1cm)  LVIDd:         4.23 cm   (3.9-5.9cm)  LVIDs:          2.79 cm  LV Mass Index: 69.0 g/m2  LV % FS        34.0 %    LA VOLUME:  Normal Ranges:  LA Vol A2C:        59.3 ml  LA Vol Index A2C:  32.8 ml/m2  LA Area A2C:       18.7 cm2  LA Major Axis A2C: 5.0 cm  LA Volume Index:   29.6 ml/m2  LA Vol A2C:        59.7 ml    RA VOLUME BY A/L METHOD:  Normal Ranges:  RA Vol A4C:        32.1 ml    (8.3-19.5ml)  RA Vol Index A4C:  17.7 ml/m2  RA Area A4C:       13.6 cm2  RA Major Axis A4C: 4.9 cm    M-MODE MEASUREMENTS:  Normal Ranges:  Ao Root: 2.20 cm (2.0-3.7cm)  LAs:     4.80 cm (2.7-4.0cm)    AORTA MEASUREMENTS:  Normal Ranges:  Asc Ao, d: 3.10 cm (2.1-3.4cm)    LV SYSTOLIC FUNCTION BY 2D PLANIMETRY (MOD):  Normal Ranges:  EF-A4C View: 51.6 % (>=55%)  EF-A2C View: 57.9 %  EF-Biplane:  55.8 %    LV DIASTOLIC FUNCTION:  Normal Ranges:  MV Peak E:      0.93 m/s   (0.7-1.2 m/s)  MV Peak A:      1.02 m/s   (0.42-0.7 m/s)  E/A Ratio:      0.91       (1.0-2.2)  MV e'           0.08 m/s   (>8.0)  MV lateral e'   0.08 m/s  MV medial e'    0.07 m/s  E/e' Ratio:     12.39      (<8.0)  PulmV Sys Siddhartha:  68.10 cm/s  PulmV Urrutia Siddhartha: 47.60 cm/s  PulmV S/D Siddhartha:  1.40    MITRAL VALVE:  Normal Ranges:  MV Vmax:    1.22 m/s (<=1.3m/s)  MV peak P.0 mmHg (<5mmHg)  MV mean P.0 mmHg (<48mmHg)  MV DT:      262 msec (150-240msec)    AORTIC VALVE:  Normal Ranges:  AoV Vmax:      1.73 m/s  (<=1.7m/s)  AoV Peak P.0 mmHg (<20mmHg)  LVOT Max Siddhartha:  1.21 m/s  (<=1.1m/s)  LVOT VTI:      29.10 cm  LVOT Diameter: 2.00 cm   (1.8-2.4cm)  AoV Area,Vmax: 2.20 cm2  (2.5-4.5cm2)      RIGHT VENTRICLE:  RV Basal 3.18 cm  RV Mid   2.70 cm  RV Major 5.2 cm  TAPSE:   16.5 mm  RV s'    0.13 m/s    TRICUSPID VALVE/RVSP:  Normal Ranges:  IVC Diam: 1.75 cm    PULMONIC VALVE:  Normal Ranges:  PV Accel Time: 69 msec  (>120ms)  PV Max Siddhartha:    1.4 m/s  (0.6-0.9m/s)  PV Max P.3 mmHg    Pulmonary Veins:  PulmV Urrutia Siddhartha: 47.60 cm/s  PulmV S/D Siddhartha:  1.40  PulmV Sys Siddhartha:  68.10 cm/s      [id number] [facility]  DO  Electronically signed on 12/15/2023 at 10:13:13 AM        ** Final **    Ejection Fractions:  LV EF   Date/Time Value Ref Range Status   12/15/2023 08:53 AM 56       Cath:  Coronary Angiography:   Temporary Pacemaker Insertion 06/02/2024    [place] [id number]  Phone [phone]    Cardiovascular Catheterization Report    Patient Name:      MARK GABRIEL Performing Physician:  Ginger Loza DO  Study Date:        6/2/2024           Verifying Physician:   Ginger Loza DO  MRN/PID:           [id number]           Cardiologist/Co-Scrub:  Accession#:        [id number]       Ordering Provider:     Ginger LOZA  Date of Birth/Age: 1940 / 84      Cardiologist:  clifford  Gender:            F                  Fellow:  Encounter#:        [id number]         Surgeon:      Study: Temporary Pacemaker Insertion      Indications:  Stable angina and cardiac arrhythmia.  Hemodynamic instability without cardiogenic shock.  Frailty status of patient entering lab: 6 = Moderately frail.      Coronary Angiography Comments:  Procedure(s):  * Temporary Pacemaker Insertion    Procedure Findings:  Patient was brought to the catheterization lab after informed consent for temporary pacemaker has been obtained in the setting where she was having symptomatic bradycardia with hypotension and heart rates in the 30s beats per minute range.    She was sedated with Versed 0.5 mg and fentanyl 25 mcg and local 1% lidocaine 7 cc used to the right groin for local anesthesia. A micropuncture technique was used to access the right common femoral vein and micropuncture sheath was placed and then exchanged for 6 [place] temporary pacemaker sheath. The temporary pacemaker was placed without difficulty into the apex of the right ventricle. Her underlying heart rate was 49 bpm and we continue to achieve capture at 0.4 mA of current and then set the maker current to 1.5 mA.  Heart rate set at 40 bpm in an attempt to avoid competition with her AV synchrony with her own spontaneous heart rate of 49 bpm. No complications and she was returned to the ICU asymptomatic in stable condition.        Hemo Personnel:  +----------------+---------+  Name            Duty       +----------------+---------+  Javier Steel MD 1  +----------------+---------+      Cardiac Cath Post Procedure Notes:  Post Procedure Diagnosis: Symptomatic bradycardia.  Blood Loss:               Estimated blood loss during the procedure was 5ml mls.  Specimens Removed:        Number of specimen(s) removed: none.      ICD 10 Codes:  Abdominal aortic ectasia-I77.811; Bradycardia, unspecified-R00.1    CPT Codes:  Insertion of temporary single chamber cardiac electrode or pacemaker catheter (separate procedure)-53667    [id number] Javier Steel DO  Performing Physician  Electronically signed by [id number] Javier Steel DO on 6/12/2024 at 11:12:15 PM          ** Final (Updated) **    Right Heart Cath: No results found for this or any previous visit from the past 1800 days.    Stress Test:  Nuclear:No results found for this or any previous visit from the past 1800 days.    Metabolic Stress: No results found for this or any previous visit from the past 1800 days.    Cardiac Imaging:  Cardiac Scoring: No results found for this or any previous visit from the past 1800 days.    Cardiac MRI: No results found for this or any previous visit from the past 1800 days.         Assessment/Plan  Assessment/Plan   Principal Problem:    Pacemaker  Active Problems:    Sick sinus syndrome (Multi)    Paroxysma atrial fib  Sick sinus syndrome  s/p  dual chamber PPM placement   Having pain with PPM pocket    Here for PPM pocket revision with Dr. Chisholm on 10/14/2024.            NP discussed with Dr. Chisholm regarding plan of care/ discharge plan      I spent 35 minutes in the professional and overall care of this patient.      Tawny Root,  APRN-CNP

## 2024-10-11 ENCOUNTER — LAB (OUTPATIENT)
Dept: LAB | Facility: LAB | Age: 84
End: 2024-10-11
Payer: MEDICARE

## 2024-10-11 DIAGNOSIS — I48.0 PAROXYSMAL ATRIAL FIBRILLATION (MULTI): ICD-10-CM

## 2024-10-11 DIAGNOSIS — Z95.0 PACEMAKER: ICD-10-CM

## 2024-10-11 LAB
ANION GAP SERPL CALCULATED.3IONS-SCNC: 12 MMOL/L (ref 10–20)
BUN SERPL-MCNC: 27 MG/DL (ref 6–23)
CALCIUM SERPL-MCNC: 8.9 MG/DL (ref 8.6–10.3)
CHLORIDE SERPL-SCNC: 107 MMOL/L (ref 98–107)
CO2 SERPL-SCNC: 26 MMOL/L (ref 21–32)
CREAT SERPL-MCNC: 2.23 MG/DL (ref 0.5–1.05)
EGFRCR SERPLBLD CKD-EPI 2021: 21 ML/MIN/1.73M*2
ERYTHROCYTE [DISTWIDTH] IN BLOOD BY AUTOMATED COUNT: 13.9 % (ref 11.5–14.5)
GLUCOSE SERPL-MCNC: 88 MG/DL (ref 74–99)
HCT VFR BLD AUTO: 35.1 % (ref 36–46)
HGB BLD-MCNC: 10.8 G/DL (ref 12–16)
MAGNESIUM SERPL-MCNC: 2 MG/DL (ref 1.6–2.4)
MCH RBC QN AUTO: 31.2 PG (ref 26–34)
MCHC RBC AUTO-ENTMCNC: 30.8 G/DL (ref 32–36)
MCV RBC AUTO: 101 FL (ref 80–100)
NRBC BLD-RTO: 0 /100 WBCS (ref 0–0)
PLATELET # BLD AUTO: 270 X10*3/UL (ref 150–450)
POTASSIUM SERPL-SCNC: 4 MMOL/L (ref 3.5–5.3)
RBC # BLD AUTO: 3.46 X10*6/UL (ref 4–5.2)
SODIUM SERPL-SCNC: 141 MMOL/L (ref 136–145)
WBC # BLD AUTO: 32.5 X10*3/UL (ref 4.4–11.3)

## 2024-10-11 PROCEDURE — 80048 BASIC METABOLIC PNL TOTAL CA: CPT

## 2024-10-11 PROCEDURE — 36415 COLL VENOUS BLD VENIPUNCTURE: CPT

## 2024-10-11 PROCEDURE — 83735 ASSAY OF MAGNESIUM: CPT

## 2024-10-11 PROCEDURE — 85027 COMPLETE CBC AUTOMATED: CPT

## 2024-10-14 ENCOUNTER — HOSPITAL ENCOUNTER (OUTPATIENT)
Facility: HOSPITAL | Age: 84
Setting detail: OUTPATIENT SURGERY
Discharge: HOME | End: 2024-10-14
Attending: INTERNAL MEDICINE | Admitting: INTERNAL MEDICINE
Payer: MEDICARE

## 2024-10-14 VITALS
OXYGEN SATURATION: 100 % | RESPIRATION RATE: 13 BRPM | WEIGHT: 155 LBS | HEIGHT: 63 IN | HEART RATE: 85 BPM | DIASTOLIC BLOOD PRESSURE: 63 MMHG | TEMPERATURE: 96.5 F | BODY MASS INDEX: 27.46 KG/M2 | SYSTOLIC BLOOD PRESSURE: 136 MMHG

## 2024-10-14 DIAGNOSIS — Z95.0 PACEMAKER: Primary | ICD-10-CM

## 2024-10-14 DIAGNOSIS — I49.5 SICK SINUS SYNDROME (MULTI): ICD-10-CM

## 2024-10-14 PROCEDURE — 33222 RELOCATION POCKET PACEMAKER: CPT | Performed by: INTERNAL MEDICINE

## 2024-10-14 PROCEDURE — 2500000004 HC RX 250 GENERAL PHARMACY W/ HCPCS (ALT 636 FOR OP/ED): Mod: JZ | Performed by: INTERNAL MEDICINE

## 2024-10-14 PROCEDURE — 7100000010 HC PHASE TWO TIME - EACH INCREMENTAL 1 MINUTE: Performed by: INTERNAL MEDICINE

## 2024-10-14 PROCEDURE — 2720000007 HC OR 272 NO HCPCS: Performed by: INTERNAL MEDICINE

## 2024-10-14 PROCEDURE — C1889 IMPLANT/INSERT DEVICE, NOC: HCPCS | Performed by: INTERNAL MEDICINE

## 2024-10-14 PROCEDURE — 7100000009 HC PHASE TWO TIME - INITIAL BASE CHARGE: Performed by: INTERNAL MEDICINE

## 2024-10-14 PROCEDURE — 2500000005 HC RX 250 GENERAL PHARMACY W/O HCPCS: Performed by: INTERNAL MEDICINE

## 2024-10-14 PROCEDURE — 99153 MOD SED SAME PHYS/QHP EA: CPT | Performed by: INTERNAL MEDICINE

## 2024-10-14 PROCEDURE — 99152 MOD SED SAME PHYS/QHP 5/>YRS: CPT | Performed by: INTERNAL MEDICINE

## 2024-10-14 RX ORDER — ONDANSETRON HYDROCHLORIDE 2 MG/ML
4 INJECTION, SOLUTION INTRAVENOUS EVERY 8 HOURS PRN
Status: DISCONTINUED | OUTPATIENT
Start: 2024-10-14 | End: 2024-10-14 | Stop reason: HOSPADM

## 2024-10-14 RX ORDER — FENTANYL CITRATE 50 UG/ML
INJECTION, SOLUTION INTRAMUSCULAR; INTRAVENOUS AS NEEDED
Status: DISCONTINUED | OUTPATIENT
Start: 2024-10-14 | End: 2024-10-14 | Stop reason: HOSPADM

## 2024-10-14 RX ORDER — ACETAMINOPHEN 325 MG/1
650 TABLET ORAL EVERY 4 HOURS PRN
Status: DISCONTINUED | OUTPATIENT
Start: 2024-10-14 | End: 2024-10-14 | Stop reason: HOSPADM

## 2024-10-14 RX ORDER — ONDANSETRON 4 MG/1
4 TABLET, ORALLY DISINTEGRATING ORAL EVERY 8 HOURS PRN
Status: DISCONTINUED | OUTPATIENT
Start: 2024-10-14 | End: 2024-10-14 | Stop reason: HOSPADM

## 2024-10-14 RX ORDER — MIDAZOLAM HYDROCHLORIDE 1 MG/ML
INJECTION, SOLUTION INTRAMUSCULAR; INTRAVENOUS AS NEEDED
Status: DISCONTINUED | OUTPATIENT
Start: 2024-10-14 | End: 2024-10-14 | Stop reason: HOSPADM

## 2024-10-14 RX ORDER — BUPIVACAINE HYDROCHLORIDE 2.5 MG/ML
INJECTION, SOLUTION EPIDURAL; INFILTRATION; INTRACAUDAL AS NEEDED
Status: DISCONTINUED | OUTPATIENT
Start: 2024-10-14 | End: 2024-10-14 | Stop reason: HOSPADM

## 2024-10-14 RX ORDER — VANCOMYCIN 1 G/200ML
1000 INJECTION, SOLUTION INTRAVENOUS ONCE
Status: DISCONTINUED | OUTPATIENT
Start: 2024-10-14 | End: 2024-10-14

## 2024-10-14 ASSESSMENT — ENCOUNTER SYMPTOMS
DIARRHEA: 0
NEUROLOGICAL NEGATIVE: 1
EYES NEGATIVE: 1
GASTROINTESTINAL NEGATIVE: 1
ALLERGIC/IMMUNOLOGIC NEGATIVE: 1
NAUSEA: 0
MUSCULOSKELETAL NEGATIVE: 1
PSYCHIATRIC NEGATIVE: 1
ENDOCRINE NEGATIVE: 1
CARDIOVASCULAR NEGATIVE: 1
RESPIRATORY NEGATIVE: 1
FATIGUE: 1
HEMATOLOGIC/LYMPHATIC NEGATIVE: 1

## 2024-10-14 ASSESSMENT — COLUMBIA-SUICIDE SEVERITY RATING SCALE - C-SSRS
1. IN THE PAST MONTH, HAVE YOU WISHED YOU WERE DEAD OR WISHED YOU COULD GO TO SLEEP AND NOT WAKE UP?: NO
2. HAVE YOU ACTUALLY HAD ANY THOUGHTS OF KILLING YOURSELF?: NO
6. HAVE YOU EVER DONE ANYTHING, STARTED TO DO ANYTHING, OR PREPARED TO DO ANYTHING TO END YOUR LIFE?: NO

## 2024-10-14 ASSESSMENT — PAIN - FUNCTIONAL ASSESSMENT: PAIN_FUNCTIONAL_ASSESSMENT: 0-10

## 2024-10-14 ASSESSMENT — PAIN SCALES - GENERAL: PAINLEVEL_OUTOF10: 0 - NO PAIN

## 2024-10-14 NOTE — DISCHARGE INSTRUCTIONS
Home-going Instructions After Device Implant    Incision:  If you have an occlusive dressing like Aquacel (tan-colored bandage) or Mepilex border, please leave on for one week.  Do not remove the steri strips, they will fall off on their own.     May shower tomorrow.   Do not submerge site in bath/swimming for 2 weeks, until incision is healed.       Call the Device Clinic at [phone] if you have any questions about your instructions, Monday-Friday between the hours of 7:00 am - 4:30 pm. After hours please call your physician's office.   After one week, you may wash the site with soap and water.   Inspect your incision each day.  If you note increased redness, swelling, or drainage, or if you develop a fever, please call your doctor IMMEDIATELY.     Your Doctor:  Dr. Chisholm    Telephone: .[phone]    Pain:  It is normal for the area around the incision to be tender for a few weeks following surgery.  Pain relievers such as Tylenol or Motrin are usually sufficient for pain relief.  The pain should get better each day, if it gets worse, please contact your doctor.    Activity Restrictions:  device replacement 1 week10/21/2024.  Avoid gross arm movement on the side of your new implant.  Do not raise or stretch your arm above shoulder level.  Do not pick-up weights greater than 15 lbs.  Avoid activities such as golf or swimming for six weeks.      Driving: Please do not resume driving for 1 week(s) date: 10/21/2024.     ID Card:  It is important that you carry your device ID card with you at all times.  Inform all doctors and healthcare providers that you have a pacemaker or defibrillator.  Until the lead wires are completely healed in your heart, a prophylactic antibiotic may need to be given to you prior to procedures such as deep cleaning or extraction of teeth.    Electromagnetic Interference:  Microwave ovens are safe to use.  Cellular telephones should be held to the ear that is opposite your  device.  If you are scheduled for a MRI, please notify the Device Clinic to check if you have a compatible device.  Present your device ID card to the airport .  The detector wand may be used below waist level and to inspect your shoes.  Read the patient booklet for more information.  You may call the device clinic or the patient services department of the device  with questions about specific electrical appliances and interference problems.          Device Clinic: [phone] (this is Not an emergency number)    Other Points:  -Always notify healthcare providers, such as dentists and podiatrists, that you have an pacemaker. If  any surgery is planned for you, the anesthesia personnel must know in advance about your pacemaker.    Other Sources of Information:  Device Clinic Nurses: [phone]  Patient Services Department of the  of your ICD  The Internet holds an abundance of information. Some internet sites of interest:  www.medtronic.com  www.[employer]  www.[employer].com  www.heartrhythm.com  www.[employer]    This info is a general resource. It is not meant to replace your health care provider’s advice.  Ask your doctor or health care team any questions. Always follow their instructions.

## 2024-10-14 NOTE — Clinical Note
Patient Clipped and Prepped: right chest. Prepped with ChloraPrep, a minimum of 3 minute dry time, longer if needed, no pooling noted, patient draped in sterile fashion. Site prepped x 2 with 3 minute dry time between each prep

## 2024-10-14 NOTE — POST-PROCEDURE NOTE
Physician Transition of Care Summary  Invasive Cardiovascular Lab    Procedure Date: 10/14/2024  Attending:    * Timmy Chisholm - Primary  Resident/Fellow/Other Assistant: Surgeons and Role:  * No surgeons found with a matching role *    Indications:   Pre-op Diagnosis      * Pacemaker [Z95.0]     * Sick sinus syndrome (Multi) [I49.5]    Post-procedure diagnosis:   Post-op Diagnosis     * Pacemaker [Z95.0]     * Sick sinus syndrome (Multi) [I49.5]    Procedure(s):   PPM Pocket Revision  01372 - [place] RELOCATION OF SKIN POCKET FOR PACEMAKER        Procedure Findings:   Please see below    Description of the Procedure:   The patient was brought to the EP laboratory while in a paced atrial rhythm.  The right upper chest was prepped and draped in the usual sterile fashion and fentanyl and Versed were used for sedation and bupivacaine for local anesthetic.  The incision was placed overlying the same one in the right deltopectoral groove and the device was freed using a photon electrocautery tool.  The device was moved medially and the pocket was adjusted and extended medially to accommodate for this.  The device was sutured at the suture area to the pectoralis minor muscle to prevent migration to the axillary area.  All electrical's were excellent with good sensing, thresholds and impedances.  A Tyrex antibiotic pouch was utilized and the pocket was closed with 3 layers of suture 0.0 Vicryl layer for the deepest layer, a 3.0 continuous layer and a 4.0 continuous layer for the subcuticular layer.  Steri-Strips and island dressing were applied.    Summary:  Successful pocket revision with movement to the device more medially    Complications:   None    Stents/Implants:   Implants       No implant documentation for this case.            Anticoagulation/Antiplatelet Plan:   None    Estimated Blood Loss:   25 mL    Anesthesia: Moderate Sedation Anesthesia Staff: No anesthesia staff entered.    Any Specimen(s) Removed:   No  specimens collected during this procedure.    Disposition:   Stable      Electronically signed by: Timmy Chisholm MD, 10/14/2024 9:43 AM

## 2024-10-15 ASSESSMENT — PAIN SCALES - GENERAL: PAINLEVEL_OUTOF10: 0 - NO PAIN

## 2024-10-16 DIAGNOSIS — E03.9 ACQUIRED HYPOTHYROIDISM: ICD-10-CM

## 2024-10-16 RX ORDER — LEVOTHYROXINE SODIUM 150 UG/1
TABLET ORAL
Qty: 90 TABLET | Refills: 0 | Status: SHIPPED | OUTPATIENT
Start: 2024-10-16

## 2024-10-18 DIAGNOSIS — Z95.0 PACEMAKER: Primary | ICD-10-CM

## 2024-10-18 DIAGNOSIS — I48.0 PAROXYSMAL ATRIAL FIBRILLATION (MULTI): ICD-10-CM

## 2024-10-23 ENCOUNTER — LAB (OUTPATIENT)
Dept: LAB | Facility: LAB | Age: 84
End: 2024-10-23
Payer: MEDICARE

## 2024-10-23 DIAGNOSIS — I48.0 PAROXYSMAL ATRIAL FIBRILLATION (MULTI): ICD-10-CM

## 2024-10-23 DIAGNOSIS — Z95.0 PACEMAKER: ICD-10-CM

## 2024-10-23 DIAGNOSIS — D64.9 ANEMIA, UNSPECIFIED: Primary | ICD-10-CM

## 2024-10-23 LAB
ALBUMIN SERPL BCP-MCNC: 3.6 G/DL (ref 3.4–5)
ALP SERPL-CCNC: 85 U/L (ref 33–136)
ALT SERPL W P-5'-P-CCNC: 8 U/L (ref 7–45)
ANION GAP SERPL CALCULATED.3IONS-SCNC: 10 MMOL/L (ref 10–20)
AST SERPL W P-5'-P-CCNC: 13 U/L (ref 9–39)
BILIRUB SERPL-MCNC: 0.4 MG/DL (ref 0–1.2)
BUN SERPL-MCNC: 38 MG/DL (ref 6–23)
CALCIUM SERPL-MCNC: 8.6 MG/DL (ref 8.6–10.3)
CHLORIDE SERPL-SCNC: 106 MMOL/L (ref 98–107)
CO2 SERPL-SCNC: 29 MMOL/L (ref 21–32)
CREAT SERPL-MCNC: 2.66 MG/DL (ref 0.5–1.05)
EGFRCR SERPLBLD CKD-EPI 2021: 17 ML/MIN/1.73M*2
FERRITIN SERPL-MCNC: 476 NG/ML (ref 8–150)
FOLATE SERPL-MCNC: 6.9 NG/ML
GLUCOSE SERPL-MCNC: 91 MG/DL (ref 74–99)
IRON SERPL-MCNC: 36 UG/DL (ref 35–150)
MAGNESIUM SERPL-MCNC: 2.45 MG/DL (ref 1.6–2.4)
POTASSIUM SERPL-SCNC: 3.8 MMOL/L (ref 3.5–5.3)
PROT SERPL-MCNC: 6.2 G/DL (ref 6.4–8.2)
SODIUM SERPL-SCNC: 141 MMOL/L (ref 136–145)
TSH SERPL-ACNC: 1.66 MIU/L (ref 0.44–3.98)
VIT B12 SERPL-MCNC: 956 PG/ML (ref 211–911)

## 2024-10-23 PROCEDURE — 82728 ASSAY OF FERRITIN: CPT

## 2024-10-23 PROCEDURE — 36415 COLL VENOUS BLD VENIPUNCTURE: CPT

## 2024-10-23 PROCEDURE — 82607 VITAMIN B-12: CPT

## 2024-10-23 PROCEDURE — 82746 ASSAY OF FOLIC ACID SERUM: CPT

## 2024-10-23 PROCEDURE — 83735 ASSAY OF MAGNESIUM: CPT

## 2024-10-23 PROCEDURE — 80053 COMPREHEN METABOLIC PANEL: CPT

## 2024-10-23 PROCEDURE — 83540 ASSAY OF IRON: CPT

## 2024-10-23 PROCEDURE — 84443 ASSAY THYROID STIM HORMONE: CPT

## 2024-10-28 ENCOUNTER — TELEPHONE (OUTPATIENT)
Dept: INPATIENT UNIT | Facility: HOSPITAL | Age: 84
End: 2024-10-28
Payer: MEDICARE

## 2024-10-30 DIAGNOSIS — M79.7 FIBROMYALGIA: ICD-10-CM

## 2024-10-30 RX ORDER — GABAPENTIN 300 MG/1
300 CAPSULE ORAL 2 TIMES DAILY
Qty: 60 CAPSULE | Refills: 0 | Status: SHIPPED | OUTPATIENT
Start: 2024-10-30

## 2024-10-31 ENCOUNTER — HOSPITAL ENCOUNTER (OUTPATIENT)
Dept: CARDIOLOGY | Facility: CLINIC | Age: 84
Discharge: HOME | End: 2024-10-31
Payer: MEDICARE

## 2024-10-31 DIAGNOSIS — Z95.0 PACEMAKER: ICD-10-CM

## 2024-10-31 DIAGNOSIS — I49.5 SINUS NODE DYSFUNCTION (MULTI): ICD-10-CM

## 2024-10-31 PROCEDURE — 93280 PM DEVICE PROGR EVAL DUAL: CPT

## 2024-11-12 ENCOUNTER — TELEPHONE (OUTPATIENT)
Dept: CARDIOLOGY | Facility: CLINIC | Age: 84
End: 2024-11-12
Payer: MEDICARE

## 2024-11-15 DIAGNOSIS — I48.0 PAROXYSMAL ATRIAL FIBRILLATION (MULTI): ICD-10-CM

## 2024-11-17 RX ORDER — AMIODARONE HYDROCHLORIDE 200 MG/1
200 TABLET ORAL DAILY
Qty: 90 TABLET | Refills: 0 | Status: SHIPPED | OUTPATIENT
Start: 2024-11-17

## 2024-11-21 ENCOUNTER — LAB (OUTPATIENT)
Dept: LAB | Facility: LAB | Age: 84
End: 2024-11-21
Payer: MEDICARE

## 2024-11-21 ENCOUNTER — TELEPHONE (OUTPATIENT)
Dept: PRIMARY CARE | Facility: CLINIC | Age: 84
End: 2024-11-21

## 2024-11-21 ENCOUNTER — HOSPITAL ENCOUNTER (OUTPATIENT)
Dept: RADIOLOGY | Facility: HOSPITAL | Age: 84
Discharge: HOME | End: 2024-11-21
Payer: MEDICARE

## 2024-11-21 DIAGNOSIS — R07.81 RIB PAIN ON LEFT SIDE: ICD-10-CM

## 2024-11-21 DIAGNOSIS — N18.4 CHRONIC KIDNEY DISEASE, STAGE 4 (SEVERE) (MULTI): Primary | ICD-10-CM

## 2024-11-21 LAB
ALBUMIN SERPL BCP-MCNC: 3.7 G/DL (ref 3.4–5)
ANION GAP SERPL CALCULATED.3IONS-SCNC: 13 MMOL/L (ref 10–20)
BUN SERPL-MCNC: 41 MG/DL (ref 6–23)
CALCIUM SERPL-MCNC: 8.7 MG/DL (ref 8.6–10.3)
CHLORIDE SERPL-SCNC: 104 MMOL/L (ref 98–107)
CO2 SERPL-SCNC: 27 MMOL/L (ref 21–32)
CREAT SERPL-MCNC: 2.81 MG/DL (ref 0.5–1.05)
EGFRCR SERPLBLD CKD-EPI 2021: 16 ML/MIN/1.73M*2
ERYTHROCYTE [DISTWIDTH] IN BLOOD BY AUTOMATED COUNT: 15.3 % (ref 11.5–14.5)
GLUCOSE SERPL-MCNC: 80 MG/DL (ref 74–99)
HCT VFR BLD AUTO: 29 % (ref 36–46)
HGB BLD-MCNC: 8.9 G/DL (ref 12–16)
MCH RBC QN AUTO: 32.1 PG (ref 26–34)
MCHC RBC AUTO-ENTMCNC: 30.7 G/DL (ref 32–36)
MCV RBC AUTO: 105 FL (ref 80–100)
NRBC BLD-RTO: 0 /100 WBCS (ref 0–0)
PHOSPHATE SERPL-MCNC: 3.7 MG/DL (ref 2.5–4.9)
PLATELET # BLD AUTO: 237 X10*3/UL (ref 150–450)
POTASSIUM SERPL-SCNC: 3.5 MMOL/L (ref 3.5–5.3)
RBC # BLD AUTO: 2.77 X10*6/UL (ref 4–5.2)
SODIUM SERPL-SCNC: 140 MMOL/L (ref 136–145)
WBC # BLD AUTO: 24.5 X10*3/UL (ref 4.4–11.3)

## 2024-11-21 PROCEDURE — 83970 ASSAY OF PARATHORMONE: CPT

## 2024-11-21 PROCEDURE — 71101 X-RAY EXAM UNILAT RIBS/CHEST: CPT | Mod: LT

## 2024-11-21 PROCEDURE — 36415 COLL VENOUS BLD VENIPUNCTURE: CPT

## 2024-11-21 PROCEDURE — 85027 COMPLETE CBC AUTOMATED: CPT

## 2024-11-21 PROCEDURE — 71101 X-RAY EXAM UNILAT RIBS/CHEST: CPT | Mod: LEFT SIDE | Performed by: RADIOLOGY

## 2024-11-21 PROCEDURE — 80069 RENAL FUNCTION PANEL: CPT

## 2024-11-21 NOTE — TELEPHONE ENCOUNTER
Patient's Daughter Linda stated Patient fell last week, she didn't receive treatment and home health nurse suggested Patient get a full set of X-Rays for rib area. Patient lost her balance while using her walker.    Patient is going for labs today so she would like the order placed today.    Linda can be reached at [phone]

## 2024-11-22 LAB — PTH-INTACT SERPL-MCNC: 103.1 PG/ML (ref 18.5–88)

## 2024-11-26 ENCOUNTER — LAB (OUTPATIENT)
Dept: LAB | Facility: LAB | Age: 84
End: 2024-11-26
Payer: MEDICARE

## 2024-11-26 DIAGNOSIS — N18.4 CHRONIC KIDNEY DISEASE, STAGE 4 (SEVERE) (MULTI): Primary | ICD-10-CM

## 2024-11-26 DIAGNOSIS — M79.7 FIBROMYALGIA: ICD-10-CM

## 2024-11-26 DIAGNOSIS — E83.41 HYPERMAGNESEMIA: ICD-10-CM

## 2024-11-26 LAB
CK SERPL-CCNC: 39 U/L (ref 0–215)
MAGNESIUM SERPL-MCNC: 2.38 MG/DL (ref 1.6–2.4)

## 2024-11-26 PROCEDURE — 36415 COLL VENOUS BLD VENIPUNCTURE: CPT

## 2024-11-26 PROCEDURE — 83735 ASSAY OF MAGNESIUM: CPT

## 2024-11-26 PROCEDURE — 82550 ASSAY OF CK (CPK): CPT

## 2024-11-26 RX ORDER — GABAPENTIN 300 MG/1
300 CAPSULE ORAL 2 TIMES DAILY
Qty: 60 CAPSULE | Refills: 0 | Status: SHIPPED | OUTPATIENT
Start: 2024-11-26

## 2024-12-03 ENCOUNTER — APPOINTMENT (OUTPATIENT)
Dept: CARDIOLOGY | Facility: CLINIC | Age: 84
End: 2024-12-03
Payer: MEDICARE

## 2024-12-05 ENCOUNTER — APPOINTMENT (OUTPATIENT)
Dept: PRIMARY CARE | Facility: CLINIC | Age: 84
End: 2024-12-05
Payer: MEDICARE

## 2024-12-05 DIAGNOSIS — M06.09 RHEUMATOID ARTHRITIS OF MULTIPLE SITES WITH NEGATIVE RHEUMATOID FACTOR (MULTI): Primary | ICD-10-CM

## 2024-12-05 DIAGNOSIS — I49.5 SICK SINUS SYNDROME (MULTI): ICD-10-CM

## 2024-12-05 DIAGNOSIS — I50.33 CONGESTIVE HEART FAILURE WITH LEFT VENTRICULAR DIASTOLIC DYSFUNCTION, ACUTE ON CHRONIC: ICD-10-CM

## 2024-12-05 DIAGNOSIS — Z00.00 WELL ADULT EXAM: ICD-10-CM

## 2024-12-05 PROCEDURE — 1123F ACP DISCUSS/DSCN MKR DOCD: CPT | Performed by: FAMILY MEDICINE

## 2024-12-05 PROCEDURE — 99442 PR PHYS/QHP TELEPHONE EVALUATION 11-20 MIN: CPT | Performed by: FAMILY MEDICINE

## 2024-12-05 PROCEDURE — 1157F ADVNC CARE PLAN IN RCRD: CPT | Performed by: FAMILY MEDICINE

## 2024-12-05 NOTE — PROGRESS NOTES
Subjective   Patient ID: Tracie Baltazar is a 84 y.o. female who presents for No chief complaint on file..    HPI telephone visit.  Patient is here for follow-up to recent hospitalization.  Patient was admitted and found to have a sick sinus.  She is status post pacemaker placement by electrophysiologist.  She states that she is feeling significantly better and is not falling down or losing consciousness.    However patient also has CKD.  With worsening GFR.  She states that she is scheduled for what sounds like shunt placement in the near future.    Review of Systems  Constitutional: Patient is positive for fatigue.  She is negative for fever, weight change.  HEENT: Patient is negative for change in vision, hearing, swallow.  Cardio: Patient is negative for chest pain, lower extremity edema.  Pulmonary: Patient is negative for cough, shortness of breath.    Objective   There were no vitals taken for this visit.    Physical Exam  No examination.  Assessment/Plan   Problem List Items Addressed This Visit             ICD-10-CM    Congestive heart failure with left ventricular diastolic dysfunction, acute on chronic I50.33    Relevant Orders chronic.    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    Disability Placard    Sick sinus syndrome (Multi) I49.5    Relevant Orders chronic.  Walsh has been placed.    Disability Placard     CKD.  Patient is scheduled to have shunt placement for dialysis.  Telephone conversation was 16 minutes 6 seconds.

## 2024-12-23 DIAGNOSIS — I48.0 PAROXYSMAL ATRIAL FIBRILLATION (MULTI): Primary | ICD-10-CM

## 2024-12-26 ENCOUNTER — HOSPITAL ENCOUNTER (OUTPATIENT)
Dept: CARDIOLOGY | Facility: CLINIC | Age: 84
Discharge: HOME | End: 2024-12-26
Payer: MEDICARE

## 2024-12-26 DIAGNOSIS — I49.5 SINUS NODE DYSFUNCTION (MULTI): ICD-10-CM

## 2024-12-26 DIAGNOSIS — M79.7 FIBROMYALGIA: ICD-10-CM

## 2024-12-26 DIAGNOSIS — Z95.0 PACEMAKER: ICD-10-CM

## 2024-12-26 RX ORDER — GABAPENTIN 300 MG/1
300 CAPSULE ORAL 2 TIMES DAILY
Qty: 60 CAPSULE | Refills: 0 | Status: SHIPPED | OUTPATIENT
Start: 2024-12-26

## 2025-01-07 ENCOUNTER — OFFICE VISIT (OUTPATIENT)
Dept: CARDIOLOGY | Facility: CLINIC | Age: 85
End: 2025-01-07
Payer: MEDICARE

## 2025-01-21 DIAGNOSIS — M79.7 FIBROMYALGIA: ICD-10-CM

## 2025-01-21 RX ORDER — GABAPENTIN 300 MG/1
300 CAPSULE ORAL 2 TIMES DAILY
Qty: 60 CAPSULE | Refills: 0 | Status: SHIPPED | OUTPATIENT
Start: 2025-01-21

## 2025-01-24 ENCOUNTER — LAB (OUTPATIENT)
Dept: LAB | Facility: LAB | Age: 85
End: 2025-01-24
Payer: MEDICARE

## 2025-01-24 DIAGNOSIS — I48.0 PAROXYSMAL ATRIAL FIBRILLATION (MULTI): ICD-10-CM

## 2025-01-24 DIAGNOSIS — N18.4 CHRONIC KIDNEY DISEASE, STAGE 4 (SEVERE) (MULTI): Primary | ICD-10-CM

## 2025-01-24 LAB
ALBUMIN SERPL BCP-MCNC: 3.9 G/DL (ref 3.4–5)
ALP SERPL-CCNC: 102 U/L (ref 33–136)
ALT SERPL W P-5'-P-CCNC: 8 U/L (ref 7–45)
ANION GAP SERPL CALCULATED.3IONS-SCNC: 13 MMOL/L (ref 10–20)
AST SERPL W P-5'-P-CCNC: 12 U/L (ref 9–39)
BILIRUB SERPL-MCNC: 0.3 MG/DL (ref 0–1.2)
BUN SERPL-MCNC: 39 MG/DL (ref 6–23)
CALCIUM SERPL-MCNC: 8.7 MG/DL (ref 8.6–10.3)
CHLORIDE SERPL-SCNC: 103 MMOL/L (ref 98–107)
CO2 SERPL-SCNC: 27 MMOL/L (ref 21–32)
CREAT SERPL-MCNC: 2.72 MG/DL (ref 0.5–1.05)
EGFRCR SERPLBLD CKD-EPI 2021: 17 ML/MIN/1.73M*2
ERYTHROCYTE [DISTWIDTH] IN BLOOD BY AUTOMATED COUNT: 13.5 % (ref 11.5–14.5)
GLUCOSE SERPL-MCNC: 88 MG/DL (ref 74–99)
HCT VFR BLD AUTO: 33.9 % (ref 36–46)
HGB BLD-MCNC: 10.3 G/DL (ref 12–16)
MAGNESIUM SERPL-MCNC: 2.18 MG/DL (ref 1.6–2.4)
MCH RBC QN AUTO: 31.4 PG (ref 26–34)
MCHC RBC AUTO-ENTMCNC: 30.4 G/DL (ref 32–36)
MCV RBC AUTO: 103 FL (ref 80–100)
NRBC BLD-RTO: 0 /100 WBCS (ref 0–0)
PHOSPHATE SERPL-MCNC: 3.9 MG/DL (ref 2.5–4.9)
PLATELET # BLD AUTO: 211 X10*3/UL (ref 150–450)
POTASSIUM SERPL-SCNC: 4.1 MMOL/L (ref 3.5–5.3)
PROT SERPL-MCNC: 6.1 G/DL (ref 6.4–8.2)
PTH-INTACT SERPL-MCNC: 153.3 PG/ML (ref 18.5–88)
RBC # BLD AUTO: 3.28 X10*6/UL (ref 4–5.2)
SODIUM SERPL-SCNC: 139 MMOL/L (ref 136–145)
TSH SERPL-ACNC: 1.98 MIU/L (ref 0.44–3.98)
WBC # BLD AUTO: 27 X10*3/UL (ref 4.4–11.3)

## 2025-01-24 PROCEDURE — 83970 ASSAY OF PARATHORMONE: CPT

## 2025-01-24 PROCEDURE — 83735 ASSAY OF MAGNESIUM: CPT

## 2025-01-24 PROCEDURE — 84100 ASSAY OF PHOSPHORUS: CPT

## 2025-01-24 PROCEDURE — 85027 COMPLETE CBC AUTOMATED: CPT

## 2025-01-24 PROCEDURE — 84443 ASSAY THYROID STIM HORMONE: CPT

## 2025-01-24 PROCEDURE — 80053 COMPREHEN METABOLIC PANEL: CPT

## 2025-01-28 ENCOUNTER — APPOINTMENT (OUTPATIENT)
Dept: CARDIOLOGY | Facility: CLINIC | Age: 85
End: 2025-01-28
Payer: MEDICARE

## 2025-02-06 ENCOUNTER — OFFICE VISIT (OUTPATIENT)
Dept: CARDIOLOGY | Facility: CLINIC | Age: 85
End: 2025-02-06
Payer: MEDICARE

## 2025-02-06 VITALS
DIASTOLIC BLOOD PRESSURE: 80 MMHG | BODY MASS INDEX: 27.63 KG/M2 | WEIGHT: 156 LBS | HEART RATE: 59 BPM | SYSTOLIC BLOOD PRESSURE: 145 MMHG | OXYGEN SATURATION: 98 %

## 2025-02-06 DIAGNOSIS — I50.33 CONGESTIVE HEART FAILURE WITH LEFT VENTRICULAR DIASTOLIC DYSFUNCTION, ACUTE ON CHRONIC: ICD-10-CM

## 2025-02-06 DIAGNOSIS — I49.5 SICK SINUS SYNDROME (MULTI): Primary | ICD-10-CM

## 2025-02-06 DIAGNOSIS — I48.0 PAROXYSMAL ATRIAL FIBRILLATION (MULTI): ICD-10-CM

## 2025-02-06 DIAGNOSIS — I10 BENIGN ESSENTIAL HYPERTENSION: ICD-10-CM

## 2025-02-06 PROCEDURE — 1126F AMNT PAIN NOTED NONE PRSNT: CPT | Performed by: INTERNAL MEDICINE

## 2025-02-06 PROCEDURE — 1159F MED LIST DOCD IN RCRD: CPT | Performed by: INTERNAL MEDICINE

## 2025-02-06 PROCEDURE — 1157F ADVNC CARE PLAN IN RCRD: CPT | Performed by: INTERNAL MEDICINE

## 2025-02-06 PROCEDURE — 99214 OFFICE O/P EST MOD 30 MIN: CPT | Performed by: INTERNAL MEDICINE

## 2025-02-06 PROCEDURE — 1123F ACP DISCUSS/DSCN MKR DOCD: CPT | Performed by: INTERNAL MEDICINE

## 2025-02-06 PROCEDURE — 3077F SYST BP >= 140 MM HG: CPT | Performed by: INTERNAL MEDICINE

## 2025-02-06 PROCEDURE — 3079F DIAST BP 80-89 MM HG: CPT | Performed by: INTERNAL MEDICINE

## 2025-02-06 PROCEDURE — 1036F TOBACCO NON-USER: CPT | Performed by: INTERNAL MEDICINE

## 2025-02-06 RX ORDER — FUROSEMIDE 40 MG/1
40 TABLET ORAL DAILY
COMMUNITY

## 2025-02-06 ASSESSMENT — ENCOUNTER SYMPTOMS
MYALGIAS: 0
WEIGHT GAIN: 0
PALPITATIONS: 0
DIZZINESS: 0
LOSS OF SENSATION IN FEET: 0
WHEEZING: 0
PND: 0
IRREGULAR HEARTBEAT: 0
OCCASIONAL FEELINGS OF UNSTEADINESS: 1
WEAKNESS: 0
COUGH: 0
NEAR-SYNCOPE: 0
CLAUDICATION: 0
DIAPHORESIS: 0
DYSPNEA ON EXERTION: 0
SYNCOPE: 0
WEIGHT LOSS: 0
SHORTNESS OF BREATH: 0
FEVER: 0
DEPRESSION: 0
ORTHOPNEA: 0

## 2025-02-06 ASSESSMENT — PATIENT HEALTH QUESTIONNAIRE - PHQ9
2. FEELING DOWN, DEPRESSED OR HOPELESS: NOT AT ALL
1. LITTLE INTEREST OR PLEASURE IN DOING THINGS: NOT AT ALL
SUM OF ALL RESPONSES TO PHQ9 QUESTIONS 1 AND 2: 0

## 2025-02-06 ASSESSMENT — LIFESTYLE VARIABLES: TOTAL SCORE: 0

## 2025-02-06 ASSESSMENT — PAIN SCALES - GENERAL: PAINLEVEL_OUTOF10: 0-NO PAIN

## 2025-02-06 NOTE — PROGRESS NOTES
Subjective      Chief Complaint   Patient presents with    Follow-up        83 yo female with h/o PAF, most recently dual chamber PPM (SJM) for symptomatic bradycardia presents for followup. She ambulates with a walker, no recent falls. She has no chest pain or dyspnea, no dizziness or palpitations. She has ESRD approaching IHD.          Review of Systems   Constitutional: Negative for diaphoresis, fever, weight gain and weight loss.   Eyes:  Negative for visual disturbance.   Cardiovascular:  Negative for chest pain, claudication, dyspnea on exertion, irregular heartbeat, leg swelling, near-syncope, orthopnea, palpitations, paroxysmal nocturnal dyspnea and syncope.   Respiratory:  Negative for cough, shortness of breath and wheezing.    Musculoskeletal:  Negative for muscle weakness and myalgias.   Neurological:  Negative for dizziness and weakness.   All other systems reviewed and are negative.       Past Medical History:   Diagnosis Date    Abnormal ECG     Asthma     Essential (primary) hypertension     Hypertension    Kidney failure     Leukemia (Multi)     Rheumatoid arthritis         Past Surgical History:   Procedure Laterality Date    BACK SURGERY      Back Surgery    CARDIAC ELECTROPHYSIOLOGY PROCEDURE Right 6/2/2024    Procedure: Temporary Pacemaker Insertion;  Surgeon: Javier Steel DO;  Location: [facility] Cardiac Cath Lab;  Service: Cardiovascular;  Laterality: Right;  pacemaker line in place sutured in with 2-0 silk    CARDIAC ELECTROPHYSIOLOGY PROCEDURE N/A 6/3/2024    Procedure: PPM IMPLANT DUAL;  Surgeon: Timmy Chisholm MD;  Location: [facility] Cardiac Cath Lab;  Service: Electrophysiology;  Laterality: N/A;  costa    CARDIAC ELECTROPHYSIOLOGY PROCEDURE Right 10/14/2024    Procedure: PPM Pocket Revision;  Surgeon: Timmy Chisholm MD;  Location: [facility] Cardiac Cath Lab;  Service: Electrophysiology;  Laterality: Right;  pre cert by corporate [employer]-rep notified    CHOLECYSTECTOMY      Cholecystectomy     HYSTERECTOMY      Hysterectomy    KNEE SURGERY      arthroscopic surgery?    OTHER SURGICAL HISTORY      Shoulder Surgery Left    SHOULDER SURGERY Left     Left shoulder impingement surgery    TOTAL KNEE ARTHROPLASTY Left 02/13/2014        Social History     Socioeconomic History    Marital status:      Spouse name: Not on file    Number of children: Not on file    Years of education: Not on file    Highest education level: Not on file   Occupational History    Not on file   Tobacco Use    Smoking status: Never     Passive exposure: Never    Smokeless tobacco: Never   Vaping Use    Vaping status: Never Used   Substance and Sexual Activity    Alcohol use: Not Currently     Comment: rarely    Drug use: Never    Sexual activity: Defer   Other Topics Concern    Not on file   Social History Narrative    Not on file     Social Drivers of Health     Financial Resource Strain: Low Risk  (10/6/2024)    Overall Financial Resource Strain (CARDIA)     Difficulty of Paying Living Expenses: Not hard at all   Recent Concern: Financial Resource Strain - High Risk (10/5/2024)    Overall Financial Resource Strain (CARDIA)     Difficulty of Paying Living Expenses: Very hard   Food Insecurity: No Food Insecurity (10/6/2024)    Hunger Vital Sign     Worried About Running Out of Food in the Last Year: Never true     Ran Out of Food in the Last Year: Never true   Transportation Needs: No Transportation Needs (10/6/2024)    PRAPARE - Transportation     Lack of Transportation (Medical): No     Lack of Transportation (Non-Medical): No   Physical Activity: Inactive (10/6/2024)    Exercise Vital Sign     Days of Exercise per Week: 0 days     Minutes of Exercise per Session: 0 min   Stress: No Stress Concern Present (10/6/2024)    [employer] - Occupational Stress Questionnaire     Feeling of Stress : Not at all   Social Connections: Moderately Isolated (10/6/2024)    Social Connection and Isolation Panel  [NHANES]     Frequency of Communication with Friends and Family: More than three times a week     Frequency of Social Gatherings with Friends and Family: More than three times a week     Attends [employer] Services: Never     Active Member of Clubs or Organizations: Yes     Attends Club or Organization Meetings: More than 4 times per year     Marital Status:    Intimate Partner Violence: Not At Risk (10/6/2024)    Humiliation, Afraid, Rape, and Kick questionnaire     Fear of Current or Ex-Partner: No     Emotionally Abused: No     Physically Abused: No     Sexually Abused: No   Housing Stability: Low Risk  (10/6/2024)    Housing Stability Vital Sign     Unable to Pay for Housing in the Last Year: No     Number of Times Moved in the Last Year: 0     Homeless in the Last Year: No        Family History   Problem Relation Name Age of Onset    Emphysema Mother      Emphysema Father      Heart attack Sister      Lung cancer Brother          Agent orange    Lymphoma Son Brock     Multiple sclerosis Son Brock     Alcohol abuse Son Brock     Blood Disorder Son Brock     Cancer Son Elvis     Hearing loss Daughter Diann     Miscarriages / Stillbirths Daughter Linda         OBJECTIVE:    Vitals:    02/06/25 1009   BP: 145/80   Pulse: 59   SpO2: 98%        Vitals reviewed.   Constitutional:       Appearance: Normal and healthy appearance. Not in distress.   Pulmonary:      Effort: Pulmonary effort is normal.      Breath sounds: Normal breath sounds.   Cardiovascular:      Normal rate. Regular rhythm. Normal S1. Normal S2.       Murmurs: There is no murmur.      No gallop.  No click.   Pulses:     Intact distal pulses.   Edema:     Peripheral edema absent.   Skin:     General: Skin is warm and dry.   Neurological:      General: No focal deficit present.          Lab Review:   Lab Results   Component Value Date     01/24/2025    K 4.1 01/24/2025     01/24/2025    CO2 27 01/24/2025    BUN 39 (H) 01/24/2025    CREATININE  2.72 (H) 01/24/2025    GLUCOSE 88 01/24/2025    CALCIUM 8.7 01/24/2025     Lab Results   Component Value Date    CHOL 203 (H) 08/13/2024    TRIG 107 08/13/2024    HDL 80.0 08/13/2024       Lab Results   Component Value Date    LDLCALC 102 08/13/2024        Sick sinus syndrome (Multi)  S/p SJT dual chamber PPM placement. Last interrogation in Dec reviewed, she is 93% a-paced. Normal device fxn, expected depletion.    Atrial fibrillation (Multi)  Stable without signs of heart failure. She has PFTs upcoming. She doesn't have a thyroid. LFTs ok. Continue Eliquis    Benign essential hypertension  Reasonable control. Continue norvasc, metoprolol    Congestive heart failure with left ventricular diastolic dysfunction, acute on chronic (Multi)  Euvolemic. IHD will help w volume status I suspect

## 2025-02-06 NOTE — ASSESSMENT & PLAN NOTE
Stable without signs of heart failure. She has PFTs upcoming. She doesn't have a thyroid. LFTs ok. Continue Eliquis

## 2025-02-06 NOTE — ASSESSMENT & PLAN NOTE
S/p SJT dual chamber PPM placement. Last interrogation in Dec reviewed, she is 93% a-paced. Normal device fxn, expected depletion.

## 2025-02-11 ENCOUNTER — OFFICE VISIT (OUTPATIENT)
Dept: VASCULAR SURGERY | Facility: CLINIC | Age: 85
End: 2025-02-11
Payer: MEDICARE

## 2025-02-11 VITALS — DIASTOLIC BLOOD PRESSURE: 64 MMHG | RESPIRATION RATE: 18 BRPM | HEART RATE: 59 BPM | SYSTOLIC BLOOD PRESSURE: 152 MMHG

## 2025-02-11 DIAGNOSIS — Z95.0 PACEMAKER: ICD-10-CM

## 2025-02-11 DIAGNOSIS — N18.4 CHRONIC KIDNEY DISEASE, STAGE 4 (SEVERE) (MULTI): Primary | ICD-10-CM

## 2025-02-11 DIAGNOSIS — I82.B21 CHRONIC EMBOLISM AND THROMBOSIS OF RIGHT SUBCLAVIAN VEIN: ICD-10-CM

## 2025-02-11 PROCEDURE — 1036F TOBACCO NON-USER: CPT | Performed by: SURGERY

## 2025-02-11 PROCEDURE — 99212 OFFICE O/P EST SF 10 MIN: CPT | Performed by: SURGERY

## 2025-02-11 PROCEDURE — 1157F ADVNC CARE PLAN IN RCRD: CPT | Performed by: SURGERY

## 2025-02-11 PROCEDURE — 3077F SYST BP >= 140 MM HG: CPT | Performed by: SURGERY

## 2025-02-11 PROCEDURE — 1159F MED LIST DOCD IN RCRD: CPT | Performed by: SURGERY

## 2025-02-11 PROCEDURE — 99202 OFFICE O/P NEW SF 15 MIN: CPT | Performed by: SURGERY

## 2025-02-11 PROCEDURE — 3078F DIAST BP <80 MM HG: CPT | Performed by: SURGERY

## 2025-02-11 PROCEDURE — 1126F AMNT PAIN NOTED NONE PRSNT: CPT | Performed by: SURGERY

## 2025-02-11 PROCEDURE — 1123F ACP DISCUSS/DSCN MKR DOCD: CPT | Performed by: SURGERY

## 2025-02-11 ASSESSMENT — LIFESTYLE VARIABLES
AUDIT-C TOTAL SCORE: 0
SKIP TO QUESTIONS 9-10: 1
HOW MANY STANDARD DRINKS CONTAINING ALCOHOL DO YOU HAVE ON A TYPICAL DAY: PATIENT DOES NOT DRINK
HOW OFTEN DO YOU HAVE SIX OR MORE DRINKS ON ONE OCCASION: NEVER
HOW OFTEN DO YOU HAVE A DRINK CONTAINING ALCOHOL: NEVER

## 2025-02-11 ASSESSMENT — ENCOUNTER SYMPTOMS
LOSS OF SENSATION IN FEET: 1
OCCASIONAL FEELINGS OF UNSTEADINESS: 1
DEPRESSION: 1

## 2025-02-11 ASSESSMENT — PAIN SCALES - GENERAL: PAINLEVEL_OUTOF10: 0-NO PAIN

## 2025-02-11 NOTE — Clinical Note
Patient with clinically no central venous patency will get duplex but she is one of the few patients that really should get a safe central line and subsequent contrast venography once dialysis is initiated. Please call me if you have questions [phone]. Mars Strong

## 2025-02-11 NOTE — PROGRESS NOTES
Patient is a 84-year-old woman who was brought in by her daughter.  She is hard of hearing and suffers from chronic leukemia with a chronic leukocytosis most recently measured at 27,000.  She has had multiple hospitalizations which required central lines and had placement of a pacemaker in the right chest as the left subclavian vein access failed.  She has no arm swelling or arm complaints.  She is alert but hard of hearing.  She is overweight and relatively frail as well.  She does get shortness of breath with exertion.    On my examination she has full normal brachial radial and ulnar pulses in both arms.  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  This makes it unlikely that creating a fistula in either arm will result in success.  That said I will get a vein mapping to confirm this.    I had a discussion with the patient's daughter.  Formally we can only make this judgment off of contrast venogram.  MR venography is not possible due to the presence of her pacemaker and the lead which distorts the signal of the MRI.  She we will likely have the best outcome by avoiding hospitalization or early dialysis and this would involve waiting and seeing until she needs dialysis and then placing a safe tunneled dialysis catheter in some fashion and at that time doing contrast venography to see where we can planned a permanent access.    I will speak with Dr. Darnell Amor regarding this.

## 2025-02-12 DIAGNOSIS — I48.0 PAROXYSMAL ATRIAL FIBRILLATION (MULTI): ICD-10-CM

## 2025-02-12 RX ORDER — AMIODARONE HYDROCHLORIDE 200 MG/1
200 TABLET ORAL DAILY
Qty: 90 TABLET | Refills: 1 | Status: SHIPPED | OUTPATIENT
Start: 2025-02-12

## 2025-02-18 ENCOUNTER — APPOINTMENT (OUTPATIENT)
Dept: VASCULAR MEDICINE | Facility: CLINIC | Age: 85
End: 2025-02-18
Payer: MEDICARE

## 2025-02-18 ENCOUNTER — ANCILLARY PROCEDURE (OUTPATIENT)
Dept: VASCULAR MEDICINE | Facility: CLINIC | Age: 85
End: 2025-02-18
Payer: MEDICARE

## 2025-02-18 DIAGNOSIS — Z01.818 PREOP EXAMINATION: ICD-10-CM

## 2025-02-18 DIAGNOSIS — Z01.810 ENCOUNTER FOR PREPROCEDURAL CARDIOVASCULAR EXAMINATION: ICD-10-CM

## 2025-02-18 DIAGNOSIS — I82.B21 CHRONIC EMBOLISM AND THROMBOSIS OF RIGHT SUBCLAVIAN VEIN: ICD-10-CM

## 2025-02-18 DIAGNOSIS — N18.4 CHRONIC KIDNEY DISEASE (CKD) STAGE G4/A1, SEVERELY DECREASED GLOMERULAR FILTRATION RATE (GFR) BETWEEN 15-29 ML/MIN/1.73 SQUARE METER AND ALBUMINURIA CREATININE RATIO LESS THAN 30 MG/G (CMS/HC* (MULTI): ICD-10-CM

## 2025-02-18 PROCEDURE — 93985 DUP-SCAN HEMO COMPL BI STD: CPT | Performed by: SURGERY

## 2025-02-18 PROCEDURE — 93985 DUP-SCAN HEMO COMPL BI STD: CPT

## 2025-02-19 DIAGNOSIS — M79.7 FIBROMYALGIA: ICD-10-CM

## 2025-02-19 RX ORDER — GABAPENTIN 300 MG/1
300 CAPSULE ORAL 2 TIMES DAILY
Qty: 60 CAPSULE | Refills: 2 | Status: SHIPPED | OUTPATIENT
Start: 2025-02-19

## 2025-02-21 NOTE — RESULT ENCOUNTER NOTE
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 Would need tunneled line and central venography at time of dialysis initiation.

## 2025-03-02 DIAGNOSIS — I50.33 CONGESTIVE HEART FAILURE WITH LEFT VENTRICULAR DIASTOLIC DYSFUNCTION, ACUTE ON CHRONIC: ICD-10-CM

## 2025-03-02 DIAGNOSIS — M06.09 RHEUMATOID ARTHRITIS OF MULTIPLE SITES WITH NEGATIVE RHEUMATOID FACTOR (MULTI): ICD-10-CM

## 2025-03-03 RX ORDER — DULOXETIN HYDROCHLORIDE 20 MG/1
CAPSULE, DELAYED RELEASE ORAL
Qty: 90 CAPSULE | Refills: 0 | Status: SHIPPED | OUTPATIENT
Start: 2025-03-03

## 2025-03-03 RX ORDER — AMLODIPINE BESYLATE 5 MG/1
5 TABLET ORAL DAILY
Qty: 90 TABLET | Refills: 0 | Status: SHIPPED | OUTPATIENT
Start: 2025-03-03

## 2025-03-05 ENCOUNTER — APPOINTMENT (OUTPATIENT)
Dept: RESPIRATORY THERAPY | Facility: HOSPITAL | Age: 85
End: 2025-03-05
Payer: MEDICARE

## 2025-03-06 ENCOUNTER — DOCUMENTATION (OUTPATIENT)
Dept: VASCULAR SURGERY | Facility: HOSPITAL | Age: 85
End: 2025-03-06
Payer: MEDICARE

## 2025-03-06 NOTE — PROGRESS NOTES
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  She will need a catheter once ready for dialysis which will be placed by vascular surgery.  I instructed the patient's daughter to call our office once she is notified that her mother needs dialysis.

## 2025-03-11 ENCOUNTER — OFFICE VISIT (OUTPATIENT)
Dept: HEMATOLOGY/ONCOLOGY | Facility: CLINIC | Age: 85
End: 2025-03-11
Payer: MEDICARE

## 2025-03-11 VITALS
TEMPERATURE: 97.3 F | RESPIRATION RATE: 18 BRPM | BODY MASS INDEX: 30.14 KG/M2 | SYSTOLIC BLOOD PRESSURE: 150 MMHG | HEART RATE: 59 BPM | DIASTOLIC BLOOD PRESSURE: 64 MMHG | HEIGHT: 61 IN | OXYGEN SATURATION: 97 % | WEIGHT: 159.61 LBS

## 2025-03-11 DIAGNOSIS — C95.10 CHRONIC LEUKEMIA, NOT HAVING ACHIEVED REMISSION (MULTI): Primary | ICD-10-CM

## 2025-03-11 PROCEDURE — 99215 OFFICE O/P EST HI 40 MIN: CPT | Performed by: NURSE PRACTITIONER

## 2025-03-11 PROCEDURE — 1157F ADVNC CARE PLAN IN RCRD: CPT | Performed by: NURSE PRACTITIONER

## 2025-03-11 PROCEDURE — 1123F ACP DISCUSS/DSCN MKR DOCD: CPT | Performed by: NURSE PRACTITIONER

## 2025-03-11 PROCEDURE — 3077F SYST BP >= 140 MM HG: CPT | Performed by: NURSE PRACTITIONER

## 2025-03-11 PROCEDURE — 1126F AMNT PAIN NOTED NONE PRSNT: CPT | Performed by: NURSE PRACTITIONER

## 2025-03-11 PROCEDURE — 1159F MED LIST DOCD IN RCRD: CPT | Performed by: NURSE PRACTITIONER

## 2025-03-11 PROCEDURE — 3078F DIAST BP <80 MM HG: CPT | Performed by: NURSE PRACTITIONER

## 2025-03-11 ASSESSMENT — ENCOUNTER SYMPTOMS
ADENOPATHY: 0
CONSTIPATION: 0
UNEXPECTED WEIGHT CHANGE: 1
NAUSEA: 0
CHEST TIGHTNESS: 0
PALPITATIONS: 0
ARTHRALGIAS: 0
ABDOMINAL DISTENTION: 0
DIARRHEA: 0
COUGH: 0
RESPIRATORY NEGATIVE: 1
FLANK PAIN: 0
HEMOPTYSIS: 0
DIAPHORESIS: 0
HEMATURIA: 0
DIZZINESS: 1
TROUBLE SWALLOWING: 0
ABDOMINAL PAIN: 0
BACK PAIN: 0
CARDIOVASCULAR NEGATIVE: 1
NUMBNESS: 0
EXTREMITY WEAKNESS: 1
BRUISES/BLEEDS EASILY: 0
SHORTNESS OF BREATH: 0
LEG SWELLING: 0
LIGHT-HEADEDNESS: 1
HEADACHES: 0
FATIGUE: 1
FEVER: 0
BLOOD IN STOOL: 0
CHILLS: 0
GASTROINTESTINAL NEGATIVE: 1

## 2025-03-11 ASSESSMENT — PAIN SCALES - GENERAL: PAINLEVEL_OUTOF10: 0-NO PAIN

## 2025-03-11 NOTE — PROGRESS NOTES
Patient here for follow up visit with Lydia Collado for Dx of CLL  Patient here with dtr. She ambulated via walker. Falls precautions maintained.    Medications and Allergies reviewed and reconciled this visit.    No concerns or complaints noted at this time. Pt denies fever, chills, and night sweats. She has lost greater than 20 # but has been ill with heart and kidney failure. She is not a candidate for fistula but will get a tunneled cath once dialysis starts.     Pt reports appetite is  good.      Follow up per  PA  request.    Pt. reports availability and use of mychart, Reviewed this is a good place to communicate with the team as well as review labs and upcoming orders.   No barriers to education noted, patient agrees to current plan and verbalized understanding using teach back method.

## 2025-03-11 NOTE — PROGRESS NOTES
Patient ID: Tracie Baltazar is a 84 y.o. female.  Referring Physician: No referring provider defined for this encounter.  Primary Care Provider: Javier Floyd MD  Visit Type: Follow Up      Subjective    HPI  The patient comes in today for follow-up evaluation for history of stage I chronic lymphocytic leukemia diagnosed in 2007 with Dr. Dunlap.   WBC 5/14/18 was 22.9 and most recently 1/25/25 WBC 27.   On presentation today she denies any night sweats, lymphadenopathy or unexplained weight loss. She has not had any interval infections or hospitalizations. No cough, chest pain or shortness of breath. No nausea or vomiting. No constipation or diarrhea. Appetite is good and weight is stable.     Since last being seen she has required a pacemaker.  She is dealing with kidney failure and the projected need for dialysis.  She has developed anemia with hgb 10.3.  Iron levels were good a few months ago.       Review of Systems   Constitutional:  Positive for fatigue and unexpected weight change. Negative for chills, diaphoresis and fever.   HENT:  Negative.  Negative for hearing loss, lump/mass, mouth sores, nosebleeds and trouble swallowing.    Respiratory: Negative.  Negative for chest tightness, cough, hemoptysis and shortness of breath.    Cardiovascular: Negative.  Negative for chest pain, leg swelling and palpitations.   Gastrointestinal: Negative.  Negative for abdominal distention, abdominal pain, blood in stool, constipation, diarrhea and nausea.   Genitourinary:  Negative for hematuria.    Musculoskeletal:  Positive for gait problem. Negative for arthralgias, back pain and flank pain.   Skin: Negative.  Negative for itching and rash.   Neurological:  Positive for dizziness, extremity weakness, gait problem and light-headedness. Negative for headaches and numbness.   Hematological:  Negative for adenopathy. Does not bruise/bleed easily.      Objective   BSA: 1.77 meters squared  /64   Pulse 59    "Temp 36.3 °C (97.3 °F) (Temporal)   Resp 18   Ht (S) 1.554 m (5' 1.18\")   Wt 72.4 kg (159 lb 9.8 oz)   SpO2 97%   BMI 29.98 kg/m²      has a past medical history of Abnormal ECG, Asthma, Essential (primary) hypertension, Kidney failure, Leukemia (Multi), and Rheumatoid arthritis.   has a past surgical history that includes Hysterectomy; Back surgery; Cholecystectomy; Other surgical history; Knee surgery; Shoulder surgery (Left); Total knee arthroplasty (Left, 02/13/2014); Cardiac electrophysiology procedure (Right, 6/2/2024); Cardiac electrophysiology procedure (N/A, 6/3/2024); and Cardiac electrophysiology procedure (Right, 10/14/2024).  Family History   Problem Relation Name Age of Onset    Emphysema Mother      Emphysema Father      Heart attack Sister      Lung cancer Brother          Agent orange    Lymphoma Son Brock     Multiple sclerosis Son Brock     Alcohol abuse Son Brokc     Blood Disorder Son Brock     Cancer Son Elvis     Hearing loss Daughter Diann     Miscarriages / Stillbirths Daughter Linda Baltazar  reports that she has never smoked. She has never been exposed to tobacco smoke. She has never used smokeless tobacco.  She  reports that she does not currently use alcohol.  She  reports no history of drug use.    Physical Exam  Constitutional:       Appearance: Normal appearance.   Eyes:      Conjunctiva/sclera: Conjunctivae normal.      Pupils: Pupils are equal, round, and reactive to light.   Cardiovascular:      Rate and Rhythm: Normal rate and regular rhythm.      Pulses: Normal pulses.      Heart sounds: Normal heart sounds. No murmur heard.  Pulmonary:      Effort: Pulmonary effort is normal.      Breath sounds: Normal breath sounds. No wheezing, rhonchi or rales.   Abdominal:      General: There is no distension.      Palpations: There is no mass.      Tenderness: There is abdominal tenderness.      Comments: Tender with palpation LUQ, no splenomegaly appreciated.  Review medical " record, no abdominal studies completed recently   Musculoskeletal:         General: No swelling or tenderness. Normal range of motion.   Lymphadenopathy:      Cervical: No cervical adenopathy.   Skin:     Coloration: Skin is pale. Skin is not jaundiced.      Findings: No bruising.   Neurological:      Motor: Weakness present.     WBC   Date/Time Value Ref Range Status   01/24/2025 11:56 AM 27.0 (H) 4.4 - 11.3 x10*3/uL Final   11/21/2024 03:57 PM 24.5 (H) 4.4 - 11.3 x10*3/uL Final   10/11/2024 12:44 PM 32.5 (H) 4.4 - 11.3 x10*3/uL Final     nRBC   Date Value Ref Range Status   01/24/2025 0.0 0.0 - 0.0 /100 WBCs Final   11/21/2024 0.0 0.0 - 0.0 /100 WBCs Final   10/11/2024 0.0 0.0 - 0.0 /100 WBCs Final     RBC   Date Value Ref Range Status   01/24/2025 3.28 (L) 4.00 - 5.20 x10*6/uL Final   11/21/2024 2.77 (L) 4.00 - 5.20 x10*6/uL Final   10/11/2024 3.46 (L) 4.00 - 5.20 x10*6/uL Final     Hemoglobin   Date Value Ref Range Status   01/24/2025 10.3 (L) 12.0 - 16.0 g/dL Final   11/21/2024 8.9 (L) 12.0 - 16.0 g/dL Final   10/11/2024 10.8 (L) 12.0 - 16.0 g/dL Final     Hematocrit   Date Value Ref Range Status   01/24/2025 33.9 (L) 36.0 - 46.0 % Final   11/21/2024 29.0 (L) 36.0 - 46.0 % Final   10/11/2024 35.1 (L) 36.0 - 46.0 % Final     MCV   Date/Time Value Ref Range Status   01/24/2025 11:56  (H) 80 - 100 fL Final   11/21/2024 03:57  (H) 80 - 100 fL Final   10/11/2024 12:44  (H) 80 - 100 fL Final     MCH   Date/Time Value Ref Range Status   01/24/2025 11:56 AM 31.4 26.0 - 34.0 pg Final   11/21/2024 03:57 PM 32.1 26.0 - 34.0 pg Final   10/11/2024 12:44 PM 31.2 26.0 - 34.0 pg Final     MCHC   Date/Time Value Ref Range Status   01/24/2025 11:56 AM 30.4 (L) 32.0 - 36.0 g/dL Final   11/21/2024 03:57 PM 30.7 (L) 32.0 - 36.0 g/dL Final   10/11/2024 12:44 PM 30.8 (L) 32.0 - 36.0 g/dL Final     RDW   Date/Time Value Ref Range Status   01/24/2025 11:56 AM 13.5 11.5 - 14.5 % Final   11/21/2024 03:57 PM 15.3 (H)  "11.5 - 14.5 % Final   10/11/2024 12:44 PM 13.9 11.5 - 14.5 % Final     Platelets   Date/Time Value Ref Range Status   01/24/2025 11:56  150 - 450 x10*3/uL Final   11/21/2024 03:57  150 - 450 x10*3/uL Final   10/11/2024 12:44  150 - 450 x10*3/uL Final     MPV   Date/Time Value Ref Range Status   10/25/2023 12:52 PM 11.5 7.5 - 11.5 fL Final   08/17/2023 12:58 PM 12.1 7.0 - 12.6 CU Final   04/17/2023 01:44 PM 12.8 (H) 7.0 - 12.6 CU Final     No results found for: \"NEUTOPHILPCT\"  Immature Granulocytes %, Automated   Date/Time Value Ref Range Status   10/04/2024 06:51 PM 0.4 0.0 - 0.9 % Final     Comment:     Immature Granulocyte Count (IG) includes promyelocytes, myelocytes and metamyelocytes but does not include bands. Percent differential counts (%) should be interpreted in the context of the absolute cell counts (cells/UL).   08/13/2024 11:37 AM 0.3 0.0 - 0.9 % Final     Comment:     Immature Granulocyte Count (IG) includes promyelocytes, myelocytes and metamyelocytes but does not include bands. Percent differential counts (%) should be interpreted in the context of the absolute cell counts (cells/UL).   06/06/2024 06:20 AM 0.3 0.0 - 0.9 % Final     Comment:     Immature Granulocyte Count (IG) includes promyelocytes, myelocytes and metamyelocytes but does not include bands. Percent differential counts (%) should be interpreted in the context of the absolute cell counts (cells/UL).     Lymphocytes %, Manual   Date/Time Value Ref Range Status   10/04/2024 06:51 PM 55.0 13.0 - 44.0 % Final   08/13/2024 11:37 AM 79.0 13.0 - 44.0 % Final     Comment:     Albumin smear used to eliminate smudge cells.   06/06/2024 06:20 AM 75.0 13.0 - 44.0 % Final     Comment:     ALBUMIN USED TO ELIMINATE SMUDGE CELLS     Monocytes %, Manual   Date/Time Value Ref Range Status   10/04/2024 06:51 PM 2.0 2.0 - 10.0 % Final   08/13/2024 11:37 AM 1.0 2.0 - 10.0 % Final   06/06/2024 06:20 AM 1.0 2.0 - 10.0 % Final "     Eosinophils %, Manual   Date/Time Value Ref Range Status   10/04/2024 06:51 PM 0.0 0.0 - 6.0 % Final   08/13/2024 11:37 AM 0.0 0.0 - 6.0 % Final   06/06/2024 06:20 AM 2.0 0.0 - 6.0 % Final     Basophils %, Manual   Date/Time Value Ref Range Status   10/04/2024 06:51 PM 0.0 0.0 - 2.0 % Final   08/13/2024 11:37 AM 1.0 0.0 - 2.0 % Final   06/06/2024 06:20 AM 1.0 0.0 - 2.0 % Final     Neutrophils Absolute   Date/Time Value Ref Range Status   12/08/2022 09:43 AM 8.36 (H) 1.8 - 7.7 K/UL Final   07/28/2022 10:45 AM 7.93 (H) 1.8 - 7.7 K/UL Final   12/10/2021 02:30 PM 7.44 1.8 - 7.7 K/UL Final     Immature Granulocytes Absolute, Automated   Date/Time Value Ref Range Status   10/04/2024 06:51 PM 0.14 0.00 - 0.50 x10*3/uL Final   08/13/2024 11:37 AM 0.07 0.00 - 0.50 x10*3/uL Final   06/06/2024 06:20 AM 0.12 0.00 - 0.50 x10*3/uL Final     Lymphocytes Absolute   Date/Time Value Ref Range Status   12/08/2022 09:43 AM 23.42 (H) 1.2 - 3.2 K/UL Final   07/28/2022 10:45 AM 22.46 (H) 1.2 - 3.2 K/UL Final   12/10/2021 02:30 PM 30.55 (H) 1.2 - 3.2 K/UL Final     Monocytes Absolute   Date/Time Value Ref Range Status   12/08/2022 09:43 AM 1.00 (H) 0 - 0.8 K/UL Final   07/28/2022 10:45 AM 0.66 0 - 0.8 K/UL Final   12/10/2021 02:30 PM 0.78 0 - 0.8 K/UL Final     Eosinophils Absolute, Manual   Date/Time Value Ref Range Status   10/04/2024 06:51 PM 0.00 0.00 - 0.40 x10*3/uL Final   08/13/2024 11:37 AM 0.00 0.00 - 0.40 x10*3/uL Final   06/06/2024 06:20 AM 0.71 (H) 0.00 - 0.40 x10*3/uL Final     Basophils Absolute, Manual   Date/Time Value Ref Range Status   10/04/2024 06:51 PM 0.00 0.00 - 0.10 x10*3/uL Final   08/13/2024 11:37 AM 0.28 (H) 0.00 - 0.10 x10*3/uL Final   06/06/2024 06:20 AM 0.35 (H) 0.00 - 0.10 x10*3/uL Final       Assessment/Plan    Rand Stage I chronic lymphocytic leukemia originally diagnosed in 2007 and has been treated with observation alone ever since.  - continue to monitor labs on an every 6 month basis along with  physical exam.  Discussed watching for WBC to double in <6 months.   LUQ pain elicited on exam more likely to be gas pain than splenomegaly.   Patient will call if she has further pain and I will order U/S at that time  - reviewed with patient she should contact office should she develop any drenching night sweats, unexplained weight loss or lymphadenopathy.    Anemia likely related to renal insufficiency with hgb 10.3 and normal iron levels a few months ago.  Discussed benefit of Procrit for anemia related to renal failure and would start if hgb <10.  Will visit in 6 months with labs the week prior.  She knows to call sooner if hgb <10.     Pacemaker placed, still with some unsteadiness     Immunizations. The patient will continue getting the flu shot every fall. Continue to receive COVID-19 boosters as recommended by CDC. She has already received both of her shingles vaccinations. She will receive the pneumonia vaccine every five years.      Health maintenance. The patient will continue to remain up to date on age appropriate screening through her primary care physician.        Diagnoses and all orders for this visit:  Chronic leukemia, not having achieved remission (Multi)  -     CBC and Auto Differential; Future  -     Comprehensive Metabolic Panel; Future  -     Ferritin; Future  -     Iron and TIBC; Future  -     Vitamin B12; Future  -     Clinic Appointment Request Follow up; Future           Lydia Collado PA-C

## 2025-03-13 ENCOUNTER — APPOINTMENT (OUTPATIENT)
Dept: RADIOLOGY | Facility: HOSPITAL | Age: 85
DRG: 086 | End: 2025-03-13
Payer: MEDICARE

## 2025-03-13 ENCOUNTER — CLINICAL SUPPORT (OUTPATIENT)
Dept: EMERGENCY MEDICINE | Facility: HOSPITAL | Age: 85
DRG: 086 | End: 2025-03-13
Payer: MEDICARE

## 2025-03-13 ENCOUNTER — APPOINTMENT (OUTPATIENT)
Dept: RADIOLOGY | Facility: HOSPITAL | Age: 85
End: 2025-03-13
Payer: MEDICARE

## 2025-03-13 ENCOUNTER — HOSPITAL ENCOUNTER (EMERGENCY)
Facility: HOSPITAL | Age: 85
Discharge: ANOTHER HOSPITAL | End: 2025-03-13
Attending: EMERGENCY MEDICINE
Payer: MEDICARE

## 2025-03-13 ENCOUNTER — HOSPITAL ENCOUNTER (INPATIENT)
Facility: HOSPITAL | Age: 85
LOS: 2 days | Discharge: HOME | DRG: 086 | End: 2025-03-15
Attending: EMERGENCY MEDICINE | Admitting: SURGERY
Payer: MEDICARE

## 2025-03-13 VITALS
TEMPERATURE: 97.5 F | BODY MASS INDEX: 30.02 KG/M2 | SYSTOLIC BLOOD PRESSURE: 123 MMHG | OXYGEN SATURATION: 99 % | HEIGHT: 61 IN | DIASTOLIC BLOOD PRESSURE: 54 MMHG | HEART RATE: 60 BPM | WEIGHT: 159 LBS | RESPIRATION RATE: 13 BRPM

## 2025-03-13 DIAGNOSIS — I61.9 INTRAPARENCHYMAL HEMORRHAGE OF BRAIN (MULTI): ICD-10-CM

## 2025-03-13 DIAGNOSIS — E55.9 MILD VITAMIN D DEFICIENCY: ICD-10-CM

## 2025-03-13 DIAGNOSIS — M17.4 OTHER SECONDARY OSTEOARTHRITIS OF BOTH KNEES: ICD-10-CM

## 2025-03-13 DIAGNOSIS — S80.00XA CONTUSION OF KNEE, UNSPECIFIED LATERALITY, INITIAL ENCOUNTER: ICD-10-CM

## 2025-03-13 DIAGNOSIS — K59.00 CONSTIPATION, UNSPECIFIED CONSTIPATION TYPE: ICD-10-CM

## 2025-03-13 DIAGNOSIS — S40.011A CONTUSION OF RIGHT SHOULDER, INITIAL ENCOUNTER: ICD-10-CM

## 2025-03-13 DIAGNOSIS — Z95.0 PACEMAKER: ICD-10-CM

## 2025-03-13 DIAGNOSIS — S06.330A: ICD-10-CM

## 2025-03-13 DIAGNOSIS — I49.5 SICK SINUS SYNDROME (MULTI): ICD-10-CM

## 2025-03-13 DIAGNOSIS — I50.33 CONGESTIVE HEART FAILURE WITH LEFT VENTRICULAR DIASTOLIC DYSFUNCTION, ACUTE ON CHRONIC: ICD-10-CM

## 2025-03-13 DIAGNOSIS — M19.90 ARTHRITIS: ICD-10-CM

## 2025-03-13 DIAGNOSIS — R53.1 WEAKNESS: ICD-10-CM

## 2025-03-13 DIAGNOSIS — M06.09 RHEUMATOID ARTHRITIS OF MULTIPLE SITES WITH NEGATIVE RHEUMATOID FACTOR (MULTI): ICD-10-CM

## 2025-03-13 DIAGNOSIS — I61.9 INTRAPARENCHYMAL HEMORRHAGE OF BRAIN (MULTI): Primary | ICD-10-CM
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
## 2025-03-13 LAB
ABO GROUP (TYPE) IN BLOOD: NORMAL
ANION GAP SERPL CALCULATED.3IONS-SCNC: 13 MMOL/L (ref 10–20)
ANTIBODY SCREEN: NORMAL
APTT PPP: 26.4 SECONDS (ref 22–32.5)
BASOPHILS # BLD MANUAL: 0.71 X10*3/UL (ref 0–0.1)
BASOPHILS NFR BLD MANUAL: 2 %
BUN SERPL-MCNC: 48 MG/DL (ref 6–23)
CALCIUM SERPL-MCNC: 9.1 MG/DL (ref 8.6–10.3)
CHLORIDE SERPL-SCNC: 103 MMOL/L (ref 98–107)
CK SERPL-CCNC: 161 U/L (ref 0–215)
CO2 SERPL-SCNC: 25 MMOL/L (ref 21–32)
CREAT SERPL-MCNC: 3 MG/DL (ref 0.5–1.05)
EGFRCR SERPLBLD CKD-EPI 2021: 15 ML/MIN/1.73M*2
EOSINOPHIL # BLD MANUAL: 0.36 X10*3/UL (ref 0–0.4)
EOSINOPHIL NFR BLD MANUAL: 1 %
ERYTHROCYTE [DISTWIDTH] IN BLOOD BY AUTOMATED COUNT: 14.2 % (ref 11.5–14.5)
GLUCOSE SERPL-MCNC: 125 MG/DL (ref 74–99)
HCT VFR BLD AUTO: 36 % (ref 36–46)
HGB BLD-MCNC: 11.3 G/DL (ref 12–16)
IMM GRANULOCYTES # BLD AUTO: 0.11 X10*3/UL (ref 0–0.5)
IMM GRANULOCYTES NFR BLD AUTO: 0.3 % (ref 0–0.9)
INR PPP: 1.1 (ref 0.9–1.2)
LYMPHOCYTES # BLD MANUAL: 24.14 X10*3/UL (ref 0.8–3)
LYMPHOCYTES NFR BLD MANUAL: 68 %
MCH RBC QN AUTO: 31.5 PG (ref 26–34)
MCHC RBC AUTO-ENTMCNC: 31.4 G/DL (ref 32–36)
MCV RBC AUTO: 100 FL (ref 80–100)
MONOCYTES # BLD MANUAL: 0.36 X10*3/UL (ref 0.05–0.8)
MONOCYTES NFR BLD MANUAL: 1 %
NEUTS SEG # BLD MANUAL: 8.52 X10*3/UL (ref 1.6–5)
NEUTS SEG NFR BLD MANUAL: 24 %
NRBC BLD-RTO: 0 /100 WBCS (ref 0–0)
OVALOCYTES BLD QL SMEAR: ABNORMAL
PLATELET # BLD AUTO: 256 X10*3/UL (ref 150–450)
POTASSIUM SERPL-SCNC: 4.1 MMOL/L (ref 3.5–5.3)
PROTHROMBIN TIME: 12.2 SECONDS (ref 9.3–12.7)
RBC # BLD AUTO: 3.59 X10*6/UL (ref 4–5.2)
RBC MORPH BLD: ABNORMAL
RH FACTOR (ANTIGEN D): NORMAL
SODIUM SERPL-SCNC: 137 MMOL/L (ref 136–145)
TOTAL CELLS COUNTED BLD: 100
VARIANT LYMPHS # BLD MANUAL: 1.42 X10*3/UL (ref 0–0.3)
VARIANT LYMPHS NFR BLD: 4 %
WBC # BLD AUTO: 35.5 X10*3/UL (ref 4.4–11.3)

## 2025-03-13 PROCEDURE — 70450 CT HEAD/BRAIN W/O DYE: CPT | Performed by: RADIOLOGY

## 2025-03-13 PROCEDURE — 82550 ASSAY OF CK (CPK): CPT | Performed by: EMERGENCY MEDICINE

## 2025-03-13 PROCEDURE — 73030 X-RAY EXAM OF SHOULDER: CPT | Mod: RT

## 2025-03-13 PROCEDURE — 85610 PROTHROMBIN TIME: CPT | Performed by: EMERGENCY MEDICINE

## 2025-03-13 PROCEDURE — 36415 COLL VENOUS BLD VENIPUNCTURE: CPT | Performed by: EMERGENCY MEDICINE

## 2025-03-13 PROCEDURE — 99223 1ST HOSP IP/OBS HIGH 75: CPT | Performed by: INTERNAL MEDICINE

## 2025-03-13 PROCEDURE — 99291 CRITICAL CARE FIRST HOUR: CPT | Mod: 25 | Performed by: EMERGENCY MEDICINE

## 2025-03-13 PROCEDURE — 86905 BLOOD TYPING RBC ANTIGENS: CPT

## 2025-03-13 PROCEDURE — 99285 EMERGENCY DEPT VISIT HI MDM: CPT | Performed by: EMERGENCY MEDICINE

## 2025-03-13 PROCEDURE — 85730 THROMBOPLASTIN TIME PARTIAL: CPT | Performed by: EMERGENCY MEDICINE

## 2025-03-13 PROCEDURE — 2500000001 HC RX 250 WO HCPCS SELF ADMINISTERED DRUGS (ALT 637 FOR MEDICARE OP): Performed by: NURSE PRACTITIONER

## 2025-03-13 PROCEDURE — 99223 1ST HOSP IP/OBS HIGH 75: CPT

## 2025-03-13 PROCEDURE — 86850 RBC ANTIBODY SCREEN: CPT

## 2025-03-13 PROCEDURE — 72170 X-RAY EXAM OF PELVIS: CPT | Mod: FOREIGN READ | Performed by: RADIOLOGY

## 2025-03-13 PROCEDURE — 71045 X-RAY EXAM CHEST 1 VIEW: CPT

## 2025-03-13 PROCEDURE — 73564 X-RAY EXAM KNEE 4 OR MORE: CPT | Mod: RT

## 2025-03-13 PROCEDURE — 70450 CT HEAD/BRAIN W/O DYE: CPT

## 2025-03-13 PROCEDURE — 2500000002 HC RX 250 W HCPCS SELF ADMINISTERED DRUGS (ALT 637 FOR MEDICARE OP, ALT 636 FOR OP/ED): Performed by: NURSE PRACTITIONER

## 2025-03-13 PROCEDURE — 1200000002 HC GENERAL ROOM WITH TELEMETRY DAILY

## 2025-03-13 PROCEDURE — 72125 CT NECK SPINE W/O DYE: CPT

## 2025-03-13 PROCEDURE — 93005 ELECTROCARDIOGRAM TRACING: CPT

## 2025-03-13 PROCEDURE — G0390 TRAUMA RESPONS W/HOSP CRITI: HCPCS

## 2025-03-13 PROCEDURE — G0316 PR PROLONGED HOSPITAL INPATIENT OR OBSERVATION CARE EVALUATION AND MANAGEMENT SERVICE(S) BEYOND THE TOTAL TIME FOR THE PRIMARY SERVICE (WHEN THE PRIMARY SERVICE HAS BEEN SELECTED USING TIME: HCPCS | Performed by: INTERNAL MEDICINE

## 2025-03-13 PROCEDURE — 84132 ASSAY OF SERUM POTASSIUM: CPT | Performed by: EMERGENCY MEDICINE

## 2025-03-13 PROCEDURE — 85007 BL SMEAR W/DIFF WBC COUNT: CPT | Performed by: EMERGENCY MEDICINE

## 2025-03-13 PROCEDURE — 86870 RBC ANTIBODY IDENTIFICATION: CPT

## 2025-03-13 PROCEDURE — 85027 COMPLETE CBC AUTOMATED: CPT | Performed by: EMERGENCY MEDICINE

## 2025-03-13 PROCEDURE — 2500000001 HC RX 250 WO HCPCS SELF ADMINISTERED DRUGS (ALT 637 FOR MEDICARE OP)

## 2025-03-13 PROCEDURE — 73560 X-RAY EXAM OF KNEE 1 OR 2: CPT | Mod: LT

## 2025-03-13 PROCEDURE — 72170 X-RAY EXAM OF PELVIS: CPT

## 2025-03-13 PROCEDURE — 73030 X-RAY EXAM OF SHOULDER: CPT | Mod: RIGHT SIDE | Performed by: RADIOLOGY

## 2025-03-13 PROCEDURE — 99222 1ST HOSP IP/OBS MODERATE 55: CPT | Performed by: NEUROLOGICAL SURGERY

## 2025-03-13 PROCEDURE — 73564 X-RAY EXAM KNEE 4 OR MORE: CPT | Mod: RIGHT SIDE | Performed by: RADIOLOGY

## 2025-03-13 PROCEDURE — 99285 EMERGENCY DEPT VISIT HI MDM: CPT | Mod: 25 | Performed by: EMERGENCY MEDICINE

## 2025-03-13 RX ORDER — ACETAMINOPHEN 325 MG/1
650 TABLET ORAL EVERY 6 HOURS
Status: DISCONTINUED | OUTPATIENT
Start: 2025-03-13 | End: 2025-03-13

## 2025-03-13 RX ORDER — FUROSEMIDE 20 MG/1
40 TABLET ORAL DAILY
COMMUNITY

## 2025-03-13 RX ORDER — AMOXICILLIN 250 MG
2 CAPSULE ORAL 2 TIMES DAILY
Status: DISCONTINUED | OUTPATIENT
Start: 2025-03-13 | End: 2025-03-15 | Stop reason: HOSPADM

## 2025-03-13 RX ORDER — AMLODIPINE BESYLATE 5 MG/1
5 TABLET ORAL DAILY
Status: DISCONTINUED | OUTPATIENT
Start: 2025-03-13 | End: 2025-03-15 | Stop reason: HOSPADM

## 2025-03-13 RX ORDER — ACETAMINOPHEN 325 MG/1
975 TABLET ORAL EVERY 8 HOURS
Status: DISCONTINUED | OUTPATIENT
Start: 2025-03-13 | End: 2025-03-15 | Stop reason: HOSPADM

## 2025-03-13 RX ORDER — FUROSEMIDE 20 MG/1
20 TABLET ORAL DAILY
Status: DISCONTINUED | OUTPATIENT
Start: 2025-03-13 | End: 2025-03-15 | Stop reason: HOSPADM

## 2025-03-13 RX ORDER — ACETAMINOPHEN 325 MG/1
975 TABLET ORAL ONCE
Status: COMPLETED | OUTPATIENT
Start: 2025-03-13 | End: 2025-03-13

## 2025-03-13 RX ORDER — POLYETHYLENE GLYCOL 3350 17 G/17G
17 POWDER, FOR SOLUTION ORAL DAILY
Status: DISCONTINUED | OUTPATIENT
Start: 2025-03-13 | End: 2025-03-15 | Stop reason: HOSPADM

## 2025-03-13 RX ORDER — LABETALOL HYDROCHLORIDE 5 MG/ML
10 INJECTION, SOLUTION INTRAVENOUS EVERY 4 HOURS PRN
Status: DISCONTINUED | OUTPATIENT
Start: 2025-03-13 | End: 2025-03-15 | Stop reason: HOSPADM

## 2025-03-13 RX ORDER — DICLOFENAC SODIUM 10 MG/G
4 GEL TOPICAL 4 TIMES DAILY PRN
Status: DISCONTINUED | OUTPATIENT
Start: 2025-03-13 | End: 2025-03-15 | Stop reason: HOSPADM

## 2025-03-13 RX ORDER — LEVOTHYROXINE SODIUM 75 UG/1
150 TABLET ORAL DAILY
Status: DISCONTINUED | OUTPATIENT
Start: 2025-03-13 | End: 2025-03-15 | Stop reason: HOSPADM

## 2025-03-13 RX ORDER — AMIODARONE HYDROCHLORIDE 200 MG/1
200 TABLET ORAL DAILY
Status: DISCONTINUED | OUTPATIENT
Start: 2025-03-13 | End: 2025-03-15 | Stop reason: HOSPADM

## 2025-03-13 RX ORDER — GABAPENTIN 300 MG/1
300 CAPSULE ORAL NIGHTLY
Status: DISCONTINUED | OUTPATIENT
Start: 2025-03-13 | End: 2025-03-15 | Stop reason: HOSPADM

## 2025-03-13 RX ORDER — GABAPENTIN 300 MG/1
300 CAPSULE ORAL 2 TIMES DAILY
Status: DISCONTINUED | OUTPATIENT
Start: 2025-03-13 | End: 2025-03-13

## 2025-03-13 RX ADMIN — ACETAMINOPHEN 975 MG: 325 TABLET, FILM COATED ORAL at 14:00

## 2025-03-13 RX ADMIN — FUROSEMIDE 20 MG: 40 TABLET ORAL at 14:00

## 2025-03-13 RX ADMIN — SENNOSIDES AND DOCUSATE SODIUM 2 TABLET: 50; 8.6 TABLET ORAL at 20:55

## 2025-03-13 RX ADMIN — ACETAMINOPHEN 975 MG: 325 TABLET, FILM COATED ORAL at 20:55

## 2025-03-13 RX ADMIN — LEVOTHYROXINE SODIUM 150 MCG: 0.07 TABLET ORAL at 14:00

## 2025-03-13 RX ADMIN — ACETAMINOPHEN 975 MG: 325 TABLET, FILM COATED ORAL at 06:50

## 2025-03-13 RX ADMIN — GABAPENTIN 300 MG: 300 CAPSULE ORAL at 13:59

## 2025-03-13 RX ADMIN — GABAPENTIN 300 MG: 300 CAPSULE ORAL at 20:55

## 2025-03-13 RX ADMIN — AMLODIPINE BESYLATE 5 MG: 5 TABLET ORAL at 13:59

## 2025-03-13 RX ADMIN — AMIODARONE HYDROCHLORIDE 200 MG: 200 TABLET ORAL at 13:59

## 2025-03-13 ASSESSMENT — PAIN DESCRIPTION - ORIENTATION
ORIENTATION: RIGHT;LEFT
ORIENTATION: RIGHT

## 2025-03-13 ASSESSMENT — PAIN DESCRIPTION - LOCATION
LOCATION: LEG
LOCATION: LEG

## 2025-03-13 ASSESSMENT — ENCOUNTER SYMPTOMS
CONSTITUTIONAL NEGATIVE: 1
ENDOCRINE NEGATIVE: 1
RESPIRATORY NEGATIVE: 1
LIGHT-HEADEDNESS: 0
EYES NEGATIVE: 1
DIZZINESS: 0
GASTROINTESTINAL NEGATIVE: 1
HEADACHES: 1

## 2025-03-13 ASSESSMENT — COLUMBIA-SUICIDE SEVERITY RATING SCALE - C-SSRS
6. HAVE YOU EVER DONE ANYTHING, STARTED TO DO ANYTHING, OR PREPARED TO DO ANYTHING TO END YOUR LIFE?: NO
2. HAVE YOU ACTUALLY HAD ANY THOUGHTS OF KILLING YOURSELF?: NO
1. IN THE PAST MONTH, HAVE YOU WISHED YOU WERE DEAD OR WISHED YOU COULD GO TO SLEEP AND NOT WAKE UP?: NO
1. IN THE PAST MONTH, HAVE YOU WISHED YOU WERE DEAD OR WISHED YOU COULD GO TO SLEEP AND NOT WAKE UP?: NO
6. HAVE YOU EVER DONE ANYTHING, STARTED TO DO ANYTHING, OR PREPARED TO DO ANYTHING TO END YOUR LIFE?: NO
2. HAVE YOU ACTUALLY HAD ANY THOUGHTS OF KILLING YOURSELF?: NO

## 2025-03-13 ASSESSMENT — LIFESTYLE VARIABLES
TOTAL SCORE: 0
HAVE PEOPLE ANNOYED YOU BY CRITICIZING YOUR DRINKING: NO
EVER FELT BAD OR GUILTY ABOUT YOUR DRINKING: NO
EVER HAD A DRINK FIRST THING IN THE MORNING TO STEADY YOUR NERVES TO GET RID OF A HANGOVER: NO
HAVE YOU EVER FELT YOU SHOULD CUT DOWN ON YOUR DRINKING: NO
TOTAL SCORE: 0
EVER HAD A DRINK FIRST THING IN THE MORNING TO STEADY YOUR NERVES TO GET RID OF A HANGOVER: NO
HAVE YOU EVER FELT YOU SHOULD CUT DOWN ON YOUR DRINKING: NO
HAVE PEOPLE ANNOYED YOU BY CRITICIZING YOUR DRINKING: NO
EVER FELT BAD OR GUILTY ABOUT YOUR DRINKING: NO

## 2025-03-13 ASSESSMENT — PAIN - FUNCTIONAL ASSESSMENT
PAIN_FUNCTIONAL_ASSESSMENT: 0-10

## 2025-03-13 ASSESSMENT — PAIN SCALES - GENERAL
PAINLEVEL_OUTOF10: 4
PAINLEVEL_OUTOF10: 0 - NO PAIN
PAINLEVEL_OUTOF10: 7
PAINLEVEL_OUTOF10: 0 - NO PAIN
PAINLEVEL_OUTOF10: 5 - MODERATE PAIN

## 2025-03-13 ASSESSMENT — PAIN SCALES - WONG BAKER: WONGBAKER_NUMERICALRESPONSE: HURTS LITTLE MORE

## 2025-03-13 ASSESSMENT — COGNITIVE AND FUNCTIONAL STATUS - GENERAL
MOVING TO AND FROM BED TO CHAIR: A LITTLE
CLIMB 3 TO 5 STEPS WITH RAILING: A LITTLE
STANDING UP FROM CHAIR USING ARMS: A LITTLE
WALKING IN HOSPITAL ROOM: A LITTLE
MOVING FROM LYING ON BACK TO SITTING ON SIDE OF FLAT BED WITH BEDRAILS: A LITTLE
DAILY ACTIVITIY SCORE: 20
DRESSING REGULAR LOWER BODY CLOTHING: A LITTLE
HELP NEEDED FOR BATHING: A LITTLE
MOBILITY SCORE: 18
TOILETING: A LITTLE
DRESSING REGULAR UPPER BODY CLOTHING: A LITTLE
TURNING FROM BACK TO SIDE WHILE IN FLAT BAD: A LITTLE

## 2025-03-13 ASSESSMENT — PAIN DESCRIPTION - PAIN TYPE: TYPE: ACUTE PAIN

## 2025-03-13 NOTE — PROGRESS NOTES
Emergency Department Transition of Care Note       Signout   I received Tracie Baltazar in signout from Dr. Hoang.  Please see the previous note for all HPI, PE and MDM up to the time of signout at 0700.    In brief Tracie Baltazar is an 84 y.o. female presenting for   Chief Complaint   Patient presents with    Fall           ED Course & Medical Decision Making   At the time of signout, the patient's disposition is pending repeat CT head and trauma surgery recommendations.  This is an 84-year-old female who presented to the emergency department after mechanical fall on [employer].  She was found to have a 6 mm thalamic IPH.  She was maintaining her blood pressure at goal without any medication assistance.  X-rays were obtained for ongoing knee pain that showed no traumatic injury.  Repeat CT head was obtained that showed stability at 6 hours.  Trauma surgery recommending admission to regular nursing floor for ongoing assessment.  Patient remained hemodynamically stable and awaits transfer to regular nursing floor.    ED Course:  ED Course as of 03/13/25 1625   u Mar 13, 2025   0804 EKG interpretation by me: 07: 48: Rate of 60 bpm, atrially paced rhythm, normal axis, AL interval 242, , QTc 470, no evidence of ST segment elevations or depressions, no patterned T wave abnormalities. [VM]      ED Course User Index  [VM] Robbie Reyes DO         Diagnoses as of 03/13/25 1625   Fall, initial encounter   Contusion of knee, unspecified laterality, initial encounter   Contusion of right shoulder, initial encounter   Intraparenchymal hematoma of brain without loss of consciousness, unspecified laterality, initial encounter (Multi)       Patient seen by and discussed with the attending emergency medicine physician.     Disposition   As a result of their workup, the patient will require admission to the hospital.  The patient was informed of her diagnosis.  The patient was given the opportunity to ask questions  and I answered them. The patient agreed to be admitted to the hospital.    Procedures   Procedures    Patient seen and discussed with ED attending physician.    Robbie Reyes DO  Emergency Medicine PGY-3  [facility]

## 2025-03-13 NOTE — CONSULTS
Subjective   HPI  Tracie Baltazar is a 84 y.o. Right-handed female with PMH of  h/o leukemia, afib on eliquis (LD 3/12 AM), recurrect c diff, multiple falls, CKD IV, rhabdomyolysis, asthma, HTN, RA p/w fall, +HS, -LOC. who presented to [facility] on 3/13/2025 with history of falling down  neurology consulted for CT finding of thalamic ICH.     The patient was stable compared to her usual condition until she fell this morning. While in the bathroom, she attempted to reach for her walker, but her hand slipped, causing her to fall forward and strike the closet door with the top of her head. She was unable to get up or reach the phone. Her son arrived, found her, and called EMS. She took her Eliquis yesterday morning but missed her evening dose. She denies any symptoms of headache, dizziness, confusion, double vision, blurry vision, nausea/vomiting, numbness, weakness, or tingling.    Past Medical History  Past Medical History:   Diagnosis Date    Abnormal ECG     Asthma     Essential (primary) hypertension     Hypertension    Kidney failure     Leukemia (Multi)     Rheumatoid arthritis        Surgical History  Past Surgical History:   Procedure Laterality Date    BACK SURGERY      Back Surgery    CARDIAC ELECTROPHYSIOLOGY PROCEDURE Right 6/2/2024    Procedure: Temporary Pacemaker Insertion;  Surgeon: Javier Steel DO;  Location: [facility] Cardiac Cath Lab;  Service: Cardiovascular;  Laterality: Right;  pacemaker line in place sutured in with 2-0 silk    CARDIAC ELECTROPHYSIOLOGY PROCEDURE N/A 6/3/2024    Procedure: PPM IMPLANT DUAL;  Surgeon: Timmy Chisholm MD;  Location: [facility] Cardiac Cath Lab;  Service: Electrophysiology;  Laterality: N/A;  costa    CARDIAC ELECTROPHYSIOLOGY PROCEDURE Right 10/14/2024    Procedure: PPM Pocket Revision;  Surgeon: Timmy Chisholm MD;  Location: [facility] Cardiac Cath Lab;  Service: Electrophysiology;  Laterality: Right;  pre cert by [employer]-rep notified    CHOLECYSTECTOMY       "Cholecystectomy    HYSTERECTOMY      Hysterectomy    KNEE SURGERY      arthroscopic surgery?    OTHER SURGICAL HISTORY      Shoulder Surgery Left    SHOULDER SURGERY Left     Left shoulder impingement surgery    TOTAL KNEE ARTHROPLASTY Left 02/13/2014       Social History  Social History     Tobacco Use    Smoking status: Never     Passive exposure: Never    Smokeless tobacco: Never   Vaping Use    Vaping status: Never Used   Substance Use Topics    Alcohol use: Not Currently     Comment: rarely    Drug use: Never       Allergies  Ibuprofen and Amoxicillin    Home Medications  Current Outpatient Medications   Medication Instructions    acetaminophen (TYLENOL) 650 mg, oral, Every 4 hours PRN    amiodarone (PACERONE) 200 mg, oral, Daily    amLODIPine (NORVASC) 5 mg, oral, Daily    apixaban (ELIQUIS) 2.5 mg, oral, 2 times daily, To facility: Please hold until June 8 due to hematoma around the pacemaker site.    cholecalciferol (Vitamin D-3) 50 MCG (2000 UT) tablet Take 1 tablet (2,000 Units) by mouth once daily.    cyanocobalamin (Vitamin B-12) 100 mcg tablet Take 1 tablet (100 mcg) by mouth once daily.    DULoxetine (Cymbalta) 20 mg DR capsule TAKE 1 CAPSULE BY MOUTH ONCE DAILY **DO  NOT  CRUSH  OR  CHEW**    furosemide (LASIX) 40 mg, oral, Daily    gabapentin (NEURONTIN) 300 mg, oral, 2 times daily    levothyroxine (Synthroid, Levoxyl) 150 mcg tablet TAKE 1 TABLET BY MOUTH ONCE DAILY IN THE MORNING AS DIRECTED    ondansetron ODT (ZOFRAN-ODT) 4 mg, oral, Every 8 hours PRN         Objective   24h Vitals  Heart Rate:  [60-66]   Temperature:  [36.4 °C (97.5 °F)-36.8 °C (98.3 °F)]   Respirations:  [10-22]   BP: (123-176)/()   Height:  [154.9 cm (5' 1\")]   Weight:  [72.1 kg (159 lb)]   Pulse Ox:  [95 %-100 %]      Physical Exam  General Appearance:  No distress, alert, interactive and cooperative.      Mental State: Orientation was normal to time, place and person. Recent and remote memory was intact.  Attention span " and concentration were normal. Language was normal. General fund of knowledge was intact.      Cranial Nerves:   CN: Visual fields full finger count    CN 3, 4, 6: Pupils round,3 mm in diameter, Lids symmetric; no ptosis. EOMs normal alignment, full range with normal saccades, pursuit and convergence.   No nystagmus.   CN 5: Facial sensation intact bilaterally.   CN 7: Normal and symmetric facial strength. Nasolabial folds symmetric.   CN 8: Hearing intact to voice  CN 9: Palate elevates symmetrically.   CN 11: Normal strength of shoulder shrug and neck turning.   CN 12: Tongue midline, with normal bulk and strength; no fasciculations.      Motor: Muscle bulk was normal in both upper and lower extremities.      Strength                      R          L  Shoulder abduction       5          5  Elbow extension           5            5  Elbow flexion                5          5                                5          5     Hip flexion                     5     5  Knee flexion                  5     5  Knee extension             5      5  DorsiFlex                        5      5  PlantarFlex                     5       5     Sensory: In both upper and lower extremities, sensation was intact to light touch     Coordination: In both upper extremities, finger-nose-finger was intact without dysmetria or overshoot.      Gait: Not assessed       NIHSS  Level of consciousness: 0 = Alert  LOC Question: 0 = Both correct  LOC Commands: 0 = Obeys both  Best Gaze: 0 = Normal  Visual Field: 0 = No visual loss  Facial Paresis: 0 = Normal  LUE: 0 = No drift; 10 sec  RUE: 0 = No drift; 10 sec  LLE: 0 = No drift; 5 sec  RLE: 0 = No drift; 5 sec  Dysarthria: 0 = Normal  Best Language: 0 = No aphasia  Limb Ataxia: 0 = Absent  Sensory: 0 = Absent  Neglect: 0 = None  NIHSS Score: 0     Recent Labs  Results from last 72 hours   Lab Units 03/13/25  0348   WBC AUTO x10*3/uL 35.5*   HEMOGLOBIN g/dL 11.3*   PLATELETS AUTO x10*3/uL 256    SODIUM mmol/L 137   POTASSIUM mmol/L 4.1   BUN mg/dL 48*   CREATININE mg/dL 3.00*   CALCIUM mg/dL 9.1        Lab Results   Component Value Date    HGBA1C 5.4 12/19/2019    LDLCALC 102 08/13/2024    LDLCALC 70 06/03/2024    LDLCALC 164 (H) 12/08/2022      Images:  CT head wo IV contrast  CT cervical spine wo IV contrast  3/13/2025  CT HEAD:  1. A 6 mm focus of hyperdense attenuation is present in the lateral  aspect of the left thalamus, new since prior exam in October of 2024,  suspicious for a small focus of intraparenchymal hemorrhage with thin  rim of low-density edema along the periphery, but without evidence of  significant mass effect. Finding is more suggestive of hypertensive  and not traumatic hemorrhage. No evidence of intraventricular,  subarachnoid or subdural hemorrhage.  2. Moderate volume of periventricular and subcortical attenuation  changes are present in the bilateral cerebral hemispheres,  nonspecific findings favored to represent chronic sequela of  microvascular disease.      CT C-SPINE:  1. No evidence of acute trauma to the cervical spine.  2. Spondylotic changes of the cervical spine as detailed above.    CT head wo IV contrast  3/13/2025  IMPRESSION:  Stable size of left thalamic 6 mm hematoma. No midline shift or mass  effect.      No acute territorial infarct. Moderate microangiopathic disease and  diffuse parenchymal volume loss.       Assessment/Plan   Tracie Baltazar is a 84 y.o. Right-handed female with h/o leukemia, afib on eliquis (LD 3/12 AM), recurrect c diff, multiple falls, CKD IV, rhabdomyolysis, asthma, HTN, RA  presented to the ED with complaint of falls. The initial CTH 3/13 at 4 AM shows 6 mm thalamic intraparenchymal hemorrhage. The repeated CTH at 10am showed stable findings. Given patient has no active complaints, non focal exam, NIHHS 0, and stable CTH, plan for conservative management.     Type: ICH   Time of Neurosurgery contact: 8:50 AM 3/13/2025   BP goal: less  than 160   IVH: No   ICH volume: 1ml   Subtype: Undetermined etiology  Vessels Involved: PCA  Neurologic Manifestations: none  Deficits: NIHSS 0  Initial treatment: None  Anti-platelets or Anti-coagulation management: Not indicated  Vascular Risk Factors: HTN, HLD, and Hypercoagulability  BP goal: < 160    Recommendations:  Lipid panel, A1c  Defer the AC restart plan to neurosurgery  sBP goal < 160  Defer MRI at this time (pacemaker)  Neurology will continue to follow    Agustin Coffey MD  PGY-1 Neurology  Stroke [phone]    I have reviewed and edited the information above and I agree with the assessment and plan as outlined by the medical student. Edits made directly into the note as necessary.  Madina Campbell MD   PGY-3 Neurology    Pt will be formally staffed with the attending physician, Dr. Hernandez, in the AM. Recommendations are not finalized until staffing.

## 2025-03-13 NOTE — CONSULTS
Consults    Date of Service:  3/13/2025 Attending Provider:  Tabitha Flannery DO     Reason for Consultation:  Tracie Baltazar is being seen today for a consult requested by Tabitha Flannery DO for ICH.      Subjective   History of Present Illness:  Tracie is a 84 y.o. female with h/o leukemia, afib on eliquis (LD 3/12 AM), recurrect c diff, multiple falls, CKD IV, rhabdomyolysis, asthma, HTN, RA p/w fall, +HS, -LOC.    Patient was in the bathroom and reached for her walker but her handslipped. She fell forward and hit the door. She was unable to get up or reach the phone. Her son came over, found her and called EMS. She took her Eliquis yesterday morning and missed her evening dose. She endorses headache otherwise denies dizziness, confusion, double vision, blurry vision, n/v, numbness, weakness, tingling.    Review of Systems   10 point ROS is obtained and negative except the ones mentioned in the HPI    Objective     Vitals:  Vitals:    03/13/25 0830   BP: 156/63   Pulse: 60   Resp: 14   Temp:    SpO2: 97%         Exam:  Constitutional: No acute distress  Resp: breathing comfortably on room air  Cardio: well perfused  GI: nondistended  MSK: full range of motion  Neuro: A&Ox3  Cranial Nerves II-XII: [place], EOMI, Face symmetric, Facial SILT, Palate/Tongue midline and symmetric, shoulder shrugs symmetric, hearing intact to finger rubs bilaterally  Motor:   BUE 5/5  BLE 5/5  No pronator drift  Sensation: SILT throughout all extremities  Psych: appropriate  Skin: no obvious lesions    Medical History  Past Medical History:   Diagnosis Date    Abnormal ECG     Asthma     Essential (primary) hypertension     Hypertension    Kidney failure     Leukemia (Multi)     Rheumatoid arthritis        Surgical History  Past Surgical History:   Procedure Laterality Date    BACK SURGERY      Back Surgery    CARDIAC ELECTROPHYSIOLOGY PROCEDURE Right 6/2/2024    Procedure: Temporary Pacemaker Insertion;  Surgeon: Javier Steel,  DO;  Location: [facility] Cardiac Cath Lab;  Service: Cardiovascular;  Laterality: Right;  pacemaker line in place sutured in with 2-0 silk    CARDIAC ELECTROPHYSIOLOGY PROCEDURE N/A 6/3/2024    Procedure: PPM IMPLANT DUAL;  Surgeon: Timmy Chisholm MD;  Location: [facility] Cardiac Cath Lab;  Service: Electrophysiology;  Laterality: N/A;  costa    CARDIAC ELECTROPHYSIOLOGY PROCEDURE Right 10/14/2024    Procedure: PPM Pocket Revision;  Surgeon: Timmy Chisholm MD;  Location: [facility] Cardiac Cath Lab;  Service: Electrophysiology;  Laterality: Right;  pre cert by [employer]-rep notified    CHOLECYSTECTOMY      Cholecystectomy    HYSTERECTOMY      Hysterectomy    KNEE SURGERY      arthroscopic surgery?    OTHER SURGICAL HISTORY      Shoulder Surgery Left    SHOULDER SURGERY Left     Left shoulder impingement surgery    TOTAL KNEE ARTHROPLASTY Left 02/13/2014        Medications  Current Outpatient Medications   Medication Instructions    acetaminophen (TYLENOL) 650 mg, oral, Every 4 hours PRN    amiodarone (PACERONE) 200 mg, oral, Daily    amLODIPine (NORVASC) 5 mg, oral, Daily    apixaban (ELIQUIS) 2.5 mg, oral, 2 times daily, To facility: Please hold until June 8 due to hematoma around the pacemaker site.    cholecalciferol (Vitamin D-3) 50 MCG (2000 UT) tablet Vitamin D 50 MCG (2000 UT) Oral Tablet   Refills: 0       Active    cyanocobalamin (Vitamin B-12) 100 mcg tablet = 1 tab(s) ( 100 mcg ), Oral, daily, 0 Refill(s), Type: Maintenance    DULoxetine (Cymbalta) 20 mg DR capsule TAKE 1 CAPSULE BY MOUTH ONCE DAILY **DO  NOT  CRUSH  OR  CHEW**    furosemide (LASIX) 40 mg, Daily    gabapentin (NEURONTIN) 300 mg, oral, 2 times daily    levothyroxine (Synthroid, Levoxyl) 150 mcg tablet TAKE 1 TABLET BY MOUTH ONCE DAILY IN THE MORNING AS DIRECTED    ondansetron ODT (ZOFRAN-ODT) 4 mg, oral, Every 8 hours PRN        Diagnostic Results:    Lab Results   Component Value Date    WBC 35.5 (H) 03/13/2025    HGB 11.3 (L) 03/13/2025     HCT 36.0 03/13/2025     03/13/2025     03/13/2025     Lab Results   Component Value Date    CREATININE 3.00 (H) 03/13/2025    BUN 48 (H) 03/13/2025     03/13/2025    K 4.1 03/13/2025     03/13/2025    CO2 25 03/13/2025     Lab Results   Component Value Date    INR 1.1 03/13/2025    INR 1.2 06/01/2024    PROTIME 12.2 03/13/2025    PROTIME 12.1 06/01/2024       === 03/13/25 ===    CT CERVICAL SPINE WO IV CONTRAST    - Impression -  CT HEAD:  1. A 6 mm focus of hyperdense attenuation is present in the lateral  aspect of the left thalamus, new since prior exam in October of 2024,  suspicious for a small focus of intraparenchymal hemorrhage with thin  rim of low-density edema along the periphery, but without evidence of  significant mass effect. Finding is more suggestive of hypertensive  and not traumatic hemorrhage. No evidence of intraventricular,  subarachnoid or subdural hemorrhage.  2. Moderate volume of periventricular and subcortical attenuation  changes are present in the bilateral cerebral hemispheres,  nonspecific findings favored to represent chronic sequela of  microvascular disease.    CT C-SPINE:  1. No evidence of acute trauma to the cervical spine.  2. Spondylotic changes of the cervical spine as detailed above.    MACRO:  None    Signed by: Masoud Reyes 3/13/2025 4:25 AM  Dictation workstation:   [id number]  === 02/25/13 ===    MRI THORACIC SPINE WO CONTRAST    - Impression -  Mild degenerative changes throughout the thoracic spine.  There are  several small bulging discs and osteophytes noted as above.  There is  no evidence of canal stenosis, cord compression or disc  herniation.Compared to the previous examination, there has been no  interval change. Diagnostic interpretation  was performed on the  campus of [facility].      Assessment/Plan   Assessment:  Tracie is a 84 y.o. female with h/o leukemia, afib on eliquis (LD 3/12 AM), recurrect  c diff, multiple falls, CKD IV, rhabdomyolysis, asthma, HTN, RA  p/w fall, CTH 1 ml L thalamic ICH (ICH score 1), CT CS neg    Plan:  Repeat CT head for stability 6 hours from indexed scan   SBP goal <160  Hold Eliquis, restart pending stability  No need for eliquis reversal at this time    Obtain CBC, RFP, coag, T&S, UA  Platelet goal >100K until post-bleed day 3  Hold dvt ppx at this time  Further recs pending imaging     Ji Ward MD  Plan not finalized until note signed by attending

## 2025-03-13 NOTE — PROGRESS NOTES
Pharmacy Medication History Review    Tracie Baltazar is a 84 y.o. female admitted for No Principal Problem: There is no principal problem currently on the Problem List. Please update the Problem List and refresh.. Pharmacy reviewed the patient's [id number] medications and allergies for accuracy.    The list below reflects the updated PTA list.   Prior to Admission Medications   Prescriptions Last Dose Informant   DULoxetine (Cymbalta) 20 mg DR capsule 3/12/2025 Self, Child   Sig: TAKE 1 CAPSULE BY MOUTH ONCE DAILY **DO  NOT  CRUSH  OR  CHEW**   acetaminophen (Tylenol) 325 mg tablet 3/12/2025 Self, Child   Sig: Take 2 tablets (650 mg) by mouth every 4 hours if needed for mild pain (1 - 3).   amLODIPine (Norvasc) 5 mg tablet 3/12/2025 Self, Child   Sig: Take 1 tablet by mouth once daily   amiodarone (Pacerone) 200 mg tablet 3/12/2025 Self, Child   Sig: Take 1 tablet by mouth once daily   apixaban (Eliquis) 2.5 mg tablet 3/12/2025 Self, Child   Sig: Take 1 tablet (2.5 mg) by mouth 2 times a day. To facility: Please hold until June 8 due to hematoma around the pacemaker site.   cholecalciferol (Vitamin D-3) 50 MCG (2000 UT) tablet 3/12/2025 Self, Child   Sig: Take 1 tablet (2,000 Units) by mouth once daily.   cyanocobalamin (Vitamin B-12) 100 mcg tablet 3/12/2025 Self, Child   Sig: Take 1 tablet (100 mcg) by mouth once daily.   furosemide (Lasix) 20 mg tablet     Sig: Take 2 tablets (40 mg) by mouth once daily.    Patient prescribed 60 mg daily but is currently taking 40 mg daily    gabapentin (Neurontin) 300 mg capsule 3/12/2025 Self, Child   Sig: Take 1 capsule by mouth twice daily   levothyroxine (Synthroid, Levoxyl) 150 mcg tablet 3/12/2025 Self, Child   Sig: TAKE 1 TABLET BY MOUTH ONCE DAILY IN THE MORNING AS DIRECTED   ondansetron ODT (Zofran-ODT) 4 mg disintegrating tablet More than a month Self, Child   Sig: Take 1 tablet (4 mg) by mouth every 8 hours if needed for nausea or vomiting.   Patient not  taking: Reported on 3/13/2025      Facility-Administered Medications: None        The list below reflects the updated allergy list. Please review each documented allergy for additional clarification and justification.  Allergies  Reviewed by Kristina Roth RN on 3/13/2025        Severity Reactions Comments    Ibuprofen Medium Other, Unknown Hx of kidney failure    Amoxicillin Low Rash             Patient accepts M2B at discharge. Pharmacy has been updated to [facility].    Sources used to complete the med history include:    [facility], [place]  Pharmacy dispense history  Patient interview Moderate historian  Child, Good historian, daughter Linda [place]  Chart Review  Care Everywhere     Below are additional concerns with the patient's PTA list.  Patient currently is taking 40 mg of furosemide at home. Patient has 20 mg tablets at home so she is taking 2 tablets daily     Medications ADDED:  None  Medications CHANGED:  None   Medications REMOVED:   None     Sheldon Clemons [facility].   Transitions of Care Pharmacist  [facility]  Please reach out via Secure Chat for questions, or if no response call [employer] or [facility]

## 2025-03-13 NOTE — CONSULTS
Inpatient consult to Geriatric Medicine  Consult performed by: Opal Gonzales MD  Consult ordered by: JUMA Anne-CNP  Reason for consult: Medical management      Primary Team: Trauma     Admit Date: 3/13/2025    Emergency Contact:   Extended Emergency Contact Information  Primary Emergency Contact: Elvis Baltazar  Mobile Phone: [phone]  Relation: Child   needed? No     Reason For Consult: Medical management    History Of Present Illness:  Tracie Baltazar is a 84 y.o. female presenting with a fall.  Patient reportedly fell last night and could not get up.  She admits to hitting her head, after she lost her footing, fell on her knees and fell forward hitting her face against the ajar door.  She was unable to get up or reach to her phone her son found her this morning. CT head showed left thalamus intraparenchymal hemorrhage. Neurosurgery has been consulted and recommended follow-up.   She has a past medical history of atrial fibrillation, on Eliquis, leukemia, CKD stage IV, Hypertension.     History per patient:  History as above. She lives with one of her sons in a multiple level house. She stays on the main level usually. She ambulates with a walker. She stated that she lost her balance as she was reaching out for her walker from using the bathroom. Her knees gave out and she fell face forward, hitting the door that was ajar.    History per sonElvis:  History as above. Patient has history of recurrent falls - a total of 6 falls in 6 months. 1st 4 falls were related to symptomatic bradycardia. Now s/p pacemaker. This is her second fall. It is unclear how long she was on the floor but thinks might have been a couple of hours. They have tried a life alert system, did not work, now thinking of getting some alert system that she will wear as a chain. He concurred patient's living conditions with her other son. He stated that lately her memory is not as sharp. She forgets conversations  lately and repeats her questions.     What matters most to the patient: Getting back home    Prior to Admission Meds  Prior to Admission Medications   Prescriptions Last Dose Informant Patient Reported? Taking?   DULoxetine (Cymbalta) 20 mg DR capsule 3/12/2025 Self, Child No Yes   Sig: TAKE 1 CAPSULE BY MOUTH ONCE DAILY **DO  NOT  CRUSH  OR  CHEW**   acetaminophen (Tylenol) 325 mg tablet 3/12/2025 Self, Child No Yes   Sig: Take 2 tablets (650 mg) by mouth every 4 hours if needed for mild pain (1 - 3).   amLODIPine (Norvasc) 5 mg tablet 3/12/2025 Self, Child No Yes   Sig: Take 1 tablet by mouth once daily   amiodarone (Pacerone) 200 mg tablet 3/12/2025 Self, Child No Yes   Sig: Take 1 tablet by mouth once daily   apixaban (Eliquis) 2.5 mg tablet 3/12/2025 Self, Child No Yes   Sig: Take 1 tablet (2.5 mg) by mouth 2 times a day. To facility: Please hold until June 8 due to hematoma around the pacemaker site.   cholecalciferol (Vitamin D-3) 50 MCG (2000 UT) tablet 3/12/2025 Self, Child Yes Yes   Sig: Take 1 tablet (2,000 Units) by mouth once daily.   cyanocobalamin (Vitamin B-12) 100 mcg tablet 3/12/2025 Self, Child Yes Yes   Sig: Take 1 tablet (100 mcg) by mouth once daily.   furosemide (Lasix) 20 mg tablet   Yes No   Sig: Take 2 tablets (40 mg) by mouth once daily.   gabapentin (Neurontin) 300 mg capsule 3/12/2025 Self, Child No Yes   Sig: Take 1 capsule by mouth twice daily   levothyroxine (Synthroid, Levoxyl) 150 mcg tablet 3/12/2025 Self, Child No Yes   Sig: TAKE 1 TABLET BY MOUTH ONCE DAILY IN THE MORNING AS DIRECTED   ondansetron ODT (Zofran-ODT) 4 mg disintegrating tablet More than a month Self, Child No No   Sig: Take 1 tablet (4 mg) by mouth every 8 hours if needed for nausea or vomiting.   Patient not taking: Reported on 3/13/2025      Facility-Administered Medications: None        Current Meds in Hospital  Current Facility-Administered Medications   Medication Dose Route Frequency Provider Last Rate Last  Admin    acetaminophen (Tylenol) tablet 975 mg  975 mg oral q8h Sally A Anson, APRN-CNP        amiodarone (Pacerone) tablet 200 mg  200 mg oral Daily Sally A Anson, APRN-CNP        amLODIPine (Norvasc) tablet 5 mg  5 mg oral Daily Sally A Anson, APRN-CNP        diclofenac sodium (Voltaren) 1 % gel 4 g  4 g Topical 4x daily PRN Ese Villasenors, DO        furosemide (Lasix) tablet 20 mg  20 mg oral Daily Sally A Anson, APRN-CNP        gabapentin (Neurontin) capsule 300 mg  300 mg oral BID Sally A Anson, APRN-CNP        labetaloL (Normodyne,Trandate) injection 10 mg  10 mg intravenous q4h PRN Ese Villasenors, DO        levothyroxine (Synthroid, Levoxyl) tablet 150 mcg  150 mcg oral Daily [facility] APRN-CNP        oxygen (O2) therapy   inhalation Continuous PRN - O2/gases Sally A Anna, APRN-CNP        polyethylene glycol (Glycolax, Miralax) packet 17 g  17 g oral Daily Sally A Anna, APRN-CNP        sennosides-docusate sodium (Lisette-Colace) 8.6-50 mg per tablet 2 tablet  2 tablet oral BID [facility] APRN-CNP         Current Outpatient Medications   Medication Sig Dispense Refill    acetaminophen (Tylenol) 325 mg tablet Take 2 tablets (650 mg) by mouth every 4 hours if needed for mild pain (1 - 3). 30 tablet 0    amiodarone (Pacerone) 200 mg tablet Take 1 tablet by mouth once daily 90 tablet 1    amLODIPine (Norvasc) 5 mg tablet Take 1 tablet by mouth once daily 90 tablet 0    apixaban (Eliquis) 2.5 mg tablet Take 1 tablet (2.5 mg) by mouth 2 times a day. To facility: Please hold until June 8 due to hematoma around the pacemaker site. 60 tablet 11    cholecalciferol (Vitamin D-3) 50 MCG (2000 UT) tablet Take 1 tablet (2,000 Units) by mouth once daily.      cyanocobalamin (Vitamin B-12) 100 mcg tablet Take 1 tablet (100 mcg) by mouth once daily.      DULoxetine (Cymbalta) 20 mg DR capsule TAKE 1 CAPSULE BY MOUTH ONCE DAILY **DO  NOT  CRUSH  OR  CHEW** 90 capsule 0     gabapentin (Neurontin) 300 mg capsule Take 1 capsule by mouth twice daily 60 capsule 2    levothyroxine (Synthroid, Levoxyl) 150 mcg tablet TAKE 1 TABLET BY MOUTH ONCE DAILY IN THE MORNING AS DIRECTED 90 tablet 0    furosemide (Lasix) 20 mg tablet Take 2 tablets (40 mg) by mouth once daily.      ondansetron ODT (Zofran-ODT) 4 mg disintegrating tablet Take 1 tablet (4 mg) by mouth every 8 hours if needed for nausea or vomiting. (Patient not taking: Reported on 3/13/2025)          The patient's outpatient electronic medical record (EMR) is reviewed, notable information includes:  3/11/2025-heme-onc outpatient visits to follow-up on chronic lymphocytic leukemia - being observed, being continued on 6 months labs check. Discussed checking Iron, Ferritin, TIBC, Vitamin B12    2/27/25-palliative consult - PPS 60%, no concerns     2/11/25-vascular surgery consult - noted that she has had multiple central lines, pacemaker in right chest as left subclavian vein access failed. 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 Vein mapping planned    2/6/25-cardiology follow-up for pacemaker - 93% pacing    10/4/24 - 10/6/24 - admitted after a fall with rhabdomyolysis. Patient was on the floor for 18 months  Past Medical History  Past Medical History:   Diagnosis Date    Abnormal ECG     Asthma     Essential (primary) hypertension     Hypertension    Kidney failure     Leukemia (Multi)     Rheumatoid arthritis         Surgical History  Past Surgical History:   Procedure Laterality Date    BACK SURGERY      Back Surgery    CARDIAC ELECTROPHYSIOLOGY PROCEDURE Right 6/2/2024    Procedure: Temporary Pacemaker Insertion;  Surgeon: Javier Steel DO;  Location: [facility];  Service: Cardiovascular;  Laterality: Right;  pacemaker line in place sutured in with 2-0 silk    CARDIAC ELECTROPHYSIOLOGY PROCEDURE N/A 6/3/2024    Procedure: PPM IMPLANT DUAL;  Surgeon:  Timmy Chisholm MD;  Location: [facility];  Service: Electrophysiology;  Laterality: N/A;  costa    CARDIAC ELECTROPHYSIOLOGY PROCEDURE Right 10/14/2024    Procedure: PPM Pocket Revision;  Surgeon: Timmy Chisholm MD;  Location: [facility];  Service: Electrophysiology;  Laterality: Right;  pre cert by corporate [employer]-rep notified    CHOLECYSTECTOMY      Cholecystectomy    HYSTERECTOMY      Hysterectomy    KNEE SURGERY      arthroscopic surgery?    OTHER SURGICAL HISTORY      Shoulder Surgery Left    SHOULDER SURGERY Left     Left shoulder impingement surgery    TOTAL KNEE ARTHROPLASTY Left 02/13/2014        Family History  Her family history includes Alcohol abuse in her son; Blood Disorder in her son; Cancer in her son; Emphysema in her father and mother; Hearing loss in her daughter; Heart attack in her sister; Lung cancer in her brother; Lymphoma in her son; Miscarriages / Stillbirths in her daughter; Multiple sclerosis in her son.     Social History  -Alcohol use: Yes - Occasional glass of wine  -Tobacco use: No  -Illicit drug use: No  -Exercise: No  -Spiritual needs: Jewish, not Gnosticism goer  -Marital Status:   Occupation:   Highest Level of Education: High School  Community Resources: No  : No  Current living environment: Lives at home, has a walker    Activities of Daily Living:  Basic ADLs: (I= independent, A= assistance, D= dependent)  Bathing: A, Dressing: I, Toileting: I, Transferring: D, Continence: D, Feeding: I    Burnett Index:  4  Instrumental ADLs: (I= independent, A= assistance, D= dependent)  Ability to use phone: I, Shopping: A, Cooking: A, Housekeeping: A, Laundry: A, Transportation: D, Medications: D, Handle Finances: D   [place] Scale:  5    Allergies  Ibuprofen and Amoxicillin    Review of Systems  Review of System:  Constitutional:   -Night sweats/fever: No     -Weight change: No    Integumentary: No new rash    Ears, Nose, Throat:  -Hearing  loss: Yes, wears hearing aids        Eyes:  -Vision loss: Yes    Cardiovascular:  -Chest Pain: No  -Orthopnea: No      -Paroxysmal nocturnal dyspnea: No  -Edema: No    Respiratory:   -Dyspnea: No  -Wheezing: No    Gastrointestinal:           -Appetite: Good  -Difficulty chewing/ swallowing: No  -Heartburn: No  -Bowels:No    Genitourinary:   -Incontinence: Yes, wears depends    Musculoskeletal:  -Mobility: uses a walker  -Pain: Knee and back pain    Neurological:  -Confusion: No  -Falls: See HPI  -Gait: Uses a walker  -Memory: See HPI  -Tremor: No    Psychiatric:  -Mood: Good  -Sleep: Good    Documents on file and valid:  Advance Directive/Living Will: Yes   Health Care Power of : Yes  Code Status: DNR/DNI      Physical Exam  GEN: Alert, oriented to person, place and time, not pale, not jaundiced, well hydrated, very hard of hearing  CVS: HS I +II, regular, no murmurs  RESP: Diminished air entry  ABD: Full, soft, BS present, nontender, no palpable organs,   EXT: No bilateral leg edema    Last Recorded Vitals      3/13/2025     7:30 AM 3/13/2025     8:05 AM 3/13/2025     8:30 AM 3/13/2025     9:30 AM 3/13/2025    11:00 AM 3/13/2025    12:00 PM 3/13/2025     1:00 PM   Vitals   Systolic 157 155 156 156 145 140 156   Diastolic 69 57 63 65 55 59 68   Heart Rate 60 60 60 60 60 60 60   Temp  36.6 °C (97.9 °F)        Resp 20 12 14 15 13 12 10      Vitals:    03/13/25 0633   Weight: 72.1 kg (159 lb)        Confusion Assessment Method(CAM) for diagnosis of delirium:    1.  Acute onset or fluctuating course: absent  2.  Inattention: absent  3.  Disorganized thinking: absent  4.  Altered level of consciousness: absent  CAM: negative    AT Score For Assessment of Delirium and Cognitive Impairment:    Alertness: 0  Normal(fully alert,but not agitated, throughout assessment)=0  Mild sleepiness for <10 seconds after walking, then normal=0  Clearly abnormal=4  2.  AMT4: 0  No mistakes=0  One mistake=1  Two or more  mistakes/untestable=2  3.  Attention: 0  Achieves seven months or more correctly=0  Starts but scores <7 months/ refuses to start=1  Untestable(cannot start because unwell, drowsy, inattentive)=2  4.  Acute: 0  No=0  Yes=4    Total Score: 0  4 or above: Possible delirium +/- cognitive impairment  1-3: Possible cognitive impairment  0: Delirium or severe cognitive impairment unlikely(but delirium still possible if (4) information incomplete)     Relevant Results  Lab Results   Component Value Date    TSH 1.98 01/24/2025    ELNAEGNU01 956 (H) 10/23/2024    VITD25 35 02/19/2024    HGBA1C 5.4 12/19/2019     Results from last 7 days   Lab Units 03/13/25  0520 03/13/25  0348   WBC AUTO x10*3/uL  --  35.5*   HEMOGLOBIN g/dL  --  11.3*   HEMATOCRIT %  --  36.0   SODIUM mmol/L  --  137   POTASSIUM mmol/L  --  4.1   CHLORIDE mmol/L  --  103   CREATININE mg/dL  --  3.00*   BUN mg/dL  --  48*   CO2 mmol/L  --  25   INR  1.1  --        Recent Imaging Results      XR pelvis 1-2 views  Narrative: STUDY:  Pelvis Radiographs; 3/13/25 at 12:01 PM  INDICATION:  Fall, trauma, evaluate for fracture.  COMPARISON:  Pelvic XR 10/4/24.  ACCESSION NUMBER(S):  [id number]  ORDERING CLINICIAN:  ANAYA DOUGLAS  TECHNIQUE:  Two view(s) of the pelvis.  FINDINGS:    The pelvic ring is intact.  There is no acute fracture.  There are degenerative change the lower lumbosacral spine and hips  bilaterally  Impression: No acute osseous abnormalities.  Signed by Gigi Ariza MD  CT head wo IV contrast  Narrative: Interpreted By:  Karly Melvin,   STUDY:  CT HEAD WO IV CONTRAST      INDICATION:  Signs/Symptoms:IPH stability scan      COMPARISON:  Head CT 03/13/2015      ACCESSION NUMBER(S):  [id number]      ORDERING CLINICIAN:  MANOHAR SHERIFF      TECHNIQUE:  CT of the head was performed without the administration of  intravenous contrast.      FINDINGS:  BRAIN PARENCHYMA: Stable size of 6 mm hematoma within the left  thalamus (series 201, image 18).  Mild surrounding vasogenic edema  similar to previous CT. No notable mass effect. No new or enlarging  intracranial hemorrhage. No acute territorial infarct. Moderate  microangiopathic disease and diffuse parenchymal volume loss.      MIDLINE SHIFT OR HERNIATION: No midline shift or herniation.      VENTRICLES: No hydrocephalus.      DURAL VENOUS SINUSES: No hyperdensity to suggest acute thrombus.      EXTRA-AXIAL SPACES (epidural, subdural, subarachnoid spaces and basal  cisterns): No collections.      VISUALIZED ORBITS: Normal.      VISUALIZED PARANASAL SINUSES AND MASTOID AIR CELLS: Paranasal sinuses  are clear. Tympanomastoid cavities are aerated.      SOFT TISSUES: Normal.      OSSEOUS STRUCTURES: Normal.      Impression: Stable size of left thalamic 6 mm hematoma. No midline shift or mass  effect.      No acute territorial infarct. Moderate microangiopathic disease and  diffuse parenchymal volume loss.      Signed by: Karly Melvin 3/13/2025 10:47 AM  Dictation workstation:   [id number]  XR knee right 4+ views  Narrative: STUDY:  Knee Radiographs; 03/13/2025, 10:26 AM  INDICATION:  Fall, right knee pain.  COMPARISON:  None Available.  ACCESSION NUMBER(S):  [id number]  ORDERING CLINICIAN:  MANOHAR SHERIFF  TECHNIQUE:  four view(s) of the right knee.  FINDINGS/  Impression: There is no displaced fracture.  The alignment is anatomic.  Mild soft  tissue bulging along the medial aspect of the knee.  There is no joint  effusion.  Hypertrophy of the tibial spine with subchondral sclerosis consistent  with degenerative changes.  Joint spaces are preserved.  Signed by Sujatha Hou MD  XR shoulder right 2+ views  Narrative: STUDY:  Shoulder Radiographs; 03/13/2025, 10:26 AM  INDICATION:  Fall, right shoulder pain.   COMPARISON:  CXR 03/13/2025.  ACCESSION NUMBER(S):  [id number]  ORDERING CLINICIAN:  MANOHAR SHERIFF  TECHNIQUE:  three view(s) of the right shoulder.  FINDINGS:    There is no displaced fracture.  There  are degenerative change the  acromioclavicular joint with joint space loss and osteophytes.  No  soft tissue abnormality is seen.  Impression: No acute osseous abnormalities.  Signed by Gigi Ariza MD  XR chest 1 view  Narrative: STUDY:  Chest Radiograph;  3/13/2025 4:12 Am  INDICATION:  Trauma.  COMPARISON:  None Available.  ACCESSION NUMBER(S):  [id number]  ORDERING CLINICIAN:  HIGINIO BARAJAS  TECHNIQUE:  Frontal chest was obtained at 04:12 hours.  FINDINGS:  Right upper chest implanted cardiac device with transvenous leads.  Spinal stimulators project over the lower midline chest.  CARDIOMEDIASTINAL SILHOUETTE:  Cardiomediastinal silhouette is normal in size and configuration.   There are calcifications lining the aortic arch.     LUNGS:  Lungs are clear.     ABDOMEN:  No remarkable upper abdominal findings.     BONES:  No acute osseous changes.  Impression: No acute pleural or parenchymal disease.  Normal cardiac diameter.  Signed by Nick Gates MD  XR knee left 1-2 views  Narrative: STUDY:  Knee Radiographs; 3/13/2025 4:12 AM  INDICATION:  Trauma.  COMPARISON:  None Available.  ACCESSION NUMBER(S):  [id number]  ORDERING CLINICIAN:  HIGINIO BARAJAS  TECHNIQUE:  Two view(s) of the left knee.  FINDINGS:   Prior total knee arthroplasty.  The total knee arthroplasty is in  anatomic alignment.  There is no displaced fracture.  There is no  evidence of orthopedic hardware complications.  No soft tissue  abnormality is seen.  Impression: Prior total knee arthroplasty, no acute traumatic finding.  Signed by Nick Gates MD  CT head wo IV contrast, CT cervical spine wo IV contrast  Narrative: Interpreted By:  Masoud Reyes,   STUDY:  CT HEAD WO IV CONTRAST; CT CERVICAL SPINE WO IV CONTRAST;  3/13/2025  4:13 am      INDICATION:  Signs/Symptoms:Trauma.          COMPARISON:  CT head dated 10/05/2024; CT cervical spine dated 06/01/2024.      ACCESSION NUMBER(S):  [id number]; [id number]      ORDERING  CLINICIAN:  HIGINIO BARAJAS      TECHNIQUE:  Noncontrast axial CT scan of head was performed, with coronal and  sagittal reformats provided. The images were reviewed in bone, brain,  blood and soft tissue windows.      Axial CT images of the cervical spine are obtained. Axial, coronal  and sagittal reconstructions are provided for review.      FINDINGS:  CT HEAD:      A 6 mm focus of hyperdense attenuation is present in the lateral  aspect of the left thalamus (series 7, image 21) new since prior exam  on 10/05/2024, suspicious for small intraparenchymal hemorrhage.  There is a thin rim of low-density edema are present along the  periphery.      No additional areas of hyperdense intracranial hemorrhage are  identified. No intraventricular, subarachnoid or subdural hemorrhage  is present.      Gray-white differentiation is intact, without evidence of acute CT  apparent transcortical infarct. Moderate volume of patchy attenuation  changes are present in the periventricular and subcortical white  matter of bilateral cerebral hemispheres, nonspecific findings  favored to represent chronic sequela of microvascular disease.      No abnormal ventricular dilatation is present. Basal cisterns are  patent. No extra-axial fluid collection is identified.      Scalp soft tissues do not demonstrate any acute abnormality. No skull  fracture or other acute calvarial abnormality is present.      Mastoid air cells and middle ear cavities are clear. Small amount of  mucus/secretions are present in the left maxillary sinus.      CT C-SPINE:      A 2-3 mm retrolisthesis is present at C2-C3 and C3-C4, otherwise  cervical vertebral alignment is maintained.      Cervical vertebral body heights are preserved without evidence of  compression fractures. Posterior elements of the cervical spine do  not demonstrate any evidence of acute trauma.      Craniocervical junction is intact. Facet joints are preserved without  evidence of traumatic  subluxation or widening.      Mild intervertebral disc height loss is present at C2-C3.      Moderate to high-grade spinal canal stenosis is suspected at the  level of C3-C4 due to central disc osteophyte complex which effaces  anterior subarachnoid space and encroaches in the ventral cervical  spinal cord. Mild stenosis is suspected at C5-C6 and C6-C7 due to  disc osteophyte complexes.      Mild neural foraminal stenosis is present at C3-C4 and C4-C5 due to  uncovertebral and facet joint hypertrophy.      Visualized prevertebral and paraspinal soft tissues do not  demonstrate any acute abnormality.      Impression: CT HEAD:  1. A 6 mm focus of hyperdense attenuation is present in the lateral  aspect of the left thalamus, new since prior exam in October of 2024,  suspicious for a small focus of intraparenchymal hemorrhage with thin  rim of low-density edema along the periphery, but without evidence of  significant mass effect. Finding is more suggestive of hypertensive  and not traumatic hemorrhage. No evidence of intraventricular,  subarachnoid or subdural hemorrhage.  2. Moderate volume of periventricular and subcortical attenuation  changes are present in the bilateral cerebral hemispheres,  nonspecific findings favored to represent chronic sequela of  microvascular disease.      CT C-SPINE:  1. No evidence of acute trauma to the cervical spine.  2. Spondylotic changes of the cervical spine as detailed above.      MACRO:  None      Signed by: Masoud Reyes 3/13/2025 4:25 AM  Dictation workstation:   [id number]      Head/Brain Imaging  === Results for orders placed during the hospital encounter of 03/13/25 ===    CT head wo IV contrast [[id number]] 03/13/2025    Status: Normal  Stable size of left thalamic 6 mm hematoma. No midline shift or mass  effect.    No acute territorial infarct. Moderate microangiopathic disease and  diffuse parenchymal volume loss.    Signed by: Karly Melvin 3/13/2025 10:47  AM  Dictation workstation:   [id number]  No results found for this or any previous visit.        DATA:  EKG: QTC  Encounter Date: 10/04/24   ECG 12 lead   Result Value    Ventricular Rate 60    Atrial Rate 60    AZ Interval 228    QRS Duration 96    QT Interval 408    QTC Calculation(Bazett) 408    R Axis -33    T Axis 48    QRS Count 10    Q Onset 211    P Onset 162    P Offset 182    T Offset 415    QTC Fredericia 408    Narrative    Atrial-paced rhythm with prolonged AV conduction  Left axis deviation  Nonspecific ST and T wave abnormality  Abnormal ECG  When compared with ECG of 01-JUN-2024 16:10,  Electronic atrial pacemaker has replaced Sinus rhythm  Confirmed by Javier Steel ([id number]) on 10/8/2024 3:51:33 PM     Anti-psychotics in 48 hours: No  Opioids/Benzodiazepines in 48 hours: No  Anticholinergics on board:No  Restraints:No  Indwelling catheters:No  Last BM: Yesterday  UO in 24 hours: Not recorded  Activity in the past 24 hours: Yet to be seen by PT/OT  Need for ambulatory devices: Walker    Assessment/Plan   85 y/o female with PMHx of CLL, chronic anemia, CKD stage 4, SSS s/p pacemaker, gait impairment, ambulates with a walker, h/o recurrent falls comes in after a fall in which she stated she lost her balance whilst reaching for her walker in the bathroom. Patient sustains an intraparenchymal hemorrhage.     Acute fall likely secondary to loss of balance whilst reaching for walker  History of recurrent falls, previously related to complete heart block, now s/p pacemaker  Acute intraparenchymal hemorrhage s/p fall, now with minimal headaches  Paroxysmal A-fib, on Eliquis  Sick sinus syndrome status post pacemaker  Hyperlipidemia  CKD 4, Cr now 3.0, Baseline 2.66-2.72, dialysis planned for the future  Asthma  Osteoarthritis  CLL, follows with oncology, not on treatment  Rheumatoid arthritis, not on chronic steroids  Polypharmacy - on multiple concerning meds  Hypothyroidism  Concern for cognitive  impairment, last TSH 1.98    Serum creatinine: 3 mg/dL (H) 03/13/25 0348  Estimated creatinine clearance: 12.7 mL/min (A)    Plan:  Ok for Tylenol prn for headaches  Please perform pacemaker check  Check Vitamin D level  Please obtain orthostatic vitals when able  Agree with holding Eliquis  Follow-up per neurosurgery recommendations  Please decrease gabapentin to 300mg daily from 300mg BID (max for CrCl is 300mg /day)  Do not resume on Cymbalta as CrCl is less than 30  Agree with PT/OT evaluations  Patient will need to follow-up with Geriatrics in the outpatient 4-6 weeks after discharge  - Recommend:  Referral to Geriatric Outpatient clinic: [facility], [address] [place] [address], [address], [place] [address], [phone]    Care Transitions:  -Recommended level for discharge: Pending PT/OT  -Home going considerations: Pending PT/OT evals  -Primary care physician: Dr. Javier Floyd    Goals of Care:  -Health care power of : Elvis Baltazar  -Living will: Yes  Code status: DNR/DNI    4M AGE-FRIENDLY INITIATIVE:  What matters most to patient: Getting better and going home  Medications: Cymbalta, gabapentin  Mentation: CAM negative, 4AT 0  Mobility: Uses a walker at baseline      Geriatric medicine will continue to follow the patient. Thank you for allowing geriatric medicine to be involved in the care of your patient. Geriatric medicine consultation team is available during work hours Monday through Friday. For any emergency issues requiring immediate assistance over the weekend, please page Geriatrics pager [phone]    Consult Billing Time  Prep time on date of patient encounter(minutes):30  Time directly with patient/family/caregiver(minutes):60  Documentation time(minutes):30  TOTAL TIME(minutes):120    Opal Gonzales MD

## 2025-03-13 NOTE — HOSPITAL COURSE
83 yo F w h/o leukemia, afib on eliquis (LD 3/12 AM), recurrect c diff, multiple falls, CKD, asthma, HTN, RA  p/w fall, CTH 1 ml L thalamic ICH  Neurosurgery was consulted, recommended no acute intervention. Neuro-stroke evaluated patient, recommended resumption of Eliquis 3/21 and start statin. Repeat CT head stable. Patient to start on atorvastatin 40mg daily. Geriatrics evaluated the patient and obtained additional laboratory testing and PT/OT evaluation. Patient to follow up in geriatrics clinic outpatient. Additional recommendations include nephrology consult for JANET and GFR. PT/OT evaluated patient, recommended discharge home with family assistance for mobility at home. PT/OT outpatient referrals sent. Patient discharged with neurology and geriatrics follow up referrals. Patient to follow up with home nephrology provider and PCP upon discharge. Patient discharged home in stable condition.

## 2025-03-13 NOTE — PROGRESS NOTES
Tracie Baltazar is a 84 y.o. female on day 0 of admission presenting with Fall, initial encounter.    Social Work Note: Consult conversation, SW tyler    Spoke with patient bedside.  She has good family support, 4 adult children, one of her sons lives with her.  She reported being on the floor for about 6 hours, we discussed a LifeAlert and she reported that her daughter has already ordered one.  She has a home care RN through [facility] that calls her once a  month to see if she has any needs.  She is willing to go to a SNF if needed, [facility]  is her first choice as she spent time there about 4 years ago. As precaution, I provided patient with a list of nearby facilities [facility] initiated   03/13/25 4891   Discharge Planning   Living Arrangements Family members   Support Systems Children   Assistance Needed patient lives with a 66 yo son and has another adult son nearby and two daughters   Type of Residence Private residence   Number of Stairs to Enter Residence 3   Number of Stairs Within Residence 0   Do you have animals or pets at home? Yes   Type of Animals or Pets cat   Who is requesting discharge planning? Provider   Does the patient need discharge transport arranged? Yes   RoundTrip coordination needed? Yes   Has discharge transport been arranged? No   Financial Resource Strain   How hard is it for you to pay for the very basics like food, housing, medical care, and heating? Not hard   Housing Stability   In the last 12 months, was there a time when you were not able to pay the mortgage or rent on time? N   At any time in the past 12 months, were you homeless or living in a shelter (including now)? N   Transportation Needs   In the past 12 months, has lack of transportation kept you from medical appointments or from getting medications? no   Patient Choice   Patient / Family choosing to utilize agency / facility established prior to hospitalization No   Stroke Family Assessment   Stroke Family  Assessment Needed No   Intensity of Service   Intensity of Service >30 min         Updated clinical staff.    Shyam Layne, TONIW

## 2025-03-13 NOTE — ED TRIAGE NOTES
Pt to the ED as a transfer from [facility] for a trauma consult after a fall from home. Pt states at around 1900 she fell and was not able to get up until around 0200 when her son found her. Pt states she simply lost her footing and fell onto her knees and then hit her face off the ground. Denies LOC, is on eliquis for AFIB which she also has a pacemaker for. Pt denies any other symptoms before or after falling. Only complaint is bilateral knee pain r/t the fall. A&O x4. NAD . VSS.

## 2025-03-13 NOTE — ED PROVIDER NOTES
HPI   Chief Complaint   Patient presents with    Trauma       HPI  84-year-old female presents after a fall at home.  The patient was reaching to get her walker lost her footing fell forward onto her chest and hit her head.  She could not get up and laid there for a few hours.  Patient's son found her on the floor.  She is on Eliquis.  Complains of some left knee pain chest wall pain.  No dizziness lightheadedness.  No nausea or vomiting.  No fevers or chills.  Brought in by EMS.  No other complaints.      Patient History   Past Medical History:   Diagnosis Date    Abnormal ECG     Asthma     Essential (primary) hypertension     Hypertension    Kidney failure     Leukemia (Multi)     Rheumatoid arthritis      Past Surgical History:   Procedure Laterality Date    BACK SURGERY      Back Surgery    CARDIAC ELECTROPHYSIOLOGY PROCEDURE Right 6/2/2024    Procedure: Temporary Pacemaker Insertion;  Surgeon: Javier Steel DO;  Location: [facility] Cardiac Cath Lab;  Service: Cardiovascular;  Laterality: Right;  pacemaker line in place sutured in with 2-0 silk    CARDIAC ELECTROPHYSIOLOGY PROCEDURE N/A 6/3/2024    Procedure: PPM IMPLANT DUAL;  Surgeon: Timmy Chisholm MD;  Location: [facility] Cardiac Cath Lab;  Service: Electrophysiology;  Laterality: N/A;  costa    CARDIAC ELECTROPHYSIOLOGY PROCEDURE Right 10/14/2024    Procedure: PPM Pocket Revision;  Surgeon: Timmy Chisholm MD;  Location: [facility] Cardiac Cath Lab;  Service: Electrophysiology;  Laterality: Right;  pre cert by [employer]-rep notified    CHOLECYSTECTOMY      Cholecystectomy    HYSTERECTOMY      Hysterectomy    KNEE SURGERY      arthroscopic surgery?    OTHER SURGICAL HISTORY      Shoulder Surgery Left    SHOULDER SURGERY Left     Left shoulder impingement surgery    TOTAL KNEE ARTHROPLASTY Left 02/13/2014     Family History   Problem Relation Name Age of Onset    Emphysema Mother      Emphysema Father      Heart attack Sister      Lung cancer Brother           Agent orange    Lymphoma Son Brock     Multiple sclerosis Son Brock     Alcohol abuse Son Brock     Blood Disorder Son Brock     Cancer Son Elvis     Hearing loss Daughter Diann     Miscarriages / Stillbirths Daughter Linda      Social History     Tobacco Use    Smoking status: Never     Passive exposure: Never    Smokeless tobacco: Never   Vaping Use    Vaping status: Never Used   Substance Use Topics    Alcohol use: Not Currently     Comment: rarely    Drug use: Never       Physical Exam   ED Triage Vitals [03/13/25 0342]   Temperature Heart Rate Respirations BP   36.4 °C (97.5 °F) 60 15 (!) 146/96      Pulse Ox Temp Source Heart Rate Source Patient Position   100 % Oral Monitor Lying      BP Location FiO2 (%)     -- --       Physical Exam  General:  Awake, alert, no acute distress.  Head: Normocephalic, Atraumatic  Neck: Supple, trachea midline, no stridor  Skin: Warm and dry, no rashes   Lungs: Clear to auscultation bilaterally no acute respiratory distress, speaking in full sentences without difficulty  CV: Regular Rate Rhythm with no obvious murmurs gallops rubs noted, no jugular venous distention, no pedal edema   Abdomen: Soft, nontender, nondistended, positive bowel sounds, no peritoneal signs  Neuro:  No gross focal neurologic deficits, NIH is 0  Musculoskeletal:  Full range of motion in all 4 extremities  Psychiatric:  Alert oriented x 3, Good insight into condition.    ED Course & MDM   Diagnoses as of 03/13/25 0512   Intraparenchymal hemorrhage of brain (Multi)                 No data recorded     Chris Coma Scale Score: 15 (03/13/25 0353 : Amanda Holman, LISA)                           Medical Decision Making  My EKG interpretation at 0 447:  Paced rhythm 60 bpm normal axis.  Normal 254 QTc 414 no ectopy or acute ischemic changes noted    Patient's laboratory studies are unremarkable with exception of mild acute on chronic kidney injury.  CT head shows concern for intraparenchymal hemorrhage of the thalamus of 6  mm.  C-spine CT negative  Left knee, chest x-ray negative.    Given the findings of the head CT contacted the trauma surgeon at [facility] who wanted the patient to be transferred to the ED for further evaluation.  Discussed the findings and plan for transfer the patient at bedside.  Consent was given.  She was transferred in stable condition.      Procedure  Critical Care    Performed by: Bartolo Morin DO  Authorized by: Bartolo Morin DO    Critical care provider statement:     Critical care time (minutes):  35    Critical care time was exclusive of:  Separately billable procedures and treating other patients    Critical care was necessary to treat or prevent imminent or life-threatening deterioration of the following conditions:  Trauma    Critical care was time spent personally by me on the following activities:  Examination of patient, obtaining history from patient or surrogate, review of old charts, re-evaluation of patient's condition, ordering and review of radiographic studies and ordering and review of laboratory studies    Care discussed with: accepting provider at another facility         Bartolo Morin DO  03/13/25 0517       Bartolo Morin DO  03/13/25 0540

## 2025-03-13 NOTE — H&P
[facility]  TRAUMA SERVICE - HISTORY AND PHYSICAL / CONSULT    Patient Name: Tracie Baltazar  MRN: [id number]  Admit Date: 313  : 1940  AGE: 84 y.o.   GENDER: female  ==============================================================================  MECHANISM OF INJURY / CHIEF COMPLAINT:   85 yo F s/p mechanical fall overnight. Fell forward coming out of bathroom while reaching for walker. Fell to knees with head strike. No LOC. On eliquis for afib, not reversed. Last dose eliquis taken 3/12 am. Presented to outside where imaging was obtained, IP. Transferred to [facility] for trauma and NSGY consult. Complaint of right shoulder pain, bilateral knee pain, and headache.     LOC (yes/no?): No   Anticoagulant / Anti-platelet Rx? (for what dx?): Eliquis for afib   Referring Facility Name (N/A for scene EMR run): [facility] ED    INJURIES:   left thalamic 6 mm hematoma       INCIDENTAL FINDINGS:  None at this time     ==============================================================================  ADMISSION PLAN OF CARE:  Repeat ct head 6 hours from previous, stable   Xray right shoulder negative for acute injury   right knee xray, Mild soft tissue bulging along the medial aspect of the knee, negative for fx   Diet   Pain control  Neuro checks  IS  Hold eliquis and dvt proph  SCDs  Home medications resumed as appropriate  Telemetry  Geriatrics consult  Pt/ot  Monitor electrolytes and replete as clinically indicated  Strict I/Os  Consultants notified: NSGY consulted by ED    Pt. And plan discussed with Dr. Segal.     Sally Castillo, APRN-CNP  Trauma Surgery  [id number]    Total face to face time spent with patient/family of 40 minutes, with >50% of the time spent discussing plan of care/management, counseling/educating on disease processes, explaining results of diagnostic testing.         ==============================================================================  PAST MEDICAL HISTORY:    PMH: CKD 4, HTN, HLD, diastolic heart failure, SSS s/p pacer placed in 6/2024, cdiff in sep/2024, Chronic lymphocytic leukemia, RA, Asthma, HLD, afib on eliquis, multiple falls within the past year, hypothyroid   Past Medical History:   Diagnosis Date    Abnormal ECG     Asthma     Essential (primary) hypertension     Hypertension    Kidney failure     Leukemia (Multi)     Rheumatoid arthritis          PSH: Spinal stimulator for back pain, lumbar laminectomy in 1980's for back pain and sciatica s/p spinal cord stimulator in 2013 which help with the back pain  Past Surgical History:   Procedure Laterality Date    BACK SURGERY      Back Surgery    CARDIAC ELECTROPHYSIOLOGY PROCEDURE Right 6/2/2024    Procedure: Temporary Pacemaker Insertion;  Surgeon: Javier Steel DO;  Location: [facility] Cardiac Cath Lab;  Service: Cardiovascular;  Laterality: Right;  pacemaker line in place sutured in with 2-0 silk    CARDIAC ELECTROPHYSIOLOGY PROCEDURE N/A 6/3/2024    Procedure: PPM IMPLANT DUAL;  Surgeon: Timmy Chisholm MD;  Location: [facility] Cardiac Cath Lab;  Service: Electrophysiology;  Laterality: N/A;  costa    CARDIAC ELECTROPHYSIOLOGY PROCEDURE Right 10/14/2024    Procedure: PPM Pocket Revision;  Surgeon: Timmy Chisholm MD;  Location: [facility] Cardiac Cath Lab;  Service: Electrophysiology;  Laterality: Right;  pre cert by [employer]-rep notified    CHOLECYSTECTOMY      Cholecystectomy    HYSTERECTOMY      Hysterectomy    KNEE SURGERY      arthroscopic surgery?    OTHER SURGICAL HISTORY      Shoulder Surgery Left    SHOULDER SURGERY Left     Left shoulder impingement surgery    TOTAL KNEE ARTHROPLASTY Left 02/13/2014     FH: Reviewed, not pertinent to current traumatic event   Family History   Problem Relation Name Age of Onset    Emphysema Mother      Emphysema Father      Heart attack Sister      Lung cancer Brother          Agent orange    Lymphoma Son Brock     Multiple sclerosis Son Brock     Alcohol abuse Son Brock      Blood Disorder Son Brock     Cancer Son Elvis     Hearing loss Daughter Diann     Miscarriages / Stillbirths Daughter Linda      SOCIAL HISTORY:    Smoking: deies   Social History     Tobacco Use   Smoking Status Never    Passive exposure: Never   Smokeless Tobacco Never       Alcohol: denies    Social History     Substance and Sexual Activity   Alcohol Use Not Currently    Comment: rarely       Drug use: denies    MEDICATIONS: pharmacy obtained med rec, reviewed   Prior to Admission medications    Medication Sig Start Date End Date Taking? Authorizing Provider   acetaminophen (Tylenol) 325 mg tablet Take 2 tablets (650 mg) by mouth every 4 hours if needed for mild pain (1 - 3). 1/2/24  Yes Lynn Mccullough MD   amiodarone (Pacerone) 200 mg tablet Take 1 tablet by mouth once daily 2/12/25  Yes Javier Floyd MD   amLODIPine (Norvasc) 5 mg tablet Take 1 tablet by mouth once daily 3/3/25  Yes Javier Floyd MD   apixaban (Eliquis) 2.5 mg tablet Take 1 tablet (2.5 mg) by mouth 2 times a day. To facility: Please hold until June 8 due to hematoma around the pacemaker site. 8/12/24  Yes Aidan Wright DO   cholecalciferol (Vitamin D-3) 50 MCG (2000 UT) tablet Take 1 tablet (2,000 Units) by mouth once daily.   Yes Historical Provider, MD   cyanocobalamin (Vitamin B-12) 100 mcg tablet Take 1 tablet (100 mcg) by mouth once daily. 11/16/22  Yes Historical Provider, MD   DULoxetine (Cymbalta) 20 mg DR capsule TAKE 1 CAPSULE BY MOUTH ONCE DAILY **DO  NOT  CRUSH  OR  CHEW** 3/3/25  Yes Javier Floyd MD   gabapentin (Neurontin) 300 mg capsule Take 1 capsule by mouth twice daily 2/19/25  Yes Javier Floyd MD   levothyroxine (Synthroid, Levoxyl) 150 mcg tablet TAKE 1 TABLET BY MOUTH ONCE DAILY IN THE MORNING AS DIRECTED 10/16/24  Yes Javier Floyd MD   furosemide (Lasix) 40 mg tablet Take 1 tablet (40 mg) by mouth once daily.  3/13/25 Yes Historical Provider, MD   furosemide (Lasix) 20 mg tablet Take 2  tablets (40 mg) by mouth once daily.    Historical Provider, MD   ondansetron ODT (Zofran-ODT) 4 mg disintegrating tablet Take 1 tablet (4 mg) by mouth every 8 hours if needed for nausea or vomiting.  Patient not taking: Reported on 3/13/2025 6/6/24   Jorge Silverio DO     ALLERGIES: Reviewed   Allergies   Allergen Reactions    Ibuprofen Other and Unknown     Hx of kidney failure    Amoxicillin Rash       REVIEW OF SYSTEMS:  Review of Systems   Constitutional: Negative.    HENT: Negative.     Eyes: Negative.    Respiratory: Negative.     Gastrointestinal: Negative.    Endocrine: Negative.    Genitourinary: Negative.    Musculoskeletal:         Right shoulder and right knee pain    Skin: Negative.    Neurological:  Positive for headaches. Negative for dizziness, syncope and light-headedness.     PHYSICAL EXAM:  PRIMARY SURVEY:  Airway  Airway is patent.     Breathing  Breathing is normal.     Circulation  Rate is regular.   Pulses  Radial: 2+ on the right; 2+ on the left.  Pedal: 2+ on the right; 2+ on the left.    Disability  Chris Coma Score  Eye:4   Verbal:5   Motor:6      15  Pupils  Right Pupil:   round and reactive      2 mm  Left Pupil:   round and reactive      2 mm     Motor Strength   strength:  5/5 on the right  5/5 on the left  Dorsiflex strength:  5/5 on the right  5/5 on the left  Plantarflex strength:  5/5 on the right  5/5 on the left      SECONDARY SURVEY/PHYSICAL EXAM:  Physical Exam  Vitals reviewed.   HENT:      Head: Normocephalic.      Comments: Tenderness over forehead, swelling around left, glasses on      Right Ear: External ear normal.      Left Ear: External ear normal.      Nose: Nose normal.      Mouth/Throat:      Mouth: Mucous membranes are moist.      Pharynx: Oropharynx is clear.   Eyes:      Pupils: Pupils are equal, round, and reactive to light.      Comments: Left muscle paralysis since birth    Neck:      Comments: Cervical, T/L spine nontender to palpation, no step  offs, no deformities   Cardiovascular:      Rate and Rhythm: Normal rate.      Pulses: Normal pulses.   Pulmonary:      Comments: Anterior chest wall nontender to palpation, no crepitus, no deformities   Abdominal:      Comments: Soft, nontender, nondistended, pelvis stable to  compression    Musculoskeletal:      Cervical back: Neck supple.      Comments: Strength 5/5 x4. Pain to palpation right shoulder, bilateral knees. No deformities x4 extremities.    Skin:     General: Skin is warm and dry.      Capillary Refill: Capillary refill takes less than 2 seconds.      Comments: Superficial abrasion to left knee    Neurological:      Mental Status: She is alert and oriented to person, place, and time.   Psychiatric:         Mood and Affect: Mood normal.         Behavior: Behavior normal.     IMAGING SUMMARY:   CT Head: left thalamic hematoma  CT C-Spine: No acute injury.   CXR/PXR: No acute injury   Right knee xray: Mild soft tissue bulging along the medial aspect of the knee, no fracture.   Right shoulder xray, left knee xray: No acute jury       LABS:  Results from last 7 days   Lab Units 03/13/25  0348   WBC AUTO x10*3/uL 35.5*   HEMOGLOBIN g/dL 11.3*   HEMATOCRIT % 36.0   PLATELETS AUTO x10*3/uL 256   LYMPHO PCT MAN % 68.0   MONO PCT MAN % 1.0   EOSINO PCT MAN % 1.0     Results from last 7 days   Lab Units 03/13/25  0520   APTT seconds 26.4   INR  1.1     Results from last 7 days   Lab Units 03/13/25  0348   SODIUM mmol/L 137   POTASSIUM mmol/L 4.1   CHLORIDE mmol/L 103   CO2 mmol/L 25   BUN mg/dL 48*   CREATININE mg/dL 3.00*   CALCIUM mg/dL 9.1   GLUCOSE mg/dL 125*               I have reviewed all laboratory and imaging results ordered/pertinent for this encounter.

## 2025-03-13 NOTE — ED PROVIDER NOTES
History of Present Illness     History provided by: Patient  Limitations to History: None Identified  External Records Reviewed with Brief Summary: Previous ED visits/recent PCP notes for PMH     HPI:  Tracie Baltazar is a 84 y.o. female  PMH of  PAF, SSS, s/p  dual chamber PPM placement 6/3/2024 on Eliquis hypertension hyperlipidemia CKD 4 asthma arthritis leukemia C. difficile who presents as a transfer from [facility] for neurosurgery and trauma in the setting of a mechanical fall at home yesterday.  Last dose of Eliquis was 3/12 AM.  She was using her walker to walk when she missed the handle with her hand and fell forward hitting her head.  No loss of consciousness.  She is having pain in her bilateral knees right shoulder.  She had received x-rays of the left knee which was negative as well as CT head and C-spine at the outside hospital.  Her CT head had revealed a 6 mm intraparenchymal hemorrhage.  Patient did not receive reversal prior to arrival.  She denies any headache vision changes numbness tingling weakness.  She endorses ongoing pain in her right shoulder and bilateral knees.  No nausea vomiting diarrhea.  She was in her normal state of health prior to the fall.    Physical Exam   Triage vitals:  T 36.8 °C (98.3 °F)  HR 60  /64  RR 14  O2 100 % None (Room air)    General: Awake, alert, in no acute distress  Eyes: Gaze conjugate.  No scleral icterus or injection  HENT: Normo-cephalic, atraumatic. No stridor  CV: RRR. Radial/PT pulses 2+ bilaterally  Resp: Breathing non-labored, speaking in full sentences.  Clear to auscultation bilaterally  GI: Soft, non-distended, non-tender. No rebound or guarding.  MSK/Extremities: No gross bony deformities. Moving all extremities, pain on palpation of the right shoulder bilateral knees, no CTL spine tenderness step-offs or deformities  Skin: Warm. Appropriate color  Neuro: Alert. Oriented. Face symmetric. Speech is fluent.  Gross strength and  sensation intact in b/l UE and LEs  Psych: Appropriate mood and affect      Medical Decision Making & ED Course   Medical Decision Makin y.o. female who presents for evaluation of neurosurgery and trauma consult as a transfer from [facility].  She is neurovascularly intact.  She is ANO x 4 GCS of 15 she has stable vitals here.  She received consultations by neurosurgery and trauma who evaluated at bedside.  Neurosurgery recommends a 6-hour stability scan that will be done at 10 AM.  They recommend blood pressure goal is 1 30-1 60 systolic blood pressure a labetalol as needed was placed.  Patient to receive every hour neurochecks while in the ED.  Given that her last dose of Eliquis was greater than 18 hours and size of bleed with normal neuroexam patient was not reversed.  Will continue to hold Eliquis.  She received Tylenol and Voltaren gel for her knee pain.  She received additional x-rays of her shoulder and knee to rule out underlying injury along her right side.  No obvious bony deformities.  Patient's final disposition pending neurosurgical and trauma recommendations, though I do anticipate admission  ----     Social Determinants of Health which Significantly Impact Care: None identified       Independent Result Review and Interpretation: Results were independently reviewed and interpreted by myself. Please see ED course and [facility] for full interpretation.    Chronic conditions affecting the patient's care: As documented in the [facility]    Care Considerations: As per [facility]    ED Course:  Diagnoses as of 25 0739   Fall, initial encounter   Contusion of knee, unspecified laterality, initial encounter   Contusion of right shoulder, initial encounter   Intraparenchymal hematoma of brain without loss of consciousness, unspecified laterality, initial encounter (Multi)     Disposition   Patient was signed out pending final disposition    Procedures   Procedures    Patient seen and discussed with ED attending  physician.    Ese Hoang, DO  PGY-3 Emergency Medicine     Ese Hoang DO  Resident  03/13/25 0705

## 2025-03-13 NOTE — CONSULTS
Consults    History Of Present Illness  Tracie Baltazar is a 84 y.o. female presenting with head trauma.   Last known well:   Had stroke symptoms resolved at time of presentation: {yes,no:23622}  Past Medical History  Past Medical History:   Diagnosis Date   • Abnormal ECG    • Asthma    • Essential (primary) hypertension     Hypertension   • Kidney failure    • Leukemia (Multi)    • Rheumatoid arthritis      Surgical History  Past Surgical History:   Procedure Laterality Date   • BACK SURGERY      Back Surgery   • CARDIAC ELECTROPHYSIOLOGY PROCEDURE Right 6/2/2024    Procedure: Temporary Pacemaker Insertion;  Surgeon: Javier Steel DO;  Location: [facility] Cardiac [facility] Lab;  Service: Cardiovascular;  Laterality: Right;  pacemaker line in place sutured in with 2-0 silk   • CARDIAC ELECTROPHYSIOLOGY PROCEDURE N/A 6/3/2024    Procedure: PPM IMPLANT DUAL;  Surgeon: Timmy Chisholm MD;  Location: [facility] Cardiac [facility];  Service: Electrophysiology;  Laterality: N/A;  abbott   • CARDIAC ELECTROPHYSIOLOGY PROCEDURE Right 10/14/2024    Procedure: PPM Pocket Revision;  Surgeon: Timmy Chisholm MD;  Location: [facility] Cardiac [facility];  Service: Electrophysiology;  Laterality: Right;  pre cert by corporate [employer]-rep notified   • CHOLECYSTECTOMY      Cholecystectomy   • HYSTERECTOMY      Hysterectomy   • KNEE SURGERY      arthroscopic surgery?   • OTHER SURGICAL HISTORY      Shoulder Surgery Left   • SHOULDER SURGERY Left     Left shoulder impingement surgery   • TOTAL KNEE ARTHROPLASTY Left 02/13/2014     Social History  Social History     Tobacco Use   • Smoking status: Never     Passive exposure: Never   • Smokeless tobacco: Never   Vaping Use   • Vaping status: Never Used   Substance Use Topics   • Alcohol use: Not Currently     Comment: rarely   • Drug use: Never     Allergies  Ibuprofen and Amoxicillin  Home Medications  (Not in a hospital admission)      Review of Systems  Neurological Exam  Physical Exam  Last  "Recorded Vitals  Blood pressure 123/63, pulse 60, temperature 36.6 °C (97.9 °F), resp. rate 10, height 1.549 m (5' 1\"), weight 72.1 kg (159 lb), SpO2 97%.    {For Ischemic Stroke, document TIA ABCD@ score, Chris coma scale, P-mRS, Indications/contraindications for thrombolytic and Barthel Scale on the Scoring Tools and refresh the note.     For Intracerebral hemorrhage, document P-mRS, ICH score, Hunt and Gomez scale, Spetzler Michael AVM grading and Barthel Scale on the Scoring Tools and refresh the note :99}  {Link to Stroke Scoring tools - Link :99}  Relevant Results  {If you would like to pull in Medications, type .meds     If you would like to pull in Lab results for the last 24 hours, type .[id number]    If you would like to pull in Imaging results, type .imgrslt :99}                [place] Coma Scale  Best Eye Response: Spontaneous  Best Verbal Response: Oriented  Best Motor Response: Follows commands  Chris Coma Scale Score: 15                No MRI head results found for the past 14 days  CT head wo IV contrast    Result Date: 3/13/2025  Interpreted By:  Karly Melvin, STUDY: CT HEAD WO IV CONTRAST   INDICATION: Signs/Symptoms:IPH stability scan   COMPARISON: Head CT 03/13/2015   ACCESSION NUMBER(S): [id number]   ORDERING CLINICIAN: MANOHAR SHERIFF   TECHNIQUE: CT of the head was performed without the administration of intravenous contrast.   FINDINGS: BRAIN PARENCHYMA: Stable size of 6 mm hematoma within the left thalamus (series 201, image 18). Mild surrounding vasogenic edema similar to previous CT. No notable mass effect. No new or enlarging intracranial hemorrhage. No acute territorial infarct. Moderate microangiopathic disease and diffuse parenchymal volume loss.   MIDLINE SHIFT OR HERNIATION: No midline shift or herniation.   VENTRICLES: No hydrocephalus.   DURAL VENOUS SINUSES: No hyperdensity to suggest acute thrombus.   EXTRA-AXIAL SPACES (epidural, subdural, subarachnoid spaces and basal " cisterns): No collections.   VISUALIZED ORBITS: Normal.   VISUALIZED PARANASAL SINUSES AND MASTOID AIR CELLS: Paranasal sinuses are clear. Tympanomastoid cavities are aerated.   SOFT TISSUES: Normal.   OSSEOUS STRUCTURES: Normal.       Stable size of left thalamic 6 mm hematoma. No midline shift or mass effect.   No acute territorial infarct. Moderate microangiopathic disease and diffuse parenchymal volume loss.   Signed by: Karly Melvin 3/13/2025 10:47 AM Dictation workstation:   [id number]    CT head wo IV contrast    Result Date: 3/13/2025  Interpreted By:  Masoud Reyes, STUDY: CT HEAD WO IV CONTRAST; CT CERVICAL SPINE WO IV CONTRAST;  3/13/2025 4:13 am   INDICATION: Signs/Symptoms:Trauma.     COMPARISON: CT head dated 10/05/2024; CT cervical spine dated 06/01/2024.   ACCESSION NUMBER(S): [id number]; [id number]   ORDERING CLINICIAN: HIGINIO BARAJAS   TECHNIQUE: Noncontrast axial CT scan of head was performed, with coronal and sagittal reformats provided. The images were reviewed in bone, brain, blood and soft tissue windows.   Axial CT images of the cervical spine are obtained. Axial, coronal and sagittal reconstructions are provided for review.   FINDINGS: CT HEAD:   A 6 mm focus of hyperdense attenuation is present in the lateral aspect of the left thalamus (series 7, image 21) new since prior exam on 10/05/2024, suspicious for small intraparenchymal hemorrhage. There is a thin rim of low-density edema are present along the periphery.   No additional areas of hyperdense intracranial hemorrhage are identified. No intraventricular, subarachnoid or subdural hemorrhage is present.   Gray-white differentiation is intact, without evidence of acute CT apparent transcortical infarct. Moderate volume of patchy attenuation changes are present in the periventricular and subcortical white matter of bilateral cerebral hemispheres, nonspecific findings favored to represent chronic sequela of microvascular  disease.   No abnormal ventricular dilatation is present. Basal cisterns are patent. No extra-axial fluid collection is identified.   Scalp soft tissues do not demonstrate any acute abnormality. No skull fracture or other acute calvarial abnormality is present.   Mastoid air cells and middle ear cavities are clear. Small amount of mucus/secretions are present in the left maxillary sinus.   CT C-SPINE:   A 2-3 mm retrolisthesis is present at C2-C3 and C3-C4, otherwise cervical vertebral alignment is maintained.   Cervical vertebral body heights are preserved without evidence of compression fractures. Posterior elements of the cervical spine do not demonstrate any evidence of acute trauma.   Craniocervical junction is intact. Facet joints are preserved without evidence of traumatic subluxation or widening.   Mild intervertebral disc height loss is present at C2-C3.   Moderate to high-grade spinal canal stenosis is suspected at the level of C3-C4 due to central disc osteophyte complex which effaces anterior subarachnoid space and encroaches in the ventral cervical spinal cord. Mild stenosis is suspected at C5-C6 and C6-C7 due to disc osteophyte complexes.   Mild neural foraminal stenosis is present at C3-C4 and C4-C5 due to uncovertebral and facet joint hypertrophy.   Visualized prevertebral and paraspinal soft tissues do not demonstrate any acute abnormality.       CT HEAD: 1. A 6 mm focus of hyperdense attenuation is present in the lateral aspect of the left thalamus, new since prior exam in October of 2024, suspicious for a small focus of intraparenchymal hemorrhage with thin rim of low-density edema along the periphery, but without evidence of significant mass effect. Finding is more suggestive of hypertensive and not traumatic hemorrhage. No evidence of intraventricular, subarachnoid or subdural hemorrhage. 2. Moderate volume of periventricular and subcortical attenuation changes are present in the bilateral  "cerebral hemispheres, nonspecific findings favored to represent chronic sequela of microvascular disease.   CT C-SPINE: 1. No evidence of acute trauma to the cervical spine. 2. Spondylotic changes of the cervical spine as detailed above.   MACRO: None   Signed by: Masoud Reyes 3/13/2025 4:25 AM Dictation workstation:   [id number]   No echocardiogram results found for the past 14 days        No results found for: \"BNP\"     I have personally reviewed the following imaging results XR pelvis 1-2 views    Result Date: 3/13/2025  STUDY: Pelvis Radiographs; 3/13/25 at 12:01 PM INDICATION: Fall, trauma, evaluate for fracture. COMPARISON: Pelvic XR 10/4/24. ACCESSION NUMBER(S): [id number] ORDERING CLINICIAN: ANAYA DOUGLAS TECHNIQUE:  Two view(s) of the pelvis. FINDINGS:  The pelvic ring is intact.  There is no acute fracture. There are degenerative change the lower lumbosacral spine and hips bilaterally    No acute osseous abnormalities. Signed by Gigi Ariza MD    CT head wo IV contrast    Result Date: 3/13/2025  Interpreted By:  Karly Melvin, STUDY: CT HEAD WO IV CONTRAST   INDICATION: Signs/Symptoms:IPH stability scan   COMPARISON: Head CT 03/13/2015   ACCESSION NUMBER(S): [id number]   ORDERING CLINICIAN: MANOHAR SHERIFF   TECHNIQUE: CT of the head was performed without the administration of intravenous contrast.   FINDINGS: BRAIN PARENCHYMA: Stable size of 6 mm hematoma within the left thalamus (series 201, image 18). Mild surrounding vasogenic edema similar to previous CT. No notable mass effect. No new or enlarging intracranial hemorrhage. No acute territorial infarct. Moderate microangiopathic disease and diffuse parenchymal volume loss.   MIDLINE SHIFT OR HERNIATION: No midline shift or herniation.   VENTRICLES: No hydrocephalus.   DURAL VENOUS SINUSES: No hyperdensity to suggest acute thrombus.   EXTRA-AXIAL SPACES (epidural, subdural, subarachnoid spaces and basal cisterns): No collections.   " VISUALIZED ORBITS: Normal.   VISUALIZED PARANASAL SINUSES AND MASTOID AIR CELLS: Paranasal sinuses are clear. Tympanomastoid cavities are aerated.   SOFT TISSUES: Normal.   OSSEOUS STRUCTURES: Normal.       Stable size of left thalamic 6 mm hematoma. No midline shift or mass effect.   No acute territorial infarct. Moderate microangiopathic disease and diffuse parenchymal volume loss.   Signed by: Karly Melvin 3/13/2025 10:47 AM Dictation workstation:   [id number]    XR knee right 4+ views    Result Date: 3/13/2025  STUDY: Knee Radiographs; 03/13/2025, 10:26 AM INDICATION: Fall, right knee pain. COMPARISON: None Available. ACCESSION NUMBER(S): [id number] ORDERING CLINICIAN: MANOHAR SHERIFF TECHNIQUE:  four view(s) of the right knee. FINDINGS/    There is no displaced fracture.  The alignment is anatomic.  Mild soft tissue bulging along the medial aspect of the knee.  There is no joint effusion. Hypertrophy of the tibial spine with subchondral sclerosis consistent with degenerative changes.  Joint spaces are preserved. Signed by Sujatha Hou MD    XR shoulder right 2+ views    Result Date: 3/13/2025  STUDY: Shoulder Radiographs; 03/13/2025, 10:26 AM INDICATION: Fall, right shoulder pain. COMPARISON: CXR 03/13/2025. ACCESSION NUMBER(S): [id number] ORDERING CLINICIAN: MANOHAR SHERIFF TECHNIQUE:  three view(s) of the right shoulder. FINDINGS:  There is no displaced fracture.  There are degenerative change the acromioclavicular joint with joint space loss and osteophytes.  No soft tissue abnormality is seen.    No acute osseous abnormalities. Signed by Gigi Ariza MD    XR chest 1 view    Result Date: 3/13/2025  STUDY: Chest Radiograph;  3/13/2025 4:12 Am INDICATION: Trauma. COMPARISON: None Available. ACCESSION NUMBER(S): [id number] ORDERING CLINICIAN: HIGINIO BARAJAS TECHNIQUE:  Frontal chest was obtained at 04:12 hours. FINDINGS: Right upper chest implanted cardiac device with transvenous leads. Spinal stimulators  project over the lower midline chest. CARDIOMEDIASTINAL SILHOUETTE: Cardiomediastinal silhouette is normal in size and configuration. There are calcifications lining the aortic arch.  LUNGS: Lungs are clear.  ABDOMEN: No remarkable upper abdominal findings.  BONES: No acute osseous changes.    No acute pleural or parenchymal disease.  Normal cardiac diameter. Signed by Nick Gates MD    XR knee left 1-2 views    Result Date: 3/13/2025  STUDY: Knee Radiographs; 3/13/2025 4:12 AM INDICATION: Trauma. COMPARISON: None Available. ACCESSION NUMBER(S): [id number] ORDERING CLINICIAN: HIGINIO BARAJAS TECHNIQUE:  Two view(s) of the left knee. FINDINGS: Prior total knee arthroplasty.  The total knee arthroplasty is in anatomic alignment.  There is no displaced fracture.  There is no evidence of orthopedic hardware complications.  No soft tissue abnormality is seen.    Prior total knee arthroplasty, no acute traumatic finding. Signed by Nick Gates MD    CT head wo IV contrast    Result Date: 3/13/2025  Interpreted By:  Masoud Reyes, STUDY: CT HEAD WO IV CONTRAST; CT CERVICAL SPINE WO IV CONTRAST;  3/13/2025 4:13 am   INDICATION: Signs/Symptoms:Trauma.     COMPARISON: CT head dated 10/05/2024; CT cervical spine dated 06/01/2024.   ACCESSION NUMBER(S): [id number]; [id number]   ORDERING CLINICIAN: HIGINIO BARAJAS   TECHNIQUE: Noncontrast axial CT scan of head was performed, with coronal and sagittal reformats provided. The images were reviewed in bone, brain, blood and soft tissue windows.   Axial CT images of the cervical spine are obtained. Axial, coronal and sagittal reconstructions are provided for review.   FINDINGS: CT HEAD:   A 6 mm focus of hyperdense attenuation is present in the lateral aspect of the left thalamus (series 7, image 21) new since prior exam on 10/05/2024, suspicious for small intraparenchymal hemorrhage. There is a thin rim of low-density edema are present along the periphery.   No additional  areas of hyperdense intracranial hemorrhage are identified. No intraventricular, subarachnoid or subdural hemorrhage is present.   Gray-white differentiation is intact, without evidence of acute CT apparent transcortical infarct. Moderate volume of patchy attenuation changes are present in the periventricular and subcortical white matter of bilateral cerebral hemispheres, nonspecific findings favored to represent chronic sequela of microvascular disease.   No abnormal ventricular dilatation is present. Basal cisterns are patent. No extra-axial fluid collection is identified.   Scalp soft tissues do not demonstrate any acute abnormality. No skull fracture or other acute calvarial abnormality is present.   Mastoid air cells and middle ear cavities are clear. Small amount of mucus/secretions are present in the left maxillary sinus.   CT C-SPINE:   A 2-3 mm retrolisthesis is present at C2-C3 and C3-C4, otherwise cervical vertebral alignment is maintained.   Cervical vertebral body heights are preserved without evidence of compression fractures. Posterior elements of the cervical spine do not demonstrate any evidence of acute trauma.   Craniocervical junction is intact. Facet joints are preserved without evidence of traumatic subluxation or widening.   Mild intervertebral disc height loss is present at C2-C3.   Moderate to high-grade spinal canal stenosis is suspected at the level of C3-C4 due to central disc osteophyte complex which effaces anterior subarachnoid space and encroaches in the ventral cervical spinal cord. Mild stenosis is suspected at C5-C6 and C6-C7 due to disc osteophyte complexes.   Mild neural foraminal stenosis is present at C3-C4 and C4-C5 due to uncovertebral and facet joint hypertrophy.   Visualized prevertebral and paraspinal soft tissues do not demonstrate any acute abnormality.       CT HEAD: 1. A 6 mm focus of hyperdense attenuation is present in the lateral aspect of the left thalamus, new  since prior exam in October of 2024, suspicious for a small focus of intraparenchymal hemorrhage with thin rim of low-density edema along the periphery, but without evidence of significant mass effect. Finding is more suggestive of hypertensive and not traumatic hemorrhage. No evidence of intraventricular, subarachnoid or subdural hemorrhage. 2. Moderate volume of periventricular and subcortical attenuation changes are present in the bilateral cerebral hemispheres, nonspecific findings favored to represent chronic sequela of microvascular disease.   CT C-SPINE: 1. No evidence of acute trauma to the cervical spine. 2. Spondylotic changes of the cervical spine as detailed above.   MACRO: None   Signed by: Masoud Reyes 3/13/2025 4:25 AM Dictation workstation:   [id number]    CT cervical spine wo IV contrast    Result Date: 3/13/2025  Interpreted By:  Masoud Reyes, STUDY: CT HEAD WO IV CONTRAST; CT CERVICAL SPINE WO IV CONTRAST;  3/13/2025 4:13 am   INDICATION: Signs/Symptoms:Trauma.     COMPARISON: CT head dated 10/05/2024; CT cervical spine dated 06/01/2024.   ACCESSION NUMBER(S): [id number]; [id number]   ORDERING CLINICIAN: HIGINIO BARAJAS   TECHNIQUE: Noncontrast axial CT scan of head was performed, with coronal and sagittal reformats provided. The images were reviewed in bone, brain, blood and soft tissue windows.   Axial CT images of the cervical spine are obtained. Axial, coronal and sagittal reconstructions are provided for review.   FINDINGS: CT HEAD:   A 6 mm focus of hyperdense attenuation is present in the lateral aspect of the left thalamus (series 7, image 21) new since prior exam on 10/05/2024, suspicious for small intraparenchymal hemorrhage. There is a thin rim of low-density edema are present along the periphery.   No additional areas of hyperdense intracranial hemorrhage are identified. No intraventricular, subarachnoid or subdural hemorrhage is present.   Gray-white  differentiation is intact, without evidence of acute CT apparent transcortical infarct. Moderate volume of patchy attenuation changes are present in the periventricular and subcortical white matter of bilateral cerebral hemispheres, nonspecific findings favored to represent chronic sequela of microvascular disease.   No abnormal ventricular dilatation is present. Basal cisterns are patent. No extra-axial fluid collection is identified.   Scalp soft tissues do not demonstrate any acute abnormality. No skull fracture or other acute calvarial abnormality is present.   Mastoid air cells and middle ear cavities are clear. Small amount of mucus/secretions are present in the left maxillary sinus.   CT C-SPINE:   A 2-3 mm retrolisthesis is present at C2-C3 and C3-C4, otherwise cervical vertebral alignment is maintained.   Cervical vertebral body heights are preserved without evidence of compression fractures. Posterior elements of the cervical spine do not demonstrate any evidence of acute trauma.   Craniocervical junction is intact. Facet joints are preserved without evidence of traumatic subluxation or widening.   Mild intervertebral disc height loss is present at C2-C3.   Moderate to high-grade spinal canal stenosis is suspected at the level of C3-C4 due to central disc osteophyte complex which effaces anterior subarachnoid space and encroaches in the ventral cervical spinal cord. Mild stenosis is suspected at C5-C6 and C6-C7 due to disc osteophyte complexes.   Mild neural foraminal stenosis is present at C3-C4 and C4-C5 due to uncovertebral and facet joint hypertrophy.   Visualized prevertebral and paraspinal soft tissues do not demonstrate any acute abnormality.       CT HEAD: 1. A 6 mm focus of hyperdense attenuation is present in the lateral aspect of the left thalamus, new since prior exam in October of 2024, suspicious for a small focus of intraparenchymal hemorrhage with thin rim of low-density edema along the  periphery, but without evidence of significant mass effect. Finding is more suggestive of hypertensive and not traumatic hemorrhage. No evidence of intraventricular, subarachnoid or subdural hemorrhage. 2. Moderate volume of periventricular and subcortical attenuation changes are present in the bilateral cerebral hemispheres, nonspecific findings favored to represent chronic sequela of microvascular disease.   CT C-SPINE: 1. No evidence of acute trauma to the cervical spine. 2. Spondylotic changes of the cervical spine as detailed above.   MACRO: None   Signed by: Masoud Reyes 3/13/2025 4:25 AM Dictation workstation:   [id number]    Vascular US vein map for hemodialysis access arm bilateral    Result Date: 2/20/2025           [place] [address]            Phone [phone]  Vascular Lab Report  VASC US VEIN MAP FOR HEMODIALYSIS ACCESS ARM BILATERAL Patient Name:      MARK LEDEZMA HARVEY  Reading Physician: [id number] Deann Riojas MD Study Date:        2/18/2025           Ordering Provider: [id number] BETO ELAM MRN/PID:           [id number]            Fellow: Accession#:        [id number]        Technologist:      Wanda Orosco RVT Date of Birth/Age: 1940 / 84 years Technologist 2: Gender:            F                   Encounter#:        [id number] Admission Status:  Outpatient          Location           [facility]                                        Performed:  Diagnosis/ICD: Chronic kidney disease, stage 4 (severe)-N18.4; Encounter for                preprocedural cardiovascular examination-Z01.810; 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 There are chronic changes visualized in the proximal subclavian, mid subclavian and distal subclavian veins. Left Upper Venous: No evidence of acute deep vein thrombus visualized in the left upper extremity. There are chronic changes visualized in the proximal subclavian, mid subclavian and distal subclavian veins. Right Upper Vein Mapping: Right cephalic and basilic veins appear widely patent with no evidence of thrombosis or fibrosis. Left Upper Vein Mapping: Left cephalic and basilic veins appear widely patent with no evidence of thrombosis or fibrosis.  Imaging & Doppler Findings:  Right                   Compress Thrombus  Diam Cephalic Prox Arm         Yes      None   2.2 mm Cephalic Mid Arm          Yes      None   2.2 mm Cephalic Distal Arm       Yes      None   1.2 mm Cephalic Prox Forearm     Yes      None   1.3 mm Cephalic Mid Forearm      Yes      None   0.9 mm Cephalic Distal Forearm   Yes      None   0.9 mm Basilic Prox Arm          Yes      None   4.2 mm Basilic Mid Arm           Yes      None   3.7 mm Basilic Distal Arm        Yes      None   2.0 mm Basilic Prox Forearm      Yes      None   0.8 mm Basilic Mid Forearm       Yes      None   0.9 mm Basilic Distal Forearm    Yes      None   1.0 mm  Left                    Compress Thrombus  Diam Cephalic Prox Arm         Yes      None   1.0 mm Cephalic Mid Arm          Yes      None   1.0 mm Cephalic Distal Arm       Yes      None   1.5 mm Cephalic Prox Forearm     Yes      None   0.8 mm Cephalic Mid Forearm      Yes      None   0.6 mm Cephalic Distal Forearm   Yes      None   0.6 mm Basilic Prox Arm          Yes      None   5.3 mm Basilic Mid Arm           Yes      None   2.5 mm Basilic Distal Arm        Yes      None   1.8 mm Basilic Prox Forearm      Yes      None   1.0 mm Basilic Mid Forearm       Yes      None   1.0 mm Basilic Distal Forearm    Yes      None   0.7 mm  Right               Compressible Thrombus         Flow Internal Jugular        Yes        None       Pulsatile Subclavian Proximal     Yes      Chronic  Spontaneous/Phasic Subclavian Mid          Yes      Chronic Subclavian Distal       Yes      Chronic  Spontaneous/Phasic Axillary                Yes        None   Spontaneous/Phasic Brachial                Yes        None Cephalic                Yes        None Basilic                 Yes        None  Left                Compress Thrombus   Flow Internal Jugular      Yes      None   Pulsatile Subclavian Proximal   Yes    Chronic  Pulsatile Subclavian Mid        Yes    Chronic Subclavian Distal     Yes    Chronic  Pulsatile Axillary              Yes      None   Pulsatile Brachial              Yes      None Cephalic              Yes      None Basilic               Yes      None  Right                              Left   PSV    Waveform                    PSV   Waveform 114 cm/s Triphasic Brachial Distal 85 cm/s Triphasic 48 cm/s  Triphasic     Radial      56 cm/s Triphasic 44 cm/s  Triphasic      Ulnar      37 cm/s Triphasic                 Right   Left Brachial Diam D 5.3 mm 5.4 mm  Radial Diam D  3.2 mm 3.8 mm   [id number] Deann Riojas MD Electronically signed by [id number] Deann Riojas MD on 2/20/2025 at 11:58:00 AM  ** Final **   . ***    Stroke Alert CT/MRI review: {CT/MRI REVIEW:76516}     IV Thrombolysis IV Thrombolysis Checklist  {Pediatric patients aged <18 years with Acute Suspected Stroke - refer to the [facility] Clinical Practice Guideline for the Emergency Management of pediatric patients with Acute Suspected Stroke & the Pediatric IV Thrombolysis Consent :99}    {IV Thrombolysis Given:24713}  {For EVT therapy, type .EVT :99}     Assessment/Plan   Assessment & Plan  Fall, initial encounter      Type: {Stroke Type:85453}  Subtype/etiology: ***  Vessels involved: ***  Neurological manifestations:  NIHSS (worst at presentation): ***   Diagnostic evaluation: ***  Antiplatelet/antithrombotic plan  for stroke prevention: ***  VTE prophylaxis: ***  Vascular Risk Factor modification goals:  Blood pressure goals: avoid hypotension SBP <100 that could worsen cerebral perfusion, {BP GOALS:57247}  Lipid Goals: education on healthy diet and {LIPID GOALS:29699}  Glucose Goals: early treatment of hyperglycemia to goal glucose 140-180 mg/dl with long-term goal A1c < 7%   Smoking Cessation and Education  Assessment for Rehabilitation needs   Patient and family education on signs and symptoms of stroke, calling 911, healthy strategies for stroke prevention.             Agustin Coffey MD

## 2025-03-13 NOTE — ED TRIAGE NOTES
Pt presents via EMS after a fall at home. Pt states she fell at home around 1900 last night and could not get up. Pt's son found her this morning. Pt is on eliquis for AFIB. HIA called before arrival to the ED. Pt states there was no LOC. Pt reports no dizziness or CP before falling. Pt states she just lost her footing. Pt states she fell onto her knees and then fell forward hitting her face on the carpet. Pt has edema to the left eye and scattered bruising to the chest and arms. Pt also has pain in her upper back and bilateral knees. Pt is a/ox4. RR are even and unlabored.

## 2025-03-14 ENCOUNTER — APPOINTMENT (OUTPATIENT)
Dept: CARDIOLOGY | Facility: HOSPITAL | Age: 85
DRG: 086 | End: 2025-03-14
Payer: MEDICARE

## 2025-03-14 LAB
25(OH)D3 SERPL-MCNC: 40 NG/ML (ref 30–100)
ALBUMIN SERPL BCP-MCNC: 3.7 G/DL (ref 3.4–5)
ANION GAP SERPL CALC-SCNC: 15 MMOL/L (ref 10–20)
BB ANTIBODY IDENTIFICATION: NORMAL
BUN SERPL-MCNC: 45 MG/DL (ref 6–23)
CALCIUM SERPL-MCNC: 9 MG/DL (ref 8.6–10.6)
CASE #: NORMAL
CHLORIDE SERPL-SCNC: 103 MMOL/L (ref 98–107)
CHOLEST SERPL-MCNC: 223 MG/DL (ref 0–199)
CHOLESTEROL/HDL RATIO: 3.3
CO2 SERPL-SCNC: 26 MMOL/L (ref 21–32)
CREAT SERPL-MCNC: 3.38 MG/DL (ref 0.5–1.05)
EGFRCR SERPLBLD CKD-EPI 2021: 13 ML/MIN/1.73M*2
ERYTHROCYTE [DISTWIDTH] IN BLOOD BY AUTOMATED COUNT: 14.2 % (ref 11.5–14.5)
EST. AVERAGE GLUCOSE BLD GHB EST-MCNC: 100 MG/DL
FOLATE SERPL-MCNC: >24 NG/ML
GLUCOSE SERPL-MCNC: 76 MG/DL (ref 74–99)
HBA1C MFR BLD: 5.1 %
HCT VFR BLD AUTO: 33.6 % (ref 36–46)
HDLC SERPL-MCNC: 66.6 MG/DL
HGB BLD-MCNC: 10.4 G/DL (ref 12–16)
LDLC SERPL CALC-MCNC: 136 MG/DL
MAGNESIUM SERPL-MCNC: 2.49 MG/DL (ref 1.6–2.4)
MCH RBC QN AUTO: 31.3 PG (ref 26–34)
MCHC RBC AUTO-ENTMCNC: 31 G/DL (ref 32–36)
MCV RBC AUTO: 101 FL (ref 80–100)
NON HDL CHOLESTEROL: 156 MG/DL (ref 0–149)
NRBC BLD-RTO: 0 /100 WBCS (ref 0–0)
PATH REV-IMMUNOHEMATOLOGY-PR30: NORMAL
PHOSPHATE SERPL-MCNC: 3.9 MG/DL (ref 2.5–4.9)
PLATELET # BLD AUTO: 228 X10*3/UL (ref 150–450)
POTASSIUM SERPL-SCNC: 3.7 MMOL/L (ref 3.5–5.3)
RBC # BLD AUTO: 3.32 X10*6/UL (ref 4–5.2)
SODIUM SERPL-SCNC: 140 MMOL/L (ref 136–145)
T4 FREE SERPL-MCNC: 1.62 NG/DL (ref 0.78–1.48)
TRIGL SERPL-MCNC: 103 MG/DL (ref 0–149)
TSH SERPL-ACNC: 12.04 MIU/L (ref 0.44–3.98)
VIT B12 SERPL-MCNC: 1030 PG/ML (ref 211–911)
VLDL: 21 MG/DL (ref 0–40)
WBC # BLD AUTO: 25.5 X10*3/UL (ref 4.4–11.3)

## 2025-03-14 PROCEDURE — 82607 VITAMIN B-12: CPT | Performed by: NURSE PRACTITIONER

## 2025-03-14 PROCEDURE — 99233 SBSQ HOSP IP/OBS HIGH 50: CPT | Performed by: STUDENT IN AN ORGANIZED HEALTH CARE EDUCATION/TRAINING PROGRAM

## 2025-03-14 PROCEDURE — 85027 COMPLETE CBC AUTOMATED: CPT | Performed by: NURSE PRACTITIONER

## 2025-03-14 PROCEDURE — 80061 LIPID PANEL: CPT | Performed by: NURSE PRACTITIONER

## 2025-03-14 PROCEDURE — 93280 PM DEVICE PROGR EVAL DUAL: CPT

## 2025-03-14 PROCEDURE — 2500000001 HC RX 250 WO HCPCS SELF ADMINISTERED DRUGS (ALT 637 FOR MEDICARE OP): Performed by: NURSE PRACTITIONER

## 2025-03-14 PROCEDURE — 82306 VITAMIN D 25 HYDROXY: CPT | Performed by: INTERNAL MEDICINE

## 2025-03-14 PROCEDURE — 84443 ASSAY THYROID STIM HORMONE: CPT | Performed by: NURSE PRACTITIONER

## 2025-03-14 PROCEDURE — 2500000002 HC RX 250 W HCPCS SELF ADMINISTERED DRUGS (ALT 637 FOR MEDICARE OP, ALT 636 FOR OP/ED): Performed by: NURSE PRACTITIONER

## 2025-03-14 PROCEDURE — 83735 ASSAY OF MAGNESIUM: CPT | Performed by: NURSE PRACTITIONER

## 2025-03-14 PROCEDURE — 93280 PM DEVICE PROGR EVAL DUAL: CPT | Performed by: STUDENT IN AN ORGANIZED HEALTH CARE EDUCATION/TRAINING PROGRAM

## 2025-03-14 PROCEDURE — 97535 SELF CARE MNGMENT TRAINING: CPT | Mod: GO | Performed by: OCCUPATIONAL THERAPIST

## 2025-03-14 PROCEDURE — 80069 RENAL FUNCTION PANEL: CPT | Performed by: NURSE PRACTITIONER

## 2025-03-14 PROCEDURE — 36415 COLL VENOUS BLD VENIPUNCTURE: CPT | Performed by: NURSE PRACTITIONER

## 2025-03-14 PROCEDURE — 99232 SBSQ HOSP IP/OBS MODERATE 35: CPT | Performed by: NURSE PRACTITIONER

## 2025-03-14 PROCEDURE — 82746 ASSAY OF FOLIC ACID SERUM: CPT | Performed by: NURSE PRACTITIONER

## 2025-03-14 PROCEDURE — 84439 ASSAY OF FREE THYROXINE: CPT | Performed by: NURSE PRACTITIONER

## 2025-03-14 PROCEDURE — 2500000004 HC RX 250 GENERAL PHARMACY W/ HCPCS (ALT 636 FOR OP/ED): Performed by: NURSE PRACTITIONER

## 2025-03-14 PROCEDURE — 1200000002 HC GENERAL ROOM WITH TELEMETRY DAILY

## 2025-03-14 PROCEDURE — 97162 PT EVAL MOD COMPLEX 30 MIN: CPT | Mod: GP | Performed by: PHYSICAL THERAPIST

## 2025-03-14 PROCEDURE — 97165 OT EVAL LOW COMPLEX 30 MIN: CPT | Mod: GO | Performed by: OCCUPATIONAL THERAPIST

## 2025-03-14 PROCEDURE — 83036 HEMOGLOBIN GLYCOSYLATED A1C: CPT | Performed by: NURSE PRACTITIONER

## 2025-03-14 RX ORDER — ATORVASTATIN CALCIUM 40 MG/1
40 TABLET, FILM COATED ORAL NIGHTLY
Status: DISCONTINUED | OUTPATIENT
Start: 2025-03-14 | End: 2025-03-15 | Stop reason: HOSPADM

## 2025-03-14 RX ADMIN — ACETAMINOPHEN 975 MG: 325 TABLET, FILM COATED ORAL at 12:50

## 2025-03-14 RX ADMIN — SENNOSIDES AND DOCUSATE SODIUM 2 TABLET: 50; 8.6 TABLET ORAL at 08:49

## 2025-03-14 RX ADMIN — AMLODIPINE BESYLATE 5 MG: 5 TABLET ORAL at 08:48

## 2025-03-14 RX ADMIN — SODIUM CHLORIDE 500 ML: 9 INJECTION, SOLUTION INTRAVENOUS at 13:38

## 2025-03-14 RX ADMIN — LEVOTHYROXINE SODIUM 150 MCG: 0.07 TABLET ORAL at 05:52

## 2025-03-14 RX ADMIN — ACETAMINOPHEN 975 MG: 325 TABLET, FILM COATED ORAL at 05:52

## 2025-03-14 RX ADMIN — AMIODARONE HYDROCHLORIDE 200 MG: 200 TABLET ORAL at 08:48

## 2025-03-14 RX ADMIN — GABAPENTIN 300 MG: 300 CAPSULE ORAL at 21:20

## 2025-03-14 RX ADMIN — POLYETHYLENE GLYCOL 3350 17 G: 17 POWDER, FOR SOLUTION ORAL at 08:49

## 2025-03-14 RX ADMIN — ATORVASTATIN CALCIUM 40 MG: 40 TABLET, FILM COATED ORAL at 21:20

## 2025-03-14 RX ADMIN — FUROSEMIDE 20 MG: 40 TABLET ORAL at 08:48

## 2025-03-14 RX ADMIN — ACETAMINOPHEN 975 MG: 325 TABLET, FILM COATED ORAL at 21:19

## 2025-03-14 ASSESSMENT — PAIN - FUNCTIONAL ASSESSMENT
PAIN_FUNCTIONAL_ASSESSMENT: 0-10

## 2025-03-14 ASSESSMENT — PAIN SCALES - WONG BAKER: WONGBAKER_NUMERICALRESPONSE: NO HURT

## 2025-03-14 ASSESSMENT — COGNITIVE AND FUNCTIONAL STATUS - GENERAL
DRESSING REGULAR LOWER BODY CLOTHING: A LOT
TURNING FROM BACK TO SIDE WHILE IN FLAT BAD: A LITTLE
DAILY ACTIVITIY SCORE: 20
HELP NEEDED FOR BATHING: A LITTLE
MOBILITY SCORE: 18
STANDING UP FROM CHAIR USING ARMS: A LITTLE
DAILY ACTIVITIY SCORE: 17
PERSONAL GROOMING: A LITTLE
DAILY ACTIVITIY SCORE: 20
WALKING IN HOSPITAL ROOM: A LITTLE
MOVING TO AND FROM BED TO CHAIR: A LITTLE
WALKING IN HOSPITAL ROOM: A LITTLE
STANDING UP FROM CHAIR USING ARMS: A LITTLE
DRESSING REGULAR LOWER BODY CLOTHING: A LITTLE
MOVING FROM LYING ON BACK TO SITTING ON SIDE OF FLAT BED WITH BEDRAILS: A LITTLE
DRESSING REGULAR UPPER BODY CLOTHING: A LITTLE
CLIMB 3 TO 5 STEPS WITH RAILING: A LOT
MOVING TO AND FROM BED TO CHAIR: A LITTLE
WALKING IN HOSPITAL ROOM: A LITTLE
MOVING FROM LYING ON BACK TO SITTING ON SIDE OF FLAT BED WITH BEDRAILS: A LITTLE
TOILETING: A LITTLE
HELP NEEDED FOR BATHING: A LITTLE
MOBILITY SCORE: 18
CLIMB 3 TO 5 STEPS WITH RAILING: A LITTLE
TOILETING: A LITTLE
TURNING FROM BACK TO SIDE WHILE IN FLAT BAD: A LITTLE
DRESSING REGULAR UPPER BODY CLOTHING: A LITTLE
STANDING UP FROM CHAIR USING ARMS: A LITTLE
CLIMB 3 TO 5 STEPS WITH RAILING: A LITTLE
TURNING FROM BACK TO SIDE WHILE IN FLAT BAD: A LITTLE
HELP NEEDED FOR BATHING: A LOT
DRESSING REGULAR UPPER BODY CLOTHING: A LITTLE
MOVING TO AND FROM BED TO CHAIR: A LITTLE
MOBILITY SCORE: 17
MOVING FROM LYING ON BACK TO SITTING ON SIDE OF FLAT BED WITH BEDRAILS: A LITTLE
DRESSING REGULAR LOWER BODY CLOTHING: A LITTLE
TOILETING: A LITTLE

## 2025-03-14 ASSESSMENT — ACTIVITIES OF DAILY LIVING (ADL)
BATHING_ASSISTANCE: MINIMAL
HOME_MANAGEMENT_TIME_ENTRY: 12
ADL_ASSISTANCE: INDEPENDENT

## 2025-03-14 ASSESSMENT — PAIN SCALES - GENERAL
PAINLEVEL_OUTOF10: 0 - NO PAIN
PAINLEVEL_OUTOF10: 2

## 2025-03-14 ASSESSMENT — PAIN DESCRIPTION - DESCRIPTORS: DESCRIPTORS: ACHING

## 2025-03-14 NOTE — CARE PLAN
The patient's goals for the shift include      The clinical goals for the shift include remain free from falls

## 2025-03-14 NOTE — CARE PLAN
The patient's goals for the shift include      The clinical goals for the shift include pt will remain HDS      Problem: Fall/Injury  Goal: Not fall by end of shift  Outcome: Progressing  Goal: Be free from injury by end of the shift  Outcome: Progressing  Goal: Verbalize understanding of personal risk factors for fall in the hospital  Outcome: Progressing  Goal: Verbalize understanding of risk factor reduction measures to prevent injury from fall in the home  Outcome: Progressing  Goal: Use assistive devices by end of the shift  Outcome: Progressing  Goal: Pace activities to prevent fatigue by end of the shift  Outcome: Progressing     Problem: Pain - Adult  Goal: Verbalizes/displays adequate comfort level or baseline comfort level  Outcome: Progressing     Problem: Safety - Adult  Goal: Free from fall injury  Outcome: Progressing     Problem: Discharge Planning  Goal: Discharge to home or other facility with appropriate resources  Outcome: Progressing     Problem: Chronic Conditions and Co-morbidities  Goal: Patient's chronic conditions and co-morbidity symptoms are monitored and maintained or improved  Outcome: Progressing     Problem: Nutrition  Goal: Nutrient intake appropriate for maintaining nutritional needs  Outcome: Progressing     Problem: Pain  Goal: Takes deep breaths with improved pain control throughout the shift  Outcome: Progressing  Goal: Turns in bed with improved pain control throughout the shift  Outcome: Progressing  Goal: Walks with improved pain control throughout the shift  Outcome: Progressing  Goal: Performs ADL's with improved pain control throughout shift  Outcome: Progressing  Goal: Participates in PT with improved pain control throughout the shift  Outcome: Progressing  Goal: Free from opioid side effects throughout the shift  Outcome: Progressing  Goal: Free from acute confusion related to pain meds throughout the shift  Outcome: Progressing

## 2025-03-14 NOTE — SIGNIFICANT EVENT
Stroke Neurology Sign Off Note:  Tracie Baltazar is a 84 y.o. Right-handed female with h/o leukemia, afib on eliquis (LD 3/12 AM), recurrect c diff, multiple falls, CKD IV, rhabdomyolysis, asthma, HTN, RA  presented to the ED with complaint of falls. The initial CTH 3/13 at 4 AM shows 6 mm left thalamic intraparenchymal hemorrhage. The repeated CTH at 10am showed stable findings. Given patient has no active complaints, non focal exam, NIHHS 0, and stable CTH, plan for conservative management. , A1c 5.1. Due to small size of ICH and stability on CTH, would recommend restart of eliquis in 1 week on 3/21/25.     Type: ICH              Time of Neurosurgery contact: 8:50 AM 3/13/2025              BP goal: less than 160              IVH: No              ICH volume: 1ml   Subtype: Undetermined etiology  Vessels Involved: PCA  Neurologic Manifestations: none  Deficits: NIHSS 0  Initial treatment: None  Anti-platelets or Anti-coagulation management: Not indicated  Vascular Risk Factors: HTN, HLD, and Hypercoagulability  BP goal: < 160    Recommendations:  -Eliquis restart in 1 week on 3/21/25  -Initiate atorvastatin 40 mg daily  -SBP goal < 160  -Can place referral for stroke neurology referral at discharge for outpatient follow-up    Neurology will sign-off at this time. Please reach out with any questions.    Frieda Katz MD  PGY-3 Neurology  Stroke Pager: [phone]

## 2025-03-14 NOTE — PROGRESS NOTES
[facility]  TRAUMA SERVICE - PROGRESS NOTE    Patient Name: Tracie Baltazar  MRN: [id number]  Admit Date: 313  : 1940  AGE: 84 y.o.   GENDER: female  ==============================================================================  MECHANISM OF INJURY:     83 yo F s/p mechanical fall, multiple recent falls in the past year.     LOC (yes/no?): No   Anticoagulant / Anti-platelet Rx? (for what dx?): Eliquis for afib   Referring Facility Name (N/A for scene EMR run): [facility] ED     INJURIES/problems:   left thalamic 6 mm hematoma     Hx. afib  on eliquis, CKD 4, HTN, HLD, diastolic heart failure, SSS s/p pacer placed in 2024, cdiff in sep/2024, Chronic lymphocytic leukemia, RA, Asthma, HLD, Spinal stimulator  lumbar laminectomy in  for back pain and sciatica s/p spinal cord stimulator in  which help with the back pain, multiple falls this past year, hypothyroid         INCIDENTAL FINDINGS:  None at this time     ==============================================================================  TODAY'S ASSESSMENT AND PLAN OF CARE:    #left thalamic 6 mm hematoma   -NSGY consulted, repeat head stable, neuro checks,hold eliquis for now  -Neurology consulted, lipid and A1c panel (ordered this am), SBP less than 160  -Holding DVT for 48 hours after stbale head ct  -Holding eliquis, pend nsgy for restart plan     Comorbids:  -Geriatrics consulted, recs appreciated   -Home medications resumed as appropriate  -Orthostatic vitals today  -AM labs ordered  -PT/OT today   -Pain control scheduled tylenol   -Telemetry   -Pacer interrogated today, order place     FEN:   -Renal diet  -Strict I/Os  -Monitor electrolytes and replete as clinically indicated  -BR  -IS    Proph: SCDs, hold chemoprophylaxis for 48 hours after after stable head ct (tomorrow am)    Dispo: Continue care on telemetry floor.     Pt. And plan discussed with Dr. Horn.     Sally Castillo, JUMA-CNP  Trauma  Surgery  [id number]    Total face to face time spent with patient/family of 20 minutes, with >50% of the time spent discussing plan of care/management, counseling/educating on disease processes, explaining results of diagnostic testing.             ==============================================================================  CHIEF COMPLAINT / OVERNIGHT EVENTS:   Pt. Stated she slept well. No acute events.     MEDICAL HISTORY / ROS:  Admission history and ROS reviewed. Pertinent changes as follows:  Complaint of generalized aching.     PHYSICAL EXAM:  Heart Rate:  [58-61]   Temp:  [36.3 °C (97.3 °F)-36.8 °C (98.2 °F)]   Resp:  [10-20]   BP: (123-165)/(55-69)   SpO2:  [95 %-99 %]   Physical Exam  Vitals reviewed.   Constitutional:       Comments: AOx3   HENT:      Head: Normocephalic.      Right Ear: External ear normal.      Left Ear: External ear normal.      Nose: Nose normal.      Mouth/Throat:      Mouth: Mucous membranes are dry.      Pharynx: Oropharynx is clear.   Eyes:      Pupils: Pupils are equal, round, and reactive to light.   Cardiovascular:      Rate and Rhythm: Normal rate.      Pulses: Normal pulses.   Pulmonary:      Comments: On room air, respirations even and unlabored, thorax symmetric, no audible wheezing   Abdominal:      General: There is no distension.      Palpations: Abdomen is soft.      Tenderness: There is no abdominal tenderness.   Musculoskeletal:      Cervical back: Neck supple.      Comments: BLE nonpitting edema    Skin:     Comments: Superficial abrasions to bilateral knees    Neurological:      Mental Status: She is alert and oriented to person, place, and time.             LABS:  Results from last 7 days   Lab Units 03/13/25  0348   WBC AUTO x10*3/uL 35.5*   HEMOGLOBIN g/dL 11.3*   HEMATOCRIT % 36.0   PLATELETS AUTO x10*3/uL 256   LYMPHO PCT MAN % 68.0   MONO PCT MAN % 1.0   EOSINO PCT MAN % 1.0     Results from last 7 days   Lab Units 03/13/25  0520   APTT seconds 26.4   INR  1.1      Results from last 7 days   Lab Units 03/13/25  0348   SODIUM mmol/L 137   POTASSIUM mmol/L 4.1   CHLORIDE mmol/L 103   CO2 mmol/L 25   BUN mg/dL 48*   CREATININE mg/dL 3.00*   CALCIUM mg/dL 9.1   GLUCOSE mg/dL 125*               I have reviewed all medications, laboratory results, and imaging pertinent for today's encounter.

## 2025-03-14 NOTE — SIGNIFICANT EVENT
Patient presenting with small left thalamic ICH, repeat CTH was stable. Patient is neurologically intact. Patient continues to be a trauma primary patient, and is followed by stroke. No neurosurgical interventions are indicated, Eliquis restart per stroke. No follow up needed. Neurosurgery will now sign off, please chat or page with any questions or concerns.    Rashawn Mason MD  Neurosurgery

## 2025-03-14 NOTE — PROGRESS NOTES
Subjective   Tracie Baltazar is a 84 y.o. female  PMH of  PAF, SSS, s/p  dual chamber PPM placement 6/3/2024 on Eliquis hypertension hyperlipidemia CKD 4 asthma arthritis and leukemia presenting with a fall.  Patient reportedly fell last night and could not get up.  She admits to hitting her head, after she lost her footing, fell on her knees and fell forward hitting her face against the ajar door.  She was unable to get up or reach to her phone her son found her this morning. CT head showed left thalamus intraparenchymal hemorrhage. Neurosurgery has been consulted and recommended follow-up.        Update 3/14/2025  Patient states that her body aches from falling down, but rates it a 2 out of 10 on the pain scale. Patient was wondering if there would be a repeat CT done. Patient states she did not eat breakfast, but usually doesn't even at home. Otherwise had no concerns.    Patient was seen at bedside-family was present in the room.  Patient again reported not eating breakfast, though, she is planning to order lunch.  Patient states that she continues to make urine.  She denied having any pain present at my visit today.        Objective     Current Facility-Administered Medications   Medication Dose Route Frequency Provider Last Rate Last Admin    acetaminophen (Tylenol) tablet 975 mg  975 mg oral q8h FIDEL AnneCNP   975 mg at 03/14/25 0552    amiodarone (Pacerone) tablet 200 mg  200 mg oral Daily JUMA Anne-CNP   200 mg at 03/13/25 1359    amLODIPine (Norvasc) tablet 5 mg  5 mg oral Daily JUMA Anne-CNP   5 mg at 03/13/25 1359    diclofenac sodium (Voltaren) 1 % gel 4 g  4 g Topical 4x daily PRN Ese Hoang,         furosemide (Lasix) tablet 20 mg  20 mg oral Daily JUMA Anne-CNP   20 mg at 03/13/25 1400    gabapentin (Neurontin) capsule 300 mg  300 mg oral Nightly JUMA Anne-CNP   300 mg at 03/13/25 2055    labetaloL  (Normodyne,Trandate) injection 10 mg  10 mg intravenous q4h PRN Ese Hoang,         levothyroxine (Synthroid, Levoxyl) tablet 150 mcg  150 mcg oral Daily Sally Landaverdeter, APRN-CNP   150 mcg at 03/14/25 0552    oxygen (O2) therapy   inhalation Continuous PRN - O2/gases Sally MILLER ERAN Castillo        polyethylene glycol (Glycolax, Miralax) packet 17 g  17 g oral Daily Sally MILLER ERAN Castillo        sennosides-docusate sodium (Lisette-Colace) 8.6-50 mg per tablet 2 tablet  2 tablet oral BID Sally MILLER [place], APRN-CNP   2 tablet at 03/13/25 2055       Physical Exam  GEN: Alert, oriented to person, place and time, not pale, not jaundiced, well hydrated, very hard of hearing  CVS: HS I +II, regular, no murmurs  RESP: Diminished air entry  ABD: Full, soft, BS present, nontender, no palpable organs,   EXT: No bilateral leg edema    Confusion Assessment Method(CAM) for diagnosis of delirium:    1.  Acute onset or fluctuating course: absent  2.  Inattention: absent  3.  Disorganized thinking: absent  4.  Altered level of consciousness: absent  CAM: Negative    AT Score For Assessment of Delirium and Cognitive Impairment:    Alertness: 0  Normal(fully alert,but not agitated, throughout assessment)=0  Mild sleepiness for <10 seconds after walking, then normal=0  Clearly abnormal=4  2.  AMT4: 0  No mistakes=0  One mistake=1  Two or more mistakes/untestable=2  3.  Attention: 0  Achieves seven months or more correctly=0  Starts but scores <7 months/ refuses to start=1  Untestable(cannot start because unwell, drowsy, inattentive)=2  4.  Acute: 0  No=0  Yes=4    Total Score: 0  4 or above: Possible delirium +/- cognitive impairment  1-3: Possible cognitive impairment  0: Delirium or severe cognitive impairment unlikely(but delirium still possible if (4) information incomplete)      Last Recorded Vitals      3/13/2025     2:00 PM 3/13/2025     4:30 PM 3/13/2025     5:30 PM 3/13/2025     8:32 PM 3/13/2025    11:31 PM  3/14/2025     3:23 AM 3/14/2025     7:57 AM   Vitals   Systolic 141 123 133 165 149 134 133   Diastolic 66 63 62 63 65 57 60   BP Location    Left arm Left arm Left arm Left arm   Heart Rate 60 60 60 58 61 60 60   Temp    36.7 °C (98.1 °F) 36.3 °C (97.3 °F) 36.8 °C (98.2 °F)    Resp 11 10 14 12 14 14 18      Vitals:    03/13/25 0633   Weight: 72.1 kg (159 lb)        Relevant Results  Lab Results   Component Value Date    TSH 1.98 01/24/2025    UPQXESJL92 956 (H) 10/23/2024    VITD25 35 02/19/2024    HGBA1C 5.4 12/19/2019     Results from last 7 days   Lab Units 03/13/25  0520 03/13/25  0348   WBC AUTO x10*3/uL  --  35.5*   HEMOGLOBIN g/dL  --  11.3*   HEMATOCRIT %  --  36.0   SODIUM mmol/L  --  137   POTASSIUM mmol/L  --  4.1   CHLORIDE mmol/L  --  103   CREATININE mg/dL  --  3.00*   BUN mg/dL  --  48*   CO2 mmol/L  --  25   INR  1.1  --           XR pelvis 1-2 views  Narrative: STUDY:  Pelvis Radiographs; 3/13/25 at 12:01 PM  INDICATION:  Fall, trauma, evaluate for fracture.  COMPARISON:  Pelvic XR 10/4/24.  ACCESSION NUMBER(S):  [id number]  ORDERING CLINICIAN:  ANAYA DOUGLAS  TECHNIQUE:  Two view(s) of the pelvis.  FINDINGS:    The pelvic ring is intact.  There is no acute fracture.  There are degenerative change the lower lumbosacral spine and hips  bilaterally  Impression: No acute osseous abnormalities.  Signed by Gigi Ariza MD  CT head wo IV contrast  Narrative: Interpreted By:  Karly Melvin,   STUDY:  CT HEAD WO IV CONTRAST      INDICATION:  Signs/Symptoms:IPH stability scan      COMPARISON:  Head CT 03/13/2015      ACCESSION NUMBER(S):  [id number]      ORDERING CLINICIAN:  MANOHAR SHERIFF      TECHNIQUE:  CT of the head was performed without the administration of  intravenous contrast.      FINDINGS:  BRAIN PARENCHYMA: Stable size of 6 mm hematoma within the left  thalamus (series 201, image 18). Mild surrounding vasogenic edema  similar to previous CT. No notable mass effect. No new or  enlarging  intracranial hemorrhage. No acute territorial infarct. Moderate  microangiopathic disease and diffuse parenchymal volume loss.      MIDLINE SHIFT OR HERNIATION: No midline shift or herniation.      VENTRICLES: No hydrocephalus.      DURAL VENOUS SINUSES: No hyperdensity to suggest acute thrombus.      EXTRA-AXIAL SPACES (epidural, subdural, subarachnoid spaces and basal  cisterns): No collections.      VISUALIZED ORBITS: Normal.      VISUALIZED PARANASAL SINUSES AND MASTOID AIR CELLS: Paranasal sinuses  are clear. Tympanomastoid cavities are aerated.      SOFT TISSUES: Normal.      OSSEOUS STRUCTURES: Normal.      Impression: Stable size of left thalamic 6 mm hematoma. No midline shift or mass  effect.      No acute territorial infarct. Moderate microangiopathic disease and  diffuse parenchymal volume loss.      Signed by: Karly Melvin 3/13/2025 10:47 AM  Dictation workstation:   [id number]  XR knee right 4+ views  Narrative: STUDY:  Knee Radiographs; 03/13/2025, 10:26 AM  INDICATION:  Fall, right knee pain.  COMPARISON:  None Available.  ACCESSION NUMBER(S):  [id number]  ORDERING CLINICIAN:  MANOHAR SHERIFF  TECHNIQUE:  four view(s) of the right knee.  FINDINGS/  Impression: There is no displaced fracture.  The alignment is anatomic.  Mild soft  tissue bulging along the medial aspect of the knee.  There is no joint  effusion.  Hypertrophy of the tibial spine with subchondral sclerosis consistent  with degenerative changes.  Joint spaces are preserved.  Signed by Sujatha Hou MD  XR shoulder right 2+ views  Narrative: STUDY:  Shoulder Radiographs; 03/13/2025, 10:26 AM  INDICATION:  Fall, right shoulder pain.   COMPARISON:  CXR 03/13/2025.  ACCESSION NUMBER(S):  [id number]  ORDERING CLINICIAN:  MANOHAR SHERIFF  TECHNIQUE:  three view(s) of the right shoulder.  FINDINGS:    There is no displaced fracture.  There are degenerative change the  acromioclavicular joint with joint space loss and osteophytes.   No  soft tissue abnormality is seen.  Impression: No acute osseous abnormalities.  Signed by Gigi Ariza MD  XR chest 1 view  Narrative: STUDY:  Chest Radiograph;  3/13/2025 4:12 Am  INDICATION:  Trauma.  COMPARISON:  None Available.  ACCESSION NUMBER(S):  [id number]  ORDERING CLINICIAN:  HIGINIO BARAJAS  TECHNIQUE:  Frontal chest was obtained at 04:12 hours.  FINDINGS:  Right upper chest implanted cardiac device with transvenous leads.  Spinal stimulators project over the lower midline chest.  CARDIOMEDIASTINAL SILHOUETTE:  Cardiomediastinal silhouette is normal in size and configuration.   There are calcifications lining the aortic arch.     LUNGS:  Lungs are clear.     ABDOMEN:  No remarkable upper abdominal findings.     BONES:  No acute osseous changes.  Impression: No acute pleural or parenchymal disease.  Normal cardiac diameter.  Signed by Nick Gates MD  XR knee left 1-2 views  Narrative: STUDY:  Knee Radiographs; 3/13/2025 4:12 AM  INDICATION:  Trauma.  COMPARISON:  None Available.  ACCESSION NUMBER(S):  [id number]  ORDERING CLINICIAN:  HIGINIO BARAJAS  TECHNIQUE:  Two view(s) of the left knee.  FINDINGS:   Prior total knee arthroplasty.  The total knee arthroplasty is in  anatomic alignment.  There is no displaced fracture.  There is no  evidence of orthopedic hardware complications.  No soft tissue  abnormality is seen.  Impression: Prior total knee arthroplasty, no acute traumatic finding.  Signed by Nick Gates MD  CT head wo IV contrast, CT cervical spine wo IV contrast  Narrative: Interpreted By:  Masoud Reyes,   STUDY:  CT HEAD WO IV CONTRAST; CT CERVICAL SPINE WO IV CONTRAST;  3/13/2025  4:13 am      INDICATION:  Signs/Symptoms:Trauma.          COMPARISON:  CT head dated 10/05/2024; CT cervical spine dated 06/01/2024.      ACCESSION NUMBER(S):  [id number]; [id number]      ORDERING CLINICIAN:  HIGINIO BARAJAS      TECHNIQUE:  Noncontrast axial CT scan of head was performed, with coronal  and  sagittal reformats provided. The images were reviewed in bone, brain,  blood and soft tissue windows.      Axial CT images of the cervical spine are obtained. Axial, coronal  and sagittal reconstructions are provided for review.      FINDINGS:  CT HEAD:      A 6 mm focus of hyperdense attenuation is present in the lateral  aspect of the left thalamus (series 7, image 21) new since prior exam  on 10/05/2024, suspicious for small intraparenchymal hemorrhage.  There is a thin rim of low-density edema are present along the  periphery.      No additional areas of hyperdense intracranial hemorrhage are  identified. No intraventricular, subarachnoid or subdural hemorrhage  is present.      Gray-white differentiation is intact, without evidence of acute CT  apparent transcortical infarct. Moderate volume of patchy attenuation  changes are present in the periventricular and subcortical white  matter of bilateral cerebral hemispheres, nonspecific findings  favored to represent chronic sequela of microvascular disease.      No abnormal ventricular dilatation is present. Basal cisterns are  patent. No extra-axial fluid collection is identified.      Scalp soft tissues do not demonstrate any acute abnormality. No skull  fracture or other acute calvarial abnormality is present.      Mastoid air cells and middle ear cavities are clear. Small amount of  mucus/secretions are present in the left maxillary sinus.      CT C-SPINE:      A 2-3 mm retrolisthesis is present at C2-C3 and C3-C4, otherwise  cervical vertebral alignment is maintained.      Cervical vertebral body heights are preserved without evidence of  compression fractures. Posterior elements of the cervical spine do  not demonstrate any evidence of acute trauma.      Craniocervical junction is intact. Facet joints are preserved without  evidence of traumatic subluxation or widening.      Mild intervertebral disc height loss is present at C2-C3.      Moderate to  high-grade spinal canal stenosis is suspected at the  level of C3-C4 due to central disc osteophyte complex which effaces  anterior subarachnoid space and encroaches in the ventral cervical  spinal cord. Mild stenosis is suspected at C5-C6 and C6-C7 due to  disc osteophyte complexes.      Mild neural foraminal stenosis is present at C3-C4 and C4-C5 due to  uncovertebral and facet joint hypertrophy.      Visualized prevertebral and paraspinal soft tissues do not  demonstrate any acute abnormality.      Impression: CT HEAD:  1. A 6 mm focus of hyperdense attenuation is present in the lateral  aspect of the left thalamus, new since prior exam in October of 2024,  suspicious for a small focus of intraparenchymal hemorrhage with thin  rim of low-density edema along the periphery, but without evidence of  significant mass effect. Finding is more suggestive of hypertensive  and not traumatic hemorrhage. No evidence of intraventricular,  subarachnoid or subdural hemorrhage.  2. Moderate volume of periventricular and subcortical attenuation  changes are present in the bilateral cerebral hemispheres,  nonspecific findings favored to represent chronic sequela of  microvascular disease.      CT C-SPINE:  1. No evidence of acute trauma to the cervical spine.  2. Spondylotic changes of the cervical spine as detailed above.      MACRO:  None      Signed by: Masoud Reyes 3/13/2025 4:25 AM  Dictation workstation:   [id number]        DATA:  EKG: QTC  Encounter Date: 10/04/24   ECG 12 lead   Result Value    Ventricular Rate 60    Atrial Rate 60    MT Interval 228    QRS Duration 96    QT Interval 408    QTC Calculation(Bazett) 408    R Axis -33    T Axis 48    QRS Count 10    Q Onset 211    P Onset 162    P Offset 182    T Offset 415    QTC Fredericia 408    Narrative    Atrial-paced rhythm with prolonged AV conduction  Left axis deviation  Nonspecific ST and T wave abnormality  Abnormal ECG  When compared with ECG of  01-JUN-2024 16:10,  Electronic atrial pacemaker has replaced Sinus rhythm  Confirmed by Javier Steel ([id number]) on 10/8/2024 3:51:33 PM      Anti-psychotics in 48 hours: No  Opioids/Benzodiazepines in 48 hours: No  Anticholinergics on board:No  Restraints:No  Indwelling catheters:No  Last BM: None  UO in 24 hours: 550  Activity in the past 24 hours: None  Need for ambulatory devices: Walker    Assessment/Plan   85 y/o female with PMHx of CLL, chronic anemia, CKD stage 4, SSS s/p pacemaker, gait impairment, ambulates with a walker, h/o recurrent falls comes in after a fall in which she stated she lost her balance whilst reaching for her walker in the bathroom. Patient sustains an intraparenchymal hemorrhage.      Acute fall likely secondary to loss of balance whilst reaching for walker  History of recurrent falls, previously related to complete heart block, now s/p pacemaker  Acute intraparenchymal hemorrhage s/p fall, now with minimal headaches  Paroxysmal A-fib, on Eliquis  Sick sinus syndrome status post pacemaker  Hyperlipidemia  CKD 4, Cr now 3.0, Baseline 2.66-2.72, dialysis planned for the future  Asthma  Osteoarthritis  CLL, follows with oncology, not on treatment  Rheumatoid arthritis, not on chronic steroids  Polypharmacy - on multiple concerning meds  Hypothyroidism  Concern for cognitive impairment, last TSH 1.98  Macrocytosis  JANET     Serum creatinine: 3 mg/dL (H) 03/13/25 0348  Estimated creatinine clearance: 12.7 mL/min (A)     Plan:  Check Vitamin D level  Consider outpatient Fall clinic at discharge.  - Consider obtaining Vit B12, Folic Acid and TSH levels for macrocytosis - though this can be done on an OP basis as well, as patient already follows with hematology for CLL.    Please obtain orthostatic vitals when able  Hgb dropped from 11.3 to 10.4 - no concerns for an active bleeding at this time - follow CBC tomorrow, unless overt signs of bleeding occur.  Ok for Tylenol prn for headaches  Please  perform pacemaker check - agree  Neurosurgery signed off and recommended eliquis be started per stroke team.    Please decrease gabapentin to 300mg daily from 300mg BID (max for CrCl is 300mg /day)  Do not resume on Cymbalta as CrCl is less than 30  PT/OT following.  Patient will need to follow-up with Geriatrics in the outpatient 4-6 weeks after discharge  - Recommend:  Referral to Geriatric Outpatient clinic: [facility], [facility] [facility] [address], [address], [place] [address], [phone]  - given the new JANET and GFR in the CKD5 range - would recommend a nephro consult.     Care Transitions:  -Recommended level for discharge: Pending PT/OT  -Home going considerations: Pending PT/OT evals  -Primary care physician: Dr. Javier Floyd     Goals of Care:  -Health care power of : Elvis Baltazar  -Living will: Yes  Code status: DNR/DNI     4M AGE-FRIENDLY INITIATIVE:  What matters most to patient: Getting better and going home  Medications: Cymbalta, gabapentin  Mentation: CAM negative, 4AT 0  Mobility: Uses a walker at baseline  Geriatric medicine will continue to follow the patient. Thank you for allowing geriatric medicine to be involved in the care of your patient. Geriatric medicine consultation team is available during work hours Monday through Friday. For any emergency issues requiring immediate assistance over the weekend, please page Geriatrics pager [phone]    Ivan Brooke    I saw and evaluated the patient.  I personally obtained the key and critical portions of the history and physical exam or was physically present for key and critical portions performed by the resident / med student. I reviewed the resident’s / med student's documentation and discussed the patient with them.  I agree with the medical student's medical decision making as documented in their note, which includes my edits.     James Moore MD

## 2025-03-14 NOTE — PROGRESS NOTES
Physical Therapy    Physical Therapy Evaluation    Patient Name: Tracie Baltazar  MRN: [id number]  Department: [facility] [id number]  Room: [address] [place], [place] [address]A  Today's Date: 3/14/2025   Time Calculation  Start Time: 0858  Stop Time: 0914  Time Calculation (min): 16 min    Assessment/Plan   PT Assessment  PT Assessment Results: Decreased strength, Decreased range of motion, Decreased endurance, Impaired balance, Decreased mobility, Decreased safety awareness  Rehab Prognosis: Good  Barriers to Discharge Home: Physical needs  Physical Needs: Stair navigation into home limited by function/safety, Ambulating household distances limited by function/safety, High falls risk due to function or environment  Evaluation/Treatment Tolerance: Patient tolerated treatment well  Strengths: Attitude of self  Barriers to Participation: Comorbidities  End of Session Communication: Bedside nurse  Assessment Comment: pt admitted for fall and would benefit from skilled services to improve functional mobility and balance to decrease risk of further falls.  End of Session Patient Position: Bed, 3 rail up, Alarm on  IP OR SWING BED PT PLAN  Inpatient or Swing Bed: Inpatient  PT Plan  Treatment/Interventions: Bed mobility, Transfer training, Gait training, Stair training, Balance training, Strengthening, Endurance training, Range of motion, Therapeutic exercise, Therapeutic activity, Home exercise program  PT Plan: Ongoing PT  PT Frequency: 4 times per week  PT Discharge Recommendations: Low intensity level of continued care (assist with mobility from family)  Equipment Recommended upon Discharge:  (tbd)  PT Recommended Transfer Status: Contact guard  PT - OK to Discharge: Yes (Eval received and complete. See recs.)    Subjective   General Visit Information:  General  Reason for Referral: fall with left thalmic hematoma  Past Medical History Relevant to Rehab: CLL, chronic anemia, CKD stage 4, SSS s/p pacemaker, gait impairment, ambulates with a  walker, h/o recurrent falls  Family/Caregiver Present: No  Prior to Session Communication: Bedside nurse  Patient Position Received: Bed, 3 rail up, Alarm on  General Comment: pt supine and willing to participate.  Home Living:  Home Living  Type of Home: House  Lives With:  (son, also indicates that daughter and granddaughter come by often)  Home Adaptive Equipment:  (rollator)  Home Layout: Two level, Able to live on main level with bedroom/bathroom  Home Access: Stairs to enter with rails  Entrance Stairs-Number of Steps: 3  Bathroom Shower/Tub: Walk-in shower  Bathroom Equipment: Shower chair with back, Grab bars in shower  Prior Level of Function:  Prior Function Per Pt/Caregiver Report  Level of [place]: Independent with ADLs and functional transfers, Independent with homemaking with ambulation  Receives Help From: Family  Ambulatory Assistance: Independent (uses rollator)  Precautions:  Precautions  Hearing/Visual Limitations: B hearing aides  Medical Precautions: Fall precautions      Date/Time Vitals Session Patient Position Pulse Resp SpO2 BP MAP (mmHg)    03/14/25 1123 --  --  60  18  97 %  142/61  88                 Objective   Pain:  Pain Assessment  Pain Assessment: 0-10  0-10 (Numeric) Pain Score: 0 - No pain  Cognition:  Cognition  Arousal/Alertness: Appropriate responses to stimuli  Orientation Level: Oriented X4  Following Commands: Follows all commands and directions without difficulty    General Assessments:  Activity Tolerance  Endurance: Tolerates 10 - 20 min exercise with multiple rests    Sensation  Light Touch: No apparent deficits            Coordination  Movements are Fluid and Coordinated: Yes  Coordination Comment: decreased rate of movements d/t pain    Postural Control  Postural Control: Within Functional Limits    Static Sitting Balance  Static Sitting-Balance Support: Bilateral upper extremity supported  Static Sitting-Level of Assistance: Contact guard  Static  Sitting-Comment/Number of Minutes: sat EOB    Static Standing Balance  Static Standing-Balance Support: Bilateral upper extremity supported  Static Standing-Level of Assistance: Minimum assistance  Static Standing-Comment/Number of Minutes: ww  Functional Assessments:  Bed Mobility  Bed Mobility: Yes  Bed Mobility 1  Bed Mobility 1: Supine to sitting, Sitting to supine  Level of Assistance 1: Minimum assistance    Transfers  Transfer: Yes  Transfer 1  Transfer From 1: Sit to, Stand to  Transfer to 1: Stand, Sit  Technique 1: Sit to stand, Stand to sit  Transfer Device 1: Walker  Transfer Level of Assistance 1: Contact guard    Ambulation/Gait Training  Ambulation/Gait Training Performed: Yes  Ambulation/Gait Training 1  Surface 1: Level tile  Device 1: Rolling walker  Assistance 1: Contact guard  Quality of Gait 1: Narrow base of support, Inconsistent stride length, Decreased step length (pt allowing walker to get out in front of her. cues for walker placement)  Comments/Distance (ft) 1: pt ambulated 20 ft x 2    Stairs  Stairs: No  Extremity/Trunk Assessments:  RLE   RLE : Exceptions to WFL  Strength RLE  RLE Overall Strength: Greater than or equal to 3/5 as evidenced by functional mobility  LLE   LLE : Exceptions to WFL  Strength LLE  LLE Overall Strength: Greater than or equal to 3/5 as evidenced by functional mobility  Outcome Measures:  [facility] Basic Mobility  Turning from your back to your side while in a flat bed without using bedrails: A little  Moving from lying on your back to sitting on the side of a flat bed without using bedrails: A little  Moving to and from bed to chair (including a wheelchair): A little  Standing up from a chair using your arms (e.g. wheelchair or bedside chair): A little  To walk in hospital room: A little  Climbing 3-5 steps with railing: A lot  Basic Mobility - Total Score: 17    Other Measures  5x Sit to Stand: 25  Encounter Problems       Encounter Problems (Active)       Balance        STG - Maintains dynamic standing balance with upper extremity support for 3 mins without LOB and with SBA  (Progressing)       Start:  03/14/25    Expected End:  03/28/25       INTERVENTIONS:1. Practice standing with minimal support.2. Educate patient about standing tolerance.3. Educate patient about independence with gait, transfers, and ADL's.4. Educate patient about use of assistive device.5. Educate patient about self-directed care.            Mobility       STG - Patient will ambulate 100 ft with ww  (Progressing)       Start:  03/14/25    Expected End:  03/28/25            STG - Patient will ambulate up and down a curb/step with CGA and HR  (Progressing)       Start:  03/14/25    Expected End:  03/28/25               PT Transfers       STG - Transfer from bed to chair with Ww  (Progressing)       Start:  03/14/25    Expected End:  03/28/25            STG - Patient will perform bed mobility I  (Progressing)       Start:  03/14/25    Expected End:  03/28/25               Pain - Adult          Safety       LTG - Patient will adhere to hip precautions during ADL's and transfers       Start:  03/14/25            LTG - Patient will demonstrate safety requirements appropriate to situation/environment       Start:  03/14/25            LTG - Patient will utilize safety techniques       Start:  03/14/25            STG - Patient locks brakes on wheelchair       Start:  03/14/25            STG - Patient uses call light consistently to request assistance with transfers       Start:  03/14/25            STG - Patient uses gait belt during all transfers       Start:  03/14/25            Goal 1       Start:  03/14/25            Goal 2       Start:  03/14/25            Goal 3       Start:  03/14/25                   Education Documentation  Precautions, taught by Lucero Bennett, PT at 3/14/2025  1:15 PM.  Learner: Patient  Readiness: Eager  Method: Explanation  Response: Verbalizes Understanding    Body Mechanics,  taught by Lucero Bennett, PT at 3/14/2025  1:15 PM.  Learner: Patient  Readiness: Eager  Method: Explanation  Response: Verbalizes Understanding    Mobility Training, taught by Lucero Bennett, PT at 3/14/2025  1:15 PM.  Learner: Patient  Readiness: Eager  Method: Explanation  Response: Verbalizes Understanding    Education Comments  No comments found.

## 2025-03-14 NOTE — PROGRESS NOTES
Occupational Therapy    Occupational Therapy    Evaluation and Treatment    Patient Name: Tracie Baltazar  MRN: [id number]  Today's Date: 3/14/2025  [facility]  Time Calculation  Start Time: 1131  Stop Time: 1154  Time Calculation (min): 23 min    Assessment  IP OT Assessment  OT Assessment: Patient is a 84 year old female admitted to hospital after fall. She presents with impaired ADL, functional mobiltiy, functional transfer, activity tolerance, balance, LE weakness and would benefit from skilled OT services to address these deficits.  Prognosis: Good  Barriers to Discharge Home: No anticipated barriers  Medical Staff Made Aware: Yes  End of Session Communication: Bedside nurse  End of Session Patient Position: Bed, 3 rail up, Alarm off, not on at start of session  Plan:  Inpatient Plan  Treatment Interventions: ADL retraining, Functional transfer training, Endurance training, Patient/family training, Equipment evaluation/education, Compensatory technique education (functional mobility and bed mobility training)  OT Frequency: 3 times per week  OT Discharge Recommendations: Low intensity level of continued care (wtih assistance from family)  OT Recommended Transfer Status: Minimal assist, Assist of 1  OT - OK to Discharge: Yes  OT Assessment  Prognosis: Good  Medical Staff Made Aware: Yes    Subjective   Current Problem:  1. Fall, initial encounter        2. Contusion of knee, unspecified laterality, initial encounter        3. Contusion of right shoulder, initial encounter        4. Intraparenchymal hematoma of brain without loss of consciousness, unspecified laterality, initial encounter (Multi)        5. Sick sinus syndrome (Multi)  Cardiac device check - Inpatient    Cardiac device check - Inpatient        General:  Reason for Referral: fall with left thalmic hematoma  Past Medical History Relevant to Rehab: CLL, chronic anemia, CKD stage 4, SSS s/p pacemaker, gait impairment, ambulates with a walker,  h/o recurrent falls  Prior to Session Communication: Bedside nurse  Patient Position Received: Bed, 3 rail up, Alarm off, not on at start of session  Family/Caregiver Present: Yes  Caregiver Feedback: daughter and granddaughter present   Precautions:  Hearing/Visual Limitations: bilateral hearing aides, glasses  Medical Precautions: Fall precautions  Vital Signs:   Date/Time Vitals Session Patient Position Pulse Resp SpO2 BP MAP (mmHg)    03/14/25 1123 --  --  60  18  97 %  142/61  88           Pain:  Pain Assessment  Pain Assessment: 0-10  0-10 (Numeric) Pain Score: 2  Pain Location:  (knees, right hip)  Pain Descriptors: Aching  Pain Interventions: Repositioned, Ambulation/increased activity  Response to Interventions: No change in pain           Objective   Cognition:  Overall Cognitive Status: Within Functional Limits  Arousal/Alertness: Appropriate responses to stimuli  Orientation Level: Oriented X4  Following Commands: Follows all commands and directions without difficulty           Home Living:  Type of Home: House  Lives With:  (son, also indicates that daughter and granddaughter come by often)  Home Adaptive Equipment:  (rollator)  Home Layout: Two level (Patient however stays on 1st floor only)  Bathroom Shower/Tub: Walk-in shower  Bathroom Equipment: Shower chair with back, Grab bars in shower, Hand-held shower hose   Prior Function:  Level of [place]: Independent with ADLs and functional transfers, Independent with homemaking with ambulation  ADL Assistance: Independent (with exception of assistance for socks/shoes)  Homemaking Assistance: Independent (meal prep and light IADL)  Ambulatory Assistance: Independent  Leisure: tv, pet cat  Hand Dominance: Right       ADL:  Eating Assistance: Independent  Grooming Assistance: Stand by  Bathing Assistance: Minimal (upper body, mod assist lower body)  UE Dressing Assistance: Minimal  LE Dressing Assistance: Moderate  Toileting Assistance with Device:  Minimal  Activity Tolerance:  Endurance: Tolerates 10 - 20 min exercise with multiple rests  Balance:  Dynamic Sitting Balance  Dynamic Sitting-Level of Assistance: Close supervision  Dynamic Standing Balance  Dynamic Standing-Level of Assistance: Contact guard  Static Sitting Balance  Static Sitting-Level of Assistance: Close supervision  Static Standing Balance  Static Standing-Level of Assistance: Contact guard  Bed Mobility/Transfers: Bed Mobility/Transfers: Bed Mobility  Bed Mobility: Yes  Bed Mobility 1  Bed Mobility 1: Supine to sitting  Level of Assistance 1: Minimum assistance, Minimal verbal cues  Bed Mobility 2  Bed Mobility  2: Sitting to supine  Level of Assistance 2: Close supervision  Bed Mobility 3  Bed Mobility 3: Scooting (up in bed)  Level of Assistance 3: Maximum assistance, +2  Functional Mobility  Functional Mobility Performed: Yes  Functional Mobility 1  Surface 1: Level tile  Device 1: Rolling walker  Assistance 1: Contact guard, Moderate verbal cues   and Transfers  Transfer: Yes  Transfer 1  Transfer From 1: Sit to, Stand to  Transfer to 1: Stand, Sit  Transfer Device 1: Walker  Transfer Level of Assistance 1: Contact guard, Moderate verbal cues  IADL's:      Vision: Vision - Basic Assessment  Current Vision: Wears glasses all the time   and    Sensation:  Light Touch: No apparent deficits       Coordination:  Movements are Fluid and Coordinated: Yes        Extremities:   RUE   RUE : Within Functional Limits, LUE   LUE: Within Functional Limits, RLE   RLE : Exceptions to WFL  Strength RLE  RLE Overall Strength: Greater than or equal to 3/5 as evidenced by functional mobility, and LLE   LLE : Exceptions to WFL  Strength LLE  LLE Overall Strength: Greater than or equal to 3/5 as evidenced by functional mobility      Outcome Measures: [facility] Daily Activity  Putting on and taking off regular lower body clothing: A lot  Bathing (including washing, rinsing, drying): A lot  Putting on and taking  off regular upper body clothing: A little  Toileting, which includes using toilet, bedpan or urinal: A little  Taking care of personal grooming such as brushing teeth: A little  Eating Meals: None  Daily Activity - Total Score: 17         ,     OT Adult Other Outcome Measures  4AT: 0- negative    Education Documentation  Body Mechanics, taught by Zully Horton OT at 3/14/2025 12:43 PM.  Learner: Patient  Readiness: Acceptance  Method: Explanation  Response: Verbalizes Understanding    Precautions, taught by Zully Horton OT at 3/14/2025 12:43 PM.  Learner: Patient  Readiness: Acceptance  Method: Explanation  Response: Verbalizes Understanding    ADL Training, taught by Zully Horton OT at 3/14/2025 12:43 PM.  Learner: Patient  Readiness: Acceptance  Method: Explanation  Response: Verbalizes Understanding    Education Comments  No comments found.        Goals:   Encounter Problems       Encounter Problems (Active)       ADLs       Patient will perform UB and LB bathing seated in chair or EOB with supervision level of assistance and long-handled sponge.       Start:  03/14/25    Expected End:  03/28/25            Patient with complete upper body dressing with supervision level of assistance donning and doffing all UE clothes with no adaptive equipment while edge of bed        Start:  03/14/25    Expected End:  03/28/25            Patient with complete lower body dressing with supervision level of assistance donning and doffing all LE clothes  with PRN adaptive equipment while edge of bed  (Progressing)       Start:  03/14/25    Expected End:  03/28/25            Patient will complete daily grooming tasks  with supervision level of assistance and PRN adaptive equipment while standing. (Progressing)       Start:  03/14/25    Expected End:  03/28/25            Patient will complete toileting including hygiene clothing management/hygiene with supervision level of assistance and grab bars. (Progressing)       Start:   03/14/25    Expected End:  03/28/25               MOBILITY       Patient will perform Functional mobility min Household distances with supervision level of assistance and front wheeled walker in order to improve safety and functional mobility.       Start:  03/14/25    Expected End:  03/28/25               TRANSFERS       Patient will perform bed mobility modified independent level of assistance and bed rails in order to improve safety and independence with mobility       Start:  03/14/25    Expected End:  03/28/25            Patient will complete functional transfer to all surfaces with front wheeled walker with supervision level of assistance.       Start:  03/14/25    Expected End:  03/28/25                   Treatment Completed on Evaluation       Activities of Daily Living:    Grooming  Grooming Level of Assistance: Setup, Close supervision  Grooming Where Assessed: Bed level  Grooming Comments: hair brushing           LE Dressing  LE Dressing:  (Initiated instruction on technique for donning/doffing socks with AE.  Patient demonstrated good understanding, however prefers for family to assist her at this time. She indicated plan to consider AE use. Patient does have reachers at home.)  Toileting  Toileting Adaptive Equipment:  (grab bar, tall toilet, walker)  Toileting Level of Assistance: Contact guard, Moderate verbal cues  Where Assessed: Toilet  Toileting Comments: Patient required CGA and verbal cues for transfer, walker safety and steadying when adjusting clothing.         03/14/25 at 12:45 PM   Zully Horton OT   Rehab Office: [phone]

## 2025-03-15 VITALS
WEIGHT: 159 LBS | TEMPERATURE: 97.9 F | HEART RATE: 60 BPM | HEIGHT: 61 IN | OXYGEN SATURATION: 96 % | DIASTOLIC BLOOD PRESSURE: 66 MMHG | SYSTOLIC BLOOD PRESSURE: 137 MMHG | RESPIRATION RATE: 16 BRPM | BODY MASS INDEX: 30.02 KG/M2

## 2025-03-15 LAB
ALBUMIN SERPL BCP-MCNC: 3.6 G/DL (ref 3.4–5)
ANION GAP SERPL CALC-SCNC: 14 MMOL/L (ref 10–20)
BUN SERPL-MCNC: 43 MG/DL (ref 6–23)
CALCIUM SERPL-MCNC: 8.6 MG/DL (ref 8.6–10.6)
CHLORIDE SERPL-SCNC: 107 MMOL/L (ref 98–107)
CO2 SERPL-SCNC: 22 MMOL/L (ref 21–32)
CREAT SERPL-MCNC: 3.4 MG/DL (ref 0.5–1.05)
EGFRCR SERPLBLD CKD-EPI 2021: 13 ML/MIN/1.73M*2
ERYTHROCYTE [DISTWIDTH] IN BLOOD BY AUTOMATED COUNT: 14.3 % (ref 11.5–14.5)
GLUCOSE SERPL-MCNC: 95 MG/DL (ref 74–99)
HCT VFR BLD AUTO: 32.9 % (ref 36–46)
HGB BLD-MCNC: 10.2 G/DL (ref 12–16)
MAGNESIUM SERPL-MCNC: 2.33 MG/DL (ref 1.6–2.4)
MCH RBC QN AUTO: 30.5 PG (ref 26–34)
MCHC RBC AUTO-ENTMCNC: 31 G/DL (ref 32–36)
MCV RBC AUTO: 99 FL (ref 80–100)
NRBC BLD-RTO: 0 /100 WBCS (ref 0–0)
PHOSPHATE SERPL-MCNC: 3.9 MG/DL (ref 2.5–4.9)
PLATELET # BLD AUTO: 218 X10*3/UL (ref 150–450)
POTASSIUM SERPL-SCNC: 3.9 MMOL/L (ref 3.5–5.3)
RBC # BLD AUTO: 3.34 X10*6/UL (ref 4–5.2)
SODIUM SERPL-SCNC: 139 MMOL/L (ref 136–145)
WBC # BLD AUTO: 25.9 X10*3/UL (ref 4.4–11.3)

## 2025-03-15 PROCEDURE — 2500000001 HC RX 250 WO HCPCS SELF ADMINISTERED DRUGS (ALT 637 FOR MEDICARE OP): Performed by: NURSE PRACTITIONER

## 2025-03-15 PROCEDURE — 85027 COMPLETE CBC AUTOMATED: CPT | Performed by: NURSE PRACTITIONER

## 2025-03-15 PROCEDURE — 36415 COLL VENOUS BLD VENIPUNCTURE: CPT | Performed by: NURSE PRACTITIONER

## 2025-03-15 PROCEDURE — 99238 HOSP IP/OBS DSCHRG MGMT 30/<: CPT

## 2025-03-15 PROCEDURE — 2500000002 HC RX 250 W HCPCS SELF ADMINISTERED DRUGS (ALT 637 FOR MEDICARE OP, ALT 636 FOR OP/ED): Performed by: NURSE PRACTITIONER

## 2025-03-15 PROCEDURE — 80069 RENAL FUNCTION PANEL: CPT | Performed by: NURSE PRACTITIONER

## 2025-03-15 PROCEDURE — 2500000004 HC RX 250 GENERAL PHARMACY W/ HCPCS (ALT 636 FOR OP/ED): Performed by: NURSE PRACTITIONER

## 2025-03-15 PROCEDURE — 83735 ASSAY OF MAGNESIUM: CPT | Performed by: NURSE PRACTITIONER

## 2025-03-15 RX ORDER — ATORVASTATIN CALCIUM 40 MG/1
40 TABLET, FILM COATED ORAL NIGHTLY
Qty: 30 TABLET | Refills: 0 | Status: SHIPPED | OUTPATIENT
Start: 2025-03-15 | End: 2025-04-14

## 2025-03-15 RX ORDER — ACETAMINOPHEN 325 MG/1
975 TABLET ORAL EVERY 8 HOURS
Qty: 90 TABLET | Refills: 0 | Status: SHIPPED | OUTPATIENT
Start: 2025-03-15 | End: 2025-03-25

## 2025-03-15 RX ORDER — AMOXICILLIN 250 MG
2 CAPSULE ORAL 2 TIMES DAILY
Qty: 28 TABLET | Refills: 0 | Status: SHIPPED | OUTPATIENT
Start: 2025-03-15 | End: 2025-03-22

## 2025-03-15 RX ADMIN — POLYETHYLENE GLYCOL 3350 17 G: 17 POWDER, FOR SOLUTION ORAL at 09:48

## 2025-03-15 RX ADMIN — AMIODARONE HYDROCHLORIDE 200 MG: 200 TABLET ORAL at 09:48

## 2025-03-15 RX ADMIN — FUROSEMIDE 20 MG: 40 TABLET ORAL at 09:48

## 2025-03-15 RX ADMIN — LEVOTHYROXINE SODIUM 150 MCG: 0.07 TABLET ORAL at 05:40

## 2025-03-15 RX ADMIN — AMLODIPINE BESYLATE 5 MG: 5 TABLET ORAL at 09:48

## 2025-03-15 RX ADMIN — ACETAMINOPHEN 975 MG: 325 TABLET, FILM COATED ORAL at 05:40

## 2025-03-15 RX ADMIN — SENNOSIDES AND DOCUSATE SODIUM 2 TABLET: 50; 8.6 TABLET ORAL at 09:48

## 2025-03-15 ASSESSMENT — COGNITIVE AND FUNCTIONAL STATUS - GENERAL
STANDING UP FROM CHAIR USING ARMS: A LITTLE
HELP NEEDED FOR BATHING: A LITTLE
MOVING TO AND FROM BED TO CHAIR: A LITTLE
CLIMB 3 TO 5 STEPS WITH RAILING: A LITTLE
TOILETING: A LITTLE
TURNING FROM BACK TO SIDE WHILE IN FLAT BAD: A LITTLE
DAILY ACTIVITIY SCORE: 20
DRESSING REGULAR LOWER BODY CLOTHING: A LITTLE
MOVING FROM LYING ON BACK TO SITTING ON SIDE OF FLAT BED WITH BEDRAILS: A LITTLE
MOBILITY SCORE: 18
WALKING IN HOSPITAL ROOM: A LITTLE
DRESSING REGULAR UPPER BODY CLOTHING: A LITTLE

## 2025-03-15 ASSESSMENT — PAIN - FUNCTIONAL ASSESSMENT: PAIN_FUNCTIONAL_ASSESSMENT: 0-10

## 2025-03-15 ASSESSMENT — PAIN SCALES - WONG BAKER: WONGBAKER_NUMERICALRESPONSE: NO HURT

## 2025-03-15 ASSESSMENT — PAIN SCALES - GENERAL: PAINLEVEL_OUTOF10: 0 - NO PAIN

## 2025-03-15 NOTE — DISCHARGE SUMMARY
Discharge Diagnosis  - Mechanical Fall  - Left Thalamic Hematoma    Issues Requiring Follow-Up  - L thalamic hematoma: follow up with neurology-stroke providers  - Followup with geriatrics providers as outpatient     Hospital Course  85 yo F w h/o leukemia, afib on eliquis (LD 3/12 AM), recurrect c diff, multiple falls, CKD, asthma, HTN, RA  p/w fall, CTH 1 ml L thalamic ICH  Neurosurgery was consulted, recommended no acute intervention. Neuro-stroke evaluated patient, recommended resumption of Eliquis 3/21 and start statin. Repeat CT head stable. Patient to start on atorvastatin 40mg daily. Geriatrics evaluated the patient and obtained additional laboratory testing and PT/OT evaluation. Patient to follow up in geriatrics clinic outpatient. Additional recommendations include nephrology consult for JANET and GFR. PT/OT evaluated patient, recommended discharge home with family assistance for mobility at home. PT/OT outpatient referrals sent. Patient discharged with neurology and geriatrics follow up referrals. Patient to follow up with home nephrology provider and PCP upon discharge. Patient discharged home in stable condition.    Pertinent Physical Exam At Time of Discharge  Physical Exam  Vitals reviewed.   HENT:      Head: Normocephalic.      Mouth/Throat:      Pharynx: Oropharynx is clear.   Eyes:      Extraocular Movements: Extraocular movements intact.   Cardiovascular:      Rate and Rhythm: Normal rate.      Pulses: Normal pulses.   Pulmonary:      Effort: Pulmonary effort is normal.      Breath sounds: Normal breath sounds.      Comments: Normal work of breathing on room air.  Abdominal:      General: There is no distension.      Tenderness: There is no abdominal tenderness.   Musculoskeletal:         General: No swelling, tenderness or deformity.      Cervical back: No tenderness.   Skin:     General: Skin is warm and dry.      Comments: Superficial abrasions to bilateral knees.   Neurological:      Mental  Status: She is alert and oriented to person, place, and time.      Sensory: No sensory deficit.      Motor: No weakness.      Comments:  strength intact bilaterally. Sensation to light touch intact throughout. Moving all extremities spontaneously. GCS 15.   Psychiatric:         Mood and Affect: Mood normal.         Behavior: Behavior normal.     Home Medications     Medication List      START taking these medications     atorvastatin 40 mg tablet; Commonly known as: Lipitor; Take 1 tablet (40   mg) by mouth once daily at bedtime.   sennosides-docusate sodium 8.6-50 mg tablet; Commonly known as:   Lisette-Colace; Take 2 tablets by mouth 2 times a day for 7 days.     CHANGE how you take these medications     acetaminophen 325 mg tablet; Commonly known as: Tylenol; Take 3 tablets   (975 mg) by mouth every 8 hours for 10 days.; What changed: how much to   take, when to take this, reasons to take this     CONTINUE taking these medications     amiodarone 200 mg tablet; Commonly known as: Pacerone; Take 1 tablet by   mouth once daily   amLODIPine 5 mg tablet; Commonly known as: Norvasc; Take 1 tablet by   mouth once daily   apixaban 2.5 mg tablet; Commonly known as: Eliquis; Take 1 tablet (2.5   mg) by mouth 2 times a day. To facility: Please hold until June 8 due to   hematoma around the pacemaker site.   cholecalciferol 50 MCG (2000 UT) tablet; Commonly known as: Vitamin D-3   furosemide 20 mg tablet; Commonly known as: Lasix   gabapentin 300 mg capsule; Commonly known as: Neurontin; Take 1 capsule   by mouth twice daily   levothyroxine 150 mcg tablet; Commonly known as: Synthroid, Levoxyl;   TAKE 1 TABLET BY MOUTH ONCE DAILY IN THE MORNING AS DIRECTED   Vitamin B-12 100 mcg tablet; Generic drug: cyanocobalamin     ASK your doctor about these medications     ondansetron ODT 4 mg disintegrating tablet; Commonly known as:   Zofran-ODT; Take 1 tablet (4 mg) by mouth every 8 hours if needed for   nausea or vomiting.      Outpatient Follow-Up  Future Appointments   Date Time Provider Department Center   4/16/2025 11:00 AM DEISY RESPTHER1 PFT ROOM [facility]1 [facility]   6/5/2025 10:45 AM DEISY [id number] CARDIAC DEVICE CLINIC [id number] [facility]   6/5/2025 11:15 AM Timmy Chisholm MD [id number] [facility]   8/7/2025 10:30 AM Aidan Wright DO [id number] [facility]   9/9/2025 11:30 AM Lydia Collado PA-C [id number] [facility]     Patient sent with scripts and follow up appointments as appropriate. Patient instructed to follow up with home nephrology provider (per geriatric recommendation for CKD), PCP upon discharge. Patient discharged home with home assistance in stable condition.    Patient discussed with attending, Dr. Horn.    Bridget Loya PA-C  Trauma, Critical Care, and Acute Care Surgery    Total discharge care time of 23 minutes spent reviewing patient vital signs and medications, performing physical exam, discussing plan, coordinating care, reviewing consultant notes, with >50% of time spent face to face with patient/family.

## 2025-03-15 NOTE — CARE PLAN
The patient's goals for the shift include      The clinical goals for the shift include maintain skin integrity and ambulation

## 2025-03-15 NOTE — NURSING NOTE
Patient has been medically discharged home, means of transportation is son. All IV's have been removed, all belongings have been taken with patient. After visit summary has been acknowledged as well. Will follow up with primary health care provider as instructed.       Mehnaz Boyle LPN

## 2025-03-15 NOTE — DISCHARGE INSTRUCTIONS
[facility]  DISCHARGE INSTRUCTIONS    You were admitted to [facility] from 3/13 - 3/15.    GENERAL INSTRUCTIONS  1) Diet: Resume regular diet that you were consuming prior to admission.     2) Activity:   - Move around as you are able  - Avoid strenuous activities and intensive movements as you are healing.  - Normal activity as tolerated until follow up visits  Driving: No driving while taking narcotic medications and until follow up visits.  Showering/Bathing: You may shower and bathe once all of your dressings are removed. While a surgical dressing or orthopedic splint is in place, we recommend sponge baths only and to avoid getting the area of injury wet until your follow up appointments. Once the surgical dressing, wound dressing, and/or orthopedic splint is removed, you may shower. Please do not scrub the area of injury, but allow soap and water to run gently over the area. No tub soaks, swimming, or hot tub use until your follow up appointment unless otherwise indicated. Further instructions will be provided to you at your follow up appointments.     3) Medications:   - You are to resume all your home medications as previously prescribed unless otherwise indicated or instructed.   - You have been given a prescription for Tylenol (every 8 hours as needed for pain/fever/headache for 10 days) and Lisette-Colace (x7 days for constipation).   - You have been given a prescription for atorvastatin daily, per Neurology-stroke providers.  - Please DO NOT resume your home Eliquis until March 21st  - If refills are needed for your medications, please don't hesitate to reach out to your primary care provider.     4) Wound care:  Keep wounds clean, dry, and covered with a dressing as desired/necessary. No lotions, powders, or creams over wounds. No tub soaks. Apply bacitracin ointment to wounds/abrasions three times per day and as needed for up to 7 days.  If splint is in place, do not remove until follow up appointment and/or sutures will be removed in the office during your follow up appointment. Call the office immediately if you notice any sign of infection such as increased redness, warmth, thick drainage, wound separation, or spiking fever/chills.     5) Follow up appointments:  - Trauma Surgery: A trauma clinic follow up is not necessary regarding your recent hospital admission. Follow up with the trauma surgery clinic as needed/desired. To follow up, please call the trauma clinic at [phone] to schedule your appointment. Do not hesitate to call our outpatient nurse coordinator at [phone] with any questions/concerns. The nurse will get back to you within 48-72 hours. If you feel it is an emergency, please proceed to your nearest Emergency Room.  - Neurology-Stroke: If you have not been contacted within 2-3 days of discharge from the hospital, please call [phone] to schedule your follow up appointment with a neurosurgeon within 2 weeks of discharge regarding your recent hospital admission.  - Geriatrics: Please follow up in 4-6 weeks after admission. You may call [phone] to schedule follow up. [facility], [address], [address], [place] [address].  - Primary Care Provider: Please follow up with your PCP within 1-2 weeks after discharge regarding your recent hospital admission. If you do not have a primary care provider, you may also call the [facility] main number at [phone] and ask for a referral.  - Please follow up with your home Nephrology provider as an outpatient.    6) Please notify your physician if you have the following symptoms.   - Fever > 100.5 F (>38 C), chills  - Uncontrolled nausea and/or vomiting  - Chest pain  - New or worsening shortness of breath  - Uncontrolled diarrhea   - You stop passing gas   - Have new bruising, rashes, blistering on your body  - New numbness/tingling, loss of feeling in any  part of your body or a new decreased ability to move any part of your body  - New or worsening swelling  - Uncontrolled pain  If you display any of the following signs/symptoms: increased confusion, altered mental status, new numbness or tingling, decreased sensation or movement, pain that is uncontrolled with pain medications, increased shortness of breath, chest pain/palpitations, or any other concerning signs/symptoms, please proceed to your nearest Emergency Department for further evaluation and management.

## 2025-03-17 ENCOUNTER — APPOINTMENT (OUTPATIENT)
Dept: RESPIRATORY THERAPY | Facility: HOSPITAL | Age: 85
End: 2025-03-17
Payer: MEDICARE

## 2025-03-17 ENCOUNTER — PATIENT OUTREACH (OUTPATIENT)
Dept: PRIMARY CARE | Facility: CLINIC | Age: 85
End: 2025-03-17
Payer: MEDICARE

## 2025-03-17 NOTE — PROGRESS NOTES
Discharge Facility: [facility] [place] [facility]  Discharge Diagnosis: Mechanical Fall, Left Thalamic Hematoma  Admission Date: 03/13/2025  Discharge Date: 03/15/2025    PCP Appointment Date: Tasked to office due to availability  Specialist Appointment Date: Cardio 06/06/2025, 08/07/2025, Hem/Onc 09/09/2025  Hospital Encounter and Summary Linked: Yes  ED to Hosp-Admission (Discharged) with Teresa Longo MD; Tabitha Flannery DO (03/13/2025)     See discharge assessment below for further details      Wrap Up  Wrap Up Additional Comments: CM spoke to patient via phone. She states that she is doing okay at home. She admits to some bilateral knee pain from the fall. She has all discharge medication at the home. PCp follow up has been tasked to the office for scheduling due to availability. She has no questions at this time and was thankful for this call. (3/17/2025 10:35 AM)    Medications  Medications reviewed with patient/caregiver?: Yes (3/17/2025 10:35 AM)  Is the patient having any side effects they believe may be caused by any medication additions or changes?: No (3/17/2025 10:35 AM)  Does the patient have all medications ordered at discharge?: Yes (3/17/2025 10:35 AM)  Prescription Comments: Scripts given at discharge for Lipitor anf Lisette-Colace (3/17/2025 10:35 AM)  Is the patient taking all medications as directed (includes completed medication regime)?: Yes (3/17/2025 10:35 AM)  Medication Comments: Patient denies any issues obtaining or affording medication (3/17/2025 10:35 AM)    Appointments  Does the patient have a primary care provider?: Yes (3/17/2025 10:35 AM)  Care Management Interventions: -- (Tasked to office due to availability) (3/17/2025 10:35 AM)  Has the patient kept scheduled appointments due by today?: Yes (3/17/2025 10:35 AM)    Self Management  What is the home health agency?: N/A (3/17/2025 10:35 AM)  What Durable Medical Equipment (DME) was ordered?: N/A (3/17/2025 10:35 AM)    Patient Teaching  Does the patient  have access to their discharge instructions?: Yes (3/17/2025 10:35 AM)  Care Management Interventions: Reviewed instructions with patient (3/17/2025 10:35 AM)  What is the patient's perception of their health status since discharge?: Improving (3/17/2025 10:35 AM)  Is the patient/caregiver able to teach back the hierarchy of who to call/visit for symptoms/problems? PCP, Specialist, Home Health nurse, Urgent Care, ED, 911: Yes (3/17/2025 10:35 AM)

## 2025-03-20 PROBLEM — I50.30 UNSPECIFIED DIASTOLIC (CONGESTIVE) HEART FAILURE: Status: ACTIVE | Noted: 2024-12-02

## 2025-03-24 ENCOUNTER — HOSPITAL ENCOUNTER (OUTPATIENT)
Dept: CARDIOLOGY | Facility: CLINIC | Age: 85
Discharge: HOME | End: 2025-03-24
Payer: MEDICARE

## 2025-03-24 DIAGNOSIS — I48.0 PAROXYSMAL ATRIAL FIBRILLATION (MULTI): ICD-10-CM

## 2025-03-24 DIAGNOSIS — Z95.0 PACEMAKER: ICD-10-CM

## 2025-03-24 DIAGNOSIS — I49.5 SICK SINUS SYNDROME (MULTI): ICD-10-CM

## 2025-03-24 DIAGNOSIS — I49.5 SICK SINUS SYNDROME (MULTI): Primary | ICD-10-CM

## 2025-03-24 PROCEDURE — 93296 REM INTERROG EVL PM/IDS: CPT

## 2025-03-24 PROCEDURE — 93294 REM INTERROG EVL PM/LDLS PM: CPT | Performed by: INTERNAL MEDICINE

## 2025-03-25 ENCOUNTER — APPOINTMENT (OUTPATIENT)
Dept: PRIMARY CARE | Facility: CLINIC | Age: 85
End: 2025-03-25
Payer: MEDICARE

## 2025-03-25 VITALS
SYSTOLIC BLOOD PRESSURE: 140 MMHG | OXYGEN SATURATION: 98 % | TEMPERATURE: 98 F | DIASTOLIC BLOOD PRESSURE: 66 MMHG | WEIGHT: 156 LBS | BODY MASS INDEX: 29.48 KG/M2 | HEART RATE: 61 BPM

## 2025-03-25 DIAGNOSIS — I63.81 CEREBROVASCULAR ACCIDENT (CVA) OF LEFT THALAMUS (MULTI): Primary | ICD-10-CM

## 2025-03-25 DIAGNOSIS — R29.898 SEVERE MUSCLE DECONDITIONING: ICD-10-CM

## 2025-03-25 PROCEDURE — 3078F DIAST BP <80 MM HG: CPT | Performed by: STUDENT IN AN ORGANIZED HEALTH CARE EDUCATION/TRAINING PROGRAM

## 2025-03-25 PROCEDURE — 1036F TOBACCO NON-USER: CPT | Performed by: STUDENT IN AN ORGANIZED HEALTH CARE EDUCATION/TRAINING PROGRAM

## 2025-03-25 PROCEDURE — 1157F ADVNC CARE PLAN IN RCRD: CPT | Performed by: STUDENT IN AN ORGANIZED HEALTH CARE EDUCATION/TRAINING PROGRAM

## 2025-03-25 PROCEDURE — 99495 TRANSJ CARE MGMT MOD F2F 14D: CPT | Performed by: STUDENT IN AN ORGANIZED HEALTH CARE EDUCATION/TRAINING PROGRAM

## 2025-03-25 PROCEDURE — 1159F MED LIST DOCD IN RCRD: CPT | Performed by: STUDENT IN AN ORGANIZED HEALTH CARE EDUCATION/TRAINING PROGRAM

## 2025-03-25 PROCEDURE — 1125F AMNT PAIN NOTED PAIN PRSNT: CPT | Performed by: STUDENT IN AN ORGANIZED HEALTH CARE EDUCATION/TRAINING PROGRAM

## 2025-03-25 PROCEDURE — 1111F DSCHRG MED/CURRENT MED MERGE: CPT | Performed by: STUDENT IN AN ORGANIZED HEALTH CARE EDUCATION/TRAINING PROGRAM

## 2025-03-25 PROCEDURE — 1123F ACP DISCUSS/DSCN MKR DOCD: CPT | Performed by: STUDENT IN AN ORGANIZED HEALTH CARE EDUCATION/TRAINING PROGRAM

## 2025-03-25 PROCEDURE — 3077F SYST BP >= 140 MM HG: CPT | Performed by: STUDENT IN AN ORGANIZED HEALTH CARE EDUCATION/TRAINING PROGRAM

## 2025-03-25 ASSESSMENT — ENCOUNTER SYMPTOMS
DYSPHORIC MOOD: 0
LIGHT-HEADEDNESS: 0
VOMITING: 0
SINUS PAIN: 0
WOUND: 0
MYALGIAS: 0
CHILLS: 0
FEVER: 0
FREQUENCY: 0
RHINORRHEA: 0
CONSTIPATION: 0
SLEEP DISTURBANCE: 0
COUGH: 0
DIZZINESS: 0
PALPITATIONS: 0
POLYDIPSIA: 0
HEADACHES: 0
SHORTNESS OF BREATH: 0
FATIGUE: 0
DYSURIA: 0
NAUSEA: 0
WHEEZING: 0
SINUS PRESSURE: 0
NERVOUS/ANXIOUS: 0
DIARRHEA: 0
ARTHRALGIAS: 0

## 2025-03-25 ASSESSMENT — PAIN SCALES - GENERAL: PAINLEVEL_OUTOF10: 7

## 2025-03-25 NOTE — PROGRESS NOTES
"Patient: Tracie Baltazar  : 1940  PCP: Javier Floyd MD  MRN: [id number]  Program: Transitional Care Management  Status: Enrolled  Effective Dates: 3/17/2025 - present  Responsible Staff: Audrey Nelson  Social Drivers to be Addressed: No information to display         Tracie Baltazar is a 84 y.o. female presenting today for follow-up after being discharged from the hospital 10 days ago. The main problem requiring admission was mechanical fall which caused hemorrhagic stroke of thalamus. The discharge summary and/or Transitional Care Management documentation was reviewed. Medication reconciliation was performed as indicated via the \"Colby as Reviewed\" timestamp.     Tracie Baltazar was contacted by Transitional Care Management services two days after her discharge. This encounter and supporting documentation was reviewed.    Review of Systems   Constitutional:  Negative for chills, fatigue and fever.   HENT:  Negative for congestion, hearing loss, rhinorrhea, sinus pressure, sinus pain and tinnitus.    Eyes:  Negative for visual disturbance.   Respiratory:  Negative for cough, shortness of breath and wheezing.    Cardiovascular:  Negative for chest pain, palpitations and leg swelling.   Gastrointestinal:  Negative for constipation, diarrhea, nausea and vomiting.   Endocrine: Negative for cold intolerance, heat intolerance, polydipsia and polyuria.   Genitourinary:  Negative for dysuria, frequency, menstrual problem, urgency and vaginal discharge.   Musculoskeletal:  Negative for arthralgias and myalgias.   Skin:  Negative for pallor, rash and wound.   Neurological:  Negative for dizziness, light-headedness and headaches.   Psychiatric/Behavioral:  Negative for dysphoric mood and sleep disturbance. The patient is not nervous/anxious.        /66   Pulse 61   Temp 36.7 °C (98 °F)   Wt 70.8 kg (156 lb)   SpO2 98%   BMI 29.48 kg/m²     Physical Exam  Constitutional:       Appearance: Normal " appearance.   HENT:      Head: Normocephalic and atraumatic.      Right Ear: Tympanic membrane, ear canal and external ear normal.      Left Ear: Tympanic membrane, ear canal and external ear normal.      Nose: Nose normal.      Mouth/Throat:      Mouth: Mucous membranes are moist.      Pharynx: Oropharynx is clear.   Eyes:      Extraocular Movements: Extraocular movements intact.      Conjunctiva/sclera: Conjunctivae normal.      Pupils: Pupils are equal, round, and reactive to light.   Cardiovascular:      Rate and Rhythm: Normal rate and regular rhythm.      Pulses: Normal pulses.      Heart sounds: Normal heart sounds.   Pulmonary:      Effort: Pulmonary effort is normal.      Breath sounds: Normal breath sounds.   Abdominal:      General: There is no distension.      Palpations: Abdomen is soft.      Tenderness: There is no abdominal tenderness.   Musculoskeletal:         General: Normal range of motion.      Cervical back: Normal range of motion.   Lymphadenopathy:      Cervical: No cervical adenopathy.   Skin:     General: Skin is warm and dry.   Neurological:      Mental Status: She is alert and oriented to person, place, and time. Mental status is at baseline.      GCS: GCS eye subscore is 4. GCS verbal subscore is 5. GCS motor subscore is 6.      Cranial Nerves: Cranial nerves 2-12 are intact.      Motor: No tremor or pronator drift.      Coordination: Coordination normal.      Comments: There is weakness noted in bilateral lower extremities.  Left is worse than the right.  Patient is unable to rise from chair unassisted.   Psychiatric:         Mood and Affect: Mood normal.         Behavior: Behavior normal.         The complexity of medical decision making for this patient's transitional care is moderate.    Assessment/Plan   Assessment & Plan  Cerebrovascular accident (CVA) of left thalamus (Multi)    Orders:    Referral to Neurology; Future  Reviewed hospital documentation.  Patient was recommended to  have an appointment for hospital follow-up with stroke neurology.  This apparently was not discussed with patient or her daughter nor was it scheduled at discharge.  I have placed a referral.  Did advise them that if it takes a long time to get into contact the office and I will attempt to beg for them to be seen in a more appropriate timeframe.      Severe muscle deconditioning       Patient with significant physical deficits apparently unrelated to her recent stroke.  She does have home physical therapy coming in right now.  I did discuss that an appropriate goal for her would likely be able to rise and sit without assistance.  While she is unlikely to be ambulating without her walker, I would like her to improve her steadiness on her feet in order to improve her safety and overall wellbeing.      Reviewed and approved by JILL GOMEZ on 3/25/25 at 4:33 PM.

## 2025-03-26 ENCOUNTER — PATIENT OUTREACH (OUTPATIENT)
Dept: PRIMARY CARE | Facility: CLINIC | Age: 85
End: 2025-03-26
Payer: MEDICARE

## 2025-03-26 NOTE — PROGRESS NOTES
Unable to reach patient for call back after recent hospitalization.   LVM with call back number for patient to call if needed

## 2025-03-31 DIAGNOSIS — E03.9 ACQUIRED HYPOTHYROIDISM: ICD-10-CM

## 2025-03-31 RX ORDER — LEVOTHYROXINE SODIUM 150 UG/1
TABLET ORAL
Qty: 90 TABLET | Refills: 1 | Status: SHIPPED | OUTPATIENT
Start: 2025-03-31

## 2025-04-16 ENCOUNTER — HOSPITAL ENCOUNTER (OUTPATIENT)
Dept: RESPIRATORY THERAPY | Facility: HOSPITAL | Age: 85
Setting detail: THERAPIES SERIES
Discharge: HOME | End: 2025-04-16
Payer: MEDICARE

## 2025-04-16 DIAGNOSIS — R01.1 HEART MURMUR: Primary | ICD-10-CM

## 2025-04-16 DIAGNOSIS — I48.0 PAROXYSMAL ATRIAL FIBRILLATION (MULTI): ICD-10-CM

## 2025-04-16 PROCEDURE — 94726 PLETHYSMOGRAPHY LUNG VOLUMES: CPT

## 2025-04-17 RX ORDER — FUROSEMIDE 20 MG/1
40 TABLET ORAL DAILY
Qty: 180 TABLET | Refills: 3 | Status: SHIPPED | OUTPATIENT
Start: 2025-04-17

## 2025-04-21 ENCOUNTER — PATIENT OUTREACH (OUTPATIENT)
Dept: PRIMARY CARE | Facility: CLINIC | Age: 85
End: 2025-04-21
Payer: MEDICARE

## 2025-04-24 ENCOUNTER — APPOINTMENT (OUTPATIENT)
Dept: PHYSICAL THERAPY | Facility: CLINIC | Age: 85
End: 2025-04-24
Payer: MEDICARE

## 2025-04-24 ENCOUNTER — APPOINTMENT (OUTPATIENT)
Dept: OCCUPATIONAL THERAPY | Facility: CLINIC | Age: 85
End: 2025-04-24
Payer: MEDICARE

## 2025-05-29 DIAGNOSIS — I48.0 PAROXYSMAL ATRIAL FIBRILLATION (MULTI): ICD-10-CM

## 2025-05-29 DIAGNOSIS — Z95.0 PACEMAKER: ICD-10-CM

## 2025-05-29 DIAGNOSIS — I49.5 SICK SINUS SYNDROME (MULTI): Primary | ICD-10-CM

## 2025-06-01 DIAGNOSIS — I50.33 CONGESTIVE HEART FAILURE WITH LEFT VENTRICULAR DIASTOLIC DYSFUNCTION, ACUTE ON CHRONIC: ICD-10-CM

## 2025-06-02 RX ORDER — AMLODIPINE BESYLATE 5 MG/1
5 TABLET ORAL DAILY
Qty: 90 TABLET | Refills: 1 | Status: SHIPPED | OUTPATIENT
Start: 2025-06-02

## 2025-06-05 ENCOUNTER — OFFICE VISIT (OUTPATIENT)
Facility: CLINIC | Age: 85
End: 2025-06-05
Payer: MEDICARE

## 2025-06-05 ENCOUNTER — ANCILLARY PROCEDURE (OUTPATIENT)
Facility: CLINIC | Age: 85
End: 2025-06-05
Payer: MEDICARE

## 2025-06-05 DIAGNOSIS — Z95.0 PACEMAKER: ICD-10-CM

## 2025-06-05 DIAGNOSIS — I48.0 PAROXYSMAL ATRIAL FIBRILLATION (MULTI): ICD-10-CM

## 2025-06-05 DIAGNOSIS — I49.5 SINUS NODE DYSFUNCTION (MULTI): ICD-10-CM

## 2025-06-05 PROCEDURE — 1159F MED LIST DOCD IN RCRD: CPT | Performed by: INTERNAL MEDICINE

## 2025-06-05 PROCEDURE — 99213 OFFICE O/P EST LOW 20 MIN: CPT | Performed by: INTERNAL MEDICINE

## 2025-06-05 PROCEDURE — 93280 PM DEVICE PROGR EVAL DUAL: CPT

## 2025-06-05 PROCEDURE — 93280 PM DEVICE PROGR EVAL DUAL: CPT | Performed by: INTERNAL MEDICINE

## 2025-06-05 PROCEDURE — G2211 COMPLEX E/M VISIT ADD ON: HCPCS | Performed by: INTERNAL MEDICINE

## 2025-06-05 RX ORDER — AMIODARONE HYDROCHLORIDE 200 MG/1
100 TABLET ORAL DAILY
Qty: 90 TABLET | Refills: 1 | Status: SHIPPED | OUTPATIENT
Start: 2025-06-05

## 2025-06-05 NOTE — PROGRESS NOTES
"No chief complaint on file.       The patient is an 85-year-old female who is well-known to me.  She has a history of sinus node dysfunction, hypertension, and paroxysmal atrial fibrillation suppressed with amiodarone.  She is post dual-chamber pacemaker.  Her device interrogation is noted separately.  She is seeing me for further follow-up regarding her atrial fibrillation and her amiodarone chronic use.  Overall she feels well and she has not had any recurrent atrial arrhythmias.  She does have some gait instability and \"jittery legs\".           Active Ambulatory Problems     Diagnosis Date Noted    SCC (squamous cell carcinoma) 10/24/2023    Postlaminectomy syndrome of lumbosacral region 10/24/2023    Morbid obesity (Multi) 10/24/2023    Mild vitamin D deficiency 10/24/2023    Leukemia (Multi) 08/04/2020    Leg length discrepancy 10/24/2023    Chronic pain of left knee 10/24/2023    Chest pain 09/03/2019    Hypercholesterolemia 02/09/2021    History of arthroplasty of left knee 10/24/2023    Heart murmur 10/24/2023    Fluid volume disorder 10/24/2023    Essential familial hyperlipidemia 12/26/2019    Elevated serum creatinine 02/09/2021    Dyslipidemia 10/24/2023    Osteoarthritis of knee 10/24/2023    DJD (degenerative joint disease) of knee 10/24/2023    Depression 10/24/2023    Complex regional pain syndrome type 1 of left lower extremity 10/24/2023    Chronic kidney disease, stage 4 (severe) (Multi) 10/24/2023    Chronic renal insufficiency 05/12/2021    Fibromyalgia 10/24/2023    Chronic pain syndrome 10/24/2023    Chronic lymphocytic leukemia (Multi) 10/24/2023    Breast mass, right 10/24/2023    Benign essential hypertension 12/12/2019    Baker's cyst of knee 10/24/2023    Ataxia 10/24/2023    Arthritis 10/24/2023    Acquired hypothyroidism 12/26/2019    Increase in creatinine 05/12/2021    Abnormal blood chemistry 10/24/2023    Peripheral edema 10/24/2023    Varicose veins of leg with swelling 10/24/2023 "    Shingles 10/24/2023    Near syncope 10/24/2023    Shortness of breath 12/13/2023    Congestive heart failure with left ventricular diastolic dysfunction, acute on chronic 12/14/2023    Rheumatoid arthritis of multiple sites with negative rheumatoid factor (Multi) 12/21/2023    Weakness 12/24/2023    Atrial fibrillation (Multi) 12/25/2023    Acute cystitis with hematuria 01/04/2024    Clostridium difficile colitis 01/04/2024    Rash 01/08/2024    Bradycardia 06/01/2024    Syncope 06/01/2024    Pacemaker 09/17/2024    Sick sinus syndrome (Multi) 09/17/2024    Traumatic rhabdomyolysis, initial encounter 10/05/2024    Intraparenchymal hemorrhage of brain (Multi) 03/13/2025    Fall, initial encounter 03/13/2025    Unspecified diastolic (congestive) heart failure 12/02/2024     Resolved Ambulatory Problems     Diagnosis Date Noted    No Resolved Ambulatory Problems     Past Medical History:   Diagnosis Date    Abnormal ECG     Asthma     CHF (congestive heart failure)     Chronic kidney disease     Disease of thyroid gland     Essential (primary) hypertension     Kidney failure     Rheumatoid arthritis         ROS       Current Outpatient Medications   Medication Instructions    amiodarone (PACERONE) 200 mg, oral, Daily    amLODIPine (NORVASC) 5 mg, oral, Daily    apixaban (ELIQUIS) 2.5 mg, oral, 2 times daily, To facility: Please hold until June 8 due to hematoma around the pacemaker site.    atorvastatin (LIPITOR) 40 mg, oral, Nightly    cholecalciferol (Vitamin D-3) 50 MCG (2000 UT) tablet Take 1 tablet (2,000 Units) by mouth once daily.    cyanocobalamin (Vitamin B-12) 100 mcg tablet Take 1 tablet (100 mcg) by mouth once daily.    furosemide (LASIX) 40 mg, oral, Daily    gabapentin (NEURONTIN) 300 mg, oral, 2 times daily    levothyroxine (Synthroid, Levoxyl) 150 mcg tablet TAKE 1 TABLET BY MOUTH ONCE DAILY IN THE MORNING AS DIRECTED    ondansetron ODT (ZOFRAN-ODT) 4 mg, oral, Every 8 hours PRN       Objective      There were no vitals filed for this visit.     Physical Exam       Lab Review:   No visits with results within 2 Month(s) from this visit.   Latest known visit with results is:   Admission on 03/13/2025, Discharged on 03/15/2025   Component Date Value    ABO TYPE 03/13/2025 B     Rh TYPE 03/13/2025 NEG     ANTIBODY SCREEN 03/13/2025 POS     Antibody ID 03/13/2025 Anti-D     CASE # 03/13/2025 [id number]     PATH REV-IMMUNOHEMATOLOGY 03/13/2025                      Value:Antibody detection screen is positive.   Antibody identification panel was performed.  The autocontrol is negative.  Previously identified alloantibody anti-D is reacting.  The patient has a history of anti-C (Big C).  All other common clinically significant alloantibodies have been ruled out.  Fully crossmatched D antigen-negative and C (Big C) antigen-negative donor RBC units should be selected for transfusion for this patient.     Vitamin D, 25-Hydroxy, T* 03/14/2025 40     WBC 03/14/2025 25.5 (H)     nRBC 03/14/2025 0.0     RBC 03/14/2025 3.32 (L)     Hemoglobin 03/14/2025 10.4 (L)     Hematocrit 03/14/2025 33.6 (L)     MCV 03/14/2025 101 (H)     MCH 03/14/2025 31.3     MCHC 03/14/2025 31.0 (L)     RDW 03/14/2025 14.2     Platelets 03/14/2025 228     Glucose 03/14/2025 76     Sodium 03/14/2025 140     Potassium 03/14/2025 3.7     Chloride 03/14/2025 103     Bicarbonate 03/14/2025 26     Anion Gap 03/14/2025 15     Urea Nitrogen 03/14/2025 45 (H)     Creatinine 03/14/2025 3.38 (H)     eGFR 03/14/2025 13 (L)     Calcium 03/14/2025 9.0     Phosphorus 03/14/2025 3.9     Albumin 03/14/2025 3.7     Magnesium 03/14/2025 2.49 (H)     Cholesterol 03/14/2025 223 (H)     HDL-Cholesterol 03/14/2025 66.6     Cholesterol/HDL Ratio 03/14/2025 3.3     LDL Calculated 03/14/2025 136 (H)     VLDL 03/14/2025 21     Triglycerides 03/14/2025 103     Non HDL Cholesterol 03/14/2025 156 (H)     Hemoglobin A1C 03/14/2025 5.1     Estimated Average Glucose 03/14/2025 100      Vitamin B12 03/14/2025 1,030 (H)     Folate, Serum 03/14/2025 >24.0     Thyroid Stimulating Horm* 03/14/2025 12.04 (H)     Thyroxine, Free 03/14/2025 1.62 (H)     WBC 03/15/2025 25.9 (H)     nRBC 03/15/2025 0.0     RBC 03/15/2025 3.34 (L)     Hemoglobin 03/15/2025 10.2 (L)     Hematocrit 03/15/2025 32.9 (L)     MCV 03/15/2025 99     MCH 03/15/2025 30.5     MCHC 03/15/2025 31.0 (L)     RDW 03/15/2025 14.3     Platelets 03/15/2025 218     Glucose 03/15/2025 95     Sodium 03/15/2025 139     Potassium 03/15/2025 3.9     Chloride 03/15/2025 107     Bicarbonate 03/15/2025 22     Anion Gap 03/15/2025 14     Urea Nitrogen 03/15/2025 43 (H)     Creatinine 03/15/2025 3.40 (H)     eGFR 03/15/2025 13 (L)     Calcium 03/15/2025 8.6     Phosphorus 03/15/2025 3.9     Albumin 03/15/2025 3.6     Magnesium 03/15/2025 2.33        [id number] Score  Age >= 75: 2   Sex Female: 1   CHF History No: 0   HTN Yes: 1   Stroke/TIA/Thromboembolism No: 0   Vascular Dz: CAD/PAD/Aortic Plaque No: 0   DM No: 0   Total Score 4            Assessment/plan:  Paroxysmal atrial fibrillation  History as noted above.  The patient is doing well with no recurrences.  I would cut her amiodarone dosing to half to 100 mg a day.  Continue anticoagulation.  Surveillance pulmonary, thyroid, and liver function test.  Follow-up in 6 months.    Problem List Items Addressed This Visit    None     Timmy Chisholm MD

## 2025-06-11 DIAGNOSIS — M79.7 FIBROMYALGIA: ICD-10-CM

## 2025-06-11 RX ORDER — GABAPENTIN 300 MG/1
300 CAPSULE ORAL 2 TIMES DAILY
Qty: 60 CAPSULE | Refills: 1 | Status: SHIPPED | OUTPATIENT
Start: 2025-06-11

## 2025-07-03 ENCOUNTER — APPOINTMENT (OUTPATIENT)
Dept: NEUROLOGY | Facility: CLINIC | Age: 85
End: 2025-07-03
Payer: MEDICARE

## 2025-07-03 DIAGNOSIS — M06.09 RHEUMATOID ARTHRITIS OF MULTIPLE SITES WITH NEGATIVE RHEUMATOID FACTOR (MULTI): ICD-10-CM

## 2025-07-04 ENCOUNTER — APPOINTMENT (OUTPATIENT)
Dept: RADIOLOGY | Facility: HOSPITAL | Age: 85
DRG: 312 | End: 2025-07-04
Payer: MEDICARE

## 2025-07-04 ENCOUNTER — HOSPITAL ENCOUNTER (INPATIENT)
Facility: HOSPITAL | Age: 85
LOS: 1 days | Discharge: HOME | End: 2025-07-06
Admitting: INTERNAL MEDICINE
Payer: MEDICARE

## 2025-07-04 DIAGNOSIS — R53.81 DEBILITY: ICD-10-CM

## 2025-07-04 DIAGNOSIS — R55 SYNCOPE AND COLLAPSE: Primary | ICD-10-CM

## 2025-07-04 PROBLEM — R19.7 DIARRHEA: Status: ACTIVE | Noted: 2025-07-04

## 2025-07-04 PROBLEM — R53.1 GENERALIZED WEAKNESS: Status: ACTIVE | Noted: 2025-07-04

## 2025-07-04 LAB
ALBUMIN SERPL BCP-MCNC: 4 G/DL (ref 3.4–5)
ALP SERPL-CCNC: 101 U/L (ref 33–136)
ALT SERPL W P-5'-P-CCNC: 10 U/L (ref 7–45)
ANION GAP SERPL CALCULATED.3IONS-SCNC: 13 MMOL/L (ref 10–20)
AST SERPL W P-5'-P-CCNC: 14 U/L (ref 9–39)
BASOPHILS # BLD MANUAL: 0.48 X10*3/UL (ref 0–0.1)
BASOPHILS NFR BLD MANUAL: 2 %
BILIRUB SERPL-MCNC: 0.4 MG/DL (ref 0–1.2)
BUN SERPL-MCNC: 43 MG/DL (ref 6–23)
CALCIUM SERPL-MCNC: 8.9 MG/DL (ref 8.6–10.3)
CARDIAC TROPONIN I PNL SERPL HS: 8 NG/L (ref 0–13)
CARDIAC TROPONIN I PNL SERPL HS: 9 NG/L (ref 0–13)
CHLORIDE SERPL-SCNC: 106 MMOL/L (ref 98–107)
CO2 SERPL-SCNC: 26 MMOL/L (ref 21–32)
CREAT SERPL-MCNC: 3.37 MG/DL (ref 0.5–1.05)
EGFRCR SERPLBLD CKD-EPI 2021: 13 ML/MIN/1.73M*2
EOSINOPHIL # BLD MANUAL: 0.71 X10*3/UL (ref 0–0.4)
EOSINOPHIL NFR BLD MANUAL: 3 %
ERYTHROCYTE [DISTWIDTH] IN BLOOD BY AUTOMATED COUNT: 13.8 % (ref 11.5–14.5)
GLUCOSE SERPL-MCNC: 123 MG/DL (ref 74–99)
HCT VFR BLD AUTO: 34.3 % (ref 36–46)
HGB BLD-MCNC: 10.6 G/DL (ref 12–16)
IMM GRANULOCYTES # BLD AUTO: 0.06 X10*3/UL (ref 0–0.5)
IMM GRANULOCYTES NFR BLD AUTO: 0.3 % (ref 0–0.9)
LYMPHOCYTES # BLD MANUAL: 16.9 X10*3/UL (ref 0.8–3)
LYMPHOCYTES NFR BLD MANUAL: 71 %
MAGNESIUM SERPL-MCNC: 2.35 MG/DL (ref 1.6–2.4)
MCH RBC QN AUTO: 31.6 PG (ref 26–34)
MCHC RBC AUTO-ENTMCNC: 30.9 G/DL (ref 32–36)
MCV RBC AUTO: 102 FL (ref 80–100)
MONOCYTES # BLD MANUAL: 0.48 X10*3/UL (ref 0.05–0.8)
MONOCYTES NFR BLD MANUAL: 2 %
NEUTS SEG # BLD MANUAL: 5.24 X10*3/UL (ref 1.6–5)
NEUTS SEG NFR BLD MANUAL: 22 %
NRBC BLD-RTO: 0 /100 WBCS (ref 0–0)
PLATELET # BLD AUTO: 174 X10*3/UL (ref 150–450)
POTASSIUM SERPL-SCNC: 3.2 MMOL/L (ref 3.5–5.3)
PROT SERPL-MCNC: 6.8 G/DL (ref 6.4–8.2)
RBC # BLD AUTO: 3.35 X10*6/UL (ref 4–5.2)
RBC MORPH BLD: ABNORMAL
SARS-COV-2 RNA RESP QL NAA+PROBE: NOT DETECTED
SODIUM SERPL-SCNC: 142 MMOL/L (ref 136–145)
TOTAL CELLS COUNTED BLD: 100
TSH SERPL-ACNC: 4.81 MIU/L (ref 0.44–3.98)
WBC # BLD AUTO: 23.8 X10*3/UL (ref 4.4–11.3)

## 2025-07-04 PROCEDURE — 99285 EMERGENCY DEPT VISIT HI MDM: CPT

## 2025-07-04 PROCEDURE — 84484 ASSAY OF TROPONIN QUANT: CPT

## 2025-07-04 PROCEDURE — 85027 COMPLETE CBC AUTOMATED: CPT

## 2025-07-04 PROCEDURE — 71045 X-RAY EXAM CHEST 1 VIEW: CPT | Performed by: RADIOLOGY

## 2025-07-04 PROCEDURE — 87493 C DIFF AMPLIFIED PROBE: CPT

## 2025-07-04 PROCEDURE — 80053 COMPREHEN METABOLIC PANEL: CPT

## 2025-07-04 PROCEDURE — 85007 BL SMEAR W/DIFF WBC COUNT: CPT

## 2025-07-04 PROCEDURE — 87635 SARS-COV-2 COVID-19 AMP PRB: CPT

## 2025-07-04 PROCEDURE — G0378 HOSPITAL OBSERVATION PER HR: HCPCS

## 2025-07-04 PROCEDURE — 73564 X-RAY EXAM KNEE 4 OR MORE: CPT | Mod: LEFT SIDE | Performed by: STUDENT IN AN ORGANIZED HEALTH CARE EDUCATION/TRAINING PROGRAM

## 2025-07-04 PROCEDURE — 83735 ASSAY OF MAGNESIUM: CPT

## 2025-07-04 PROCEDURE — 36415 COLL VENOUS BLD VENIPUNCTURE: CPT

## 2025-07-04 PROCEDURE — 99222 1ST HOSP IP/OBS MODERATE 55: CPT | Performed by: STUDENT IN AN ORGANIZED HEALTH CARE EDUCATION/TRAINING PROGRAM

## 2025-07-04 PROCEDURE — 84439 ASSAY OF FREE THYROXINE: CPT | Performed by: STUDENT IN AN ORGANIZED HEALTH CARE EDUCATION/TRAINING PROGRAM

## 2025-07-04 PROCEDURE — 2500000002 HC RX 250 W HCPCS SELF ADMINISTERED DRUGS (ALT 637 FOR MEDICARE OP, ALT 636 FOR OP/ED)

## 2025-07-04 PROCEDURE — 73564 X-RAY EXAM KNEE 4 OR MORE: CPT | Mod: LT

## 2025-07-04 PROCEDURE — 71045 X-RAY EXAM CHEST 1 VIEW: CPT

## 2025-07-04 PROCEDURE — 84443 ASSAY THYROID STIM HORMONE: CPT | Performed by: STUDENT IN AN ORGANIZED HEALTH CARE EDUCATION/TRAINING PROGRAM

## 2025-07-04 RX ORDER — ACETAMINOPHEN 500 MG
5 TABLET ORAL NIGHTLY PRN
Status: DISCONTINUED | OUTPATIENT
Start: 2025-07-04 | End: 2025-07-06 | Stop reason: HOSPADM

## 2025-07-04 RX ORDER — POTASSIUM CHLORIDE 750 MG/1
10 TABLET, FILM COATED, EXTENDED RELEASE ORAL ONCE
Status: COMPLETED | OUTPATIENT
Start: 2025-07-04 | End: 2025-07-04

## 2025-07-04 RX ADMIN — POTASSIUM CHLORIDE 10 MEQ: 750 TABLET, FILM COATED, EXTENDED RELEASE ORAL at 20:07

## 2025-07-04 SDOH — HEALTH STABILITY: MENTAL HEALTH: HOW MANY DRINKS CONTAINING ALCOHOL DO YOU HAVE ON A TYPICAL DAY WHEN YOU ARE DRINKING?: PATIENT DECLINED

## 2025-07-04 SDOH — SOCIAL STABILITY: SOCIAL INSECURITY
WITHIN THE LAST YEAR, HAVE YOU BEEN RAPED OR FORCED TO HAVE ANY KIND OF SEXUAL ACTIVITY BY YOUR PARTNER OR EX-PARTNER?: PATIENT DECLINED

## 2025-07-04 SDOH — ECONOMIC STABILITY: HOUSING INSECURITY: IN THE LAST 12 MONTHS, WAS THERE A TIME WHEN YOU WERE NOT ABLE TO PAY THE MORTGAGE OR RENT ON TIME?: NO

## 2025-07-04 SDOH — ECONOMIC STABILITY: INCOME INSECURITY
IN THE PAST 12 MONTHS HAS THE ELECTRIC, GAS, OIL, OR WATER COMPANY THREATENED TO SHUT OFF SERVICES IN YOUR HOME?: PATIENT DECLINED

## 2025-07-04 SDOH — SOCIAL STABILITY: SOCIAL INSECURITY
WITHIN THE LAST YEAR, HAVE YOU BEEN KICKED, HIT, SLAPPED, OR OTHERWISE PHYSICALLY HURT BY YOUR PARTNER OR EX-PARTNER?: PATIENT DECLINED

## 2025-07-04 SDOH — SOCIAL STABILITY: SOCIAL NETWORK: HOW OFTEN DO YOU ATTEND MEETINGS OF THE CLUBS OR ORGANIZATIONS YOU BELONG TO?: PATIENT DECLINED

## 2025-07-04 SDOH — HEALTH STABILITY: MENTAL HEALTH
DO YOU FEEL STRESS - TENSE, RESTLESS, NERVOUS, OR ANXIOUS, OR UNABLE TO SLEEP AT NIGHT BECAUSE YOUR MIND IS TROUBLED ALL THE TIME - THESE DAYS?: PATIENT DECLINED

## 2025-07-04 SDOH — HEALTH STABILITY: MENTAL HEALTH: HOW OFTEN DO YOU HAVE SIX OR MORE DRINKS ON ONE OCCASION?: PATIENT DECLINED

## 2025-07-04 SDOH — SOCIAL STABILITY: SOCIAL INSECURITY: HAS ANYONE EVER THREATENED TO HURT YOUR FAMILY OR YOUR PETS?: NO

## 2025-07-04 SDOH — SOCIAL STABILITY: SOCIAL NETWORK: IN A TYPICAL WEEK, HOW MANY TIMES DO YOU TALK ON THE PHONE WITH FAMILY, FRIENDS, OR NEIGHBORS?: PATIENT DECLINED

## 2025-07-04 SDOH — SOCIAL STABILITY: SOCIAL INSECURITY: ARE YOU OR HAVE YOU BEEN THREATENED OR ABUSED PHYSICALLY, EMOTIONALLY, OR SEXUALLY BY ANYONE?: NO

## 2025-07-04 SDOH — SOCIAL STABILITY: SOCIAL INSECURITY: DO YOU FEEL ANYONE HAS EXPLOITED OR TAKEN ADVANTAGE OF YOU FINANCIALLY OR OF YOUR PERSONAL PROPERTY?: NO

## 2025-07-04 SDOH — SOCIAL STABILITY: SOCIAL NETWORK
DO YOU BELONG TO ANY CLUBS OR ORGANIZATIONS SUCH AS CHURCH GROUPS, UNIONS, FRATERNAL OR ATHLETIC GROUPS, OR SCHOOL GROUPS?: PATIENT DECLINED

## 2025-07-04 SDOH — HEALTH STABILITY: PHYSICAL HEALTH: ON AVERAGE, HOW MANY MINUTES DO YOU ENGAGE IN EXERCISE AT THIS LEVEL?: 10 MIN

## 2025-07-04 SDOH — SOCIAL STABILITY: SOCIAL INSECURITY: HAVE YOU HAD THOUGHTS OF HARMING ANYONE ELSE?: NO

## 2025-07-04 SDOH — ECONOMIC STABILITY: HOUSING INSECURITY: AT ANY TIME IN THE PAST 12 MONTHS, WERE YOU HOMELESS OR LIVING IN A SHELTER (INCLUDING NOW)?: NO

## 2025-07-04 SDOH — ECONOMIC STABILITY: HOUSING INSECURITY: IN THE PAST 12 MONTHS, HOW MANY TIMES HAVE YOU MOVED WHERE YOU WERE LIVING?: 0

## 2025-07-04 SDOH — SOCIAL STABILITY: SOCIAL INSECURITY
WITHIN THE LAST YEAR, HAVE YOU BEEN HUMILIATED OR EMOTIONALLY ABUSED IN OTHER WAYS BY YOUR PARTNER OR EX-PARTNER?: PATIENT DECLINED

## 2025-07-04 SDOH — SOCIAL STABILITY: SOCIAL INSECURITY: WITHIN THE LAST YEAR, HAVE YOU BEEN AFRAID OF YOUR PARTNER OR EX-PARTNER?: PATIENT DECLINED

## 2025-07-04 SDOH — SOCIAL STABILITY: SOCIAL INSECURITY: ARE YOU MARRIED, WIDOWED, DIVORCED, SEPARATED, NEVER MARRIED, OR LIVING WITH A PARTNER?: PATIENT DECLINED

## 2025-07-04 SDOH — ECONOMIC STABILITY: FOOD INSECURITY: HOW HARD IS IT FOR YOU TO PAY FOR THE VERY BASICS LIKE FOOD, HOUSING, MEDICAL CARE, AND HEATING?: NOT HARD AT ALL

## 2025-07-04 SDOH — HEALTH STABILITY: MENTAL HEALTH: HOW OFTEN DO YOU HAVE A DRINK CONTAINING ALCOHOL?: PATIENT DECLINED

## 2025-07-04 SDOH — HEALTH STABILITY: PHYSICAL HEALTH: ON AVERAGE, HOW MANY DAYS PER WEEK DO YOU ENGAGE IN MODERATE TO STRENUOUS EXERCISE (LIKE A BRISK WALK)?: 2 DAYS

## 2025-07-04 SDOH — SOCIAL STABILITY: SOCIAL INSECURITY: ABUSE: ADULT

## 2025-07-04 SDOH — SOCIAL STABILITY: SOCIAL INSECURITY: DO YOU FEEL UNSAFE GOING BACK TO THE PLACE WHERE YOU ARE LIVING?: NO

## 2025-07-04 SDOH — SOCIAL STABILITY: SOCIAL INSECURITY: HAVE YOU HAD ANY THOUGHTS OF HARMING ANYONE ELSE?: NO

## 2025-07-04 SDOH — SOCIAL STABILITY: SOCIAL NETWORK: HOW OFTEN DO YOU ATTEND CHURCH OR RELIGIOUS SERVICES?: PATIENT DECLINED

## 2025-07-04 SDOH — ECONOMIC STABILITY: TRANSPORTATION INSECURITY: IN THE PAST 12 MONTHS, HAS LACK OF TRANSPORTATION KEPT YOU FROM MEDICAL APPOINTMENTS OR FROM GETTING MEDICATIONS?: NO

## 2025-07-04 SDOH — ECONOMIC STABILITY: FOOD INSECURITY: WITHIN THE PAST 12 MONTHS, THE FOOD YOU BOUGHT JUST DIDN'T LAST AND YOU DIDN'T HAVE MONEY TO GET MORE.: PATIENT DECLINED

## 2025-07-04 SDOH — SOCIAL STABILITY: SOCIAL NETWORK: HOW OFTEN DO YOU GET TOGETHER WITH FRIENDS OR RELATIVES?: PATIENT DECLINED

## 2025-07-04 SDOH — SOCIAL STABILITY: SOCIAL INSECURITY: DOES ANYONE TRY TO KEEP YOU FROM HAVING/CONTACTING OTHER FRIENDS OR DOING THINGS OUTSIDE YOUR HOME?: NO

## 2025-07-04 SDOH — ECONOMIC STABILITY: FOOD INSECURITY
WITHIN THE PAST 12 MONTHS, YOU WORRIED THAT YOUR FOOD WOULD RUN OUT BEFORE YOU GOT THE MONEY TO BUY MORE.: PATIENT DECLINED

## 2025-07-04 SDOH — SOCIAL STABILITY: SOCIAL INSECURITY: WERE YOU ABLE TO COMPLETE ALL THE BEHAVIORAL HEALTH SCREENINGS?: YES

## 2025-07-04 SDOH — SOCIAL STABILITY: SOCIAL INSECURITY: ARE THERE ANY APPARENT SIGNS OF INJURIES/BEHAVIORS THAT COULD BE RELATED TO ABUSE/NEGLECT?: NO

## 2025-07-04 ASSESSMENT — ACTIVITIES OF DAILY LIVING (ADL)
ADEQUATE_TO_COMPLETE_ADL: YES
GROOMING: NEEDS ASSISTANCE
LACK_OF_TRANSPORTATION: NO
DRESSING YOURSELF: NEEDS ASSISTANCE
TOILETING: NEEDS ASSISTANCE
LACK_OF_TRANSPORTATION: NO
PATIENT'S MEMORY ADEQUATE TO SAFELY COMPLETE DAILY ACTIVITIES?: YES
HEARING - RIGHT EAR: DIFFICULTY WITH NOISE
BATHING: INDEPENDENT
HEARING - LEFT EAR: DIFFICULTY WITH NOISE
FEEDING YOURSELF: INDEPENDENT
WALKS IN HOME: NEEDS ASSISTANCE
JUDGMENT_ADEQUATE_SAFELY_COMPLETE_DAILY_ACTIVITIES: YES

## 2025-07-04 ASSESSMENT — PAIN SCALES - GENERAL
PAINLEVEL_OUTOF10: 0 - NO PAIN
PAINLEVEL_OUTOF10: 0 - NO PAIN

## 2025-07-04 ASSESSMENT — LIFESTYLE VARIABLES
SKIP TO QUESTIONS 9-10: 0
HOW OFTEN DO YOU HAVE 6 OR MORE DRINKS ON ONE OCCASION: LESS THAN MONTHLY
SKIP TO QUESTIONS 9-10: 0
AUDIT-C TOTAL SCORE: -1
AUDIT-C TOTAL SCORE: 2
AUDIT-C TOTAL SCORE: 2
HOW MANY STANDARD DRINKS CONTAINING ALCOHOL DO YOU HAVE ON A TYPICAL DAY: 1 OR 2
HOW OFTEN DO YOU HAVE A DRINK CONTAINING ALCOHOL: MONTHLY OR LESS

## 2025-07-04 ASSESSMENT — COGNITIVE AND FUNCTIONAL STATUS - GENERAL
TOILETING: A LITTLE
PERSONAL GROOMING: A LITTLE
DRESSING REGULAR UPPER BODY CLOTHING: A LITTLE
MOVING TO AND FROM BED TO CHAIR: A LITTLE
DRESSING REGULAR LOWER BODY CLOTHING: A LITTLE
DAILY ACTIVITIY SCORE: 19
MOBILITY SCORE: 15
PATIENT BASELINE BEDBOUND: NO
WALKING IN HOSPITAL ROOM: A LOT
STANDING UP FROM CHAIR USING ARMS: A LOT
HELP NEEDED FOR BATHING: A LITTLE
TURNING FROM BACK TO SIDE WHILE IN FLAT BAD: A LITTLE
CLIMB 3 TO 5 STEPS WITH RAILING: A LOT
MOVING FROM LYING ON BACK TO SITTING ON SIDE OF FLAT BED WITH BEDRAILS: A LITTLE

## 2025-07-04 ASSESSMENT — PAIN - FUNCTIONAL ASSESSMENT
PAIN_FUNCTIONAL_ASSESSMENT: 0-10
PAIN_FUNCTIONAL_ASSESSMENT: 0-10

## 2025-07-05 PROBLEM — E87.6 HYPOKALEMIA: Status: ACTIVE | Noted: 2025-07-05

## 2025-07-05 PROBLEM — R55 SYNCOPE AND COLLAPSE: Status: ACTIVE | Noted: 2025-07-05

## 2025-07-05 PROBLEM — I48.0 PAROXYSMAL ATRIAL FIBRILLATION (MULTI): Status: ACTIVE | Noted: 2023-12-25

## 2025-07-05 LAB
ALBUMIN SERPL BCP-MCNC: 3.7 G/DL (ref 3.4–5)
ANION GAP SERPL CALCULATED.3IONS-SCNC: 12 MMOL/L (ref 10–20)
APPEARANCE UR: CLEAR
BACTERIA #/AREA URNS AUTO: ABNORMAL /HPF
BILIRUB UR STRIP.AUTO-MCNC: NEGATIVE MG/DL
BUN SERPL-MCNC: 43 MG/DL (ref 6–23)
C DIF TOX TCDA+TCDB STL QL NAA+PROBE: NOT DETECTED
CALCIUM SERPL-MCNC: 8.7 MG/DL (ref 8.6–10.3)
CHLORIDE SERPL-SCNC: 107 MMOL/L (ref 98–107)
CK SERPL-CCNC: 45 U/L (ref 0–215)
CO2 SERPL-SCNC: 27 MMOL/L (ref 21–32)
COLOR UR: ABNORMAL
CREAT SERPL-MCNC: 3.09 MG/DL (ref 0.5–1.05)
EGFRCR SERPLBLD CKD-EPI 2021: 14 ML/MIN/1.73M*2
ERYTHROCYTE [DISTWIDTH] IN BLOOD BY AUTOMATED COUNT: 13.8 % (ref 11.5–14.5)
GLUCOSE SERPL-MCNC: 102 MG/DL (ref 74–99)
GLUCOSE UR STRIP.AUTO-MCNC: ABNORMAL MG/DL
HCT VFR BLD AUTO: 35.2 % (ref 36–46)
HGB BLD-MCNC: 10.9 G/DL (ref 12–16)
HYALINE CASTS #/AREA URNS AUTO: ABNORMAL /LPF
KETONES UR STRIP.AUTO-MCNC: NEGATIVE MG/DL
LEUKOCYTE ESTERASE UR QL STRIP.AUTO: ABNORMAL
MCH RBC QN AUTO: 31.5 PG (ref 26–34)
MCHC RBC AUTO-ENTMCNC: 31 G/DL (ref 32–36)
MCV RBC AUTO: 102 FL (ref 80–100)
MUCOUS THREADS #/AREA URNS AUTO: ABNORMAL /LPF
NITRITE UR QL STRIP.AUTO: NEGATIVE
NRBC BLD-RTO: 0 /100 WBCS (ref 0–0)
PH UR STRIP.AUTO: 6 [PH]
PHOSPHATE SERPL-MCNC: 4.1 MG/DL (ref 2.5–4.9)
PLATELET # BLD AUTO: 160 X10*3/UL (ref 150–450)
POTASSIUM SERPL-SCNC: 3.3 MMOL/L (ref 3.5–5.3)
PROT UR STRIP.AUTO-MCNC: ABNORMAL MG/DL
RBC # BLD AUTO: 3.46 X10*6/UL (ref 4–5.2)
RBC # UR STRIP.AUTO: ABNORMAL MG/DL
RBC #/AREA URNS AUTO: ABNORMAL /HPF
SODIUM SERPL-SCNC: 143 MMOL/L (ref 136–145)
SP GR UR STRIP.AUTO: 1.01
SQUAMOUS #/AREA URNS AUTO: ABNORMAL /HPF
T4 FREE SERPL-MCNC: 1.44 NG/DL (ref 0.61–1.12)
UROBILINOGEN UR STRIP.AUTO-MCNC: NORMAL MG/DL
WBC # BLD AUTO: 27.2 X10*3/UL (ref 4.4–11.3)
WBC #/AREA URNS AUTO: ABNORMAL /HPF

## 2025-07-05 PROCEDURE — 1200000002 HC GENERAL ROOM WITH TELEMETRY DAILY

## 2025-07-05 PROCEDURE — 2500000002 HC RX 250 W HCPCS SELF ADMINISTERED DRUGS (ALT 637 FOR MEDICARE OP, ALT 636 FOR OP/ED): Performed by: INTERNAL MEDICINE

## 2025-07-05 PROCEDURE — 2500000004 HC RX 250 GENERAL PHARMACY W/ HCPCS (ALT 636 FOR OP/ED): Performed by: STUDENT IN AN ORGANIZED HEALTH CARE EDUCATION/TRAINING PROGRAM

## 2025-07-05 PROCEDURE — 96361 HYDRATE IV INFUSION ADD-ON: CPT

## 2025-07-05 PROCEDURE — 96360 HYDRATION IV INFUSION INIT: CPT

## 2025-07-05 PROCEDURE — 2500000002 HC RX 250 W HCPCS SELF ADMINISTERED DRUGS (ALT 637 FOR MEDICARE OP, ALT 636 FOR OP/ED): Performed by: STUDENT IN AN ORGANIZED HEALTH CARE EDUCATION/TRAINING PROGRAM

## 2025-07-05 PROCEDURE — 84100 ASSAY OF PHOSPHORUS: CPT | Performed by: STUDENT IN AN ORGANIZED HEALTH CARE EDUCATION/TRAINING PROGRAM

## 2025-07-05 PROCEDURE — 36415 COLL VENOUS BLD VENIPUNCTURE: CPT | Performed by: STUDENT IN AN ORGANIZED HEALTH CARE EDUCATION/TRAINING PROGRAM

## 2025-07-05 PROCEDURE — 87086 URINE CULTURE/COLONY COUNT: CPT | Mod: TRILAB

## 2025-07-05 PROCEDURE — 81001 URINALYSIS AUTO W/SCOPE: CPT

## 2025-07-05 PROCEDURE — 80069 RENAL FUNCTION PANEL: CPT | Performed by: STUDENT IN AN ORGANIZED HEALTH CARE EDUCATION/TRAINING PROGRAM

## 2025-07-05 PROCEDURE — 2500000001 HC RX 250 WO HCPCS SELF ADMINISTERED DRUGS (ALT 637 FOR MEDICARE OP): Performed by: INTERNAL MEDICINE

## 2025-07-05 PROCEDURE — 82550 ASSAY OF CK (CPK): CPT | Performed by: INTERNAL MEDICINE

## 2025-07-05 PROCEDURE — 99232 SBSQ HOSP IP/OBS MODERATE 35: CPT | Performed by: INTERNAL MEDICINE

## 2025-07-05 PROCEDURE — 85027 COMPLETE CBC AUTOMATED: CPT | Performed by: STUDENT IN AN ORGANIZED HEALTH CARE EDUCATION/TRAINING PROGRAM

## 2025-07-05 PROCEDURE — 2500000001 HC RX 250 WO HCPCS SELF ADMINISTERED DRUGS (ALT 637 FOR MEDICARE OP): Performed by: STUDENT IN AN ORGANIZED HEALTH CARE EDUCATION/TRAINING PROGRAM

## 2025-07-05 PROCEDURE — 97162 PT EVAL MOD COMPLEX 30 MIN: CPT | Mod: GP

## 2025-07-05 RX ORDER — ACETAMINOPHEN 325 MG/1
650 TABLET ORAL EVERY 6 HOURS PRN
Status: DISCONTINUED | OUTPATIENT
Start: 2025-07-05 | End: 2025-07-06 | Stop reason: HOSPADM

## 2025-07-05 RX ORDER — GABAPENTIN 300 MG/1
300 CAPSULE ORAL 2 TIMES DAILY
Status: DISCONTINUED | OUTPATIENT
Start: 2025-07-05 | End: 2025-07-06 | Stop reason: HOSPADM

## 2025-07-05 RX ORDER — LEVOTHYROXINE SODIUM 150 UG/1
150 TABLET ORAL
Status: DISCONTINUED | OUTPATIENT
Start: 2025-07-05 | End: 2025-07-06 | Stop reason: HOSPADM

## 2025-07-05 RX ORDER — AMLODIPINE BESYLATE 5 MG/1
5 TABLET ORAL DAILY
Status: DISCONTINUED | OUTPATIENT
Start: 2025-07-05 | End: 2025-07-05

## 2025-07-05 RX ORDER — ATORVASTATIN CALCIUM 40 MG/1
40 TABLET, FILM COATED ORAL NIGHTLY
Status: DISCONTINUED | OUTPATIENT
Start: 2025-07-05 | End: 2025-07-06 | Stop reason: HOSPADM

## 2025-07-05 RX ORDER — POTASSIUM CHLORIDE 20 MEQ/1
20 TABLET, EXTENDED RELEASE ORAL ONCE
Status: COMPLETED | OUTPATIENT
Start: 2025-07-05 | End: 2025-07-05

## 2025-07-05 RX ORDER — FUROSEMIDE 40 MG/1
40 TABLET ORAL DAILY
Status: DISCONTINUED | OUTPATIENT
Start: 2025-07-05 | End: 2025-07-06 | Stop reason: HOSPADM

## 2025-07-05 RX ORDER — AMIODARONE HYDROCHLORIDE 200 MG/1
100 TABLET ORAL DAILY
Status: DISCONTINUED | OUTPATIENT
Start: 2025-07-05 | End: 2025-07-06 | Stop reason: HOSPADM

## 2025-07-05 RX ORDER — POTASSIUM CHLORIDE 14.9 MG/ML
20 INJECTION INTRAVENOUS ONCE
Status: DISCONTINUED | OUTPATIENT
Start: 2025-07-05 | End: 2025-07-05

## 2025-07-05 RX ADMIN — ATORVASTATIN CALCIUM 40 MG: 40 TABLET, FILM COATED ORAL at 01:33

## 2025-07-05 RX ADMIN — ACETAMINOPHEN 650 MG: 325 TABLET ORAL at 21:12

## 2025-07-05 RX ADMIN — GABAPENTIN 300 MG: 300 CAPSULE ORAL at 08:59

## 2025-07-05 RX ADMIN — LEVOTHYROXINE SODIUM 150 MCG: 0.15 TABLET ORAL at 06:35

## 2025-07-05 RX ADMIN — APIXABAN 2.5 MG: 2.5 TABLET, FILM COATED ORAL at 08:59

## 2025-07-05 RX ADMIN — ATORVASTATIN CALCIUM 40 MG: 40 TABLET, FILM COATED ORAL at 21:12

## 2025-07-05 RX ADMIN — GABAPENTIN 300 MG: 300 CAPSULE ORAL at 01:33

## 2025-07-05 RX ADMIN — POTASSIUM CHLORIDE 20 MEQ: 1500 TABLET, EXTENDED RELEASE ORAL at 12:55

## 2025-07-05 RX ADMIN — APIXABAN 2.5 MG: 2.5 TABLET, FILM COATED ORAL at 21:12

## 2025-07-05 RX ADMIN — SODIUM CHLORIDE, SODIUM LACTATE, POTASSIUM CHLORIDE, AND CALCIUM CHLORIDE 500 ML: 600; 310; 30; 20 INJECTION, SOLUTION INTRAVENOUS at 05:47

## 2025-07-05 RX ADMIN — GABAPENTIN 300 MG: 300 CAPSULE ORAL at 21:12

## 2025-07-05 RX ADMIN — AMIODARONE HYDROCHLORIDE 100 MG: 200 TABLET ORAL at 08:59

## 2025-07-05 ASSESSMENT — COGNITIVE AND FUNCTIONAL STATUS - GENERAL
MOVING TO AND FROM BED TO CHAIR: A LITTLE
CLIMB 3 TO 5 STEPS WITH RAILING: A LOT
DRESSING REGULAR LOWER BODY CLOTHING: A LITTLE
MOBILITY SCORE: 15
MOVING FROM LYING ON BACK TO SITTING ON SIDE OF FLAT BED WITH BEDRAILS: A LITTLE
DRESSING REGULAR UPPER BODY CLOTHING: A LITTLE
DAILY ACTIVITIY SCORE: 19
CLIMB 3 TO 5 STEPS WITH RAILING: A LOT
HELP NEEDED FOR BATHING: A LITTLE
MOVING FROM LYING ON BACK TO SITTING ON SIDE OF FLAT BED WITH BEDRAILS: A LITTLE
MOVING TO AND FROM BED TO CHAIR: A LITTLE
MOBILITY SCORE: 17
STANDING UP FROM CHAIR USING ARMS: A LOT
WALKING IN HOSPITAL ROOM: A LOT
STANDING UP FROM CHAIR USING ARMS: A LITTLE
TOILETING: A LITTLE
CLIMB 3 TO 5 STEPS WITH RAILING: A LOT
STANDING UP FROM CHAIR USING ARMS: A LOT
HELP NEEDED FOR BATHING: A LITTLE
TURNING FROM BACK TO SIDE WHILE IN FLAT BAD: A LITTLE
TOILETING: A LITTLE
DRESSING REGULAR UPPER BODY CLOTHING: A LITTLE
MOVING FROM LYING ON BACK TO SITTING ON SIDE OF FLAT BED WITH BEDRAILS: A LITTLE
DAILY ACTIVITIY SCORE: 19
WALKING IN HOSPITAL ROOM: A LOT
TURNING FROM BACK TO SIDE WHILE IN FLAT BAD: A LITTLE
WALKING IN HOSPITAL ROOM: A LITTLE
PERSONAL GROOMING: A LITTLE
DRESSING REGULAR LOWER BODY CLOTHING: A LITTLE
TURNING FROM BACK TO SIDE WHILE IN FLAT BAD: A LITTLE
PERSONAL GROOMING: A LITTLE
MOVING TO AND FROM BED TO CHAIR: A LITTLE
MOBILITY SCORE: 15

## 2025-07-05 ASSESSMENT — PAIN - FUNCTIONAL ASSESSMENT
PAIN_FUNCTIONAL_ASSESSMENT: 0-10

## 2025-07-05 ASSESSMENT — PAIN SCALES - GENERAL
PAINLEVEL_OUTOF10: 0 - NO PAIN
PAINLEVEL_OUTOF10: 4
PAINLEVEL_OUTOF10: 0 - NO PAIN
PAINLEVEL_OUTOF10: 8

## 2025-07-05 ASSESSMENT — PAIN DESCRIPTION - DESCRIPTORS: DESCRIPTORS: ACHING

## 2025-07-05 ASSESSMENT — ACTIVITIES OF DAILY LIVING (ADL): ADL_ASSISTANCE: NEEDS ASSISTANCE

## 2025-07-05 ASSESSMENT — PAIN DESCRIPTION - LOCATION: LOCATION: KNEE

## 2025-07-05 ASSESSMENT — ENCOUNTER SYMPTOMS
LIGHT-HEADEDNESS: 1
WEAKNESS: 1

## 2025-07-05 ASSESSMENT — PAIN DESCRIPTION - ORIENTATION: ORIENTATION: RIGHT

## 2025-07-05 NOTE — ED PROVIDER NOTES
Emergency Department Provider Note       History of Present Illness     History provided by: Patient and Family Member  Limitations to History: None  External Records Reviewed with Brief Summary: Discharge Summary from 3/15/25 which showed admission for mechanical fall and L. Thalamic hematoma.     HPI:  Tracie Baltazar is a 85 y.o. female presents for syncope.  Her family states that generally she is able to ambulate with just a walker however the past day or 2 she has required several people to assist her.  Today she was walking to the restroom requiring 3 people for assistance and then after having diarrhea, she syncopized.  Her family is able to catch her and she did not injure herself.  They state that this is unlike her.  Upon arrival to the ED, patient tells me that other than the weakness in her bilateral legs, she feels at baseline    Physical Exam   Triage vitals:  T 36.1 °C (96.9 °F)  HR 60  /67  RR 14  O2 96 % None (Room air)    Physical Exam  Vitals and nursing note reviewed.   Constitutional:       Appearance: Normal appearance.   HENT:      Head: Normocephalic and atraumatic.      Nose: Nose normal.      Mouth/Throat:      Mouth: Mucous membranes are moist.   Eyes:      Extraocular Movements: Extraocular movements intact.      Conjunctiva/sclera: Conjunctivae normal.   Cardiovascular:      Rate and Rhythm: Normal rate and regular rhythm.      Pulses: Normal pulses.      Heart sounds: Normal heart sounds.   Pulmonary:      Effort: Pulmonary effort is normal.      Breath sounds: Normal breath sounds.   Musculoskeletal:         General: Normal range of motion.      Cervical back: Normal range of motion and neck supple.   Skin:     General: Skin is warm and dry.      Capillary Refill: Capillary refill takes less than 2 seconds.   Neurological:      General: No focal deficit present.      Mental Status: She is alert and oriented to person, place, and time. Mental status is at baseline.       "Comments: NIHSS testing:   Alert  Oriented to month and age  Able to blink eyes and squeeze hands   No gaze paralysis   Visual fields intact  No facial palsy   No left or right arm drift   Symmetric bilateral weakness in the lower extremities  No limb ataxia   Sensation intact in bilateral upper and lower extremities as well as face   No aphasia   No dysarthria   No extinction or inattention     Negative test of skew.            Medical Decision Making & ED Course   Medical Decision Makin y.o. female presents for syncope and weakness.  It seems that syncope is most likely vasovagal as it happened after a bowel movement.  The weakness however predates this    Patient's labs evaluated, she has a leukocytosis of 23.8 however she has not known leukemia and this is actually lower than her baseline.  Her CMP otherwise shows slight hypokalemia which I have repleted.  Her creatinine is elevated 3.37 however this is roughly chronic for her, her family tells me that she is \"ready to start dialysis at the drop of a hat \"    I did speak to the device rep, they state that her pacemaker defibrillator did not detect any abnormalities, no runs of V. tach or bradycardia.    EKG evaluated here which is unremarkable.  She otherwise has had 2 episodes of diarrhea here which nursing staff report was very mucous and foul-smelling, Ordered for C. Difficile    We did have nursing staff attempt to ambulate the patient, within 30 feet she stated that she was too weak to walk, she essentially had to carry back into the bed with help of nursing staff.    Patient otherwise lives at home, in view of this I do believe could be dangerous to discharge her at this time as with her diarrhea I am concerned that she would likely have a fall    I discussed with the hospitalist who will accept the patient for further evaluation and likely PT OT evaluation  ----      Differential diagnoses considered include but are not limited to: C. difficile, " UTI, electrolyte derangement    Social Determinants of Health which Significantly Impact Care: Social Determinants of Health which Significantly Impact Care: None identified     EKG Independent Interpretation: EKG interpreted by myself. Please see ED Course for full interpretation.    Independent Result Review and Interpretation: Relevant laboratory and radiographic results were reviewed and independently interpreted by myself.  As necessary, they are commented on in the ED Course.    Chronic conditions affecting the patient's care: As documented above in [facility]    The patient was discussed with the following consultants/services: None    Care Considerations: As documented above in [facility]    ED Course:  ED Course as of 07/04/25 2250 Fri Jul 04, 2025 1823 EKG interpreted by myself at 1823  Atrially paced at a rate of 60,   No LA, QRS or QT prolongation.   No ST elevations or depressions concerning for STEMI.      [SY]   1907 Spoke with pacemaker rep, no episodes of VT or bradycardia, battery life is 8.5 years. 0 recorded episodes on the device. Pt 95% atrially paced, <1% ventricular paced.  [SY]      ED Course User Index  [SY] Rudi Vigil MD         Diagnoses as of 07/04/25 2250   Syncope and collapse   Debility       Disposition   As a result of their workup, the patient will require admission to the hospital.  The patient was informed of her diagnosis.  The patient was given the opportunity to ask questions and I answered them. The patient agreed to be admitted to the hospital.    Procedures   Procedures        Rudi Vigil MD  Emergency Medicine                                                       Rudi Vigil MD  07/04/25 2250

## 2025-07-05 NOTE — CARE PLAN
The patient's goals for the shift include      The clinical goals for the shift include encourage fluids, monitor stool results, monitor vitals, encourage ambulation to commode    Problem: Pain - Adult  Goal: Verbalizes/displays adequate comfort level or baseline comfort level  Outcome: Progressing     Problem: Safety - Adult  Goal: Free from fall injury  Outcome: Progressing     Problem: Discharge Planning  Goal: Discharge to home or other facility with appropriate resources  Outcome: Progressing     Problem: Chronic Conditions and Co-morbidities  Goal: Patient's chronic conditions and co-morbidity symptoms are monitored and maintained or improved  Outcome: Progressing     Problem: Nutrition  Goal: Nutrient intake appropriate for maintaining nutritional needs  Outcome: Progressing     Problem: Skin  Goal: Decreased wound size/increased tissue granulation at next dressing change  Outcome: Progressing  Flowsheets (Taken 7/5/2025 0046)  Decreased wound size/increased tissue granulation at next dressing change: Protective dressings over bony prominences  Goal: Participates in plan/prevention/treatment measures  Outcome: Progressing  Flowsheets (Taken 7/5/2025 0046)  Participates in plan/prevention/treatment measures:   Elevate heels   Increase activity/out of bed for meals  Goal: Prevent/manage excess moisture  Outcome: Progressing  Flowsheets (Taken 7/5/2025 0046)  Prevent/manage excess moisture: Monitor for/manage infection if present  Goal: Prevent/minimize sheer/friction injuries  Outcome: Progressing  Flowsheets (Taken 7/5/2025 0046)  Prevent/minimize sheer/friction injuries:   Use pull sheet   Increase activity/out of bed for meals  Goal: Promote/optimize nutrition  Outcome: Progressing  Flowsheets (Taken 7/5/2025 0046)  Promote/optimize nutrition: Monitor/record intake including meals  Goal: Promote skin healing  Outcome: Progressing  Flowsheets (Taken 7/5/2025 0046)  Promote skin healing:   Turn/reposition  every 2 hours/use positioning/transfer devices   Protective dressings over bony prominences     Problem: Fall/Injury  Goal: Not fall by end of shift  Outcome: Progressing  Goal: Be free from injury by end of the shift  Outcome: Progressing  Goal: Verbalize understanding of personal risk factors for fall in the hospital  Outcome: Progressing  Goal: Verbalize understanding of risk factor reduction measures to prevent injury from fall in the home  Outcome: Progressing  Goal: Use assistive devices by end of the shift  Outcome: Progressing  Goal: Pace activities to prevent fatigue by end of the shift  Outcome: Progressing

## 2025-07-05 NOTE — CARE PLAN
The patient's goals for the shift include  safety, skin care    The clinical goals for the shift include monitor vital and labs    Problem: Pain - Adult  Goal: Verbalizes/displays adequate comfort level or baseline comfort level  Outcome: Progressing     Problem: Safety - Adult  Goal: Free from fall injury  Outcome: Progressing     Problem: Discharge Planning  Goal: Discharge to home or other facility with appropriate resources  Outcome: Progressing     Problem: Chronic Conditions and Co-morbidities  Goal: Patient's chronic conditions and co-morbidity symptoms are monitored and maintained or improved  Outcome: Progressing     Problem: Nutrition  Goal: Nutrient intake appropriate for maintaining nutritional needs  Outcome: Progressing     Problem: Skin  Goal: Decreased wound size/increased tissue granulation at next dressing change  Outcome: Progressing  Goal: Participates in plan/prevention/treatment measures  Outcome: Progressing  Goal: Prevent/manage excess moisture  Outcome: Progressing  Flowsheets (Taken 7/5/2025 1942)  Prevent/manage excess moisture: Cleanse incontinence/protect with barrier cream  Goal: Prevent/minimize sheer/friction injuries  Outcome: Progressing  Goal: Promote/optimize nutrition  Outcome: Progressing  Goal: Promote skin healing  Outcome: Progressing  Flowsheets (Taken 7/5/2025 1942)  Promote skin healing: Turn/reposition every 2 hours/use positioning/transfer devices     Problem: Fall/Injury  Goal: Not fall by end of shift  Outcome: Progressing  Goal: Be free from injury by end of the shift  Outcome: Progressing  Goal: Verbalize understanding of personal risk factors for fall in the hospital  Outcome: Progressing  Goal: Verbalize understanding of risk factor reduction measures to prevent injury from fall in the home  Outcome: Progressing  Goal: Use assistive devices by end of the shift  Outcome: Progressing  Goal: Pace activities to prevent fatigue by end of the shift  Outcome:  Progressing

## 2025-07-05 NOTE — CARE PLAN
The patient's goals for the shift include      The clinical goals for the shift include monitor vital and labs    Problem: Pain - Adult  Goal: Verbalizes/displays adequate comfort level or baseline comfort level  Outcome: Progressing     Problem: Safety - Adult  Goal: Free from fall injury  Outcome: Progressing     Problem: Discharge Planning  Goal: Discharge to home or other facility with appropriate resources  Outcome: Progressing     Problem: Chronic Conditions and Co-morbidities  Goal: Patient's chronic conditions and co-morbidity symptoms are monitored and maintained or improved  Outcome: Progressing     Problem: Nutrition  Goal: Nutrient intake appropriate for maintaining nutritional needs  Outcome: Progressing     Problem: Skin  Goal: Decreased wound size/increased tissue granulation at next dressing change  Outcome: Progressing  Goal: Participates in plan/prevention/treatment measures  Outcome: Progressing  Goal: Prevent/manage excess moisture  Outcome: Progressing  Goal: Prevent/minimize sheer/friction injuries  Outcome: Progressing  Goal: Promote/optimize nutrition  Outcome: Progressing  Goal: Promote skin healing  Outcome: Progressing     Problem: Fall/Injury  Goal: Not fall by end of shift  Outcome: Progressing  Goal: Be free from injury by end of the shift  Outcome: Progressing  Goal: Verbalize understanding of personal risk factors for fall in the hospital  Outcome: Progressing  Goal: Verbalize understanding of risk factor reduction measures to prevent injury from fall in the home  Outcome: Progressing  Goal: Use assistive devices by end of the shift  Outcome: Progressing  Goal: Pace activities to prevent fatigue by end of the shift  Outcome: Progressing

## 2025-07-05 NOTE — PROGRESS NOTES
Physical Therapy    Physical Therapy Evaluation    Patient Name: Tracie Baltazar  MRN: [id number]  Department: [address]  Room: [address] [place] [address]  Today's Date: 7/5/2025   Time Calculation  Start Time: 0915  Stop Time: 0942  Time Calculation (min): 27 min    Assessment/Plan   PT Assessment  PT Assessment Results: Decreased strength, Decreased range of motion, Decreased endurance, Impaired balance, Decreased mobility, Decreased coordination, Pain  Rehab Prognosis: Good  Barriers to Discharge Home: Physical needs, Caregiver assistance  Caregiver Assistance: Caregiver assistance needed per identified barriers - however, level of patient's required assistance exceeds assistance available at home  Physical Needs: Ambulating household distances limited by function/safety, Intermittent ADL assistance needed  Evaluation/Treatment Tolerance: Patient limited by fatigue  Medical Staff Made Aware: Yes  Strengths: Ability to acquire knowledge, Attitude of self, Housing layout, Insight into problems, Living arrangement secure, Support of Caregivers, Support of extended family/friends, Premorbid level of function  Barriers to Participation: Leisure activity  End of Session Communication: Bedside nurse  Assessment Comment: Patient is an 84 y/o female who presented to the ED after progressively increasing weakness in her LEs and syncopal episodes. Patient currently experiencing a fall once every 4 months, with the most recent being 1 month prior to this evaluation.  She presents below her functional baseline; with impaired LE strength, balance, and significant impairments in endurance which limit her standing, ambulatory, and activity tolerance. She requires skilled acute PT to address these deficits. Anticipate patient to benefit from low intensity rehab at discharge.  End of Session Patient Position: Up in chair, Alarm on  IP OR SWING BED PT PLAN  Inpatient or Swing Bed: Inpatient  PT Plan  Treatment/Interventions: Bed mobility, Transfer  training, Gait training, Stair training, Balance training, Neuromuscular re-education, Strengthening, Endurance training, Range of motion, Therapeutic exercise, Home exercise program, Therapeutic activity  PT Plan: Ongoing PT  PT Frequency: 4 times per week (during this acute inpatient hospitalization)  PT Discharge Recommendations: Low intensity level of continued care  Equipment Recommended upon Discharge: Wheeled walker  PT Recommended Transfer Status: Assist x1, Contact guard, Assistive device  PT - OK to Discharge: Yes    Subjective     PT Visit Info:  PT Received On: 07/05/25  General Visit Information:  General  Reason for Referral: Impaired Mobility  Referred By: Dr. Divya Slater MD  Past Medical History Relevant to Rehab: Arthritis, asthma, CHF, CKD, Thyroid, RA, Kidney failure, HTN, heart murmur, leukemia  Family/Caregiver Present: No  Prior to Session Communication: Bedside nurse  Patient Position Received: Up in chair, Alarm off, caregiver present (Nurse in room)  Preferred Learning Style: verbal  General Comment: Patient agreeable to PT eval.  Home Living:  Home Living  Type of Home: House  Lives With: Adult children  Home Adaptive Equipment: Walker rolling or standard, Wheelchair-manual  Home Layout: Two level, Laundry main level, Full bath main level, Able to live on main level with bedroom/bathroom  Home Access: Stairs to enter with rails  Entrance Stairs-Rails: Right  Entrance Stairs-Number of Steps: 3  Bathroom Shower/Tub: Walk-in shower  Bathroom Toilet: Standard  Bathroom Equipment: Grab bars in shower, Shower chair with back, Hand-held shower hose  Home Living Comments: Paatient lives at home w/ adult son who has MS. Reports daughter frequently visits to help with transportation  Prior Level of Function:  Prior Function Per Pt/Caregiver Report  Level of [place]: Independent with homemaking with ambulation, Needs assistance with ADLs  Receives Help From: Family  ADL Assistance: Needs  assistance  Homemaking Assistance: Needs assistance (reports daughter assists with transportation)  Ambulatory Assistance: Independent  Prior Function Comments: Patient Mod I with transfers and mobility using W/c or rollator depending on distance. Reports unable to amb prolonged distances  Precautions:  Precautions  Hearing/Visual Limitations: [place] [facility], wears hearing aids  Medical Precautions: Fall precautions, Other (comment) (Contact precautions due to C. difficile rule out)     Objective   Pain:  Pain Assessment  Pain Assessment: 0-10  0-10 (Numeric) Pain Score: 0 - No pain  Cognition:  Cognition  Overall Cognitive Status: Within Functional Limits  Orientation Level: Oriented X4  Attention: Within Functional Limits    General Assessments:    Activity Tolerance  Endurance: Tolerates 10 - 20 min exercise with multiple rests    Sensation  Sensation Comment: Grossly WFL, patient does note some numbness and tingling on lateral L lower leg        Coordination  Movements are Fluid and Coordinated: No    Static Sitting Balance  Static Sitting-Balance Support: Feet supported, Bilateral upper extremity supported  Static Sitting-Level of Assistance: Distant supervision    Static Standing Balance  Static Standing-Balance Support: Bilateral upper extremity supported  Static Standing-Level of Assistance: Contact guard  Functional Assessments:    Bed Mobility  Bed Mobility: No    Transfers  Transfer: Yes  Transfer 1  Transfer From 1: Chair with arms to  Transfer to 1: Stand  Technique 1: Stand to sit  Transfer Device 1: Walker, Gait belt  Transfer Level of Assistance 1: Contact guard  Trials/Comments 1: Cues to push with UEs to come to stand  Transfers 2  Transfer From 2: Stand to  Transfer to 2: Sit  Technique 2: Stand to sit  Transfer Device 2: Gait belt, Walker  Transfer Level of Assistance 2: Contact guard  Trials/Comments 2: Cues to back up to chair, reach towards armrests, control descent.  Transfers 3  Transfer From 3: Sit  to  Transfer to 3: Stand  Technique 3: Sit to stand  Transfer Device 3: Walker  Transfer Level of Assistance 3: Contact guard  Trials/Comments 3: Cues to stand to facilitate adjustment of hospital gown. Inc use of arm rests to push to stand apparent  Transfers 4  Transfer From 4: Stand to  Transfer to 4: Sit  Technique 4: Stand to sit  Transfer Device 4: Walker  Transfer Level of Assistance 4: Contact guard  Trials/Comments 4: Cues to return to sit    Ambulation/Gait Training  Ambulation/Gait Training Performed: Yes  Ambulation/Gait Training 1  Surface 1: Level tile  Device 1: Rolling walker  Gait Support Devices: Gait belt  Assistance 1: Contact guard  Quality of Gait 1: Narrow base of support, Forward flexed posture, Antalgic, Soft knee(s), Decreased step length  Comments/Distance (ft) 1: Patient exhibited increased thoracic kyphosis and moderate use of UEs on WW to attempt to maintain upright. Slow, cautious gait speed observed with patient fatiguing after ambulating approx 50' in room over two laps from chair to doorway and back.    Stairs  Stairs: No    Extremity/Trunk Assessments:     RLE   RLE : Exceptions to WFL  Strength RLE  R Hip Flexion: 4-/5  R Knee Flexion: 4-/5  R Knee Extension: 4-/5  R Ankle Dorsiflexion: 4/5  R Ankle Plantar Flexion: 4/5  LLE   LLE : Exceptions to WFL  Strength LLE  L Hip Flexion: 4-/5  L Knee Flexion: 4-/5  L Knee Extension: 4-/5  L Ankle Dorsiflexion: 4/5  L Ankle Plantar Flexion: 4/5  Outcome Measures:  [facility] Basic Mobility  Turning from your back to your side while in a flat bed without using bedrails: A little  Moving from lying on your back to sitting on the side of a flat bed without using bedrails: A little  Moving to and from bed to chair (including a wheelchair): A little  Standing up from a chair using your arms (e.g. wheelchair or bedside chair): A little  To walk in hospital room: A little  Climbing 3-5 steps with railing: A lot  Basic Mobility - Total Score:  17    Encounter Problems       Encounter Problems (Active)       IRF PT STG Problem       Patient will transfer supine to sit and sit to supine with independent assist to facilitate mobility.       Start:  07/05/25    Expected End:  07/19/25            Patient will transfer sit to stand and stand to sit with independent assist to facilitate mobility.       Start:  07/05/25    Expected End:  07/19/25            Patient will transfer bed to chair and chair to bed with independent assist to facilitate mobility.       Start:  07/05/25    Expected End:  07/19/25            Patient will amb 125 feet rolling walker device including two turns on even surface with supervision assist to facilitate safe mobility.         Start:  07/05/25    Expected End:  07/19/25            Patient will negotiate 5 stairs with one rail(s) and supervision} assist with least restrictive device for in home and community.       Start:  07/05/25    Expected End:  07/19/25            Patient will tolerate 5 minutes of standing to improve ability to perform MRADLs.       Start:  07/05/25    Expected End:  07/19/25                   Education Documentation  Precautions, taught by Salty Street PT at 7/5/2025 11:37 AM.  Learner: Patient  Readiness: Acceptance  Method: Explanation  Response: Needs Reinforcement  Comment: Educated patient on role of PT in acute setting, benefits of rehab for decreasing risk of falls and maintaining independence.    Body Mechanics, taught by Salty Street PT at 7/5/2025 11:37 AM.  Learner: Patient  Readiness: Acceptance  Method: Explanation  Response: Needs Reinforcement  Comment: Educated patient on role of PT in acute setting, benefits of rehab for decreasing risk of falls and maintaining independence.    Mobility Training, taught by Salty Street PT at 7/5/2025 11:37 AM.  Learner: Patient  Readiness: Acceptance  Method: Explanation  Response: Needs Reinforcement  Comment: Educated patient on role of PT in acute setting,  benefits of rehab for decreasing risk of falls and maintaining independence.    Education Comments  No comments found.

## 2025-07-05 NOTE — CONSULTS
Reason For Consult  Chronic kidney disease stage IV    History Of Present Illness  Tracie Baltazar is a 85 y.o. female with a past medical history of chronic kidney disease stage IV presented to the hospital with syncope, generalized weakness, 1 day of diarrhea.  Patient says at home her blood pressure was 70/30.  Here in the hospital she became very orthostatic blood pressure dropped into the 60s systolic with standing.  Her creatinine is currently at baseline.  We are consulted for management of chronic kidney disease stage IV.     Past Medical History  She has a past medical history of Abnormal ECG, Arthritis, Asthma, CHF (congestive heart failure), Chronic kidney disease, Disease of thyroid gland, Essential (primary) hypertension, Heart murmur, Kidney failure, Leukemia (Multi), and Rheumatoid arthritis.    Surgical History  She has a past surgical history that includes Hysterectomy; Back surgery; Cholecystectomy; Other surgical history; Knee surgery; Shoulder surgery (Left); Total knee arthroplasty (Left, 02/13/2014); Cardiac electrophysiology procedure (Right, 6/2/2024); Cardiac electrophysiology procedure (N/A, 6/3/2024); and Cardiac electrophysiology procedure (Right, 10/14/2024).     Social History  She reports that she has never smoked. She has never been exposed to tobacco smoke. She has never used smokeless tobacco. She reports that she does not currently use alcohol. She reports that she does not use drugs.    Family History  Family History[1]     Allergies  Ibuprofen and Amoxicillin    Review of Systems  10 point review of systems was obtained and is negative other than what is indicated above in the HPI     Physical Exam  General: Awake, alert, no acute distress  Head/ears/nose/throat:  Normocephalic, dry mucous membranes  Respiratory:  Clear to auscultation bilaterally, normal respiratory effort  Cardiovascular:  S1 and S2, no rubs  Gastrointestinal:  Soft, positive bowel sounds, no rebound or  "guarding  Extremities: Mild left lower extremity edema, no cyanosis  Neurologic:  Alert, responsive, cooperative with exam  Skin:  No ulcers noted, dry   Psychiatric: Normal mood and affect         I&O 24HR    Intake/Output Summary (Last 24 hours) at 7/5/2025 1431  Last data filed at 7/5/2025 1330  Gross per 24 hour   Intake 1300 ml   Output 750 ml   Net 550 ml       Vitals 24HR  Heart Rate:  [60-77]   Temp:  [36.1 °C (96.9 °F)-36.7 °C (98 °F)]   Resp:  [14-18]   BP: ()/(42-87)   Height:  [160 cm (5' 2.99\")-160 cm (5' 3\")]   Weight:  [71.7 kg (158 lb)-71.7 kg (158 lb 1.1 oz)]   SpO2:  [95 %-98 %]       Relevant Results  Results for orders placed or performed during the hospital encounter of 07/04/25 (from the past 24 hours)   CBC and Auto Differential   Result Value Ref Range    WBC 23.8 (H) 4.4 - 11.3 x10*3/uL    nRBC 0.0 0.0 - 0.0 /100 WBCs    RBC 3.35 (L) 4.00 - 5.20 x10*6/uL    Hemoglobin 10.6 (L) 12.0 - 16.0 g/dL    Hematocrit 34.3 (L) 36.0 - 46.0 %     (H) 80 - 100 fL    MCH 31.6 26.0 - 34.0 pg    MCHC 30.9 (L) 32.0 - 36.0 g/dL    RDW 13.8 11.5 - 14.5 %    Platelets 174 150 - 450 x10*3/uL    Immature Granulocytes %, Automated 0.3 0.0 - 0.9 %    Immature Granulocytes Absolute, Automated 0.06 0.00 - 0.50 x10*3/uL   Magnesium   Result Value Ref Range    Magnesium 2.35 1.60 - 2.40 mg/dL   Comprehensive metabolic panel   Result Value Ref Range    Glucose 123 (H) 74 - 99 mg/dL    Sodium 142 136 - 145 mmol/L    Potassium 3.2 (L) 3.5 - 5.3 mmol/L    Chloride 106 98 - 107 mmol/L    Bicarbonate 26 21 - 32 mmol/L    Anion Gap 13 10 - 20 mmol/L    Urea Nitrogen 43 (H) 6 - 23 mg/dL    Creatinine 3.37 (H) 0.50 - 1.05 mg/dL    eGFR 13 (L) >60 mL/min/1.73m*2    Calcium 8.9 8.6 - 10.3 mg/dL    Albumin 4.0 3.4 - 5.0 g/dL    Alkaline Phosphatase 101 33 - 136 U/L    Total Protein 6.8 6.4 - 8.2 g/dL    AST 14 9 - 39 U/L    Bilirubin, Total 0.4 0.0 - 1.2 mg/dL    ALT 10 7 - 45 U/L   Troponin I, High Sensitivity, " Initial   Result Value Ref Range    Troponin I, High Sensitivity 8 0 - 13 ng/L   Manual Differential   Result Value Ref Range    Neutrophils %, Manual 22.0 40.0 - 80.0 %    Lymphocytes %, Manual 71.0 13.0 - 44.0 %    Monocytes %, Manual 2.0 2.0 - 10.0 %    Eosinophils %, Manual 3.0 0.0 - 6.0 %    Basophils %, Manual 2.0 0.0 - 2.0 %    Seg Neutrophils Absolute, Manual 5.24 (H) 1.60 - 5.00 x10*3/uL    Lymphocytes Absolute, Manual 16.90 (H) 0.80 - 3.00 x10*3/uL    Monocytes Absolute, Manual 0.48 0.05 - 0.80 x10*3/uL    Eosinophils Absolute, Manual 0.71 (H) 0.00 - 0.40 x10*3/uL    Basophils Absolute, Manual 0.48 (H) 0.00 - 0.10 x10*3/uL    Total Cells Counted 100     RBC Morphology No significant RBC morphology present    Troponin, High Sensitivity, 1 Hour   Result Value Ref Range    Troponin I, High Sensitivity 9 0 - 13 ng/L   TSH with reflex to Free T4 if abnormal   Result Value Ref Range    Thyroid Stimulating Hormone 4.81 (H) 0.44 - 3.98 mIU/L   Thyroxine, Free   Result Value Ref Range    Thyroxine, Free 1.44 (H) 0.61 - 1.12 ng/dL   Sars-CoV-2 PCR   Result Value Ref Range    Coronavirus 2019, PCR Not Detected Not Detected   C. difficile, PCR    Specimen: Stool   Result Value Ref Range    C. difficile, PCR Not Detected Not Detected   Urinalysis with Reflex Culture and Microscopic   Result Value Ref Range    Color, Urine Light-Yellow Light-Yellow, Yellow, Dark-Yellow    Appearance, Urine Clear Clear    Specific Gravity, Urine 1.012 1.005 - 1.035    pH, Urine 6.0 5.0, 5.5, 6.0, 6.5, 7.0, 7.5, 8.0    Protein, Urine 30 (1+) (A) NEGATIVE, 10 (TRACE), 20 (TRACE) mg/dL    Glucose, Urine 30 (TRACE) (A) Normal mg/dL    Blood, Urine 0.06 (1+) (A) NEGATIVE mg/dL    Ketones, Urine NEGATIVE NEGATIVE mg/dL    Bilirubin, Urine NEGATIVE NEGATIVE mg/dL    Urobilinogen, Urine Normal Normal mg/dL    Nitrite, Urine NEGATIVE NEGATIVE    Leukocyte Esterase, Urine 25 Luis M/uL (A) NEGATIVE   Microscopic Only, Urine   Result Value Ref Range     WBC, Urine 1-5 1-5, NONE /HPF    RBC, Urine 3-5 NONE, 1-2, 3-5 /HPF    Squamous Epithelial Cells, Urine 1-9 (SPARSE) Reference range not established. /HPF    Bacteria, Urine 1+ (A) NONE SEEN /HPF    Mucus, Urine FEW Reference range not established. /LPF    Hyaline Casts, Urine 2+ (A) NONE /LPF   Renal Function Panel   Result Value Ref Range    Glucose 102 (H) 74 - 99 mg/dL    Sodium 143 136 - 145 mmol/L    Potassium 3.3 (L) 3.5 - 5.3 mmol/L    Chloride 107 98 - 107 mmol/L    Bicarbonate 27 21 - 32 mmol/L    Anion Gap 12 10 - 20 mmol/L    Urea Nitrogen 43 (H) 6 - 23 mg/dL    Creatinine 3.09 (H) 0.50 - 1.05 mg/dL    eGFR 14 (L) >60 mL/min/1.73m*2    Calcium 8.7 8.6 - 10.3 mg/dL    Phosphorus 4.1 2.5 - 4.9 mg/dL    Albumin 3.7 3.4 - 5.0 g/dL   CBC   Result Value Ref Range    WBC 27.2 (H) 4.4 - 11.3 x10*3/uL    nRBC 0.0 0.0 - 0.0 /100 WBCs    RBC 3.46 (L) 4.00 - 5.20 x10*6/uL    Hemoglobin 10.9 (L) 12.0 - 16.0 g/dL    Hematocrit 35.2 (L) 36.0 - 46.0 %     (H) 80 - 100 fL    MCH 31.5 26.0 - 34.0 pg    MCHC 31.0 (L) 32.0 - 36.0 g/dL    RDW 13.8 11.5 - 14.5 %    Platelets 160 150 - 450 x10*3/uL   Creatine Kinase   Result Value Ref Range    Creatine Kinase 45 0 - 215 U/L          Assessment & Plan  Generalized weakness    CKD (chronic kidney disease) stage 4, GFR 15-29 ml/min (Multi)    Chronic lymphocytic leukemia (Multi)    Paroxysmal atrial fibrillation (Multi)    Diarrhea    Hypokalemia    Syncope    Chronic disease stage IV, creatinine is currently at baseline  Syncopal episode likely secondary to orthostatic hypotension  Orthostatic hypotension  Mild hypokalemia in setting of diarrhea which is now improved    Plan: I agree with holding all antihypertensive medications at this time, potassium was replaced.  Cardiology on board.  She is likely going to need midodrine, await cardiology input.  Thank you for your consultation.    Darnell Amor MD         [1]   Family History  Problem Relation Name Age of Onset     Emphysema Mother      Emphysema Father      Heart attack Sister      Lung cancer Brother          Agent orange    Lymphoma Son Brock     Multiple sclerosis Son Brock     Alcohol abuse Son Brock     Blood Disorder Son Brock     Cancer Son Elvis     Hearing loss Daughter Diann     Miscarriages / Stillbirths Daughter Linda

## 2025-07-05 NOTE — H&P
History of Present Illness  Tracie Baltazar is a 85 y.o. female who presented with Syncope (Pt had two syncopal episodes today, brought in by ems per family. Has cardiac hx) and is admitted for Generalized weakness    Subjective    Reports that she has been progressively having more weakness, however today she had an episode where, after returning from the restroom she had assistance with getting to bed and she felt weak and her vision went dark on the way to bed. She was sat on bed and she slumped forward, but family caught her.     She reports that she has these episodes every so often. She says the most recent episode prior to this one was around 3 weeks prior. She denies and chest pain or shortness of breath and she otherwise feels back to her normal, other than the generalized weakness that has been ongoing    Review of Systems   Eyes:  Positive for visual disturbance.   Neurological:  Positive for syncope, weakness and light-headedness.         History    Tracie Baltazar  has a past medical history of Abnormal ECG, Arthritis, Asthma, CHF (congestive heart failure), Chronic kidney disease, Disease of thyroid gland, Essential (primary) hypertension, Heart murmur, Kidney failure, Leukemia (Multi), and Rheumatoid arthritis.     Surgical History[1]   Patient  reports that she has never smoked. She has never been exposed to tobacco smoke. She has never used smokeless tobacco. She reports that she does not currently use alcohol. She reports that she does not use drugs.     Patient's family history includes Alcohol abuse in her son; Blood Disorder in her son; Cancer in her son; Emphysema in her father and mother; Hearing loss in her daughter; Heart attack in her sister; Lung cancer in her brother; Lymphoma in her son; Miscarriages / Stillbirths in her daughter; Multiple sclerosis in her son.          Objective    Blood pressure 163/58, pulse 61, temperature 36.5 °C (97.7 °F), temperature source Temporal, resp.  "rate 16, height 1.6 m (5' 3\"), weight 71.7 kg (158 lb), SpO2 98%.  Vitals Reviewed: yes  Constitutional: awake and alert, laying back in bed, no acute distress  Eyes: EOMI, normal conjunctiva  HENT: tachy mucus membranes, airway patent  Pulm: no wheezes, no rhonchi, no crackles, no coarse breath sounds  Cardiac: reg rate, regular rhythm, no murmur, pacemaker right chest  MSK: trace lower extremity edema  Neuro: No focal deficit, oriented, CN II-XII grossly intact  Psych: Cooperative, appropriate affect, appropriate judgement    No intake or output data in the 24 hours ending 07/05/25 0026    Relevant Results  XR knee left 4+ views  Result Date: 7/4/2025  Uncomplicated left total knee arthroplasty without acute osseous abnormality of the left knee.     MACRO: None.   Signed by: John Zepeda 7/4/2025 8:34 PM Dictation workstation:   [id number]    XR chest 1 view  Result Date: 7/4/2025  No airspace consolidation or pleural effusion.   MACRO: None   Signed by: Mohsen Styles 7/4/2025 7:47 PM Dictation workstation:   [id number]      Microbiology  No results found for the last 90 days.      Scheduled medications  Scheduled Medications[2]  Continuous medications  Continuous Medications[3]  PRN medications  PRN Medications[4]        Assessment    Assessment & Plan  Generalized weakness  -PT/OT consulted for evaluation  Syncope  -she has pacemaker, no recorded PCM events per report  -check orthostatics   Paroxysmal atrial fibrillation (Multi)  -Recent reduction in amiodarone dose, continue 100mg amiodarone daily  -Continue Eliquis  Diarrhea  -occurred in ER. She has hx of C diff  -stool studies ordered in ER  CKD (chronic kidney disease) stage 4, GFR 15-29 ml/min (Multi)  -follows with Dr BRITTNEY Amor, consulted  -Cr stable  Chronic lymphocytic leukemia (Multi)  -WBCs stable on labs  Hypokalemia  -s/p gentle repletion in ER, monitor and replete as needed    DVT prophylaxis: Eliquis  Disposition: Obs  DNR/DNI. Reports her " youngest son, Elvis, is HPOA if required      Divya Slater         [1]   Past Surgical History:  Procedure Laterality Date    BACK SURGERY      Back Surgery    CARDIAC ELECTROPHYSIOLOGY PROCEDURE Right 6/2/2024    Procedure: Temporary Pacemaker Insertion;  Surgeon: Javier Steel DO;  Location: [facility];  Service: Cardiovascular;  Laterality: Right;  pacemaker line in place sutured in with 2-0 silk    CARDIAC ELECTROPHYSIOLOGY PROCEDURE N/A 6/3/2024    Procedure: PPM IMPLANT DUAL;  Surgeon: Timmy Chisholm MD;  Location: [facility];  Service: Electrophysiology;  Laterality: N/A;  costa    CARDIAC ELECTROPHYSIOLOGY PROCEDURE Right 10/14/2024    Procedure: PPM Pocket Revision;  Surgeon: Timmy Chisholm MD;  Location: [facility];  Service: Electrophysiology;  Laterality: Right;  pre cert by corporate [employer]-rep notified    CHOLECYSTECTOMY      Cholecystectomy    HYSTERECTOMY      Hysterectomy    KNEE SURGERY      arthroscopic surgery?    OTHER SURGICAL HISTORY      Shoulder Surgery Left    SHOULDER SURGERY Left     Left shoulder impingement surgery    TOTAL KNEE ARTHROPLASTY Left 02/13/2014   [2] amiodarone, 100 mg, oral, Daily  amLODIPine, 5 mg, oral, Daily  apixaban, 2.5 mg, oral, BID  atorvastatin, 40 mg, oral, Nightly  furosemide, 40 mg, oral, Daily  gabapentin, 300 mg, oral, BID  levothyroxine, 150 mcg, oral, Daily     [3]    [4] PRN medications: melatonin

## 2025-07-05 NOTE — PROGRESS NOTES
Tracie Baltazar is a 85 y.o. female on day 0 of admission presenting with Generalized weakness.      Subjective   Patient feeling better today.  Does not complain of significant dizziness       Objective     Last Recorded Vitals  /68 (BP Location: Left arm, Patient Position: Sitting)   Pulse 60   Temp 36.5 °C (97.7 °F) (Temporal)   Resp 18   Wt 71.7 kg (158 lb 1.1 oz)   SpO2 98%   Intake/Output last 3 Shifts:    Intake/Output Summary (Last 24 hours) at 7/5/2025 1231  Last data filed at 7/5/2025 1204  Gross per 24 hour   Intake 712.5 ml   Output 750 ml   Net -37.5 ml       Admission Weight  Weight: 71.7 kg (158 lb) (07/04/25 1818)    Daily Weight  07/04/25 : 71.7 kg (158 lb 1.1 oz)    Image Results  XR knee left 4+ views  Narrative: Interpreted By:  John Zepeda,   STUDY:  XR KNEE LEFT 4+ VIEWS; ;  7/4/2025 8:27 pm      INDICATION:  Signs/Symptoms:pain.          COMPARISON:  None.      ACCESSION NUMBER(S):  [id number]      ORDERING CLINICIAN:  DEVON SALDANA      FINDINGS:  There is a left total knee arthroplasty. The hardware is intact  without periprosthetic lucency or fracture.  No significant joint  effusion.      Impression: Uncomplicated left total knee arthroplasty without acute osseous  abnormality of the left knee.          MACRO:  None.      Signed by: John Zepeda 7/4/2025 8:34 PM  Dictation workstation:   [id number]  XR chest 1 view  Narrative: Interpreted By:  Mohsen Styles,   STUDY:  XR CHEST 1 VIEW;  7/4/2025 7:28 pm      INDICATION:  Signs/Symptoms:cp.      COMPARISON:  3/13/2025      ACCESSION NUMBER(S):  [id number]      ORDERING CLINICIAN:  DEVON SALDANA      FINDINGS:  There is stable right side cardiac generator and leads. The cardiac  silhouette is stable in size. Atherosclerotic calcification of the  thoracic aorta. Mild basilar atelectasis. No pleural effusion. No  pneumothorax. Spinal stimulator leads.      Impression: No airspace consolidation or pleural effusion.       MACRO:  None      Signed by: Mohsen Styles 7/4/2025 7:47 PM  Dictation workstation:   [id number]      Physical Exam  Generally no acute distress  HEENT PERRL EOMI  Cardiovascular S1-S2 regular rate rhythm  Lungs clear to auscultation bilaterally  Abdomen nontender nondistended bowel sounds present  Extremities no clubbing cyanosis edema    Relevant Results  Scheduled medications  Scheduled Medications[1]  Continuous medications  Continuous Medications[2]  PRN medications  PRN Medications[3]  Results for orders placed or performed during the hospital encounter of 07/04/25 (from the past 24 hours)   CBC and Auto Differential   Result Value Ref Range    WBC 23.8 (H) 4.4 - 11.3 x10*3/uL    nRBC 0.0 0.0 - 0.0 /100 WBCs    RBC 3.35 (L) 4.00 - 5.20 x10*6/uL    Hemoglobin 10.6 (L) 12.0 - 16.0 g/dL    Hematocrit 34.3 (L) 36.0 - 46.0 %     (H) 80 - 100 fL    MCH 31.6 26.0 - 34.0 pg    MCHC 30.9 (L) 32.0 - 36.0 g/dL    RDW 13.8 11.5 - 14.5 %    Platelets 174 150 - 450 x10*3/uL    Immature Granulocytes %, Automated 0.3 0.0 - 0.9 %    Immature Granulocytes Absolute, Automated 0.06 0.00 - 0.50 x10*3/uL   Magnesium   Result Value Ref Range    Magnesium 2.35 1.60 - 2.40 mg/dL   Comprehensive metabolic panel   Result Value Ref Range    Glucose 123 (H) 74 - 99 mg/dL    Sodium 142 136 - 145 mmol/L    Potassium 3.2 (L) 3.5 - 5.3 mmol/L    Chloride 106 98 - 107 mmol/L    Bicarbonate 26 21 - 32 mmol/L    Anion Gap 13 10 - 20 mmol/L    Urea Nitrogen 43 (H) 6 - 23 mg/dL    Creatinine 3.37 (H) 0.50 - 1.05 mg/dL    eGFR 13 (L) >60 mL/min/1.73m*2    Calcium 8.9 8.6 - 10.3 mg/dL    Albumin 4.0 3.4 - 5.0 g/dL    Alkaline Phosphatase 101 33 - 136 U/L    Total Protein 6.8 6.4 - 8.2 g/dL    AST 14 9 - 39 U/L    Bilirubin, Total 0.4 0.0 - 1.2 mg/dL    ALT 10 7 - 45 U/L   Troponin I, High Sensitivity, Initial   Result Value Ref Range    Troponin I, High Sensitivity 8 0 - 13 ng/L   Manual Differential   Result Value Ref Range    Neutrophils  %, Manual 22.0 40.0 - 80.0 %    Lymphocytes %, Manual 71.0 13.0 - 44.0 %    Monocytes %, Manual 2.0 2.0 - 10.0 %    Eosinophils %, Manual 3.0 0.0 - 6.0 %    Basophils %, Manual 2.0 0.0 - 2.0 %    Seg Neutrophils Absolute, Manual 5.24 (H) 1.60 - 5.00 x10*3/uL    Lymphocytes Absolute, Manual 16.90 (H) 0.80 - 3.00 x10*3/uL    Monocytes Absolute, Manual 0.48 0.05 - 0.80 x10*3/uL    Eosinophils Absolute, Manual 0.71 (H) 0.00 - 0.40 x10*3/uL    Basophils Absolute, Manual 0.48 (H) 0.00 - 0.10 x10*3/uL    Total Cells Counted 100     RBC Morphology No significant RBC morphology present    Troponin, High Sensitivity, 1 Hour   Result Value Ref Range    Troponin I, High Sensitivity 9 0 - 13 ng/L   TSH with reflex to Free T4 if abnormal   Result Value Ref Range    Thyroid Stimulating Hormone 4.81 (H) 0.44 - 3.98 mIU/L   Thyroxine, Free   Result Value Ref Range    Thyroxine, Free 1.44 (H) 0.61 - 1.12 ng/dL   Sars-CoV-2 PCR   Result Value Ref Range    Coronavirus 2019, PCR Not Detected Not Detected   C. difficile, PCR    Specimen: Stool   Result Value Ref Range    C. difficile, PCR Not Detected Not Detected   Urinalysis with Reflex Culture and Microscopic   Result Value Ref Range    Color, Urine Light-Yellow Light-Yellow, Yellow, Dark-Yellow    Appearance, Urine Clear Clear    Specific Gravity, Urine 1.012 1.005 - 1.035    pH, Urine 6.0 5.0, 5.5, 6.0, 6.5, 7.0, 7.5, 8.0    Protein, Urine 30 (1+) (A) NEGATIVE, 10 (TRACE), 20 (TRACE) mg/dL    Glucose, Urine 30 (TRACE) (A) Normal mg/dL    Blood, Urine 0.06 (1+) (A) NEGATIVE mg/dL    Ketones, Urine NEGATIVE NEGATIVE mg/dL    Bilirubin, Urine NEGATIVE NEGATIVE mg/dL    Urobilinogen, Urine Normal Normal mg/dL    Nitrite, Urine NEGATIVE NEGATIVE    Leukocyte Esterase, Urine 25 Luis M/uL (A) NEGATIVE   Microscopic Only, Urine   Result Value Ref Range    WBC, Urine 1-5 1-5, NONE /HPF    RBC, Urine 3-5 NONE, 1-2, 3-5 /HPF    Squamous Epithelial Cells, Urine 1-9 (SPARSE) Reference range not  established. /HPF    Bacteria, Urine 1+ (A) NONE SEEN /HPF    Mucus, Urine FEW Reference range not established. /LPF    Hyaline Casts, Urine 2+ (A) NONE /LPF   Renal Function Panel   Result Value Ref Range    Glucose 102 (H) 74 - 99 mg/dL    Sodium 143 136 - 145 mmol/L    Potassium 3.3 (L) 3.5 - 5.3 mmol/L    Chloride 107 98 - 107 mmol/L    Bicarbonate 27 21 - 32 mmol/L    Anion Gap 12 10 - 20 mmol/L    Urea Nitrogen 43 (H) 6 - 23 mg/dL    Creatinine 3.09 (H) 0.50 - 1.05 mg/dL    eGFR 14 (L) >60 mL/min/1.73m*2    Calcium 8.7 8.6 - 10.3 mg/dL    Phosphorus 4.1 2.5 - 4.9 mg/dL    Albumin 3.7 3.4 - 5.0 g/dL   CBC   Result Value Ref Range    WBC 27.2 (H) 4.4 - 11.3 x10*3/uL    nRBC 0.0 0.0 - 0.0 /100 WBCs    RBC 3.46 (L) 4.00 - 5.20 x10*6/uL    Hemoglobin 10.9 (L) 12.0 - 16.0 g/dL    Hematocrit 35.2 (L) 36.0 - 46.0 %     (H) 80 - 100 fL    MCH 31.5 26.0 - 34.0 pg    MCHC 31.0 (L) 32.0 - 36.0 g/dL    RDW 13.8 11.5 - 14.5 %    Platelets 160 150 - 450 x10*3/uL   Creatine Kinase   Result Value Ref Range    Creatine Kinase 45 0 - 215 U/L     *Note: Due to a large number of results and/or encounters for the requested time period, some results have not been displayed. A complete set of results can be found in Results Review.     Impression: 85-year-old woman presents with weakness and orthostasis.    Plan:    1.  Weakness  - Physical therapy evaluated, low intensity no further PT    2.  Orthostasis  - Rechecking orthostatics today, looking at vitals including orthostatics, hypotension did not cause a reflexive tachycardic response.  Given the lack of tachycardia especially with orthostatics I am wondering if there is a component of some blunting of that response.    I consulted cardiology given this finding and the symptoms for any further recommendations or workup in house.  She does follow with the EP as an outpatient.    3.  Paroxysmal atrial fibrillation and sick sinus syndrome  - Continue Eliquis  - Continue  amiodarone    4.  CLL  - White blood cell is stable and consistent with previous labs    5.  Chronic kidney disease stage IV  - Follows with Dr. Amor, nephrology has been consulted    6.  Diarrhea  - Patient only endorses 1 episode in the ED, this has resolved.  Can likely discontinue isolation if this remains.                                     Amanuel Gil MD           [1] amiodarone, 100 mg, oral, Daily  [Held by provider] amLODIPine, 5 mg, oral, Daily  apixaban, 2.5 mg, oral, BID  atorvastatin, 40 mg, oral, Nightly  [Held by provider] furosemide, 40 mg, oral, Daily  gabapentin, 300 mg, oral, BID  levothyroxine, 150 mcg, oral, Daily  potassium chloride CR, 20 mEq, oral, Once  [2]    [3] PRN medications: melatonin

## 2025-07-06 VITALS
RESPIRATION RATE: 18 BRPM | WEIGHT: 158.07 LBS | HEART RATE: 60 BPM | DIASTOLIC BLOOD PRESSURE: 59 MMHG | BODY MASS INDEX: 28.01 KG/M2 | OXYGEN SATURATION: 98 % | TEMPERATURE: 97.7 F | SYSTOLIC BLOOD PRESSURE: 145 MMHG | HEIGHT: 63 IN

## 2025-07-06 LAB
ALBUMIN SERPL BCP-MCNC: 3.6 G/DL (ref 3.4–5)
ANION GAP SERPL CALCULATED.3IONS-SCNC: 13 MMOL/L (ref 10–20)
BACTERIA UR CULT: NORMAL
BUN SERPL-MCNC: 41 MG/DL (ref 6–23)
CALCIUM SERPL-MCNC: 8.6 MG/DL (ref 8.6–10.3)
CHLORIDE SERPL-SCNC: 105 MMOL/L (ref 98–107)
CO2 SERPL-SCNC: 25 MMOL/L (ref 21–32)
CREAT SERPL-MCNC: 3 MG/DL (ref 0.5–1.05)
EGFRCR SERPLBLD CKD-EPI 2021: 15 ML/MIN/1.73M*2
ERYTHROCYTE [DISTWIDTH] IN BLOOD BY AUTOMATED COUNT: 13.8 % (ref 11.5–14.5)
GLUCOSE SERPL-MCNC: 117 MG/DL (ref 74–99)
HCT VFR BLD AUTO: 32.8 % (ref 36–46)
HGB BLD-MCNC: 10.1 G/DL (ref 12–16)
MCH RBC QN AUTO: 31.4 PG (ref 26–34)
MCHC RBC AUTO-ENTMCNC: 30.8 G/DL (ref 32–36)
MCV RBC AUTO: 102 FL (ref 80–100)
NRBC BLD-RTO: 0 /100 WBCS (ref 0–0)
PHOSPHATE SERPL-MCNC: 3.6 MG/DL (ref 2.5–4.9)
PLATELET # BLD AUTO: 152 X10*3/UL (ref 150–450)
POTASSIUM SERPL-SCNC: 3.5 MMOL/L (ref 3.5–5.3)
RBC # BLD AUTO: 3.22 X10*6/UL (ref 4–5.2)
SODIUM SERPL-SCNC: 139 MMOL/L (ref 136–145)
WBC # BLD AUTO: 24.5 X10*3/UL (ref 4.4–11.3)

## 2025-07-06 PROCEDURE — 2500000001 HC RX 250 WO HCPCS SELF ADMINISTERED DRUGS (ALT 637 FOR MEDICARE OP): Performed by: STUDENT IN AN ORGANIZED HEALTH CARE EDUCATION/TRAINING PROGRAM

## 2025-07-06 PROCEDURE — 2500000004 HC RX 250 GENERAL PHARMACY W/ HCPCS (ALT 636 FOR OP/ED): Performed by: INTERNAL MEDICINE

## 2025-07-06 PROCEDURE — 80069 RENAL FUNCTION PANEL: CPT | Performed by: STUDENT IN AN ORGANIZED HEALTH CARE EDUCATION/TRAINING PROGRAM

## 2025-07-06 PROCEDURE — 99239 HOSP IP/OBS DSCHRG MGMT >30: CPT | Performed by: INTERNAL MEDICINE

## 2025-07-06 PROCEDURE — 36415 COLL VENOUS BLD VENIPUNCTURE: CPT | Performed by: STUDENT IN AN ORGANIZED HEALTH CARE EDUCATION/TRAINING PROGRAM

## 2025-07-06 PROCEDURE — 99222 1ST HOSP IP/OBS MODERATE 55: CPT | Performed by: INTERNAL MEDICINE

## 2025-07-06 PROCEDURE — 2500000002 HC RX 250 W HCPCS SELF ADMINISTERED DRUGS (ALT 637 FOR MEDICARE OP, ALT 636 FOR OP/ED): Performed by: STUDENT IN AN ORGANIZED HEALTH CARE EDUCATION/TRAINING PROGRAM

## 2025-07-06 PROCEDURE — 85027 COMPLETE CBC AUTOMATED: CPT | Performed by: STUDENT IN AN ORGANIZED HEALTH CARE EDUCATION/TRAINING PROGRAM

## 2025-07-06 RX ADMIN — SODIUM CHLORIDE 500 ML: 900 INJECTION, SOLUTION INTRAVENOUS at 09:16

## 2025-07-06 RX ADMIN — AMIODARONE HYDROCHLORIDE 100 MG: 200 TABLET ORAL at 09:15

## 2025-07-06 RX ADMIN — LEVOTHYROXINE SODIUM 150 MCG: 0.15 TABLET ORAL at 06:41

## 2025-07-06 RX ADMIN — APIXABAN 2.5 MG: 2.5 TABLET, FILM COATED ORAL at 09:15

## 2025-07-06 RX ADMIN — GABAPENTIN 300 MG: 300 CAPSULE ORAL at 09:15

## 2025-07-06 ASSESSMENT — COGNITIVE AND FUNCTIONAL STATUS - GENERAL
MOVING FROM LYING ON BACK TO SITTING ON SIDE OF FLAT BED WITH BEDRAILS: A LITTLE
HELP NEEDED FOR BATHING: A LITTLE
TURNING FROM BACK TO SIDE WHILE IN FLAT BAD: A LITTLE
STANDING UP FROM CHAIR USING ARMS: A LOT
DRESSING REGULAR UPPER BODY CLOTHING: A LITTLE
TOILETING: A LITTLE
WALKING IN HOSPITAL ROOM: A LOT
MOVING TO AND FROM BED TO CHAIR: A LITTLE
MOBILITY SCORE: 15
DRESSING REGULAR LOWER BODY CLOTHING: A LITTLE
CLIMB 3 TO 5 STEPS WITH RAILING: A LOT

## 2025-07-06 ASSESSMENT — ENCOUNTER SYMPTOMS
SHORTNESS OF BREATH: 0
DYSPNEA ON EXERTION: 0
WEIGHT LOSS: 0
WHEEZING: 0
ORTHOPNEA: 0
PND: 0
CLAUDICATION: 0
FEVER: 0
WEAKNESS: 0
COUGH: 0
WEIGHT GAIN: 0
NEAR-SYNCOPE: 0
IRREGULAR HEARTBEAT: 0
SYNCOPE: 1
PALPITATIONS: 0
DIZZINESS: 0
DIAPHORESIS: 0
MYALGIAS: 0

## 2025-07-06 ASSESSMENT — PAIN - FUNCTIONAL ASSESSMENT
PAIN_FUNCTIONAL_ASSESSMENT: 0-10
PAIN_FUNCTIONAL_ASSESSMENT: 0-10

## 2025-07-06 ASSESSMENT — PAIN SCALES - GENERAL
PAINLEVEL_OUTOF10: 0 - NO PAIN
PAINLEVEL_OUTOF10: 0 - NO PAIN

## 2025-07-06 NOTE — CONSULTS
Inpatient consult to Cardiology  Consult performed by: Aidan Wright DO  Consult ordered by: Amanuel Gil MD  Reason for consult: Syncope        History Of Present Illness:    Tracie Baltazar is a 85 y.o. female presenting with syncope.  She has a history of stage IV chronic kidney disease.  She also has a history of atrial fibrillation and sick sinus syndrome with a [place] dual-chamber pacemaker placed in June 2024.  She follows with Dr. Chisholm.  She is on amiodarone.  Her last pacemaker interrogation shows 0% atrial for burden, she is 96% right atrially paced.  When she was seen last amiodarone was reduced to 100 mg daily.  She has been having trouble with lightheadedness which has affected her ambulation lately.  She was sitting outside with her granddaughter yesterday.  When she came inside she was dizzy and had difficulty negotiating the steps to come in.  She said she blacked out.  She was brought to the emergency room for evaluation and had similar episodes of lightheadedness while ambulating.  She was admitted.  Her EKG shows an atrially paced rhythm with prolonged AV conduction.  Her laboratory analysis shows a stable stage IV chronic kidney disease.  Troponins are normal.    Review of Systems   Constitutional: Negative for diaphoresis, fever, weight gain and weight loss.   Eyes:  Negative for visual disturbance.   Cardiovascular:  Positive for syncope. Negative for chest pain, claudication, dyspnea on exertion, irregular heartbeat, leg swelling, near-syncope, orthopnea, palpitations and paroxysmal nocturnal dyspnea.   Respiratory:  Negative for cough, shortness of breath and wheezing.    Musculoskeletal:  Negative for muscle weakness and myalgias.   Neurological:  Negative for dizziness and weakness.   All other systems reviewed and are negative.         Last Recorded Vitals:  Vitals:    07/05/25 1903 07/05/25 2302 07/06/25 0356 07/06/25 0754   BP: 144/62 159/61 176/66 145/59   BP  Location: Left arm Left arm Left arm Left arm   Patient Position: Lying Lying Lying Lying   Pulse: 62 61 66 60   Resp: 17 17 19 18   Temp: 36.6 °C (97.9 °F) 36.6 °C (97.9 °F) 36.7 °C (98.1 °F) 36.5 °C (97.7 °F)   TempSrc: Temporal Temporal Temporal Temporal   SpO2: 98% 95% 97% 98%   Weight:       Height:           Last Labs:  CBC - 7/6/2025:  4:40 AM  24.5 10.1 152    32.8      CMP - 7/6/2025:  4:40 AM  8.6 6.8 14 --- 0.4   3.6 3.6 10 101      PTT - 3/13/2025:  5:20 AM  1.1   12.2 26.4     Troponin I, High Sensitivity   Date/Time Value Ref Range Status   07/04/2025 07:15 PM 9 0 - 13 ng/L Final   07/04/2025 06:26 PM 8 0 - 13 ng/L Final   10/04/2024 07:57 PM 11 0 - 13 ng/L Final     Hemoglobin A1C   Date/Time Value Ref Range Status   03/14/2025 08:46 AM 5.1 See comment % Final   12/19/2019 10:50 AM 5.4 4.0 - 6.0 % Final     Comment:     Hemoglobin A1C levels are related to mean blood glucose during the   preceding 2-3 months. The relationship table below may be used as a   general guide. Each 1% increase in HGB A1C is a reflection of an   increase in mean glucose of approximately 30 mg/dl.   Reference: Diabetes Care, volume 29, supplement 1 Jan. 2006                        HGB A1C ................. Approx. Mean Glucose   _______________________________________________   6%   ...............................  120 mg/dl   7%   ...............................  150 mg/dl   8%   ...............................  180 mg/dl   9%   ...............................  210 mg/dl   10%  ...............................  240 mg/dl  Performed at [address] [address]       LDL Calculated   Date/Time Value Ref Range Status   03/14/2025 08:46  (H) <=99 mg/dL Final     Comment:                                 Near   Borderline      AGE      Desirable  Optimal    High     High     Very High     0-19 Y     0 - 109     ---    110-129   >/= 130     ----    20-24 Y     0 - 119     ---    120-159   >/= 160      ----      >24 Y     0 -  99   100-129  130-159   160-189     >/=190     08/13/2024 11:37  65 - 130 mg/dL Final   06/03/2024 04:23 AM 70 65 - 130 mg/dL Final     VLDL   Date/Time Value Ref Range Status   03/14/2025 08:46 AM 21 0 - 40 mg/dL Final      Last I/O:  I/O last 3 completed shifts:  In: 1300 (18.1 mL/kg) [P.O.:800; IV Piggyback:500]  Out: 1850 (25.8 mL/kg) [Urine:1850 (0.7 mL/kg/hr)]  Weight: 71.7 kg     Past Cardiology Tests (Last 3 Years):  EKG:  ECG 12 lead 10/04/2024      ECG 12 Lead 06/01/2024      ECG 12 lead 12/24/2023    Echo:  Transthoracic Echo (TTE) Complete 12/15/2023    Ejection Fractions:  EF   Date/Time Value Ref Range Status   12/15/2023 08:53 AM 56       Cath:  Cardiac Catheterization Procedure 06/02/2024    Stress Test:  Nuclear Stress Test 12/26/2023    Cardiac Imaging:  No results found for this or any previous visit from the past 1095 days.      Past Medical History:  She has a past medical history of Abnormal ECG, Arthritis, Asthma, CHF (congestive heart failure), Chronic kidney disease, Disease of thyroid gland, Essential (primary) hypertension, Heart murmur, Kidney failure, Leukemia (Multi), and Rheumatoid arthritis.    Past Surgical History:  She has a past surgical history that includes Hysterectomy; Back surgery; Cholecystectomy; Other surgical history; Knee surgery; Shoulder surgery (Left); Total knee arthroplasty (Left, 02/13/2014); Cardiac electrophysiology procedure (Right, 6/2/2024); Cardiac electrophysiology procedure (N/A, 6/3/2024); and Cardiac electrophysiology procedure (Right, 10/14/2024).      Social History:  She reports that she has never smoked. She has never been exposed to tobacco smoke. She has never used smokeless tobacco. She reports that she does not currently use alcohol. She reports that she does not use drugs.    Family History:  Family History[1]     Allergies:  Ibuprofen and Amoxicillin    Inpatient Medications:  Scheduled Medications[2]  PRN  Medications[3]  Continuous Medications[4]  Outpatient Medications:  Current Outpatient Medications   Medication Instructions    amiodarone (PACERONE) 100 mg, oral, Daily    amLODIPine (NORVASC) 5 mg, oral, Daily    apixaban (ELIQUIS) 2.5 mg, oral, 2 times daily, To facility: Please hold until June 8 due to hematoma around the pacemaker site.    atorvastatin (LIPITOR) 40 mg, oral, Nightly    cholecalciferol (Vitamin D-3) 50 MCG (2000 UT) tablet Take 1 tablet (2,000 Units) by mouth once daily.    cyanocobalamin (Vitamin B-12) 100 mcg tablet Take 1 tablet (100 mcg) by mouth once daily.    furosemide (LASIX) 40 mg, oral, Daily    gabapentin (NEURONTIN) 300 mg, oral, 2 times daily    levothyroxine (Synthroid, Levoxyl) 150 mcg tablet TAKE 1 TABLET BY MOUTH ONCE DAILY IN THE MORNING AS DIRECTED    ondansetron ODT (ZOFRAN-ODT) 4 mg, oral, Every 8 hours PRN       Physical Exam:   Gen: NAD   Neck: no JVD, carotid upstroke is brisk and without delay   Heart: rrr, s1s2+ no mrg   Lungs: CTA   Ext: warm no edema     Assessment/Plan   Syncope: I feel that intravascular volume depletion was likely the biggest contributor to orthostatic hypotension here.  She has received 500 cc lactated Ringer's thus far.  I am going to give her another 500 cc normal saline.  With resting hypertension and systolic pressures in the 150s to 170s without medication I do not feel that midodrine is ideal.  Holding her furosemide for the time being.  Her last echocardiogram was in 2023 it would be reasonable to repeat one.   Intravascular volume depletion: Sitting outside for a decent amount of time likely led to some insensible loss and contributed to this.  Will give volume as discussed above and reassess.  Atrial fibrillation: Would continue amiodarone as it is likely suppressing atrial fibrillation.  This should not affect blood pressure.  Dual-chamber pacemaker in situ: I do not suspect pacemaker issues here.  Would be reasonable to expedite her  follow-up with Dr. Chisholm however so that she is seen in the next 3 to 4 weeks.      Site Assessment Clean;Dry;Intact 07/05/25 2100   Dressing Status Clean;Dry;Occlusive 07/05/25 2100   Number of days: 2       Code Status:  DNR and No Intubation      Aidan Wright DO       [1]   Family History  Problem Relation Name Age of Onset    Emphysema Mother      Emphysema Father      Heart attack Sister      Lung cancer Brother          Agent orange    Lymphoma Son Brock     Multiple sclerosis Son Brock     Alcohol abuse Son Brock     Blood Disorder Son Brock     Cancer Son Elvis     Hearing loss Daughter Diann     Miscarriages / Stillbirths Daughter Linda    [2]   Scheduled medications   Medication Dose Route Frequency    amiodarone  100 mg oral Daily    apixaban  2.5 mg oral BID    atorvastatin  40 mg oral Nightly    [Held by provider] furosemide  40 mg oral Daily    gabapentin  300 mg oral BID    levothyroxine  150 mcg oral Daily   [3]   PRN medications   Medication    acetaminophen    melatonin   [4]   Continuous Medications   Medication Dose Last Rate

## 2025-07-06 NOTE — CARE PLAN
The patient's goals for the shift include      The clinical goals for the shift include montior vitals, safety, see consult    Problem: Pain - Adult  Goal: Verbalizes/displays adequate comfort level or baseline comfort level  Outcome: Progressing     Problem: Safety - Adult  Goal: Free from fall injury  Outcome: Progressing     Problem: Discharge Planning  Goal: Discharge to home or other facility with appropriate resources  Outcome: Progressing     Problem: Chronic Conditions and Co-morbidities  Goal: Patient's chronic conditions and co-morbidity symptoms are monitored and maintained or improved  Outcome: Progressing     Problem: Nutrition  Goal: Nutrient intake appropriate for maintaining nutritional needs  Outcome: Progressing     Problem: Skin  Goal: Decreased wound size/increased tissue granulation at next dressing change  Outcome: Progressing  Goal: Participates in plan/prevention/treatment measures  Outcome: Progressing  Goal: Prevent/manage excess moisture  Outcome: Progressing  Goal: Prevent/minimize sheer/friction injuries  Outcome: Progressing  Goal: Promote/optimize nutrition  Outcome: Progressing  Goal: Promote skin healing  Outcome: Progressing     Problem: Fall/Injury  Goal: Not fall by end of shift  Outcome: Progressing  Goal: Be free from injury by end of the shift  Outcome: Progressing  Goal: Verbalize understanding of personal risk factors for fall in the hospital  Outcome: Progressing  Goal: Verbalize understanding of risk factor reduction measures to prevent injury from fall in the home  Outcome: Progressing  Goal: Use assistive devices by end of the shift  Outcome: Progressing  Goal: Pace activities to prevent fatigue by end of the shift  Outcome: Progressing

## 2025-07-06 NOTE — PROGRESS NOTES
Patient's creatinine is stable at baseline, I agree with Cardiology that since she is hypertensive at rest I would not start midodrine however her blood pressure should be managed in this situation based more on standing readings especially given her age and symptoms of syncope before she came in therefore I would continue to hold her antihypertensive medications since she becomes hypotensive with standing.    Darnell Amor MD

## 2025-07-07 ENCOUNTER — ANCILLARY PROCEDURE (OUTPATIENT)
Facility: CLINIC | Age: 85
End: 2025-07-07
Payer: MEDICARE

## 2025-07-07 ENCOUNTER — PATIENT OUTREACH (OUTPATIENT)
Dept: PRIMARY CARE | Facility: CLINIC | Age: 85
End: 2025-07-07
Payer: MEDICARE

## 2025-07-07 DIAGNOSIS — I48.0 PAROXYSMAL ATRIAL FIBRILLATION (MULTI): ICD-10-CM

## 2025-07-07 DIAGNOSIS — Z95.0 PACEMAKER: ICD-10-CM

## 2025-07-07 DIAGNOSIS — I49.5 SICK SINUS SYNDROME (MULTI): ICD-10-CM

## 2025-07-07 LAB — PATH REVIEW-CBC DIFFERENTIAL: NORMAL

## 2025-07-07 NOTE — DISCHARGE SUMMARY
Discharge Diagnosis  Presyncope/syncope           Issues Requiring Follow-Up  Cardiology follow-up    Discharge Meds     Medication List      PAUSE taking these medications     amLODIPine 5 mg tablet; Wait to take this until your doctor or other   care provider tells you to start again.; Commonly known as: Norvasc; Take   1 tablet by mouth once daily   furosemide 20 mg tablet; Wait to take this until: July 9, 2025; Commonly   known as: Lasix; Take 2 tablets (40 mg) by mouth once daily.     CONTINUE taking these medications     amiodarone 200 mg tablet; Commonly known as: Pacerone; Take 0.5 tablets   (100 mg) by mouth once daily.   apixaban 2.5 mg tablet; Commonly known as: Eliquis; Take 1 tablet (2.5   mg) by mouth 2 times a day. To facility: Please hold until June 8 due to   hematoma around the pacemaker site.   atorvastatin 40 mg tablet; Commonly known as: Lipitor; Take 1 tablet (40   mg) by mouth once daily at bedtime.   cholecalciferol 50 mcg (2,000 units) tablet; Commonly known as: Vitamin   D-3   gabapentin 300 mg capsule; Commonly known as: Neurontin; Take 1 capsule   by mouth twice daily   levothyroxine 150 mcg tablet; Commonly known as: Synthroid, Levoxyl;   TAKE 1 TABLET BY MOUTH ONCE DAILY IN THE MORNING AS DIRECTED   ondansetron ODT 4 mg disintegrating tablet; Commonly known as:   Zofran-ODT; Take 1 tablet (4 mg) by mouth every 8 hours if needed for   nausea or vomiting.   Vitamin B-12 100 mcg tablet; Generic drug: cyanocobalamin       Test Results Pending At Discharge  Pending Labs       Order Current Status    Extra Urine Gray Tube Collected (07/05/25 0346)    Pathologist Review-CBC Differential In process    Urinalysis with Reflex Culture and Microscopic In process            Hospital Course   Patient was admitted to hospital with presyncopal episode and orthostasis.  Patient was given IV fluids.  Patient with history of chronic kidney disease as well.  Nephrology and cardiology were consulted.  Renal  function returned to baseline.  Cardiology was consulted and patient's cardiologist Dr. Wright was actually on service and saw her.  Patient does have a pacemaker secondary to sick sinus syndrome.  Patient was initially given midodrine but this was discontinued on discharge and cardiology felt that her symptoms most likely were secondary to dehydration.  Interrogation of the pacemaker revealed no abnormal rhythm.  Nephrology did recommend holding blood pressure medications given her presyncope and lower blood pressures and recommended using standing blood pressures as a gauge for reinitiation.  This will be followed up by her outpatient cardiologist and/or primary care physician.  Patient should also follow-up with Dr. Chisholm if she continues to have these episodes and return to the hospital if they become severe.  Patient was in stable and improved condition on day of discharge with stable labs and vital signs.    Pertinent Physical Exam At Time of Discharge  Physical Exam  Generally no acute distress  HEENT PERRL EOMI  Cardiovascular S1-S2 regular rate rhythm  Lungs clear to auscultation  Abdomen nontender bowel sounds present  Extremities no clubbing cyanosis or edema  Outpatient Follow-Up  Future Appointments   Date Time Provider Department Center   7/10/2025  4:30 PM Javier Floyd MD [id number] [facility]   8/7/2025 10:30 AM Aidan Wright DO [id number] [facility]   9/9/2025 11:30 AM Lydia Collado PA-C [id number] [facility]   12/4/2025 10:15 AM DEISY [id number] [facility] [id number] [facility]   12/4/2025 10:45 AM Timmy Chisholm MD [id number] [facility]     Total time spent in discharge was 35 minutes    Amanuel Gil MD

## 2025-07-07 NOTE — PROGRESS NOTES
Discharge Facility: [facility]  Discharge Diagnosis: Syncope and collapse   Admission Date: 07/04/2025  Discharge Date: 07/06/2025    PCP Appointment Date: 07/10/2025, scheduled by this CM  Specialist Appointment Date: Cardio 08/07/2025, 12/04/2025, Hem/Onc 09/09/2025  Hospital Encounter and Summary Linked: Yes  ED to Hosp-Admission (Discharged) with Amanuel Gil MD; Rudi Vigil MD; Divya Slater MD (07/04/2025)     See discharge assessment below for further details    Wrap Up  Wrap Up Additional Comments: CM spoke to patients daughter Linda via phone. She states that her mother is doing okay at home. PCP follow up appointment has been scheduled by this CM for 07/10/2025. She has been given this CM's contact information and is encouraged to call with any questions. She was very thankful for this call and assistance. (7/7/2025 10:40 AM)    Medications  Medications reviewed with patient/caregiver?: Yes (7/7/2025 10:40 AM)  Is the patient having any side effects they believe may be caused by any medication additions or changes?: No (7/7/2025 10:40 AM)  Does the patient have all medications ordered at discharge?: Yes (7/7/2025 10:40 AM)  Prescription Comments: No new scripts given at discharge (7/7/2025 10:40 AM)  Is the patient taking all medications as directed (includes completed medication regime)?: Yes (7/7/2025 10:40 AM)  Medication Comments: Linda denies any issues affording medication (7/7/2025 10:40 AM)    Appointments  Does the patient have a primary care provider?: Yes (7/7/2025 10:40 AM)  Care Management Interventions: -- (07/10/2025, scheduled by this CM) (7/7/2025 10:40 AM)  Has the patient kept scheduled appointments due by today?: Yes (7/7/2025 10:40 AM)    Self Management  What is the home health agency?: N/A (7/7/2025 10:40 AM)  What Durable Medical Equipment (DME) was ordered?: N/A (7/7/2025 10:40 AM)    Patient Teaching  Does the patient have access to their discharge  instructions?: Yes (7/7/2025 10:40 AM)  Care Management Interventions: Reviewed instructions with patient (7/7/2025 10:40 AM)  What is the patient's perception of their health status since discharge?: Improving (7/7/2025 10:40 AM)  Is the patient/caregiver able to teach back the hierarchy of who to call/visit for symptoms/problems? PCP, Specialist, Home Health nurse, Urgent Care, ED, 911: Yes (7/7/2025 10:40 AM)

## 2025-07-08 RX ORDER — DULOXETIN HYDROCHLORIDE 20 MG/1
CAPSULE, DELAYED RELEASE ORAL
Qty: 90 CAPSULE | Refills: 0 | OUTPATIENT
Start: 2025-07-08

## 2025-07-10 ENCOUNTER — TELEPHONE (OUTPATIENT)
Dept: INPATIENT UNIT | Facility: HOSPITAL | Age: 85
End: 2025-07-10

## 2025-07-10 ENCOUNTER — OFFICE VISIT (OUTPATIENT)
Dept: PRIMARY CARE | Facility: CLINIC | Age: 85
End: 2025-07-10
Payer: MEDICARE

## 2025-07-10 VITALS
BODY MASS INDEX: 29.63 KG/M2 | DIASTOLIC BLOOD PRESSURE: 60 MMHG | HEART RATE: 60 BPM | TEMPERATURE: 97.2 F | HEIGHT: 62 IN | SYSTOLIC BLOOD PRESSURE: 134 MMHG | WEIGHT: 161 LBS | OXYGEN SATURATION: 98 %

## 2025-07-10 DIAGNOSIS — E66.01 MORBID OBESITY (MULTI): ICD-10-CM

## 2025-07-10 DIAGNOSIS — I10 BENIGN ESSENTIAL HYPERTENSION: Primary | ICD-10-CM

## 2025-07-10 DIAGNOSIS — R09.82 POSTNASAL DRIP: ICD-10-CM

## 2025-07-10 DIAGNOSIS — R57.0 CARDIOGENIC SHOCK (MULTI): ICD-10-CM

## 2025-07-10 DIAGNOSIS — J96.01 ACUTE RESPIRATORY FAILURE WITH HYPOXIA: ICD-10-CM

## 2025-07-10 PROCEDURE — 3075F SYST BP GE 130 - 139MM HG: CPT | Performed by: FAMILY MEDICINE

## 2025-07-10 PROCEDURE — 99495 TRANSJ CARE MGMT MOD F2F 14D: CPT | Performed by: FAMILY MEDICINE

## 2025-07-10 PROCEDURE — 1036F TOBACCO NON-USER: CPT | Performed by: FAMILY MEDICINE

## 2025-07-10 PROCEDURE — 1159F MED LIST DOCD IN RCRD: CPT | Performed by: FAMILY MEDICINE

## 2025-07-10 PROCEDURE — 1111F DSCHRG MED/CURRENT MED MERGE: CPT | Performed by: FAMILY MEDICINE

## 2025-07-10 PROCEDURE — 3078F DIAST BP <80 MM HG: CPT | Performed by: FAMILY MEDICINE

## 2025-07-10 PROCEDURE — 1125F AMNT PAIN NOTED PAIN PRSNT: CPT | Performed by: FAMILY MEDICINE

## 2025-07-10 ASSESSMENT — PAIN SCALES - GENERAL: PAINLEVEL_OUTOF10: 3

## 2025-07-10 NOTE — PROGRESS NOTES
"Subjective   Patient ID: Tracie Baltazar is a 85 y.o. female who presents for Follow-up ([facility] 7/4/2025 for syncope).    HPI here for hospital follow-up.  Patient was admitted to the hospital on 7/4/25 and discharged 7/6/2025.  She was presented to the ED via squad after she passed out at home.  She had been in the bathroom using the toilet and she stood up and passed out.  While being transported she had a systolic blood pressure of about 70.  On arrival to the ED she continued to have a systolic pressure of about 70.  Patient has a history of hypertension and atrial fibrillation.  She is on amiodarone 200 mg, half tablet daily as well as amlodipine 5 mg daily, furosemide 20 mg, 2 tablets daily.  While hospitalized the amlodipine was held.  Patient presents today with a normal blood pressure.  She takes her blood pressure routinely at home 3-4 times weekly.  Patient is also complaining of postnasal drip.  Review of Systems  Constitution: Patient is negative for fever, fatigue, weight change.  HEENT: Patient is positive for postnasal drip.  Patient is negative for change in vision, hearing, swallow.  Cardio: Patient is negative for chest pain, lower extremity edema.  Pulmonary: Patient is negative for cough, shortness of breath.    Objective   /60 (BP Location: Left arm, Patient Position: Sitting)   Pulse 60   Temp 36.2 °C (97.2 °F)   Ht 1.575 m (5' 2\")   Wt 73 kg (161 lb)   SpO2 98%   BMI 29.45 kg/m²     Physical Exam  General: Awake alert no apparent distress.  HEENT: Moist oral mucosa cancer lymphadenopathy.  Cardio: Heart S1-S2 no murmur rub or gallop.  Pulm: Lungs clear to auscultation bilaterally.    Assessment/Plan   Problem List Items Addressed This Visit           ICD-10-CM    Benign essential hypertension - Primary needs better control.  Discontinue amlodipine.  Patient has follow-up with cardiology in about a month.  The cardiologist will have final say as to the permanent " disposition of amlodipine use.  Also patient is furosemide has been held and will be reinstituted in the near future.  If patient's blood pressure falls again consider reducing the dose of furosemide. I10     Other Visit Diagnoses         Codes      Postnasal drip    needs better control.  Patient advised to use OTC antihistamine. R09.82

## 2025-07-13 DIAGNOSIS — I48.0 PAROXYSMAL ATRIAL FIBRILLATION (MULTI): ICD-10-CM

## 2025-07-14 ENCOUNTER — PATIENT OUTREACH (OUTPATIENT)
Dept: PRIMARY CARE | Facility: CLINIC | Age: 85
End: 2025-07-14
Payer: MEDICARE

## 2025-07-14 NOTE — TELEPHONE ENCOUNTER
NOV: 08/07/2025    Requested Prescriptions     Pending Prescriptions Disp Refills    Eliquis 2.5 mg tablet [Pharmacy Med Name: Eliquis 2.5 MG Oral Tablet] 60 tablet 0     Sig: Take 1 tablet by mouth twice daily

## 2025-07-14 NOTE — PROGRESS NOTES
Confirmation of at least 2 patient identifiers.    Completed telephonic follow-up with patient after recent visit with Dr. Floyd      Spoke to Spouse/significant other Linda during outreach call.    Patient reports feeling: Improved    Patient has questions or concerns about medications: No    Have all prescribed medications been filled? Yes    Patient has necessary resources to manage their care? NA    Patient has questions or concerns? No    Next care management follow-up approximately within one month.  Care  information provided to patient.

## 2025-07-15 RX ORDER — APIXABAN 2.5 MG/1
2.5 TABLET, FILM COATED ORAL 2 TIMES DAILY
Qty: 60 TABLET | Refills: 0 | Status: SHIPPED | OUTPATIENT
Start: 2025-07-15

## 2025-07-15 NOTE — DOCUMENTATION CLARIFICATION NOTE
PATIENT:               MARK GABRIEL  ACCT #:                  [id number]  MRN:                       [id number]  :                       1940  ADMIT DATE:       2025 6:14 PM  DISCH DATE:        2025 1:35 PM  RESPONDING PROVIDER #:        [id number]          PROVIDER RESPONSE TEXT:    Syncope a result of dehydration 2/2 diarrhea    CDI QUERY TEXT:    Clarification    Instruction:    Based on your assessment of the patient and the clinical information, please provide the requested documentation by clicking on the appropriate radio button and enter any additional information if prompted.    Question: Please clarify the etiology of syncope after workup    When answering this query, please exercise your independent professional judgment. The fact that a question is being asked, does not imply that any particular answer is desired or expected.    The patient's clinical indicators include:  Clinical Information: 85y.o. F admitted for Syncope, diarrhea, weakness, A fib & Hypokalemia.     ED: 85 y.o. female presents for syncope & weakness, syncope likely vasovagal as it happened after a BM. She has leukocytosis, however has known leukemia & this is lower than baseline. Syncope & collapse, debility     H&P: Generalized weakness, Syncope, has pacemaker, no recorded PCM events per report. Paroxysmal atrial fibrillation, diarrhea, CKD stage 4, Chronic lymphocytic leukemia, Hypokalemia.     Nephro: presented with syncope, weakness & 1 day of diarrhea. Patient says at home BP was 70/30. Here in the hospital, she became orthostatic, BP dropped into the 60s systolic with standing. Chronic disease stage IV, creatinine is at baseline. Syncopal episode likely secondary to orthostatic hypotension, mild hypokalemia in setting of diarrhea, improved     PN: Rechecking orthostatics today, looking at vitals, hypotension did not cause a reflexive tachycardic response.     Cards: She has a hx atrial fibrillation  & sick sinus syndrome s/p dual-chamber pacemaker. Syncope: I feel that intravascular volume depletion was likely the biggest contributor to orthostatic hypotension.    7/6 D/C: Presyncope/syncope. Patient does have a pacemaker secondary to sick sinus syndrome. Patient was initially given midodrine but this was discontinued on d/c and cardiology felt her symptoms most likely were secondary to dehydration.    Clinical Indicators:  7/5 Orthostatics  p. 60, 150/56 MAP 87, Lying  p. 66, 114/56 MAP 75, Sitting  p. 77,   69/43 MAP 52, Standing    p. 60, 159/60 MAP 93, Lying x5 mins  p. 60, 121/49 MAP 73, Sitting  p. 64, 124/42 MAP 69, Standing x1 min    7/4 WBC 23.8, Cr 3.37, GFR 13, BUN 43, K 3.2  7/5 WBC 27.2, Cr 3.09, GFR 14, BUN 43, K 3.3  7/6 WBC 24.5, Cr 3.00, GFR 15, BUN 41, K 3.5    Treatment:  7/5-7/6 LR & NS x1L  7/4-7/5 Klor-Con 10-20 mEq po x2 doses  7/5-7/6 Amiodarone 100mg po x2 doses  7/5-7/6 Gabapentin 300mg po x4 doses    Risk Factors: HTN, CKD stage 4, chronic Leukemia  Options provided:  -- Syncope likely related to Hypkalemia 2/2 dehydration  -- Syncope a result of dehydration 2/2 diarrhea  -- Syncope due to orthostatic hypotension  -- Syncope due to Paroxysmal Atrial fib  -- Syncope related to adverse effects from prescribed Gabapentin usage  -- Other - I will add my own diagnosis  -- Refer to Clinical Documentation Reviewer    Query created by: Anna Cook on 7/7/2025 8:25 PM      Electronically signed by:  EZE RODRIGUEZ MD 7/15/2025 4:49 PM

## 2025-07-29 LAB
ALBUMIN SERPL-MCNC: 4.2 G/DL (ref 3.6–5.1)
ALP SERPL-CCNC: 107 U/L (ref 37–153)
ALT SERPL-CCNC: 10 U/L (ref 6–29)
ANION GAP SERPL CALCULATED.4IONS-SCNC: 13 MMOL/L (CALC) (ref 7–17)
AST SERPL-CCNC: 13 U/L (ref 10–35)
BILIRUB SERPL-MCNC: 0.3 MG/DL (ref 0.2–1.2)
BUN SERPL-MCNC: 44 MG/DL (ref 7–25)
CALCIUM SERPL-MCNC: 9.1 MG/DL (ref 8.6–10.4)
CHLORIDE SERPL-SCNC: 106 MMOL/L (ref 98–110)
CO2 SERPL-SCNC: 23 MMOL/L (ref 20–32)
CREAT SERPL-MCNC: 3.02 MG/DL (ref 0.6–0.95)
EGFRCR SERPLBLD CKD-EPI 2021: 15 ML/MIN/1.73M2
GLUCOSE SERPL-MCNC: 91 MG/DL (ref 65–99)
MAGNESIUM SERPL-MCNC: 2.4 MG/DL (ref 1.5–2.5)
POTASSIUM SERPL-SCNC: 4.2 MMOL/L (ref 3.5–5.3)
PROT SERPL-MCNC: 6.6 G/DL (ref 6.1–8.1)
SODIUM SERPL-SCNC: 142 MMOL/L (ref 135–146)
TSH SERPL-ACNC: 2.54 MIU/L (ref 0.4–4.5)

## 2025-07-31 ENCOUNTER — APPOINTMENT (OUTPATIENT)
Dept: ORTHOPEDIC SURGERY | Facility: CLINIC | Age: 85
End: 2025-07-31
Payer: MEDICARE

## 2025-08-07 ENCOUNTER — APPOINTMENT (OUTPATIENT)
Facility: CLINIC | Age: 85
End: 2025-08-07
Payer: MEDICARE

## 2025-08-10 DIAGNOSIS — I48.0 PAROXYSMAL ATRIAL FIBRILLATION (MULTI): ICD-10-CM

## 2025-08-12 RX ORDER — APIXABAN 2.5 MG/1
2.5 TABLET, FILM COATED ORAL 2 TIMES DAILY
Qty: 60 TABLET | Refills: 0 | Status: SHIPPED | OUTPATIENT
Start: 2025-08-12

## 2025-08-28 ENCOUNTER — OFFICE VISIT (OUTPATIENT)
Facility: CLINIC | Age: 85
End: 2025-08-28
Payer: MEDICARE

## 2025-08-28 VITALS
BODY MASS INDEX: 30.36 KG/M2 | DIASTOLIC BLOOD PRESSURE: 76 MMHG | WEIGHT: 166 LBS | SYSTOLIC BLOOD PRESSURE: 178 MMHG | OXYGEN SATURATION: 97 % | HEART RATE: 59 BPM

## 2025-08-28 DIAGNOSIS — E78.5 DYSLIPIDEMIA: Primary | ICD-10-CM

## 2025-08-28 DIAGNOSIS — I50.33 CONGESTIVE HEART FAILURE WITH LEFT VENTRICULAR DIASTOLIC DYSFUNCTION, ACUTE ON CHRONIC: ICD-10-CM

## 2025-08-28 DIAGNOSIS — R01.1 HEART MURMUR: ICD-10-CM

## 2025-08-28 DIAGNOSIS — I10 BENIGN ESSENTIAL HYPERTENSION: ICD-10-CM

## 2025-08-28 DIAGNOSIS — I48.0 PAROXYSMAL ATRIAL FIBRILLATION (MULTI): ICD-10-CM

## 2025-08-28 DIAGNOSIS — R00.1 BRADYCARDIA: ICD-10-CM

## 2025-08-28 PROCEDURE — 1126F AMNT PAIN NOTED NONE PRSNT: CPT | Performed by: INTERNAL MEDICINE

## 2025-08-28 PROCEDURE — 99212 OFFICE O/P EST SF 10 MIN: CPT

## 2025-08-28 PROCEDURE — 99214 OFFICE O/P EST MOD 30 MIN: CPT | Performed by: INTERNAL MEDICINE

## 2025-08-28 PROCEDURE — 1159F MED LIST DOCD IN RCRD: CPT | Performed by: INTERNAL MEDICINE

## 2025-08-28 PROCEDURE — 3077F SYST BP >= 140 MM HG: CPT | Performed by: INTERNAL MEDICINE

## 2025-08-28 PROCEDURE — 1036F TOBACCO NON-USER: CPT | Performed by: INTERNAL MEDICINE

## 2025-08-28 PROCEDURE — 3078F DIAST BP <80 MM HG: CPT | Performed by: INTERNAL MEDICINE

## 2025-08-28 RX ORDER — FUROSEMIDE 20 MG/1
20 TABLET ORAL DAILY
Qty: 20 TABLET | Refills: 11 | Status: SHIPPED | OUTPATIENT
Start: 2025-08-28

## 2025-08-28 RX ORDER — AMLODIPINE BESYLATE 5 MG/1
5 TABLET ORAL 2 TIMES DAILY
Qty: 90 TABLET | Refills: 1 | Status: SHIPPED | OUTPATIENT
Start: 2025-08-28

## 2025-08-28 ASSESSMENT — ENCOUNTER SYMPTOMS
DEPRESSION: 0
OCCASIONAL FEELINGS OF UNSTEADINESS: 1
WEIGHT LOSS: 0
DIAPHORESIS: 0
CLAUDICATION: 0
IRREGULAR HEARTBEAT: 0
SHORTNESS OF BREATH: 0
LOSS OF SENSATION IN FEET: 0
SYNCOPE: 0
FEVER: 0
WEAKNESS: 0
WEIGHT GAIN: 0
DIZZINESS: 0
PND: 0
NEAR-SYNCOPE: 0
WHEEZING: 0
ORTHOPNEA: 0
PALPITATIONS: 0
COUGH: 0
MYALGIAS: 0
DYSPNEA ON EXERTION: 0

## 2025-08-28 ASSESSMENT — PAIN SCALES - GENERAL: PAINLEVEL_OUTOF10: 0-NO PAIN

## 2025-08-28 ASSESSMENT — LIFESTYLE VARIABLES: TOTAL SCORE: 0

## 2025-09-04 LAB
CHOLEST SERPL-MCNC: 204 MG/DL
CHOLEST/HDLC SERPL: 3.6 (CALC)
HDLC SERPL-MCNC: 57 MG/DL
LDLC SERPL CALC-MCNC: 121 MG/DL (CALC)
NONHDLC SERPL-MCNC: 147 MG/DL (CALC)
TRIGL SERPL-MCNC: 143 MG/DL

## 2025-09-05 DIAGNOSIS — S06.330A: ICD-10-CM

## 2025-09-05 RX ORDER — ATORVASTATIN CALCIUM 80 MG/1
80 TABLET, FILM COATED ORAL NIGHTLY
Qty: 30 TABLET | Refills: 11 | Status: SHIPPED | OUTPATIENT
Start: 2025-09-05 | End: 2025-10-05

## 2025-09-23 ENCOUNTER — APPOINTMENT (OUTPATIENT)
Dept: GERIATRIC MEDICINE | Facility: CLINIC | Age: 85
End: 2025-09-23
Payer: MEDICARE

## (undated) DEVICE — INTRODUCER KIT, MINI-STICK MAX, 4FR STIFFENED, 21G X 7CM ECHO NEEDLE NITINOL

## (undated) DEVICE — INTRODUCER SYSTEM, PRELUDE SNAP, SPLITTABLE, HEMOSTATIC, 6FR

## (undated) DEVICE — GUIDEWIRE, ANGLE TIP,  .035 DIA, 180 CM, 3 CM TIP"

## (undated) DEVICE — ENVELOPE, ANTIBACTERIAL, AIGIS RX TYRX, ABSORBABLE, MED

## (undated) DEVICE — TRAY, PACIING ELECTRODE, 5 FR 110CM

## (undated) DEVICE — SUTURE, V-LOC, 3.0, 12 IN, 90ABS, VIOLET

## (undated) DEVICE — SHEATH, PINNACLE, W/.038 GUIDEWIRE, 10 CM,  6FR INTRODUCER, 6FR DIA, 2.5 CM DIALATOR

## (undated) DEVICE — Device

## (undated) DEVICE — PHOTONBLADE

## (undated) DEVICE — PAD, ELECTRODE DEFIB PADPRO ADULT STRL W/ADAPTER

## (undated) DEVICE — PACK, ANGIO P2, CUSTOM, LAKE

## (undated) DEVICE — VEIN PICK, ACC SUP, 6541

## (undated) DEVICE — MICROINTRODUCER KIT, VSI, 4FR X 40CM, STIFFEN

## (undated) DEVICE — GUIDEWIRE, J TIP, 3 MM, 0.035 IN X 180 CM, PTFE

## (undated) DEVICE — DRESSING, MEPILEX BORDER, POST-OP AG, 4 X 6 IN

## (undated) DEVICE — SUTURE, V-LOC, 4-0, 12 IN, P-12, 90 ABS

## (undated) DEVICE — COVER, EQUIPMENT, ZERO GRAVITY